# Patient Record
Sex: MALE | Race: WHITE | NOT HISPANIC OR LATINO | Employment: OTHER | URBAN - METROPOLITAN AREA
[De-identification: names, ages, dates, MRNs, and addresses within clinical notes are randomized per-mention and may not be internally consistent; named-entity substitution may affect disease eponyms.]

---

## 2018-04-05 LAB
ALBUMIN SERPL BCP-MCNC: 4.5 G/DL (ref 3.5–5.7)
ALP SERPL-CCNC: 52 IU/L (ref 55–165)
ALT SERPL W P-5'-P-CCNC: 19 IU/L (ref 10–35)
ANION GAP SERPL CALCULATED.3IONS-SCNC: 12.6 MM/L
AST SERPL W P-5'-P-CCNC: 13 U/L (ref 8–27)
BASOPHILS # BLD AUTO: 0.1 X3/UL (ref 0–0.3)
BASOPHILS # BLD AUTO: 1.2 % (ref 0–2)
BILIRUB SERPL-MCNC: 0.6 MG/DL (ref 0.3–1)
BILIRUB UR QL STRIP: NEGATIVE
BNP SERPL-MCNC: 9 PG/ML (ref 1–100)
BUN SERPL-MCNC: 17 MG/DL (ref 7–25)
CALCIUM SERPL-MCNC: 9.6 MG/DL (ref 8.6–10.5)
CHLORIDE SERPL-SCNC: 105 MM/L (ref 98–107)
CHOLEST SERPL-MCNC: 220 MG/DL (ref 0–200)
CLARITY UR: CLEAR
CO2 SERPL-SCNC: 26 MM/L (ref 21–31)
COLOR UR: YELLOW
CREAT SERPL-MCNC: 1.11 MG/DL (ref 0.7–1.3)
DEPRECATED RDW RBC AUTO: 13.6 %
EGFR (HISTORICAL): > 60 GFR
EGFR AFRICAN AMERICAN (HISTORICAL): > 60 GFR
EOSINOPHIL # BLD AUTO: 0.2 X3/UL (ref 0–0.5)
EOSINOPHIL NFR BLD AUTO: 4 % (ref 0–5)
ERYTHROCYTE SEDIMENTATION RATE (HISTORICAL): 2 MM/HR
EST. AVERAGE GLUCOSE BLD GHB EST-MCNC: 142 MG/DL
GLUCOSE (HISTORICAL): 130 MG/DL (ref 65–99)
GLUCOSE UR STRIP-MCNC: NEGATIVE MG/DL
HBA1C MFR BLD HPLC: 6.6 % (ref 4–6.2)
HCT VFR BLD AUTO: 48.7 % (ref 42–52)
HDLC SERPL-MCNC: 42 MG/DL (ref 40–60)
HGB BLD-MCNC: 16.8 G/DL (ref 14–18)
HGB UR QL STRIP.AUTO: NEGATIVE
KETONES UR STRIP-MCNC: NEGATIVE MG/DL
LDLC SERPL CALC-MCNC: 129.8 MG/DL (ref 75–193)
LEUKOCYTE ESTERASE UR QL STRIP: NEGATIVE
LYMPHOCYTES # BLD AUTO: 1.6 X3/UL (ref 1.2–4.2)
LYMPHOCYTES NFR BLD AUTO: 30.1 % (ref 20.5–51.1)
MCH RBC QN AUTO: 30.2 PG (ref 26–34)
MCHC RBC AUTO-ENTMCNC: 34.6 G/DL (ref 31–37)
MCV RBC AUTO: 87.4 FL (ref 81–99)
MONOCYTES # BLD AUTO: 0.5 X3/UL (ref 0–1)
MONOCYTES NFR BLD AUTO: 8.5 % (ref 1.7–12)
NEUTROPHILS # BLD AUTO: 3 X3/UL (ref 1.4–6.5)
NEUTS SEG NFR BLD AUTO: 56.2 % (ref 42.2–75.2)
NITRITE UR QL STRIP: NEGATIVE
OSMOLALITY, SERUM (HISTORICAL): 281 MOSM (ref 262–291)
PH UR STRIP.AUTO: 5.5 [PH] (ref 4.5–8)
PLATELET # BLD AUTO: 167 X3/UL (ref 130–400)
PMV BLD AUTO: 9.1 FL
POTASSIUM SERPL-SCNC: 4.6 MM/L (ref 3.5–5.5)
PROT UR STRIP-MCNC: NEGATIVE MG/DL
PSA (HISTORICAL): 1.96 NG/ML (ref 0–4)
RBC # BLD AUTO: 5.57 X6/UL (ref 4.3–5.9)
SODIUM SERPL-SCNC: 139 MM/L (ref 134–143)
SP GR UR STRIP.AUTO: 1.02 (ref 1–1.03)
TOTAL PROTEIN (HISTORICAL): 6.9 G/DL (ref 6.4–8.9)
TRIGL SERPL-MCNC: 240 MG/DL (ref 44–166)
TSH SERPL DL<=0.05 MIU/L-ACNC: 0.58 UIU/M (ref 0.45–5.33)
UROBILINOGEN UR QL STRIP.AUTO: 0.2 EU/DL (ref 0.2–8)
VLDL CHOLESTEROL (HISTORICAL): 48 MG/DL (ref 5–51)
WBC # BLD AUTO: 5.4 X3/UL (ref 4.8–10.8)

## 2018-07-19 DIAGNOSIS — E78.5 HYPERLIPIDEMIA, UNSPECIFIED HYPERLIPIDEMIA TYPE: Primary | ICD-10-CM

## 2018-07-20 RX ORDER — SIMVASTATIN 20 MG
20 TABLET ORAL
Qty: 90 TABLET | Refills: 3 | Status: SHIPPED | OUTPATIENT
Start: 2018-07-20 | End: 2019-12-13 | Stop reason: ALTCHOICE

## 2018-08-15 PROBLEM — R06.00 DYSPNEA: Status: ACTIVE | Noted: 2018-08-15

## 2018-08-15 PROBLEM — E78.00 HIGH CHOLESTEROL: Status: ACTIVE | Noted: 2018-08-15

## 2018-08-15 PROBLEM — I10 HIGH BLOOD PRESSURE: Status: ACTIVE | Noted: 2018-08-15

## 2018-08-30 DIAGNOSIS — E11.9 TYPE 2 DIABETES MELLITUS WITHOUT COMPLICATION, WITHOUT LONG-TERM CURRENT USE OF INSULIN (HCC): Primary | ICD-10-CM

## 2019-01-16 ENCOUNTER — TELEPHONE (OUTPATIENT)
Dept: FAMILY MEDICINE CLINIC | Facility: CLINIC | Age: 69
End: 2019-01-16

## 2019-01-16 DIAGNOSIS — R73.09 ELEVATED GLUCOSE LEVEL: Primary | ICD-10-CM

## 2019-01-16 DIAGNOSIS — E11.8 TYPE 2 DIABETES MELLITUS WITH COMPLICATION, WITH LONG-TERM CURRENT USE OF INSULIN (HCC): ICD-10-CM

## 2019-01-16 DIAGNOSIS — R73.03 PRE-DIABETES: Primary | ICD-10-CM

## 2019-01-16 DIAGNOSIS — Z79.4 TYPE 2 DIABETES MELLITUS WITH COMPLICATION, WITH LONG-TERM CURRENT USE OF INSULIN (HCC): ICD-10-CM

## 2019-01-17 ENCOUNTER — TRANSCRIBE ORDERS (OUTPATIENT)
Dept: ADMINISTRATIVE | Facility: HOSPITAL | Age: 69
End: 2019-01-17

## 2019-01-17 ENCOUNTER — APPOINTMENT (OUTPATIENT)
Dept: LAB | Facility: HOSPITAL | Age: 69
End: 2019-01-17
Attending: INTERNAL MEDICINE
Payer: COMMERCIAL

## 2019-01-17 DIAGNOSIS — R73.03 PRE-DIABETES: ICD-10-CM

## 2019-01-17 DIAGNOSIS — R73.03 PRE-DIABETES: Primary | ICD-10-CM

## 2019-01-17 LAB
ALBUMIN SERPL BCP-MCNC: 4.4 G/DL (ref 3.5–5.7)
ALP SERPL-CCNC: 55 U/L (ref 55–165)
ALT SERPL W P-5'-P-CCNC: 19 U/L (ref 7–52)
ANION GAP SERPL CALCULATED.3IONS-SCNC: 9 MMOL/L (ref 4–13)
AST SERPL W P-5'-P-CCNC: 19 U/L (ref 13–39)
BASOPHILS # BLD AUTO: 0.1 THOUSANDS/ΜL (ref 0–0.1)
BASOPHILS NFR BLD AUTO: 1 % (ref 0–1)
BILIRUB SERPL-MCNC: 0.6 MG/DL (ref 0.2–1)
BILIRUB UR QL STRIP: NEGATIVE
BUN SERPL-MCNC: 18 MG/DL (ref 7–25)
CALCIUM SERPL-MCNC: 9.2 MG/DL (ref 8.6–10.5)
CHLORIDE SERPL-SCNC: 102 MMOL/L (ref 98–107)
CLARITY UR: CLEAR
CO2 SERPL-SCNC: 25 MMOL/L (ref 21–31)
COLOR UR: YELLOW
CREAT SERPL-MCNC: 1.02 MG/DL (ref 0.7–1.3)
EOSINOPHIL # BLD AUTO: 0.2 THOUSAND/ΜL (ref 0–0.61)
EOSINOPHIL NFR BLD AUTO: 3 % (ref 0–6)
ERYTHROCYTE [DISTWIDTH] IN BLOOD BY AUTOMATED COUNT: 13.5 % (ref 11.6–15.1)
EST. AVERAGE GLUCOSE BLD GHB EST-MCNC: 148 MG/DL
GFR SERPL CREATININE-BSD FRML MDRD: 76 ML/MIN/1.73SQ M
GLUCOSE P FAST SERPL-MCNC: 142 MG/DL (ref 65–99)
GLUCOSE UR STRIP-MCNC: NEGATIVE MG/DL
HBA1C MFR BLD: 6.8 % (ref 4.2–6.3)
HCT VFR BLD AUTO: 50.8 % (ref 42–52)
HGB BLD-MCNC: 17.1 G/DL (ref 12–17)
HGB UR QL STRIP.AUTO: NEGATIVE
KETONES UR STRIP-MCNC: NEGATIVE MG/DL
LEUKOCYTE ESTERASE UR QL STRIP: NEGATIVE
LYMPHOCYTES # BLD AUTO: 1.9 THOUSANDS/ΜL (ref 0.6–4.47)
LYMPHOCYTES NFR BLD AUTO: 29 % (ref 14–44)
MCH RBC QN AUTO: 29.8 PG (ref 26.8–34.3)
MCHC RBC AUTO-ENTMCNC: 33.6 G/DL (ref 31.4–37.4)
MCV RBC AUTO: 89 FL (ref 82–98)
MONOCYTES # BLD AUTO: 0.6 THOUSAND/ΜL (ref 0.17–1.22)
MONOCYTES NFR BLD AUTO: 10 % (ref 4–12)
NEUTROPHILS # BLD AUTO: 3.8 THOUSANDS/ΜL (ref 1.85–7.62)
NEUTS SEG NFR BLD AUTO: 58 % (ref 43–75)
NITRITE UR QL STRIP: NEGATIVE
NRBC BLD AUTO-RTO: 0 /100 WBCS
PH UR STRIP.AUTO: 6 [PH] (ref 5–8)
PLATELET # BLD AUTO: 168 THOUSANDS/UL (ref 149–390)
PMV BLD AUTO: 9.2 FL (ref 8.9–12.7)
POTASSIUM SERPL-SCNC: 4.5 MMOL/L (ref 3.5–5.5)
PROT SERPL-MCNC: 6.8 G/DL (ref 6.4–8.9)
PROT UR STRIP-MCNC: NEGATIVE MG/DL
RBC # BLD AUTO: 5.73 MILLION/UL (ref 3.88–5.62)
SODIUM SERPL-SCNC: 136 MMOL/L (ref 134–143)
SP GR UR STRIP.AUTO: 1.02 (ref 1–1.03)
UROBILINOGEN UR QL STRIP.AUTO: 0.2 E.U./DL
WBC # BLD AUTO: 6.5 THOUSAND/UL (ref 4.31–10.16)

## 2019-01-17 PROCEDURE — 80053 COMPREHEN METABOLIC PANEL: CPT

## 2019-01-17 PROCEDURE — 83036 HEMOGLOBIN GLYCOSYLATED A1C: CPT

## 2019-01-17 PROCEDURE — 85025 COMPLETE CBC W/AUTO DIFF WBC: CPT

## 2019-01-17 PROCEDURE — 36415 COLL VENOUS BLD VENIPUNCTURE: CPT

## 2019-01-17 PROCEDURE — 81003 URINALYSIS AUTO W/O SCOPE: CPT

## 2019-01-23 ENCOUNTER — OFFICE VISIT (OUTPATIENT)
Dept: FAMILY MEDICINE CLINIC | Facility: CLINIC | Age: 69
End: 2019-01-23
Payer: COMMERCIAL

## 2019-01-23 VITALS
RESPIRATION RATE: 16 BRPM | OXYGEN SATURATION: 99 % | BODY MASS INDEX: 35.79 KG/M2 | WEIGHT: 214.8 LBS | SYSTOLIC BLOOD PRESSURE: 133 MMHG | TEMPERATURE: 97.1 F | DIASTOLIC BLOOD PRESSURE: 80 MMHG | HEART RATE: 86 BPM | HEIGHT: 65 IN

## 2019-01-23 DIAGNOSIS — E11.9 TYPE 2 DIABETES MELLITUS WITHOUT COMPLICATION, WITHOUT LONG-TERM CURRENT USE OF INSULIN (HCC): ICD-10-CM

## 2019-01-23 DIAGNOSIS — Z01.00 DIABETIC EYE EXAM (HCC): ICD-10-CM

## 2019-01-23 DIAGNOSIS — Z79.4 TYPE 2 DIABETES MELLITUS WITH COMPLICATION, WITH LONG-TERM CURRENT USE OF INSULIN (HCC): ICD-10-CM

## 2019-01-23 DIAGNOSIS — Z29.9 PREVENTIVE MEASURE: ICD-10-CM

## 2019-01-23 DIAGNOSIS — E11.8 TYPE 2 DIABETES MELLITUS WITH COMPLICATION, WITH LONG-TERM CURRENT USE OF INSULIN (HCC): ICD-10-CM

## 2019-01-23 DIAGNOSIS — E78.00 HIGH CHOLESTEROL: ICD-10-CM

## 2019-01-23 DIAGNOSIS — E11.9 ENCOUNTER FOR DIABETIC FOOT EXAM (HCC): ICD-10-CM

## 2019-01-23 DIAGNOSIS — E11.9 DIABETIC EYE EXAM (HCC): ICD-10-CM

## 2019-01-23 DIAGNOSIS — I10 ESSENTIAL HYPERTENSION: ICD-10-CM

## 2019-01-23 DIAGNOSIS — Z11.59 ENCOUNTER FOR HEPATITIS C SCREENING TEST FOR LOW RISK PATIENT: Primary | ICD-10-CM

## 2019-01-23 PROCEDURE — 1101F PT FALLS ASSESS-DOCD LE1/YR: CPT | Performed by: INTERNAL MEDICINE

## 2019-01-23 PROCEDURE — 99214 OFFICE O/P EST MOD 30 MIN: CPT | Performed by: INTERNAL MEDICINE

## 2019-01-23 PROCEDURE — 3008F BODY MASS INDEX DOCD: CPT | Performed by: INTERNAL MEDICINE

## 2019-01-23 PROCEDURE — 3079F DIAST BP 80-89 MM HG: CPT | Performed by: INTERNAL MEDICINE

## 2019-01-23 PROCEDURE — 3075F SYST BP GE 130 - 139MM HG: CPT | Performed by: INTERNAL MEDICINE

## 2019-01-23 PROCEDURE — 1036F TOBACCO NON-USER: CPT | Performed by: INTERNAL MEDICINE

## 2019-01-23 PROCEDURE — 1160F RVW MEDS BY RX/DR IN RCRD: CPT | Performed by: INTERNAL MEDICINE

## 2019-01-23 RX ORDER — ASPIRIN 81 MG/1
81 TABLET ORAL DAILY
Qty: 90 TABLET | Refills: 3 | Status: SHIPPED | OUTPATIENT
Start: 2019-01-23 | End: 2020-08-03

## 2019-01-23 NOTE — PROGRESS NOTES
Vitals:    01/23/19 1108   BP: 133/80   Pulse: 86   Resp: 16   Temp: (!) 97 1 °F (36 2 °C)   TempSrc: Tympanic   SpO2: 99%   Weight: 97 4 kg (214 lb 12 8 oz)   Height: 5' 5" (1 651 m)       Assessment/Plan:    Diagnoses and all orders for this visit:    Encounter for hepatitis C screening test for low risk patient  -     Hepatitis C antibody; Future    Encounter for diabetic foot exam ([facility] )  -     Diabetic foot exam; Future    Diabetic eye exam [facility])  -     Ambulatory Referral to Ophthalmology; Future    Type 2 diabetes mellitus with complication, with long-term current use of insulin (HCC)  -     Microalbumin / creatinine urine ratio  -     Lipid panel; Future    Essential hypertension  -     TSH, 3rd generation with Free T4 reflex; Future  -     CBC and differential; Future  -     Urinalysis with reflex to microscopic    High cholesterol    Type 2 diabetes mellitus without complication, without long-term current use of insulin (HCC)    Preventive measure  -     aspirin (ECOTRIN LOW STRENGTH) 81 mg EC tablet; Take 1 tablet (81 mg total) by mouth daily        Subjective:      Patient ID: Alvino Koroma is a 80 y o  male      HPI    The following portions of the patient's history were reviewed and updated as appropriate: allergies, current medications, past family history, past medical history, past social history, past surgical history and problem list       Current Outpatient Prescriptions:     metFORMIN (GLUCOPHAGE) 500 mg tablet, Take 1 tablet (500 mg total) by mouth daily with breakfast, Disp: 90 tablet, Rfl: 1    simvastatin (ZOCOR) 20 mg tablet, Take 1 tablet (20 mg total) by mouth daily at bedtime, Disp: 90 tablet, Rfl: 3    aspirin (ECOTRIN LOW STRENGTH) 81 mg EC tablet, Take 1 tablet (81 mg total) by mouth daily, Disp: 90 tablet, Rfl: 3    Past Medical History:   Diagnosis Date    Arthritis     Dyslipidemia     GERD (gastroesophageal reflux disease)     Nodule of left lung 2007    negative for CA  Type 2 diabetes mellitus without complication, without long-term current use of insulin ([facility] ) 1/23/2019       Past Surgical History:   Procedure Laterality Date    FOOT TENDON SURGERY      heel       Social History       Patient Active Problem List   Diagnosis    High cholesterol    High blood pressure    Dyspnea    Type 2 diabetes mellitus without complication, without long-term current use of insulin ([facility])           Review of Systems      Objective:      /80   Pulse 86   Temp (!) 97 1 °F (36 2 °C) (Tympanic)   Resp 16   Ht 5' 5" (1 651 m)   Wt 97 4 kg (214 lb 12 8 oz)   SpO2 99%   BMI 35 74 kg/m²          Physical Exam   Cardiovascular: Pulses are no weak pulses  Pulses:       Dorsalis pedis pulses are 2+ on the right side, and 2+ on the left side  Posterior tibial pulses are 2+ on the right side, and 2+ on the left side  Feet:   Right Foot:   Skin Integrity: Negative for ulcer, skin breakdown, erythema, warmth, callus or dry skin  Left Foot:   Skin Integrity: Negative for ulcer, skin breakdown, erythema, warmth, callus or dry skin             Appointment on 01/17/2019   Component Date Value Ref Range Status    WBC 01/17/2019 6 50  4 31 - 10 16 Thousand/uL Final    RBC 01/17/2019 5 73* 3 88 - 5 62 Million/uL Final    Hemoglobin 01/17/2019 17 1* 12 0 - 17 0 g/dL Final    Hematocrit 01/17/2019 50 8  42 0 - 52 0 % Final    MCV 01/17/2019 89  82 - 98 fL Final    MCH 01/17/2019 29 8  26 8 - 34 3 pg Final    MCHC 01/17/2019 33 6  31 4 - 37 4 g/dL Final    RDW 01/17/2019 13 5  11 6 - 15 1 % Final    MPV 01/17/2019 9 2  8 9 - 12 7 fL Final    Platelets 62/74/3213 168  149 - 390 Thousands/uL Final    nRBC 01/17/2019 0  /100 WBCs Final    Neutrophils Relative 01/17/2019 58  43 - 75 % Final    Lymphocytes Relative 01/17/2019 29  14 - 44 % Final    Monocytes Relative 01/17/2019 10  4 - 12 % Final    Eosinophils Relative 01/17/2019 3  0 - 6 % Final    Basophils Relative 01/17/2019 1  0 - 1 % Final    Neutrophils Absolute 01/17/2019 3 80  1 85 - 7 62 Thousands/µL Final    Lymphocytes Absolute 01/17/2019 1 90  0 60 - 4 47 Thousands/µL Final    Monocytes Absolute 01/17/2019 0 60  0 17 - 1 22 Thousand/µL Final    Eosinophils Absolute 01/17/2019 0 20  0 00 - 0 61 Thousand/µL Final    Basophils Absolute 01/17/2019 0 10  0 00 - 0 10 Thousands/µL Final    Sodium 01/17/2019 136  134 - 143 mmol/L Final    Potassium 01/17/2019 4 5  3 5 - 5 5 mmol/L Final    Chloride 01/17/2019 102  98 - 107 mmol/L Final    CO2 01/17/2019 25  21 - 31 mmol/L Final    ANION GAP 01/17/2019 9  4 - 13 mmol/L Final    BUN 01/17/2019 18  7 - 25 mg/dL Final    Creatinine 01/17/2019 1 02  0 70 - 1 30 mg/dL Final    Standardized to IDMS reference method    Glucose, Fasting 01/17/2019 142* 65 - 99 mg/dL Final      Specimen collection should occur prior to Sulfasalazine administration due to the potential for falsely depressed results  Specimen collection should occur prior to Sulfapyridine administration due to the potential for falsely elevated results   Calcium 01/17/2019 9 2  8 6 - 10 5 mg/dL Final    AST 01/17/2019 19  13 - 39 U/L Final      Specimen collection should occur prior to Sulfasalazine administration due to the potential for falsely depressed results   ALT 01/17/2019 19  7 - 52 U/L Final      Specimen collection should occur prior to Sulfasalazine administration due to the potential for falsely depressed results       Alkaline Phosphatase 01/17/2019 55  55 - 165 U/L Final    Total Protein 01/17/2019 6 8  6 4 - 8 9 g/dL Final    Albumin 01/17/2019 4 4  3 5 - 5 7 g/dL Final    Total Bilirubin 01/17/2019 0 60  0 20 - 1 00 mg/dL Final    eGFR 01/17/2019 76  ml/min/1 73sq m Final    Hemoglobin A1C 01/17/2019 6 8* 4 2 - 6 3 % Final    EAG 01/17/2019 148  mg/dl Final   Telephone on 01/16/2019   Component Date Value Ref Range Status    Color, UA 01/17/2019 Yellow  Yellow, Straw Final  Clarity, UA 01/17/2019 Clear  Hazy, Clear Final    Specific Gravity, UA 01/17/2019 1 020  1 005 - 1 030 Final    pH, UA 01/17/2019 6 0  5 0-<8 0 Final    Leukocytes, UA 01/17/2019 Negative  Negative Final    Nitrite, UA 01/17/2019 Negative  Negative Final    Protein, UA 01/17/2019 Negative  Negative, Interference- unable to analyze mg/dl Final    Glucose, UA 01/17/2019 Negative  Negative mg/dl Final    Ketones, UA 01/17/2019 Negative  Negative mg/dl Final    Urobilinogen, UA 01/17/2019 0 2  0 2, 1 0 E U /dl E U /dl Final    Bilirubin, UA 01/17/2019 Negative  Negative Final    Blood, UA 01/17/2019 Negative  Negative Final       No results found  Social History     Social History Narrative    No narrative on file       Family History   Problem Relation Age of Onset    COPD Father         80 yrs   Roselyn Silva Cerebral aneurysm Mother         80 yrs       Past Surgical History:   Procedure Laterality Date    FOOT TENDON SURGERY      heel       No Known Allergies  Patient's shoes and socks removed  Right Foot/Ankle   Right Foot Inspection  Skin Exam: skin normal and skin intact no dry skin, no warmth, no callus, no erythema, no maceration, no abnormal color, no pre-ulcer, no ulcer and no callus                          Toe Exam: ROM and strength within normal limits  Sensory   Vibration: intact  Proprioception: intact   Monofilament testing: intact  Vascular  Capillary refills: < 3 seconds  The right DP pulse is 2+  The right PT pulse is 2+     Right Toe  - Comprehensive Exam  Ecchymosis: none  Arch: normal  Hammertoes: absent  Claw Toes: absent  Swelling: none   Tenderness: none         Left Foot/Ankle  Left Foot Inspection  Skin Exam: skin normal and skin intactno dry skin, no warmth, no erythema, no maceration, normal color, no pre-ulcer, no ulcer and no callus                         Toe Exam: ROM and strength within normal limits                   Sensory   Vibration: intact  Proprioception: intact  Monofilament: intact  Vascular  Capillary refills: < 3 seconds  The left DP pulse is 2+  The left PT pulse is 2+  Left Toe  - Comprehensive Exam  Ecchymosis: none  Arch: normal  Hammertoes: absent  Claw toes: absent  Swelling: none   Tenderness: none       Assign Risk Category:  No deformity present; No loss of protective sensation;  No weak pulses       Risk: 0

## 2019-01-23 NOTE — PROGRESS NOTES
Vitals:    01/23/19 1108   BP: 133/80   Pulse: 86   Resp: 16   Temp: (!) 97 1 °F (36 2 °C)   TempSrc: Tympanic   SpO2: 99%   Weight: 97 4 kg (214 lb 12 8 oz)   Height: 5' 5" (1 651 m)       Assessment/Plan:    Diagnoses and all orders for this visit:    Encounter for hepatitis C screening test for low risk patient  -     Hepatitis C antibody; Future    Encounter for diabetic foot exam ([facility] )  -     Diabetic foot exam; Future    Diabetic eye exam [facility])  -     Ambulatory Referral to Ophthalmology; Future    Type 2 diabetes mellitus with complication, with long-term current use of insulin (HCC)  -     Microalbumin / creatinine urine ratio  -     Lipid panel; Future    Essential hypertension  -     TSH, 3rd generation with Free T4 reflex; Future  -     CBC and differential; Future  -     Urinalysis with reflex to microscopic    High cholesterol    Type 2 diabetes mellitus without complication, without long-term current use of insulin (HCC)    Preventive measure  -     aspirin (ECOTRIN LOW STRENGTH) 81 mg EC tablet; Take 1 tablet (81 mg total) by mouth daily        Subjective:      Patient ID: Antonio Joe is a 80 y o  male      HPI    The following portions of the patient's history were reviewed and updated as appropriate: allergies, current medications, past family history, past medical history, past social history, past surgical history and problem list       Current Outpatient Prescriptions:     metFORMIN (GLUCOPHAGE) 500 mg tablet, Take 1 tablet (500 mg total) by mouth daily with breakfast, Disp: 90 tablet, Rfl: 1    simvastatin (ZOCOR) 20 mg tablet, Take 1 tablet (20 mg total) by mouth daily at bedtime, Disp: 90 tablet, Rfl: 3    aspirin (ECOTRIN LOW STRENGTH) 81 mg EC tablet, Take 1 tablet (81 mg total) by mouth daily, Disp: 90 tablet, Rfl: 3    Past Medical History:   Diagnosis Date    Arthritis     Dyslipidemia     GERD (gastroesophageal reflux disease)     Nodule of left lung 2007    negative for CA  Type 2 diabetes mellitus without complication, without long-term current use of insulin ([facility] ) 1/23/2019       Past Surgical History:   Procedure Laterality Date    FOOT TENDON SURGERY      heel       Social History       Patient Active Problem List   Diagnosis    High cholesterol    High blood pressure    Dyspnea    Type 2 diabetes mellitus without complication, without long-term current use of insulin ([facility])           Review of Systems      Objective:      /80   Pulse 86   Temp (!) 97 1 °F (36 2 °C) (Tympanic)   Resp 16   Ht 5' 5" (1 651 m)   Wt 97 4 kg (214 lb 12 8 oz)   SpO2 99%   BMI 35 74 kg/m²          Physical Exam        Appointment on 01/17/2019   Component Date Value Ref Range Status    WBC 01/17/2019 6 50  4 31 - 10 16 Thousand/uL Final    RBC 01/17/2019 5 73* 3 88 - 5 62 Million/uL Final    Hemoglobin 01/17/2019 17 1* 12 0 - 17 0 g/dL Final    Hematocrit 01/17/2019 50 8  42 0 - 52 0 % Final    MCV 01/17/2019 89  82 - 98 fL Final    MCH 01/17/2019 29 8  26 8 - 34 3 pg Final    MCHC 01/17/2019 33 6  31 4 - 37 4 g/dL Final    RDW 01/17/2019 13 5  11 6 - 15 1 % Final    MPV 01/17/2019 9 2  8 9 - 12 7 fL Final    Platelets 42/93/4416 168  149 - 390 Thousands/uL Final    nRBC 01/17/2019 0  /100 WBCs Final    Neutrophils Relative 01/17/2019 58  43 - 75 % Final    Lymphocytes Relative 01/17/2019 29  14 - 44 % Final    Monocytes Relative 01/17/2019 10  4 - 12 % Final    Eosinophils Relative 01/17/2019 3  0 - 6 % Final    Basophils Relative 01/17/2019 1  0 - 1 % Final    Neutrophils Absolute 01/17/2019 3 80  1 85 - 7 62 Thousands/µL Final    Lymphocytes Absolute 01/17/2019 1 90  0 60 - 4 47 Thousands/µL Final    Monocytes Absolute 01/17/2019 0 60  0 17 - 1 22 Thousand/µL Final    Eosinophils Absolute 01/17/2019 0 20  0 00 - 0 61 Thousand/µL Final    Basophils Absolute 01/17/2019 0 10  0 00 - 0 10 Thousands/µL Final    Sodium 01/17/2019 136  134 - 143 mmol/L Final    Potassium 01/17/2019 4 5  3 5 - 5 5 mmol/L Final    Chloride 01/17/2019 102  98 - 107 mmol/L Final    CO2 01/17/2019 25  21 - 31 mmol/L Final    ANION GAP 01/17/2019 9  4 - 13 mmol/L Final    BUN 01/17/2019 18  7 - 25 mg/dL Final    Creatinine 01/17/2019 1 02  0 70 - 1 30 mg/dL Final    Standardized to IDMS reference method    Glucose, Fasting 01/17/2019 142* 65 - 99 mg/dL Final      Specimen collection should occur prior to Sulfasalazine administration due to the potential for falsely depressed results  Specimen collection should occur prior to Sulfapyridine administration due to the potential for falsely elevated results   Calcium 01/17/2019 9 2  8 6 - 10 5 mg/dL Final    AST 01/17/2019 19  13 - 39 U/L Final      Specimen collection should occur prior to Sulfasalazine administration due to the potential for falsely depressed results   ALT 01/17/2019 19  7 - 52 U/L Final      Specimen collection should occur prior to Sulfasalazine administration due to the potential for falsely depressed results       Alkaline Phosphatase 01/17/2019 55  55 - 165 U/L Final    Total Protein 01/17/2019 6 8  6 4 - 8 9 g/dL Final    Albumin 01/17/2019 4 4  3 5 - 5 7 g/dL Final    Total Bilirubin 01/17/2019 0 60  0 20 - 1 00 mg/dL Final    eGFR 01/17/2019 76  ml/min/1 73sq m Final    Hemoglobin A1C 01/17/2019 6 8* 4 2 - 6 3 % Final    EAG 01/17/2019 148  mg/dl Final   Telephone on 01/16/2019   Component Date Value Ref Range Status    Color, UA 01/17/2019 Yellow  Yellow, Straw Final    Clarity, UA 01/17/2019 Clear  Hazy, Clear Final    Specific Gravity, UA 01/17/2019 1 020  1 005 - 1 030 Final    pH, UA 01/17/2019 6 0  5 0-<8 0 Final    Leukocytes, UA 01/17/2019 Negative  Negative Final    Nitrite, UA 01/17/2019 Negative  Negative Final    Protein, UA 01/17/2019 Negative  Negative, Interference- unable to analyze mg/dl Final    Glucose, UA 01/17/2019 Negative  Negative mg/dl Final    Ketones, UA 01/17/2019 Negative  Negative mg/dl Final    Urobilinogen, UA 01/17/2019 0 2  0 2, 1 0 E U /dl E U /dl Final    Bilirubin, UA 01/17/2019 Negative  Negative Final    Blood, UA 01/17/2019 Negative  Negative Final       No results found      Social History     Social History Narrative    No narrative on file       Family History   Problem Relation Age of Onset    COPD Father         80 yrs    Cerebral aneurysm Mother         80 yrs       Past Surgical History:   Procedure Laterality Date    FOOT TENDON SURGERY      heel       No Known Allergies

## 2019-01-23 NOTE — PROGRESS NOTES
Assessment/Plan diabetes mellitus type 2  This is in good control is A1c is 6 8 is fasting sugar 142 mg%  Hypertension no evidence of this is found at this point he just avoids is a salt in the diet  Hypercholesterolemia patient is on Zocor 20 mg daily  It diabetic foot exam was given to him which was completely normal  The recent lab and blood tests were reviewed and discussed with the patient his CBCs borderline this will be repeated again with a lipid panel TSH and a PSA     Diagnoses and all orders for this visit:    Encounter for hepatitis C screening test for low risk patient  -     Hepatitis C antibody; Future    Encounter for diabetic foot exam ([facility] )  -     Diabetic foot exam; Future    Diabetic eye exam [facility])  -     Ambulatory Referral to Ophthalmology; Future    Type 2 diabetes mellitus with complication, with long-term current use of insulin (HCC)  -     Microalbumin / creatinine urine ratio  -     Lipid panel; Future    Essential hypertension  -     TSH, 3rd generation with Free T4 reflex; Future  -     CBC and differential; Future  -     Urinalysis with reflex to microscopic    High cholesterol    Type 2 diabetes mellitus without complication, without long-term current use of insulin (HCC)    Preventive measure  -     aspirin (ECOTRIN LOW STRENGTH) 81 mg EC tablet; Take 1 tablet (81 mg total) by mouth daily          Subjective:     Patient ID: Sukhi Feng is a 80 y o  male  HPI 67-year-old white male is coming in for a routine follow-up visit he is seeing me after relapse of 1 year  Offers no new complaints no chest pain no shortness of breathing no nausea vomiting patient is known to have history of hypertension hypercholesterolemia obesity and diabetes mellitus type 2 a diabetic foot exam was also given and in addition to this visit    Review of Systems   Constitutional: Negative  HENT: Negative  Eyes: Negative  Respiratory: Negative  Cardiovascular: Negative  Gastrointestinal: Negative  Endocrine: Negative  Genitourinary: Negative  Musculoskeletal: Negative  Skin: Negative  Allergic/Immunologic: Negative  Neurological: Negative  Hematological: Negative  Psychiatric/Behavioral: Negative  Objective:     Physical Exam   Constitutional: He is oriented to person, place, and time  He appears well-developed and well-nourished  HENT:   Head: Normocephalic  Mouth/Throat: Oropharynx is clear and moist    Eyes: Pupils are equal, round, and reactive to light  Conjunctivae are normal    Neck: Normal range of motion  Neck supple  Cardiovascular: Normal rate and normal heart sounds  Pulmonary/Chest: Effort normal and breath sounds normal    Abdominal: Soft  Bowel sounds are normal    Musculoskeletal: Normal range of motion  Neurological: He is alert and oriented to person, place, and time  Skin: Skin is warm and dry

## 2019-01-23 NOTE — PROGRESS NOTES
Vitals:    01/23/19 1108   BP: 144/82   Pulse: 86   Resp: 16   Temp: (!) 97 1 °F (36 2 °C)   TempSrc: Tympanic   SpO2: 99%   Weight: 97 4 kg (214 lb 12 8 oz)   Height: 5' 5" (1 651 m)       Assessment/Plan:    Diagnoses and all orders for this visit:    Encounter for hepatitis C screening test for low risk patient  -     Hepatitis C antibody; Future    Encounter for diabetic foot exam ([place] [address] )  -     Diabetic foot exam; Future    Diabetic eye exam [facility])  -     Ambulatory Referral to Ophthalmology; Future    Type 2 diabetes mellitus with complication, with long-term current use of insulin (HCC)  -     Microalbumin / creatinine urine ratio        Subjective:      Patient ID: Olivia Do is a 80 y o  male      HPI    The following portions of the patient's history were reviewed and updated as appropriate: allergies, current medications, past family history, past medical history, past social history, past surgical history and problem list       Current Outpatient Prescriptions:     metFORMIN (GLUCOPHAGE) 500 mg tablet, Take 1 tablet (500 mg total) by mouth daily with breakfast, Disp: 90 tablet, Rfl: 1    simvastatin (ZOCOR) 20 mg tablet, Take 1 tablet (20 mg total) by mouth daily at bedtime, Disp: 90 tablet, Rfl: 3    Past Medical History:   Diagnosis Date    Arthritis     Dyslipidemia     GERD (gastroesophageal reflux disease)     Nodule of left lung 2007    negative for CA       Past Surgical History:   Procedure Laterality Date    FOOT TENDON SURGERY      heel       Social History       Patient Active Problem List   Diagnosis    High cholesterol    High blood pressure    Dyspnea           Review of Systems      Objective:      /82   Pulse 86   Temp (!) 97 1 °F (36 2 °C) (Tympanic)   Resp 16   Ht 5' 5" (1 651 m)   Wt 97 4 kg (214 lb 12 8 oz)   SpO2 99%   BMI 35 74 kg/m²          Physical Exam        Appointment on 01/17/2019   Component Date Value Ref Range Status    WBC 01/17/2019 6 50 4 31 - 10 16 Thousand/uL Final    RBC 01/17/2019 5 73* 3 88 - 5 62 Million/uL Final    Hemoglobin 01/17/2019 17 1* 12 0 - 17 0 g/dL Final    Hematocrit 01/17/2019 50 8  42 0 - 52 0 % Final    MCV 01/17/2019 89  82 - 98 fL Final    MCH 01/17/2019 29 8  26 8 - 34 3 pg Final    MCHC 01/17/2019 33 6  31 4 - 37 4 g/dL Final    RDW 01/17/2019 13 5  11 6 - 15 1 % Final    MPV 01/17/2019 9 2  8 9 - 12 7 fL Final    Platelets 27/46/3742 168  149 - 390 Thousands/uL Final    nRBC 01/17/2019 0  /100 WBCs Final    Neutrophils Relative 01/17/2019 58  43 - 75 % Final    Lymphocytes Relative 01/17/2019 29  14 - 44 % Final    Monocytes Relative 01/17/2019 10  4 - 12 % Final    Eosinophils Relative 01/17/2019 3  0 - 6 % Final    Basophils Relative 01/17/2019 1  0 - 1 % Final    Neutrophils Absolute 01/17/2019 3 80  1 85 - 7 62 Thousands/µL Final    Lymphocytes Absolute 01/17/2019 1 90  0 60 - 4 47 Thousands/µL Final    Monocytes Absolute 01/17/2019 0 60  0 17 - 1 22 Thousand/µL Final    Eosinophils Absolute 01/17/2019 0 20  0 00 - 0 61 Thousand/µL Final    Basophils Absolute 01/17/2019 0 10  0 00 - 0 10 Thousands/µL Final    Sodium 01/17/2019 136  134 - 143 mmol/L Final    Potassium 01/17/2019 4 5  3 5 - 5 5 mmol/L Final    Chloride 01/17/2019 102  98 - 107 mmol/L Final    CO2 01/17/2019 25  21 - 31 mmol/L Final    ANION GAP 01/17/2019 9  4 - 13 mmol/L Final    BUN 01/17/2019 18  7 - 25 mg/dL Final    Creatinine 01/17/2019 1 02  0 70 - 1 30 mg/dL Final    Standardized to IDMS reference method    Glucose, Fasting 01/17/2019 142* 65 - 99 mg/dL Final      Specimen collection should occur prior to Sulfasalazine administration due to the potential for falsely depressed results  Specimen collection should occur prior to Sulfapyridine administration due to the potential for falsely elevated results      Calcium 01/17/2019 9 2  8 6 - 10 5 mg/dL Final    AST 01/17/2019 19  13 - 39 U/L Final      Specimen collection should occur prior to Sulfasalazine administration due to the potential for falsely depressed results   ALT 01/17/2019 19  7 - 52 U/L Final      Specimen collection should occur prior to Sulfasalazine administration due to the potential for falsely depressed results   Alkaline Phosphatase 01/17/2019 55  55 - 165 U/L Final    Total Protein 01/17/2019 6 8  6 4 - 8 9 g/dL Final    Albumin 01/17/2019 4 4  3 5 - 5 7 g/dL Final    Total Bilirubin 01/17/2019 0 60  0 20 - 1 00 mg/dL Final    eGFR 01/17/2019 76  ml/min/1 73sq m Final    Hemoglobin A1C 01/17/2019 6 8* 4 2 - 6 3 % Final    EAG 01/17/2019 148  mg/dl Final   Telephone on 01/16/2019   Component Date Value Ref Range Status    Color, UA 01/17/2019 Yellow  Yellow, Straw Final    Clarity, UA 01/17/2019 Clear  Hazy, Clear Final    Specific Gravity, UA 01/17/2019 1 020  1 005 - 1 030 Final    pH, UA 01/17/2019 6 0  5 0-<8 0 Final    Leukocytes, UA 01/17/2019 Negative  Negative Final    Nitrite, UA 01/17/2019 Negative  Negative Final    Protein, UA 01/17/2019 Negative  Negative, Interference- unable to analyze mg/dl Final    Glucose, UA 01/17/2019 Negative  Negative mg/dl Final    Ketones, UA 01/17/2019 Negative  Negative mg/dl Final    Urobilinogen, UA 01/17/2019 0 2  0 2, 1 0 E U /dl E U /dl Final    Bilirubin, UA 01/17/2019 Negative  Negative Final    Blood, UA 01/17/2019 Negative  Negative Final       No results found  Social History     Social History Narrative    No narrative on file       Family History   Problem Relation Age of Onset    COPD Father         80 yrs   Ardeth Needs Cerebral aneurysm Mother         80 yrs       Past Surgical History:   Procedure Laterality Date    FOOT TENDON SURGERY      heel       No Known Allergies    Patient's shoes and socks removed

## 2019-01-30 ENCOUNTER — APPOINTMENT (OUTPATIENT)
Dept: LAB | Facility: HOSPITAL | Age: 69
End: 2019-01-30
Attending: INTERNAL MEDICINE
Payer: COMMERCIAL

## 2019-01-30 DIAGNOSIS — E11.8 TYPE 2 DIABETES MELLITUS WITH COMPLICATION, WITH LONG-TERM CURRENT USE OF INSULIN (HCC): ICD-10-CM

## 2019-01-30 DIAGNOSIS — Z11.59 ENCOUNTER FOR HEPATITIS C SCREENING TEST FOR LOW RISK PATIENT: ICD-10-CM

## 2019-01-30 DIAGNOSIS — I10 ESSENTIAL HYPERTENSION: ICD-10-CM

## 2019-01-30 DIAGNOSIS — Z79.4 TYPE 2 DIABETES MELLITUS WITH COMPLICATION, WITH LONG-TERM CURRENT USE OF INSULIN (HCC): ICD-10-CM

## 2019-01-30 LAB
BASOPHILS # BLD AUTO: 0 THOUSANDS/ΜL (ref 0–0.1)
BASOPHILS NFR BLD AUTO: 1 % (ref 0–1)
BILIRUB UR QL STRIP: NEGATIVE
CHOLEST SERPL-MCNC: 151 MG/DL (ref 0–200)
CLARITY UR: CLEAR
COLOR UR: YELLOW
CREAT UR-MCNC: 174 MG/DL
EOSINOPHIL # BLD AUTO: 0.2 THOUSAND/ΜL (ref 0–0.61)
EOSINOPHIL NFR BLD AUTO: 3 % (ref 0–6)
ERYTHROCYTE [DISTWIDTH] IN BLOOD BY AUTOMATED COUNT: 13.9 % (ref 11.6–15.1)
GLUCOSE UR STRIP-MCNC: NEGATIVE MG/DL
HCT VFR BLD AUTO: 49.7 % (ref 42–52)
HCV AB SER QL: NORMAL
HDLC SERPL-MCNC: 41 MG/DL (ref 40–60)
HGB BLD-MCNC: 16.6 G/DL (ref 12–17)
HGB UR QL STRIP.AUTO: NEGATIVE
KETONES UR STRIP-MCNC: NEGATIVE MG/DL
LDLC SERPL CALC-MCNC: 79 MG/DL (ref 0–100)
LEUKOCYTE ESTERASE UR QL STRIP: NEGATIVE
LYMPHOCYTES # BLD AUTO: 1.7 THOUSANDS/ΜL (ref 0.6–4.47)
LYMPHOCYTES NFR BLD AUTO: 30 % (ref 14–44)
MCH RBC QN AUTO: 29.8 PG (ref 26.8–34.3)
MCHC RBC AUTO-ENTMCNC: 33.5 G/DL (ref 31.4–37.4)
MCV RBC AUTO: 89 FL (ref 82–98)
MICROALBUMIN UR-MCNC: 8.6 MG/L (ref 0–20)
MICROALBUMIN/CREAT 24H UR: 5 MG/G CREATININE (ref 0–30)
MONOCYTES # BLD AUTO: 0.5 THOUSAND/ΜL (ref 0.17–1.22)
MONOCYTES NFR BLD AUTO: 9 % (ref 4–12)
NEUTROPHILS # BLD AUTO: 3.2 THOUSANDS/ΜL (ref 1.85–7.62)
NEUTS SEG NFR BLD AUTO: 58 % (ref 43–75)
NITRITE UR QL STRIP: NEGATIVE
NONHDLC SERPL-MCNC: 110 MG/DL
NRBC BLD AUTO-RTO: 0 /100 WBCS
PH UR STRIP.AUTO: 6 [PH] (ref 5–8)
PLATELET # BLD AUTO: 171 THOUSANDS/UL (ref 149–390)
PMV BLD AUTO: 9.3 FL (ref 8.9–12.7)
PROT UR STRIP-MCNC: NEGATIVE MG/DL
RBC # BLD AUTO: 5.59 MILLION/UL (ref 3.88–5.62)
SP GR UR STRIP.AUTO: 1.02 (ref 1–1.03)
TRIGL SERPL-MCNC: 154 MG/DL (ref 44–166)
TSH SERPL DL<=0.05 MIU/L-ACNC: 0.48 UIU/ML (ref 0.45–5.33)
UROBILINOGEN UR QL STRIP.AUTO: 0.2 E.U./DL
WBC # BLD AUTO: 5.6 THOUSAND/UL (ref 4.31–10.16)

## 2019-01-30 PROCEDURE — 81003 URINALYSIS AUTO W/O SCOPE: CPT | Performed by: INTERNAL MEDICINE

## 2019-01-30 PROCEDURE — 84443 ASSAY THYROID STIM HORMONE: CPT

## 2019-01-30 PROCEDURE — 80061 LIPID PANEL: CPT

## 2019-01-30 PROCEDURE — 85025 COMPLETE CBC W/AUTO DIFF WBC: CPT

## 2019-01-30 PROCEDURE — 82570 ASSAY OF URINE CREATININE: CPT | Performed by: INTERNAL MEDICINE

## 2019-01-30 PROCEDURE — 86803 HEPATITIS C AB TEST: CPT

## 2019-01-30 PROCEDURE — 3061F NEG MICROALBUMINURIA REV: CPT | Performed by: INTERNAL MEDICINE

## 2019-01-30 PROCEDURE — 82043 UR ALBUMIN QUANTITATIVE: CPT | Performed by: INTERNAL MEDICINE

## 2019-01-30 PROCEDURE — 36415 COLL VENOUS BLD VENIPUNCTURE: CPT

## 2019-03-11 DIAGNOSIS — E11.9 TYPE 2 DIABETES MELLITUS WITHOUT COMPLICATION, WITHOUT LONG-TERM CURRENT USE OF INSULIN (HCC): ICD-10-CM

## 2019-07-25 ENCOUNTER — OFFICE VISIT (OUTPATIENT)
Dept: FAMILY MEDICINE CLINIC | Facility: CLINIC | Age: 69
End: 2019-07-25
Payer: COMMERCIAL

## 2019-07-25 VITALS
OXYGEN SATURATION: 97 % | RESPIRATION RATE: 18 BRPM | BODY MASS INDEX: 33.15 KG/M2 | DIASTOLIC BLOOD PRESSURE: 68 MMHG | SYSTOLIC BLOOD PRESSURE: 134 MMHG | HEART RATE: 84 BPM | WEIGHT: 199 LBS | HEIGHT: 65 IN | TEMPERATURE: 98.5 F

## 2019-07-25 DIAGNOSIS — Z00.00 ANNUAL PHYSICAL EXAM: Primary | ICD-10-CM

## 2019-07-25 DIAGNOSIS — I10 ESSENTIAL HYPERTENSION: ICD-10-CM

## 2019-07-25 DIAGNOSIS — E66.09 CLASS 1 OBESITY DUE TO EXCESS CALORIES WITHOUT SERIOUS COMORBIDITY WITH BODY MASS INDEX (BMI) OF 33.0 TO 33.9 IN ADULT: ICD-10-CM

## 2019-07-25 DIAGNOSIS — E11.9 TYPE 2 DIABETES MELLITUS WITHOUT COMPLICATION, WITHOUT LONG-TERM CURRENT USE OF INSULIN (HCC): ICD-10-CM

## 2019-07-25 DIAGNOSIS — E78.00 HIGH CHOLESTEROL: ICD-10-CM

## 2019-07-25 DIAGNOSIS — Z12.11 COLON CANCER SCREENING: ICD-10-CM

## 2019-07-25 DIAGNOSIS — Z12.5 SCREENING PSA (PROSTATE SPECIFIC ANTIGEN): ICD-10-CM

## 2019-07-25 PROBLEM — E66.811 CLASS 1 OBESITY DUE TO EXCESS CALORIES WITHOUT SERIOUS COMORBIDITY WITH BODY MASS INDEX (BMI) OF 33.0 TO 33.9 IN ADULT: Status: ACTIVE | Noted: 2019-07-25

## 2019-07-25 LAB — SL AMB POCT HEMOGLOBIN AIC: 6 (ref ?–6.5)

## 2019-07-25 PROCEDURE — 3075F SYST BP GE 130 - 139MM HG: CPT | Performed by: NURSE PRACTITIONER

## 2019-07-25 PROCEDURE — 99214 OFFICE O/P EST MOD 30 MIN: CPT | Performed by: NURSE PRACTITIONER

## 2019-07-25 PROCEDURE — 3044F HG A1C LEVEL LT 7.0%: CPT | Performed by: NURSE PRACTITIONER

## 2019-07-25 PROCEDURE — 3008F BODY MASS INDEX DOCD: CPT | Performed by: NURSE PRACTITIONER

## 2019-07-25 PROCEDURE — 1036F TOBACCO NON-USER: CPT | Performed by: NURSE PRACTITIONER

## 2019-07-25 PROCEDURE — 99397 PER PM REEVAL EST PAT 65+ YR: CPT | Performed by: NURSE PRACTITIONER

## 2019-07-25 PROCEDURE — 1160F RVW MEDS BY RX/DR IN RCRD: CPT | Performed by: NURSE PRACTITIONER

## 2019-07-25 PROCEDURE — 83036 HEMOGLOBIN GLYCOSYLATED A1C: CPT | Performed by: NURSE PRACTITIONER

## 2019-07-25 NOTE — PROGRESS NOTES
HPI:  Adarsh Bush is a 80 y o  male here for his yearly health maintenance exam    Patient Active Problem List   Diagnosis    High cholesterol    High blood pressure    Dyspnea    Type 2 diabetes mellitus without complication, without long-term current use of insulin ([facility])    Class 1 obesity due to excess calories without serious comorbidity with body mass index (BMI) of 33 0 to 33 9 in adult     Past Medical History:   Diagnosis Date    Arthritis     Dyslipidemia     GERD (gastroesophageal reflux disease)     Nodule of left lung 2007    negative for CA    Type 2 diabetes mellitus without complication, without long-term current use of insulin ([facility]) 1/23/2019         Current Outpatient Medications   Medication Sig Dispense Refill    aspirin (ECOTRIN LOW STRENGTH) 81 mg EC tablet Take 1 tablet (81 mg total) by mouth daily 90 tablet 3    metFORMIN (GLUCOPHAGE) 500 mg tablet Take 1 tablet (500 mg total) by mouth daily with breakfast 90 tablet 1    simvastatin (ZOCOR) 20 mg tablet Take 1 tablet (20 mg total) by mouth daily at bedtime (Patient not taking: Reported on 7/25/2019) 90 tablet 3     No current facility-administered medications for this visit  No Known Allergies  Immunization History   Administered Date(s) Administered    H1N1, All Formulations 01/09/2010    INFLUENZA 09/01/2012, 11/04/2015, 10/13/2017, 10/15/2018    Pneumococcal Conjugate 13-Valent 10/15/2018    Zoster 11/04/2015       Patient Care Team:  Deniz Cano as PCP - General (Family Medicine)    Review of Systems   Constitutional: Negative for activity change, diaphoresis, fatigue and fever  HENT: Negative for congestion, facial swelling, hearing loss, rhinorrhea, sinus pressure, sinus pain, sneezing, sore throat and voice change  Eyes: Negative for discharge and visual disturbance  Respiratory: Negative for cough, choking, chest tightness, shortness of breath, wheezing and stridor      Cardiovascular: Negative for chest pain, palpitations and leg swelling  Gastrointestinal: Negative for abdominal distention, abdominal pain, constipation, diarrhea, nausea and vomiting  Endocrine: Negative for polydipsia, polyphagia and polyuria  Genitourinary: Negative for difficulty urinating, dysuria, frequency and urgency  Musculoskeletal: Negative for arthralgias, back pain, gait problem, joint swelling, myalgias, neck pain and neck stiffness  Skin: Negative for color change, rash and wound  Neurological: Negative for dizziness, syncope, speech difficulty, weakness, light-headedness and headaches  Hematological: Negative for adenopathy  Does not bruise/bleed easily  Psychiatric/Behavioral: Negative for agitation, behavioral problems, confusion, hallucinations, sleep disturbance and suicidal ideas  The patient is not nervous/anxious  Physical Exam :  Physical Exam   Constitutional: He is oriented to person, place, and time  He appears well-developed and well-nourished  No distress  Neck: Normal range of motion  Neck supple  No tracheal deviation present  No thyromegaly present  Cardiovascular: Normal rate, regular rhythm and normal heart sounds  No murmur heard  Pulmonary/Chest: Effort normal and breath sounds normal  No respiratory distress  He has no wheezes  Musculoskeletal: Normal range of motion  He exhibits no edema, tenderness or deformity  Lymphadenopathy:     He has no cervical adenopathy  Neurological: He is alert and oriented to person, place, and time  Skin: Skin is warm and dry  He is not diaphoretic  Psychiatric: He has a normal mood and affect  His behavior is normal  Judgment and thought content normal    Nursing note and vitals reviewed          Reviewed Updated [facility] Prior Wellness Visits:   Last Health Maintenance visit information was reviewed, patient interviewed , no change since last HM visit yes  Last HM visit information was reviewed, patient interviewed and updates made to the record today yes    Assessment and Plan:  1  Annual physical exam     2  Colon cancer screening  Ambulatory referral to Gastroenterology   3  Type 2 diabetes mellitus without complication, without long-term current use of insulin (HCC)  POCT hemoglobin A1c    Comprehensive metabolic panel    Microalbumin / creatinine urine ratio    HEMOGLOBIN A1C W/ EAG ESTIMATION   4  High cholesterol  Lipid panel   5  Essential hypertension  Comprehensive metabolic panel   6  Screening PSA (prostate specific antigen)  PSA, total and free   7   Class 1 obesity due to excess calories without serious comorbidity with body mass index (BMI) of 33 0 to 33 9 in adult         Health Maintenance Due   Topic Date Due    CRC Screening: Colonoscopy  1950    DM Eye Exam  08/03/1961    BMI: Followup Plan  08/03/1969    HEPATITIS B VACCINES (1 of 3 - Risk 3-dose series) 08/03/1970    DTaP,Tdap,and Td Vaccines (1 - Tdap) 08/03/1972    CRC Screening: FOBTx3/FIT  08/03/2001    INFLUENZA VACCINE  07/01/2019    HEMOGLOBIN A1C  07/17/2019

## 2019-07-25 NOTE — PROGRESS NOTES
[address] [place] [address] Primary Care        NAME: Khris Beltran is a 80 y o  male  : 1950    MRN: [id number]  DATE: 2019  TIME: 11:28 AM    Assessment and Plan   Type 2 diabetes mellitus without complication, without long-term current use of insulin ([facility] ) [E11 9]  1  Type 2 diabetes mellitus without complication, without long-term current use of insulin ([facility])  POCT hemoglobin A1c    Comprehensive metabolic panel    Microalbumin / creatinine urine ratio    HEMOGLOBIN A1C W/ EAG ESTIMATION   2  Colon cancer screening  Ambulatory referral to Gastroenterology   3  High cholesterol  Lipid panel   4  Essential hypertension  Comprehensive metabolic panel   5  Screening PSA (prostate specific antigen)  PSA, total and free   6  Class 1 obesity due to excess calories without serious comorbidity with body mass index (BMI) of 33 0 to 33 9 in adult           Patient Instructions     Patient Instructions   Complete blood work in 6 months (2020)  Nothing to eat or drink 8-10 hours prior to testing  Can have water or black coffee  Follow up in 6 months for routine visit, and to review blood work  Follow up with GI to set up colonoscopy, Dr Ivette Hare  Referral provided  Chief Complaint     Chief Complaint   Patient presents with    Diabetes    Hypertension         History of Present Illness       Patient here for routine follow up  Cholesterol-was taking Simvastatin  Ran out about 60 days ago and has not been taking during this time  Recent labs were WNL  Will not restart this medication at this time  Will recheck labs in 2020, and evaluate at this time  Patient started diet in 2019, went to Dr Domingo Goyal in [place]  Is taking supplements, shakes, and has lost 20 lbs  DM- HbgA1C today 6 0  Currently taking Metformin 500 mg daily at breakfast  Doing well no concerns  Will continue  Had vision checked one week ago at [facility]  Did not have diabetic eye exam completed  Declines at this time  Will revisit at next follow up visit  Dermatology- saw [facility] in the past for brown spots on face  Patient states he had spots on the left side of head/face, lesion was biopsied and was benign  Inquiring about spots on right side of face currently  "it just bothers me that it's there " no pain, itching, bleeding, or discomfort  States they have not changed in color, size, shape, or texture  Encouraged to follow up with dermatologist approx  Every 5 years at least for evaluation of skin lesions  GI- had colonoscopy at age 55  States he has not had one since then  Review of Systems   Review of Systems   Constitutional: Negative for activity change, diaphoresis, fatigue and fever  HENT: Negative for congestion, facial swelling, hearing loss, rhinorrhea, sinus pressure, sinus pain, sneezing, sore throat and voice change  Eyes: Negative for discharge and visual disturbance  Respiratory: Negative for cough, choking, chest tightness, shortness of breath, wheezing and stridor  Cardiovascular: Negative for chest pain, palpitations and leg swelling  Gastrointestinal: Negative for abdominal distention, abdominal pain, constipation, diarrhea, nausea and vomiting  Endocrine: Negative for polydipsia, polyphagia and polyuria  Genitourinary: Negative for difficulty urinating, dysuria, frequency and urgency  Musculoskeletal: Negative for arthralgias, back pain, gait problem, joint swelling, myalgias, neck pain and neck stiffness  Skin: Negative for color change, rash and wound  Brown lesion on right side of face  Neurological: Negative for dizziness, syncope, speech difficulty, weakness, light-headedness and headaches  Hematological: Negative for adenopathy  Does not bruise/bleed easily  Psychiatric/Behavioral: Negative for agitation, behavioral problems, confusion, hallucinations, sleep disturbance and suicidal ideas   The patient is not nervous/anxious  Current Medications       Current Outpatient Medications:     aspirin (ECOTRIN LOW STRENGTH) 81 mg EC tablet, Take 1 tablet (81 mg total) by mouth daily, Disp: 90 tablet, Rfl: 3    metFORMIN (GLUCOPHAGE) 500 mg tablet, Take 1 tablet (500 mg total) by mouth daily with breakfast, Disp: 90 tablet, Rfl: 1    simvastatin (ZOCOR) 20 mg tablet, Take 1 tablet (20 mg total) by mouth daily at bedtime (Patient not taking: Reported on 7/25/2019), Disp: 90 tablet, Rfl: 3    Current Allergies     Allergies as of 07/25/2019    (No Known Allergies)            The following portions of the patient's history were reviewed and updated as appropriate: allergies, current medications, past family history, past medical history, past social history, past surgical history and problem list      Past Medical History:   Diagnosis Date    Arthritis     Dyslipidemia     GERD (gastroesophageal reflux disease)     Nodule of left lung 2007    negative for CA    Type 2 diabetes mellitus without complication, without long-term [facility] use [facility] 1/23/2019       Past Surgical History:   Procedure Laterality Date    FOOT TENDON SURGERY      heel       Family History   Problem Relation Age of Onset    COPD Father         80 yrs    Cerebral aneurysm Mother         80 yrs         Medications have been verified  Objective   /68   Pulse 84   Temp 98 5 °F (36 9 °C) (Tympanic)   Resp 18   Ht 5' 5" (1 651 m)   Wt 90 3 kg (199 lb)   SpO2 97%   BMI 33 12 kg/m²        Physical Exam     Physical Exam   Constitutional: He is oriented to person, place, and time  Vital signs are normal  He appears well-developed and well-nourished  He is cooperative  No distress  Neck: Trachea normal  Neck supple  No tracheal tenderness present  No thyromegaly present  Cardiovascular: Normal rate, regular rhythm and normal heart sounds  No murmur heard    Pulmonary/Chest: Effort normal and breath sounds normal  No respiratory distress  He has no wheezes  Musculoskeletal: Normal range of motion  Neurological: He is alert and oriented to person, place, and time  Skin: Skin is warm and dry  Lesion noted  No rash noted  He is not diaphoretic    2 brown preauricular skin lesions  Consistent with seborrheic keratosis  Psychiatric: He has a normal mood and affect  His speech is normal and behavior is normal  Judgment and thought content normal  Cognition and memory are normal    Nursing note and vitals reviewed  BMI Counseling: Body mass index is 33 12 kg/m²  Discussed the patient's BMI with him  The BMI is above average  BMI counseling and education was provided to the patient  Nutrition recommendations include 3-5 servings of fruits/vegetables daily, consuming healthier snacks, moderation in carbohydrate intake, increasing intake of lean protein, reducing intake of saturated fat and trans fat and reducing intake of cholesterol

## 2019-07-25 NOTE — PATIENT INSTRUCTIONS
Complete blood work in 6 months (Feb 2020)  Nothing to eat or drink 8-10 hours prior to testing  Can have water or black coffee  Follow up in 6 months for routine visit, and to review blood work  Follow up with GI to set up colonoscopy, Dr Tana Sanchez  Referral provided

## 2019-11-13 DIAGNOSIS — E11.9 DIABETIC EYE EXAM (HCC): Primary | ICD-10-CM

## 2019-11-13 DIAGNOSIS — Z01.00 DIABETIC EYE EXAM (HCC): Primary | ICD-10-CM

## 2019-11-16 DIAGNOSIS — E11.9 TYPE 2 DIABETES MELLITUS WITHOUT COMPLICATION, WITHOUT LONG-TERM CURRENT USE OF INSULIN (HCC): ICD-10-CM

## 2019-11-20 DIAGNOSIS — E11.9 TYPE 2 DIABETES MELLITUS WITHOUT COMPLICATION, WITHOUT LONG-TERM CURRENT USE OF INSULIN (HCC): ICD-10-CM

## 2019-11-27 ENCOUNTER — APPOINTMENT (OUTPATIENT)
Dept: LAB | Facility: HOSPITAL | Age: 69
End: 2019-11-27
Payer: COMMERCIAL

## 2019-11-27 DIAGNOSIS — I10 ESSENTIAL HYPERTENSION: ICD-10-CM

## 2019-11-27 DIAGNOSIS — E11.9 TYPE 2 DIABETES MELLITUS WITHOUT COMPLICATION, WITHOUT LONG-TERM CURRENT USE OF INSULIN (HCC): ICD-10-CM

## 2019-11-27 DIAGNOSIS — E78.00 HIGH CHOLESTEROL: ICD-10-CM

## 2019-11-27 DIAGNOSIS — Z12.5 SCREENING PSA (PROSTATE SPECIFIC ANTIGEN): ICD-10-CM

## 2019-11-27 LAB
ALBUMIN SERPL BCP-MCNC: 4.6 G/DL (ref 3.5–5)
ALP SERPL-CCNC: 58 U/L (ref 46–116)
ALT SERPL W P-5'-P-CCNC: 30 U/L (ref 12–78)
ANION GAP SERPL CALCULATED.3IONS-SCNC: 5 MMOL/L (ref 4–13)
AST SERPL W P-5'-P-CCNC: 16 U/L (ref 5–45)
BILIRUB SERPL-MCNC: 0.57 MG/DL (ref 0.2–1)
BUN SERPL-MCNC: 21 MG/DL (ref 5–25)
CALCIUM SERPL-MCNC: 9.2 MG/DL (ref 8.3–10.1)
CHLORIDE SERPL-SCNC: 109 MMOL/L (ref 100–108)
CHOLEST SERPL-MCNC: 219 MG/DL (ref 50–200)
CO2 SERPL-SCNC: 27 MMOL/L (ref 21–32)
CREAT SERPL-MCNC: 1.05 MG/DL (ref 0.6–1.3)
CREAT UR-MCNC: 124 MG/DL
EST. AVERAGE GLUCOSE BLD GHB EST-MCNC: 123 MG/DL
GFR SERPL CREATININE-BSD FRML MDRD: 73 ML/MIN/1.73SQ M
GLUCOSE P FAST SERPL-MCNC: 117 MG/DL (ref 65–99)
HBA1C MFR BLD: 5.9 % (ref 4.2–6.3)
HDLC SERPL-MCNC: 43 MG/DL
LDLC SERPL CALC-MCNC: 150 MG/DL (ref 0–100)
MICROALBUMIN UR-MCNC: 8.1 MG/L (ref 0–20)
MICROALBUMIN/CREAT 24H UR: 7 MG/G CREATININE (ref 0–30)
NONHDLC SERPL-MCNC: 176 MG/DL
POTASSIUM SERPL-SCNC: 4.6 MMOL/L (ref 3.5–5.3)
PROT SERPL-MCNC: 7.6 G/DL (ref 6.4–8.2)
SODIUM SERPL-SCNC: 141 MMOL/L (ref 136–145)
TRIGL SERPL-MCNC: 129 MG/DL

## 2019-11-27 PROCEDURE — 84154 ASSAY OF PSA FREE: CPT

## 2019-11-27 PROCEDURE — 82570 ASSAY OF URINE CREATININE: CPT

## 2019-11-27 PROCEDURE — 80061 LIPID PANEL: CPT

## 2019-11-27 PROCEDURE — 84153 ASSAY OF PSA TOTAL: CPT

## 2019-11-27 PROCEDURE — 80053 COMPREHEN METABOLIC PANEL: CPT

## 2019-11-27 PROCEDURE — 83036 HEMOGLOBIN GLYCOSYLATED A1C: CPT

## 2019-11-27 PROCEDURE — 36415 COLL VENOUS BLD VENIPUNCTURE: CPT

## 2019-11-27 PROCEDURE — 82043 UR ALBUMIN QUANTITATIVE: CPT

## 2019-11-29 LAB
PSA FREE MFR SERPL: 20.6 %
PSA FREE SERPL-MCNC: 0.64 NG/ML
PSA SERPL-MCNC: 3.1 NG/ML (ref 0–4)

## 2019-12-13 ENCOUNTER — OFFICE VISIT (OUTPATIENT)
Dept: FAMILY MEDICINE CLINIC | Facility: CLINIC | Age: 69
End: 2019-12-13
Payer: COMMERCIAL

## 2019-12-13 VITALS
HEART RATE: 76 BPM | OXYGEN SATURATION: 99 % | BODY MASS INDEX: 33.99 KG/M2 | HEIGHT: 65 IN | DIASTOLIC BLOOD PRESSURE: 78 MMHG | SYSTOLIC BLOOD PRESSURE: 132 MMHG | RESPIRATION RATE: 18 BRPM | TEMPERATURE: 98.2 F | WEIGHT: 204 LBS

## 2019-12-13 DIAGNOSIS — E11.9 TYPE 2 DIABETES MELLITUS WITHOUT COMPLICATION, WITHOUT LONG-TERM CURRENT USE OF INSULIN (HCC): Primary | ICD-10-CM

## 2019-12-13 DIAGNOSIS — Z12.11 COLON CANCER SCREENING: ICD-10-CM

## 2019-12-13 DIAGNOSIS — E66.09 CLASS 1 OBESITY DUE TO EXCESS CALORIES WITHOUT SERIOUS COMORBIDITY WITH BODY MASS INDEX (BMI) OF 33.0 TO 33.9 IN ADULT: ICD-10-CM

## 2019-12-13 DIAGNOSIS — E78.00 HYPERCHOLESTEREMIA: ICD-10-CM

## 2019-12-13 PROCEDURE — 1036F TOBACCO NON-USER: CPT | Performed by: NURSE PRACTITIONER

## 2019-12-13 PROCEDURE — 1160F RVW MEDS BY RX/DR IN RCRD: CPT | Performed by: NURSE PRACTITIONER

## 2019-12-13 PROCEDURE — 99214 OFFICE O/P EST MOD 30 MIN: CPT | Performed by: NURSE PRACTITIONER

## 2019-12-13 PROCEDURE — 3008F BODY MASS INDEX DOCD: CPT | Performed by: NURSE PRACTITIONER

## 2019-12-13 RX ORDER — SIMVASTATIN 20 MG
20 TABLET ORAL
Qty: 90 TABLET | Refills: 3 | Status: SHIPPED | OUTPATIENT
Start: 2019-12-13 | End: 2020-12-14 | Stop reason: SDUPTHER

## 2019-12-13 RX ORDER — SIMVASTATIN 10 MG
10 TABLET ORAL
Qty: 90 TABLET | Refills: 3 | Status: SHIPPED | OUTPATIENT
Start: 2019-12-13 | End: 2019-12-13

## 2019-12-13 NOTE — PATIENT INSTRUCTIONS
Continue same dose Metformin  Start Simvastatin 20mg daily  Return in July for annual exam/6 month diabetes medcheck  Get colonoscopy as agreed upon- referral to Dr Emily Aragon given  Call for any problems/concerns

## 2019-12-13 NOTE — PROGRESS NOTES
[address] [place] [address] Primary Care        NAME: Susanna Fuchs is a 77 y o  male  : 1950    MRN: [id number]  DATE: 2019  TIME: 12:18 PM    Assessment and Plan   Type 2 diabetes mellitus without complication, without long-term current use of insulin ([facility] [address] ) [E11 9]  1  Type 2 diabetes mellitus without complication, without long-term current use of insulin (HCC)  metFORMIN (GLUCOPHAGE) 500 mg tablet   2  Colon cancer screening  Ambulatory referral to Gastroenterology   3  Hypercholesteremia  simvastatin (ZOCOR) 20 mg tablet    DISCONTINUED: simvastatin (ZOCOR) 10 mg tablet   4  Class 1 obesity due to excess calories without serious comorbidity with body mass index (BMI) of 33 0 to 33 9 in adult           Patient Instructions     Patient Instructions   Continue same dose Metformin  Start Simvastatin 20mg daily  Return in July for annual exam/6 month diabetes medcheck  Get colonoscopy as agreed upon- referral to Dr Clive Jeffers given  Call for any problems/concerns          Chief Complaint     Chief Complaint   Patient presents with    Follow-up    Diabetes         History of Present Illness       Here for 6 month medcheck/DM/Lab review  Cholesterol has worsened- is willing to restart Simvastatin 20mg daily  Has been improving his diet- HgA1c improved from 6 0 to 5 9  Is willing to get colonoscopy in /July when he isn't working (is a )- his last one was with Dr Clive Jeffers- would like referral      Review of Systems   Review of Systems   Constitutional: Negative for activity change, diaphoresis, fatigue and fever  HENT: Negative for congestion, facial swelling, hearing loss, rhinorrhea, sinus pressure, sinus pain, sneezing, sore throat and voice change  Eyes: Negative for discharge and visual disturbance  Respiratory: Negative for cough, choking, chest tightness, shortness of breath, wheezing and stridor  Cardiovascular: Negative for chest pain, palpitations and leg swelling  Gastrointestinal: Negative for abdominal distention, abdominal pain, constipation, diarrhea, nausea and vomiting  Endocrine: Negative for polydipsia, polyphagia and polyuria  Genitourinary: Negative for difficulty urinating, dysuria, frequency and urgency  Musculoskeletal: Negative for arthralgias, back pain, gait problem, joint swelling, myalgias, neck pain and neck stiffness  Skin: Negative for color change, rash and wound  Neurological: Negative for dizziness, syncope, speech difficulty, weakness, light-headedness and headaches  Hematological: Negative for adenopathy  Does not bruise/bleed easily  Psychiatric/Behavioral: Negative for agitation, behavioral problems, confusion, hallucinations, sleep disturbance and suicidal ideas  The patient is not nervous/anxious            Current Medications       Current Outpatient Medications:     aspirin (ECOTRIN LOW STRENGTH) 81 mg EC tablet, Take 1 tablet (81 mg total) by mouth daily, Disp: 90 tablet, Rfl: 3    metFORMIN (GLUCOPHAGE) 500 mg tablet, Take 1 tablet (500 mg total) by mouth daily with breakfast, Disp: 90 tablet, Rfl: 1    simvastatin (ZOCOR) 20 mg tablet, Take 1 tablet (20 mg total) by mouth daily at bedtime, Disp: 90 tablet, Rfl: 3    Current Allergies     Allergies as of 12/13/2019    (No Known Allergies)            The following portions of the patient's history were reviewed and updated as appropriate: allergies, current medications, past family history, past medical history, past social history, past surgical history and problem list      Past Medical History:   Diagnosis Date    Arthritis     Dyslipidemia     GERD (gastroesophageal reflux disease)     Nodule of left lung 2007    negative for CA    Type 2 [facility] mellitus without complication, without long-term [facility] use [facility] 1/23/2019       Past Surgical History:   Procedure Laterality Date    FOOT TENDON SURGERY      heel       Family History   Problem Relation Age of Onset    COPD Father         80 yrs    Cerebral aneurysm Mother         80 yrs         Medications have been verified  Objective   /78   Pulse 76   Temp 98 2 °F (36 8 °C) (Tympanic)   Resp 18   Ht 5' 5" (1 651 m)   Wt 92 5 kg (204 lb)   SpO2 99%   BMI 33 95 kg/m²        Physical Exam     Physical Exam   Constitutional: He is oriented to person, place, and time  He appears well-developed and well-nourished  No distress  Cardiovascular: Normal rate, regular rhythm and normal heart sounds  No murmur heard  Pulmonary/Chest: Effort normal and breath sounds normal  No respiratory distress  He has no wheezes  Musculoskeletal: Normal range of motion  Neurological: He is alert and oriented to person, place, and time  Skin: Skin is warm and dry  He is not diaphoretic  Psychiatric: He has a normal mood and affect  His behavior is normal  Judgment and thought content normal    Nursing note and vitals reviewed  BMI Counseling: Body mass index is 33 95 kg/m²  The BMI is above normal  Nutrition recommendations include reducing fast food intake, consuming healthier snacks, decreasing soda and/or juice intake and moderation in carbohydrate intake       PHQ-9 Depression Screening    PHQ-9:    Frequency of the following problems over the past two weeks:       Little interest or pleasure in doing things:  0 - not at all  Feeling down, depressed, or hopeless:  0 - not at all  PHQ-2 Score:  0

## 2020-01-23 ENCOUNTER — TELEPHONE (OUTPATIENT)
Dept: FAMILY MEDICINE CLINIC | Facility: CLINIC | Age: 70
End: 2020-01-23

## 2020-01-23 NOTE — TELEPHONE ENCOUNTER
Patient stopped into office asking if Jessica Vasquez would be willing to review and order specific one test laboratory testing which checks for cancer causing agents  The patient provided a packet for Jessica Vasquez to review  He works as a  and is exposed to smoke and chemicals  This testing was recommended to him  Packet is on desk for review

## 2020-01-24 NOTE — TELEPHONE ENCOUNTER
I will order the testing but I can not verify coverage by his insurance company   If all he needs is a provider to order the testing I am fine with that

## 2020-01-24 NOTE — TELEPHONE ENCOUNTER
Spoke with patient per provider  Timm Dance is able to order the lab testing for him and sign the order for the patient  We can only place orders for our patients  We cannot guarantee insurance coverage for the testing  Per patient they will be charged $150 per  if they do testing as a group  The patient is going to think it over and let the office know  Orders scanned into chart

## 2020-02-06 LAB
LEFT EYE DIABETIC RETINOPATHY: NORMAL
RIGHT EYE DIABETIC RETINOPATHY: NORMAL

## 2020-02-17 ENCOUNTER — OFFICE VISIT (OUTPATIENT)
Dept: FAMILY MEDICINE CLINIC | Facility: CLINIC | Age: 70
End: 2020-02-17
Payer: COMMERCIAL

## 2020-02-17 VITALS
WEIGHT: 205 LBS | RESPIRATION RATE: 18 BRPM | TEMPERATURE: 97.6 F | BODY MASS INDEX: 34.16 KG/M2 | HEIGHT: 65 IN | OXYGEN SATURATION: 98 % | SYSTOLIC BLOOD PRESSURE: 118 MMHG | HEART RATE: 85 BPM | DIASTOLIC BLOOD PRESSURE: 68 MMHG

## 2020-02-17 DIAGNOSIS — E11.9 TYPE 2 DIABETES MELLITUS WITHOUT COMPLICATION, WITHOUT LONG-TERM CURRENT USE OF INSULIN (HCC): ICD-10-CM

## 2020-02-17 DIAGNOSIS — E11.9 ENCOUNTER FOR DIABETIC FOOT EXAM (HCC): ICD-10-CM

## 2020-02-17 DIAGNOSIS — M62.08 DIASTASIS RECTI: Primary | ICD-10-CM

## 2020-02-17 LAB — SL AMB POCT HEMOGLOBIN AIC: 6.3 (ref ?–6.5)

## 2020-02-17 PROCEDURE — 83036 HEMOGLOBIN GLYCOSYLATED A1C: CPT | Performed by: NURSE PRACTITIONER

## 2020-02-17 PROCEDURE — 3074F SYST BP LT 130 MM HG: CPT | Performed by: NURSE PRACTITIONER

## 2020-02-17 PROCEDURE — 3044F HG A1C LEVEL LT 7.0%: CPT | Performed by: NURSE PRACTITIONER

## 2020-02-17 PROCEDURE — 3078F DIAST BP <80 MM HG: CPT | Performed by: NURSE PRACTITIONER

## 2020-02-17 PROCEDURE — 3008F BODY MASS INDEX DOCD: CPT | Performed by: NURSE PRACTITIONER

## 2020-02-17 PROCEDURE — 2022F DILAT RTA XM EVC RTNOPTHY: CPT | Performed by: NURSE PRACTITIONER

## 2020-02-17 PROCEDURE — 4040F PNEUMOC VAC/ADMIN/RCVD: CPT | Performed by: NURSE PRACTITIONER

## 2020-02-17 PROCEDURE — 1160F RVW MEDS BY RX/DR IN RCRD: CPT | Performed by: NURSE PRACTITIONER

## 2020-02-17 PROCEDURE — 99213 OFFICE O/P EST LOW 20 MIN: CPT | Performed by: NURSE PRACTITIONER

## 2020-02-17 PROCEDURE — 1036F TOBACCO NON-USER: CPT | Performed by: NURSE PRACTITIONER

## 2020-02-17 NOTE — PROGRESS NOTES
[address] [place] [address] Primary Care        NAME: Brennan Lenz is a 77 y o  male  : 1950    MRN: [id number]  DATE: 2020  TIME: 11:33 AM    Assessment and Plan   Diastasis recti [M62 08]  1  Diastasis recti     2  Type 2 diabetes mellitus without complication, without long-term current use of insulin (HCC)  POCT hemoglobin A1c    HEMOGLOBIN A1C W/ EAG ESTIMATION   3  Encounter for diabetic foot exam [facility])           Patient Instructions     Patient Instructions   Consider physical therapy for Diastasis Recti- call for referral  Return in 6 months for scheduled visit  Continue same medications      Diastasis Recti   WHAT YOU NEED TO KNOW:   What is diastasis recti (DR)? DR is when the muscles in your abdomen move apart  It is a common condition during pregnancy and after delivery  What increases my risk for DR? · Being pregnant with more than 1 baby    · Being pregnant at age 28 or older    · Delivering a large baby    · Weak abdominal muscles    · Weight lifting    · Swelling or fluid in the abdomen    · Obesity  What are the signs and symptoms of DR?   · A lump in the middle of your abdomen that gets bigger when you cough, sneeze, or sit up    · Pain in the abdomen, pelvis, or back    · Problems controlling your urine or bowel movements  How is DR diagnosed and treated? Your healthcare provider will feel your abdomen and ask about your symptoms  You may need an ultrasound or CT scan  Tell the healthcare provider if you have ever had an allergic reaction to contrast liquid  DR may get better without treatment  Your healthcare provider may tell you to go to physical therapy  A physical therapist teaches you exercises to help improve movement and strength, and to decrease pain  Rarely, surgery is needed to repair the muscles  How can I manage my symptoms? · Do not lift anything heavier than 10 pounds  This can make your symptoms worse  · Do abdominal exercises as directed    Abdominal exercises can help strengthen your muscles and decrease symptoms  If you are a woman, do not do abdominal exercises for at least 2 weeks after you deliver  · Wear a binder as directed  A binder can help support your muscles and decrease pain  Ask your healthcare provider where to buy a binder  · Maintain a healthy weight  This may help prevent your DR from getting worse  Ask your healthcare provider how much you should weigh  Ask him to help you create a weight loss plan if you are overweight  When should I seek immediate care? · You have severe pain in your abdomen or pelvis  · You have a sudden increase in the size of the bulge that cannot be pushed back in     · You vomit blood  · You have black bowel movements or blood in your bowel movements  When should I contact my healthcare provider? · You have nausea or are vomiting  · You have trouble controlling your urine or bowel movements  · You have questions or concerns about your condition or care  CARE AGREEMENT:   You have the right to help plan your care  Learn about your health condition and how it may be treated  Discuss treatment options with your caregivers to decide what care you want to receive  You always have the right to refuse treatment  The above information is an  only  It is not intended as medical advice for individual conditions or treatments  Talk to your doctor, nurse or pharmacist before following any medical regimen to see if it is safe and effective for you  © 2017 [employer]  Information is for End User's use only and may not be sold, redistributed or otherwise used for commercial purposes  All illustrations and images included in CareNotes® are the copyrighted property of [employer]  or [employer]              Chief Complaint     Chief Complaint   Patient presents with    Hernia     possible embellical hernia     Follow-up     diabetes          History of Present Illness       78 y/o male presents to the office for evaluation of possible umbilical hernia  Patient was at [facility] physical last month and was told has it  Patient notes a "bulging out" of umbilical/mid-abdomen when lifting or straining  Patient notes intermittent discomfort  Denies fever, severe abdominal pain, n/v/d  Discussion reviewing worsening symptoms and treatment options had with patient  Review of Systems   Review of Systems   Constitutional: Negative for activity change, appetite change and fatigue  HENT: Negative  Respiratory: Negative  Cardiovascular: Negative  Gastrointestinal: Negative for abdominal distention, abdominal pain, nausea and vomiting  Genitourinary: Negative  Musculoskeletal: Negative for myalgias  Skin: Negative  Neurological: Negative  Psychiatric/Behavioral: Negative            Current Medications       Current Outpatient Medications:     aspirin (ECOTRIN LOW STRENGTH) 81 mg EC tablet, Take 1 tablet (81 mg total) by mouth daily, Disp: 90 tablet, Rfl: 3    metFORMIN (GLUCOPHAGE) 500 mg tablet, Take 1 tablet (500 mg total) by mouth daily with breakfast, Disp: 90 tablet, Rfl: 1    simvastatin (ZOCOR) 20 mg tablet, Take 1 tablet (20 mg total) by mouth daily at bedtime, Disp: 90 tablet, Rfl: 3    Current Allergies     Allergies as of 02/17/2020    (No Known Allergies)            The following portions of the patient's history were reviewed and updated as appropriate: allergies, current medications, past family history, past medical history, past social history, past surgical history and problem list      Past Medical History:   Diagnosis Date    Arthritis     Dyslipidemia     GERD (gastroesophageal reflux disease)     Nodule of left lung 2007    negative for CA    Type 2 diabetes mellitus without complication, without long-term current use of insulin ([facility] [address] ) 1/23/2019       Past Surgical History:   Procedure Laterality Date    FOOT TENDON SURGERY      heel       Family History   Problem Relation Age of Onset    COPD Father         80 yrs    Cerebral aneurysm Mother         80 yrs         Medications have been verified  Objective   /68   Pulse 85   Temp 97 6 °F (36 4 °C)   Resp 18   Ht 5' 5" (1 651 m)   Wt 93 kg (205 lb)   SpO2 98%   BMI 34 11 kg/m²        Physical Exam     Physical Exam   Constitutional: He is oriented to person, place, and time  He appears well-developed and well-nourished  No distress  Cardiovascular: Normal rate, regular rhythm and normal heart sounds  Pulses are no weak pulses  No murmur heard  Pulses:       Dorsalis pedis pulses are 2+ on the right side, and 2+ on the left side  Pulmonary/Chest: Effort normal and breath sounds normal  No respiratory distress  He has no wheezes  Abdominal: Soft  There is no tenderness  Musculoskeletal: Normal range of motion  Feet:   Right Foot:   Skin Integrity: Negative for ulcer, skin breakdown, erythema, warmth, callus or dry skin  Left Foot:   Skin Integrity: Negative for ulcer, skin breakdown, erythema, warmth, callus or dry skin  Neurological: He is alert and oriented to person, place, and time  Skin: Skin is warm and dry  He is not diaphoretic  Psychiatric: He has a normal mood and affect  His behavior is normal  Judgment and thought content normal    Nursing note and vitals reviewed  Patient's shoes and socks removed  Right Foot/Ankle   Right Foot Inspection  Skin Exam: skin normal and skin intact no dry skin, no warmth, no callus, no erythema, no maceration, no abnormal color, no pre-ulcer, no ulcer and no callus                          Toe Exam: ROM and strength within normal limits  Sensory       Monofilament testing: intact  Vascular  Capillary refills: < 3 seconds  The right DP pulse is 2+       Left Foot/Ankle  Left Foot Inspection  Skin Exam: skin normal and skin intactno dry skin, no warmth, no erythema, no maceration, normal color, no pre-ulcer, no ulcer and no callus                         Toe Exam: ROM and strength within normal limits                   Sensory       Monofilament: intact  Vascular  Capillary refills: < 3 seconds  The left DP pulse is 2+  Assign Risk Category:  No deformity present; No loss of protective sensation;  No weak pulses       Risk: 0

## 2020-02-17 NOTE — PATIENT INSTRUCTIONS
Consider physical therapy for Diastasis Recti- call for referral  Return in 6 months for scheduled visit  Continue same medications      Diastasis Recti   WHAT YOU NEED TO KNOW:   What is diastasis recti (DR)? DR is when the muscles in your abdomen move apart  It is a common condition during pregnancy and after delivery  What increases my risk for DR? · Being pregnant with more than 1 baby    · Being pregnant at age 28 or older    · Delivering a large baby    · Weak abdominal muscles    · Weight lifting    · Swelling or fluid in the abdomen    · Obesity  What are the signs and symptoms of DR?   · A lump in the middle of your abdomen that gets bigger when you cough, sneeze, or sit up    · Pain in the abdomen, pelvis, or back    · Problems controlling your urine or bowel movements  How is DR diagnosed and treated? Your healthcare provider will feel your abdomen and ask about your symptoms  You may need an ultrasound or CT scan  Tell the healthcare provider if you have ever had an allergic reaction to contrast liquid  DR may get better without treatment  Your healthcare provider may tell you to go to physical therapy  A physical therapist teaches you exercises to help improve movement and strength, and to decrease pain  Rarely, surgery is needed to repair the muscles  How can I manage my symptoms? · Do not lift anything heavier than 10 pounds  This can make your symptoms worse  · Do abdominal exercises as directed  Abdominal exercises can help strengthen your muscles and decrease symptoms  If you are a woman, do not do abdominal exercises for at least 2 weeks after you deliver  · Wear a binder as directed  A binder can help support your muscles and decrease pain  Ask your healthcare provider where to buy a binder  · Maintain a healthy weight  This may help prevent your DR from getting worse  Ask your healthcare provider how much you should weigh   Ask him to help you create a weight loss plan if you are overweight  When should I seek immediate care? · You have severe pain in your abdomen or pelvis  · You have a sudden increase in the size of the bulge that cannot be pushed back in     · You vomit blood  · You have black bowel movements or blood in your bowel movements  When should I contact my healthcare provider? · You have nausea or are vomiting  · You have trouble controlling your urine or bowel movements  · You have questions or concerns about your condition or care  CARE AGREEMENT:   You have the right to help plan your care  Learn about your health condition and how it may be treated  Discuss treatment options with your caregivers to decide what care you want to receive  You always have the right to refuse treatment  The above information is an  only  It is not intended as medical advice for individual conditions or treatments  Talk to your doctor, nurse or pharmacist before following any medical regimen to see if it is safe and effective for you  © 2017 [address] Information is for End User's use only and may not be sold, redistributed or otherwise used for commercial purposes  All illustrations and images included in CareNotes® are the copyrighted property of [employer]  or Carlos Ty

## 2020-07-01 ENCOUNTER — APPOINTMENT (OUTPATIENT)
Dept: LAB | Facility: CLINIC | Age: 70
End: 2020-07-01
Payer: COMMERCIAL

## 2020-07-01 DIAGNOSIS — E11.9 TYPE 2 DIABETES MELLITUS WITHOUT COMPLICATION, WITHOUT LONG-TERM CURRENT USE OF INSULIN (HCC): ICD-10-CM

## 2020-07-01 LAB
EST. AVERAGE GLUCOSE BLD GHB EST-MCNC: 128 MG/DL
HBA1C MFR BLD: 6.1 %

## 2020-07-01 PROCEDURE — 3044F HG A1C LEVEL LT 7.0%: CPT | Performed by: NURSE PRACTITIONER

## 2020-07-01 PROCEDURE — 83036 HEMOGLOBIN GLYCOSYLATED A1C: CPT

## 2020-07-01 PROCEDURE — 36415 COLL VENOUS BLD VENIPUNCTURE: CPT

## 2020-07-10 ENCOUNTER — TELEPHONE (OUTPATIENT)
Dept: FAMILY MEDICINE CLINIC | Facility: CLINIC | Age: 70
End: 2020-07-10

## 2020-07-10 NOTE — TELEPHONE ENCOUNTER
Called patient, Left message to let him know that the form is done, patient can pick it up or we call mail it  I also let him know the second page for if the eye doctor to fill out  Told patient to call back to let us know what he would like to us to do

## 2020-07-26 DIAGNOSIS — E11.9 TYPE 2 DIABETES MELLITUS WITHOUT COMPLICATION, WITHOUT LONG-TERM CURRENT USE OF INSULIN (HCC): ICD-10-CM

## 2020-08-03 ENCOUNTER — OFFICE VISIT (OUTPATIENT)
Dept: FAMILY MEDICINE CLINIC | Facility: CLINIC | Age: 70
End: 2020-08-03
Payer: COMMERCIAL

## 2020-08-03 VITALS
BODY MASS INDEX: 34.82 KG/M2 | OXYGEN SATURATION: 98 % | WEIGHT: 209 LBS | HEIGHT: 65 IN | RESPIRATION RATE: 18 BRPM | DIASTOLIC BLOOD PRESSURE: 72 MMHG | TEMPERATURE: 97.2 F | HEART RATE: 82 BPM | SYSTOLIC BLOOD PRESSURE: 118 MMHG

## 2020-08-03 DIAGNOSIS — Z00.00 ANNUAL PHYSICAL EXAM: Primary | ICD-10-CM

## 2020-08-03 DIAGNOSIS — Z12.5 PROSTATE CANCER SCREENING: ICD-10-CM

## 2020-08-03 DIAGNOSIS — I10 ESSENTIAL HYPERTENSION: ICD-10-CM

## 2020-08-03 DIAGNOSIS — E66.09 CLASS 1 OBESITY DUE TO EXCESS CALORIES WITH SERIOUS COMORBIDITY AND BODY MASS INDEX (BMI) OF 34.0 TO 34.9 IN ADULT: ICD-10-CM

## 2020-08-03 DIAGNOSIS — E11.9 TYPE 2 DIABETES MELLITUS WITHOUT COMPLICATION, WITHOUT LONG-TERM CURRENT USE OF INSULIN (HCC): ICD-10-CM

## 2020-08-03 DIAGNOSIS — Z13.220 SCREENING CHOLESTEROL LEVEL: ICD-10-CM

## 2020-08-03 PROCEDURE — 99213 OFFICE O/P EST LOW 20 MIN: CPT | Performed by: NURSE PRACTITIONER

## 2020-08-03 PROCEDURE — 1160F RVW MEDS BY RX/DR IN RCRD: CPT | Performed by: NURSE PRACTITIONER

## 2020-08-03 PROCEDURE — 1101F PT FALLS ASSESS-DOCD LE1/YR: CPT | Performed by: NURSE PRACTITIONER

## 2020-08-03 PROCEDURE — 1036F TOBACCO NON-USER: CPT | Performed by: NURSE PRACTITIONER

## 2020-08-03 PROCEDURE — 99397 PER PM REEVAL EST PAT 65+ YR: CPT | Performed by: NURSE PRACTITIONER

## 2020-08-03 PROCEDURE — 2022F DILAT RTA XM EVC RTNOPTHY: CPT | Performed by: NURSE PRACTITIONER

## 2020-08-03 PROCEDURE — 3044F HG A1C LEVEL LT 7.0%: CPT | Performed by: NURSE PRACTITIONER

## 2020-08-03 PROCEDURE — 3078F DIAST BP <80 MM HG: CPT | Performed by: NURSE PRACTITIONER

## 2020-08-03 PROCEDURE — 4040F PNEUMOC VAC/ADMIN/RCVD: CPT | Performed by: NURSE PRACTITIONER

## 2020-08-03 PROCEDURE — 3288F FALL RISK ASSESSMENT DOCD: CPT | Performed by: NURSE PRACTITIONER

## 2020-08-03 PROCEDURE — 3074F SYST BP LT 130 MM HG: CPT | Performed by: NURSE PRACTITIONER

## 2020-08-03 PROCEDURE — 3008F BODY MASS INDEX DOCD: CPT | Performed by: NURSE PRACTITIONER

## 2020-08-03 PROCEDURE — 3725F SCREEN DEPRESSION PERFORMED: CPT | Performed by: NURSE PRACTITIONER

## 2020-08-03 NOTE — PROGRESS NOTES
HPI:  Morgan Sales is a 72 y o  male here for his yearly health maintenance exam    Patient Active Problem List   Diagnosis    Hypercholesteremia    High blood pressure    Dyspnea    Type 2 diabetes mellitus without complication, without long-term current use of insulin ([facility]    Class 1 obesity due to excess calories with serious comorbidity and body mass index (BMI) of 34 0 to 34 9 in adult     Past Medical History:   Diagnosis Date    Arthritis     Dyslipidemia     GERD (gastroesophageal reflux disease)     Nodule of left lung 2007    negative for CA    Type 2 diabetes mellitus without complication, without long-term current use of insulin ([facility] ) 1/23/2019       BMI Counseling: Body mass index is 34 78 kg/m²  The BMI is above normal  Nutrition recommendations include decreasing portion sizes, encouraging healthy choices of fruits and vegetables, decreasing fast food intake, consuming healthier snacks, limiting drinks that contain sugar, moderation in carbohydrate intake and increasing intake of lean protein  PHQ-9 Depression Screening    PHQ-9:    Frequency of the following problems over the past two weeks:       Little interest or pleasure in doing things:  0 - not at all  Feeling down, depressed, or hopeless:  0 - not at all  PHQ-2 Score:  0           Current Outpatient Medications   Medication Sig Dispense Refill    metFORMIN (GLUCOPHAGE) 500 mg tablet Take 1 tablet (500 mg total) by mouth daily with breakfast 90 tablet 0    simvastatin (ZOCOR) 20 mg tablet Take 1 tablet (20 mg total) by mouth daily at bedtime 90 tablet 3     No current facility-administered medications for this visit        No Known Allergies  Immunization History   Administered Date(s) Administered    H1N1, All Formulations 01/09/2010    INFLUENZA 09/01/2012, 11/04/2015, 10/13/2017, 10/15/2018    Pneumococcal Conjugate 13-Valent 10/15/2018    Zoster 11/04/2015    Zoster Vaccine Recombinant 11/02/2019, 02/20/2020    influenza, trivalent, adjuvanted 10/07/2019       Patient Care Team:  Cheo Dasilva as PCP - General (Family Medicine)    Review of Systems   Constitutional: Negative for activity change, diaphoresis, fatigue and fever  HENT: Negative for congestion, facial swelling, hearing loss, rhinorrhea, sinus pressure, sinus pain, sneezing, sore throat and voice change  Eyes: Negative for discharge and visual disturbance  Respiratory: Negative for cough, choking, chest tightness, shortness of breath, wheezing and stridor  Cardiovascular: Negative for chest pain, palpitations and leg swelling  Gastrointestinal: Negative for abdominal distention, abdominal pain, constipation, diarrhea, nausea and vomiting  Endocrine: Negative for polydipsia, polyphagia and polyuria  Genitourinary: Negative for difficulty urinating, dysuria, frequency and urgency  Musculoskeletal: Negative for arthralgias, back pain, gait problem, joint swelling, myalgias, neck pain and neck stiffness  Skin: Negative for color change, rash and wound  Neurological: Negative for dizziness, syncope, speech difficulty, weakness, light-headedness and headaches  Hematological: Negative for adenopathy  Does not bruise/bleed easily  Psychiatric/Behavioral: Negative for agitation, behavioral problems, confusion, hallucinations, sleep disturbance and suicidal ideas  The patient is not nervous/anxious  Physical Exam :  Physical Exam   Constitutional: He is oriented to person, place, and time  He appears well-developed  No distress  Neck: Trachea normal and normal range of motion  Neck supple  No thyroid mass and no thyromegaly present  Cardiovascular: Normal rate, regular rhythm and normal heart sounds  No murmur heard  Pulmonary/Chest: Effort normal and breath sounds normal  No respiratory distress  He has no wheezes  Musculoskeletal: Normal range of motion           General: No swelling, tenderness, deformity or signs of injury  Right lower leg: No edema  Left lower leg: No edema  Neurological: He is alert and oriented to person, place, and time  Skin: Skin is warm and dry  He is not diaphoretic  Psychiatric: His speech is normal and behavior is normal  Judgment and thought content normal    Nursing note and vitals reviewed  Reviewed Updated [facility] Prior Wellness Visits:   Last Health Maintenance visit information was reviewed, patient interviewed , no change since last  visit no  Last HM visit information was reviewed, patient interviewed and updates made to the record today no    Assessment and Plan:  1  Annual physical exam     2  Type 2 diabetes mellitus without complication, without long-term current use of insulin (HCC)  HEMOGLOBIN A1C W/ EAG ESTIMATION    Microalbumin / creatinine urine ratio   3  Essential hypertension  Comprehensive metabolic panel   4  Prostate cancer screening  PSA, Total Screen   5  Screening cholesterol level  Lipid panel   6   Class 1 obesity due to excess calories with serious comorbidity and body mass index (BMI) of 34 0 to 34 9 in adult         Health Maintenance Due   Topic Date Due    DTaP,Tdap,and Td Vaccines (1 - Tdap) 08/03/1972    Colorectal Cancer Screening  08/03/2001    Pneumococcal Vaccine: 65+ Years (2 of 2 - PPSV23) 10/15/2019    Influenza Vaccine  07/01/2020    Annual Physical  07/25/2020    BMI: Followup Plan  12/13/2020

## 2020-08-03 NOTE — PROGRESS NOTES
[address] [place] [address] Primary Care        NAME: Nohemi Srinivasan is a 72 y o  male  : 1950    MRN: [id number]  DATE: August 3, 2020  TIME: 8:29 AM    Assessment and Plan   Type 2 diabetes mellitus without complication, without long-term current use of insulin ([facility] [address] ) [E11 9]  1  Type 2 diabetes mellitus without complication, without long-term current use of insulin (HCC)  HEMOGLOBIN A1C W/ EAG ESTIMATION    Microalbumin / creatinine urine ratio   2  Essential hypertension  Comprehensive metabolic panel   3  Prostate cancer screening  PSA, Total Screen   4  Screening cholesterol level  Lipid panel         Patient Instructions     Patient Instructions   Apply warm compress to eye several times a day  Follow up in 6 months  Follow up with dermatologist for blocked oil duct on right lower eyelid  Chief Complaint     Chief Complaint   Patient presents with    Follow-up     6 month fu  Pt offers no complaints  pt states he had colonoscopy completed 2 weeks ago Carole Mahajan         History of Present Illness       HgbA1C 6 1  Continuing Metformin  Has a "stye" on his right eye that has been present for a year  No pain, Recently seen by eye doctor- they did not address this for him  Had colonoscopy 2 weeks ago by Dr Mihai [facility]   Review of Systems   Constitutional: Negative  Negative for activity change, diaphoresis, fatigue and fever  HENT: Negative  Negative for congestion, facial swelling, hearing loss, rhinorrhea, sinus pressure, sinus pain, sneezing, sore throat and voice change  Eyes: Negative for discharge and visual disturbance  Right lower lid stye   Respiratory: Negative  Negative for cough, choking, chest tightness, shortness of breath, wheezing and stridor  Cardiovascular: Negative  Negative for chest pain, palpitations and leg swelling  Gastrointestinal: Negative    Negative for abdominal distention, abdominal pain, constipation, diarrhea, nausea and vomiting  Endocrine: Negative for polydipsia, polyphagia and polyuria  Genitourinary: Negative  Negative for difficulty urinating, dysuria, frequency and urgency  Musculoskeletal: Negative  Negative for arthralgias, back pain, gait problem, joint swelling, myalgias, neck pain and neck stiffness  Skin: Negative  Negative for color change, rash and wound  Neurological: Negative  Negative for dizziness, syncope, speech difficulty, weakness, light-headedness and headaches  Hematological: Negative  Negative for adenopathy  Does not bruise/bleed easily  Psychiatric/Behavioral: Negative  Negative for agitation, behavioral problems, confusion, hallucinations, sleep disturbance and suicidal ideas  The patient is not nervous/anxious  Current Medications       Current Outpatient Medications:     metFORMIN (GLUCOPHAGE) 500 mg tablet, Take 1 tablet (500 mg total) by mouth daily with breakfast, Disp: 90 tablet, Rfl: 0    simvastatin (ZOCOR) 20 mg tablet, Take 1 tablet (20 mg total) by mouth daily at bedtime, Disp: 90 tablet, Rfl: 3    Current Allergies     Allergies as of 08/03/2020    (No Known Allergies)            The following portions of the patient's history were reviewed and updated as appropriate: allergies, current medications, past family history, past medical history, past social history, past surgical history and problem list      Past Medical History:   Diagnosis Date    Arthritis     Dyslipidemia     GERD (gastroesophageal reflux disease)     Nodule of left lung 2007    negative for CA    Type 2 diabetes mellitus without complication, without long-term current use of insulin [facility] ) 1/23/2019       Past Surgical History:   Procedure Laterality Date    FOOT TENDON SURGERY      heel       Family History   Problem Relation Age of Onset    COPD Father         80 yrs    Cerebral aneurysm Mother         80 yrs         Medications have been verified          Objective   /72 Pulse 82   Temp (!) 97 2 °F (36 2 °C) (Temporal)   Resp 18   Ht 5' 5"   Wt 94 8 kg (209 lb)   SpO2 98%   BMI 34 78 kg/m²        Physical Exam     Physical Exam   Constitutional: He is oriented to person, place, and time  He appears well-developed  No distress  HENT:   Head: Normocephalic and atraumatic  Eyes: Conjunctivae are normal        Right eye stye   Neck: Trachea normal and normal range of motion  Neck supple  No thyroid mass and no thyromegaly present  Cardiovascular: Normal rate, regular rhythm, S1 normal, S2 normal and normal heart sounds  No murmur heard  Negative carotid bruits b/l   Pulmonary/Chest: Effort normal and breath sounds normal  No respiratory distress  He has no wheezes  Musculoskeletal: Normal range of motion  General: No swelling, tenderness, deformity or signs of injury  Neurological: He is alert and oriented to person, place, and time  Skin: Skin is warm and dry  He is not diaphoretic  Psychiatric: His speech is normal and behavior is normal  Mood, judgment and thought content normal    Nursing note and vitals reviewed

## 2020-08-03 NOTE — PATIENT INSTRUCTIONS
Apply warm compress to eye several times a day  Follow up in 6 months  Follow up with dermatologist for blocked oil duct on right lower eyelid

## 2020-11-18 DIAGNOSIS — E11.9 TYPE 2 DIABETES MELLITUS WITHOUT COMPLICATION, WITHOUT LONG-TERM CURRENT USE OF INSULIN (HCC): ICD-10-CM

## 2020-12-07 ENCOUNTER — TELEPHONE (OUTPATIENT)
Dept: FAMILY MEDICINE CLINIC | Facility: CLINIC | Age: 70
End: 2020-12-07

## 2020-12-07 DIAGNOSIS — E11.9 TYPE 2 DIABETES MELLITUS WITHOUT COMPLICATION, WITHOUT LONG-TERM CURRENT USE OF INSULIN (HCC): Primary | ICD-10-CM

## 2020-12-09 ENCOUNTER — LAB (OUTPATIENT)
Dept: LAB | Facility: CLINIC | Age: 70
End: 2020-12-09
Payer: COMMERCIAL

## 2020-12-09 DIAGNOSIS — Z12.5 PROSTATE CANCER SCREENING: ICD-10-CM

## 2020-12-09 DIAGNOSIS — I10 ESSENTIAL HYPERTENSION: ICD-10-CM

## 2020-12-09 DIAGNOSIS — Z13.220 SCREENING CHOLESTEROL LEVEL: ICD-10-CM

## 2020-12-09 DIAGNOSIS — E11.9 TYPE 2 DIABETES MELLITUS WITHOUT COMPLICATION, WITHOUT LONG-TERM CURRENT USE OF INSULIN (HCC): ICD-10-CM

## 2020-12-09 LAB
ALBUMIN SERPL BCP-MCNC: 4.1 G/DL (ref 3.5–5)
ALP SERPL-CCNC: 61 U/L (ref 46–116)
ALT SERPL W P-5'-P-CCNC: 28 U/L (ref 12–78)
ANION GAP SERPL CALCULATED.3IONS-SCNC: 4 MMOL/L (ref 4–13)
AST SERPL W P-5'-P-CCNC: 12 U/L (ref 5–45)
BILIRUB SERPL-MCNC: 0.68 MG/DL (ref 0.2–1)
BUN SERPL-MCNC: 17 MG/DL (ref 5–25)
CALCIUM SERPL-MCNC: 9.5 MG/DL (ref 8.3–10.1)
CHLORIDE SERPL-SCNC: 108 MMOL/L (ref 100–108)
CHOLEST SERPL-MCNC: 148 MG/DL (ref 50–200)
CO2 SERPL-SCNC: 27 MMOL/L (ref 21–32)
CREAT SERPL-MCNC: 0.98 MG/DL (ref 0.6–1.3)
CREAT UR-MCNC: 85.8 MG/DL
EST. AVERAGE GLUCOSE BLD GHB EST-MCNC: 140 MG/DL
GFR SERPL CREATININE-BSD FRML MDRD: 78 ML/MIN/1.73SQ M
GLUCOSE SERPL-MCNC: 100 MG/DL (ref 65–140)
HBA1C MFR BLD: 6.5 %
HDLC SERPL-MCNC: 40 MG/DL
LDLC SERPL CALC-MCNC: 73 MG/DL (ref 0–100)
MICROALBUMIN UR-MCNC: 7.4 MG/L (ref 0–20)
MICROALBUMIN/CREAT 24H UR: 9 MG/G CREATININE (ref 0–30)
NONHDLC SERPL-MCNC: 108 MG/DL
POTASSIUM SERPL-SCNC: 4.6 MMOL/L (ref 3.5–5.3)
PROT SERPL-MCNC: 7.4 G/DL (ref 6.4–8.2)
PSA SERPL-MCNC: 1.7 NG/ML (ref 0–4)
SODIUM SERPL-SCNC: 139 MMOL/L (ref 136–145)
TRIGL SERPL-MCNC: 175 MG/DL

## 2020-12-09 PROCEDURE — 36415 COLL VENOUS BLD VENIPUNCTURE: CPT

## 2020-12-09 PROCEDURE — 80061 LIPID PANEL: CPT

## 2020-12-09 PROCEDURE — 82570 ASSAY OF URINE CREATININE: CPT | Performed by: NURSE PRACTITIONER

## 2020-12-09 PROCEDURE — 80053 COMPREHEN METABOLIC PANEL: CPT

## 2020-12-09 PROCEDURE — 82043 UR ALBUMIN QUANTITATIVE: CPT | Performed by: NURSE PRACTITIONER

## 2020-12-09 PROCEDURE — 83036 HEMOGLOBIN GLYCOSYLATED A1C: CPT

## 2020-12-09 PROCEDURE — G0103 PSA SCREENING: HCPCS

## 2020-12-14 DIAGNOSIS — E78.00 HYPERCHOLESTEREMIA: ICD-10-CM

## 2020-12-14 RX ORDER — SIMVASTATIN 20 MG
20 TABLET ORAL
Qty: 90 TABLET | Refills: 3 | Status: SHIPPED | OUTPATIENT
Start: 2020-12-14 | End: 2021-03-28 | Stop reason: SDUPTHER

## 2021-01-12 ENCOUNTER — IMMUNIZATIONS (OUTPATIENT)
Dept: FAMILY MEDICINE CLINIC | Facility: HOSPITAL | Age: 71
End: 2021-01-12

## 2021-01-12 DIAGNOSIS — Z23 ENCOUNTER FOR IMMUNIZATION: ICD-10-CM

## 2021-01-12 PROCEDURE — 0011A SARS-COV-2 / COVID-19 MRNA VACCINE (MODERNA) 100 MCG: CPT

## 2021-01-12 PROCEDURE — 91301 SARS-COV-2 / COVID-19 MRNA VACCINE (MODERNA) 100 MCG: CPT

## 2021-02-04 ENCOUNTER — OFFICE VISIT (OUTPATIENT)
Dept: FAMILY MEDICINE CLINIC | Facility: CLINIC | Age: 71
End: 2021-02-04
Payer: COMMERCIAL

## 2021-02-04 VITALS
OXYGEN SATURATION: 98 % | RESPIRATION RATE: 20 BRPM | WEIGHT: 216 LBS | SYSTOLIC BLOOD PRESSURE: 128 MMHG | BODY MASS INDEX: 35.99 KG/M2 | HEART RATE: 85 BPM | HEIGHT: 65 IN | DIASTOLIC BLOOD PRESSURE: 80 MMHG | TEMPERATURE: 97.9 F

## 2021-02-04 DIAGNOSIS — E66.09 CLASS 1 OBESITY DUE TO EXCESS CALORIES WITH SERIOUS COMORBIDITY AND BODY MASS INDEX (BMI) OF 34.0 TO 34.9 IN ADULT: ICD-10-CM

## 2021-02-04 DIAGNOSIS — E11.9 ENCOUNTER FOR DIABETIC FOOT EXAM (HCC): ICD-10-CM

## 2021-02-04 DIAGNOSIS — E11.9 TYPE 2 DIABETES MELLITUS WITHOUT COMPLICATION, WITHOUT LONG-TERM CURRENT USE OF INSULIN (HCC): Primary | ICD-10-CM

## 2021-02-04 DIAGNOSIS — E78.00 HYPERCHOLESTEREMIA: ICD-10-CM

## 2021-02-04 PROCEDURE — 3288F FALL RISK ASSESSMENT DOCD: CPT | Performed by: NURSE PRACTITIONER

## 2021-02-04 PROCEDURE — 3725F SCREEN DEPRESSION PERFORMED: CPT | Performed by: NURSE PRACTITIONER

## 2021-02-04 PROCEDURE — 99214 OFFICE O/P EST MOD 30 MIN: CPT | Performed by: NURSE PRACTITIONER

## 2021-02-04 PROCEDURE — 1101F PT FALLS ASSESS-DOCD LE1/YR: CPT | Performed by: NURSE PRACTITIONER

## 2021-02-04 PROCEDURE — 1036F TOBACCO NON-USER: CPT | Performed by: NURSE PRACTITIONER

## 2021-02-04 PROCEDURE — 1160F RVW MEDS BY RX/DR IN RCRD: CPT | Performed by: NURSE PRACTITIONER

## 2021-02-04 NOTE — PROGRESS NOTES
[address] [facility]        NAME: Herminia Rosado is a 72 y o  male  : 1950    MRN: [id number]  DATE: 2021  TIME: 12:04 PM    Assessment and Plan   Type 2 diabetes mellitus without complication, without long-term current use of insulin ([facility] ) [E11 9]  1  Type 2 diabetes mellitus without complication, without long-term current use of insulin ([address] )     2  Encounter for diabetic foot exam ([facility] )     3  Class 1 obesity due to excess calories with serious comorbidity and body mass index (BMI) of 34 0 to 34 9 in adult     4  Hypercholesteremia           Patient Instructions     Patient Instructions   Dermatology card given   Offered diabetes education/nutrition guidance- he will call if he wants to do this  Continue same medications  Return in 6 months or call sooner for problems/concerns          Chief Complaint     Chief Complaint   Patient presents with    Follow-up         History of Present Illness       In office 6mo wellness visit  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 "[employer]  Is doing well, no complaints today  Reviewed recent bloodwork from December  Is willing to get his HgA1c checked in office in August then get full panel of bloodwork in December next year  Review of Systems   Review of Systems   Constitutional: Negative for activity change, diaphoresis, fatigue and fever  HENT: Negative for congestion, facial swelling, hearing loss, rhinorrhea, sinus pressure, sinus pain, sneezing, sore throat and voice change  Eyes: Negative for discharge and visual disturbance  Respiratory: Negative for cough, choking, chest tightness, shortness of breath, wheezing and stridor  Cardiovascular: Negative for chest pain, palpitations and leg swelling  Gastrointestinal: Negative for abdominal distention, abdominal pain, constipation, diarrhea, nausea and vomiting  Endocrine: Negative for polydipsia, polyphagia and polyuria  Genitourinary: Negative for difficulty urinating, dysuria, frequency and urgency  Musculoskeletal: Negative for arthralgias, back pain, gait problem, joint swelling, myalgias, neck pain and neck stiffness  Skin: Negative for color change, rash and wound  Neurological: Negative for dizziness, syncope, speech difficulty, weakness, light-headedness and headaches  Hematological: Negative for adenopathy  Does not bruise/bleed easily  Psychiatric/Behavioral: Negative for agitation, behavioral problems, confusion, hallucinations, sleep disturbance and suicidal ideas  The patient is not nervous/anxious  Current Medications       Current Outpatient Medications:     metFORMIN (GLUCOPHAGE) 500 mg tablet, Take 1 tablet (500 mg total) by mouth daily with breakfast, Disp: 90 tablet, Rfl: 3    simvastatin (ZOCOR) 20 mg tablet, Take 1 tablet (20 mg total) by mouth daily at bedtime, Disp: 90 tablet, Rfl: 3    Current Allergies     Allergies as of 02/04/2021    (No Known Allergies)            The following portions of the patient's history were reviewed and updated as appropriate: allergies, current medications, past family history, past medical history, past social history, past surgical history and problem list      Past Medical History:   Diagnosis Date    Arthritis     Dyslipidemia     GERD (gastroesophageal reflux disease)     Nodule of left lung 2007    negative for CA    Type 2 diabetes mellitus without complication, without long-term current use of insulin [facility] ) 1/23/2019       Past Surgical History:   Procedure Laterality Date    FOOT TENDON SURGERY      heel       Family History   Problem Relation Age of Onset    COPD Father         80 yrs    Cerebral aneurysm Mother         80 yrs         Medications have been verified          Objective   /80   Pulse 85   Temp 97 9 °F (36 6 °C)   Resp 20   Ht 5' 5" (1 651 m)   Wt 98 kg (216 lb)   SpO2 98%   BMI 35 94 kg/m²        Physical Exam Physical Exam  Vitals signs and nursing note reviewed  Constitutional:       General: He is not in acute distress  Appearance: He is well-developed  He is not diaphoretic  Neck:      Musculoskeletal: Normal range of motion and neck supple  Thyroid: No thyromegaly  Trachea: No tracheal deviation  Cardiovascular:      Rate and Rhythm: Normal rate and regular rhythm  Pulses: no weak pulses          Dorsalis pedis pulses are 2+ on the right side and 2+ on the left side  Heart sounds: Normal heart sounds  No murmur  Pulmonary:      Effort: Pulmonary effort is normal  No respiratory distress  Breath sounds: Normal breath sounds  No wheezing  Musculoskeletal: Normal range of motion  General: No tenderness or deformity  Right lower leg: No edema  Left lower leg: No edema  Feet:      Right foot:      Skin integrity: No ulcer, skin breakdown, erythema, warmth, callus or dry skin  Left foot:      Skin integrity: No ulcer, skin breakdown, erythema, warmth, callus or dry skin  Skin:     General: Skin is warm and dry  Neurological:      General: No focal deficit present  Mental Status: He is alert and oriented to person, place, and time  Psychiatric:         Mood and Affect: Mood normal          Speech: Speech normal          Behavior: Behavior normal          Thought Content: Thought content normal          Judgment: Judgment normal      Patient's shoes and socks removed  Right Foot/Ankle   Right Foot Inspection  Skin Exam: skin normal and skin intact no dry skin, no warmth, no callus, no erythema, no maceration, no abnormal color, no pre-ulcer, no ulcer and no callus                          Toe Exam: ROM and strength within normal limits  Sensory       Monofilament testing: intact  Vascular  Capillary refills: < 3 seconds  The right DP pulse is 2+       Left Foot/Ankle  Left Foot Inspection  Skin Exam: skin normal and skin intactno dry skin, no warmth, no erythema, no maceration, normal color, no pre-ulcer, no ulcer and no callus                         Toe Exam: ROM and strength within normal limits                   Sensory       Monofilament: intact  Vascular  Capillary refills: < 3 seconds  The left DP pulse is 2+  Assign Risk Category:  No deformity present; No loss of protective sensation; No weak pulses       Risk: 0      BMI Counseling: Body mass index is 35 94 kg/m²  The BMI is above normal  Nutrition recommendations include decreasing portion sizes, encouraging healthy choices of fruits and vegetables, decreasing fast food intake, consuming healthier snacks, limiting drinks that contain sugar, moderation in carbohydrate intake and increasing intake of lean protein

## 2021-02-04 NOTE — PATIENT INSTRUCTIONS
Dermatology card given   Offered diabetes education/nutrition guidance- he will call if he wants to do this  Continue same medications  Return in 6 months or call sooner for problems/concerns

## 2021-02-05 ENCOUNTER — TELEPHONE (OUTPATIENT)
Dept: FAMILY MEDICINE CLINIC | Facility: CLINIC | Age: 71
End: 2021-02-05

## 2021-02-05 DIAGNOSIS — E66.09 CLASS 1 OBESITY DUE TO EXCESS CALORIES WITH SERIOUS COMORBIDITY AND BODY MASS INDEX (BMI) OF 34.0 TO 34.9 IN ADULT: Primary | ICD-10-CM

## 2021-02-05 NOTE — TELEPHONE ENCOUNTER
Sounds good! Please give him appointment with the provider from weight management- not the nutritionist  Thanks!

## 2021-02-07 ENCOUNTER — IMMUNIZATIONS (OUTPATIENT)
Dept: FAMILY MEDICINE CLINIC | Facility: HOSPITAL | Age: 71
End: 2021-02-07

## 2021-02-07 DIAGNOSIS — Z23 ENCOUNTER FOR IMMUNIZATION: Primary | ICD-10-CM

## 2021-02-07 PROCEDURE — 0012A SARS-COV-2 / COVID-19 MRNA VACCINE (MODERNA) 100 MCG: CPT

## 2021-02-07 PROCEDURE — 91301 SARS-COV-2 / COVID-19 MRNA VACCINE (MODERNA) 100 MCG: CPT

## 2021-02-25 DIAGNOSIS — E11.9 TYPE 2 DIABETES MELLITUS WITHOUT COMPLICATION, WITHOUT LONG-TERM CURRENT USE OF INSULIN (HCC): ICD-10-CM

## 2021-02-27 DIAGNOSIS — E11.9 TYPE 2 DIABETES MELLITUS WITHOUT COMPLICATION, WITHOUT LONG-TERM CURRENT USE OF INSULIN (HCC): ICD-10-CM

## 2021-03-28 DIAGNOSIS — E78.00 HYPERCHOLESTEREMIA: ICD-10-CM

## 2021-03-29 RX ORDER — SIMVASTATIN 20 MG
20 TABLET ORAL
Qty: 90 TABLET | Refills: 3 | Status: SHIPPED | OUTPATIENT
Start: 2021-03-29 | End: 2022-04-25 | Stop reason: SDUPTHER

## 2021-03-29 NOTE — TELEPHONE ENCOUNTER
Patient requesting refill(s) of: simvastatin    Last filled:12/14/2020 #90 x 3  Last appt:2/4/2021  Next appt:8/5/2021  Pharmacy: [facility]

## 2021-04-01 ENCOUNTER — CONSULT (OUTPATIENT)
Dept: BARIATRICS | Facility: CLINIC | Age: 71
End: 2021-04-01
Payer: MEDICARE

## 2021-04-01 VITALS
RESPIRATION RATE: 18 BRPM | HEIGHT: 64 IN | BODY MASS INDEX: 36.88 KG/M2 | TEMPERATURE: 98.3 F | WEIGHT: 216 LBS | HEART RATE: 87 BPM | SYSTOLIC BLOOD PRESSURE: 132 MMHG | DIASTOLIC BLOOD PRESSURE: 72 MMHG

## 2021-04-01 DIAGNOSIS — E66.01 SEVERE OBESITY (BMI 35.0-39.9) WITH COMORBIDITY (HCC): Primary | ICD-10-CM

## 2021-04-01 DIAGNOSIS — E11.9 TYPE 2 DIABETES MELLITUS WITHOUT COMPLICATION, WITHOUT LONG-TERM CURRENT USE OF INSULIN (HCC): ICD-10-CM

## 2021-04-01 DIAGNOSIS — Z13.29 SCREENING FOR THYROID DISORDER: ICD-10-CM

## 2021-04-01 DIAGNOSIS — E78.00 HYPERCHOLESTEREMIA: ICD-10-CM

## 2021-04-01 PROCEDURE — 3008F BODY MASS INDEX DOCD: CPT | Performed by: NURSE PRACTITIONER

## 2021-04-01 PROCEDURE — 99204 OFFICE O/P NEW MOD 45 MIN: CPT | Performed by: PHYSICIAN ASSISTANT

## 2021-04-01 NOTE — ASSESSMENT & PLAN NOTE
-Discussed options of [facility]-Intensive Lifestyle Intervention Program, Very Low Calorie Diet-VLCD, Conservative Program, Jean Paul-En-Y Gastric Bypass and Vertical Sleeve Gastrectomy and the role of weight loss medications   -Initial weight loss goal of 5-10% weight loss for improved health  -Screening labs: reviewed; consider TSH  -Patient is interested in pursuing conservative program    +STOP BANG (4/8):  -reviewed risks of undiagnosed/untreated sleep apnea  -Recommended referral to sleep medicine provider for further evaluation   -Patient declined and would like to see if risks improve with weight loss   Repeat STOP BANG 3-4 months

## 2021-04-01 NOTE — ASSESSMENT & PLAN NOTE
Lab Results   Component Value Date    HGBA1C 6 5 (H) 12/09/2020   -On Metformin, can consider GLP-1  -dietary/lifestyle changes

## 2021-04-01 NOTE — PROGRESS NOTES
Assessment/Plan:    Severe obesity (BMI 35 0-39  9) with comorbidity ([facility] )  -Discussed options of [facility]-Intensive Lifestyle Intervention Program, Very Low Calorie Diet-VLCD, Conservative Program, Jean Paul-En-Y Gastric Bypass and Vertical Sleeve Gastrectomy and the role of weight loss medications   -Initial weight loss goal of 5-10% weight loss for improved health  -Screening labs: reviewed; consider TSH  -Patient is interested in pursuing conservative program    +STOP BANG (4/8):  -reviewed risks of undiagnosed/untreated sleep apnea  -Recommended referral to sleep medicine provider for further evaluation   -Patient declined and would like to see if risks improve with weight loss  Repeat STOP BANG 3-4 months    Type 2 diabetes mellitus without complication, without long-term current use of insulin ([place] [address] )    Lab Results   Component Value Date    HGBA1C 6 5 (H) 12/09/2020   -On Metformin, can consider GLP-1  -dietary/lifestyle changes    Hypercholesteremia  -on Statin therapy  -dietary/lifestyle changes  -can improve weight weight loss    Goals:    Food log (ie ) www myfitnesspal com,sparkpeople  com,loseit com,calorieking  com,etc    No sugary beverages  At least 64oz of water daily  Increase physical activity by 10 minutes daily  Gradually increase physical activity to a goal of 5 days per week for 30 minutes of MODERATE intensity PLUS 2 days per week of FULL BODY resistance training  1535-1463 calories per day  5-10 servings of fruits and vegetables per day  90 grams of protein per day    Follow up in approximately 4-6 weeks with Non-Surgical Physician/Advanced Practitioner  Diagnoses and all orders for this visit:    Severe obesity (BMI 35 0-39  9) with comorbidity ([facility] )  -     TSH, 3rd generation with Free T4 reflex; Future    Type 2 diabetes mellitus without complication, without long-term current use of insulin ([facility])  -     TSH, 3rd generation with Free T4 reflex;  Future    Hypercholesteremia    BMI 36 0-36 9,adult  -     Ambulatory referral to Weight Management    Screening for thyroid disorder  -     TSH, 3rd generation with Free T4 reflex; Future          Subjective:   Chief Complaint   Patient presents with    Consult     [facility] New Patient - Richie Van 4/8       Patient ID: Val Edmondson  is a 72 y o  male with excess weight/obesity here to pursue weight managment  Past Medical History:   Diagnosis Date    Arthritis     Dyslipidemia     GERD (gastroesophageal reflux disease)     Nodule of left lung 2007    negative for CA    Type 2 diabetes mellitus without complication, without long-term current use of insulin ([facility] ) 1/23/2019         HPI:  Obesity/Excess Weight:  Severity: Severe  Onset:  Past 20 years  Modifiers: physician's weight loss program, nutrisystem, self created diets, Following with chiropractor - meal replacements and supplements  Contributing factors: eating out of boredom, reports gaining 20 lbs in last year with COVID  Associated symptoms: decreased exercise capacity, increased shortness of breath and decreased mobility    Goals: 180 lbs    Hydration: 32-64oz of water; crystal light, coffee + equal and cream  Exercise: 2-2 5 miles walking daily 45 min  Occupation: drives school bus  Sleep: ~6 hours  ETOH: occasional; varies, 6-8 drinks per month  Tobacco: former  DM: past 4 years    Colonoscopy: 7/2020    The following portions of the patient's history were reviewed and updated as appropriate: allergies, current medications, past family history, past medical history, past social history, past surgical history and problem list     Review of Systems   Constitutional: Negative for chills and fever  HENT: Negative for sore throat  Respiratory: Positive for shortness of breath  Negative for cough  Cardiovascular: Negative for chest pain and palpitations  Gastrointestinal: Negative for abdominal pain, nausea and vomiting  Genitourinary: Negative for dysuria     Musculoskeletal: Positive for arthralgias  Skin: Negative for rash  Neurological: Negative for dizziness and headaches  Psychiatric/Behavioral: Negative for dysphoric mood  The patient is not nervous/anxious  Objective:    /72   Pulse 87   Temp 98 3 °F (36 8 °C)   Resp 18   Ht 5' 4 25" (1 632 m)   Wt 98 kg (216 lb)   BMI 36 79 kg/m²     Physical Exam  Vitals signs and nursing note reviewed  Constitutional   General appearance: Abnormal   well developed and obese  Eyes No conjunctival pallor  Ears, Nose, Mouth, and Throat Oral mucosa moist    Pulmonary   Respiratory effort: No increased work of breathing or signs of respiratory distress  Auscultation of lungs: Clear to auscultation, equal breath sounds bilaterally, no wheezes, no rales, no rhonci  Cardiovascular   Auscultation of heart: Normal rate and rhythm, normal S1 and S2, without murmurs  Examination of extremities for edema and/or varicosities: Normal   no edema  Abdomen   Abdomen: Abnormal   The abdomen was obese  Bowel sounds were normal  The abdomen was soft and nontender     Musculoskeletal   Gait and station: Normal     Psychiatric   Orientation to person, place and time: Normal     Affect: appropriate

## 2021-04-01 NOTE — PATIENT INSTRUCTIONS
Goals: Food log (ie ) www myfitnesspal com,sparkpeople  com,loseit com,calorieking  com,etc    No sugary beverages  At least 64oz of water daily  Increase physical activity by 10 minutes daily   Gradually increase physical activity to a goal of 5 days per week for 30 minutes of MODERATE intensity PLUS 2 days per week of FULL BODY resistance training  9473-4413 calories per day, 1500 calories per day with exercise  5-10 servings of fruits and vegetables per day  90 grams of protein per day

## 2021-05-03 ENCOUNTER — OFFICE VISIT (OUTPATIENT)
Dept: OBGYN CLINIC | Facility: CLINIC | Age: 71
End: 2021-05-03
Payer: MEDICARE

## 2021-05-03 VITALS
WEIGHT: 216 LBS | DIASTOLIC BLOOD PRESSURE: 80 MMHG | HEIGHT: 64 IN | BODY MASS INDEX: 36.88 KG/M2 | HEART RATE: 91 BPM | SYSTOLIC BLOOD PRESSURE: 141 MMHG

## 2021-05-03 DIAGNOSIS — G56.02 CARPAL TUNNEL SYNDROME ON LEFT: Primary | ICD-10-CM

## 2021-05-03 DIAGNOSIS — G56.01 CARPAL TUNNEL SYNDROME ON RIGHT: ICD-10-CM

## 2021-05-03 PROCEDURE — 99203 OFFICE O/P NEW LOW 30 MIN: CPT | Performed by: ORTHOPAEDIC SURGERY

## 2021-05-03 NOTE — PROGRESS NOTES
Assessment/Plan:  Assessment/Plan   Diagnoses and all orders for this visit:    Carpal tunnel syndrome on left  -     EMG 2 Limb Upper Extremity; Future    Carpal tunnel syndrome on right  -     EMG 2 Limb Upper Extremity; Future    Discussed with patient that today's physical exam is consistent with bilateral carpal tunnel syndrome, left more significant than right  Patient is being referred for EMG study of the bilateral upper extremity  He will be seen for follow-up after his study is complete for review and consideration for surgical management versus nonsurgical management  He can resume activities as tolerated  He is amenable to this plan  clinically, the patient has carpal tunnel syndrome, left greater than right  Physical examination shows a positive Tinel's  There is atrophy along the thumb region bilaterally  His 2 point discrimination is markedly elongated more in the median and ulnar nerve distribution  The EMGs are ordered  He returned back once this is complete      Subjective:   Patient ID: Floyd Hernadez is a 72 y o  RHD male  HPI   patient presents for evaluation of bilateral hand numbness and tingling, left more significant than right  Patient states he has been experiencing these symptoms for some time, but they have gotten significantly worse over the last 3-4 weeks  Patient states that he typically experiences rapid onset numbness and tingling with gripping motions such as holding a phone or cup, or driving  He states that he is not currently dropping items  The symptoms also bothersome at night, and sometimes wake him from sleep  He denies any associated bruising, swelling, or mechanical symptoms        The following portions of the patient's history were reviewed and updated as appropriate: allergies, current medications, past family history, past medical history, past social history, past surgical history and problem list     Past Medical History:   Diagnosis Date    Arthritis     Dyslipidemia     GERD (gastroesophageal reflux disease)     Nodule of left lung 2007    negative for CA    Type 2 diabetes mellitus without complication, without long-term current use of insulin [facility] ) 1/23/2019     Past Surgical History:   Procedure Laterality Date    ELBOW SURGERY Bilateral     FOOT TENDON SURGERY      heel     Family History   Problem Relation Age of Onset    COPD Father         80 yrs   Pamela Nine Heart disease Mother         hx MI   Pamela Nine Diabetes Mother     Cancer Brother      Social History     Socioeconomic History    Marital status: /Civil Union     Spouse name: None    Number of children: None    Years of education: None    Highest education level: None   Occupational History    None   Social Needs    Financial resource strain: None    Food insecurity     Worry: None     Inability: None    Transportation needs     Medical: None     Non-medical: None   Tobacco Use    Smoking status: Former Smoker    Smokeless tobacco: Never Used   Substance and Sexual Activity    Alcohol use: No    Drug use: No    Sexual activity: None   Lifestyle    Physical activity     Days per week: None     Minutes per session: None    Stress: None   Relationships    Social connections     Talks on phone: None     Gets together: None     Attends Congregational service: None     Active member of club or organization: None     Attends meetings of clubs or organizations: None     Relationship status: None    Intimate partner violence     Fear of current or ex partner: None     Emotionally abused: None     Physically abused: None     Forced sexual activity: None   Other Topics Concern    None   Social History Narrative    None       Current Outpatient Medications:     metFORMIN (GLUCOPHAGE) 500 mg tablet, Take 1 tablet (500 mg total) by mouth daily with breakfast, Disp: 90 tablet, Rfl: 3    simvastatin (ZOCOR) 20 mg tablet, Take 1 tablet (20 mg total) by mouth daily at bedtime, Disp: 90 tablet, Rfl: 3    No Known Allergies    Review of Systems   Constitutional: Negative for chills, fever and unexpected weight change  HENT: Negative for hearing loss, nosebleeds and sore throat  Eyes: Negative for pain, redness and visual disturbance  Respiratory: Negative for cough, shortness of breath and wheezing  Cardiovascular: Negative for chest pain, palpitations and leg swelling  Gastrointestinal: Negative for abdominal pain, nausea and vomiting  Endocrine: Negative for polydipsia and polyuria  Genitourinary: Negative for dysuria and hematuria  Musculoskeletal:        As noted in HPI   Skin: Negative for rash and wound  Neurological: Negative for dizziness, numbness and headaches  Psychiatric/Behavioral: Negative for decreased concentration and suicidal ideas  The patient is not nervous/anxious  Objective:  /80 (BP Location: Right arm, Patient Position: Sitting, Cuff Size: Large)   Pulse 91   Ht 5' 4 25" (1 632 m)   Wt 98 kg (216 lb)   BMI 36 79 kg/m²     Ortho Exam   Bilateral Hands -    no obvious anatomical deformity   Skin is warm and dry to touch with no signs of erythema, ecchymosis, or infection   no tenderness to palpation on exam   FDS, FDP, and extensor mechanisms are intact   5/5 MMT throughout  - Tinel's at carpal tunnel  + Phalan's L>R  + Carpal tunnel compression   2+ distal radial pulse with brisk capillary refill to the fingers   Radial, median, and ulnar motor and sensory distributions intact  Sensation light touch diminished in median distribution L>R    Physical Exam  Constitutional:       Appearance: He is well-developed  HENT:      Right Ear: External ear normal       Left Ear: External ear normal       Nose: Nose normal    Eyes:      Conjunctiva/sclera: Conjunctivae normal       Pupils: Pupils are equal, round, and reactive to light  Neck:      Musculoskeletal: Normal range of motion     Pulmonary:      Effort: Pulmonary effort is normal  Musculoskeletal:      Comments:  As noted in HPI   Skin:     General: Skin is warm and dry  Neurological:      Mental Status: He is alert and oriented to person, place, and time  Psychiatric:         Behavior: Behavior normal          Thought Content:  Thought content normal          Judgment: Judgment normal          Imaging:  No new imaging reviewed this visit     Scribe Attestation    I,:  Eleonora Benavidez am acting as a scribe while in the presence of the attending physician :       I,:  Annabella Terrazas,  personally performed the services described in this documentation    as scribed in my presence :

## 2021-06-03 ENCOUNTER — HOSPITAL ENCOUNTER (OUTPATIENT)
Dept: NEUROLOGY | Facility: CLINIC | Age: 71
Discharge: HOME | End: 2021-06-03
Payer: MEDICARE

## 2021-06-03 DIAGNOSIS — G56.02 CARPAL TUNNEL SYNDROME ON LEFT: ICD-10-CM

## 2021-06-03 DIAGNOSIS — G56.01 CARPAL TUNNEL SYNDROME ON RIGHT: ICD-10-CM

## 2021-06-03 PROCEDURE — 95886 MUSC TEST DONE W/N TEST COMP: CPT | Performed by: PSYCHIATRY & NEUROLOGY

## 2021-06-03 PROCEDURE — 95911 NRV CNDJ TEST 9-10 STUDIES: CPT | Performed by: PSYCHIATRY & NEUROLOGY

## 2021-06-06 DIAGNOSIS — E11.9 TYPE 2 DIABETES MELLITUS WITHOUT COMPLICATION, WITHOUT LONG-TERM CURRENT USE OF INSULIN (HCC): ICD-10-CM

## 2021-06-07 DIAGNOSIS — E11.9 TYPE 2 DIABETES MELLITUS WITHOUT COMPLICATION, WITHOUT LONG-TERM CURRENT USE OF INSULIN (HCC): Primary | ICD-10-CM

## 2021-06-07 NOTE — TELEPHONE ENCOUNTER
Patient requesting refill(s) of: Metformin     Last filled: 3/1/21 90 tablets 3 refills  Last appt: 2/4/21  Next appt: 8/5/21  Pharmacy:[facility], needs new script due to switching pharmacy's

## 2021-06-14 ENCOUNTER — OFFICE VISIT (OUTPATIENT)
Dept: OBGYN CLINIC | Facility: CLINIC | Age: 71
End: 2021-06-14
Payer: MEDICARE

## 2021-06-14 VITALS
BODY MASS INDEX: 36.88 KG/M2 | HEIGHT: 64 IN | SYSTOLIC BLOOD PRESSURE: 151 MMHG | HEART RATE: 71 BPM | DIASTOLIC BLOOD PRESSURE: 81 MMHG | WEIGHT: 216 LBS

## 2021-06-14 DIAGNOSIS — G56.02 CARPAL TUNNEL SYNDROME ON LEFT: Primary | ICD-10-CM

## 2021-06-14 DIAGNOSIS — G56.01 CARPAL TUNNEL SYNDROME ON RIGHT: ICD-10-CM

## 2021-06-14 DIAGNOSIS — Z01.812 PRE-OPERATIVE LABORATORY EXAMINATION: ICD-10-CM

## 2021-06-14 PROCEDURE — 99213 OFFICE O/P EST LOW 20 MIN: CPT | Performed by: ORTHOPAEDIC SURGERY

## 2021-06-14 RX ORDER — CHLORHEXIDINE GLUCONATE 4 G/100ML
SOLUTION TOPICAL DAILY PRN
Status: CANCELLED | OUTPATIENT
Start: 2021-06-30

## 2021-06-14 RX ORDER — CEFAZOLIN SODIUM 2 G/50ML
2000 SOLUTION INTRAVENOUS ONCE
Status: CANCELLED | OUTPATIENT
Start: 2021-06-30 | End: 2021-06-14

## 2021-06-14 RX ORDER — CHLORHEXIDINE GLUCONATE 0.12 MG/ML
15 RINSE ORAL ONCE
Status: CANCELLED | OUTPATIENT
Start: 2021-06-30 | End: 2021-06-14

## 2021-06-14 NOTE — H&P (VIEW-ONLY)
Assessment/Plan:    No problem-specific Assessment & Plan notes found for this encounter  Diagnoses and all orders for this visit:    Carpal tunnel syndrome on left    Carpal tunnel syndrome on right            The patient has opted for elective left carpal tunnel release  All risks, complications, benefits were discussed with the patient in great detail including bleeding, infection, blood clots, pain, stiffness, neurovascular damage, fractures, dislocations, wound healing problems, worsening nerve symptoms, the possibility of loss of life from surgery, heart attack, stroke, etcetera     his surgery is tentatively scheduled for June 30, 2021    Subjective:      Patient ID: Axel Mora is a 72 y o  male  HPI     the patient presents for his nerve conduction studies of his bilateral upper extremities  It showed moderate carpal tunnel syndrome, worse on the left than the right  The pain does affect his lifestyle  His hand does feel clumsy  He wants to proceed with elective left carpal tunnel release  He continues to wear splints at night    The following portions of the patient's history were reviewed and updated as appropriate: allergies, current medications, past family history, past medical history, past social history, past surgical history and problem list     Review of Systems   Constitutional: Negative for chills, fever and unexpected weight change  HENT: Negative for hearing loss, nosebleeds and sore throat  Eyes: Negative for pain, redness and visual disturbance  Respiratory: Negative for cough, shortness of breath and wheezing  Cardiovascular: Negative for chest pain, palpitations and leg swelling  Gastrointestinal: Negative for abdominal pain, nausea and vomiting  Endocrine: Negative for polydipsia and polyuria  Genitourinary: Negative for dysuria and hematuria  Musculoskeletal: Positive for arthralgias and myalgias   Negative for back pain, gait problem, joint swelling, neck pain and neck stiffness  As noted in HPI   Skin: Negative for rash and wound  Neurological: Negative for dizziness, numbness and headaches  Psychiatric/Behavioral: Negative for decreased concentration and suicidal ideas  The patient is not nervous/anxious  Objective:      /81   Pulse 71   Ht 5' 4 25" (1 632 m)   Wt 98 kg (216 lb)   BMI 36 79 kg/m²          Physical Exam       neck was soft and supple  There is a negative axial compression test in his neck  Bilateral upper extremities are neurovascular intact  Fingers are pink and mobile  Compartments are soft  There is a positive Tinel's and positive Phalen's test bilaterally  It is worse on the left than the right  Two-point discrimination was markedly elongated more in the median and ulnar nerve distributions  Mild thenar atrophy was present        Nerve conduction studies show moderate carpal tunnel syndrome, worse on the left than the right

## 2021-06-14 NOTE — PROGRESS NOTES
Assessment/Plan:    No problem-specific Assessment & Plan notes found for this encounter  Diagnoses and all orders for this visit:    Carpal tunnel syndrome on left    Carpal tunnel syndrome on right            The patient has opted for elective left carpal tunnel release  All risks, complications, benefits were discussed with the patient in great detail including bleeding, infection, blood clots, pain, stiffness, neurovascular damage, fractures, dislocations, wound healing problems, worsening nerve symptoms, the possibility of loss of life from surgery, heart attack, stroke, etcetera     his surgery is tentatively scheduled for June 30, 2021    Subjective:      Patient ID: Hafsa Mccauley is a 72 y o  male  HPI     the patient presents for his nerve conduction studies of his bilateral upper extremities  It showed moderate carpal tunnel syndrome, worse on the left than the right  The pain does affect his lifestyle  His hand does feel clumsy  He wants to proceed with elective left carpal tunnel release  He continues to wear splints at night    The following portions of the patient's history were reviewed and updated as appropriate: allergies, current medications, past family history, past medical history, past social history, past surgical history and problem list     Review of Systems   Constitutional: Negative for chills, fever and unexpected weight change  HENT: Negative for hearing loss, nosebleeds and sore throat  Eyes: Negative for pain, redness and visual disturbance  Respiratory: Negative for cough, shortness of breath and wheezing  Cardiovascular: Negative for chest pain, palpitations and leg swelling  Gastrointestinal: Negative for abdominal pain, nausea and vomiting  Endocrine: Negative for polydipsia and polyuria  Genitourinary: Negative for dysuria and hematuria  Musculoskeletal: Positive for arthralgias and myalgias   Negative for back pain, gait problem, joint swelling, neck pain and neck stiffness  As noted in HPI   Skin: Negative for rash and wound  Neurological: Negative for dizziness, numbness and headaches  Psychiatric/Behavioral: Negative for decreased concentration and suicidal ideas  The patient is not nervous/anxious  Objective:      /81   Pulse 71   Ht 5' 4 25" (1 632 m)   Wt 98 kg (216 lb)   BMI 36 79 kg/m²          Physical Exam       neck was soft and supple  There is a negative axial compression test in his neck  Bilateral upper extremities are neurovascular intact  Fingers are pink and mobile  Compartments are soft  There is a positive Tinel's and positive Phalen's test bilaterally  It is worse on the left than the right  Two-point discrimination was markedly elongated more in the median and ulnar nerve distributions  Mild thenar atrophy was present        Nerve conduction studies show moderate carpal tunnel syndrome, worse on the left than the right

## 2021-06-16 ENCOUNTER — TELEPHONE (OUTPATIENT)
Dept: FAMILY MEDICINE CLINIC | Facility: CLINIC | Age: 71
End: 2021-06-16

## 2021-06-16 ENCOUNTER — APPOINTMENT (OUTPATIENT)
Dept: LAB | Facility: CLINIC | Age: 71
End: 2021-06-16
Payer: MEDICARE

## 2021-06-16 DIAGNOSIS — Z01.812 PRE-OPERATIVE LABORATORY EXAMINATION: ICD-10-CM

## 2021-06-16 DIAGNOSIS — E11.9 TYPE 2 DIABETES MELLITUS WITHOUT COMPLICATION, WITHOUT LONG-TERM CURRENT USE OF INSULIN (HCC): ICD-10-CM

## 2021-06-16 LAB
ALBUMIN SERPL BCP-MCNC: 3.8 G/DL (ref 3.5–5)
ALP SERPL-CCNC: 52 U/L (ref 46–116)
ALT SERPL W P-5'-P-CCNC: 27 U/L (ref 12–78)
ANION GAP SERPL CALCULATED.3IONS-SCNC: 7 MMOL/L (ref 4–13)
AST SERPL W P-5'-P-CCNC: 13 U/L (ref 5–45)
BILIRUB SERPL-MCNC: 0.57 MG/DL (ref 0.2–1)
BUN SERPL-MCNC: 20 MG/DL (ref 5–25)
CALCIUM SERPL-MCNC: 9 MG/DL (ref 8.3–10.1)
CHLORIDE SERPL-SCNC: 107 MMOL/L (ref 100–108)
CO2 SERPL-SCNC: 24 MMOL/L (ref 21–32)
CREAT SERPL-MCNC: 0.93 MG/DL (ref 0.6–1.3)
ERYTHROCYTE [DISTWIDTH] IN BLOOD BY AUTOMATED COUNT: 13.7 % (ref 11.6–15.1)
EST. AVERAGE GLUCOSE BLD GHB EST-MCNC: 140 MG/DL
GFR SERPL CREATININE-BSD FRML MDRD: 83 ML/MIN/1.73SQ M
GLUCOSE P FAST SERPL-MCNC: 151 MG/DL (ref 65–99)
HBA1C MFR BLD: 6.5 %
HCT VFR BLD AUTO: 48.3 % (ref 36.5–49.3)
HGB BLD-MCNC: 15.8 G/DL (ref 12–17)
MCH RBC QN AUTO: 29.4 PG (ref 26.8–34.3)
MCHC RBC AUTO-ENTMCNC: 32.7 G/DL (ref 31.4–37.4)
MCV RBC AUTO: 90 FL (ref 82–98)
PLATELET # BLD AUTO: 160 THOUSANDS/UL (ref 149–390)
PMV BLD AUTO: 10.8 FL (ref 8.9–12.7)
POTASSIUM SERPL-SCNC: 4.3 MMOL/L (ref 3.5–5.3)
PROT SERPL-MCNC: 7 G/DL (ref 6.4–8.2)
RBC # BLD AUTO: 5.37 MILLION/UL (ref 3.88–5.62)
SODIUM SERPL-SCNC: 138 MMOL/L (ref 136–145)
WBC # BLD AUTO: 5.84 THOUSAND/UL (ref 4.31–10.16)

## 2021-06-16 PROCEDURE — 80053 COMPREHEN METABOLIC PANEL: CPT

## 2021-06-16 PROCEDURE — 83036 HEMOGLOBIN GLYCOSYLATED A1C: CPT

## 2021-06-16 PROCEDURE — 85027 COMPLETE CBC AUTOMATED: CPT

## 2021-06-16 PROCEDURE — 36415 COLL VENOUS BLD VENIPUNCTURE: CPT

## 2021-06-16 NOTE — PRE-PROCEDURE INSTRUCTIONS
Pre-Surgery Instructions:   Medication Instructions    metFORMIN (GLUCOPHAGE) 500 mg tablet not taking DOS    simvastatin (ZOCOR) 20 mg tablet takes at     Med Class    Pre-Surgery/Procedure Instructions for Adult Patients who Take Medicine for Diabetes or to Control their Blood Sugar     Day Before Surgery/Procedure  Use the directions based on the type of medicine you take for your diabetes  1  If you are having a procedure that does not require a bowel prep:  ? Pre-Mixed Insulin (Intermediate Acting: Humalog 75/25, Humulin 70/30  Novolog 70/30, Regular Insulin)  § Take ½ your regular dose the evening before your procedure  ? Rapid/Fast Acting Insulin/Long Acting Insulin (Humalog U200, NovoLog, Apidra, Lantus, Levemir, Wendelin Meuse, Cameron)  § Take your FULL regular dose the day before procedure  ? Oral Diabetic Medicines including Glipizide/Glimepiride/Glucotrol (sulfonylurea)  § Take your regular dose with dinner the evening before your procedure  2  If you are having a procedure (e g  Colonoscopy) that requires a bowel prep and you are allowed to have at least a clear liquid diet:  ? Pre-Mixed Insulin (Intermediate Acting: Humalog 75/25, Humulin 70/30, Novolog 70/30, Regular Insulin)  § Take ½ your regular dose the evening before your procedure  ? Rapid/Fast Acting Insulin (Humalog U200, NovoLog, Apidra, Fiasp)  § Take ½ your regular dose the evening before your procedure  ? Long Acting Insulin (Lantus, Levemir, Wendelin Meuse)  § Take your FULL regular dose the day before procedure  ? Oral Glipizide/Glimepiride/Glucotrol (sulfonylurea)  § Take ½ your regular dose the evening before your procedure  ? Oral Diabetic Medicines that are NOT Glipizide/Glimepiride/Glucotrol  § Take your regular dose with dinner in the evening before your procedure      Day of Surgery/Procedure  · Long Acting Insulin (Lantus, Levemir, Wendelin Meuse)  ?  If you usually take your Long-Acting Insulin in the morning, take the full dose as scheduled  · With the exception of the morning Long-Acting Insulin noted above, DO NOT take ANY diabetic medicine on the day of your procedure unless you were instructed by the doctor who manages your diabetic medicines  · Continue to check your blood sugars  · If you have an insulin pump then consult with your Endocrinologist for instructions  · If you cannot see your Endocrinologist, on the day of the procedure set your insulin pump to your basal rate only  Please bring your insulin pump supplies to the hospital      This Educational material has been approved by the Patient Education Advisory Committee  Date prepared: 1/17/2018          Expiration date: 1/17/2019        Approval Number:          Statin Med Class  Continue to take this medication on your normal schedule  If this is an oral medication and you take it in the morning, then you may take this medicine with a sip of water  Reviewed with Pt med insts, showering insts, advised of NPO at [place] for DOS including candy mints etc   Advised of Inocencio Shaver 27 call  6/29 from [id number]  Pt verbalized understanding to all of the above

## 2021-06-16 NOTE — TELEPHONE ENCOUNTER
Pt dropped off  diabetic waiver form to be filled out by Jayess samuel, I gave form to MA to start form    Told pt we will call him when ready

## 2021-06-29 ENCOUNTER — ANESTHESIA EVENT (OUTPATIENT)
Dept: PERIOP | Facility: HOSPITAL | Age: 71
End: 2021-06-29
Payer: MEDICARE

## 2021-06-30 ENCOUNTER — ANESTHESIA (OUTPATIENT)
Dept: PERIOP | Facility: HOSPITAL | Age: 71
End: 2021-06-30
Payer: MEDICARE

## 2021-06-30 ENCOUNTER — HOSPITAL ENCOUNTER (OUTPATIENT)
Facility: HOSPITAL | Age: 71
Setting detail: OUTPATIENT SURGERY
Discharge: HOME | End: 2021-06-30
Attending: ORTHOPAEDIC SURGERY | Admitting: ORTHOPAEDIC SURGERY
Payer: MEDICARE

## 2021-06-30 VITALS
RESPIRATION RATE: 20 BRPM | DIASTOLIC BLOOD PRESSURE: 61 MMHG | HEART RATE: 76 BPM | OXYGEN SATURATION: 93 % | SYSTOLIC BLOOD PRESSURE: 147 MMHG | TEMPERATURE: 98.1 F

## 2021-06-30 DIAGNOSIS — Z01.812 PRE-OPERATIVE LABORATORY EXAMINATION: ICD-10-CM

## 2021-06-30 DIAGNOSIS — G56.02 CARPAL TUNNEL SYNDROME ON LEFT: Primary | ICD-10-CM

## 2021-06-30 LAB — GLUCOSE SERPL-MCNC: 122 MG/DL (ref 65–140)

## 2021-06-30 PROCEDURE — 88304 TISSUE EXAM BY PATHOLOGIST: CPT | Performed by: PATHOLOGY

## 2021-06-30 PROCEDURE — 82948 REAGENT STRIP/BLOOD GLUCOSE: CPT

## 2021-06-30 PROCEDURE — 64721 CARPAL TUNNEL SURGERY: CPT | Performed by: ORTHOPAEDIC SURGERY

## 2021-06-30 PROCEDURE — 64721 CARPAL TUNNEL SURGERY: CPT | Performed by: PHYSICIAN ASSISTANT

## 2021-06-30 RX ORDER — PROPOFOL 10 MG/ML
INJECTION, EMULSION INTRAVENOUS CONTINUOUS PRN
Status: DISCONTINUED | OUTPATIENT
Start: 2021-06-30 | End: 2021-06-30

## 2021-06-30 RX ORDER — ONDANSETRON 2 MG/ML
4 INJECTION INTRAMUSCULAR; INTRAVENOUS EVERY 6 HOURS PRN
Status: DISCONTINUED | OUTPATIENT
Start: 2021-06-30 | End: 2021-06-30 | Stop reason: HOSPADM

## 2021-06-30 RX ORDER — CHLORHEXIDINE GLUCONATE 0.12 MG/ML
15 RINSE ORAL ONCE
Status: COMPLETED | OUTPATIENT
Start: 2021-06-30 | End: 2021-06-30

## 2021-06-30 RX ORDER — CEFAZOLIN SODIUM 2 G/50ML
2000 SOLUTION INTRAVENOUS ONCE
Status: COMPLETED | OUTPATIENT
Start: 2021-06-30 | End: 2021-06-30

## 2021-06-30 RX ORDER — LIDOCAINE HYDROCHLORIDE 10 MG/ML
INJECTION, SOLUTION EPIDURAL; INFILTRATION; INTRACAUDAL; PERINEURAL AS NEEDED
Status: DISCONTINUED | OUTPATIENT
Start: 2021-06-30 | End: 2021-06-30 | Stop reason: HOSPADM

## 2021-06-30 RX ORDER — POVIDONE-IODINE 10 MG/G
OINTMENT TOPICAL AS NEEDED
Status: DISCONTINUED | OUTPATIENT
Start: 2021-06-30 | End: 2021-06-30 | Stop reason: HOSPADM

## 2021-06-30 RX ORDER — PROPOFOL 10 MG/ML
INJECTION, EMULSION INTRAVENOUS AS NEEDED
Status: DISCONTINUED | OUTPATIENT
Start: 2021-06-30 | End: 2021-06-30

## 2021-06-30 RX ORDER — FENTANYL CITRATE/PF 50 MCG/ML
25 SYRINGE (ML) INJECTION
Status: DISCONTINUED | OUTPATIENT
Start: 2021-06-30 | End: 2021-06-30 | Stop reason: HOSPADM

## 2021-06-30 RX ORDER — SODIUM CHLORIDE, SODIUM LACTATE, POTASSIUM CHLORIDE, CALCIUM CHLORIDE 600; 310; 30; 20 MG/100ML; MG/100ML; MG/100ML; MG/100ML
125 INJECTION, SOLUTION INTRAVENOUS CONTINUOUS
Status: DISCONTINUED | OUTPATIENT
Start: 2021-06-30 | End: 2021-06-30 | Stop reason: HOSPADM

## 2021-06-30 RX ORDER — CEPHALEXIN 500 MG/1
500 CAPSULE ORAL EVERY 8 HOURS SCHEDULED
Qty: 15 CAPSULE | Refills: 0 | Status: SHIPPED | OUTPATIENT
Start: 2021-06-30 | End: 2021-07-05

## 2021-06-30 RX ORDER — METHYLPREDNISOLONE ACETATE 40 MG/ML
INJECTION, SUSPENSION INTRA-ARTICULAR; INTRALESIONAL; INTRAMUSCULAR; SOFT TISSUE AS NEEDED
Status: DISCONTINUED | OUTPATIENT
Start: 2021-06-30 | End: 2021-06-30 | Stop reason: HOSPADM

## 2021-06-30 RX ORDER — SODIUM CHLORIDE, SODIUM LACTATE, POTASSIUM CHLORIDE, CALCIUM CHLORIDE 600; 310; 30; 20 MG/100ML; MG/100ML; MG/100ML; MG/100ML
125 INJECTION, SOLUTION INTRAVENOUS CONTINUOUS
Status: DISCONTINUED | OUTPATIENT
Start: 2021-06-30 | End: 2021-06-30

## 2021-06-30 RX ORDER — TRAMADOL HYDROCHLORIDE 50 MG/1
50 TABLET ORAL EVERY 6 HOURS PRN
Status: DISCONTINUED | OUTPATIENT
Start: 2021-06-30 | End: 2021-06-30 | Stop reason: HOSPADM

## 2021-06-30 RX ORDER — CHLORHEXIDINE GLUCONATE 4 G/100ML
SOLUTION TOPICAL DAILY PRN
Status: DISCONTINUED | OUTPATIENT
Start: 2021-06-30 | End: 2021-06-30 | Stop reason: HOSPADM

## 2021-06-30 RX ORDER — MIDAZOLAM HYDROCHLORIDE 2 MG/2ML
INJECTION, SOLUTION INTRAMUSCULAR; INTRAVENOUS AS NEEDED
Status: DISCONTINUED | OUTPATIENT
Start: 2021-06-30 | End: 2021-06-30

## 2021-06-30 RX ORDER — ONDANSETRON 2 MG/ML
4 INJECTION INTRAMUSCULAR; INTRAVENOUS ONCE AS NEEDED
Status: DISCONTINUED | OUTPATIENT
Start: 2021-06-30 | End: 2021-06-30 | Stop reason: HOSPADM

## 2021-06-30 RX ORDER — TRAMADOL HYDROCHLORIDE 50 MG/1
50 TABLET ORAL EVERY 6 HOURS PRN
Qty: 30 TABLET | Refills: 0 | Status: SHIPPED | OUTPATIENT
Start: 2021-06-30 | End: 2021-07-10

## 2021-06-30 RX ADMIN — CHLORHEXIDINE GLUCONATE 0.12% ORAL RINSE 15 ML: 1.2 LIQUID ORAL at 12:35

## 2021-06-30 RX ADMIN — PROPOFOL 30 MG: 10 INJECTION, EMULSION INTRAVENOUS at 13:50

## 2021-06-30 RX ADMIN — TRAMADOL HYDROCHLORIDE 50 MG: 50 TABLET, FILM COATED ORAL at 14:49

## 2021-06-30 RX ADMIN — PROPOFOL 30 MG: 10 INJECTION, EMULSION INTRAVENOUS at 13:56

## 2021-06-30 RX ADMIN — SODIUM CHLORIDE, SODIUM LACTATE, POTASSIUM CHLORIDE, AND CALCIUM CHLORIDE 125 ML/HR: .6; .31; .03; .02 INJECTION, SOLUTION INTRAVENOUS at 12:51

## 2021-06-30 RX ADMIN — CEFAZOLIN SODIUM 2000 MG: 2 SOLUTION INTRAVENOUS at 13:36

## 2021-06-30 RX ADMIN — MIDAZOLAM HYDROCHLORIDE 2 MG: 1 INJECTION, SOLUTION INTRAMUSCULAR; INTRAVENOUS at 13:48

## 2021-06-30 RX ADMIN — PROPOFOL 100 MCG/KG/MIN: 10 INJECTION, EMULSION INTRAVENOUS at 13:50

## 2021-06-30 NOTE — ANESTHESIA PREPROCEDURE EVALUATION
Procedure:  RELEASE CARPAL TUNNEL (Left Wrist)    Relevant Problems   CARDIO   (+) High blood pressure   (+) Hypercholesteremia      ENDO   (+) Type 2 diabetes mellitus without complication, without long-term current use of insulin (HCC)      PULMONARY   (+) Dyspnea        Physical Exam    Airway    Mallampati score: II  TM Distance: >3 FB  Neck ROM: full     Dental       Cardiovascular  Rhythm: regular, Rate: normal, Cardiovascular exam normal    Pulmonary  Pulmonary exam normal Breath sounds clear to auscultation,     Other Findings        Anesthesia Plan  ASA Score- 2     Anesthesia Type- IV sedation with anesthesia with ASA Monitors  Additional Monitors:   Airway Plan:           Plan Factors-Exercise tolerance (METS): >4 METS  Chart reviewed  Existing labs reviewed  Patient summary reviewed  Patient is not a current smoker  Induction- intravenous  Postoperative Plan- Plan for postoperative opioid use  Informed Consent- Anesthetic plan and risks discussed with patient  I personally reviewed this patient with the CRNA  Discussed and agreed on the Anesthesia Plan with the CRNA  Maria Sánchez

## 2021-06-30 NOTE — OP NOTE
OPERATIVE REPORT  PATIENT NAME: Pam Verdin    :  1950  MRN: [id number]  Pt Location: [facility] OR ROOM 01    SURGERY DATE: 2021    Surgeon(s) and Role:     * Karrie Haas, DO - Primary     * Katarzyna Pagan PA-C - Assisting    Preop Diagnosis:  Pre-operative laboratory examination [Z01 812]    Carpal tunnel syndrome left wrist    Post-Op Diagnosis Codes:     * Pre-operative laboratory examination [Z01 812]    Carpal tunnel syndrome left wrist    Procedure(s) (LRB):  RELEASE CARPAL TUNNEL (Left)     Left carpal tunnel release  Tenosynovectomy  Subcutaneous fasciotomy  Application of short-arm splint    Specimen(s):  Tenosynovium    Estimated Blood Loss:   Minimal    Drains:  None    Anesthesia Type:   IV Sedation with local Anesthesia    Operative Indications:  Pre-operative laboratory examination [Z01 812]  Carpal tunnel syndrome left wrist    Operative Findings:  TT: 11    Complications:   None    Procedure and Technique:    The patient was properly identified and brought to the operative suite  He was given a dose of intravenous antibiotics  After successful induction of the IV sedation a tourniquet was placed on patient's left proximal arm  The left upper extremity was then prepped and draped in usual sterile fashion  It was medically necessary that the physician assistant be in the room for  Positioning  and applying  the appropriate amount of retraction for  the patient  More specifically, the physician assisted in positioning, retraction of the soft tissue, retraction of the nerve, assisting with the fasciotomy as well as closure of a complex wound        The surgical landmarks were outlined with a  skin marker  An Esmarch was used to [employer] the left upper extremity  The tourniquet was then inflated  1% lidocaine was used for local infiltration     Using a #15  Scalpel blade, a longitudinal   incision was made in the patient's left palm starting at the wrist crease extending distally for approximately 6-7 cm  Hemostasis was achieved with the use of electrocautery  This layer was brought down to the  subcutaneous fat layer  Through further blunt dissection the palmar fascia layer was then located  Using 2nd #15  Scalpel blade the palmar fascia was then divided line with its fibers  The hypertrophic transverse carpal ligament then located  It is volar fibers were incised a #15  Scalpel blade  As its terminal fibers were being  reached, a hemostat  was placed underneath this and  above the median nerve  The remaining fibers transverse carpal ligament ligament was then divided distally to the level of palmar arch and proximally to the level of the wrist crease  There was still some stenosis proximally and remaining fibers of the transverse carpal ligament were then  divided  A small subcutaneous fasciotomy did occur  Median nerve was inspected  The nerve was quite compressed and had a bluish tinge indicative of chronic compression as well  There was a tremendous amount of  tenosynovium present, and  a small portion was sent for biopsy purposes  The wound was then irrigated and closed with 4-0 nylon and dressed with ointment Xeroform 4 x 4 sterile a volar splint and an Ace bandage    There was no complications from the procedure  He was  Transferred to his  hospital bed and went to the PACU in stable condition       I was present for the entire procedure, A qualified resident physician was not available and A physician assistant was required during the procedure for retraction tissue handling,dissection and suturing    Patient Disposition:  PACU     SIGNATURE: Markie Barr DO  DATE: June 30, 2021  TIME: 1:41 PM

## 2021-06-30 NOTE — ANESTHESIA POSTPROCEDURE EVALUATION
Post-Op Assessment Note    CV Status:  Stable  Pain Score: 0    Pain management: adequate     Mental Status:  Arousable   Hydration Status:  Stable   PONV Controlled:  None      Post Op Vitals Reviewed: Yes      Staff: CRNA         No complications documented      BP   126/60   Temp  97 3   Pulse  86   Resp   16   SpO2   97%

## 2021-06-30 NOTE — INTERVAL H&P NOTE
H&P reviewed  After examining the patient I find no changes in the patients condition since the H&P had been written    Lungs CTA  Heart RRR  Vitals:    06/30/21 1228   BP: 147/79   Pulse: 85   Resp: 18   Temp: (!) 97 4 °F (36 3 °C)   SpO2: 96%

## 2021-06-30 NOTE — DISCHARGE INSTRUCTIONS
POST-OPERATIVE INFORMATION  HAND SURGERY    1  Keep your hand elevated above heart level as much as possible during the first 48 hours after surgery to minimize swelling  2  Exercise your fingers and elbow regularly by moving them in all directions  Try to make gentle fists or touch each finger to your thumb  3  Do not remove the splint until you are seen in our office for follow up  4  If the bandage begins to feel to tight, you may unwrap it and loosen it  You should do this if you notice significant hand swelling  5  You may change the dressing after 2 days or if it gets wet or dirty  6  You may shower after 2 days, but keep your hand and dressing dry  7  Call our office if you experience pain not controlled by your medicine, develop a fever, experience increased drainage or redness around the incision site  8  If a post-op follow up appointment is not arranged before you leave the hospital, call our office the day following surgery to schedule this appointment  9  You should start therapy in about 2 days after surgery, or as previously scheduled

## 2021-07-06 ENCOUNTER — EVALUATION (OUTPATIENT)
Dept: OCCUPATIONAL THERAPY | Facility: CLINIC | Age: 71
End: 2021-07-06
Payer: MEDICARE

## 2021-07-06 DIAGNOSIS — G56.02 CARPAL TUNNEL SYNDROME ON LEFT: ICD-10-CM

## 2021-07-06 DIAGNOSIS — Z98.890 S/P CARPAL TUNNEL RELEASE: Primary | ICD-10-CM

## 2021-07-06 PROCEDURE — 97535 SELF CARE MNGMENT TRAINING: CPT

## 2021-07-06 PROCEDURE — 97165 OT EVAL LOW COMPLEX 30 MIN: CPT

## 2021-07-06 NOTE — PROGRESS NOTES
OT Evaluation     Today's date: 2021  Patient name: Kristie Vazquez  : 1950  MRN: [id number]  Referring provider: Froylan Nevarez DO  Dx:   Encounter Diagnosis     ICD-10-CM    1  S/P carpal tunnel release  Z98 890    2  Carpal tunnel syndrome on left  G56 02                   Assessment  Assessment details: Patient presenting with OP OT services with a diagnosis of CTR  Patient had surgery completed on 2021 by Dr Stuart Borjas  Patient reports symptoms started several years  Patient reports symptoms in right hand  Patient reports left is worse than right  Patient's next follow-up appointment is on 2021  Patient is right hand dominant  Patient reports being a retired   Patient presenting this date with post operative dressings  Impairments: abnormal coordination, abnormal or restricted ROM and impaired physical strength    Symptom irritability: moderateUnderstanding of Dx/Px/POC: good   Prognosis: good    Goals  STGs    Pt will increase  strength by 5-10#  Pt will increase wrist strength by 1/2 grade  Pt will increase wrist ROM by 50%     Independent with HEP    LTGs     Pt will increase  strength by an additional 5-10#  Pt will increase wrist strength by 1-2 grade  Pt will increase wrist ROM to [facility]  Pt will increase pinch strength by 3-5#  Pt will report an increase in ADL/IADL participation        Plan  Plan details: Patient has presenting to OP OT services with a dx of L CTR  Patient demonstrating increased pain, decreased strength, decreased ROM and decreased activity  Pt would benefit from continued Occupational Therapy services two times per week for 4 weeks to return to prior level of function and achieve all established goals   Thank you for the referral!    Patient would benefit from: OT eval, skilled occupational therapy and custom splinting  Referral necessary: Yes  Planned modality interventions: ultrasound, unattended electrical stimulation, thermotherapy: hydrocollator packs, cryotherapy and thermotherapy: paraffin bath  Planned therapy interventions: massage, manual therapy, joint mobilization, patient education, strengthening, stretching, fine motor coordination training, home exercise program, neuromuscular re-education, self care, therapeutic exercise and therapeutic activities  Frequency: 1x week  Duration in visits: 4  Duration in weeks: 4  Treatment plan discussed with: patient        Subjective Evaluation    History of Present Illness  Date of surgery: 2021  Mechanism of injury: surgery  Mechanism of injury: S/p L CTR          Not a recurrent problem   Quality of life: good    Pain  Current pain ratin  At best pain ratin  At worst pain ratin  Location: L Wrist    Hand dominance: right      Diagnostic Tests  EMG: abnormal  Treatments  Current treatment: occupational therapy  Patient Goals  Patient goals for therapy: decreased edema, decreased pain, increased motion, return to work, increased strength and independence with ADLs/IADLs          Objective     Neurological Testing     Sensation     Wrist/Hand   Left   Intact: light touch    Right   Diminished: light touch    Additional Neurological Details  Patient reports a resolution of symptoms since surgery along left hand  Patient is still experiencing occasional right hand numbness       Active Range of Motion     Left Wrist   Wrist flexion: 40 degrees   Wrist extension: 65 degrees   Radial deviation: 15 degrees   Ulnar deviation: 45 degrees     Right Wrist   Normal active range of motion    Left Thumb   Opposition: WNL    Right Thumb   Opposition: WNL    Additional Active Range of Motion Details  Decreased left wrist ROM  Composite fist noted    Strength/Myotome Testing     Left Wrist/Hand   Wrist extension: 3-  Wrist flexion: 3-  Radial deviation: 3-  Ulnar deviation: 3-     (2nd hand position)     Trial 1: 0    Thumb Strength  Key/Lateral Pinch     Trial 1: 0    Right Wrist/Hand   Normal wrist strength     (2nd hand position)     Trial 1: 65    Thumb Strength   Key/Lateral Pinch     Trial 1: 16    Swelling     Left Wrist/Hand   Circumference wrist: 17 6 cm    Right Wrist/Hand   Circumference wrist: 17 8 cm    Additional Swelling Details  No significant increase in swelling  Neuro Exam:     Functional outcomes   Left 9 peg hole test: 19 39 (seconds)  Right 9 hole peg test: 17 48 (seconds)        Patient tolerated session well  Patient and therapist discussed exercises as per initial HEP  Patient verbalized understanding of education and demonstrated proper technique  Therapist will make increases as tolerated   Continue with POC          Precautions s/p L CTR       Manuals 07/06/2021       Scar Massage        Manual Massage        PROM Wrist All Planes        Edema Massage                Neuro Re-Ed  07/06/2021                                                               Ther Ex 07/06/2021       TGE HEP        Median Nerve Glide HEP       Prayer Stretch        Wrist AROM   Flex/Ext  RD/UD HEP        Digi-Flex        Wrist Maze        Hand Power Pro        Wrist Iso  Flexion  Extension  RD  UD                Ther Activity 07/06/2021       Tension Pin w/ Pom Pom                                        Modalities 07/06/2021       Ultrasound                CP Performed at end of session

## 2021-07-12 ENCOUNTER — OFFICE VISIT (OUTPATIENT)
Dept: OCCUPATIONAL THERAPY | Facility: CLINIC | Age: 71
End: 2021-07-12
Payer: MEDICARE

## 2021-07-12 DIAGNOSIS — Z98.890 S/P CARPAL TUNNEL RELEASE: Primary | ICD-10-CM

## 2021-07-12 DIAGNOSIS — G56.02 CARPAL TUNNEL SYNDROME ON LEFT: ICD-10-CM

## 2021-07-12 PROCEDURE — 97530 THERAPEUTIC ACTIVITIES: CPT

## 2021-07-12 PROCEDURE — 97110 THERAPEUTIC EXERCISES: CPT

## 2021-07-12 PROCEDURE — 97140 MANUAL THERAPY 1/> REGIONS: CPT

## 2021-07-12 NOTE — PROGRESS NOTES
Daily Note     Today's date: 2021  Patient name: Guillermina Meraz  : 1950  MRN: [id number]  Referring provider: Stevie Wong DO  Dx:   Encounter Diagnosis     ICD-10-CM    1  S/P carpal tunnel release  Z98 890    2  Carpal tunnel syndrome on left  G56 02                   Subjective: "I go on Monday "      Objective: See treatment diary below      Assessment: Tolerated treatment well  Patient exhibited good technique with therapeutic exercises and would benefit from continued OT  Patient has follow-up with Ortho next Monday  Patient is leaving for [facility] tomorrow and will be scheduled for additional appointments next week  Incision appears to be healing well with good margins and no drainage  Plan: Continue per plan of care  Progress treatment as tolerated            Precautions s/p L CTR       Manuals 2021      Scar Massage        Manual Massage  Performed      PROM Wrist All Planes  30 sec x 3       Edema Massage  Performed              Neuro Re-Ed  2021                                                              Ther Ex 2021      TGE HEP  X 10      Median Nerve Glide HEP 5 sec x 7       Prayer Stretch        Wrist AROM   Flex/Ext  RD/UD HEP    X 20  X 20       Digi-Flex        Wrist Maze        Hand Power Pro        Wrist Iso  Flexion  Extension  RD  UD    10 sec x 5  10 sec x 5  10 sec x 5  10 sec x 5              Ther Activity 2021      Tension Pin w/ Pom Pom  Red                                      Modalities 2021      Ultrasound                CP Performed at end of session Performed at end of session

## 2021-07-19 ENCOUNTER — OFFICE VISIT (OUTPATIENT)
Dept: OBGYN CLINIC | Facility: CLINIC | Age: 71
End: 2021-07-19

## 2021-07-19 VITALS
DIASTOLIC BLOOD PRESSURE: 90 MMHG | HEIGHT: 64 IN | SYSTOLIC BLOOD PRESSURE: 151 MMHG | WEIGHT: 216 LBS | BODY MASS INDEX: 36.88 KG/M2 | HEART RATE: 77 BPM

## 2021-07-19 DIAGNOSIS — Z98.890 STATUS POST CARPAL TUNNEL RELEASE: Primary | ICD-10-CM

## 2021-07-19 PROCEDURE — 99024 POSTOP FOLLOW-UP VISIT: CPT | Performed by: ORTHOPAEDIC SURGERY

## 2021-07-19 NOTE — PROGRESS NOTES
Assessment/Plan:    No problem-specific Assessment & Plan notes found for this encounter  Diagnoses and all orders for this visit:    Status post carpal tunnel release           the patient is doing quite well  His numbness is now gone  Continue home exercise program   Continue therapy return back in 1 month for re-evaluation  If his condition changes, he will not hesitate to let us know    Subjective:      Patient ID: Kenneth Arreola is a 72 y o  male  HPI     the patient is 2 weeks status post left carpal tunnel release  The the the numbness is a now gone his fingertips he is complaining of some  Mild pain and itching at the incisional site  He continues to participate in physical therapy    The following portions of the patient's history were reviewed and updated as appropriate: allergies, current medications, past family history, past surgical history and problem list     Review of Systems   Constitutional: Negative for chills, fever and unexpected weight change  HENT: Negative for hearing loss, nosebleeds and sore throat  Eyes: Negative for pain, redness and visual disturbance  Respiratory: Negative for cough, shortness of breath and wheezing  Cardiovascular: Negative for chest pain, palpitations and leg swelling  Gastrointestinal: Negative for abdominal pain, nausea and vomiting  Endocrine: Negative for polydipsia and polyuria  Genitourinary: Negative for dysuria and hematuria  Musculoskeletal: Positive for myalgias  Negative for arthralgias, back pain, gait problem, joint swelling, neck pain and neck stiffness  As noted in HPI   Skin: Negative for rash and wound  Neurological: Negative for dizziness, numbness and headaches  Psychiatric/Behavioral: Negative for decreased concentration and suicidal ideas  The patient is not nervous/anxious            Objective:      /90   Pulse 77   Ht 5' 4 25" (1 632 m)   Wt 98 kg (216 lb)   BMI 36 79 kg/m²          Physical Exam       left upper extremity is neurovascular intact  Fingers are pink and mobile  Compartments are soft  Incision is clean, dry, intact  No drainage  No warmth  Sensation intact along the fingertips  Good thumb opposition  Pinch strength improved    Neurologically intact distally

## 2021-07-21 ENCOUNTER — OFFICE VISIT (OUTPATIENT)
Dept: OCCUPATIONAL THERAPY | Facility: CLINIC | Age: 71
End: 2021-07-21
Payer: MEDICARE

## 2021-07-21 DIAGNOSIS — G56.02 CARPAL TUNNEL SYNDROME ON LEFT: ICD-10-CM

## 2021-07-21 DIAGNOSIS — Z98.890 S/P CARPAL TUNNEL RELEASE: Primary | ICD-10-CM

## 2021-07-21 PROCEDURE — 97110 THERAPEUTIC EXERCISES: CPT

## 2021-07-21 PROCEDURE — 97140 MANUAL THERAPY 1/> REGIONS: CPT

## 2021-07-21 NOTE — PROGRESS NOTES
Daily Note     Today's date: 2021  Patient name: Emily Thomas  : 1950  MRN: [id number]  Referring provider: Gigi Ellis DO  Dx:   Encounter Diagnosis     ICD-10-CM    1  S/P carpal tunnel release  Z98 890    2  Carpal tunnel syndrome on left  G56 02                   Subjective: "I had the stitches removed last week "      Objective: See treatment diary below      Assessment: Tolerated treatment well  Patient exhibited good technique with therapeutic exercises and would benefit from continued OT  Patient presenting with sutures removed this date  Therapist completed [address] at start of session and followed with wound management and dead skin with tweezers and scissors  Therapist educated patient on wound care and verbalized understanding of education  Patient presenting with wound margins of 1 cm with fresh skin visible  Plan: Continue per plan of care  Progress treatment as tolerated            Precautions s/p L CTR       Manuals 2021     Scar Massage        Manual Massage  Performed Performed     PROM Wrist All Planes  30 sec x 3  30 sec x 3     Edema Massage  Performed Performed             Neuro Re-Ed  2021                                                             Ther Ex 2021     TGE HEP  X 10 X 10     Median Nerve Glide HEP 5 sec x 7  5 sec x 7     Prayer Stretch   30 sec x 3     Wrist AROM   Flex/Ext  RD/UD HEP    X 20  X 20    2# X 20  2# X 20     Digi-Flex   Red x 20  Green x 20     Wrist Maze   X 5     Hand Power Pro   Green x 20     Wrist Iso  Flexion  Extension  RD  UD    10 sec x 5  10 sec x 5  10 sec x 5  10 sec x 5   10 sec x 5  10 sec x 5  10 sec x 5  10 sec x 5             Ther Activity 2021     Tension Pin w/ Pom Pom  Red Green                                     Modalities 2021     Ultrasound                CP Performed at end of session Performed at end of session Performed at start and end of session

## 2021-07-28 ENCOUNTER — OFFICE VISIT (OUTPATIENT)
Dept: OCCUPATIONAL THERAPY | Facility: CLINIC | Age: 71
End: 2021-07-28
Payer: MEDICARE

## 2021-07-28 DIAGNOSIS — G56.02 CARPAL TUNNEL SYNDROME ON LEFT: ICD-10-CM

## 2021-07-28 DIAGNOSIS — Z98.890 S/P CARPAL TUNNEL RELEASE: Primary | ICD-10-CM

## 2021-07-28 PROCEDURE — 97140 MANUAL THERAPY 1/> REGIONS: CPT

## 2021-07-28 PROCEDURE — 97110 THERAPEUTIC EXERCISES: CPT

## 2021-07-28 NOTE — PROGRESS NOTES
Daily Note     Today's date: 2021  Patient name: Parminder Gilmore  : 1950  MRN: [id number]  Referring provider: Ean Cabrear DO  Dx:   Encounter Diagnosis     ICD-10-CM    1  S/P carpal tunnel release  Z98 890    2  Carpal tunnel syndrome on left  G56 02                   Subjective: I dont know, its really sore right here, can you see that  Objective: See treatment diary below      Assessment: Tolerated treatment well  Patient exhibited good technique with therapeutic exercises and would benefit from continued OT Pt presents this date with minimal discomfort at wrist line, with minimal amount of dried blood noted  Therapist removing small amount of dried blood for increased comfort and decreased pain  Pt reports decreased discomfort post dried blood removal and manual/scar massage  Pt participated in skilled OT this date focusing on pain/incision management, HEP review, strength, and ROM of L wrist  Therapist providing with silicone on incision this date for increased healing, with therapist providing education on wear and schedule  Plan: Continue per plan of care  Progress treatment as tolerated  Progress treament per protocol           Precautions s/p L CTR       Manuals 2021    Scar Massage        Manual Massage  Performed Performed Performed    PROM Wrist All Planes  30 sec x 3  30 sec x 3 Performed    Edema Massage  Performed Performed Performed            Neuro Re-Ed  2021                                                            Ther Ex 2021    TGE HEP  X 10 X 10 X 5    Median Nerve Glide HEP 5 sec x 7  5 sec x 7 10 sec x 3    Prayer Stretch   30 sec x 3 15 sec x 2    Wrist AROM   Flex/Ext  RD/UD  Pro/Sup HEP    X 20  X 20    2# X 20  2# X 20 2#  X 20  X 20  X 20    Digi-Flex   Red x 20  Green x 20 Green x 25    Wrist Maze   X 5 X 3    Hand Power Pro   Green x 20 Green x 25    Wrist Iso  Flexion  Extension  RD  UD    10 sec x 5  10 sec x 5  10 sec x 5  10 sec x 5   10 sec x 5  10 sec x 5  10 sec x 5  10 sec x 5             Ther Activity 07/06/2021 07/12/2021 07/21/2021 7/28/2021    Tension Pin w/ Pom Pom  Red Green Blue pin all into container                                    Modalities 07/06/2021 07/12/2021 07/21/2021 7/28/2021    Ultrasound                CP Performed at end of session Performed at end of session Performed at start and end of session Deferr

## 2021-08-04 ENCOUNTER — EVALUATION (OUTPATIENT)
Dept: OCCUPATIONAL THERAPY | Facility: CLINIC | Age: 71
End: 2021-08-04
Payer: MEDICARE

## 2021-08-04 DIAGNOSIS — G56.02 CARPAL TUNNEL SYNDROME ON LEFT: ICD-10-CM

## 2021-08-04 DIAGNOSIS — Z98.890 S/P CARPAL TUNNEL RELEASE: Primary | ICD-10-CM

## 2021-08-04 PROCEDURE — 97140 MANUAL THERAPY 1/> REGIONS: CPT

## 2021-08-04 PROCEDURE — 97150 GROUP THERAPEUTIC PROCEDURES: CPT

## 2021-08-04 PROCEDURE — 97110 THERAPEUTIC EXERCISES: CPT

## 2021-08-04 PROCEDURE — 97168 OT RE-EVAL EST PLAN CARE: CPT

## 2021-08-04 NOTE — PROGRESS NOTES
OT Re-Evaluation  and OT Discharge     Today's date: 2021  Patient name: Esteban Hernandez  : 1950  MRN: [id number]  Referring provider: Johnathon Olson DO  Dx:   Encounter Diagnosis     ICD-10-CM    1  S/P carpal tunnel release  Z98 890    2  Carpal tunnel syndrome on left  G56 02                   Assessment  Assessment details: Patient presenting with OP OT services with a diagnosis of CTR  Patient had surgery completed on 2021 by Dr Elisabeth Lawton  Patient reports symptoms started several years  Patient reports symptoms in right hand  Patient reports left is worse than right  Patient's next follow-up appointment is on 2021  Patient is right hand dominant  Patient reports being a retired   Patient presenting this date with post operative dressings  Re-assessment completed on 2021  Patient has consistently attended OP OT services since start of care  Patient has made steady progress towards established goals  Patient demonstrating with increases in wrist ROM, strength, functional use, [address] and decreases in swelling and pain  Patient has met all established goals and will be discharged to home exercises  Patient has follow-up with Ortho on 2021  Patient demonstrating with improved wound healing as well  No drainage or open margins observable  Patient and therapist discussed discontinuation of OT services  D/C OT services  Impairments: abnormal coordination, abnormal or restricted ROM and impaired physical strength    Symptom irritability: moderateUnderstanding of Dx/Px/POC: good   Prognosis: good    Goals  STGs    Pt will increase  strength by 5-10#  - Met    Pt will increase wrist strength by 1/2 grade  - Met    Pt will increase wrist ROM by 50% - Met    Independent with HEP - Met    LTGs     Pt will increase  strength by an additional 5-10#  - Met    Pt will increase wrist strength by 1-2 grade  - Met    Pt will increase wrist ROM to [facility]   - Met    Pt will increase pinch strength by 3-5#  - Met    Pt will report an increase in ADL/IADL participation - Met        Plan  Plan details:     Patient would benefit from: OT eval, skilled occupational therapy and custom splinting  Referral necessary: Yes  Planned therapy interventions: home exercise program  Treatment plan discussed with: patient        Subjective Evaluation    History of Present Illness  Date of surgery: 2021  Mechanism of injury: surgery  Mechanism of injury: S/p L CTR          Not a recurrent problem   Quality of life: good    Pain  Current pain ratin  At best pain ratin  At worst pain ratin  Location: L Wrist    Hand dominance: right      Diagnostic Tests  EMG: abnormal  Treatments  Current treatment: occupational therapy  Patient Goals  Patient goals for therapy: decreased edema, decreased pain, increased motion, return to work, increased strength and independence with ADLs/IADLs          Objective     Neurological Testing     Sensation     Wrist/Hand   Left   Intact: light touch    Right   Diminished: light touch    Additional Neurological Details  Patient reports a resolution of symptoms since surgery along left hand  Patient is still experiencing occasional right hand numbness       Active Range of Motion     Left Wrist   Wrist flexion: 45 degrees   Wrist extension: 65 degrees   Radial deviation: 15 degrees   Ulnar deviation: 45 degrees     Right Wrist   Normal active range of motion    Left Thumb   Opposition: WNL    Right Thumb   Opposition: WNL    Additional Active Range of Motion Details  Composite fist noted    Strength/Myotome Testing     Left Wrist/Hand   Wrist extension: 4  Wrist flexion: 4  Radial deviation: 4  Ulnar deviation: 4     (2nd hand position)     Trial 1: 35    Thumb Strength  Key/Lateral Pinch     Trial 1: 15    Right Wrist/Hand   Normal wrist strength     (2nd hand position)     Trial 1: 45    Thumb Strength   Key/Lateral Pinch     Trial 1: 16    Swelling Left Wrist/Hand   Circumference wrist: 17 6 cm    Right Wrist/Hand   Circumference wrist: 17 8 cm    Additional Swelling Details  No significant increase in swelling  Neuro Exam:     Functional outcomes   Left 9 peg hole test: 18 (seconds)  Right 9 hole peg test: 17 48 (seconds)

## 2021-08-04 NOTE — PROGRESS NOTES
Daily Note     Today's date: 2021  Patient name: Laurie Lockett  : 1950  MRN: [id number]  Referring provider: Gwen Chapman DO  Dx:   Encounter Diagnosis     ICD-10-CM    1  S/P carpal tunnel release  Z98 890    2  Carpal tunnel syndrome on left  G56 02                   TIME:  8:00-8:15 = Unsupervised  8:15-8:30 = Group  8:30-9:00 =     Subjective: I just have the soreness right here, no pain though  Objective: See treatment diary below    Assessment: Tolerated treatment well  Patient exhibited good technique with therapeutic exercises and would benefit from continued OT Pt presents this date with no increased pain noted, however, min soreness still present in gonzales aspect  Incision seems to be healing well, with no discharge or redness noted  Pt participated in skilled OT this date focusing on pain/incision management, HEP review, strength, and ROM of L wrist, for increased safety and engagement in ADL/IADL activities  Therapist educating pt on benefit of ice/heat for discomfort, in addition to stretches especially when using the hand a lot  Therapist educating pt on also doing self massage at home  Plan: As per OT re-evaluation, pt d/c this date          Precautions s/p L CTR       Manuals 2021   Scar Massage     Performed   Manual Massage  Performed Performed Performed Performed   PROM Wrist All Planes  30 sec x 3  30 sec x 3 Performed    Edema Massage  Performed Performed Performed Performed           Neuro Re-Ed  2021                                                           Ther Ex 2021   TGE HEP  X 10 X 10 X 5 X 5   Median Nerve Glide HEP 5 sec x 7  5 sec x 7 10 sec x 3    Prayer Stretch   30 sec x 3 15 sec x 2 Doesn't feel   Wrist AROM   Flex/Ext  RD/UD  Pro/Sup  Sub Flex/Ext  Sub RD/UD HEP    X 20  X 20    2# X 20  2# X 20 2#  X 20  X 20  X 20 2#  X 30  X 30  X 30  X 30  X 30   Digi-Flex   Red x 20  Green x 20 Green x 25 Blue x 30   Wrist Maze   X 5 X 3 X 4   Hand Power Pro   Green x 20 Green x 25 Green x 30   Wrist Iso  Flexion  Extension  RD  UD    10 sec x 5  10 sec x 5  10 sec x 5  10 sec x 5   10 sec x 5  10 sec x 5  10 sec x 5  10 sec x 5             Ther Activity 07/06/2021 07/12/2021 07/21/2021 7/28/2021 8/4/2021   Tension Pin w/ Pom Pom  Red Green Blue pin all into container Blue pin all into container                                   Modalities 07/06/2021 07/12/2021 07/21/2021 7/28/2021 8/4/2021   Ultrasound     3 3 MHz, 100% rate, ad L 1 0   MH        CP Performed at end of session Performed at end of session Performed at start and end of session Deferr

## 2021-08-05 ENCOUNTER — OFFICE VISIT (OUTPATIENT)
Dept: FAMILY MEDICINE CLINIC | Facility: CLINIC | Age: 71
End: 2021-08-05
Payer: MEDICARE

## 2021-08-05 VITALS
TEMPERATURE: 98 F | SYSTOLIC BLOOD PRESSURE: 138 MMHG | RESPIRATION RATE: 20 BRPM | BODY MASS INDEX: 36.67 KG/M2 | OXYGEN SATURATION: 98 % | HEART RATE: 81 BPM | DIASTOLIC BLOOD PRESSURE: 76 MMHG | WEIGHT: 214.8 LBS | HEIGHT: 64 IN

## 2021-08-05 DIAGNOSIS — E78.00 HYPERCHOLESTEREMIA: ICD-10-CM

## 2021-08-05 DIAGNOSIS — Z23 ENCOUNTER FOR IMMUNIZATION: ICD-10-CM

## 2021-08-05 DIAGNOSIS — E11.9 TYPE 2 DIABETES MELLITUS WITHOUT COMPLICATION, WITHOUT LONG-TERM CURRENT USE OF INSULIN (HCC): Primary | ICD-10-CM

## 2021-08-05 DIAGNOSIS — Z12.5 PROSTATE CANCER SCREENING: ICD-10-CM

## 2021-08-05 DIAGNOSIS — Z00.00 MEDICARE ANNUAL WELLNESS VISIT, SUBSEQUENT: ICD-10-CM

## 2021-08-05 PROBLEM — E11.8 TYPE 2 DIABETES MELLITUS WITH COMPLICATION, WITH LONG-TERM CURRENT USE OF INSULIN (HCC): Status: ACTIVE | Noted: 2019-01-23

## 2021-08-05 PROBLEM — Z79.4 TYPE 2 DIABETES MELLITUS WITH COMPLICATION, WITH LONG-TERM CURRENT USE OF INSULIN (HCC): Status: ACTIVE | Noted: 2019-01-23

## 2021-08-05 PROCEDURE — 99213 OFFICE O/P EST LOW 20 MIN: CPT | Performed by: NURSE PRACTITIONER

## 2021-08-05 PROCEDURE — 90732 PPSV23 VACC 2 YRS+ SUBQ/IM: CPT

## 2021-08-05 PROCEDURE — G0009 ADMIN PNEUMOCOCCAL VACCINE: HCPCS

## 2021-08-05 PROCEDURE — G0402 INITIAL PREVENTIVE EXAM: HCPCS | Performed by: NURSE PRACTITIONER

## 2021-08-05 NOTE — PROGRESS NOTES
[address] [place] [address] Primary Care        NAME: Pam Verdin is a 80 y o  male  : 1950    MRN: [id number]  DATE: 2021  TIME: 9:32 AM    Assessment and Plan   Type 2 diabetes mellitus without complication, without long-term current use of insulin ([facility] ) [E11 9]  1  Type 2 diabetes mellitus without complication, without long-term current use of insulin (HCC)  HEMOGLOBIN A1C W/ EAG ESTIMATION    Comprehensive metabolic panel    Microalbumin / creatinine urine ratio   2  Medicare annual wellness visit, subsequent     3  Hypercholesteremia  Lipid panel   4  Prostate cancer screening  PSA, Total Screen   5  Encounter for immunization  PNEUMOCOCCAL POLYSACCHARIDE VACCINE 23-VALENT =>3YO SQ IM         Patient Instructions     Patient Instructions       Medicare Preventive Visit Patient Instructions  Thank you for completing your Welcome to Medicare Visit or Medicare Annual Wellness Visit today  Your next wellness visit will be due in one year (2022)  The screening/preventive services that you may require over the next 5-10 years are detailed below  Some tests may not apply to you based off risk factors and/or age  Screening tests ordered at today's visit but not completed yet may show as past due  Also, please note that scanned in results may not display below  Preventive Screenings:  Service Recommendations Previous Testing/Comments   Colorectal Cancer Screening  · Colonoscopy    · Fecal Occult Blood Test (FOBT)/Fecal Immunochemical Test (FIT)  · Fecal DNA/Cologuard Test  · Flexible Sigmoidoscopy Age: 54-65 years old   Colonoscopy: every 10 years (May be performed more frequently if at higher risk)  OR  FOBT/FIT: every 1 year  OR  Cologuard: every 3 years  OR  Sigmoidoscopy: every 5 years  Screening may be recommended earlier than age 48 if at higher risk for colorectal cancer   Also, an individualized decision between you and your healthcare provider will decide whether screening between the ages of 76-85 would be appropriate  Colonoscopy: 08/03/2020  FOBT/FIT: Not on file  Cologuard: Not on file  Sigmoidoscopy: Not on file    Screening Current     Prostate Cancer Screening Individualized decision between patient and health care provider in men between ages of 53-78   Medicare will cover every 12 months beginning on the day after your 50th birthday PSA: 1 7 ng/mL     Screening Current     Hepatitis C Screening Once for adults born between 1945 and 1965  More frequently in patients at high risk for Hepatitis C Hep C Antibody: 01/30/2019    Screening Current   Diabetes Screening 1-2 times per year if you're at risk for diabetes or have pre-diabetes Fasting glucose: 151 mg/dL   A1C: 6 5 %    Screening Not Indicated  History Diabetes   Cholesterol Screening Once every 5 years if you don't have a lipid disorder  May order more often based on risk factors  Lipid panel: 12/09/2020    Screening Not Indicated  History Lipid Disorder      Other Preventive Screenings Covered by Medicare:  1  Abdominal Aortic Aneurysm (AAA) Screening: covered once if your at risk  You're considered to be at risk if you have a family history of AAA or a male between the age of 73-68 who smoking at least 100 cigarettes in your lifetime  2  Lung Cancer Screening: covers low dose CT scan once per year if you meet all of the following conditions: (1) Age 50-69; (2) No signs or symptoms of lung cancer; (3) Current smoker or have quit smoking within the last 15 years; (4) You have a tobacco smoking history of at least 30 pack years (packs per day x number of years you smoked); (5) You get a written order from a healthcare provider  3  Glaucoma Screening: covered annually if you're considered high risk: (1) You have diabetes OR (2) Family history of glaucoma OR (3)  aged 48 and older OR (3)  American aged 72 and older  3   Osteoporosis Screening: covered every 2 years if you meet one of the following conditions: (1) Have a vertebral abnormality; (2) On glucocorticoid therapy for more than 3 months; (3) Have primary hyperparathyroidism; (4) On osteoporosis medications and need to assess response to drug therapy  5  HIV Screening: covered annually if you're between the age of 12-76  Also covered annually if you are younger than 13 and older than 72 with risk factors for HIV infection  For pregnant patients, it is covered up to 3 times per pregnancy  Immunizations:  Immunization Recommendations   Influenza Vaccine Annual influenza vaccination during flu season is recommended for all persons aged >= 6 months who do not have contraindications   Pneumococcal Vaccine (Prevnar and Pneumovax)  * Prevnar = PCV13  * Pneumovax = PPSV23 Adults 25-60 years old: 1-3 doses may be recommended based on certain risk factors  Adults 72 years old: Prevnar (PCV13) vaccine recommended followed by Pneumovax (PPSV23) vaccine  If already received PPSV23 since turning 65, then PCV13 recommended at least one year after PPSV23 dose  Hepatitis B Vaccine 3 dose series if at intermediate or high risk (ex: diabetes, end stage renal disease, liver disease)   Tetanus (Td) Vaccine - COST NOT COVERED BY MEDICARE PART B Following completion of primary series, a booster dose should be given every 10 years to maintain immunity against tetanus  Td may also be given as tetanus wound prophylaxis  Tdap Vaccine - COST NOT COVERED BY MEDICARE PART B Recommended at least once for all adults  For pregnant patients, recommended with each pregnancy     Shingles Vaccine (Shingrix) - COST NOT COVERED BY MEDICARE PART B  2 shot series recommended in those aged 48 and above     Health Maintenance Due:      Topic Date Due    Colorectal Cancer Screening  08/03/2023    Hepatitis C Screening  Completed     Immunizations Due:      Topic Date Due    DTaP,Tdap,and Td Vaccines (1 - Tdap) Never done    Pneumococcal Vaccine: 65+ Years (1 of 2 - PPSV23) 12/10/2018    Influenza Vaccine (1) 09/01/2021     Advance Directives   What are advance directives? Advance directives are legal documents that state your wishes and plans for medical care  These plans are made ahead of time in case you lose your ability to make decisions for yourself  Advance directives can apply to any medical decision, such as the treatments you want, and if you want to donate organs  What are the types of advance directives? There are many types of advance directives, and each state has rules about how to use them  You may choose a combination of any of the following:  · Living will: This is a written record of the treatment you want  You can also choose which treatments you do not want, which to limit, and which to stop at a certain time  This includes surgery, medicine, IV fluid, and tube feedings  · Durable power of  for healthcare [facility]): This is a written record that states who you want to make healthcare choices for you when you are unable to make them for yourself  This person, called a proxy, is usually a family member or a friend  You may choose more than 1 proxy  · Do not resuscitate (DNR) order:  A DNR order is used in case your heart stops beating or you stop breathing  It is a request not to have certain forms of treatment, such as CPR  A DNR order may be included in other types of advance directives  · Medical directive: This covers the care that you want if you are in a coma, near death, or unable to make decisions for yourself  You can list the treatments you want for each condition  Treatment may include pain medicine, surgery, blood transfusions, dialysis, IV or tube feedings, and a ventilator (breathing machine)  · Values history: This document has questions about your views, beliefs, and how you feel and think about life  This information can help others choose the care that you would choose  Why are advance directives important?   An advance directive helps you control your care  Although spoken wishes may be used, it is better to have your wishes written down  Spoken wishes can be misunderstood, or not followed  Treatments may be given even if you do not want them  An advance directive may make it easier for your family to make difficult choices about your care  Weight Management   Why it is important to manage your weight:  Being overweight increases your risk of health conditions such as heart disease, high blood pressure, type 2 diabetes, and certain types of cancer  It can also increase your risk for osteoarthritis, sleep apnea, and other respiratory problems  Aim for a slow, steady weight loss  Even a small amount of weight loss can lower your risk of health problems  How to lose weight safely:  A safe and healthy way to lose weight is to eat fewer calories and get regular exercise  You can lose up about 1 pound a week by decreasing the number of calories you eat by 500 calories each day  Healthy meal plan for weight management:  A healthy meal plan includes a variety of foods, contains fewer calories, and helps you stay healthy  A healthy meal plan includes the following:  · Eat whole-grain foods more often  A healthy meal plan should contain fiber  Fiber is the part of grains, fruits, and vegetables that is not broken down by your body  Whole-grain foods are healthy and provide extra fiber in your diet  Some examples of whole-grain foods are whole-wheat breads and pastas, oatmeal, brown rice, and bulgur  · Eat a variety of vegetables every day  Include dark, leafy greens such as spinach, kale, michael greens, and mustard greens  Eat yellow and orange vegetables such as carrots, sweet potatoes, and winter squash  · Eat a variety of fruits every day  Choose fresh or canned fruit (canned in its own juice or light syrup) instead of juice  Fruit juice has very little or no fiber  · Eat low-fat dairy foods  Drink fat-free (skim) milk or 1% milk   Eat fat-free yogurt and low-fat cottage cheese  Try low-fat cheeses such as mozzarella and other reduced-fat cheeses  · Choose meat and other protein foods that are low in fat  Choose beans or other legumes such as split peas or lentils  Choose fish, skinless poultry (chicken or turkey), or lean cuts of red meat (beef or pork)  Before you cook meat or poultry, cut off any visible fat  · Use less fat and oil  Try baking foods instead of frying them  Add less fat, such as margarine, sour cream, regular salad dressing and mayonnaise to foods  Eat fewer high-fat foods  Some examples of high-fat foods include french fries, doughnuts, ice cream, and cakes  · Eat fewer sweets  Limit foods and drinks that are high in sugar  This includes candy, cookies, regular soda, and sweetened drinks  Exercise:  Exercise at least 30 minutes per day on most days of the week  Some examples of exercise include walking, biking, dancing, and swimming  You can also fit in more physical activity by taking the stairs instead of the elevator or parking farther away from stores  Ask your healthcare provider about the best exercise plan for you  © Copyright [employer] 2018 Information is for End User's use only and may not be sold, redistributed or otherwise used for commercial purposes  All illustrations and images included in CareNotes® are the copyrighted property of A D A M , Inc  or [address] [place] [address]          Chief Complaint     Chief Complaint   Patient presents with   [facility] Wellness Visit         History of Present Illness       Here for 6 month medcheck and lab review  Denies any concerns or symptoms      Review of Systems   Review of Systems   Constitutional: Negative for activity change, diaphoresis, fatigue and fever  HENT: Negative for congestion, facial swelling, hearing loss, rhinorrhea, sinus pressure, sinus pain, sneezing, sore throat and voice change  Eyes: Negative for discharge and visual disturbance     Respiratory: Negative for cough, choking, chest tightness, shortness of breath, wheezing and stridor  Cardiovascular: Negative for chest pain, palpitations and leg swelling  Gastrointestinal: Negative for abdominal distention, abdominal pain, constipation, diarrhea, nausea and vomiting  Endocrine: Negative for polydipsia, polyphagia and polyuria  Genitourinary: Negative for difficulty urinating, dysuria, frequency and urgency  Musculoskeletal: Negative for arthralgias, back pain, gait problem, joint swelling, myalgias, neck pain and neck stiffness  Skin: Negative for color change, rash and wound  Neurological: Negative for dizziness, syncope, speech difficulty, weakness, light-headedness and headaches  Hematological: Negative for adenopathy  Does not bruise/bleed easily  Psychiatric/Behavioral: Negative for agitation, behavioral problems, confusion, hallucinations, sleep disturbance and suicidal ideas  The patient is not nervous/anxious            Current Medications       Current Outpatient Medications:     metFORMIN (GLUCOPHAGE) 500 mg tablet, Take 1 tablet (500 mg total) by mouth daily with breakfast, Disp: 90 tablet, Rfl: 1    simvastatin (ZOCOR) 20 mg tablet, Take 1 tablet (20 mg total) by mouth daily at bedtime, Disp: 90 tablet, Rfl: 3    Current Allergies     Allergies as of 08/05/2021    (No Known Allergies)            The following portions of the patient's history were reviewed and updated as appropriate: allergies, current medications, past family history, past medical history, past social history, past surgical history and problem list      Past Medical History:   Diagnosis Date    Arthritis     Diabetes mellitus ([facility] )     Dyslipidemia     GERD (gastroesophageal reflux disease)     Hyperlipidemia     Nodule of left lung 2007    negative for CA    Type 2 diabetes mellitus without complication, without long-term current use of insulin ([facility] ) 1/23/2019       Past Surgical History:   Procedure Laterality Date    COLONOSCOPY      ELBOW SURGERY Bilateral     FOOT TENDON SURGERY      heel    AR REVISE MEDIAN N/CARPAL TUNNEL SURG Left 6/30/2021    Procedure: RELEASE CARPAL TUNNEL;  Surgeon: Nickie Esteban DO;  Location: [address] [place], [place] [address] MAIN OR;  Service: Orthopedics       Family History   Problem Relation Age of Onset    COPD Father         80 yrs   Chikis Hewitt Heart disease Mother         hx MI   Chikis Hewitt Diabetes Mother     Cancer Brother          Medications have been verified  Objective   /76   Pulse 81   Temp 98 °F (36 7 °C) (Tympanic)   Resp 20   Ht 5' 4 25" (1 632 m)   Wt 97 4 kg (214 lb 12 8 oz)   SpO2 98%   BMI 36 58 kg/m²        Physical Exam     Physical Exam  Vitals and nursing note reviewed  Constitutional:       General: He is not in acute distress  Appearance: He is well-developed  He is not diaphoretic  Neck:      Thyroid: No thyromegaly  Trachea: No tracheal deviation  Cardiovascular:      Rate and Rhythm: Normal rate and regular rhythm  Heart sounds: Normal heart sounds  No murmur heard  Comments: Negative carotid bruits bilateral  Pulmonary:      Effort: Pulmonary effort is normal  No respiratory distress  Breath sounds: Normal breath sounds  No wheezing  Musculoskeletal:         General: No tenderness or deformity  Normal range of motion  Cervical back: Normal range of motion and neck supple  Skin:     General: Skin is warm and dry  Neurological:      Mental Status: He is alert and oriented to person, place, and time  Psychiatric:         Speech: Speech normal          Behavior: Behavior normal          Thought Content:  Thought content normal          Judgment: Judgment normal

## 2021-08-05 NOTE — PROGRESS NOTES
Assessment and Plan:     Problem List Items Addressed This Visit        Other    Hypercholesteremia    Relevant Orders    Lipid panel      Other Visit Diagnoses     Medicare annual wellness visit, subsequent    -  Primary    Type 2 diabetes mellitus without complication, without long-term current use of insulin ([address] )        Relevant Orders    HEMOGLOBIN A1C W/ EAG ESTIMATION    Comprehensive metabolic panel    Microalbumin / creatinine urine ratio    Prostate cancer screening        Relevant Orders    PSA, Total Screen    Encounter for immunization        Relevant Orders    PNEUMOCOCCAL POLYSACCHARIDE VACCINE 23-VALENT =>3YO SQ IM           Preventive health issues were discussed with patient, and age appropriate screening tests were ordered as noted in patient's After Visit Summary  Personalized health advice and appropriate referrals for health education or preventive services given if needed, as noted in patient's After Visit Summary  History of Present Illness:     Patient presents for Medicare Annual Wellness visit    Patient Care Team:  Jarad Moya as PCP - General (Family Medicine)  Jarad Moya as PCP - PCP-[facility]     Problem List:     Patient Active Problem List   Diagnosis    Hypercholesteremia    High blood pressure    Dyspnea    Severe obesity (BMI 35 0-39  9) with comorbidity ([address] )    Carpal tunnel syndrome on right    Carpal tunnel syndrome on left      Past Medical and Surgical History:     Past Medical History:   Diagnosis Date    Arthritis     Diabetes mellitus ([address] )     Dyslipidemia     GERD (gastroesophageal reflux disease)     Hyperlipidemia     Nodule of left lung 2007    negative for CA    Type 2 diabetes mellitus without complication, without long-term current use of insulin ([address] ) 1/23/2019     Past Surgical History:   Procedure Laterality Date    COLONOSCOPY      ELBOW SURGERY Bilateral     FOOT TENDON SURGERY      heel    ME REVISE MEDIAN N/CARPAL TUNNEL SURG Left 6/30/2021    Procedure: RELEASE CARPAL TUNNEL;  Surgeon: Roz Baig DO;  Location: [facility] MAIN OR;  Service: Orthopedics      Family History:     Family History   Problem Relation Age of Onset    COPD Father         80 yrs   Vena Sharon Heart disease Mother         hx MI   Vena Sharon Diabetes Mother     Cancer Brother       Social History:     Social History     Socioeconomic History    Marital status: /Civil Union     Spouse name: None    Number of children: None    Years of education: None    Highest education level: None   Occupational History    None   Tobacco Use    Smoking status: Former Smoker    Smokeless tobacco: Never Used   Vaping Use    Vaping Use: Never used   Substance and Sexual Activity    Alcohol use: No    Drug use: No    Sexual activity: Yes     Partners: Female     Birth control/protection: None   Other Topics Concern    None   Social History Narrative    None     Social Determinants of Health     Financial Resource Strain:     Difficulty of Paying Living Expenses:    Food Insecurity:     Worried About Running Out of Food in the Last Year:     Ran Out of Food in the Last Year:    Transportation Needs:     Lack of Transportation (Medical):      Lack of Transportation (Non-Medical):    Physical Activity:     Days of Exercise per Week:     Minutes of Exercise per Session:    Stress:     Feeling of Stress :    Social Connections:     Frequency of Communication with Friends and Family:     Frequency of Social Gatherings with Friends and Family:     Attends Hinduism Services:     Active Member of Clubs or Organizations:     Attends Club or Organization Meetings:     Marital Status:    Intimate Partner Violence:     Fear of Current or Ex-Partner:     Emotionally Abused:     Physically Abused:     Sexually Abused:       Medications and Allergies:     Current Outpatient Medications   Medication Sig Dispense Refill    metFORMIN (GLUCOPHAGE) 500 mg tablet Take 1 tablet (500 mg total) by mouth daily with breakfast 90 tablet 1    simvastatin (ZOCOR) 20 mg tablet Take 1 tablet (20 mg total) by mouth daily at bedtime 90 tablet 3     No current facility-administered medications for this visit  No Known Allergies   Immunizations:     Immunization History   Administered Date(s) Administered    H1N1, All Formulations 01/09/2010    INFLUENZA 09/01/2012, 11/04/2015, 10/13/2017, 10/15/2018, 11/22/2020    Influenza, high dose seasonal 0 7 mL 10/30/2020    Pneumococcal Conjugate 13-Valent 10/15/2018    SARS-CoV-2 / COVID-19 mRNA IM (Moderna) 01/12/2021, 02/07/2021    Zoster 11/04/2015    Zoster Vaccine Recombinant 11/02/2019, 02/20/2020, 02/20/2020    influenza, trivalent, adjuvanted 10/07/2019      Health Maintenance:         Topic Date Due    Colorectal Cancer Screening  08/03/2023    Hepatitis C Screening  Completed         Topic Date Due    DTaP,Tdap,and Td Vaccines (1 - Tdap) Never done    Pneumococcal Vaccine: 65+ Years (1 of 2 - PPSV23) 12/10/2018    Influenza Vaccine (1) 09/01/2021      Medicare Health Risk Assessment:     /76   Pulse 81   Temp 98 °F (36 7 °C) (Tympanic)   Resp 20   Ht 5' 4 25" (1 632 m)   Wt 97 4 kg (214 lb 12 8 oz)   SpO2 98%   BMI 36 58 kg/m²          Health Risk Assessment:   Patient rates overall health as good  Patient feels that their physical health rating is same  Patient is satisfied with their life  Eyesight was rated as same  Hearing was rated as slightly worse  Patient feels that their emotional and mental health rating is same  Patients states they are never, rarely angry  Patient states they are never, rarely unusually tired/fatigued  Pain experienced in the last 7 days has been none  Patient states that he has experienced no weight loss or gain in last 6 months  Depression Screening:   PHQ-2 Score: 0      Fall Risk Screening:    In the past year, patient has experienced: no history of falling in past year      Home Safety:  Patient does not have trouble with stairs inside or outside of their home  Patient has working smoke alarms and has working carbon monoxide detector  Home safety hazards include: none  Nutrition:   Current diet is Regular  Medications:   Patient is not currently taking any over-the-counter supplements  Patient is able to manage medications  Activities of Daily Living (ADLs)/Instrumental Activities of Daily Living (IADLs):   Walk and transfer into and out of bed and chair?: Yes  Dress and groom yourself?: Yes    Bathe or shower yourself?: Yes    Feed yourself?  Yes  Do your laundry/housekeeping?: Yes  Manage your money, pay your bills and track your expenses?: Yes  Make your own meals?: Yes    Do your own shopping?: Yes    Previous Hospitalizations:   Any hospitalizations or ED visits within the last 12 months?: No      PREVENTIVE SCREENINGS      Cardiovascular Screening:    General: Screening Not Indicated and History Lipid Disorder    Due for: Lipid Panel      Diabetes Screening:     General: Screening Not Indicated and History Diabetes    Due for: Blood Glucose      Colorectal Cancer Screening:     General: Screening Current      Prostate Cancer Screening:    General: Screening Current      Abdominal Aortic Aneurysm (AAA) Screening:    Risk factors include: age between 73-69 yo and tobacco use        Lung Cancer Screening:     General: Screening Not Indicated      Hepatitis C Screening:    General: Screening Current    Screening, Brief Intervention, and Referral to Treatment (SBIRT)    Screening  Typical number of drinks in a day: 0    Single Item Drug Screening:  How often have you used an illegal drug (including marijuana) or a prescription medication for non-medical reasons in the past year? never    Single Item Drug Screen Score: 0  Interpretation: Negative screen for possible drug use disorder      DREA Brown

## 2021-08-05 NOTE — PATIENT INSTRUCTIONS

## 2021-08-30 ENCOUNTER — OFFICE VISIT (OUTPATIENT)
Dept: OBGYN CLINIC | Facility: CLINIC | Age: 71
End: 2021-08-30

## 2021-08-30 VITALS
BODY MASS INDEX: 36.7 KG/M2 | HEART RATE: 83 BPM | SYSTOLIC BLOOD PRESSURE: 136 MMHG | WEIGHT: 215 LBS | DIASTOLIC BLOOD PRESSURE: 77 MMHG | HEIGHT: 64 IN

## 2021-08-30 DIAGNOSIS — Z98.890 STATUS POST CARPAL TUNNEL RELEASE: Primary | ICD-10-CM

## 2021-08-30 PROCEDURE — 1124F ACP DISCUSS-NO DSCNMKR DOCD: CPT | Performed by: ORTHOPAEDIC SURGERY

## 2021-08-30 PROCEDURE — 99024 POSTOP FOLLOW-UP VISIT: CPT | Performed by: ORTHOPAEDIC SURGERY

## 2021-08-30 NOTE — PROGRESS NOTES
Assessment:  1  Status post carpal tunnel release          the patient is doing much better from his left carpal tunnel release  Continue home exercise program   Continue stretching  Follow up on as-needed basis  At some point, he will return to discuss surgical intervention along his right wrist   He is currently working will have to wait till next spring irregardless  Physical examination shows the incision to be clean, dry, intact  Negative Tinel's  Good pinch and  strength testing  Plan:  8 weeks s/p left CTR, 6/30/2021  He is doing well  He is encouraged to return to baseline activity  He should follow up in about 8 months to discuss right hand CTR  To do next visit:  Return if symptoms worsen or fail to improve  The above stated was discussed in layman's terms and the patient expressed understanding  All questions were answered to the patient's satisfaction  Scribe Attestation    I,:  Jose Engel am acting as a scribe while in the presence of the attending physician :       I,:  Remy Rivera, DO personally performed the services described in this documentation    as scribed in my presence :             Subjective:   Serge Cevallos is a 80 y o  male who presents 8 weeks s/p left CTR, 6/30/2021  He is doing well  Today he complains of tenderness over left wrist incision  He denies tingling or numbness into left hand  He is active without repercussion  He is working driving a school bus          Review of systems negative unless otherwise specified in HPI    Past Medical History:   Diagnosis Date    Arthritis     Diabetes mellitus ([address] )     Dyslipidemia     GERD (gastroesophageal reflux disease)     Hyperlipidemia     Nodule of left lung 2007    negative for CA    Type 2 diabetes mellitus without complication, without long-term current use of insulin ([address] ) 1/23/2019       Past Surgical History:   Procedure Laterality Date    COLONOSCOPY      ELBOW SURGERY Bilateral     FOOT TENDON SURGERY      heel    [place] REVISE MEDIAN N/CARPAL TUNNEL SURG Left 6/30/2021    Procedure: RELEASE CARPAL TUNNEL;  Surgeon: Colleen Delacruz DO;  Location: [facility] MAIN OR;  Service: Orthopedics       Family History   Problem Relation Age of Onset    COPD Father         80 yrs   [employer] Heart disease Mother         hx MI   [employer] Diabetes Mother     Cancer Brother        Social History     Occupational History    Not on file   Tobacco Use    Smoking status: Former Smoker    Smokeless tobacco: Never Used   Vaping Use    Vaping Use: Never used   Substance and Sexual Activity    Alcohol use: No    Drug use: No    Sexual activity: Yes     Partners: Female     Birth control/protection: None         Current Outpatient Medications:     metFORMIN (GLUCOPHAGE) 500 mg tablet, Take 1 tablet (500 mg total) by mouth daily with breakfast, Disp: 90 tablet, Rfl: 1    simvastatin (ZOCOR) 20 mg tablet, Take 1 tablet (20 mg total) by mouth daily at bedtime, Disp: 90 tablet, Rfl: 3    No Known Allergies         Vitals:    08/30/21 1105   BP: 136/77   Pulse: 83       Objective:  Physical exam  · General: Awake, Alert, Oriented  · Eyes: Pupils equal, round and reactive to light  · Heart: regular rate and rhythm  · Lungs: No audible wheezing  · Abdomen: soft                    Ortho Exam  Left wrist:  Well healed palmar incision  Normal thumb abduction and thumb-finger apposition  Sensation intact    Diagnostics, reviewed and taken today if performed as documented:    None performed    Procedures, if performed today:    Procedures    None performed      Portions of the record may have been created with voice recognition software  Occasional wrong word or "sound a like" substitutions may have occurred due to the inherent limitations of voice recognition software  Read the chart carefully and recognize, using context, where substitutions have occurred

## 2021-09-07 ENCOUNTER — TELEPHONE (OUTPATIENT)
Dept: FAMILY MEDICINE CLINIC | Facility: CLINIC | Age: 71
End: 2021-09-07

## 2021-09-07 DIAGNOSIS — R05.9 COUGH: ICD-10-CM

## 2021-09-07 DIAGNOSIS — J02.9 SORE THROAT: ICD-10-CM

## 2021-09-07 DIAGNOSIS — R51.9 NONINTRACTABLE HEADACHE, UNSPECIFIED CHRONICITY PATTERN, UNSPECIFIED HEADACHE TYPE: ICD-10-CM

## 2021-09-07 DIAGNOSIS — R50.9 FEVER, UNSPECIFIED FEVER CAUSE: Primary | ICD-10-CM

## 2021-09-07 PROCEDURE — U0003 INFECTIOUS AGENT DETECTION BY NUCLEIC ACID (DNA OR RNA); SEVERE ACUTE RESPIRATORY SYNDROME CORONAVIRUS 2 (SARS-COV-2) (CORONAVIRUS DISEASE [COVID-19]), AMPLIFIED PROBE TECHNIQUE, MAKING USE OF HIGH THROUGHPUT TECHNOLOGIES AS DESCRIBED BY CMS-2020-01-R: HCPCS | Performed by: NURSE PRACTITIONER

## 2021-09-07 PROCEDURE — U0005 INFEC AGEN DETEC AMPLI PROBE: HCPCS | Performed by: NURSE PRACTITIONER

## 2021-09-07 NOTE — TELEPHONE ENCOUNTER
Patient called today to set up appointment but needed to put in phone note symptoms cough, sore throat , headache , chills and fever started 09/05/2021  Going to have a MA call to see what to do next   Call him at [phone]

## 2021-09-07 NOTE — TELEPHONE ENCOUNTER
Patient returned call  Has been experiencing symptoms since Saturday  Fever, cough, headache  Will come to parking lot for covid swab

## 2021-09-08 LAB — SARS-COV-2 RNA RESP QL NAA+PROBE: POSITIVE

## 2021-09-09 ENCOUNTER — TELEMEDICINE (OUTPATIENT)
Dept: FAMILY MEDICINE CLINIC | Facility: CLINIC | Age: 71
End: 2021-09-09
Payer: MEDICARE

## 2021-09-09 VITALS
OXYGEN SATURATION: 99 % | HEART RATE: 96 BPM | WEIGHT: 215 LBS | TEMPERATURE: 97.2 F | DIASTOLIC BLOOD PRESSURE: 72 MMHG | RESPIRATION RATE: 18 BRPM | BODY MASS INDEX: 36.7 KG/M2 | HEIGHT: 64 IN | SYSTOLIC BLOOD PRESSURE: 138 MMHG

## 2021-09-09 DIAGNOSIS — U07.1 BRONCHITIS DUE TO COVID-19 VIRUS: Primary | ICD-10-CM

## 2021-09-09 DIAGNOSIS — J40 BRONCHITIS DUE TO COVID-19 VIRUS: Primary | ICD-10-CM

## 2021-09-09 PROCEDURE — 99213 OFFICE O/P EST LOW 20 MIN: CPT | Performed by: NURSE PRACTITIONER

## 2021-09-09 RX ORDER — AZITHROMYCIN 250 MG/1
TABLET, FILM COATED ORAL
Qty: 6 TABLET | Refills: 0 | Status: SHIPPED | OUTPATIENT
Start: 2021-09-09 | End: 2021-09-14

## 2021-09-09 NOTE — PATIENT INSTRUCTIONS
Discussed OTC cough/cold medications  zithromax only if symptoms worsen as discussed  Call for any concerns/questions  Quarantine for 10 days- patient verbalizes understanding

## 2021-10-27 ENCOUNTER — APPOINTMENT (OUTPATIENT)
Dept: RADIOLOGY | Facility: CLINIC | Age: 71
End: 2021-10-27
Payer: MEDICARE

## 2021-10-27 ENCOUNTER — OFFICE VISIT (OUTPATIENT)
Dept: URGENT CARE | Facility: CLINIC | Age: 71
End: 2021-10-27
Payer: MEDICARE

## 2021-10-27 VITALS
TEMPERATURE: 97.8 F | RESPIRATION RATE: 16 BRPM | HEART RATE: 93 BPM | OXYGEN SATURATION: 97 % | DIASTOLIC BLOOD PRESSURE: 72 MMHG | WEIGHT: 215 LBS | SYSTOLIC BLOOD PRESSURE: 130 MMHG | BODY MASS INDEX: 36.62 KG/M2

## 2021-10-27 DIAGNOSIS — S69.91XA INJURY OF RIGHT WRIST, INITIAL ENCOUNTER: ICD-10-CM

## 2021-10-27 DIAGNOSIS — S63.501A SPRAIN OF RIGHT WRIST, INITIAL ENCOUNTER: Primary | ICD-10-CM

## 2021-10-27 DIAGNOSIS — S63.91XA SPRAIN OF RIGHT HAND, INITIAL ENCOUNTER: ICD-10-CM

## 2021-10-27 PROCEDURE — 73130 X-RAY EXAM OF HAND: CPT

## 2021-10-27 PROCEDURE — 99214 OFFICE O/P EST MOD 30 MIN: CPT | Performed by: PHYSICIAN ASSISTANT

## 2021-10-27 PROCEDURE — 73110 X-RAY EXAM OF WRIST: CPT

## 2021-10-27 PROCEDURE — G0463 HOSPITAL OUTPT CLINIC VISIT: HCPCS | Performed by: PHYSICIAN ASSISTANT

## 2021-10-27 PROCEDURE — 29125 APPL SHORT ARM SPLINT STATIC: CPT | Performed by: PHYSICIAN ASSISTANT

## 2021-11-15 ENCOUNTER — OFFICE VISIT (OUTPATIENT)
Dept: OBGYN CLINIC | Facility: CLINIC | Age: 71
End: 2021-11-15
Payer: MEDICARE

## 2021-11-15 VITALS
HEIGHT: 64 IN | WEIGHT: 217 LBS | BODY MASS INDEX: 37.05 KG/M2 | HEART RATE: 94 BPM | SYSTOLIC BLOOD PRESSURE: 151 MMHG | DIASTOLIC BLOOD PRESSURE: 83 MMHG

## 2021-11-15 DIAGNOSIS — M25.561 RIGHT KNEE PAIN, UNSPECIFIED CHRONICITY: Primary | ICD-10-CM

## 2021-11-15 DIAGNOSIS — S76.301A HAMSTRING INJURY, RIGHT, INITIAL ENCOUNTER: ICD-10-CM

## 2021-11-15 PROCEDURE — 99213 OFFICE O/P EST LOW 20 MIN: CPT | Performed by: ORTHOPAEDIC SURGERY

## 2021-11-30 PROBLEM — E11.9 TYPE 2 DIABETES MELLITUS WITHOUT COMPLICATION, WITHOUT LONG-TERM CURRENT USE OF INSULIN (HCC): Status: ACTIVE | Noted: 2021-11-30

## 2021-12-11 ENCOUNTER — APPOINTMENT (OUTPATIENT)
Dept: LAB | Facility: CLINIC | Age: 71
End: 2021-12-11
Payer: MEDICARE

## 2021-12-11 DIAGNOSIS — Z12.5 PROSTATE CANCER SCREENING: ICD-10-CM

## 2021-12-11 DIAGNOSIS — E11.9 TYPE 2 DIABETES MELLITUS WITHOUT COMPLICATION, WITHOUT LONG-TERM CURRENT USE OF INSULIN (HCC): ICD-10-CM

## 2021-12-11 DIAGNOSIS — E78.00 HYPERCHOLESTEREMIA: ICD-10-CM

## 2021-12-11 LAB
ALBUMIN SERPL BCP-MCNC: 4.2 G/DL (ref 3.5–5)
ALP SERPL-CCNC: 58 U/L (ref 46–116)
ALT SERPL W P-5'-P-CCNC: 33 U/L (ref 12–78)
ANION GAP SERPL CALCULATED.3IONS-SCNC: 5 MMOL/L (ref 4–13)
AST SERPL W P-5'-P-CCNC: 16 U/L (ref 5–45)
BILIRUB SERPL-MCNC: 0.45 MG/DL (ref 0.2–1)
BUN SERPL-MCNC: 21 MG/DL (ref 5–25)
CALCIUM SERPL-MCNC: 9.2 MG/DL (ref 8.3–10.1)
CHLORIDE SERPL-SCNC: 107 MMOL/L (ref 100–108)
CHOLEST SERPL-MCNC: 149 MG/DL
CO2 SERPL-SCNC: 25 MMOL/L (ref 21–32)
CREAT SERPL-MCNC: 1.1 MG/DL (ref 0.6–1.3)
CREAT UR-MCNC: 152 MG/DL
EST. AVERAGE GLUCOSE BLD GHB EST-MCNC: 160 MG/DL
GFR SERPL CREATININE-BSD FRML MDRD: 68 ML/MIN/1.73SQ M
GLUCOSE P FAST SERPL-MCNC: 159 MG/DL (ref 65–99)
HBA1C MFR BLD: 7.2 %
HDLC SERPL-MCNC: 44 MG/DL
LDLC SERPL CALC-MCNC: 83 MG/DL (ref 0–100)
MICROALBUMIN UR-MCNC: 11.6 MG/L (ref 0–20)
MICROALBUMIN/CREAT 24H UR: 8 MG/G CREATININE (ref 0–30)
NONHDLC SERPL-MCNC: 105 MG/DL
POTASSIUM SERPL-SCNC: 4.7 MMOL/L (ref 3.5–5.3)
PROT SERPL-MCNC: 7.7 G/DL (ref 6.4–8.2)
PSA SERPL-MCNC: 1.7 NG/ML (ref 0–4)
SODIUM SERPL-SCNC: 137 MMOL/L (ref 136–145)
TRIGL SERPL-MCNC: 108 MG/DL

## 2021-12-11 PROCEDURE — 82043 UR ALBUMIN QUANTITATIVE: CPT

## 2021-12-11 PROCEDURE — 80053 COMPREHEN METABOLIC PANEL: CPT

## 2021-12-11 PROCEDURE — 82570 ASSAY OF URINE CREATININE: CPT

## 2021-12-11 PROCEDURE — 83036 HEMOGLOBIN GLYCOSYLATED A1C: CPT

## 2021-12-11 PROCEDURE — 80061 LIPID PANEL: CPT

## 2021-12-11 PROCEDURE — G0103 PSA SCREENING: HCPCS

## 2021-12-11 PROCEDURE — 36415 COLL VENOUS BLD VENIPUNCTURE: CPT

## 2021-12-27 ENCOUNTER — OFFICE VISIT (OUTPATIENT)
Dept: FAMILY MEDICINE CLINIC | Facility: CLINIC | Age: 71
End: 2021-12-27
Payer: MEDICARE

## 2021-12-27 VITALS
RESPIRATION RATE: 20 BRPM | TEMPERATURE: 98.9 F | BODY MASS INDEX: 37.28 KG/M2 | DIASTOLIC BLOOD PRESSURE: 70 MMHG | OXYGEN SATURATION: 98 % | SYSTOLIC BLOOD PRESSURE: 138 MMHG | HEART RATE: 84 BPM | HEIGHT: 64 IN | WEIGHT: 218.4 LBS

## 2021-12-27 DIAGNOSIS — E66.01 CLASS 2 SEVERE OBESITY DUE TO EXCESS CALORIES WITH SERIOUS COMORBIDITY AND BODY MASS INDEX (BMI) OF 37.0 TO 37.9 IN ADULT (HCC): Primary | ICD-10-CM

## 2021-12-27 DIAGNOSIS — E11.9 TYPE 2 DIABETES MELLITUS WITHOUT COMPLICATION, WITHOUT LONG-TERM CURRENT USE OF INSULIN (HCC): ICD-10-CM

## 2021-12-27 PROCEDURE — 99213 OFFICE O/P EST LOW 20 MIN: CPT | Performed by: NURSE PRACTITIONER

## 2021-12-27 RX ORDER — PHENTERMINE HYDROCHLORIDE 37.5 MG/1
37.5 TABLET ORAL DAILY
Qty: 30 TABLET | Refills: 2 | Status: SHIPPED | OUTPATIENT
Start: 2021-12-27 | End: 2022-04-11 | Stop reason: SDUPTHER

## 2022-02-18 ENCOUNTER — OFFICE VISIT (OUTPATIENT)
Dept: URGENT CARE | Facility: CLINIC | Age: 72
End: 2022-02-18
Payer: MEDICARE

## 2022-02-18 ENCOUNTER — APPOINTMENT (OUTPATIENT)
Dept: RADIOLOGY | Facility: CLINIC | Age: 72
End: 2022-02-18
Payer: MEDICARE

## 2022-02-18 VITALS
SYSTOLIC BLOOD PRESSURE: 137 MMHG | TEMPERATURE: 97.7 F | RESPIRATION RATE: 16 BRPM | HEART RATE: 93 BPM | DIASTOLIC BLOOD PRESSURE: 78 MMHG | OXYGEN SATURATION: 97 %

## 2022-02-18 DIAGNOSIS — M25.561 ACUTE PAIN OF RIGHT KNEE: ICD-10-CM

## 2022-02-18 DIAGNOSIS — M25.561 ACUTE PAIN OF RIGHT KNEE: Primary | ICD-10-CM

## 2022-02-18 PROCEDURE — 99213 OFFICE O/P EST LOW 20 MIN: CPT

## 2022-02-18 PROCEDURE — 73564 X-RAY EXAM KNEE 4 OR MORE: CPT

## 2022-02-18 PROCEDURE — G0463 HOSPITAL OUTPT CLINIC VISIT: HCPCS

## 2022-02-19 NOTE — PROGRESS NOTES
[address] [employer] [facility]        NAME: Valente Gloria is a 80 y o  male  : 1950    MRN: [id number]  DATE: 2022  TIME: 7:51 PM    Assessment and Plan   Acute pain of right knee [M25 561]  1  Acute pain of right knee  XR knee 4+ vw right injury    Ambulatory Referral to Orthopedic Surgery       X-ray of the knee interpreted by myself  No acute osseous abnormality  Patient place in knee immobilizer and referred to ortho  Patient Instructions     Keep the immobilizer in place  Continue to take tylenol or ibuprofen as needed for pain  Rest the area and apply ice  Follow up with PCP in 3-5 days  Proceed to  ER if symptoms worsen  Chief Complaint     Chief Complaint   Patient presents with    Knee Pain     Right knee and leg pain after falling today  History of Present Illness       Patient states he was walking down the steps and heard a pop in the right knee  Patient took aleve PTA and is ambulating with a cane  Patient denies any numbness or tingling, fever or chills  Has seen Dr Porfirio Norris in past for a pulled hamstring in the right leg  Knee Pain   Pertinent negatives include no numbness  Review of Systems   Review of Systems   Constitutional: Negative for chills and fever  Respiratory: Negative for shortness of breath  Cardiovascular: Negative for chest pain, palpitations and leg swelling  Gastrointestinal: Negative for nausea and vomiting  Musculoskeletal: Positive for arthralgias and myalgias  Skin: Negative for wound  Neurological: Negative for weakness and numbness  All other systems reviewed and are negative          Current Medications       Current Outpatient Medications:     metFORMIN (GLUCOPHAGE) 500 mg tablet, Take 1 tablet (500 mg total) by mouth 2 (two) times a day with meals, Disp: 180 tablet, Rfl: 1    phentermine (ADIPEX-P) 37 5 MG tablet, Take 1 tablet (37 5 mg total) by mouth daily, Disp: 30 tablet, Rfl: 2    simvastatin (ZOCOR) 20 mg tablet, Take 1 tablet (20 mg total) by mouth daily at bedtime, Disp: 90 tablet, Rfl: 3    Current Allergies     Allergies as of 02/18/2022    (No Known Allergies)            The following portions of the patient's history were reviewed and updated as appropriate: allergies, current medications, past family history, past medical history, past social history, past surgical history and problem list      Past Medical History:   Diagnosis Date    Arthritis     [facility] )     Dyslipidemia     GERD (gastroesophageal reflux disease)     Hyperlipidemia     Nodule of left lung 2007    negative for CA    Type 2 diabetes mellitus without complication, without long-term current use of insulin ([facility] ) 1/23/2019       Past Surgical History:   Procedure Laterality Date    COLONOSCOPY      ELBOW SURGERY Bilateral     FOOT TENDON SURGERY      heel    [place] REVISE MEDIAN N/CARPAL TUNNEL SURG Left 6/30/2021    Procedure: RELEASE CARPAL TUNNEL;  Surgeon: Sally Bagley DO;  Location: [address] [place] [address] MAIN OR;  Service: Orthopedics       Family History   Problem Relation Age of Onset    COPD Father         80 yrs    Heart disease Mother         hx MI   Nusrat Sheridanar Diabetes Mother     Cancer Brother          Medications have been verified  Objective   /78   Pulse 93   Temp 97 7 °F (36 5 °C)   Resp 16   SpO2 97%        Physical Exam     Physical Exam  Vitals and nursing note reviewed  Constitutional:       General: He is not in acute distress  Appearance: He is not ill-appearing or toxic-appearing  Cardiovascular:      Rate and Rhythm: Normal rate  Pulses: Normal pulses  Pulmonary:      Effort: Pulmonary effort is normal    Musculoskeletal:      Right knee: No swelling or deformity  Decreased range of motion  Tenderness present over the lateral joint line, MCL and PCL  Normal pulse  Left knee: Normal    Skin:     General: Skin is warm  Capillary Refill: Capillary refill takes less than 2 seconds  Neurological:      Mental Status: He is alert and oriented to person, place, and time

## 2022-02-19 NOTE — PATIENT INSTRUCTIONS
Keep the immobilizer in place  Continue to take tylenol or ibuprofen as needed for pain  Rest the area and apply ice  Knee Pain   WHAT YOU NEED TO KNOW:   Knee pain may start suddenly, or it may be a long-term problem  You may have pain on the side, front, or back of your knee  You may have knee stiffness and swelling  You may hear popping sounds or feel like your knee is giving way or locking up as you walk  You may feel pain when you sit, stand, walk, or climb up and down stairs  Knee pain can be caused by conditions such as obesity, inflammation, or strains or tears in ligaments or tendons  DISCHARGE INSTRUCTIONS:   Return to the emergency department if:   · Your pain is worse, even after treatment  · You cannot bend or straighten your leg completely  · The swelling around your knee does not go down even with treatment  · Your knee is painful and hot to the touch  Contact your healthcare provider if:   · You have questions or concerns about your condition or care  Medicines: You may need any of the following:  · NSAIDs  help decrease swelling and pain or fever  This medicine is available with or without a doctor's order  NSAIDs can cause stomach bleeding or kidney problems in certain people  If you take blood thinner medicine, always ask your healthcare provider if NSAIDs are safe for you  Always read the medicine label and follow directions  · Acetaminophen  decreases pain and fever  It is available without a doctor's order  Ask how much to take and how often to take it  Follow directions  Read the labels of all other medicines you are using to see if they also contain acetaminophen, or ask your doctor or pharmacist  Acetaminophen can cause liver damage if not taken correctly  Do not use more than 4 grams (4,000 milligrams) total of acetaminophen in one day  · Prescription pain medicine  may be given  Ask your healthcare provider how to take this medicine safely   Some prescription pain medicines contain acetaminophen  Do not take other medicines that contain acetaminophen without talking to your healthcare provider  Too much acetaminophen may cause liver damage  Prescription pain medicine may cause constipation  Ask your healthcare provider how to prevent or treat constipation  · Take your medicine as directed  Contact your healthcare provider if you think your medicine is not helping or if you have side effects  Tell him or her if you are allergic to any medicine  Keep a list of the medicines, vitamins, and herbs you take  Include the amounts, and when and why you take them  Bring the list or the pill bottles to follow-up visits  Carry your medicine list with you in case of an emergency  What you can do to manage your symptoms:   · Rest your knee so it can heal   Limit activities that increase your pain  Do low-impact exercises, such as walking or swimming  · Apply ice to help reduce swelling and pain  Use an ice pack, or put crushed ice in a plastic bag  Cover it with a towel before you apply it to your knee  Apply ice for 15 to 20 minutes every hour, or as directed  · Apply compression to help reduce swelling  Use a brace or bandage only as directed  · Elevate your knee to help decrease pain and swelling  Elevate your knee while you are sitting or lying down  Prop your leg on pillows to keep your knee above the level of your heart  · Prevent your knee from moving as directed  Your healthcare provider may put on a cast or splint  You may need to wear a leg brace to stabilize your knee  A leg brace can be adjusted to increase your range of motion as your knee heals  What you can do to prevent knee pain:   · Maintain a healthy weight  Extra weight increases your risk for knee pain  Ask your healthcare provider how much you should weigh  He or she can help you create a safe weight loss plan if you need to lose weight      · Exercise or train properly  Use the correct equipment for sports  Wear shoes that provide good support  Check your posture often as you exercise, play sports, or train for an event  This can help prevent stress and strain on your knees  Rest between sessions so you do not overwork your knees  Follow up with your healthcare provider within 24 hours or as directed: You may need follow-up treatments, such as steroid injections to decrease pain  Write down your questions so you remember to ask them during your visits  © Copyright [employer] 2021 Information is for End User's use only and may not be sold, redistributed or otherwise used for commercial purposes  All illustrations and images included in CareNotes® are the copyrighted property of A D A M , Inc  or [facility]   The above information is an  only  It is not intended as medical advice for individual conditions or treatments  Talk to your doctor, nurse or pharmacist before following any medical regimen to see if it is safe and effective for you

## 2022-02-21 ENCOUNTER — OFFICE VISIT (OUTPATIENT)
Dept: OBGYN CLINIC | Facility: CLINIC | Age: 72
End: 2022-02-21
Payer: MEDICARE

## 2022-02-21 VITALS
DIASTOLIC BLOOD PRESSURE: 99 MMHG | HEART RATE: 107 BPM | BODY MASS INDEX: 37.22 KG/M2 | WEIGHT: 218 LBS | SYSTOLIC BLOOD PRESSURE: 149 MMHG | HEIGHT: 64 IN

## 2022-02-21 DIAGNOSIS — M25.561 RIGHT KNEE PAIN, UNSPECIFIED CHRONICITY: ICD-10-CM

## 2022-02-21 DIAGNOSIS — S76.311A RUPTURE OF RIGHT HAMSTRING TENDON, INITIAL ENCOUNTER: ICD-10-CM

## 2022-02-21 DIAGNOSIS — S76.301A HAMSTRING INJURY, RIGHT, INITIAL ENCOUNTER: Primary | ICD-10-CM

## 2022-02-21 DIAGNOSIS — M25.561 ACUTE PAIN OF RIGHT KNEE: ICD-10-CM

## 2022-02-21 PROCEDURE — 99213 OFFICE O/P EST LOW 20 MIN: CPT | Performed by: ORTHOPAEDIC SURGERY

## 2022-02-21 NOTE — PROGRESS NOTES
Assessment:  1  Hamstring injury, right, initial encounter  MRI knee right  wo contrast   2  Right knee pain, unspecified chronicity  MRI knee right  wo contrast   3  Rupture of right hamstring tendon, initial encounter  MRI knee right  wo contrast       Plan:  Suspect right hamstring rupture  The patient has injury, exam and imaging consistent with hamstring rupture  A right knee MRI is ordered to rule out hamstring tear  He should follow up for right knee MRI review  To do next visit:  Return for right knee MRI review   The above stated was discussed in layman's terms and the patient expressed understanding  All questions were answered to the patient's satisfaction  The patient re-injured his right hamstring  There is point tenderness along the lateral hamstring region directly behind the knee  An MRI is ordered to rule out an internal derangement versus hamstring tendon tear  Return back once this is complete  There is any problems sooner, he will not hesitate to let us know  Scribe Attestation    I,:  Bj Kurtz am acting as a scribe while in the presence of the attending physician :       I,:  Shaniqua Connell, DO personally performed the services described in this documentation    as scribed in my presence :             Subjective:   Nisha Patel is a 80 y o  male who presents for follow up of right hamstring strain  He did re-aggravate the symptoms 2/18/2022 while going down stairs  He felt a tearing sensation  He was seen at an Urgent Care and place in immobilizer brace with use of crutches  Today he complains of occasional posterior right knee pain  He rates his pain at 0/10 and 8/10 at its worse  Weight bearing aggravates while rest alleviates  He had been oing hamstring stretches with benefit prior to recent flare of symptoms  He has [employer] compliant with immobilizer brace and crutches  He has used Tylenol, Advil a pain pill from prior surgery          Review of systems negative unless otherwise specified in HPI    Past Medical History:   Diagnosis Date    Arthritis     [facility] mellitus ([facility] )     Dyslipidemia     GERD (gastroesophageal reflux disease)     Hyperlipidemia     Nodule of left lung 2007    negative for CA    Type 2 diabetes mellitus without complication, without long-term current use of insulin [facility] ) 1/23/2019       Past Surgical History:   Procedure Laterality Date    COLONOSCOPY      ELBOW SURGERY Bilateral     FOOT TENDON SURGERY      heel    [place] REVISE MEDIAN N/CARPAL TUNNEL SURG Left 6/30/2021    Procedure: RELEASE CARPAL TUNNEL;  Surgeon: Damir Tucker DO;  Location: [address] [place] [address] MAIN OR;  Service: Orthopedics       Family History   Problem Relation Age of Onset    COPD Father         80 yrs   [de-identified] Heart disease Mother         hx MI   [de-identified] Diabetes Mother     Cancer Brother        Social History     Occupational History    Not on file   Tobacco Use    Smoking status: Former Smoker    Smokeless tobacco: Never Used   Vaping Use    Vaping Use: Never used   Substance and Sexual Activity    Alcohol use: Yes     Comment: occ    Drug use: No    Sexual activity: Yes     Partners: Female     Birth control/protection: None         Current Outpatient Medications:     metFORMIN (GLUCOPHAGE) 500 mg tablet, Take 1 tablet (500 mg total) by mouth 2 (two) times a day with meals, Disp: 180 tablet, Rfl: 1    phentermine (ADIPEX-P) 37 5 MG tablet, Take 1 tablet (37 5 mg total) by mouth daily, Disp: 30 tablet, Rfl: 2    simvastatin (ZOCOR) 20 mg tablet, Take 1 tablet (20 mg total) by mouth daily at bedtime, Disp: 90 tablet, Rfl: 3    No Known Allergies         Vitals:    02/21/22 0904   BP: 149/99   Pulse: (!) 107       Objective:  Physical exam  · General: Awake, Alert, Oriented  · Eyes: Pupils equal, round and reactive to light  · Heart: regular rate and rhythm  · Lungs: No audible wheezing  · Abdomen: soft                    Ortho Exam  Right knee:  TTP over distal hamstring  No gapping of hamstring tendin  No erythema or ecchymosis  Limited strength with flexion limited by pain  Calf compartments soft and supple  Sensation intact  Toes are warm sensate and mobile      Diagnostics, reviewed and taken today if performed as documented:    None performed    Procedures, if performed today:    Procedures    None performed      Portions of the record may have been created with voice recognition software  Occasional wrong word or "sound a like" substitutions may have occurred due to the inherent limitations of voice recognition software  Read the chart carefully and recognize, using context, where substitutions have occurred

## 2022-02-25 ENCOUNTER — HOSPITAL ENCOUNTER (OUTPATIENT)
Dept: MRI IMAGING | Facility: HOSPITAL | Age: 72
Discharge: HOME | End: 2022-02-25
Attending: ORTHOPAEDIC SURGERY
Payer: MEDICARE

## 2022-02-25 DIAGNOSIS — S76.311A RUPTURE OF RIGHT HAMSTRING TENDON, INITIAL ENCOUNTER: ICD-10-CM

## 2022-02-25 DIAGNOSIS — M25.561 RIGHT KNEE PAIN, UNSPECIFIED CHRONICITY: ICD-10-CM

## 2022-02-25 DIAGNOSIS — S76.301A HAMSTRING INJURY, RIGHT, INITIAL ENCOUNTER: ICD-10-CM

## 2022-02-25 PROCEDURE — G1004 CDSM NDSC: HCPCS

## 2022-02-25 PROCEDURE — 73721 MRI JNT OF LWR EXTRE W/O DYE: CPT

## 2022-03-04 ENCOUNTER — OFFICE VISIT (OUTPATIENT)
Dept: OBGYN CLINIC | Facility: CLINIC | Age: 72
End: 2022-03-04
Payer: MEDICARE

## 2022-03-04 VITALS — BODY MASS INDEX: 36.7 KG/M2 | WEIGHT: 215 LBS | HEIGHT: 64 IN

## 2022-03-04 DIAGNOSIS — S83.241D OTHER TEAR OF MEDIAL MENISCUS OF RIGHT KNEE AS CURRENT INJURY, SUBSEQUENT ENCOUNTER: Primary | ICD-10-CM

## 2022-03-04 PROCEDURE — 99213 OFFICE O/P EST LOW 20 MIN: CPT | Performed by: ORTHOPAEDIC SURGERY

## 2022-03-04 NOTE — PROGRESS NOTES
Assessment/Plan:   Diagnoses and all orders for this visit:    Other tear of medial meniscus of right knee as current injury, subsequent encounter  -     Ambulatory referral to Physical Therapy; Future         Discussed with patient that today's physical exam and MRI findings are consistent with degenerative tear of right medial meniscus  Discussed both conservative management via physical therapy, stretching, and strengthening versus surgical intervention via right knee arthroscopy with potential medial meniscectomy  Patient does not wish to move forward with surgical intervention at this time  Referral to formal physical therapy was provided  He can continue activity modification, NSAIDs/Tylenol, and ice and or heat as needed for pain and soreness  If he does not see sufficient progress with conservative management, he will consider surgical intervention  He has also been provided documentation for full return to work without limitations or restrictions  He will be seen for follow-up in 6 weeks for re-evaluation  Patient expresses understanding and is in agreement with this treatment plan  Treatment options were discussed with the patient in great detail  He wants to try conservative modalities first with therapy first   He understands if that fails, may need arthroscopic evaluation  Physical exam shows medial jont line tenderness with a posiitve  McMurrays  No effusion  No instability    Subjective:   Patient ID: [place]  1950     HPI  Patient is a 80 y o  male who presents for follow-up evaluation MRI review of right knee  Patient states he continues to have pain in the posteromedial aspect of the knee with weight-bearing pivoting motions  He denies any recent bruising or swelling, he also denies any numbness, tingling, feelings of instability, or mechanical symptoms  His pain is most bothersome with weight-bearing pivoting motions of the right lower extremity      The following portions of the patient's history were reviewed and updated as appropriate:  Past medical history, past surgical history, Family history, social history, current medications and allergies    Past Medical History:   Diagnosis Date    Arthritis     Diabetes mellitus ([facility] )     Dyslipidemia     GERD (gastroesophageal reflux disease)     Hyperlipidemia     Nodule of left lung 2007    negative for CA    Type 2 diabetes mellitus without complication, without long-term current use of insulin ([facility] ) 1/23/2019       Past Surgical History:   Procedure Laterality Date    COLONOSCOPY      ELBOW SURGERY Bilateral     FOOT TENDON SURGERY      heel    NM REVISE MEDIAN N/CARPAL TUNNEL SURG Left 6/30/2021    Procedure: RELEASE CARPAL TUNNEL;  Surgeon: Shannan Phillips DO;  Location: [address] [place] [address] OR;  Service: Orthopedics       Family History   Problem Relation Age of Onset    COPD Father         80 yrs   Zee Brady Heart disease Mother         hx MI   Zee Brady Diabetes Mother     Cancer Brother        Social History     Socioeconomic History    Marital status: /Civil Union     Spouse name: None    Number of children: None    Years of education: None    Highest education level: None   Occupational History    None   Tobacco Use    Smoking status: Former Smoker    Smokeless tobacco: Never Used   Vaping Use    Vaping Use: Never used   Substance and Sexual Activity    Alcohol use: Yes     Comment: occ    Drug use: No    Sexual activity: Yes     Partners: Female     Birth control/protection: None   Other Topics Concern    None   Social History Narrative    None     Social Determinants of Health     Financial Resource Strain: Not on file   Food Insecurity: Not on file   Transportation Needs: Not on file   Physical Activity: Not on file   Stress: Not on file   Social Connections: Not on file   Intimate Partner Violence: Not on file   Housing Stability: Not on file         Current Outpatient Medications:     metFORMIN (GLUCOPHAGE) 500 mg tablet, Take 1 tablet (500 mg total) by mouth 2 (two) times a day with meals, Disp: 180 tablet, Rfl: 1    phentermine (ADIPEX-P) 37 5 MG tablet, Take 1 tablet (37 5 mg total) by mouth daily, Disp: 30 tablet, Rfl: 2    simvastatin (ZOCOR) 20 mg tablet, Take 1 tablet (20 mg total) by mouth daily at bedtime, Disp: 90 tablet, Rfl: 3    No Known Allergies    Review of Systems   Constitutional: Negative for chills, fever and unexpected weight change  HENT: Negative for hearing loss, nosebleeds and sore throat  Eyes: Negative for pain, redness and visual disturbance  Respiratory: Negative for cough, shortness of breath and wheezing  Cardiovascular: Negative for chest pain, palpitations and leg swelling  Gastrointestinal: Negative for abdominal pain, nausea and vomiting  Endocrine: Negative for polydipsia and polyuria  Genitourinary: Negative for dysuria and hematuria  Musculoskeletal:        As noted in HPI   Skin: Negative for rash and wound  Neurological: Negative for dizziness, numbness and headaches  Psychiatric/Behavioral: Negative for decreased concentration and suicidal ideas  The patient is not nervous/anxious           Objective:  Ht 5' 4 25" (1 632 m)   Wt 97 5 kg (215 lb)   BMI 36 62 kg/m²     Ortho Exam  Right Knee -   Patient ambulates with antalgic gait pattern favoring left lower extremity  Uses no assistive device  No anatomical deformity  Skin is warm and dry to touch with no signs of erythema, ecchymosis, infection  No soft tissue swelling, trace effusion noted  ROM 5 degrees-115 degree  TTP over medial joint line, mildly TTP over lateral joint line  Flexor and extensor mechanisms intact  Knee is stable to varus and valgus stress  - Lachman's  - Anterior Drawer, - Posterior Drawer  Painful medial Juany's, - lateral Juany's  - Pivot Shift  Patella tracks centrally with palpable crepitus  Calf compartments are soft and supple  2+ TP and DP pulses with brisk capillary refill to the toes  Sural, saphenous, tibial, superficial and deep peroneal motor and sensory distributions intact  Sensation light touch intact distally      Physical Exam  Vitals reviewed  Constitutional:       Appearance: He is well-developed  HENT:      Head: Normocephalic and atraumatic  Nose: Nose normal    Eyes:      Conjunctiva/sclera: Conjunctivae normal    Cardiovascular:      Rate and Rhythm: Normal rate  Pulmonary:      Effort: Pulmonary effort is normal    Musculoskeletal:      Cervical back: Neck supple  Skin:     General: Skin is warm and dry  Capillary Refill: Capillary refill takes less than 2 seconds  Neurological:      Mental Status: He is alert and oriented to person, place, and time     Psychiatric:         Mood and Affect: Mood normal          Behavior: Behavior normal         Diagnostic Test Review:  Attending Physician has personally reviewed pertinent imaging in PACS, impression is as follows:    Review of MRI series taken 3/4/2022 of the right knee is significant for medial meniscal tear, mild anterior compartment osteoarthritis, and evidence of Baker cyst    Procedures   No procedures performed this visit    Scribe Attestation    I,:  Elena Murphy am acting as a scribe while in the presence of the attending physician :       I,:  Aramis Zepeda DO personally performed the services described in this documentation    as scribed in my presence :

## 2022-03-04 NOTE — LETTER
March 4, 2022     Patient: Wilber Snow   YOB: 1950   Date of Visit: 3/4/2022       To Whom it May Concern:    Wilber Snow is under my professional care  He was seen in my office on 3/4/2022  He is cleared to return to work without limitations or restrictions beginning 3/7/2022  If you have any questions or concerns, please don't hesitate to call           Sincerely,          Jose Nagel DO        CC: No Recipients

## 2022-03-13 NOTE — PROGRESS NOTES
PT Evaluation     Today's date: 3/14/2022  Patient name: Luz Rdz  : 1950  MRN: [id number]  Referring provider: Radha Castillo DO  Dx:   Encounter Diagnosis     ICD-10-CM    1  Tear of medial meniscus of right knee, current, unspecified tear type, subsequent encounter  S83 241D    2  Hamstring tightness of right lower extremity  M62 89    3  Right knee pain, unspecified chronicity  M25 561                   Assessment  Assessment details: Luz Rdz is a 80 y o  male with a history of GERD, hyperlipidemia, DM, arthritis, L lung nodule, HTN, and BMI>30 that presents for a moderate complexity physical therapy initial evaluation  The patient demonstrates signs and symptoms consistent with right knee medial meniscus tear; R hamstring tightness, and right knee pain  During the examination the patient demonstrated decreased R LE strength, decreased R knee ROM, gait dysfunction, and R knee pain  The patient's impairments are causing the following functional limitations: difficulty with prolonged standing, prolonged walking, walking on unlevel surfaces, difficulty squatting/kneeling, difficulty stair-climbing, and difficulty transferring from low surfaces     The patient's clinical presentation is evolving due to a number of participation restrictions, significant medial history, and functional limitation (FOTO 52% function)  The patient will benefit from skilled PT services to address impairments, work towards goals, and restore PLOF  Impairments: abnormal gait, abnormal or restricted ROM, activity intolerance, impaired physical strength, lacks appropriate home exercise program, pain with function and poor posture   Functional limitations: difficulty with prolonged standing, prolonged walking, walking on unlevel surfaces, difficulty squatting/kneeling, difficulty stair-climbing, and difficulty transferring from low surfaces    Symptom irritability: lowUnderstanding of Dx/Px/POC: good Prognosis: good    Goals  STG: Achieve in 4-6 weeks  1  Patient's R knee pain at worst less than 2/10 to allow for proper gait  2   Patient's R knee ROM improve by 10-15 degrees to improve squatting  3   R LE MMT improve to > 4+/5 all motions tested to improve ADL/recreational activities  4  30 second sit to stand score improve by 6 stands ( greater than 14 stands)to indicate improved transfers  LTG:  Achieve in 6-12 weeks  1  Patient's knee FOTO score improve to > 69% to indicate a return to normal functioning  2   Patient achieve personal goal of returning to walking 3 miles without R knee pain  3  Patient to achieve independence with home exercise plan  Plan  Plan details: RE-ASSESS 1X/MONTH  Patient would benefit from: skilled physical therapy  Planned modality interventions: TENS, cryotherapy, thermotherapy: hydrocollator packs and ultrasound  Other planned modality interventions: IASTM  Planned therapy interventions: joint mobilization, manual therapy, neuromuscular re-education, patient education, postural training, strengthening, stretching, therapeutic activities, therapeutic exercise, home exercise program, abdominal trunk stabilization, balance, activity modification and gait training  Frequency: 1-3X/WK  Duration in weeks: 12  Plan of Care beginning date: 3/14/2022  Plan of Care expiration date: 6/13/2022  Treatment plan discussed with: PTA and patient        Subjective Evaluation    History of Present Illness  Date of onset: 2/18/2022  Mechanism of injury: Юлия Back is a 80 y o  male that presents to outpatient physical therapy with complaints of right posterior knee pain  The patient reports initial onset of pain felt like a pulled hamstring at the R posterior knee  The patient noted onset with descending the stairs  The patient saw orthopedics who ordered an MRI which revelealed a torn medial meniscus at the right knee  The patient wanted to try conservative therapy   The patient notes difficulty with prolonged walking, walking on unlevel surfaces, ambulating up/down the steps, and difficulty squatting kneeling  The patient's main goal for physical therapy is to get rid of the pain with walking for exercise/leisure  Pain  Current pain ratin  At best pain ratin  At worst pain ratin  Location: R medial/anterior knee joint line  Quality: dull ache and sharp  Relieving factors: rest  Aggravating factors: stair climbing, standing, walking and lifting  Progression: improved    Social Support  Steps to enter house: yes  Stairs in house: yes   Lives in: multiple-level home  Lives with: spouse    Employment status: working (drive school bus)  Hand dominance: right      Diagnostic Tests  MRI studies: abnormal  Treatments  Current treatment: physical therapy  Patient Goals  Patient goals for therapy: decreased pain, improved balance, increased motion, increased strength, independence with ADLs/IADLs and return to sport/leisure activities  Patient goal: walk for leisure/exercise        Objective     Palpation     Additional Palpation Details  NO TTP    Tenderness     Right Knee   Tenderness in the inferior patella  Neurological Testing     Sensation     Knee   Left Knee   Intact: light touch    Right Knee   Intact: light touch     Active Range of Motion   Left Knee   Flexion: 135 degrees   Extension: 6 degrees     Right Knee   Flexion: 120 degrees   Extension: 1 degrees with pain    Passive Range of Motion   Left Knee   Normal passive range of motion    Right Knee   Flexion: 120 degrees with pain  Extension: 1 degrees with pain    Mobility   Patellar Mobility:   Left Knee   WFL: medial, lateral, superior and inferior       Right Knee   WFL: medial, lateral, superior and inferior    Patellar Static Positioning   Left Knee: WFL  Right Knee: [facility]    Strength/Myotome Testing     Left Hip   Planes of Motion   Flexion: 4+  Extension: 4  Abduction: 4+  Adduction: 4+    Right Hip Planes of Motion   Flexion: 3  Extension: 3  Abduction: 4-  Adduction: 4-    Left Knee   Flexion: 5  Extension: 5  Quadriceps contraction: good    Right Knee   Flexion: 3+  Extension: 4-  Quadriceps contraction: fair    Tests     Additional Tests Details  Gait impairments: Patient ambulates with no AD WBAT B/L LE  The patient demonstrates slow gait speed, R lateral trunk lean, (+) antalgic gait pattern w/ R LE limp  FOTO: 52% ( predicted 69%)    30SSTS: 8 stands    Swelling     Left Knee Girth Measurement (cm)   Joint line: 38 cm    Right Knee Girth Measurement (cm)   Joint line: 41 cm               Re-Evaluation:4/11  PRECAUTIONS:CARDIAC  Knee Specialty Daily Treatment Diary   [email or web address]       Manual Therapy 3/14       MFR/STM/ IASTM        Hamstring stretch        Prone Quad stretch        Patellar mobs        Knee stretch on edge of mat            Ther Ex 3/14       Nu Step S=  *      Biodex Bike S=        Heel slides flex/abd  *      PROM knee                Prone knee flex                SLR all planes  *      SAQ        LAQ        Hamstring stretch 3x:30       Calf stretch 3x:30       Standing hamstring curls                Mini Squat                TKE  *      Total gym squats (deep)        Neuro Re-ed        Gluteal set x15 :05       QS x15 :05       Sciatic N glides  *?       Bridges  *      [employer] board        Eccentric Leg press        Clam Shells  *              [place]        GAIT Training        sidestepping  *      Heel-toe amb        Mirror walking        Ther Act        Amb up/down steps        Chair squats        Crate carry        Step ups  *          Modalities        CP  KNEE

## 2022-03-14 ENCOUNTER — EVALUATION (OUTPATIENT)
Dept: PHYSICAL THERAPY | Facility: CLINIC | Age: 72
End: 2022-03-14
Payer: MEDICARE

## 2022-03-14 DIAGNOSIS — M62.89 HAMSTRING TIGHTNESS OF RIGHT LOWER EXTREMITY: ICD-10-CM

## 2022-03-14 DIAGNOSIS — N40.0 ENLARGED PROSTATE: Primary | ICD-10-CM

## 2022-03-14 DIAGNOSIS — M25.561 RIGHT KNEE PAIN, UNSPECIFIED CHRONICITY: ICD-10-CM

## 2022-03-14 DIAGNOSIS — S83.241D TEAR OF MEDIAL MENISCUS OF RIGHT KNEE, CURRENT, UNSPECIFIED TEAR TYPE, SUBSEQUENT ENCOUNTER: Primary | ICD-10-CM

## 2022-03-14 PROCEDURE — 97112 NEUROMUSCULAR REEDUCATION: CPT | Performed by: PHYSICAL THERAPIST

## 2022-03-14 PROCEDURE — 97162 PT EVAL MOD COMPLEX 30 MIN: CPT | Performed by: PHYSICAL THERAPIST

## 2022-03-14 RX ORDER — TAMSULOSIN HYDROCHLORIDE 0.4 MG/1
0.4 CAPSULE ORAL
Qty: 30 CAPSULE | Refills: 1 | Status: SHIPPED | OUTPATIENT
Start: 2022-03-14 | End: 2022-05-15 | Stop reason: SDUPTHER

## 2022-03-14 NOTE — TELEPHONE ENCOUNTER
----- Message from [facility] sent at 3/14/2022  1:42 PM EDT -----  Regarding: FW: Question regarding PSA, Total Screen  Flomax 0 4mg at bedtime- you can send to me for approval    ----- Message -----  From: Francis Artis MA  Sent: 3/14/2022   1:33 PM EDT  To: DREA Diaz  Subject: FW: Question regarding PSA, Total Screen           ----- Message -----  From: Olivia Do  Sent: 3/14/2022   1:13 PM EDT  To: Rambo Angelo [facility]  Subject: Question regarding PSA, Total Screen             Let try the Medication abs we will see what happens  Agree? Thank you

## 2022-03-16 ENCOUNTER — OFFICE VISIT (OUTPATIENT)
Dept: PHYSICAL THERAPY | Facility: CLINIC | Age: 72
End: 2022-03-16
Payer: MEDICARE

## 2022-03-16 DIAGNOSIS — M25.561 RIGHT KNEE PAIN, UNSPECIFIED CHRONICITY: ICD-10-CM

## 2022-03-16 DIAGNOSIS — M62.89 HAMSTRING TIGHTNESS OF RIGHT LOWER EXTREMITY: ICD-10-CM

## 2022-03-16 DIAGNOSIS — S83.241D TEAR OF MEDIAL MENISCUS OF RIGHT KNEE, CURRENT, UNSPECIFIED TEAR TYPE, SUBSEQUENT ENCOUNTER: Primary | ICD-10-CM

## 2022-03-16 PROCEDURE — 97110 THERAPEUTIC EXERCISES: CPT

## 2022-03-16 PROCEDURE — 97112 NEUROMUSCULAR REEDUCATION: CPT

## 2022-03-16 PROCEDURE — 97530 THERAPEUTIC ACTIVITIES: CPT

## 2022-03-16 NOTE — PROGRESS NOTES
Daily Note     Today's date: 3/16/2022  Patient name: Mukesh Russell  : 1950  MRN: [id number]  Referring provider: Vicky Campbell DO  Dx:   Encounter Diagnosis     ICD-10-CM    1  Tear of medial meniscus of right knee, current, unspecified tear type, subsequent encounter  S83 594D    2  Hamstring tightness of right lower extremity  M62 89    3  Right knee pain, unspecified chronicity  M25 561        Start Time: 945          Subjective: Patient felt his knee is its usual 2/10 pain that is medial right knee and across joint line today  He feels the hamstrings are what irritates him the most and is the cause of his knee problem  He reports the home exercises are going well, but he feels some discomfort in the right hamstrings with some of them  Objective: See treatment diary below    Patient's home exercise program updated to include additional exercises  Handout issued and explained with demonstration  Patient accepts new exercises  Assessment: Tolerated treatment fair+  Patient would benefit from continued PT for stretching and strengthening  Patient was able to add exercises to his program with little difficulty and discomfort in the hamstring  Patient noticed the quad sets irritate his hamstring the most  Patient seemed to understand all education given during session  Patient was a little more sore by the end of the session, but he felt it was "fine"  Plan: Continue per plan of care  Progress treatment as tolerated         Re-Evaluation:  PRECAUTIONS:CARDIAC  Knee Specialty Daily Treatment Diary   [email or web address]       Manual Therapy 3/14 3/16      MFR/STM/ IASTM        Hamstring stretch        Prone Quad stretch        Patellar mobs        Knee stretch on edge of mat            Ther Ex 3/14 3/16      Nu Step S=8  *L1 x5 min      Biodex Bike S=        Heel slides flex/abd  *:05x10 ea      PROM knee                Prone knee flex                SLR all planes  *2x5      SAQ        LAQ Hamstring stretch 3x:30 :30x3      Calf stretch 3x:30 :30x3      Standing hamstring curls                Mini Squat                TKE  *red :05x10      Total gym squats (deep)        Neuro Re-ed        Gluteal set x15 :05 :05x15      QS x15 :05 :05x15      Sciatic N glides   *?      Bridges  *:[id number]      Fitter board        Eccentric Leg press        Clam Shells  *B/L :03x10              [place]        GAIT Training        sidestepping  *10ft x4      Heel-toe amb        Mirror walking        Ther Act        Amb up/down steps        Chair squats        Crate carry        Step ups fwd/lat  *4" x10           Modalities        CP  KNEE

## 2022-03-21 ENCOUNTER — OFFICE VISIT (OUTPATIENT)
Dept: PHYSICAL THERAPY | Facility: CLINIC | Age: 72
End: 2022-03-21
Payer: MEDICARE

## 2022-03-21 DIAGNOSIS — M62.89 HAMSTRING TIGHTNESS OF RIGHT LOWER EXTREMITY: ICD-10-CM

## 2022-03-21 DIAGNOSIS — M25.561 RIGHT KNEE PAIN, UNSPECIFIED CHRONICITY: ICD-10-CM

## 2022-03-21 DIAGNOSIS — S83.241D TEAR OF MEDIAL MENISCUS OF RIGHT KNEE, CURRENT, UNSPECIFIED TEAR TYPE, SUBSEQUENT ENCOUNTER: Primary | ICD-10-CM

## 2022-03-21 PROCEDURE — 97112 NEUROMUSCULAR REEDUCATION: CPT | Performed by: PHYSICAL THERAPIST

## 2022-03-21 PROCEDURE — 97110 THERAPEUTIC EXERCISES: CPT | Performed by: PHYSICAL THERAPIST

## 2022-03-21 NOTE — PROGRESS NOTES
Daily Note     Today's date: 3/21/2022  Patient name: Bj Gray  : 1950  MRN: [id number]  Referring provider: Hadley Lott DO  Dx:   Encounter Diagnosis     ICD-10-CM    1  Tear of medial meniscus of right knee, current, unspecified tear type, subsequent encounter  S83 835D    2  Hamstring tightness of right lower extremity  M62 89    3  Right knee pain, unspecified chronicity  M25 561                   Subjective: The patient reports inconsistency with his R knee pain  He was awakened last night with posterior R knee pain at the popliteal fossa described as " rubbing" and "friction"  The patient notes feeling superior patella pain and joint line pain at the right knee /10 at the start of therapy  Objective: See treatment diary below      Assessment: The patient tolerated all activities well today  The patient's lumbar spine was screened and the patient was found to have a flexion preference which decreases his R knee symptoms  The patient was advised to focus on performing these exercises repeatedly throughout the day  The patient reports a significant reduction in his R knee pain at the end of the treatment  The patient will benefit from continued skilled physical therapy to progress towards achieving patient centered goals  Plan: Continue per plan of care  Progress treatment as tolerated  Focus on reducing the lumbar derangement and then recovery of function       Re-Evaluation:  PRECAUTIONS:CARDIAC  Knee Specialty Daily Treatment Diary   [email or web address]       Manual Therapy 3/14 3/16 3/21     MFR/STM/ IASTM        Hamstring stretch        Prone Quad stretch        Patellar mobs        Knee stretch on edge of mat            Ther Ex 3/14 3/16 3/21     Nu Step S=8  *L1 x5 min L2 x 10 mins     Biodex Bike S=        Heel slides flex/abd  *:05x10 ea      PROM knee                Prone knee flex        D K->C   3x10 w/ towel behind knee  1x10 no towel       Prone press ups   x10 p!     Prone on elbows   x10p! SLR all planes  *2x5      SAQ        LAQ        Hamstring stretch 3x:30 :30x3 :30 x3 B/L     Calf stretch 3x:30 :30x3      Standing hamstring curls                Mini Squat                TKE  *red :05x10      Total gym squats (deep)        Neuro Re-ed        Gluteal set x15 :05 :05x15 15x:05     QS x15 :05 :05x15 15x:05     Sciatic N glides   *? np     [employer]  *:[id number]      Fitter board        Eccentric Leg press        Clam Shells  *B/L :03x10              [place]        GAIT Training        sidestepping  *10ft x4      Heel-toe amb        Mirror walking        Ther Act        Amb up/down steps        Chair squats        Crate carry        Step ups fwd/lat  *4" x10           Modalities        CP  KNEE

## 2022-03-23 ENCOUNTER — OFFICE VISIT (OUTPATIENT)
Dept: PHYSICAL THERAPY | Facility: CLINIC | Age: 72
End: 2022-03-23
Payer: MEDICARE

## 2022-03-23 DIAGNOSIS — M25.561 RIGHT KNEE PAIN, UNSPECIFIED CHRONICITY: ICD-10-CM

## 2022-03-23 DIAGNOSIS — S83.241D TEAR OF MEDIAL MENISCUS OF RIGHT KNEE, CURRENT, UNSPECIFIED TEAR TYPE, SUBSEQUENT ENCOUNTER: Primary | ICD-10-CM

## 2022-03-23 DIAGNOSIS — M62.89 HAMSTRING TIGHTNESS OF RIGHT LOWER EXTREMITY: ICD-10-CM

## 2022-03-23 PROCEDURE — 97112 NEUROMUSCULAR REEDUCATION: CPT | Performed by: PHYSICAL THERAPIST

## 2022-03-23 PROCEDURE — 97110 THERAPEUTIC EXERCISES: CPT | Performed by: PHYSICAL THERAPIST

## 2022-03-23 NOTE — PROGRESS NOTES
Daily Note     Today's date: 3/23/2022  Patient name: Bonnie Byers  : 1950  MRN: [id number]  Referring provider: Austin Segovia DO  Dx:   Encounter Diagnosis     ICD-10-CM    1  Tear of medial meniscus of right knee, current, unspecified tear type, subsequent encounter  S83 462D    2  Hamstring tightness of right lower extremity  M62 89    3  Right knee pain, unspecified chronicity  M25 561                   Subjective: The patient reports feeling a significant decrease in posterior R knee pain since starting the knees to chest exercise  The patient reports feeling a 2/10 pain at the R anterior knee joint presently  Objective: See treatment diary below      Assessment: The patient tolerated th treatment well with minimal complaints of fatigue and R knee pain  The patient did have a pain increase related to performing the standing hip abd+ext exercise so these were discharged  We ended with lumbar flexion which did help to minimize the patient's R knee discomfort back to 2/10 and not worse from the other exercises  The patient will benefit from continued PT focused on improving his hip strength and core stability with maximizing R knee function  Plan: Continue per plan of care  Progress treatment as tolerated  Re-Evaluation:  PRECAUTIONS:CARDIAC  Knee Specialty Daily Treatment Diary   [email or web address]   Updated AD 3/23      Manual Therapy 3/14 3/16 3/21 3/23    MFR/STM/ IASTM        Hamstring stretch        Prone Quad stretch    4x:30 ( pillow under hips)    Patellar mobs    x10 all directions w/ home instruction                Ther Ex 3/14 3/16 3/21 3/23    Nu Step S=8  *L1 x5 min L2 x 10 mins L3 x 10 mins    Biodex Bike S=        Heel slides flex/abd  *:05x10 ea  ABD x15    Ball squeeze    x15 :05            Prone knee flex        D K->C   3x10 w/ towel behind knee  1x10 no towel   1x10  2x10 ( end)    Prone press ups   x10 p! Prone on elbows   x10p!      SLR all planes  *2x5 x10 ea flex/ext(pillow under hip)    SAQ        LAQ    x10    Hamstring stretch 3x:30 :30x3 :30 x3 B/L 3x:30 B/L    Calf stretch 3x:30 :30x3      Standing hamstring curls                Mini Squat                TKE  *red :05x10      Total gym squats (deep)        Neuro Re-ed        Gluteal set x15 :05 :05x15 15x:05 reviewed    QS x15 :05 :05x15 15x:05 reviewed    Sciatic N glides        Bridges  *:03x15      Fitter board        Eccentric Leg press        Clam Shells  *B/L :03x10  x15    HIP EXT+ABD combo    x10 p!p!  DC    [facility] [employer]        GAIT Training        sidestepping  *10ft x4      Heel-toe amb    10ft x 6    Mirror walking        Ther Act        Amb up/down steps        Chair squats        Crate carry        Step ups fwd/lat  *4" x10   [place]        Modalities        CP  KNEE

## 2022-03-28 ENCOUNTER — OFFICE VISIT (OUTPATIENT)
Dept: PHYSICAL THERAPY | Facility: CLINIC | Age: 72
End: 2022-03-28
Payer: MEDICARE

## 2022-03-28 DIAGNOSIS — M25.561 RIGHT KNEE PAIN, UNSPECIFIED CHRONICITY: ICD-10-CM

## 2022-03-28 DIAGNOSIS — S83.241D TEAR OF MEDIAL MENISCUS OF RIGHT KNEE, CURRENT, UNSPECIFIED TEAR TYPE, SUBSEQUENT ENCOUNTER: Primary | ICD-10-CM

## 2022-03-28 DIAGNOSIS — M62.89 HAMSTRING TIGHTNESS OF RIGHT LOWER EXTREMITY: ICD-10-CM

## 2022-03-28 PROCEDURE — 97112 NEUROMUSCULAR REEDUCATION: CPT | Performed by: PHYSICAL THERAPIST

## 2022-03-28 PROCEDURE — 97110 THERAPEUTIC EXERCISES: CPT | Performed by: PHYSICAL THERAPIST

## 2022-03-28 NOTE — PROGRESS NOTES
Daily Note     Today's date: 3/28/2022  Patient name: Sukhi Feng  : 1950  MRN: [id number]  Referring provider: Tone Nassar DO  Dx:   Encounter Diagnosis     ICD-10-CM    1  Tear of medial meniscus of right knee, current, unspecified tear type, subsequent encounter  S83 241D    2  Hamstring tightness of right lower extremity  M62 89    3  Right knee pain, unspecified chronicity  M25 561                   Subjective: The patient reports feeling 3-4/10 pain at his R posterior knee and also at his anterior/medial knee  The patient notes increased pain on this past Thursday, Friday  The patient notes on Friday with walking he felt a pop at the R knee and felt his posterior knee pain release  Objective: See treatment diary below      Assessment: The patient performed repeated flexion in lying and standing after warming up and demonstrated decreased R posterior knee pain but no change at his anterior/medial knee joint  The patient was instructed to perform only his supine lumbar flexion and HS stretch, calf stretch, and QS  I think the patient suffers from an anterior lumbar derangement which is being aggravated every time the patient lies completely supine or performs any hip extension exercises  All activities causing slight lumbar extension were ceased  The patient ended the treatment with minimal R knee pain after simplifying his program   The patient will benefit from continued PT to achieve his goals of therapy  Plan: Continue per plan of care  Progress treatment as tolerated         Re-Evaluation:  PRECAUTIONS:CARDIAC  Knee Specialty Daily Treatment Diary   [email or web address]   Updated AD 3/23      Manual Therapy  3/16 3/21 3/23 3/28   MFR/STM/ IASTM        Hamstring stretch        Prone Quad stretch    4x:30 ( pillow under hips) HOLD   Patellar mobs    x10 all directions w/ home instruction                Ther Ex  3/16 3/21 3/23 3/28   Nu Step S=8  *L1 x5 min L2 x 10 mins L3 x 10 mins L2 x10 min   Biodex Bike S=        Heel slides flex/abd  *:05x10 ea  ABD x15    Ball squeeze    x15 :05    Standing lumbar flexion     x10 pain at R ant/med knee   Prone knee flex        D K->C   3x10 w/ towel behind knee  1x10 no towel   1x10  2x10 ( end) 3x10   Prone press ups   x10 p! Prone on elbows   x10p! SLR all planes  *2x5  x10 ea flex/ext(pillow under hip) DC EXT   SAQ        LAQ    x10 *   Hamstring stretch  :30x3 :30 x3 B/L 3x:30 B/L 5x:30   Calf stretch  :30x3   4x:30   Standing hamstring curls                Mini Squat                TKE  *red :05x10      Total gym squats (deep)        Neuro Re-ed        Gluteal set  :05x15 15x:05 reviewed    QS  :05x15 15x:05 reviewed x15 :05   POSTURE EDUCATION / HEP program instruction     DONE AD x 8 mins   Sciatic N glides        Bridges  *:03x15      Fitter board        Eccentric Leg press        Clam Shells  *B/L :03x10  x15    HIP EXT+ABD combo    x10 p!p!  DC DC   [facility] [employer]        GAIT Training        sidestepping  *10ft x4      Heel-toe amb    10ft x 6    Mirror walking        Ther Act        Amb up/down steps        Chair squats        Crate carry        Step ups fwd/lat  *4" x10   NV        Modalities        CP  KNEE

## 2022-03-30 ENCOUNTER — OFFICE VISIT (OUTPATIENT)
Dept: PHYSICAL THERAPY | Facility: CLINIC | Age: 72
End: 2022-03-30
Payer: MEDICARE

## 2022-03-30 DIAGNOSIS — M25.561 RIGHT KNEE PAIN, UNSPECIFIED CHRONICITY: ICD-10-CM

## 2022-03-30 DIAGNOSIS — M62.89 HAMSTRING TIGHTNESS OF RIGHT LOWER EXTREMITY: ICD-10-CM

## 2022-03-30 DIAGNOSIS — S83.241D TEAR OF MEDIAL MENISCUS OF RIGHT KNEE, CURRENT, UNSPECIFIED TEAR TYPE, SUBSEQUENT ENCOUNTER: Primary | ICD-10-CM

## 2022-03-30 PROCEDURE — 97110 THERAPEUTIC EXERCISES: CPT

## 2022-03-30 NOTE — PROGRESS NOTES
Daily Note     Today's date: 3/30/2022  Patient name: Agnes Johnson  : 1950  MRN: [id number]  Referring provider: Migdalia Lee DO  Dx:   Encounter Diagnosis     ICD-10-CM    1  Tear of medial meniscus of right knee, current, unspecified tear type, subsequent encounter  S83 168D    2  Hamstring tightness of right lower extremity  M62 89    3  Right knee pain, unspecified chronicity  M25 561        Start Time: 0859          Subjective: Patient has a little discomfort in the right medial knee (about 2/10)  Feels it is in the joint  Objective: See treatment diary below    Updated patient's home exercise program  Patient declined handout, but will use the web site for exercises  Patient accepts new exercises  Assessment: Tolerated treatment well  Patient would benefit from continued PT for stretches and strengthening  Patient noticed his hamstrings and calves are very tight today  Patient was able to add exercises to his program without increase of pain  Patient seemed to understand all education on new exercises  Patient had only a little discomfort in right knee joint line by the end of the session  Plan: Continue per plan of care  Progress treatment as tolerated  Re-Evaluation:  PRECAUTIONS:CARDIAC  Knee Specialty Daily Treatment Diary   [email or web address]   Updated AD 3/23      Manual Therapy 3/30  3/21 3/23 3/28   MFR/STM/ IASTM        Hamstring stretch        Prone Quad stretch    4x:30 ( pillow under hips) HOLD   Patellar mobs    x10 all directions w/ home instruction                Ther Ex 3/30  3/21 3/23 3/28   Nu Step S=8 L2 x10 min  L2 x 10 mins L3 x 10 mins L2 x10 min   Biodex Bike S=        Heel slides flex/abd    ABD x15    Ball squeeze Sit :05 x10   x15 :05    Standing lumbar flexion     x10 pain at R ant/med knee   Prone knee flex        D K->C x10  3x10 w/ towel behind knee  1x10 no towel   1x10  2x10 ( end) 3x10   Prone press ups   x10 p! Prone on elbows   x10p! SLR all planes    x10 ea flex/ext(pillow under hip) DC EXT   SAQ        LAQ :05x10   x10 *   Hamstring stretch :30 x5 B/L  :30 x3 B/L 3x:30 B/L 5x:30   Calf stretch :30x3    4x:30   soleus stretch :30x3       Standing hamstring curls Lean on counter       Hip abduction  *red :05x20       Mini Squat                TKE        Total gym squats (deep)        Neuro Re-ed        Gluteal set   15x:05 reviewed    QS   15x:05 reviewed x15 :05   POSTURE EDUCATION / HEP program instruction     DONE AD x 8 mins   Sciatic N glides        Bridges        Fitter board        Eccentric Leg press        Clam Shells    x15    HIP EXT+ABD combo    x10 p!p! DC DC   [place]        GAIT Training        sidestepping        Heel-toe amb    10ft x 6    Mirror walking        Ther Act        Amb up/down steps        Chair squats        Crate carry        Step ups fwd/lat    NV        Modalities        CP  KNEE                          Access Code: [email or web address]  URL: [email or web address]  Date: 03/30/2022  Prepared by: Bud Florentino    Exercises  · Supine Quadricep Sets - 2 x daily - 6 x weekly - 2 sets - 10 reps - 5 hold  · Seated Hamstring Stretch - 2 x daily - 6 x weekly - 1 sets - 4 reps - 30 hold  · Supine Double Knee to Chest - 8 x daily - 7 x weekly - 1 sets - 10 reps  · Long Sitting 4 Way Patellar [place] - 1 x daily - 7 x weekly - 1 sets - 10 reps - 3 hold  · Seated Hip Adduction Isometrics with Ball - 2 x daily - 7 x weekly - 1 sets - 10 reps - 5 sec hold  · Seated Hip Abduction - 2 x daily - 7 x weekly - 1 sets - 10 reps - 5 sec hold  · Seated Calf Stretch with Strap - 2 x daily - 7 x weekly - 1 sets - 3 reps - 30 sec hold  · Seated Soleus Stretch with Strap - 2 x daily - 7 x weekly - 1 sets - 3 reps - 30 sec hold

## 2022-04-04 ENCOUNTER — OFFICE VISIT (OUTPATIENT)
Dept: PHYSICAL THERAPY | Facility: CLINIC | Age: 72
End: 2022-04-04
Payer: MEDICARE

## 2022-04-04 DIAGNOSIS — S83.241D TEAR OF MEDIAL MENISCUS OF RIGHT KNEE, CURRENT, UNSPECIFIED TEAR TYPE, SUBSEQUENT ENCOUNTER: Primary | ICD-10-CM

## 2022-04-04 DIAGNOSIS — M25.561 RIGHT KNEE PAIN, UNSPECIFIED CHRONICITY: ICD-10-CM

## 2022-04-04 DIAGNOSIS — M62.89 HAMSTRING TIGHTNESS OF RIGHT LOWER EXTREMITY: ICD-10-CM

## 2022-04-04 PROCEDURE — 97035 APP MDLTY 1+ULTRASOUND EA 15: CPT

## 2022-04-04 PROCEDURE — 97110 THERAPEUTIC EXERCISES: CPT

## 2022-04-04 NOTE — PROGRESS NOTES
Daily Note     Today's date: 2022  Patient name: Renae Newman  : 1950  MRN: [id number]  Referring provider: Kaden Wilde DO  Dx:   Encounter Diagnosis     ICD-10-CM    1  Tear of medial meniscus of right knee, current, unspecified tear type, subsequent encounter  S84 986D    2  Hamstring tightness of right lower extremity  M62 89    3  Right knee pain, unspecified chronicity  M25 561        Start Time: 902          Subjective: Patient states since Thursday he has had increased pain  He is having difficulty moving his leg from gas to break without pain  He also his having difficulty bending his knee when perform the stairs  He feels it is not the exercises that irritated the knee  Today he feels his pain is a 2/10, but when the pain flairs up his pain is a 8/10  He asked about the use of a TENS unit he has at home  Objective: See treatment diary below    Patient educated about the use of TENS on the knee and it is allowable  Assessment: Tolerated treatment well  Patient would benefit from continued PT for stretching, strengthening, and pain relief  Patient was able to perform his exercises in a pain free range with little adjustment with hip abduction  Patient received US during session which felt "good" in medial right knee area  Patient felt "ok" when he left department  Plan: Continue per plan of care  Progress treatment as tolerated         Re-Evaluation:  PRECAUTIONS:CARDIAC  Knee Specialty Daily Treatment Diary   [email or web address]   Updated AD 3/23      Manual Therapy 3/30 4/4  3/23 3/28   MFR/STM/ IASTM        Hamstring stretch        Prone Quad stretch    4x:30 ( pillow under hips) HOLD   Patellar mobs    x10 all directions w/ home instruction                Ther Ex 3/30 4/4  3/23 3/28   Nu Step S=8 L2 x10 min L2 x16 min  L3 x 10 mins L2 x10 min   Biodex Bike S=        Heel slides flex/abd    ABD x15    Ball squeeze Sit :05 x10 Supine 05x10  x15 :05    Standing lumbar flexion     x10 pain at R ant/med knee   Prone knee flex        D K->C x10 x10  1x10  2x10 ( end) 3x10   Prone press ups        Prone on elbows        SLR all planes    x10 ea flex/ext(pillow under hip) DC EXT   SAQ        LAQ :05x10 :05x10  x10 *   Hamstring stretch :30 x5 B/L :30x3 B/L  3x:30 B/L 5x:30   Calf stretch :30x3 :30x3   4x:30   soleus stretch :30x3 :30x3      Standing hamstring curls Lean on counter       Hip abduction  *red :05x20 Red :05x10      Mini Squat                TKE        Total gym squats (deep)        Neuro Re-ed        Gluteal set    reviewed    QS    reviewed x15 :05   POSTURE EDUCATION / HEP program instruction     DONE AD x 8 mins   Sciatic N glides        Bridges        Fitter board        Eccentric Leg press        Clam Shells    x15    HIP EXT+ABD combo    x10 p!p! DC DC   [place]        GAIT Training        sidestepping        Heel-toe amb    10ft x 6    Mirror walking        Ther Act        Amb up/down steps        Chair squats        Crate carry        Step ups fwd/lat    NV        Modalities  4/4      CP  KNEE        US 1 4w/cm2  *x8 min                Access Code: [email or web address]  URL: [email or web address]  Date: 03/30/2022  Prepared by: Jose Alberto Florentino    Exercises  · Supine Quadricep Sets - 2 x daily - 6 x weekly - 2 sets - 10 reps - 5 hold  · Seated Hamstring Stretch - 2 x daily - 6 x weekly - 1 sets - 4 reps - 30 hold  · Supine Double Knee to Chest - 8 x daily - 7 x weekly - 1 sets - 10 reps  · Long Sitting 4 Way Patellar [place] - 1 x daily - 7 x weekly - 1 sets - 10 reps - 3 hold  · Seated Hip Adduction Isometrics with Ball - 2 x daily - 7 x weekly - 1 sets - 10 reps - 5 sec hold  · Seated Hip Abduction - 2 x daily - 7 x weekly - 1 sets - 10 reps - 5 sec hold  · Seated Calf Stretch with Strap - 2 x daily - 7 x weekly - 1 sets - 3 reps - 30 sec hold  · Seated Soleus Stretch with Strap - 2 x daily - 7 x weekly - 1 sets - 3 reps - 30 sec hold

## 2022-04-05 ENCOUNTER — RA CDI HCC (OUTPATIENT)
Dept: OTHER | Facility: HOSPITAL | Age: 72
End: 2022-04-05

## 2022-04-05 NOTE — PROGRESS NOTES
[address]  coding opportunities       Chart reviewed, no opportunity found: CHART REVIEWED, NO OPPORTUNITY FOUND        Patients Insurance     Medicare Insurance: Medicare

## 2022-04-06 ENCOUNTER — OFFICE VISIT (OUTPATIENT)
Dept: PHYSICAL THERAPY | Facility: CLINIC | Age: 72
End: 2022-04-06
Payer: MEDICARE

## 2022-04-06 DIAGNOSIS — M62.89 HAMSTRING TIGHTNESS OF RIGHT LOWER EXTREMITY: ICD-10-CM

## 2022-04-06 DIAGNOSIS — M25.561 RIGHT KNEE PAIN, UNSPECIFIED CHRONICITY: ICD-10-CM

## 2022-04-06 DIAGNOSIS — S83.241D TEAR OF MEDIAL MENISCUS OF RIGHT KNEE, CURRENT, UNSPECIFIED TEAR TYPE, SUBSEQUENT ENCOUNTER: Primary | ICD-10-CM

## 2022-04-06 PROCEDURE — 97110 THERAPEUTIC EXERCISES: CPT | Performed by: PHYSICAL THERAPIST

## 2022-04-06 PROCEDURE — 97035 APP MDLTY 1+ULTRASOUND EA 15: CPT | Performed by: PHYSICAL THERAPIST

## 2022-04-06 NOTE — PROGRESS NOTES
Daily Note     Today's date: 2022  Patient name: Dillan Sprague  : 1950  MRN: [id number]  Referring provider: Talia Olmstead DO  Dx:   Encounter Diagnosis     ICD-10-CM    1  Tear of medial meniscus of right knee, current, unspecified tear type, subsequent encounter  S83 123D    2  Hamstring tightness of right lower extremity  M62 89    3  Right knee pain, unspecified chronicity  M25 561                   Subjective: The patient feels better today - moving right foot gas to brake is better  He still has difficulty descending the steps - goes sideways  The patient notes feeling ache and jabs of pain  The patient notes feeling a 2/10 pain at the medial knee and patella area  Objective: See treatment diary below      Assessment: The patient performed all exercises sitting in the chair today to avoid any extension at his low back  The patient tolerated the treatment well with minimal complaints of pain increase at the R knee joint  The patient will be re-assessed next session to determine the progress over the past 4 weeks  Plan: Continue per plan of care  Progress note during next visit  Progress treatment as tolerated         Re-Evaluation:  PRECAUTIONS:CARDIAC  Knee Specialty Daily Treatment Diary   [email or web address]   Updated AD 3/23      Manual Therapy 3/30 4/4 4/6  3/28   MFR/STM/ IASTM        Hamstring stretch        Prone Quad stretch     HOLD   Patellar mobs   Done x10 all                 Ther Ex 3/30 4/4 4/6  3/28   Nu Step S=8 L2 x10 min L2 x16 min L3 x 8 mins  L2 x10 min   Biodex Bike S=        Heel slides flex/abd        Ball squeeze Sit :05 x10 Supine 05x10 Sit ball x20 :05     Standing lumbar flexion     x10 pain at R ant/med knee   Prone knee flex        D K->C x10 x10   3x10   Sit repeated flexion   x10             Sit hip flexion     DC EXT           LAQ :05x10 :05x10 2# 2x10  *   Hamstring stretch :30 x5 B/L :30x3 B/L 4x:30  5x:30   Calf stretch :30x3 :30x3   4x:30 soleus stretch :30x3 :30x3      Band Ham curls   Blue 2x10     Hip abduction  *red :05x20 Red :05x10 Blue 2x10     Mini Squat        Chair squat w/ table in front for support   x12     TKE        Total gym squats (deep)        Neuro Re-ed        Gluteal set        QS     x15 :05   POSTURE EDUCATION / HEP program instruction     DONE AD x 8 mins   Sciatic N glides        Bridges        Fitter board        Eccentric Leg press        Clam Shells        HIP EXT+ABD combo     DC   [place]        GAIT Training        sidestepping        Heel-toe amb        Mirror walking        Ther Act        Amb up/down steps        Chair squats        Crate carry        Step ups fwd/lat            Modalities  4/4 4/6     CP  KNEE        US 1 4w/cm2  *x8 min x8 mins               Access Code: [email or web address]  URL: [email or web address]  Date: 03/30/2022  Prepared by: Lowery Dance Frohnheiser    Exercises  · Supine Quadricep Sets - 2 x daily - 6 x weekly - 2 sets - 10 reps - 5 hold  · Seated Hamstring Stretch - 2 x daily - 6 x weekly - 1 sets - 4 reps - 30 hold  · Supine Double Knee to Chest - 8 x daily - 7 x weekly - 1 sets - 10 reps  · Long Sitting 4 Way Patellar [place] - 1 x daily - 7 x weekly - 1 sets - 10 reps - 3 hold  · Seated Hip Adduction Isometrics with Ball - 2 x daily - 7 x weekly - 1 sets - 10 reps - 5 sec hold  · Seated Hip Abduction - 2 x daily - 7 x weekly - 1 sets - 10 reps - 5 sec hold  · Seated Calf Stretch with Strap - 2 x daily - 7 x weekly - 1 sets - 3 reps - 30 sec hold  · Seated Soleus Stretch with Strap - 2 x daily - 7 x weekly - 1 sets - 3 reps - 30 sec hold

## 2022-04-09 NOTE — PROGRESS NOTES
PT Re-Evaluation     Today's date: 2022  Patient name: Bayron Hayes  : 1950  MRN: [id number]  Referring provider: Carol Ann Travis DO  Dx:   Encounter Diagnosis     ICD-10-CM    1  Tear of medial meniscus of right knee, current, unspecified tear type, subsequent encounter  S83 241D    2  Hamstring tightness of right lower extremity  M62 89    3  Right knee pain, unspecified chronicity  M25 561                   Assessment  Assessment details: Bayron Hayes is a 80 y o  male with a history of GERD, hyperlipidemia, DM, arthritis, L lung nodule, HTN, and BMI>30 that presents for a physical therapy RE evaluation  The patient has attended 9 sessions of skilled physical therapy  The patient demonstrates signs and symptoms consistent with right knee medial meniscus tear; R hamstring tightness, and right knee pain  During the examination the patient demonstrated improved but decreased R LE strength, improved but decreased R knee ROM, improved gait, and decreased R knee pain  The patient has made functional gains since starting therapy  The patient is now able to tolerate standing/walking/stair climbing without difficulty or R knee pain  The patient's symptoms are inconsistent and intermittent  The patient continues to have difficulty with stair climbing ( at times), squatting, kneeling, and walking on unlevel surfaces  The patient will benefit from continued skilled PT to address impairments, work towards goals, and restore patient PLOF  Impairments: abnormal gait, abnormal or restricted ROM, activity intolerance, impaired physical strength, lacks appropriate home exercise program, pain with function and poor posture   Functional limitations: difficulty with prolonged standing, prolonged walking, walking on unlevel surfaces, difficulty squatting/kneeling, difficulty stair-climbing, and difficulty transferring from low surfaces    Symptom irritability: moderateUnderstanding of Dx/Px/POC: good   Prognosis: good    Goals  STG: Achieve in 4-6 weeks  1  Patient's R knee pain at worst less than 2/10 to allow for proper gait  PROGRESSING 4/11  2  Patient's R knee ROM improve by 10-15 degrees to improve squatting  MET 4/11  3  R LE MMT improve to > 4+/5 all motions tested to improve ADL/recreational activities  PROGRESSING 4/11  4  30 second sit to stand score improve by 6 stands ( greater than 14 stands)to indicate improved transfers  PROGRESSING 4/11    LTG:  Achieve in 6-12 weeks  1  Patient's knee FOTO score improve to > 69% to indicate a return to normal functioning  PROGRESSING 4/11  2  Patient achieve personal goal of returning to walking 3 miles without R knee pain  PROGRESSING 4/11  3  Patient to achieve independence with home exercise plan  PROGRESSING 4/11      Plan  Plan details: RE-ASSESS 1X/MONTH  Patient would benefit from: skilled physical therapy  Planned modality interventions: TENS, cryotherapy, thermotherapy: hydrocollator packs and ultrasound  Other planned modality interventions: IAS  Planned therapy interventions: joint mobilization, manual therapy, neuromuscular re-education, patient education, postural training, strengthening, stretching, therapeutic activities, therapeutic exercise, home exercise program, abdominal trunk stabilization, balance, activity modification and gait training  Frequency: 1-3X/WK  Duration in weeks: 12  Plan of Care beginning date: 3/14/2022  Plan of Care expiration date: 6/13/2022  Treatment plan discussed with: PTA and patient        Subjective Evaluation    History of Present Illness  Date of onset: 2/18/2022  Mechanism of injury: SUBJECTIVE: 4/11/2022: The patient reports an improvement in  activity tolerance, R LE strength, and R knee ROM since starting therapy  The patient notes his symptoms are not consistent    There are times when he is able to ambulate up/down the steps without difficulty and then there are other times when this activity is very painful/difficult  The patient continues to have difficulty with squatting/kneeling, and stair climbing  The patient will benefit from continued PT focused on achieving patient specific goals  INJURY HISTORY: Margareth Kilgore is a 80 y o  male that presents to outpatient physical therapy with complaints of right posterior knee pain  The patient reports initial onset of pain felt like a pulled hamstring at the R posterior knee  The patient noted onset with descending the stairs  The patient saw orthopedics who ordered an MRI which revelealed a torn medial meniscus at the right knee  The patient wanted to try conservative therapy  The patient notes difficulty with prolonged walking, walking on unlevel surfaces, ambulating up/down the steps, and difficulty squatting kneeling  The patient's main goal for physical therapy is to get rid of the pain with walking for exercise/leisure  Pain  Current pain rating: 3  At best pain ratin  At worst pain ratin  Location: R medial/anterior knee joint line  Quality: dull ache and sharp  Relieving factors: rest  Aggravating factors: stair climbing, standing, walking and lifting  Progression: improved    Social Support  Steps to enter house: yes  Stairs in house: yes   Lives in: multiple-level home  Lives with: spouse    Employment status: working (drive school bus)  Hand dominance: right      Diagnostic Tests  MRI studies: abnormal  Treatments  Current treatment: physical therapy  Patient Goals  Patient goals for therapy: decreased pain, improved balance, increased motion, increased strength, independence with ADLs/IADLs and return to sport/leisure activities  Patient goal: walk for leisure/exercise ( progressing)        Objective     Palpation     Additional Palpation Details  NO TTP    Tenderness     Right Knee   No tenderness in the inferior patella       Neurological Testing     Sensation     Knee   Left Knee   Intact: light touch    Right Knee   Intact: light touch     Active Range of Motion   Left Knee   Flexion: 135 degrees   Extension: 6 degrees     Right Knee   Flexion: 130 degrees   Extension: 2 degrees with pain    Additional Active Range of Motion Details  IMPROVEMENT by 10 deg of flexion - no change extension    Passive Range of Motion   Left Knee   Normal passive range of motion    Right Knee   Flexion: 131 degrees   Extension: 2 degrees with pain    Additional Passive Range of Motion Details  Improved flexion  -no change EXT    Mobility   Patellar Mobility:   Left Knee   WFL: medial, lateral, superior and inferior  Right Knee   WFL: medial, lateral, superior and inferior    Patellar Static Positioning   Left Knee: WFL  Right Knee: [facility]    Strength/Myotome Testing     Left Hip   Planes of Motion   Flexion: 4+  Extension: 4  Abduction: 4+  Adduction: 4+    Right Hip   Planes of Motion   Flexion: 4-  Extension: 4-  Abduction: 4-  Adduction: 4-    Left Knee   Flexion: 5  Extension: 5  Quadriceps contraction: good    Right Knee   Flexion: 4-  Extension: 4  Quadriceps contraction: fair    Additional Strength Details  IMPROVED  Improved    Tests     Additional Tests Details  Gait impairments: Patient ambulates with no AD WBAT B/L LE  The patient demonstrates improved but slow gait speed, R lateral trunk lean, less antalgic gait pattern w/ less pronounced R LE limp      FOTO: 57% ( predicted 69%) (improved 5%)    30SSTS: 13 stands    Swelling     Left Knee Girth Measurement (cm)   Joint line: 38 cm    Right Knee Girth Measurement (cm)   Joint line: 40 5 cm               Re-Evaluation:5/10  PRECAUTIONS:CARDIAC  Knee Specialty Daily Treatment Diary   [email or web address]       Manual Therapy 3/30 4/4 4/6 4/11 3/28   MFR/STM/ IASTM        Hamstring stretch        Prone Quad stretch     HOLD   Patellar mobs   Done x10 all                 Ther Ex 3/30 4/4 4/6 4/11 3/28   Nu Step S=8 L2 x10 min L2 x16 min L3 x 8 mins L3 x 10 mins L2 x10 min Biodex Bike S=        Heel slides flex/abd        Ball squeeze Sit :05 x10 Supine 05x10 Sit ball x20 :05 Sit ball x20 :05    Standing lumbar flexion    x10 -p!p! R post knee x10 pain at R ant/med knee   Prone knee flex        D K->C x10 x10  2x10 3x10   Sit repeated flexion   x10 2X10( better)            Sit hip flexion     DC EXT           LAQ :05x10 :05x10 2# 2x10 2# 2x10 *   Hamstring stretch :30 x5 B/L :30x3 B/L 4x:30 4x:30 5x:30   Calf stretch :30x3 :30x3   4x:30   soleus stretch :30x3 :30x3      Band Ham curls   Blue 2x10 Blue 2x10    Hip abduction  *red :05x20 Red :05x10 Blue 2x10 Blue x20    Mini Squat        Chair squat w/ table in front for support   x12 x13 p!     TKE        Total gym squats (deep)        Neuro Re-ed        Gluteal set        QS     x15 :05   POSTURE EDUCATION / HEP program instruction     DONE AD x 8 mins   Sciatic N glides        [employer] board        Eccentric Leg press        Clam Shells        HIP EXT+ABD combo     DC   [place]        GAIT Training        sidestepping        Heel-toe amb        Mirror walking        Ther Act        Amb up/down steps        Chair squats        Crate carry        Step ups fwd/lat            Modalities  4/4 4/6 4/11    CP  KNEE        US 1 4w/cm2  *x8 min x8 mins np              Access Code: [email or web address]

## 2022-04-11 ENCOUNTER — EVALUATION (OUTPATIENT)
Dept: PHYSICAL THERAPY | Facility: CLINIC | Age: 72
End: 2022-04-11
Payer: MEDICARE

## 2022-04-11 ENCOUNTER — OFFICE VISIT (OUTPATIENT)
Dept: FAMILY MEDICINE CLINIC | Facility: CLINIC | Age: 72
End: 2022-04-11
Payer: MEDICARE

## 2022-04-11 VITALS
TEMPERATURE: 98 F | HEART RATE: 102 BPM | RESPIRATION RATE: 20 BRPM | HEIGHT: 64 IN | OXYGEN SATURATION: 98 % | WEIGHT: 213.8 LBS | DIASTOLIC BLOOD PRESSURE: 80 MMHG | SYSTOLIC BLOOD PRESSURE: 132 MMHG | BODY MASS INDEX: 36.5 KG/M2

## 2022-04-11 DIAGNOSIS — E11.9 ENCOUNTER FOR DIABETIC FOOT EXAM (HCC): ICD-10-CM

## 2022-04-11 DIAGNOSIS — E66.01 CLASS 2 SEVERE OBESITY DUE TO EXCESS CALORIES WITH SERIOUS COMORBIDITY AND BODY MASS INDEX (BMI) OF 36.0 TO 36.9 IN ADULT (HCC): ICD-10-CM

## 2022-04-11 DIAGNOSIS — M25.561 RIGHT KNEE PAIN, UNSPECIFIED CHRONICITY: ICD-10-CM

## 2022-04-11 DIAGNOSIS — E78.00 HYPERCHOLESTEREMIA: ICD-10-CM

## 2022-04-11 DIAGNOSIS — M62.89 HAMSTRING TIGHTNESS OF RIGHT LOWER EXTREMITY: ICD-10-CM

## 2022-04-11 DIAGNOSIS — E11.9 TYPE 2 DIABETES MELLITUS WITHOUT COMPLICATION, WITHOUT LONG-TERM CURRENT USE OF INSULIN (HCC): Primary | ICD-10-CM

## 2022-04-11 DIAGNOSIS — R14.0 ABDOMINAL DISTENSION: ICD-10-CM

## 2022-04-11 DIAGNOSIS — S83.241D TEAR OF MEDIAL MENISCUS OF RIGHT KNEE, CURRENT, UNSPECIFIED TEAR TYPE, SUBSEQUENT ENCOUNTER: Primary | ICD-10-CM

## 2022-04-11 LAB — SL AMB POCT HEMOGLOBIN AIC: 7.6 (ref ?–6.5)

## 2022-04-11 PROCEDURE — 99214 OFFICE O/P EST MOD 30 MIN: CPT | Performed by: NURSE PRACTITIONER

## 2022-04-11 PROCEDURE — 83036 HEMOGLOBIN GLYCOSYLATED A1C: CPT | Performed by: NURSE PRACTITIONER

## 2022-04-11 PROCEDURE — 97110 THERAPEUTIC EXERCISES: CPT | Performed by: PHYSICAL THERAPIST

## 2022-04-11 RX ORDER — PHENTERMINE HYDROCHLORIDE 37.5 MG/1
37.5 TABLET ORAL DAILY
Qty: 30 TABLET | Refills: 2 | Status: SHIPPED | OUTPATIENT
Start: 2022-04-11

## 2022-04-11 NOTE — PATIENT INSTRUCTIONS
Ultrasound ordered  Continue Phentermine as directed  medcheck and AWV in August  Get labs done before visit  Call sooner for problems/concerns

## 2022-04-11 NOTE — PROGRESS NOTES
[address] [place] [address] Primary Care        NAME: Arpita Alvarez is a 80 y o  male  : 1950    MRN: [id number]  DATE: 2022  TIME: 10:37 AM    Assessment and Plan   Type 2 diabetes mellitus without complication, without long-term current use of insulin ([facility] ) [E11 9]  1  Type 2 diabetes mellitus without complication, without long-term current use of insulin ([facility])  POCT hemoglobin A1c    HEMOGLOBIN A1C W/ EAG ESTIMATION    Comprehensive metabolic panel    CBC and differential    Microalbumin / creatinine urine ratio   2  Hypercholesteremia  Lipid panel   3  Class 2 severe obesity due to excess calories with serious comorbidity and body mass index (BMI) of 36 0 to 36 9 in adult ([facility])  phentermine (ADIPEX-P) 37 5 MG tablet   4  Abdominal distension  US abdomen complete   5  Encounter for diabetic foot exam [facility])           Patient Instructions     Patient Instructions   Ultrasound ordered  Continue Phentermine as directed  mark anthony and EDE in August  Get labs done before visit  Call sooner for problems/concerns          Chief Complaint     Chief Complaint   Patient presents with    Follow-up         History of Present Illness       Here for medcheck- Phentermine  He reports he has lost about 10 pounds since starting Phentermine- has been off for about 1 week, would like to continue this  C/o abdominal distension and difficulty doing things because his stomach is in the way of his mobility  When he sits up he has Diastasis Recti- we discussed this  He also has an umbilical hernia that he reports is nontender    Hga1c increased from 7 2 to 7 6 today  He reports that he had thought he was supposed to be on 1 metformin but he should be on 2 so he started that 1 month ago      Review of Systems   Review of Systems   Constitutional: Negative for activity change, diaphoresis, fatigue and fever     HENT: Negative for congestion, facial swelling, hearing loss, rhinorrhea, sinus pressure, sinus pain, sneezing, sore throat and voice change  Eyes: Negative for discharge and visual disturbance  Respiratory: Negative for cough, choking, chest tightness, shortness of breath, wheezing and stridor  Cardiovascular: Negative for chest pain, palpitations and leg swelling  Gastrointestinal: Positive for abdominal distention  Negative for abdominal pain, constipation, diarrhea, nausea and vomiting  Endocrine: Negative for polydipsia, polyphagia and polyuria  Genitourinary: Negative for difficulty urinating, dysuria, frequency and urgency  Musculoskeletal: Negative for arthralgias, back pain, gait problem, joint swelling, myalgias, neck pain and neck stiffness  Skin: Negative for color change, rash and wound  Neurological: Negative for dizziness, syncope, speech difficulty, weakness, light-headedness and headaches  Hematological: Negative for adenopathy  Does not bruise/bleed easily  Psychiatric/Behavioral: Negative for agitation, behavioral problems, confusion, hallucinations, sleep disturbance and suicidal ideas  The patient is not nervous/anxious            Current Medications       Current Outpatient Medications:     metFORMIN (GLUCOPHAGE) 500 mg tablet, Take 1 tablet (500 mg total) by mouth 2 (two) times a day with meals, Disp: 180 tablet, Rfl: 1    phentermine (ADIPEX-P) 37 5 MG tablet, Take 1 tablet (37 5 mg total) by mouth daily, Disp: 30 tablet, Rfl: 2    simvastatin (ZOCOR) 20 mg tablet, Take 1 tablet (20 mg total) by mouth daily at bedtime, Disp: 90 tablet, Rfl: 3    tamsulosin (FLOMAX) 0 4 mg, Take 1 capsule (0 4 mg total) by mouth daily with dinner, Disp: 30 capsule, Rfl: 1    Current Allergies     Allergies as of 04/11/2022    (No Known Allergies)            The following portions of the patient's history were reviewed and updated as appropriate: allergies, current medications, past family history, past medical history, past social history, past surgical history and problem list      Past Medical History:   Diagnosis Date    Arthritis     [facility] )     Dyslipidemia     GERD (gastroesophageal reflux disease)     Hyperlipidemia     Nodule of left lung 2007    negative for CA    Type 2 [facility] mellitus without complication[facility] long[facility]term [facility] 1[facility]       Past Surgical History:   Procedure Laterality Date    COLONOSCOPY      ELBOW SURGERY Bilateral     FOOT TENDON SURGERY      heel    MD REVISE MEDIAN N/CARPAL TUNNEL SURG Left 6/30/2021    Procedure: RELEASE CARPAL TUNNEL;  Surgeon: Lida Cedeno DO;  Location: [address] [place] [address] MAIN OR;  Service: Orthopedics       Family History   Problem Relation Age of Onset    COPD Father         80 yrs   Adam Bones Heart disease Mother         hx MI   Adam Bones Diabetes Mother     Cancer Brother          Medications have been verified  Objective   /80   Pulse 102   Temp 98 °F (36 7 °C) (Tympanic)   Resp 20   Ht 5' 4 25" (1 632 m)   Wt 97 kg (213 lb 12 8 oz)   SpO2 98%   BMI 36 41 kg/m²        Physical Exam     Physical Exam  Vitals and nursing note reviewed  Constitutional:       General: He is not in acute distress  Appearance: He is well-developed  He is not diaphoretic  Neck:      Thyroid: No thyromegaly  Trachea: No tracheal deviation  Cardiovascular:      Rate and Rhythm: Normal rate and regular rhythm  Pulses: no weak pulses          Dorsalis pedis pulses are 2+ on the right side and 2+ on the left side  Heart sounds: Normal heart sounds  No murmur heard  Pulmonary:      Effort: Pulmonary effort is normal  No respiratory distress  Breath sounds: Normal breath sounds  No wheezing  Abdominal:      General: There is distension  Palpations: There is no mass  Tenderness: There is no abdominal tenderness  There is no guarding  Hernia: A hernia (umbilical) is present  Comments: Diastasis recti noted   Musculoskeletal:         General: No tenderness or deformity  Normal range of motion  Cervical back: Normal range of motion and neck supple  Right lower leg: No edema  Left lower leg: No edema  Feet:      Right foot:      Skin integrity: No ulcer, skin breakdown, erythema, warmth, callus or dry skin  Left foot:      Skin integrity: No ulcer, skin breakdown, erythema, warmth, callus or dry skin  Skin:     General: Skin is warm and dry  Neurological:      Mental Status: He is alert and oriented to person, place, and time  Psychiatric:         Mood and Affect: Mood normal          Speech: Speech normal          Behavior: Behavior normal          Thought Content: Thought content normal          Judgment: Judgment normal        Patient's shoes and socks removed  Right Foot/Ankle   Right Foot Inspection  Skin Exam: skin normal and skin intact  No dry skin, no warmth, no callus, no erythema, no maceration, no abnormal color, no pre-ulcer, no ulcer and no callus  Toe Exam: ROM and strength within normal limits  Sensory   Monofilament testing: intact    Vascular  Capillary refills: < 3 seconds  The right DP pulse is 2+  Left Foot/Ankle  Left Foot Inspection  Skin Exam: skin normal and skin intact  No dry skin, no warmth, no erythema, no maceration, normal color, no pre-ulcer, no ulcer and no callus  Toe Exam: ROM and strength within normal limits  Sensory   Monofilament testing: intact    Vascular  Capillary refills: < 3 seconds  The left DP pulse is 2+       Assign Risk Category  No deformity present  No loss of protective sensation  No weak pulses  Risk: 0

## 2022-04-12 ENCOUNTER — HOSPITAL ENCOUNTER (OUTPATIENT)
Dept: ULTRASOUND IMAGING | Facility: HOSPITAL | Age: 72
Discharge: HOME | End: 2022-04-12
Payer: MEDICARE

## 2022-04-12 DIAGNOSIS — R14.0 ABDOMINAL DISTENSION: ICD-10-CM

## 2022-04-12 PROCEDURE — 76700 US EXAM ABDOM COMPLETE: CPT

## 2022-04-13 ENCOUNTER — TELEPHONE (OUTPATIENT)
Dept: FAMILY MEDICINE CLINIC | Facility: CLINIC | Age: 72
End: 2022-04-13

## 2022-04-13 ENCOUNTER — OFFICE VISIT (OUTPATIENT)
Dept: PHYSICAL THERAPY | Facility: CLINIC | Age: 72
End: 2022-04-13
Payer: MEDICARE

## 2022-04-13 DIAGNOSIS — M25.561 RIGHT KNEE PAIN, UNSPECIFIED CHRONICITY: ICD-10-CM

## 2022-04-13 DIAGNOSIS — S83.241D TEAR OF MEDIAL MENISCUS OF RIGHT KNEE, CURRENT, UNSPECIFIED TEAR TYPE, SUBSEQUENT ENCOUNTER: Primary | ICD-10-CM

## 2022-04-13 DIAGNOSIS — M62.89 HAMSTRING TIGHTNESS OF RIGHT LOWER EXTREMITY: ICD-10-CM

## 2022-04-13 PROCEDURE — 97110 THERAPEUTIC EXERCISES: CPT

## 2022-04-13 PROCEDURE — 97112 NEUROMUSCULAR REEDUCATION: CPT

## 2022-04-13 NOTE — TELEPHONE ENCOUNTER
Patients Phentermine was denied on covermymeds due to the fact that it is a weight loss medication, which is not covered under Medicare Part D

## 2022-04-13 NOTE — TELEPHONE ENCOUNTER
Patient stopped into the office to Drop off Form to be filled out, Scanned and gave to MA in the back to put in PCP's folder   Call patient when finished [phone]

## 2022-04-13 NOTE — PROGRESS NOTES
Daily Note     Today's date: 2022  Patient name: Destin Marin  : 1950  MRN: [id number]  Referring provider: Oj Salamanca DO  Dx:   Encounter Diagnosis     ICD-10-CM    1  Tear of medial meniscus of right knee, current, unspecified tear type, subsequent encounter  S83 119D    2  Hamstring tightness of right lower extremity  M62 89    3  Right knee pain, unspecified chronicity  M25 561        Start Time: 0900          Subjective: Patient states he has coming and going tightness in medial hamstring head at posterior knee  He feels it comes and goes and he has it today  He feels overall the knee is better, but he is not yet where he wants to be  He felt his knee was very tight yesterday  He reports always having discomfort along joint line  Objective: See treatment diary below    Today patient's knee is visible seen to be swollen  Assessment: Tolerated treatment well  Patient would benefit from continued PT for stretching and strengthening  Patient was able to increase and add exercises with little difficulty and no increase of pain  He felt the hamstring tightness disappeared with exercises, but he still had joint line discomfort (mainly in medial right knee)  Plan: Continue per plan of care  Progress treatment as tolerated  Re-Evaluation:5/10  PRECAUTIONS:CARDIAC  Knee Specialty Daily Treatment Diary   [email or web address]       Manual Therapy 3/30 4/4 4/6 4/11 4/13   MFR/STM/ IASTM        Hamstring stretch        Prone Quad stretch        Patellar mobs   Done x10 all                 Ther Ex 3/30 4/4 4/6 4/11 4/13   Nu Step S=8 L2 x10 min L2 x16 min L3 x 8 mins L3 x 10 mins L2 x15 min   Biodex Bike S=        Heel slides flex/abd        Ball squeeze Sit :05 x10 Supine 05x10 Sit ball x20 :05 Sit ball x20 :05 Sit :05x20   Standing lumbar flexion    x10 -p!p!  R post knee    Prone knee flex        D K->C x10 x10  2x10 2x10   Sit repeated flexion   x10 2X10( better) x10  x10 w/ OP Sit hip flexion                LAQ :05x10 :05x10 2# 2x10 2# 2x10 2# x25    Hamstring stretch :30 x5 B/L :30x3 B/L 4x:30 4x:30 4x ;30   Calf stretch :30x3 :30x3      soleus stretch :30x3 :30x3      Band Ham curls   Blue 2x10 Blue 2x10 Blue x20   Hip abduction  *red :05x20 Red :05x10 Blue 2x10 Blue x20 Blue x20   Mini Squat        Chair squat w/ table in front for support   x12 x13 p!     TKE        Total gym squats (deep)        Neuro Re-ed        Gluteal set        QS        POSTURE EDUCATION / HEP program instruction        Sciatic N glides        Bridges        Fitter board        Eccentric Leg press     * s=6 35# x20   Clam Shells        HIP EXT+ABD combo        Phenix        GAIT Training        sidestepping        Heel-toe amb        Mirror walking        Ther Act        Amb up/down steps        Chair squats        Crate carry        Step ups fwd/lat     *6" x10 R       Modalities  4/4 4/6 4/11 4/13   CP  KNEE        US 1 4w/cm2  *x8 min x8 mins np              Access Code: [email or web address]

## 2022-04-14 NOTE — TELEPHONE ENCOUNTER
He filled Phentermine before, this was a refill  Did he pay cash, [facility], or something else? I'm assuming he will be paying for it however he did it previously

## 2022-04-15 ENCOUNTER — TELEPHONE (OUTPATIENT)
Dept: FAMILY MEDICINE CLINIC | Facility: CLINIC | Age: 72
End: 2022-04-15

## 2022-04-15 NOTE — TELEPHONE ENCOUNTER
Patient stopped by the office to  his paperwork and wanted to ask a question, patients script sent to [facility] for Phentermine ( Adipex-P) 37 5mg takes 1 tab daily by mouth, needed Prior Auth and what is the status of that?

## 2022-04-18 ENCOUNTER — OFFICE VISIT (OUTPATIENT)
Dept: OBGYN CLINIC | Facility: CLINIC | Age: 72
End: 2022-04-18
Payer: MEDICARE

## 2022-04-18 ENCOUNTER — OFFICE VISIT (OUTPATIENT)
Dept: PHYSICAL THERAPY | Facility: CLINIC | Age: 72
End: 2022-04-18
Payer: MEDICARE

## 2022-04-18 VITALS
BODY MASS INDEX: 36.41 KG/M2 | DIASTOLIC BLOOD PRESSURE: 79 MMHG | HEART RATE: 105 BPM | SYSTOLIC BLOOD PRESSURE: 139 MMHG | HEIGHT: 64 IN

## 2022-04-18 DIAGNOSIS — M25.561 RIGHT KNEE PAIN, UNSPECIFIED CHRONICITY: ICD-10-CM

## 2022-04-18 DIAGNOSIS — S83.241D TEAR OF MEDIAL MENISCUS OF RIGHT KNEE, CURRENT, UNSPECIFIED TEAR TYPE, SUBSEQUENT ENCOUNTER: Primary | ICD-10-CM

## 2022-04-18 DIAGNOSIS — M62.89 HAMSTRING TIGHTNESS OF RIGHT LOWER EXTREMITY: ICD-10-CM

## 2022-04-18 DIAGNOSIS — S83.241D OTHER TEAR OF MEDIAL MENISCUS OF RIGHT KNEE AS CURRENT INJURY, SUBSEQUENT ENCOUNTER: Primary | ICD-10-CM

## 2022-04-18 DIAGNOSIS — R14.0 ABDOMINAL DISTENTION: Primary | ICD-10-CM

## 2022-04-18 PROCEDURE — 97110 THERAPEUTIC EXERCISES: CPT

## 2022-04-18 PROCEDURE — 97112 NEUROMUSCULAR REEDUCATION: CPT

## 2022-04-18 PROCEDURE — 99213 OFFICE O/P EST LOW 20 MIN: CPT | Performed by: PHYSICIAN ASSISTANT

## 2022-04-18 PROCEDURE — 20610 DRAIN/INJ JOINT/BURSA W/O US: CPT | Performed by: PHYSICIAN ASSISTANT

## 2022-04-18 RX ORDER — BUPIVACAINE HYDROCHLORIDE 2.5 MG/ML
4 INJECTION, SOLUTION INFILTRATION; PERINEURAL
Status: COMPLETED | OUTPATIENT
Start: 2022-04-18 | End: 2022-04-18

## 2022-04-18 RX ORDER — TRIAMCINOLONE ACETONIDE 40 MG/ML
80 INJECTION, SUSPENSION INTRA-ARTICULAR; INTRAMUSCULAR
Status: COMPLETED | OUTPATIENT
Start: 2022-04-18 | End: 2022-04-18

## 2022-04-18 RX ADMIN — BUPIVACAINE HYDROCHLORIDE 4 ML: 2.5 INJECTION, SOLUTION INFILTRATION; PERINEURAL at 08:37

## 2022-04-18 RX ADMIN — TRIAMCINOLONE ACETONIDE 80 MG: 40 INJECTION, SUSPENSION INTRA-ARTICULAR; INTRAMUSCULAR at 08:37

## 2022-04-18 NOTE — PROGRESS NOTES
ASSESSMENT/PLAN:    Diagnoses and all orders for this visit:    Other tear of medial meniscus of right knee as current injury, subsequent encounter    Other orders  -     Large joint arthrocentesis        The patient is still symptomatic from his meniscal tear  Possible treatment options were again discussed with the patient  He would like to continue physical therapy  His right knee was aspirated for 23 cc of clear synovial fluid and then injected with Kenalog and Marcaine  He tolerated the procedure quite well  Follow up with our office in 6 weeks  The patient is acceptable to this plan  Return in about 6 weeks (around 5/30/2022)  Under aseptic technique, the right knee was aspirated of 23 cc of clear synovial fluid  It was injected with Kenalog and Marcaine  The patient is not interested in any surgery  Wants to try physical therapy for a longer period of time  There is still persistent medial joint tenderness with a positive Juany's  Return back in 6 weeks re-evaluation  If his condition changes, he will not hesitate to let us know    _____________________________________________________  CHIEF COMPLAINT:  Chief Complaint   Patient presents with    Right Knee - Follow-up         SUBJECTIVE:  Geraldo Baldwin is a 80 y o  male who presents to our office for a follow-up visit  The patient has a known meniscal tear of his right knee  Last visit, treatment options were discussed with the patient and he elected for physical therapy  He states that his knee pain has somewhat improved, but he is still having pain  He also complains of knee swelling as well  He denies any numbness or tingling  He denies any fever or chills        The following portions of the patient's history were reviewed and updated as appropriate: allergies, current medications, past family history, past medical history, past social history, past surgical history and problem list     PAST MEDICAL HISTORY:  Past Medical History:   Diagnosis Date    Arthritis     [facility]     Dyslipidemia     GERD (gastroesophageal reflux disease)     Hyperlipidemia     Nodule of left lung 2007    negative for CA    Type 2 diabetes mellitus without complication, without long-term current use of insulin ([facility] ) 1/23/2019       PAST SURGICAL HISTORY:  Past Surgical History:   Procedure Laterality Date    COLONOSCOPY      ELBOW SURGERY Bilateral     FOOT TENDON SURGERY      heel    OR REVISE MEDIAN N/CARPAL TUNNEL SURG Left 6/30/2021    Procedure: RELEASE CARPAL TUNNEL;  Surgeon: Ar Andre DO;  Location: [facility] MAIN OR;  Service: Orthopedics       FAMILY HISTORY:  Family History   Problem Relation Age of Onset    COPD Father         80 yrs   Roxane Splinter Heart disease Mother         hx MI   Roxane Splinter Diabetes Mother     Cancer Brother        SOCIAL HISTORY:  Social History     Tobacco Use    Smoking status: Former Smoker    Smokeless tobacco: Never Used   Vaping Use    Vaping Use: Never used   Substance Use Topics    Alcohol use: Yes     Comment: occ    Drug use: No       MEDICATIONS:    Current Outpatient Medications:     metFORMIN (GLUCOPHAGE) 500 mg tablet, Take 1 tablet (500 mg total) by mouth 2 (two) times a day with meals, Disp: 180 tablet, Rfl: 1    phentermine (ADIPEX-P) 37 5 MG tablet, Take 1 tablet (37 5 mg total) by mouth daily, Disp: 30 tablet, Rfl: 2    simvastatin (ZOCOR) 20 mg tablet, Take 1 tablet (20 mg total) by mouth daily at bedtime, Disp: 90 tablet, Rfl: 3    tamsulosin (FLOMAX) 0 4 mg, Take 1 capsule (0 4 mg total) by mouth daily with dinner, Disp: 30 capsule, Rfl: 1    ALLERGIES:  No Known Allergies    ROS:  Review of Systems     Constitutional: Negative for fatigue, fever or loss of appetite  HENT: Negative  Respiratory: Negative for shortness of breath, dyspnea  Cardiovascular: Negative for chest pain/tightness  Gastrointestinal: Negative for abdominal pain, N/V     Endocrine: Negative for cold/heat intolerance, unexplained weight loss/gain  Genitourinary: Negative for flank pain, dysuria, hematuria  Musculoskeletal: Positive for arthralgia   Skin: Negative for rash  Neurological: Negative for numbness or tingling  Psychiatric/Behavioral: Negative for agitation  _____________________________________________________  PHYSICAL EXAMINATION:    Blood pressure 139/79, pulse 105, height 5' 4 25" (1 632 m)  Constitutional: Oriented to person, place, and time  Appears well-developed and well-nourished  No distress  HENT:   Head: Normocephalic  Eyes: Conjunctivae are normal  Right eye exhibits no discharge  Left eye exhibits no discharge  No scleral icterus  Cardiovascular: Normal rate  Pulmonary/Chest: Effort normal    Neurological: Alert and oriented to person, place, and time  Skin: Skin is warm and dry  No rash noted  Not diaphoretic  No erythema  No pallor  Psychiatric: Normal mood and affect  Behavior is normal  Judgment and thought content normal       MUSCULOSKELETAL EXAMINATION:   Physical Exam  Ortho Exam    Right lower extremity is neurovascularly intact  Toes are pink and mobile  Compartments are soft  There is an effusion present  Range of motion of the knee is from 5-115 degrees  Brisk cap refill  Sensation intact  Positive Juany's  Medial joint line tenderness and slight lateral joint line tenderness  Objective:  BP Readings from Last 1 Encounters:   04/18/22 139/79      Wt Readings from Last 1 Encounters:   04/11/22 97 kg (213 lb 12 8 oz)        BMI:   Estimated body mass index is 36 41 kg/m² as calculated from the following:    Height as of this encounter: 5' 4 25" (1 632 m)  Weight as of 4/11/22: 97 kg (213 lb 12 8 oz)        PROCEDURES PERFORMED:  Large joint arthrocentesis: R knee  Universal Protocol:  Risks and benefits: risks, benefits and alternatives were discussed  Consent given by: patient  Patient understanding: patient states understanding of the procedure being performed  Site marked: the operative site was marked  Patient identity confirmed: verbally with patient    Supporting Documentation  Indications: pain   Procedure Details  Location: knee - R knee  Preparation: Patient was prepped and draped in the usual sterile fashion  Needle size: 18 G  Ultrasound guidance: no  Approach: lateral  Medications administered: 4 mL bupivacaine 0 25 %; 80 mg triamcinolone acetonide 40 mg/mL    Aspirate amount: 23 mL  Aspirate: clear and yellow    Patient tolerance: patient tolerated the procedure well with no immediate complications  Dressing:  Sterile dressing applied            Scribe Attestation    I,:  Anthony Bach PA-C am acting as a scribe while in the presence of the attending physician :       I,:  Ileana Jo DO personally performed the services described in this documentation    as scribed in my presence :

## 2022-04-18 NOTE — PROGRESS NOTES
Daily Note     Today's date: 2022  Patient name: Phill Redmond  : 1950  MRN: [id number]  Referring provider: Kalyn Rodriguez DO  Dx:   Encounter Diagnosis     ICD-10-CM    1  Tear of medial meniscus of right knee, current, unspecified tear type, subsequent encounter  S83 241D    2  Hamstring tightness of right lower extremity  M62 89    3  Right knee pain, unspecified chronicity  M25 561        Start Time:           Subjective: Patient just saw MD and he had 20 cc of fluid taken from his right knee  His knee feels better and is a 1/10 "achey"  He asked about the pain in medial right knee, he was told that was the side of the tear and that is why he is having the pain  He now feels better about the knee since the MD visit  Saturday he had a lot of pain and would have had the surgery  Today was better and he opted for just the fluid removal only at the MD         Objective: See treatment diary below      Assessment: Tolerated treatment well  Patient would benefit from continued PT for stretching and strengthening  Patient performed his exercises with little difficulty nd discomfort  He continues to become hamstring irritated if hamstring stretch is performed in standing  Patient still felt good when he left department  Encouraged patient to take it easy on the knee today since he just had it tapped  Plan: Continue per plan of care  Progress treatment as tolerated  Re-Evaluation:5/10  PRECAUTIONS:CARDIAC  Knee Specialty Daily Treatment Diary   [email or web address]       Manual Therapy    MFR/STM/ IASTM        Hamstring stretch        Prone Quad stretch        Patellar mobs   Done x10 all                 Ther Ex    Nu Step S=8 L2 x11 min  L3 x 8 mins L3 x 10 mins L2 x15 min   Biodex Bike S=        Heel slides flex/abd        Ball squeeze Sit :05x20  Sit ball x20 :05 Sit ball x20 :05 Sit :05x20   Standing lumbar flexion    x10 -p!p!  R post knee    Prone knee flex        D K->C 2x10   2x10 2x10   Sit repeated flexion x10  x10 w/ OP  x10 2X10( better) x10  x10 w/ OP           Sit hip flexion                LAQ 2# x25  2# 2x10 2# 2x10 2# x25    Hamstring stretch Stand :30x1 p! Sit :30x 3  4x:30 4x:30 4x :30   Calf stretch        soleus stretch        Band Ham curls Blue x20  Blue 2x10 Blue 2x10 Blue x20   Hip abduction Blue x20  Blue 2x10 Blue x20 Blue x20   Mini Squat        Chair squat w/ table in front for support   x12 x13 p!     TKE        Total gym squats (deep)        Neuro Re-ed        Gluteal set        QS        POSTURE EDUCATION / HEP program instruction        Sciatic N glides        Bridges        Fitter board        Eccentric Leg press s=6 35# x20    * s=6 35# x20   Clam Shells        HIP EXT+ABD combo        [place]        GAIT Training        sidestepping        Heel-toe amb        Mirror walking        Ther Act        Amb up/down steps        Chair squats        Crate carry        Step ups fwd/lat 6" x10 R    *6" x10 R       Modalities   4/6 4/11 4/13   CP  KNEE        US 1 4w/cm2   x8 mins np              Access Code: [email or web address]

## 2022-04-18 NOTE — TELEPHONE ENCOUNTER
Called and informed patient that the medication was not approved via prior auth, the patient will call the pharmacy and have the medication filled and use good rx to make the medication affordable,

## 2022-04-19 NOTE — TELEPHONE ENCOUNTER
Called and spoke with patient  Patient said he paid for it with [employer] and the cost was $11   Patient asked if there is anyway he could find out about a prescription drug plan that would cover the Phentermine since his insurance does not  Please advise

## 2022-04-20 ENCOUNTER — OFFICE VISIT (OUTPATIENT)
Dept: PHYSICAL THERAPY | Facility: CLINIC | Age: 72
End: 2022-04-20
Payer: MEDICARE

## 2022-04-20 DIAGNOSIS — S83.241D TEAR OF MEDIAL MENISCUS OF RIGHT KNEE, CURRENT, UNSPECIFIED TEAR TYPE, SUBSEQUENT ENCOUNTER: Primary | ICD-10-CM

## 2022-04-20 DIAGNOSIS — M62.89 HAMSTRING TIGHTNESS OF RIGHT LOWER EXTREMITY: ICD-10-CM

## 2022-04-20 DIAGNOSIS — M25.561 RIGHT KNEE PAIN, UNSPECIFIED CHRONICITY: ICD-10-CM

## 2022-04-20 PROCEDURE — 97110 THERAPEUTIC EXERCISES: CPT | Performed by: PHYSICAL THERAPIST

## 2022-04-20 PROCEDURE — 97112 NEUROMUSCULAR REEDUCATION: CPT | Performed by: PHYSICAL THERAPIST

## 2022-04-20 NOTE — PROGRESS NOTES
Daily Note     Today's date: 2022  Patient name: Phill Redmond  : 1950  MRN: [id number]  Referring provider: Kalyn Rodriguez DO  Dx:   Encounter Diagnosis     ICD-10-CM    1  Tear of medial meniscus of right knee, current, unspecified tear type, subsequent encounter  S84 372D    2  Hamstring tightness of right lower extremity  M62 89    3  Right knee pain, unspecified chronicity  M25 561                   Subjective: The patient notes no pain at the R knee since the doctor removed the fluid on Monday  The patient does not feel pressure at the joint or hamstring pain  Objective: See treatment diary below      Assessment: The patient tolerated all activities very well today  The patient was educated to avoid any pain provocation so that the swelling remains low  The patient did not have any complaints of increased pain or problems during/after the session today  The patient will benefit from continued skilled physical therapy to progress towards achieving patient centered goals  Plan: Continue per plan of care  Progress treatment as tolerated  Re-Evaluation:5/10  PRECAUTIONS:CARDIAC  Knee Specialty Daily Treatment Diary   [email or web address]       Manual Therapy    MFR/STM/ IASTM        Hamstring stretch        Prone Quad stretch        Patellar mobs                    Ther Ex    Nu Step S=8 L2 x11 min L2 x 10 mins  L3 x 10 mins L2 x15 min   Biodex Bike S=        Heel slides flex/abd        Ball squeeze Sit :05x20 x25  Sit ball x20 :05 Sit :05x20   Standing lumbar flexion    x10 -p!p! R post knee    Prone knee flex        D K->C 2x10 x15  2x10 2x10   Sit repeated flexion x10  x10 w/ OP   2X10( better) x10  x10 w/ OP           Sit hip flexion                LAQ 2# x25 3# x20  2# 2x10 2# x25    Hamstring stretch Stand :30x1 p!   Sit :30x 3 Sit 3x:30  4x:30 4x :30   Calf stretch  3x:30      soleus stretch        Band Ham curls Blue x20 Blue x20 Blue 2x10 Blue x20   Hip abduction Blue x20 Blue x25  Blue x20 [employer] x20   Mini Squat        Chair squat w/ table in front for support    x13 p!     TKE        Total gym squats (deep)        Neuro Re-ed        Gluteal set        QS        POSTURE EDUCATION / HEP program instruction        Sciatic N glides        Bridges        Fitter board        Eccentric Leg press s=6 35# x20 S=6 #35 x25   * s=6 35# x20   Clam Shells        HIP EXT+ABD combo        [place]        GAIT Training        sidestepping        Heel-toe amb        Mirror walking        Ther Act        Amb up/down steps        Chair squats        Crate carry        Step ups fwd/lat 6" x10 R 10x fwd/side R   *6" x10 R       Modalities    4/11 4/13   CP  KNEE        US 1 4w/cm2    np              Access Code: [email or web address]

## 2022-04-25 ENCOUNTER — OFFICE VISIT (OUTPATIENT)
Dept: PHYSICAL THERAPY | Facility: CLINIC | Age: 72
End: 2022-04-25
Payer: MEDICARE

## 2022-04-25 DIAGNOSIS — S83.241D TEAR OF MEDIAL MENISCUS OF RIGHT KNEE, CURRENT, UNSPECIFIED TEAR TYPE, SUBSEQUENT ENCOUNTER: Primary | ICD-10-CM

## 2022-04-25 DIAGNOSIS — M62.89 HAMSTRING TIGHTNESS OF RIGHT LOWER EXTREMITY: ICD-10-CM

## 2022-04-25 DIAGNOSIS — M25.561 RIGHT KNEE PAIN, UNSPECIFIED CHRONICITY: ICD-10-CM

## 2022-04-25 PROCEDURE — 97112 NEUROMUSCULAR REEDUCATION: CPT | Performed by: PHYSICAL THERAPIST

## 2022-04-25 PROCEDURE — 97110 THERAPEUTIC EXERCISES: CPT | Performed by: PHYSICAL THERAPIST

## 2022-04-25 NOTE — PROGRESS NOTES
Daily Note     Today's date: 2022  Patient name: Brandon Saldana  : 1950  MRN: [id number]  Referring provider: Rachel Cook DO  Dx:   Encounter Diagnosis     ICD-10-CM    1  Tear of medial meniscus of right knee, current, unspecified tear type, subsequent encounter  S83 825D    2  Hamstring tightness of right lower extremity  M62 89    3  Right knee pain, unspecified chronicity  M25 561                   Subjective: The patient states that he has been doing good since the fluid was drained off his knee  No complaints of pain upon entering the clinic today  Objective: See treatment diary below      Assessment: Tolerated treatment well with no increase in pain noted during session today  Patient exhibited good technique with therapeutic exercises and would benefit from continued PT  Plan: Continue per plan of care  Patient has questioned if he can be done with PT, to come in for one more visit to speak with primary therapist with possible D/C if he continues to do well without pain  Re-Evaluation:5/10  PRECAUTIONS:CARDIAC  Knee Specialty Daily Treatment Diary   [email or web address]       Manual Therapy    MFR/STM/ IASTM        Hamstring stretch        Prone Quad stretch        Patellar mobs                    Ther Ex    Nu Step S=8 L2 x11 min L2 x 10 mins L3 10 mins L3 x 10 mins L2 x15 min   Biodex Bike S=        Heel slides flex/abd        Ball squeeze Sit :05x20 x25 Sit x 25 Sit ball x20 :05 Sit :05x20   Standing lumbar flexion    x10 -p!p! R post knee    Prone knee flex        D K->C 2x10 x15 x15 2x10 2x10   Sit repeated flexion x10  x10 w/ OP   2X10( better) x10  x10 w/ OP           Sit hip flexion                LAQ 2# x25 3# x20 3# x25 2# 2x10 2# x25    Hamstring stretch Stand :30x1 p!   Sit :30x 3 Sit 3x:30 Sit 3 x 30" 4x:30 4x :30   Calf stretch  3x:30 3 x 30"     soleus stretch        Band Ham curls Blue x20 Blue x20 Blue x 20 Blue 2x10 Blue x20   Hip abduction Blue x20 Blue x25 Blue x 25 Blue x20 [employer] x20   Mini Squat        Chair squat w/ table in front for support    x13 p!     TKE        Total gym squats (deep)        Neuro Re-ed        Gluteal set        QS        POSTURE EDUCATION / HEP program instruction        Sciatic N glides        Bridges        Fitter board        Eccentric Leg press s=6 35# x20 S=6 #35 x25 S=6 35# x 25  * s=6 35# x20   Clam Shells        HIP EXT+ABD combo        [place]        GAIT Training        sidestepping        Heel-toe amb        Mirror walking        Ther Act        Amb up/down steps        Chair squats        Crate carry        Step ups fwd/lat 6" x10 R 10x fwd/side R 10x For/Side R  *6" x10 R       Modalities    4/11 4/13   CP  KNEE        US 1 4w/cm2    np              Access Code: [email or web address]

## 2022-04-27 ENCOUNTER — OFFICE VISIT (OUTPATIENT)
Dept: PHYSICAL THERAPY | Facility: CLINIC | Age: 72
End: 2022-04-27
Payer: MEDICARE

## 2022-04-27 DIAGNOSIS — S83.241D TEAR OF MEDIAL MENISCUS OF RIGHT KNEE, CURRENT, UNSPECIFIED TEAR TYPE, SUBSEQUENT ENCOUNTER: Primary | ICD-10-CM

## 2022-04-27 DIAGNOSIS — M62.89 HAMSTRING TIGHTNESS OF RIGHT LOWER EXTREMITY: ICD-10-CM

## 2022-04-27 DIAGNOSIS — M25.561 RIGHT KNEE PAIN, UNSPECIFIED CHRONICITY: ICD-10-CM

## 2022-04-27 PROCEDURE — 97112 NEUROMUSCULAR REEDUCATION: CPT | Performed by: PHYSICAL THERAPIST

## 2022-04-27 PROCEDURE — 97530 THERAPEUTIC ACTIVITIES: CPT | Performed by: PHYSICAL THERAPIST

## 2022-04-27 PROCEDURE — 97110 THERAPEUTIC EXERCISES: CPT | Performed by: PHYSICAL THERAPIST

## 2022-04-27 NOTE — PROGRESS NOTES
PT Re-Evaluation  and PT Discharge    Today's date: 2022  Patient name: Sabina Dupont  : 1950  MRN: [id number]  Referring provider: Yanci Lamar DO  Dx:   Encounter Diagnosis     ICD-10-CM    1  Tear of medial meniscus of right knee, current, unspecified tear type, subsequent encounter  S83 241D    2  Hamstring tightness of right lower extremity  M62 89    3  Right knee pain, unspecified chronicity  M25 561                   Assessment  Assessment details: Sabina Dupont is a 80 y o  male with a history of GERD, hyperlipidemia, DM, arthritis, L lung nodule, HTN, and BMI>30 that presents for a physical therapy RE evaluation/DISCHARGE  The patient has attended 14 sessions of skilled physical therapy  The patient demonstrates signs and symptoms consistent with right knee medial meniscus tear; R hamstring tightness, and right knee pain  During the examination the patient demonstrated improved R LE strength, improved but decreased R knee ROM, improved gait, and decreased R knee pain  The patient has made functional gains since starting therapy  The patient is now able to tolerate standing/walking/stair climbing without difficulty or R knee pain  The patient now only has difficulty with deep squatting, kneeling, and twisting motions  The patient feels he can continue at home with his home program   The patient will be discharged from formal PT per request           Symptom irritability: lowUnderstanding of Dx/Px/POC: good   Prognosis: good    Goals  STG: Achieve in 4-6 weeks  1  Patient's R knee pain at worst less than 2/10 to allow for proper gait  NOT MET   2  Patient's R knee ROM improve by 10-15 degrees to improve squatting  MET   3  R LE MMT improve to > 4+/5 all motions tested to improve ADL/recreational activities  PARTIALLY MET   4  30 second sit to stand score improve by 6 stands ( greater than 14 stands)to indicate improved transfers    MET     LTG:  Achieve in 6-12 weeks  1  Patient's knee FOTO score improve to > 69% to indicate a return to normal functioning  MET 4/27  2  Patient achieve personal goal of returning to walking 3 miles without R knee pain  NOT MET  3  Patient to achieve independence with home exercise plan  MET 4/27      Plan  Plan details: D/C PT TO AN INDEPENDENT [facility]  Treatment plan discussed with: PTA and patient        Subjective Evaluation    History of Present Illness  Date of onset: 2/18/2022  Mechanism of injury: SUBJECTIVE: 4/27/2022: The patient reports a significant reduction his R knee pain over the past 2-3 weeks  The patient feels he is ready for discharge to an independent HEP  The patient is able to stand, walk, and climb steps with less difficulty/pain at the R knee  The patient's main complaint is R medial knee joint line pain which he attributes to the meniscus tear  SUBJECTIVE: 4/11/2022: The patient reports an improvement in  activity tolerance, R LE strength, and R knee ROM since starting therapy  The patient notes his symptoms are not consistent  There are times when he is able to ambulate up/down the steps without difficulty and then there are other times when this activity is very painful/difficult  The patient continues to have difficulty with squatting/kneeling, and stair climbing  The patient will benefit from continued PT focused on achieving patient specific goals  INJURY HISTORY: Ita Ramirez is a 80 y o  male that presents to outpatient physical therapy with complaints of right posterior knee pain  The patient reports initial onset of pain felt like a pulled hamstring at the R posterior knee  The patient noted onset with descending the stairs  The patient saw orthopedics who ordered an MRI which revelealed a torn medial meniscus at the right knee  The patient wanted to try conservative therapy   The patient notes difficulty with prolonged walking, walking on unlevel surfaces, ambulating up/down the steps, and difficulty squatting kneeling  The patient's main goal for physical therapy is to get rid of the pain with walking for exercise/leisure  Pain  Current pain rating: 3  At best pain ratin  At worst pain ratin  Location: R medial/anterior knee joint line  Quality: dull ache and sharp  Relieving factors: rest  Aggravating factors: lifting  Progression: improved    Social Support  Steps to enter house: yes  Stairs in house: yes   Lives in: multiple-level home  Lives with: spouse    Employment status: working (drive school bus)  Hand dominance: right      Diagnostic Tests  MRI studies: abnormal  Treatments  Previous treatment: physical therapy  Patient Goals  Patient goal: walk for leisure/exercise ( NOT MET)        Objective     Palpation     Additional Palpation Details  NO TTP    Tenderness     Right Knee   No tenderness in the inferior patella  Neurological Testing     Sensation     Knee   Left Knee   Intact: light touch    Right Knee   Intact: light touch     Active Range of Motion   Left Knee   Flexion: 135 degrees   Extension: 6 degrees     Right Knee   Flexion: 130 degrees   Extension: 4 degrees with pain    Passive Range of Motion   Left Knee   Normal passive range of motion    Right Knee   Flexion: 131 degrees   Extension: 4 degrees     Mobility   Patellar Mobility:   Left Knee   WFL: medial, lateral, superior and inferior       Right Knee   WFL: medial, lateral, superior and inferior    Patellar Static Positioning   Left Knee: WFL  Right Knee: [facility]    Strength/Myotome Testing     Left Hip   Planes of Motion   Flexion: 4+  Extension: 4+  Abduction: 4+  Adduction: 4+    Right Hip   Planes of Motion   Flexion: 4+  Extension: 4+  Abduction: 4+  Adduction: 4+    Left Knee   Flexion: 5  Extension: 5  Quadriceps contraction: good    Right Knee   Flexion: 4+  Extension: 4+  Quadriceps contraction: good    Additional Strength Details  IMPROVED  Improved    Tests Additional Tests Details  Gait impairments: Patient ambulates with no AD WBAT B/L LE  The patient demonstrates improved gait speed, o lateral trunk lean, less antalgic gait pattern w/ less pronounced R LE limp  FOTO: 75% ( predicted 69%) (improved 18%)    30SSTS:  17 stands( improved 4 stands)    Swelling     Left Knee Girth Measurement (cm)   Joint line: 38 cm    Right Knee Girth Measurement (cm)   Joint line: 40 cm               Re-Evaluation:5/10  PRECAUTIONS:CARDIAC  Knee Specialty Daily Treatment Diary   [email or web address]     Manual Therapy 4/18 4/20 4/25 4/27 4/13   MFR/STM/ IASTM        Hamstring stretch        Prone Quad stretch        Patellar mobs                    Ther Ex 4/18 4/20 4/25 4/27    Nu Step S=8 L2 x11 min L2 x 10 mins L3 10 mins L3 x 15 mins    Biodex Bike S=        Heel slides flex/abd        Ball squeeze Sit :05x20 x25 Sit x 25     Standing lumbar flexion        Prone knee flex        D K->C 2x10 x15 x15 x15    Sit repeated flexion x10  x10 w/ OP               Sit hip flexion                LAQ 2# x25 3# x20 3# x25     Hamstring stretch Stand :30x1 p!   Sit :30x 3 Sit 3x:30 Sit 3 x 30"     Calf stretch  3x:30 3 x 30"     soleus stretch        Band Ham curls Blue x20 Blue x20 Blue x 20     Hip abduction Blue x20 Blue x25 Blue x 25     Mini Squat        Chair squat w/ table in front for support        TKE        Total gym squats (deep)        Neuro Re-ed        Gluteal set        QS        POSTURE EDUCATION / HEP program instruction    Reviewed: AD including movement precautions to avoid stressing the meniscus    Sciatic N glides        Bridges        Fitter board        Eccentric Leg press s=6 35# x20 S=6 #35 x25 S=6 35# x 25 S=6 42 5 #  x30    Clam Shells        HIP EXT+ABD combo        [place]        GAIT Training        sidestepping        Heel-toe amb        Mirror walking        Ther Act        Amb up/down steps    x3 4 steps SOS no HR    Chair squats    x17    Crate carry        Step ups fwd/lat 6" x10 R 10x fwd/side R 10x For/Side R         Modalities     4/13   CP  KNEE        US 1 4w/cm2                    Access Code: [email or web address]

## 2022-04-28 ENCOUNTER — OFFICE VISIT (OUTPATIENT)
Dept: GASTROENTEROLOGY | Facility: CLINIC | Age: 72
End: 2022-04-28
Payer: MEDICARE

## 2022-04-28 VITALS
OXYGEN SATURATION: 97 % | TEMPERATURE: 98.4 F | HEIGHT: 64 IN | HEART RATE: 111 BPM | DIASTOLIC BLOOD PRESSURE: 70 MMHG | SYSTOLIC BLOOD PRESSURE: 138 MMHG | BODY MASS INDEX: 36.33 KG/M2 | WEIGHT: 212.8 LBS | RESPIRATION RATE: 20 BRPM

## 2022-04-28 DIAGNOSIS — Z12.11 SCREENING FOR COLORECTAL CANCER: ICD-10-CM

## 2022-04-28 DIAGNOSIS — K21.9 GASTROESOPHAGEAL REFLUX DISEASE, UNSPECIFIED WHETHER ESOPHAGITIS PRESENT: ICD-10-CM

## 2022-04-28 DIAGNOSIS — M95.8 ACQUIRED ABDOMINAL WALL DEFECT: Primary | ICD-10-CM

## 2022-04-28 DIAGNOSIS — Z01.812 PRE-PROCEDURE LAB EXAM: ICD-10-CM

## 2022-04-28 DIAGNOSIS — Z12.12 SCREENING FOR COLORECTAL CANCER: ICD-10-CM

## 2022-04-28 DIAGNOSIS — K76.0 HEPATIC STEATOSIS: ICD-10-CM

## 2022-04-28 DIAGNOSIS — R14.0 ABDOMINAL DISTENTION: ICD-10-CM

## 2022-04-28 PROCEDURE — 99204 OFFICE O/P NEW MOD 45 MIN: CPT | Performed by: FAMILY MEDICINE

## 2022-04-28 NOTE — PROGRESS NOTES
[facility] - Outpatient Consultation  Dillan Sprague 80 y o  male MRN: [id number]  Encounter: [id number]          ASSESSMENT AND PLAN:      1  Abdominal distention  2  Acquired abdominal wall defect  Patient referred for abdominal distention, but with clear abdominal wall defect without definitive ventral herniation on exam today  Most suspicious for diastasis recti vs ventral hernia  Patient without any alarm or obstructive signs/symptoms  Ordered a CT A/P w/ and w/o contrast (IV and PO) to further differentiate diastasis recti vs true ventral hernia  Discussed that uncomplicated diastasis recti does not require surgical intervention, but placed a referral to plastic surgery to discuss possible elective repair at patient's request - If imaging were to reveal a true ventral hernia would then refer to general surgery  Discussed conservative treatment including weight loss through diet and exercise, physical therapy to strengthen to abdominal wall which patient has already been referred to by his PCP, and the use of an abdominal binder during physical activity  - Ambulatory Referral to Gastroenterology  - CT abdomen pelvis w wo contrast; Future  - Ambulatory Referral to Plastic Surgery; Future    3  Gastroesophageal reflux disease, unspecified whether esophagitis present  Patient with reflux, currently well controlled with the use of OTC antacids  Advised to continue with OTC antacids as needed, but if using the more often than 3x weekly should discuss a daily acid suppression medicine (PPI)  Discussed weight loss through diet and exercise, adhering to a GERD diet, and avoiding eating/drinking within 3 hours of lying down/bedtime  4  Hepatic steatosis  Incidentally noted on complete abdominal ultrasound on 04/12/2022 with mild liver enlargement with suggestion of patchy hepatic steatosis; no evidence of ascites   Hepatic function historically normal  Discussed treatment for fatty liver, most importantly being weight loss through low fat diet and exercise  Patient is already working with his PCP to lose weight, currently taking Adipex and participating in physical therapy  Will discuss need for US elastography vs BETANCOURT Fibrosure at follow-up  5  Screening for colorectal cancer  CRC screening colonoscopy up to date; Colonoscopy with [facility] on 08/03/2020 notable for 7 total polyps throughout the colon removed via hot biopsy forceps, with at least 1 of those polyps being a tubular adenoma  Recommended repeat x3 years, and recalls placed for patient with Roland Styles for June 2023  6  Pre-procedure lab exam  BMP to assess renal function required prior to CT w/ contrast    - Basic metabolic panel; Future    Will determine timing of follow-up after reviewing results of CT A/P      ______________________________________________________________________    HPI: Patient is a 80 y o  male with PMH significant for obesity, DM2, elevated BP, HLD, who presents today for a consultation regarding abdominal distention  Patient was kindly referred by his PCP  Patient states he first noticed abdominal distention 2 years ago  Patient sees a CDL provider for an annual physical who believed it to be a ventral hernia  Complete abdominal ultrasound on 04/12/2022 notable for mild liver enlargement with suggestion of patchy hepatic steatosis; no evidence of ascites  Denies abdominal pain, but describes abdominal discomfort especially with physical activity  Describes discomfort as pressure and occasional shortness of breath due to pressure under the diaphragm  Describes normal bowel movements, at least one formed brown stool daily without straining  Admits to rare constipation  Denies difficulty swallowing, early satiety, constipation, diarrhea, noticing blood in his stools/black tarry stools, unintentional weight loss       Patient also admits to heartburn and belching, take occasional TUMS with relief  Has previously been advised to try and lose weight and adhere to a GERD diet  States he is actively working with his PCP trying to loose weight - Taking Adipex and participating in physical therapy for recent meniscal tear  REVIEW OF SYSTEMS:    CONSTITUTIONAL: Denies any fever, chills, rigors, and weight loss  HEENT: No earache or tinnitus  Denies hearing loss or visual disturbances  CARDIOVASCULAR: No chest pain or palpitations  RESPIRATORY: Denies any cough, hemoptysis, shortness of breath or dyspnea on exertion  GASTROINTESTINAL: As noted in the History of Present Illness  GENITOURINARY: No problems with urination  Denies any hematuria or dysuria  NEUROLOGIC: No dizziness or vertigo, denies headaches  MUSCULOSKELETAL: Denies any muscle or joint pain  SKIN: Denies skin rashes or itching  ENDOCRINE: Denies excessive thirst  Denies intolerance to heat or cold  PSYCHOSOCIAL: Denies depression or anxiety  Denies any recent memory loss         Historical Information   Past Medical History:   Diagnosis Date    Arthritis     [facility]     Dyslipidemia     GERD (gastroesophageal reflux disease)     Hyperlipidemia     Nodule of left lung 2007    negative for CA    Type 2 diabetes mellitus without complication, without long-term current use of insulin ([facility] ) 1/23/2019     Past Surgical History:   Procedure Laterality Date    COLONOSCOPY      ELBOW SURGERY Bilateral     FOOT TENDON SURGERY      heel    ID REVISE MEDIAN N/CARPAL TUNNEL SURG Left 6/30/2021    Procedure: RELEASE CARPAL TUNNEL;  Surgeon: Lida Cedeno DO;  Location: [address] [place] [address] OR;  Service: Orthopedics     Social History   Social History     Substance and Sexual Activity   Alcohol Use Yes    Comment: occ     Social History     Substance and Sexual Activity   Drug Use No     Social History     Tobacco Use   Smoking Status Former Smoker   Smokeless Tobacco Never Used     Family History Problem Relation Age of Onset    COPD Father         80 yrs   Penny Sandoval Heart disease Mother         hx MI   Penny Mulmuriel Diabetes Mother     Cancer Brother        Meds/Allergies       Current Outpatient Medications:     metFORMIN (GLUCOPHAGE) 500 mg tablet    phentermine (ADIPEX-P) 37 5 MG tablet    simvastatin (ZOCOR) 20 mg tablet    tamsulosin (FLOMAX) 0 4 mg    No Known Allergies        Objective     Blood pressure 138/70, pulse (!) 111, temperature 98 4 °F (36 9 °C), temperature source Tympanic, resp  rate 20, height 5' 4 25" (1 632 m), weight 96 5 kg (212 lb 12 8 oz), SpO2 97 %  Body mass index is 36 24 kg/m²  PHYSICAL EXAM:      General Appearance:   Alert, cooperative, no distress   HEENT:   Normocephalic, atraumatic, anicteric  Neck:  Supple, symmetrical, trachea midline   Lungs:   Clear to auscultation bilaterally; no rales, rhonchi or wheezing; respirations unlabored    Heart[de-identified]   Regular rate and rhythm; no murmur, rub, or gallop  Abdomen:   +Observable and palpable midline separation of the abdominal wall, worsened with valsalva, without obvious palpable ventral herniation; Soft, non-tender, non-distended; normal bowel sounds; no masses, no organomegaly    Genitalia:   Deferred    Rectal:   Deferred    Extremities:  No cyanosis, clubbing or edema    Pulses:  2+ and symmetric    Skin:  No jaundice, rashes, or lesions    Lymph nodes:  No palpable cervical lymphadenopathy        Lab Results:   No visits with results within 1 Day(s) from this visit  Latest known visit with results is:   Office Visit on 04/11/2022   Component Date Value    Hemoglobin A1C 04/11/2022 7 6*         Radiology Results:   US abdomen complete    Result Date: 4/17/2022  Narrative: ABDOMEN ULTRASOUND, COMPLETE INDICATION:   R14 0: Abdominal distension (gaseous)  COMPARISON:  None TECHNIQUE:   Real-time ultrasound of the abdomen was performed with a curvilinear transducer with both volumetric sweeps and still imaging techniques  FINDINGS: PANCREAS:  Visualized portions of the pancreas are within normal limits  AORTA AND IVC:  Visualized portions are normal for patient age  LIVER: Size:  Enlarged  The liver measures 18 9 cm in the midclavicular line  Contour:  Surface contour is smooth  Parenchyma:  Mild heterogeneous parenchyma with regions of increased echogenicity  No liver mass identified  Limited imaging of the main portal vein shows it to be patent and hepatopetal  BILIARY: No gallbladder findings  No intrahepatic biliary dilatation  CBD measures 3 0 mm  No choledocholithiasis  KIDNEY: Right kidney measures 10 7 x 5 5 x 5 3  cm  Volume 164 1 mL Simple cyst measuring 18 1 mm  Left kidney measures 11 8 x 7 0 x 5 3 cm  Volume 229 3 mL Simple cyst measuring 24 2 mm  SPLEEN: Measures 10 3 x 11 1 x 3 6 cm  Volume 212 9 mL Within normal limits  ASCITES:  None  Impression: Mild liver enlargement with suggestion of patchy hepatic steatosis   Workstation performed: [id number]

## 2022-04-29 ENCOUNTER — APPOINTMENT (OUTPATIENT)
Dept: LAB | Facility: CLINIC | Age: 72
End: 2022-04-29
Payer: MEDICARE

## 2022-04-29 DIAGNOSIS — Z01.812 PRE-PROCEDURE LAB EXAM: ICD-10-CM

## 2022-04-29 LAB
ANION GAP SERPL CALCULATED.3IONS-SCNC: 3 MMOL/L (ref 4–13)
BUN SERPL-MCNC: 23 MG/DL (ref 5–25)
CALCIUM SERPL-MCNC: 9.2 MG/DL (ref 8.3–10.1)
CHLORIDE SERPL-SCNC: 107 MMOL/L (ref 100–108)
CO2 SERPL-SCNC: 27 MMOL/L (ref 21–32)
CREAT SERPL-MCNC: 1.04 MG/DL (ref 0.6–1.3)
GFR SERPL CREATININE-BSD FRML MDRD: 72 ML/MIN/1.73SQ M
GLUCOSE P FAST SERPL-MCNC: 150 MG/DL (ref 65–99)
POTASSIUM SERPL-SCNC: 4.5 MMOL/L (ref 3.5–5.3)
SODIUM SERPL-SCNC: 137 MMOL/L (ref 136–145)

## 2022-04-29 PROCEDURE — 80048 BASIC METABOLIC PNL TOTAL CA: CPT

## 2022-04-29 PROCEDURE — 36415 COLL VENOUS BLD VENIPUNCTURE: CPT

## 2022-05-02 ENCOUNTER — APPOINTMENT (OUTPATIENT)
Dept: PHYSICAL THERAPY | Facility: CLINIC | Age: 72
End: 2022-05-02
Payer: MEDICARE

## 2022-05-04 ENCOUNTER — APPOINTMENT (OUTPATIENT)
Dept: PHYSICAL THERAPY | Facility: CLINIC | Age: 72
End: 2022-05-04
Payer: MEDICARE

## 2022-05-06 ENCOUNTER — HOSPITAL ENCOUNTER (OUTPATIENT)
Dept: CT IMAGING | Facility: HOSPITAL | Age: 72
Discharge: HOME | End: 2022-05-06
Payer: MEDICARE

## 2022-05-06 DIAGNOSIS — R14.0 ABDOMINAL DISTENTION: ICD-10-CM

## 2022-05-06 DIAGNOSIS — M95.8 ACQUIRED ABDOMINAL WALL DEFECT: ICD-10-CM

## 2022-05-06 PROCEDURE — G1004 CDSM NDSC: HCPCS

## 2022-05-06 PROCEDURE — 74177 CT ABD & PELVIS W/CONTRAST: CPT

## 2022-05-06 RX ADMIN — IOHEXOL 100 ML: 350 INJECTION, SOLUTION INTRAVENOUS at 07:56

## 2022-05-09 ENCOUNTER — APPOINTMENT (OUTPATIENT)
Dept: PHYSICAL THERAPY | Facility: CLINIC | Age: 72
End: 2022-05-09
Payer: MEDICARE

## 2022-05-09 ENCOUNTER — PREP FOR PROCEDURE (OUTPATIENT)
Dept: OBGYN CLINIC | Facility: CLINIC | Age: 72
End: 2022-05-09

## 2022-05-09 ENCOUNTER — OFFICE VISIT (OUTPATIENT)
Dept: OBGYN CLINIC | Facility: CLINIC | Age: 72
End: 2022-05-09
Payer: MEDICARE

## 2022-05-09 VITALS
SYSTOLIC BLOOD PRESSURE: 148 MMHG | WEIGHT: 210 LBS | BODY MASS INDEX: 35.85 KG/M2 | HEART RATE: 102 BPM | DIASTOLIC BLOOD PRESSURE: 85 MMHG | HEIGHT: 64 IN

## 2022-05-09 DIAGNOSIS — Z01.812 PRE-OPERATIVE LABORATORY EXAMINATION: ICD-10-CM

## 2022-05-09 DIAGNOSIS — S83.241D OTHER TEAR OF MEDIAL MENISCUS OF RIGHT KNEE AS CURRENT INJURY, SUBSEQUENT ENCOUNTER: Primary | ICD-10-CM

## 2022-05-09 PROCEDURE — 99213 OFFICE O/P EST LOW 20 MIN: CPT | Performed by: ORTHOPAEDIC SURGERY

## 2022-05-09 RX ORDER — CHLORHEXIDINE GLUCONATE 0.12 MG/ML
15 RINSE ORAL ONCE
Status: CANCELLED | OUTPATIENT
Start: 2022-05-23 | End: 2022-05-09

## 2022-05-09 RX ORDER — CEFAZOLIN SODIUM 2 G/50ML
2000 SOLUTION INTRAVENOUS ONCE
Status: CANCELLED | OUTPATIENT
Start: 2022-05-23 | End: 2022-05-09

## 2022-05-09 RX ORDER — CHLORHEXIDINE GLUCONATE 4 G/100ML
SOLUTION TOPICAL DAILY PRN
Status: CANCELLED | OUTPATIENT
Start: 2022-05-23

## 2022-05-09 NOTE — H&P (VIEW-ONLY)
Assessment:  1  Other tear of medial meniscus of right knee as current injury, subsequent encounter         Plan:  Right knee meniscus tear  In the presence of lack of relief with non operative modalities and in conjunction with MRI findings and symptoms, quality of life decision to pursue a right knee arthroscopy with partial medial lateral meniscectomies  Patient wishes to pursue surgical intervention as he finds that her symptoms are impacting his daily activities as well as impeding on his quality of life  Risks and benefits of a knee arthroscopy were discussed  Consents obtained  Reasonable expectations of surgical outcomes advised given the amount of arthritis the patient does have  Scheduled surgery date 5/23/2022  The patient has a tear of his medial meniscus of his right knee  Treatment options were discussed  He has opted for elective arthroscopy of his right knee  All risks, complications, benefits were discussed with the patient in great detail including bleeding, infection, blood clots, pain, stiffness, neurovascular damage, fractures, dislocations, the possibility of loss of life limb for surgery, heart attack, stroke, etcetera  His surgery is tentatively scheduled for May 23, 2022  Physical examination shows diffuse medial joint line pain with a positive Juany's and a painful arc motion along that region  Negative Homans  The hamstring pain is now gone  To do next visit:  Return for post-op visit  The above stated was discussed in layman's terms and the patient expressed understanding  All questions were answered to the patient's satisfaction         Scribe Attestation    I,:  Jonathan Serna am acting as a scribe while in the presence of the attending physician :       I,:  Ileana Jo DO personally performed the services described in this documentation    as scribed in my presence :             Subjective:   Kassy Samayoa is a 80 y o  male who presents for follow up of right knee  He is s/p right knee steroid injection with liroberto, 4/18/2022  Today he complains of severe medial right knee pain  The right knee can feel unstable at times  Most activity aggravates while rest alleviates  He has tried steroid injections, activity modification and oral medications with limited benefit          Review of systems negative unless otherwise specified in HPI    Past Medical History:   Diagnosis Date    Arthritis     Diabetes mellitus ([facility] [address] )     Dyslipidemia     GERD (gastroesophageal reflux disease)     Hyperlipidemia     Nodule of left lung 2007    negative for CA    Type 2 diabetes mellitus without complication, without long-term current use of insulin ([facility] [address] ) 1/23/2019       Past Surgical History:   Procedure Laterality Date    COLONOSCOPY      ELBOW SURGERY Bilateral     FOOT TENDON SURGERY      heel    [place] REVISE MEDIAN N/CARPAL TUNNEL SURG Left 6/30/2021    Procedure: RELEASE CARPAL TUNNEL;  Surgeon: Nicole Stevens DO;  Location: [facility] MAIN OR;  Service: Orthopedics       Family History   Problem Relation Age of Onset    COPD Father         80 yrs   Tamanna Seller Heart disease Mother         hx MI   Tamanna Seller Diabetes Mother     Cancer Brother        Social History     Occupational History    Not on file   Tobacco Use    Smoking status: Former Smoker    Smokeless tobacco: Never Used   Vaping Use    Vaping Use: Never used   Substance and Sexual Activity    Alcohol use: Yes     Comment: occ    Drug use: No    Sexual activity: Yes     Partners: Female     Birth control/protection: None         Current Outpatient Medications:     metFORMIN (GLUCOPHAGE) 500 mg tablet, Take 1 tablet (500 mg total) by mouth 2 (two) times a day with meals, Disp: 180 tablet, Rfl: 1    phentermine (ADIPEX-P) 37 5 MG tablet, Take 1 tablet (37 5 mg total) by mouth daily, Disp: 30 tablet, Rfl: 2    simvastatin (ZOCOR) 20 mg tablet, Take 1 tablet (20 mg total) by mouth daily at bedtime, Disp: 90 tablet, Rfl: 3    tamsulosin (FLOMAX) 0 4 mg, Take 1 capsule (0 4 mg total) by mouth daily with dinner, Disp: 30 capsule, Rfl: 1    No Known Allergies         Vitals:    05/09/22 0933   BP: 148/85   Pulse: 102       Objective:  Physical exam  · General: Awake, Alert, Oriented  · Eyes: Pupils equal, round and reactive to light  · Heart: regular rate and rhythm  · Lungs: No audible wheezing  · Abdomen: soft                    Ortho Exam  Right knee:  TTP over medial joint line  No erythema or ecchymosis  No effusion or swelling  Normal strength  Good ROM   Positive Medial McMurrays  Positive Apleys  Calf compartments soft and supple  Sensation intact  Toes are warm sensate and mobile        Diagnostics, reviewed and taken today if performed as documented: The attending physician has personally reviewed the pertinent films in PACS and interpretation is as follows:  Right knee MRI of 2/2022:  Medial meniscus tear  Procedures, if performed today:    Procedures    None performed      Portions of the record may have been created with voice recognition software  Occasional wrong word or "sound a like" substitutions may have occurred due to the inherent limitations of voice recognition software  Read the chart carefully and recognize, using context, where substitutions have occurred

## 2022-05-11 ENCOUNTER — APPOINTMENT (OUTPATIENT)
Dept: PHYSICAL THERAPY | Facility: CLINIC | Age: 72
End: 2022-05-11
Payer: MEDICARE

## 2022-05-13 ENCOUNTER — CLINICAL SUPPORT (OUTPATIENT)
Dept: URGENT CARE | Facility: CLINIC | Age: 72
End: 2022-05-13
Payer: MEDICARE

## 2022-05-13 ENCOUNTER — ANESTHESIA EVENT (OUTPATIENT)
Dept: PERIOP | Facility: HOSPITAL | Age: 72
End: 2022-05-13
Payer: MEDICARE

## 2022-05-13 DIAGNOSIS — Z01.812 PRE-OPERATIVE LABORATORY EXAMINATION: ICD-10-CM

## 2022-05-13 RX ORDER — ACETAMINOPHEN 500 MG
500 TABLET ORAL EVERY 6 HOURS PRN
COMMUNITY

## 2022-05-13 NOTE — PROGRESS NOTES
Assessment/Plan:      Diagnoses and all orders for this visit:    Pre-operative laboratory examination  -     EKG 12 lead      Withdrawal of Life-Sustaining Treatment  Progress note    Discussion held with Sammie Voss care teams and their family including ***, who is identified as the patient's POA/decision maker  The decision was made to pursue comfort measures at this time and plan for withdrawal of life-sustaining treatments  Code status changed to Level 4, comfort measures  Non-comfort medications have been discontinued  Comfort orders have been placed  Withdrawal of life sustaining treatments occurred on 05/13/22  at 8:58 AM with *** present from the  *** team      Yina Moss RN      Subjective:     Patient ID: Ita Ramirez is a 80 y o  male      HPI    Review of Systems      Objective:     Physical Exam

## 2022-05-13 NOTE — PRE-PROCEDURE INSTRUCTIONS
Pre-Surgery Instructions:   Medication Instructions    acetaminophen (TYLENOL) 500 mg tablet Uses PRN- OK to take day of surgery    metFORMIN (GLUCOPHAGE) 500 mg tablet Hold day of surgery   phentermine (ADIPEX-P) 37 5 MG tablet Hold day of surgery   simvastatin (ZOCOR) 20 mg tablet Take night before surgery    tamsulosin (FLOMAX) 0 4 mg Take night before surgery   Covid screening negative as per patient  Reviewed pre op medicine and showering instructions with patient via phone call, verbalizes understanding  Advised patient to not take vitamins, herbal meds and ASA 7 days pre op  Advised to not take NSAID's 3 days pre op but may take Tylenol products if needed  Advised to take DOS medicine with a small sip water  Advised NPO after MN prior to surgery and surgical services will call (5/20) with scheduled time of hospital arrival      Patient not interested in referral to sleep medicine  Pt verbalized understanding of all instructions

## 2022-05-19 ENCOUNTER — OFFICE VISIT (OUTPATIENT)
Dept: FAMILY MEDICINE CLINIC | Facility: CLINIC | Age: 72
End: 2022-05-19
Payer: MEDICARE

## 2022-05-19 VITALS
SYSTOLIC BLOOD PRESSURE: 112 MMHG | BODY MASS INDEX: 35.68 KG/M2 | HEIGHT: 64 IN | TEMPERATURE: 97.7 F | DIASTOLIC BLOOD PRESSURE: 70 MMHG | RESPIRATION RATE: 20 BRPM | WEIGHT: 209 LBS | OXYGEN SATURATION: 98 % | HEART RATE: 96 BPM

## 2022-05-19 DIAGNOSIS — M62.08 DIASTASIS RECTI: Primary | ICD-10-CM

## 2022-05-19 PROCEDURE — 99213 OFFICE O/P EST LOW 20 MIN: CPT | Performed by: NURSE PRACTITIONER

## 2022-05-19 NOTE — PROGRESS NOTES
[address] [place] [address] Primary Care        NAME: Ethan Meléndez is a 80 y o  male  : 1950    MRN: [id number]  DATE: May 19, 2022  TIME: 11:27 AM    Assessment and Plan   Diastasis recti [M62 08]  1  Diastasis recti           Patient Instructions     Patient Instructions   Exercises as discussed- also discussed exercises to avoid or that would make this worse  Ok to keep appointment with plastic surgery if you want to discuss treatment options  Continue Phentermine- discussed weight loss at length  Get labs done before visit in August or call sooner for problems/concerns          Chief Complaint     Chief Complaint   Patient presents with    Follow-up         History of Present Illness       Here to discuss results of his CT scan  He was recommended by GI to follow up with plastic surgery for his diastasis recti  We discussed exercises he could work on 3x/day to treat this  There would also be a specific physical therapy available for this if he wanted  He is taking the Phentermine as directed- took 2-3 weeks off  Is down 15 pounds since starting this  Review of Systems   Review of Systems   Constitutional: Negative for activity change, diaphoresis, fatigue and fever  HENT: Negative for congestion, facial swelling, hearing loss, rhinorrhea, sinus pressure, sinus pain, sneezing, sore throat and voice change  Eyes: Negative for discharge and visual disturbance  Respiratory: Negative for cough, choking, chest tightness, shortness of breath, wheezing and stridor  Cardiovascular: Negative for chest pain, palpitations and leg swelling  Gastrointestinal: Negative for abdominal distention, abdominal pain, constipation, diarrhea, nausea and vomiting  Endocrine: Negative for polydipsia, polyphagia and polyuria  Genitourinary: Negative for difficulty urinating, dysuria, frequency and urgency     Musculoskeletal: Negative for arthralgias, back pain, gait problem, joint swelling, myalgias, neck pain and neck stiffness  Skin: Negative for color change, rash and wound  Neurological: Negative for dizziness, syncope, speech difficulty, weakness, light-headedness and headaches  Hematological: Negative for adenopathy  Does not bruise/bleed easily  Psychiatric/Behavioral: Negative for agitation, behavioral problems, confusion, hallucinations, sleep disturbance and suicidal ideas  The patient is not nervous/anxious  Current Medications       Current Outpatient Medications:     acetaminophen (TYLENOL) 500 mg tablet, Take 500 mg by mouth every 6 (six) hours as needed for mild pain, Disp: , Rfl:     metFORMIN (GLUCOPHAGE) 500 mg tablet, Take 1 tablet (500 mg total) by mouth in the morning and 1 tablet (500 mg total) in the evening  Take with meals  , Disp: 180 tablet, Rfl: 3    phentermine (ADIPEX-P) 37 5 MG tablet, Take 1 tablet (37 5 mg total) by mouth daily, Disp: 30 tablet, Rfl: 2    simvastatin (ZOCOR) 20 mg tablet, Take 1 tablet (20 mg total) by mouth daily at bedtime, Disp: 90 tablet, Rfl: 3    tamsulosin (FLOMAX) 0 4 mg, Take 1 capsule (0 4 mg total) by mouth daily with dinner, Disp: 90 capsule, Rfl: 3    Current Allergies     Allergies as of 05/19/2022    (No Known Allergies)            The following portions of the patient's history were reviewed and updated as appropriate: allergies, current medications, past family history, past medical history, past social history, past surgical history and problem list      Past Medical History:   Diagnosis Date    Arthritis     Colon polyp     Diabetes mellitus ([facility] [address] )     Dyslipidemia     GERD (gastroesophageal reflux disease)     Hyperlipidemia     Nodule of left lung 2007    negative for CA    Type 2 diabetes mellitus without complication, without long-term current use of insulin ([facility] [address] ) 01/23/2019       Past Surgical History:   Procedure Laterality Date    COLONOSCOPY      ELBOW SURGERY Bilateral     FOOT TENDON SURGERY Bilateral heel    [place] REVISE MEDIAN N/CARPAL TUNNEL SURG Left 06/30/2021    Procedure: RELEASE CARPAL TUNNEL;  Surgeon: Shekhar Oswald DO;  Location: [facility] MAIN OR;  Service: Orthopedics       Family History   Problem Relation Age of Onset    COPD Father         80 yrs   Vitcoria Negro Heart disease Mother         hx MI   Victoria Negro Diabetes Mother     Cancer Brother          Medications have been verified  Objective   /70   Pulse 96   Temp 97 7 °F (36 5 °C) (Tympanic)   Resp 20   Ht 5' 4 25" (1 632 m)   Wt 94 8 kg (209 lb)   SpO2 98%   BMI 35 60 kg/m²        Physical Exam     Physical Exam  Vitals and nursing note reviewed  Constitutional:       General: He is not in acute distress  Appearance: He is well-developed  He is not ill-appearing, toxic-appearing or diaphoretic  Pulmonary:      Effort: Pulmonary effort is normal  No respiratory distress  Abdominal:      General: There is distension (round, distended abdomen)  Skin:     Coloration: Skin is not pale  Neurological:      Mental Status: He is alert and oriented to person, place, and time  Psychiatric:         Mood and Affect: Mood normal          Speech: Speech normal          Behavior: Behavior normal  Behavior is cooperative  Thought Content:  Thought content normal          Judgment: Judgment normal

## 2022-05-19 NOTE — PATIENT INSTRUCTIONS
Exercises as discussed- also discussed exercises to avoid or that would make this worse  Ok to keep appointment with plastic surgery if you want to discuss treatment options  Continue Phentermine- discussed weight loss at length  Get labs done before visit in August or call sooner for problems/concerns

## 2022-05-23 ENCOUNTER — HOSPITAL ENCOUNTER (OUTPATIENT)
Facility: HOSPITAL | Age: 72
Setting detail: OUTPATIENT SURGERY
Discharge: HOME | End: 2022-05-23
Attending: ORTHOPAEDIC SURGERY | Admitting: ORTHOPAEDIC SURGERY
Payer: MEDICARE

## 2022-05-23 ENCOUNTER — ANESTHESIA (OUTPATIENT)
Dept: PERIOP | Facility: HOSPITAL | Age: 72
End: 2022-05-23
Payer: MEDICARE

## 2022-05-23 VITALS
HEART RATE: 80 BPM | HEIGHT: 64 IN | WEIGHT: 207 LBS | BODY MASS INDEX: 35.34 KG/M2 | SYSTOLIC BLOOD PRESSURE: 124 MMHG | DIASTOLIC BLOOD PRESSURE: 78 MMHG | RESPIRATION RATE: 18 BRPM | OXYGEN SATURATION: 97 % | TEMPERATURE: 97.4 F

## 2022-05-23 DIAGNOSIS — S83.241D OTHER TEAR OF MEDIAL MENISCUS OF RIGHT KNEE AS CURRENT INJURY, SUBSEQUENT ENCOUNTER: ICD-10-CM

## 2022-05-23 PROBLEM — S83.241A TEAR OF MEDIAL MENISCUS OF RIGHT KNEE, CURRENT: Status: ACTIVE | Noted: 2022-05-23

## 2022-05-23 LAB — GLUCOSE SERPL-MCNC: 138 MG/DL (ref 65–140)

## 2022-05-23 PROCEDURE — 88304 TISSUE EXAM BY PATHOLOGIST: CPT | Performed by: PATHOLOGY

## 2022-05-23 PROCEDURE — 29880 ARTHRS KNE SRG MNISECTMY M&L: CPT | Performed by: ORTHOPAEDIC SURGERY

## 2022-05-23 PROCEDURE — 82948 REAGENT STRIP/BLOOD GLUCOSE: CPT

## 2022-05-23 PROCEDURE — 88311 DECALCIFY TISSUE: CPT | Performed by: PATHOLOGY

## 2022-05-23 PROCEDURE — 29880 ARTHRS KNE SRG MNISECTMY M&L: CPT | Performed by: PHYSICIAN ASSISTANT

## 2022-05-23 RX ORDER — BUPIVACAINE HYDROCHLORIDE AND EPINEPHRINE 5; 5 MG/ML; UG/ML
INJECTION, SOLUTION PERINEURAL AS NEEDED
Status: DISCONTINUED | OUTPATIENT
Start: 2022-05-23 | End: 2022-05-23 | Stop reason: HOSPADM

## 2022-05-23 RX ORDER — SODIUM CHLORIDE, SODIUM LACTATE, POTASSIUM CHLORIDE, CALCIUM CHLORIDE 600; 310; 30; 20 MG/100ML; MG/100ML; MG/100ML; MG/100ML
75 INJECTION, SOLUTION INTRAVENOUS CONTINUOUS
Status: DISCONTINUED | OUTPATIENT
Start: 2022-05-23 | End: 2022-05-23

## 2022-05-23 RX ORDER — HYDROCODONE BITARTRATE AND ACETAMINOPHEN 5; 325 MG/1; MG/1
1 TABLET ORAL EVERY 6 HOURS PRN
Status: DISCONTINUED | OUTPATIENT
Start: 2022-05-23 | End: 2022-05-23 | Stop reason: HOSPADM

## 2022-05-23 RX ORDER — METHYLPREDNISOLONE ACETATE 40 MG/ML
INJECTION, SUSPENSION INTRA-ARTICULAR; INTRALESIONAL; INTRAMUSCULAR; SOFT TISSUE AS NEEDED
Status: DISCONTINUED | OUTPATIENT
Start: 2022-05-23 | End: 2022-05-23 | Stop reason: HOSPADM

## 2022-05-23 RX ORDER — CEFAZOLIN SODIUM 2 G/50ML
2000 SOLUTION INTRAVENOUS ONCE
Status: COMPLETED | OUTPATIENT
Start: 2022-05-23 | End: 2022-05-23

## 2022-05-23 RX ORDER — PROPOFOL 10 MG/ML
INJECTION, EMULSION INTRAVENOUS AS NEEDED
Status: DISCONTINUED | OUTPATIENT
Start: 2022-05-23 | End: 2022-05-23

## 2022-05-23 RX ORDER — FENTANYL CITRATE 50 UG/ML
INJECTION, SOLUTION INTRAMUSCULAR; INTRAVENOUS AS NEEDED
Status: DISCONTINUED | OUTPATIENT
Start: 2022-05-23 | End: 2022-05-23

## 2022-05-23 RX ORDER — MAGNESIUM HYDROXIDE 1200 MG/15ML
LIQUID ORAL AS NEEDED
Status: DISCONTINUED | OUTPATIENT
Start: 2022-05-23 | End: 2022-05-23 | Stop reason: HOSPADM

## 2022-05-23 RX ORDER — CEPHALEXIN 500 MG/1
500 CAPSULE ORAL EVERY 8 HOURS SCHEDULED
Qty: 15 CAPSULE | Refills: 0 | Status: SHIPPED | OUTPATIENT
Start: 2022-05-23 | End: 2022-05-28

## 2022-05-23 RX ORDER — ONDANSETRON 2 MG/ML
4 INJECTION INTRAMUSCULAR; INTRAVENOUS EVERY 6 HOURS PRN
Status: DISCONTINUED | OUTPATIENT
Start: 2022-05-23 | End: 2022-05-23 | Stop reason: HOSPADM

## 2022-05-23 RX ORDER — KETOROLAC TROMETHAMINE 30 MG/ML
INJECTION, SOLUTION INTRAMUSCULAR; INTRAVENOUS AS NEEDED
Status: DISCONTINUED | OUTPATIENT
Start: 2022-05-23 | End: 2022-05-23

## 2022-05-23 RX ORDER — LIDOCAINE HYDROCHLORIDE 20 MG/ML
INJECTION, SOLUTION EPIDURAL; INFILTRATION; INTRACAUDAL; PERINEURAL AS NEEDED
Status: DISCONTINUED | OUTPATIENT
Start: 2022-05-23 | End: 2022-05-23

## 2022-05-23 RX ORDER — ONDANSETRON 2 MG/ML
4 INJECTION INTRAMUSCULAR; INTRAVENOUS ONCE AS NEEDED
Status: DISCONTINUED | OUTPATIENT
Start: 2022-05-23 | End: 2022-05-23 | Stop reason: HOSPADM

## 2022-05-23 RX ORDER — FENTANYL CITRATE/PF 50 MCG/ML
25 SYRINGE (ML) INJECTION
Status: DISCONTINUED | OUTPATIENT
Start: 2022-05-23 | End: 2022-05-23 | Stop reason: HOSPADM

## 2022-05-23 RX ORDER — MIDAZOLAM HYDROCHLORIDE 2 MG/2ML
INJECTION, SOLUTION INTRAMUSCULAR; INTRAVENOUS AS NEEDED
Status: DISCONTINUED | OUTPATIENT
Start: 2022-05-23 | End: 2022-05-23

## 2022-05-23 RX ORDER — CHLORHEXIDINE GLUCONATE 0.12 MG/ML
15 RINSE ORAL ONCE
Status: COMPLETED | OUTPATIENT
Start: 2022-05-23 | End: 2022-05-23

## 2022-05-23 RX ORDER — POVIDONE-IODINE 10 MG/G
OINTMENT TOPICAL AS NEEDED
Status: DISCONTINUED | OUTPATIENT
Start: 2022-05-23 | End: 2022-05-23 | Stop reason: HOSPADM

## 2022-05-23 RX ORDER — CHLORHEXIDINE GLUCONATE 4 G/100ML
SOLUTION TOPICAL DAILY PRN
Status: DISCONTINUED | OUTPATIENT
Start: 2022-05-23 | End: 2022-05-23 | Stop reason: HOSPADM

## 2022-05-23 RX ORDER — MELOXICAM 15 MG/1
15 TABLET ORAL DAILY
Qty: 30 TABLET | Refills: 0 | Status: SHIPPED | OUTPATIENT
Start: 2022-05-23

## 2022-05-23 RX ORDER — ONDANSETRON 2 MG/ML
INJECTION INTRAMUSCULAR; INTRAVENOUS AS NEEDED
Status: DISCONTINUED | OUTPATIENT
Start: 2022-05-23 | End: 2022-05-23

## 2022-05-23 RX ORDER — SODIUM CHLORIDE, SODIUM LACTATE, POTASSIUM CHLORIDE, CALCIUM CHLORIDE 600; 310; 30; 20 MG/100ML; MG/100ML; MG/100ML; MG/100ML
75 INJECTION, SOLUTION INTRAVENOUS CONTINUOUS
Status: DISCONTINUED | OUTPATIENT
Start: 2022-05-23 | End: 2022-05-23 | Stop reason: HOSPADM

## 2022-05-23 RX ORDER — HYDROCODONE BITARTRATE AND ACETAMINOPHEN 5; 325 MG/1; MG/1
1 TABLET ORAL EVERY 6 HOURS PRN
Qty: 28 TABLET | Refills: 0 | Status: SHIPPED | OUTPATIENT
Start: 2022-05-23 | End: 2022-06-02

## 2022-05-23 RX ADMIN — ONDANSETRON 4 MG: 2 INJECTION INTRAMUSCULAR; INTRAVENOUS at 08:03

## 2022-05-23 RX ADMIN — FENTANYL CITRATE 25 MCG: 50 INJECTION INTRAMUSCULAR; INTRAVENOUS at 07:58

## 2022-05-23 RX ADMIN — MIDAZOLAM 2 MG: 1 INJECTION INTRAMUSCULAR; INTRAVENOUS at 07:23

## 2022-05-23 RX ADMIN — LIDOCAINE HYDROCHLORIDE 100 MG: 20 INJECTION, SOLUTION EPIDURAL; INFILTRATION; INTRACAUDAL; PERINEURAL at 07:28

## 2022-05-23 RX ADMIN — PROPOFOL 150 MG: 10 INJECTION, EMULSION INTRAVENOUS at 07:28

## 2022-05-23 RX ADMIN — CHLORHEXIDINE GLUCONATE 0.12% ORAL RINSE 15 ML: 1.2 LIQUID ORAL at 06:26

## 2022-05-23 RX ADMIN — FENTANYL CITRATE 25 MCG: 50 INJECTION INTRAMUSCULAR; INTRAVENOUS at 07:45

## 2022-05-23 RX ADMIN — FENTANYL CITRATE 25 MCG: 50 INJECTION INTRAMUSCULAR; INTRAVENOUS at 07:28

## 2022-05-23 RX ADMIN — FENTANYL CITRATE 25 MCG: 50 INJECTION INTRAMUSCULAR; INTRAVENOUS at 07:50

## 2022-05-23 RX ADMIN — SODIUM CHLORIDE, SODIUM LACTATE, POTASSIUM CHLORIDE, AND CALCIUM CHLORIDE 75 ML/HR: .6; .31; .03; .02 INJECTION, SOLUTION INTRAVENOUS at 06:36

## 2022-05-23 RX ADMIN — CEFAZOLIN SODIUM 2000 MG: 2 SOLUTION INTRAVENOUS at 07:20

## 2022-05-23 RX ADMIN — KETOROLAC TROMETHAMINE 15 MG: 30 INJECTION, SOLUTION INTRAMUSCULAR at 08:03

## 2022-05-23 NOTE — ANESTHESIA POSTPROCEDURE EVALUATION
Post-Op Assessment Note    CV Status:  Stable  Pain Score: 0    Pain management: adequate     Mental Status:  Arousable   Hydration Status:  Stable   PONV Controlled:  None   Airway Patency:  Patent      Post Op Vitals Reviewed: Yes      Staff: CRNA         No complications documented      BP   131/72   Temp   97 8   Pulse  92   Resp   14   SpO2   96%

## 2022-05-23 NOTE — INTERVAL H&P NOTE
H&P reviewed  After examining the patient I find no changes in the patients condition since the H&P had been written    Lungs CTA  Heart RRR  Vitals:    05/23/22 0623   BP: 132/78   Pulse: 96   Resp: 16   Temp: 97 7 °F (36 5 °C)   SpO2: 96%

## 2022-05-23 NOTE — ANESTHESIA PREPROCEDURE EVALUATION
Procedure:  KNEE ARTHROSCOPY WITH POSSIBLE MENISCECTOMY (Right Knee)    Relevant Problems   CARDIO   (+) High blood pressure   (+) Hypercholesteremia      ENDO   (+) Type 2 diabetes mellitus without complication, without long-term current use of insulin (HCC)      PULMONARY   (+) Dyspnea      Other   (+) Severe obesity (BMI 35 0-39  9) with comorbidity (HCC)        Physical Exam    Airway    Mallampati score: II  TM Distance: >3 FB  Neck ROM: full     Dental   No notable dental hx     Cardiovascular  Cardiovascular exam normal    Pulmonary  Pulmonary exam normal     Other Findings        Anesthesia Plan  ASA Score- 2     Anesthesia Type- general with ASA Monitors  Additional Monitors:   Airway Plan: LMA  Plan Factors-    Chart reviewed  Patient summary reviewed  Induction- intravenous  Postoperative Plan- Plan for postoperative opioid use  Planned trial extubation    Informed Consent- Anesthetic plan and risks discussed with patient  I personally reviewed this patient with the CRNA  Discussed and agreed on the Anesthesia Plan with the CRNA  Michael Oscar

## 2022-05-23 NOTE — OP NOTE
OPERATIVE REPORT  PATIENT NAME: Dillan Sprague    :  1950  MRN: [id number]  Pt Location: [facility] OR ROOM 02    SURGERY DATE: 2022    Surgeon(s) and Role:     * Ann-Marie Barber DO - Primary    Assistant:  Denise Alvarado PA-C    Preop Diagnosis:  Other tear of medial meniscus of right knee as current injury, subsequent encounter [S83 241D]    Post-Op Diagnosis Codes:     * Other tear of medial meniscus of right knee as current injury, subsequent encounter [S83 241D]    Tear of medial meniscus right knee  Tear of lateral meniscus  Degenerative joint disease  Synovitis    Procedure(s) (LRB):  KNEE ARTHROSCOPY WITH POSSIBLE MENISCECTOMY (Right)    Right knee arthroscopy  Medial meniscectomy  Lateral meniscectomy  Chondroplasty  Synovectomy  Post arthroscopic injection of intra-articular joint space and peripheral portals    Specimen(s):  Shavings    Estimated Blood Loss:   Minimal    Drains:  None    Anesthesia Type:   LMA    Operative Indications: Other tear of medial meniscus of right knee as current injury, subsequent encounter [S83 241D]      Operative Findings:  TT: 14    Complications:   None    Procedure and Technique:    The patient was properly identified and brought into the operative suite  After  Successful induction of the general anesthetic, a tourniquet was placed on patient's right proximal thigh  The right lower extremity was then prepped and draped in the usual fashion      It was medically necessary that the physician's assistant be in the room to aid in positioning and placing  the appropriate amount of retraction on  the patient  Denise Alvarado PA-C-assisting necessary for the procedure for assistance with minimally invasive arthroscopic techniques for medial and lateral meniscectomies as well as assistance with utilization of the camera and shaver and the biter  The patient was also given a dose of intravenous antibiotics       An Esmarch was used to exsanguinate the right lower extremity  The tourniquet was then inflated  Using a #11  Scalpel blade an anterior lateral portal was made approximately 1 cm above the joint line 1 cm lateral to patellar tendon and an anterior medial portal was made approximately 1 cm above the joint line 1 cm medial to patellar tendon  The arthroscope was 1st introduced into the  into the lateral portal   First the  medial joint space was inspected  There was a tremendous amount of synovial tissue  A synovectomy did occur with the assistance of a shaver  There was a complex tear along the central and posterior 1/3 horn of the medial meniscus  There was grade 3 arthritic changes along the medial femoral condyle  Using an an arthroscopic biter, a medial meniscectomy  occurred  It was then smoothed out with shaver  A Chondroplasty did occur where there was rough articular cartilage  It was then confirmed with a probe that there was no further loosening of the meniscus and articular cartilage  The arthroscope was then introduced into the intercondylar notch  The ACL was probed and found be quite stable      The arthroscope was then introduced in the lateral side of his knee  After a synovectomy was performed, there was a degenerative rim tear along the lateral meniscus  Using arthroscopic shaver a lateral meniscectomy did occur  It was then confirmed with a probe that there was no further loosening of the meniscal tissue  There was minimal arthrosis on the lateral side of his knee where no chondroplasty was warranted  The arthroscope was then induced the patellofemoral joint  There was some grade 2 and grade 3 early arthritic changes along the undersurface of the patella and trochlea  Using arthroscopic shaver a chondroplasty to occur smoothing the articular cartilage down to a nice base  The medial and lateral gutters were inspected next and there was no loose bodies        The knee was then copiously irrigated sterile arthroscopic solution  The portals were closed with 3 O nylon  The portals were then injected with 0 5% Marcaine with epinephrine, and the knee was injected with 0 5% Marcaine with epinephrine and Depo-Medrol  The wounds were then dressed with ointment Xeroform 4x4s sterile Webril and an Ace bandage  The tourniquet was then deflated  There was no complications during the procedure  He was successfully woken transferred to his hospital bed and went to recovery room in stable condition          I was present for the entire procedure, A qualified resident physician was not available and A physician assistant was required during the procedure for retraction tissue handling,dissection and suturing    Patient Disposition:  PACU       SIGNATURE: Maria Victoria Garner, DO  DATE: May 23, 2022  TIME: 7:17 AM

## 2022-05-24 NOTE — PROGRESS NOTES
PT Evaluation     Today's date: 2022  Patient name: Mitchel Gillespie  : 1950  MRN: [id number]  Referring provider: Jane Banks  Dx:   Encounter Diagnosis     ICD-10-CM    1  Other tear of medial meniscus of right knee as current injury, subsequent encounter  S83 325D Ambulatory Referral to Physical Therapy   2  Right knee pain, unspecified chronicity  M25 561                   Assessment  Assessment details: Mitchel Gillespie is a 80 y o  male with a history of GERD, hyperlipidemia, DM, arthritis, L lung nodule, HTN, and BMI>30 that presents for a moderate complexity physical therapy initial evaluation  The patient demonstrates signs and symptoms consistent with other tear of medial meniscus of the R knee s/p R knee scope meniscectomy; R LE pain  During the examination the patient demonstrated decreased R LE strength, decreased R knee ROM, gait dysfunction, and R knee pain  The patient's impairments are causing the following functional limitations: difficulty with prolonged standing, prolonged walking, walking on unlevel surfaces, difficulty squatting/kneeling, difficulty stair-climbing, and difficulty transferring from low surfaces  The patient's clinical presentation is evolving due to a number of participation restrictions, significant medial history, and functional limitation (FOTO 66% function)  The patient will benefit from skilled PT services to address impairments, work towards goals, and restore PLOF  Impairments: abnormal gait, abnormal or restricted ROM, activity intolerance, impaired physical strength, lacks appropriate home exercise program, pain with function and poor posture   Functional limitations: difficulty with prolonged standing, prolonged walking, walking on unlevel surfaces, difficulty squatting/kneeling, difficulty stair-climbing, and difficulty transferring from low surfaces    Symptom irritability: lowUnderstanding of Dx/Px/POC: good   Prognosis: good    Goals  STG: Achieve in 4-6 weeks  1  Patient's R knee pain at worst less than 2/10 to allow for proper gait  2   Patient's R knee ROM improve by 10-45 degrees to improve squatting  3   R LE MMT improve to > 4+/5 all motions tested to improve ADL/recreational activities  4  30 second sit to stand score improve by 4 stands ( greater than 14 stands)to indicate improved transfers  LTG:  Achieve in 6-12 weeks  1  Patient's knee FOTO score improve to > 74% to indicate a return to normal functioning  2   Patient achieve personal goal of returning to walking 3 miles without R knee pain  3  Patient to achieve independence with home exercise plan  Plan  Plan details: RE-ASSESS 1X/MONTH  Patient would benefit from: skilled physical therapy  Planned modality interventions: TENS, cryotherapy and thermotherapy: hydrocollator packs  Other planned modality interventions: IASTM  Planned therapy interventions: joint mobilization, manual therapy, neuromuscular re-education, patient education, postural training, strengthening, stretching, therapeutic activities, therapeutic exercise, home exercise program, abdominal trunk stabilization, balance, activity modification, gait training and balance/weight bearing training  Frequency: 1-3X/WK  Duration in weeks: 12  Plan of Care beginning date: 5/26/2022  Plan of Care expiration date: 8/24/2022  Treatment plan discussed with: PTA and patient        Subjective Evaluation    History of Present Illness  Date of onset: 2/18/2022  Date of surgery: 5/23/2022  Mechanism of injury: surgery  Mechanism of injury: Kendell Reddy is a 80 y o  male that presents to outpatient physical therapy with complaints of R thigh/groin pain s/p R knee scope meniscectomy, synovectomy done by Dr Nela Melo at [facility]  The patient denies any post operative complications  The patient attempted conservative care prior to surgery ( PT)    The patient did have S/S of an anterior lumbar derangement which improved with repeated flexion in lying  The patient notes present difficulty with prolonged walking, walking on unlevel surfaces, ambulating up/down the steps, and difficulty squatting/kneeling  The patient's main goal for physical therapy is to be pain free with walking and exercise  Recurrent probem    Pain  Current pain ratin  At best pain ratin  At worst pain ratin  Location: R medial thigh/groin  Quality: dull ache and sharp  Relieving factors: rest  Aggravating factors: stair climbing, standing, walking and lifting  Progression: improved    Social Support  Steps to enter house: yes  Stairs in house: yes   Lives in: multiple-level home  Lives with: spouse    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)  Hand dominance: right    Treatments  Previous treatment: physical therapy  Current treatment: physical therapy  Discharged from (in last 30 days): inpatient hospitalization  Patient Goals  Patient goals for therapy: decreased pain, improved balance, increased motion, increased strength, independence with ADLs/IADLs, return to sport/leisure activities and return to work  Patient goal: walk for leisure/exercise        Objective     Observations     Right Knee   Positive for abrasion  Additional Observation Details  Patient's postoperative dressings removed and incisions inspected  The patient was educated on caring for his incisions and watching for signs of infection  All patient questions related to the incisions were answered at this time  The patient's incisions were cleaned with alcohol swabs and redressed with guaze and band aids  Patient has a large black and blue area at his R medial mid thigh / groin area which is most likely due to the tourniquet as the patient denies any other trauma to this area    Palpation     Right   Tenderness of the vastus medialis       Additional Palpation Details  (+) TTP R groin muscles    Tenderness     Right Knee   No tenderness in the inferior patella  Neurological Testing     Sensation     Knee   Left Knee   Intact: light touch    Right Knee   Intact: light touch     Active Range of Motion   Left Knee   Flexion: 135 degrees   Extension: 6 degrees     Right Knee   Flexion: 80 degrees   Extension: -5 degrees     Passive Range of Motion   Left Knee   Normal passive range of motion    Right Knee   Flexion: 85 degrees   Extension: 0 degrees     Mobility   Patellar Mobility:   Left Knee   WFL: medial, lateral, superior and inferior  Right Knee   Hypomobile: medial, lateral, superior and inferior     Patellar Static Positioning   Left Knee: WFL  Right Knee: [facility]    Strength/Myotome Testing     Left Hip   Planes of Motion   Flexion: 4+  Extension: 4  Abduction: 4+  Adduction: 4+    Right Hip   Planes of Motion   Flexion: 3  Extension: 3  Abduction: 4-  Adduction: 4-    Left Knee   Flexion: 5  Extension: 5  Quadriceps contraction: good    Right Knee   Flexion: 3+  Extension: 3+  Quadriceps contraction: fair    Tests     Additional Tests Details  Gait impairments: Patient ambulates with no AD WBAT B/L LE  The patient demonstrates slow gait speed, mild R lateral trunk lean, (+) antalgic gait pattern w/ R LE limp   Mild decreased Heel toe rollover R LE    FOTO: 66% ( predicted 74%)    30SSTS: 10 stands with hands    Swelling     Left Knee Girth Measurement (cm)   Joint line: 40 cm  10 cm above joint line: 46 cm    Right Knee Girth Measurement (cm)   Joint line: 40 cm  10 cm above joint line: 50 cm               Re-Evaluation:6/23  PRECAUTIONS:CARDIAC / perform most exercises in sitting   Knee Specialty Daily Treatment Diary   Access Code [email or web address]          Manual Therapy 5/26       MFR/STM/ IASTM        Hamstring stretch  *              Patellar mobs  *      Knee stretch on edge of mat            Ther Ex 5/26       Nu Step S=  *      Biodex Bike S=        Heel slide abd, flex x10 ea       PROM knee        D K->C  *      Prone knee flex        T-band Abd        SLR all planes  *      Ball squeeze        LAQ        Hamstring stretch 3x:30       Calf stretch 3x:30       Standing hamstring curls                Mini Squat                TKE  *      Total gym squats (deep)        Neuro Re-ed                QS x15 :05       Gluteal set x10               Fitter board        Eccentric Leg press        Clam Shells  *              [place]        GAIT Training        sidestepping  *      Heel-toe amb        Mirror walking        Ther Act        Amb up/down steps        Chair squats        Crate carry        Step ups  *          Modalities        CP  KNEE                                The patient was given a new home exercise plan with written handout, pictures, and verbal instruction  The patient accepts and understands the new home activities

## 2022-05-26 ENCOUNTER — EVALUATION (OUTPATIENT)
Dept: PHYSICAL THERAPY | Facility: CLINIC | Age: 72
End: 2022-05-26
Payer: MEDICARE

## 2022-05-26 DIAGNOSIS — S83.241D OTHER TEAR OF MEDIAL MENISCUS OF RIGHT KNEE AS CURRENT INJURY, SUBSEQUENT ENCOUNTER: Primary | ICD-10-CM

## 2022-05-26 DIAGNOSIS — M25.561 RIGHT KNEE PAIN, UNSPECIFIED CHRONICITY: ICD-10-CM

## 2022-05-26 PROCEDURE — 97112 NEUROMUSCULAR REEDUCATION: CPT | Performed by: PHYSICAL THERAPIST

## 2022-05-26 PROCEDURE — 97162 PT EVAL MOD COMPLEX 30 MIN: CPT | Performed by: PHYSICAL THERAPIST

## 2022-05-26 PROCEDURE — 97110 THERAPEUTIC EXERCISES: CPT | Performed by: PHYSICAL THERAPIST

## 2022-06-01 ENCOUNTER — CONSULT (OUTPATIENT)
Dept: PLASTIC SURGERY | Facility: CLINIC | Age: 72
End: 2022-06-01
Payer: MEDICARE

## 2022-06-01 ENCOUNTER — OFFICE VISIT (OUTPATIENT)
Dept: PHYSICAL THERAPY | Facility: CLINIC | Age: 72
End: 2022-06-01
Payer: MEDICARE

## 2022-06-01 VITALS
DIASTOLIC BLOOD PRESSURE: 80 MMHG | RESPIRATION RATE: 16 BRPM | WEIGHT: 206 LBS | TEMPERATURE: 97.7 F | HEART RATE: 107 BPM | BODY MASS INDEX: 35.17 KG/M2 | HEIGHT: 64 IN | SYSTOLIC BLOOD PRESSURE: 134 MMHG | OXYGEN SATURATION: 98 %

## 2022-06-01 DIAGNOSIS — R19.00 ABDOMINAL WALL BULGE: Primary | ICD-10-CM

## 2022-06-01 DIAGNOSIS — M25.561 RIGHT KNEE PAIN, UNSPECIFIED CHRONICITY: ICD-10-CM

## 2022-06-01 DIAGNOSIS — S83.241D OTHER TEAR OF MEDIAL MENISCUS OF RIGHT KNEE AS CURRENT INJURY, SUBSEQUENT ENCOUNTER: Primary | ICD-10-CM

## 2022-06-01 PROCEDURE — 97112 NEUROMUSCULAR REEDUCATION: CPT

## 2022-06-01 PROCEDURE — 99204 OFFICE O/P NEW MOD 45 MIN: CPT | Performed by: PLASTIC SURGERY

## 2022-06-01 PROCEDURE — 97110 THERAPEUTIC EXERCISES: CPT

## 2022-06-01 NOTE — PROGRESS NOTES
Daily Note     Today's date: 2022  Patient name: Lopez Andrew  : 1950  MRN: [id number]  Referring provider: Socorro Bhardwaj  Dx:   Encounter Diagnosis     ICD-10-CM    1  Other tear of medial meniscus of right knee as current injury, subsequent encounter  S84 301D    2  Right knee pain, unspecified chronicity  M25 561        Start Time: 1116          Subjective: Patient has pain 3-4/10 with activity  No pain at rest  He did a little too much over the weekend which made him more sore  He feels the knee is not as tight, but the bruseing is better  Objective: See treatment diary below    Janna's home exercise program updated to include additional exercises  Handout declined, but he accepts new exercises  Sutures clean and intact  Assessment: Tolerated treatment well  Patient would benefit from continued PT for stretching and strengthening  Patient was able to add exercises to her program without increase of pain  He felt a little "tender" when he left department  Plan: Continue per plan of care  Progress treatment as tolerated         Re-Evaluation:  PRECAUTIONS:CARDIAC / perform most exercises in sitting   Knee Specialty Daily Treatment Diary   Access Code [email or web address]          Manual Therapy       MFR/STM/ IASTM        Hamstring stretch  *:30x3              Patellar mobs  *x10      Knee stretch on edge of mat            Ther Ex       Nu Step S=10   *L1 x10 min      Biodex Bike S=        Heel slide abd, flex x10 ea x10 ea      PROM knee        D K->C  *x10      Prone knee flex        T-band Abd        SLR all planes  *flex x10      Ball squeeze        LAQ  * :05x10      Hamstring stretch 3x:30 :30x3      Calf stretch 3x:30 Wedge :30x      Standing hamstring curls                Mini Squat                TKE  *wall/ball :05x10      Total gym squats (deep)        Neuro Re-ed                QS x15 :05 :05x15      Gluteal set x10 x10              Fitter board Eccentric Leg press        Clam Shells  *:03x10              [place]        GAIT Training        sidestepping  *10ft x4      Heel-toe amb        Mirror walking        Ther Act        Amb up/down steps        Chair squats        Crate carry        Step ups fwd/lat  *4"x10 fwd  Side x3          Modalities        CP  KNEE                            Access Code: [email or web address]  URL: [email or web address]  Date: 06/01/2022  Prepared by: Dena Gallegos Froteeteeiser    Exercises  · Supine Quadricep Sets - 2 x daily - 6 x weekly - 2 sets - 10 reps - 5 hold  · Supine Heel Slide - 2 x daily - 6 x weekly - 2 sets - 10 reps - 5 hold  · Supine Hip Abduction - 2 x daily - 6 x weekly - 2 sets - 10 reps - 5 hold  · Seated Calf Stretch with Strap - 2 x daily - 6 x weekly - 1 sets - 5 reps - 30 hold  · Seated Hamstring Stretch - 2 x daily - 6 x weekly - 1 sets - 5 reps - 30 hold  · Seated Ankle Pumps - 3 x daily - 7 x weekly - 3 sets - 10 reps  · Supine Gluteal Sets - 2 x daily - 7 x weekly - 2 sets - 10 reps - 5 hold  · 4 Way Patellar [place] - 2 x daily - 7 x weekly - 1 sets - 10 reps  · Supine Straight Leg Raises - 2 x daily - 7 x weekly - 1 sets - 10 reps  · Supine Double Knee to Chest - 2 x daily - 7 x weekly - 1 sets - 10 reps  · Seated Long Arc Quad - 2 x daily - 7 x weekly - 1 sets - 10 reps - 5 sec hold  · Standing Terminal Knee Extension at Wall with Ball - 2 x daily - 7 x weekly - 1 sets - 10 reps - 5 sec hold

## 2022-06-01 NOTE — PROGRESS NOTES
Assessment/Plan   81-year-old male with rectus diastasis, umbilical hernia  1 )  Patient is asymptomatic from both rectus diastasis and the umbilical hernia  2 ) patient can follow with General surgery as needed for repair of his umbilical hernia  3 ) once patient achieve his target weight he can follow-up with our office if needed for skin excision as needed     Discussion--I had lengthy discussion regarding the patient's current issues which are somewhat related but also somewhat separate  Discussed with the patient regarding his rectus diastasis, this is mainly a cosmetic issue, it poses no threat to his health, and as the patient is having no functional difficulty or pain or tenderness in this area I would recommend deferring any type of surgical management  Regarding his umbilical hernia, this is a reducible hernia, discussed with the patient that this is something that is typically repaired electively, I did discuss with the patient that these are typically rheumatica ended for repair in the event that they become a medical issue down the road  Regarding his abdominal pannus, discussed with the patient that he would be a better candidate for surgery once he achieves his target weight, at that point he can assess if he would benefit from skin excision surgery  I discussed with the patient we can make a referral for him for general surgery if he desires, however he states that he we will consider surgery and would like to defer treatment at this time  I discussed with the patient can follow up with us prn  Subjective   Patient ID: Asif Brown is a 80 y o  male  Vitals:    06/01/22 1002   BP: 134/80   Pulse: (!) 107   Resp: 16   Temp: 97 7 °F (36 5 °C)   SpO2: 98%     HPI- Patient is a 68yo male who is here today for evaluation of a rectus diastasis and possible body contouring surgery  The patient states the for many years he has had an abdominal bulge when the patient sits    It is not associated with any pain or tenderness, the patient has no obstructive symptoms, the patient has also lost approximately 20 lb continues to lose weight, he is concerned with his abdominal bulge and the overhanging skin on the waistline  The patient has a history of type 2 diabetes, hypercholesterolemia, he does not take steroids or blood thinners, he is a nonsmoker, he has no history of major abdominal surgery  The following portions of the patient's history were reviewed and updated as appropriate: allergies, current medications, past family history, past medical history, past social history, past surgical history and problem list     Review of Systems   Gastrointestinal: Negative for diarrhea, rectal pain and vomiting  Skin:        Per HPI       Objective   Physical Exam   Constitutional  He appears well-developed and well-nourished  Eyes      General -             Right: Right eye extraocular movements are normal              Left: Left eye extraocular movements are normal        Skin    Patient with protuberant abdomen with abdominal adiposity     Psychiatric  He has a normal mood and affect   His behavior is normal  Judgment and thought content normal      Abdomen--soft, nontender, nondistended, patient has a reducible umbilical hernia  Past Medical History:   Diagnosis Date    Arthritis     Colon polyp     [facility] [address] )     Dyslipidemia     GERD (gastroesophageal reflux disease)     Hyperlipidemia     Nodule of left lung 2007    negative for CA    Type 2 diabetes mellitus without complication, without long-term current use of insulin ([facility] [address] ) 01/23/2019     Past Surgical History:   Procedure Laterality Date    COLONOSCOPY      ELBOW SURGERY Bilateral     FOOT TENDON SURGERY Bilateral     heel    NV KNEE SCOPE,MED/LAT MENISECTOMY Right 5/23/2022    Procedure: KNEE ARTHROSCOPY WITH medial MENISCECTOMY chondroplasty synovectomy injection;  Surgeon: Gris Carty DO;  Location: [facility] MAIN OR;  Service: Orthopedics    UT REVISE MEDIAN N/CARPAL TUNNEL SURG Left 06/30/2021    Procedure: RELEASE CARPAL TUNNEL;  Surgeon: Ruddy Madison DO;  Location: [address] [place] [address] MAIN OR;  Service: Orthopedics     Current Outpatient Medications   Medication Instructions    acetaminophen (TYLENOL) 500 mg, Every 6 hours PRN    HYDROcodone-acetaminophen (NORCO) 5-325 mg per tablet 1 tablet, Oral, Every 6 hours PRN    meloxicam (MOBIC) 15 mg, Oral, Daily    metFORMIN (GLUCOPHAGE) 500 mg, Oral, 2 times daily with meals    phentermine (ADIPEX-P) 37 5 mg, Oral, Daily    simvastatin (ZOCOR) 20 mg, Oral, Daily at bedtime    tamsulosin (FLOMAX) 0 4 mg, Oral, Daily with dinner       Social History     Social History Narrative    Not on file     Social History     Tobacco Use   Smoking Status Former Smoker    Quit date: 2007    Years since quitting: 15 4   Smokeless Tobacco Never Used

## 2022-06-02 ENCOUNTER — OFFICE VISIT (OUTPATIENT)
Dept: PHYSICAL THERAPY | Facility: CLINIC | Age: 72
End: 2022-06-02
Payer: MEDICARE

## 2022-06-02 DIAGNOSIS — S83.241D OTHER TEAR OF MEDIAL MENISCUS OF RIGHT KNEE AS CURRENT INJURY, SUBSEQUENT ENCOUNTER: Primary | ICD-10-CM

## 2022-06-02 DIAGNOSIS — M25.561 RIGHT KNEE PAIN, UNSPECIFIED CHRONICITY: ICD-10-CM

## 2022-06-02 PROCEDURE — 97112 NEUROMUSCULAR REEDUCATION: CPT

## 2022-06-02 PROCEDURE — 97530 THERAPEUTIC ACTIVITIES: CPT

## 2022-06-02 PROCEDURE — 97110 THERAPEUTIC EXERCISES: CPT

## 2022-06-02 NOTE — PROGRESS NOTES
Daily Note     Today's date: 2022  Patient name: Balta Ortega  : 1950  MRN: [id number]  Referring provider: Nessa Alba  Dx:   Encounter Diagnosis     ICD-10-CM    1  Other tear of medial meniscus of right knee as current injury, subsequent encounter  S83 241D    2  Right knee pain, unspecified chronicity  M25 561        Start Time: 1030          Subjective: Patient feels he is sore today  At rest he has no pain, but walking is a 2/10 in the medial knee  Same place he always has his pain  Objective: See treatment diary below    Patient's home exercise program updated to include additional exercises  Handout declined, but new exercises accepted  Assessment: Tolerated treatment well  Patient would benefit from continued PT for stretching and strengthening  Patient was able to add an exercise to his program  He felt good when he left department  Plan: Continue per plan of care  Progress treatment as tolerated         Re-Evaluation:  PRECAUTIONS:CARDIAC / perform most exercises in sitting   Knee Specialty Daily Treatment Diary   Access Code [email or web address]          Manual Therapy      MFR/STM/ IASTM        Hamstring stretch  *:30x3 :30x3             Patellar mobs  *x10 x10     Knee stretch on edge of mat            Ther Ex  6     Nu Step S=10   *L1 x10 min L1 x10 min     Biodex Bike S=        Heel slide abd, flex x10 ea x10 ea x10ea     PROM knee        D K->C  *x10 x10     Prone knee flex        T-band Abd        SLR all planes  *flex x10 Flex 2x5     Ball squeeze        LAQ  * :05x10 :05 x10     Hamstring stretch 3x:30 :30x3 :30x3     Calf stretch 3x:30 Wedge :30x Wedge :30x3     Standing hamstring curls                Mini Squat                TKE  *wall/ball :05x10 wall/ball :05x10     Total gym squats (deep)        Neuro Re-ed                QS x15 :05 :05x15 :05x15     Gluteal set x10 x10 x15             Fitter board        Eccentric Leg press Clam Shells  *:03x10 :03x10             [place]        GAIT Training        sidestepping  *10ft x4 10ft x4     Heel-toe amb   *10 ft x4     Mirror walking        Ther Act        Amb up/down steps        Chair squats        Crate carry        Step ups fwd/lat  *4"x10 fwd  Side x3 4" x10 fwd         Modalities        CP  KNEE                            Access Code: [email or web address]  URL: [email or web address]  Date: 06/02/2022  Prepared by: Charmayne Ling Frohnheiser    Exercises  · Supine Quadricep Sets - 2 x daily - 6 x weekly - 2 sets - 10 reps - 5 hold  · Supine Heel Slide - 2 x daily - 6 x weekly - 2 sets - 10 reps - 5 hold  · Supine Hip Abduction - 2 x daily - 6 x weekly - 2 sets - 10 reps - 5 hold  · Seated Calf Stretch with Strap - 2 x daily - 6 x weekly - 1 sets - 5 reps - 30 hold  · Seated Hamstring Stretch - 2 x daily - 6 x weekly - 1 sets - 5 reps - 30 hold  · Seated Ankle Pumps - 3 x daily - 7 x weekly - 3 sets - 10 reps  · Supine Gluteal Sets - 2 x daily - 7 x weekly - 2 sets - 10 reps - 5 hold  · 4 Way Patellar [place] - 2 x daily - 7 x weekly - 1 sets - 10 reps  · Supine Straight Leg Raises - 2 x daily - 7 x weekly - 1 sets - 10 reps  · Supine Double Knee to Chest - 2 x daily - 7 x weekly - 1 sets - 10 reps  · Seated Long Arc Quad - 2 x daily - 7 x weekly - 1 sets - 10 reps - 5 sec hold  · Standing Terminal Knee Extension at Wall with Ball - 2 x daily - 7 x weekly - 1 sets - 10 reps - 5 sec hold  · Tandem Walking - 2 x daily - 7 x weekly - 1 sets - 3-4 reps      ·

## 2022-06-06 ENCOUNTER — OFFICE VISIT (OUTPATIENT)
Dept: PHYSICAL THERAPY | Facility: CLINIC | Age: 72
End: 2022-06-06
Payer: MEDICARE

## 2022-06-06 DIAGNOSIS — S83.241D OTHER TEAR OF MEDIAL MENISCUS OF RIGHT KNEE AS CURRENT INJURY, SUBSEQUENT ENCOUNTER: Primary | ICD-10-CM

## 2022-06-06 DIAGNOSIS — M25.561 RIGHT KNEE PAIN, UNSPECIFIED CHRONICITY: ICD-10-CM

## 2022-06-06 PROCEDURE — 97140 MANUAL THERAPY 1/> REGIONS: CPT | Performed by: PHYSICAL THERAPIST

## 2022-06-06 PROCEDURE — 97110 THERAPEUTIC EXERCISES: CPT | Performed by: PHYSICAL THERAPIST

## 2022-06-06 PROCEDURE — 97112 NEUROMUSCULAR REEDUCATION: CPT | Performed by: PHYSICAL THERAPIST

## 2022-06-06 NOTE — PROGRESS NOTES
Daily Note     Today's date: 2022  Patient name: Chio Almeida  : 1950  MRN: [id number]  Referring provider: Dinah Segal  Dx:   Encounter Diagnosis     ICD-10-CM    1  Other tear of medial meniscus of right knee as current injury, subsequent encounter  S84 258D    2  Right knee pain, unspecified chronicity  M25 561                   Subjective: The patient reports feeling 2/10 pain at his R anterior knee over the weekend and 1/10 pain now  Objective: See treatment diary below      Assessment: The patient's R medial thigh bruising has diminished some compared to last week which is an encouraging sign  The patient's pain levels are decreasing and his activity tolerance is improved  The patient had difficulty with static/dynamic balance activities today so they were highlighted as being important tasks to practice with HEP  The patient will benefit from continued PT to achieve his goals of therapy  Plan: Continue per plan of care  Progress treatment as tolerated         Re-Evaluation:  PRECAUTIONS:CARDIAC / perform most exercises in sitting   Knee Specialty Daily Treatment Diary   Access Code [email or web address]          Manual Therapy     Knee flex/EXT stretch    3x:30    Hamstring stretch  *:30x3 :30x3 3x:30    Calf stretch    3x:30    Patellar mobs  *x10 x10 x10    Knee stretch on edge of mat            Ther Ex     Nu Step S=10   *L1 x10 min L1 x10 min L2 x 10 mins    Biodex Bike S=        Heel slide abd, flex x10 ea x10 ea x10ea x10    PROM knee        D K->C  *x10 x10     Prone knee flex        T-band Abd        SLR all planes  *flex x10 Flex 2x5 Flex 3x5    Ball squeeze        LAQ  * :05x10 :05 x10     Hamstring stretch 3x:30 :30x3 :30x3     Calf stretch 3x:30 Wedge :30x Wedge :30x3     Standing hamstring curls                Mini Squat    Reviewed for HEP            TKE  *wall/ball :05x10 wall/ball :05x10     Total gym squats (deep)    partial squat x15    Neuro Re-ed                QS x15 :05 :05x15 :05x15 x15    Gluteal set x10 x10 x15 x15    Tandem stand    B/L 2x:30    Fitter board        Eccentric Leg press        Clam Shells  *:03x10 :03x10 x15            [place]        GAIT Training        sidestepping  *10ft x4 10ft x4 10ft x 6(NRE)    Heel-toe amb   *10 ft x4 10ft x 6(NRE)    Mirror walking        Ther Act        Amb up/down steps        Chair squats        Crate carry        Step ups fwd/lat  *4"x10 fwd  Side x3 4" x10 fwd 6" x10 fwd/side        Modalities        CP  KNEE                            Access Code: [email or web address]  URL: [email or web address]  Date: 06/02/2022  Prepared by: Klarissa Florentino    Exercises  · Supine Quadricep Sets - 2 x daily - 6 x weekly - 2 sets - 10 reps - 5 hold  · Supine Heel Slide - 2 x daily - 6 x weekly - 2 sets - 10 reps - 5 hold  · Supine Hip Abduction - 2 x daily - 6 x weekly - 2 sets - 10 reps - 5 hold  · Seated Calf Stretch with Strap - 2 x daily - 6 x weekly - 1 sets - 5 reps - 30 hold  · Seated Hamstring Stretch - 2 x daily - 6 x weekly - 1 sets - 5 reps - 30 hold  · Seated Ankle Pumps - 3 x daily - 7 x weekly - 3 sets - 10 reps  · Supine Gluteal Sets - 2 x daily - 7 x weekly - 2 sets - 10 reps - 5 hold  · 4 Way Patellar [place] - 2 x daily - 7 x weekly - 1 sets - 10 reps  · Supine Straight Leg Raises - 2 x daily - 7 x weekly - 1 sets - 10 reps  · Supine Double Knee to Chest - 2 x daily - 7 x weekly - 1 sets - 10 reps  · Seated Long Arc Quad - 2 x daily - 7 x weekly - 1 sets - 10 reps - 5 sec hold  · Standing Terminal Knee Extension at Wall with Ball - 2 x daily - 7 x weekly - 1 sets - 10 reps - 5 sec hold  · Tandem Walking - 2 x daily - 7 x weekly - 1 sets - 3-4 reps      ·

## 2022-06-08 ENCOUNTER — OFFICE VISIT (OUTPATIENT)
Dept: PHYSICAL THERAPY | Facility: CLINIC | Age: 72
End: 2022-06-08
Payer: MEDICARE

## 2022-06-08 DIAGNOSIS — S83.241D OTHER TEAR OF MEDIAL MENISCUS OF RIGHT KNEE AS CURRENT INJURY, SUBSEQUENT ENCOUNTER: Primary | ICD-10-CM

## 2022-06-08 DIAGNOSIS — M25.561 RIGHT KNEE PAIN, UNSPECIFIED CHRONICITY: ICD-10-CM

## 2022-06-08 PROCEDURE — 97110 THERAPEUTIC EXERCISES: CPT

## 2022-06-08 PROCEDURE — 97140 MANUAL THERAPY 1/> REGIONS: CPT

## 2022-06-08 PROCEDURE — 97112 NEUROMUSCULAR REEDUCATION: CPT

## 2022-06-08 NOTE — PROGRESS NOTES
Daily Note     Today's date: 2022  Patient name: Dillan Sprague  : 1950  MRN: [id number]  Referring provider: Bri Araya  Dx:   Encounter Diagnosis     ICD-10-CM    1  Other tear of medial meniscus of right knee as current injury, subsequent encounter  S81 337D    2  Right knee pain, unspecified chronicity  M25 561                   Subjective: Patient stated that he's having minimal pain this AM  Ecchymosis and edema continue to decrease  Objective: See treatment diary below    EDU on using CP at home for edema control  Assessment: Tolerated treatment well  Patient demonstrated good static and dynamic balance, completing without UE support  Good power and control noted with TG  Good tolerance to increased reps  Good quad activation note with sets and SLR, completing without lag  Continued PT would be beneficial to improve function           Plan: Continue per plan of care         Re-Evaluation:  PRECAUTIONS:CARDIAC / perform most exercises in sitting   Knee Specialty Daily Treatment Diary   Access Code [email or web address]          Manual Therapy    Knee flex/EXT stretch    3x:30 3x:30   Hamstring stretch  *:30x3 :30x3 3x:30 3x:30   Calf stretch    3x:30 3x:30   Patellar mobs  *x10 x10 x10 ROM    Knee stretch on edge of mat            Ther Ex    Nu Step S=10   *L1 x10 min L1 x10 min L2 x 10 mins L2 x 10 mins   Biodex Bike S=        Heel slide abd, flex x10 ea x10 ea x10ea x10 12x ea   PROM knee        D K->C  *x10 x10     Prone knee flex        T-band Abd        SLR all planes  *flex x10 Flex 2x5 Flex 3x5 Flex  2x10   Ball squeeze        LAQ  * :05x10 :05 x10     Hamstring stretch 3x:30 :30x3 :30x3     Calf stretch 3x:30 Wedge :30x Wedge :30x3     Standing hamstring curls                Mini Squat    Reviewed for HEP            TKE  *wall/ball :05x10 wall/ball :05x10     Total gym squats (deep)    partial squat x15 partial squat 2x10   Neuro Re-ed                QS x15 :05 :05x15 :05x15 x15 15x5"   Gluteal set x10 x10 x15 x15 15x5"   Tandem stand    B/L 2x:30 B/L 2x:30   Fitter board        Eccentric Leg press        Clam Shells  *:03x10 :03x10 x15 2x10           [place]        GAIT Training        sidestepping  *10ft x4 10ft x4 10ft x 6(NRE) 10ft x 6(NRE)   Heel-toe amb   *10 ft x4 10ft x 6(NRE) 10ft x 6(NRE)   Mirror walking        Ther Act        Amb up/down steps        Chair squats        Crate carry        Step ups fwd/lat  *4"x10 fwd  Side x3 4" x10 fwd 6" x10 fwd/side 6" x10 fwd/side       Modalities        CP  KNEE                            Access Code: [email or web address]  URL: [email or web address]  Date: 06/02/2022  Prepared by: Gilmar Florentino    Exercises  · Supine Quadricep Sets - 2 x daily - 6 x weekly - 2 sets - 10 reps - 5 hold  · Supine Heel Slide - 2 x daily - 6 x weekly - 2 sets - 10 reps - 5 hold  · Supine Hip Abduction - 2 x daily - 6 x weekly - 2 sets - 10 reps - 5 hold  · Seated Calf Stretch with Strap - 2 x daily - 6 x weekly - 1 sets - 5 reps - 30 hold  · Seated Hamstring Stretch - 2 x daily - 6 x weekly - 1 sets - 5 reps - 30 hold  · Seated Ankle Pumps - 3 x daily - 7 x weekly - 3 sets - 10 reps  · Supine Gluteal Sets - 2 x daily - 7 x weekly - 2 sets - 10 reps - 5 hold  · 4 Way Patellar [place] - 2 x daily - 7 x weekly - 1 sets - 10 reps  · Supine Straight Leg Raises - 2 x daily - 7 x weekly - 1 sets - 10 reps  · Supine Double Knee to Chest - 2 x daily - 7 x weekly - 1 sets - 10 reps  · Seated Long Arc Quad - 2 x daily - 7 x weekly - 1 sets - 10 reps - 5 sec hold  · Standing Terminal Knee Extension at Wall with Ball - 2 x daily - 7 x weekly - 1 sets - 10 reps - 5 sec hold  · Tandem Walking - 2 x daily - 7 x weekly - 1 sets - 3-4 reps      ·

## 2022-06-10 ENCOUNTER — OFFICE VISIT (OUTPATIENT)
Dept: OBGYN CLINIC | Facility: CLINIC | Age: 72
End: 2022-06-10

## 2022-06-10 VITALS
SYSTOLIC BLOOD PRESSURE: 158 MMHG | HEART RATE: 102 BPM | DIASTOLIC BLOOD PRESSURE: 84 MMHG | HEIGHT: 64 IN | WEIGHT: 206 LBS | BODY MASS INDEX: 35.17 KG/M2

## 2022-06-10 DIAGNOSIS — Z98.890 S/P RIGHT KNEE ARTHROSCOPY: Primary | ICD-10-CM

## 2022-06-10 PROCEDURE — 99024 POSTOP FOLLOW-UP VISIT: CPT | Performed by: ORTHOPAEDIC SURGERY

## 2022-06-10 NOTE — PROGRESS NOTES
ASSESSMENT/PLAN:    Diagnoses and all orders for this visit:    S/P right knee arthroscopy        The patient is doing very well since surgery  His incisions are clean, dry and intact  He should continue physical therapy and occupational therapy weight-bearing as tolerated for strengthening, stretching range of motion  He will follow up with our office in 1 month for strength and motion check  The patient is acceptable to this plan  Return in about 1 month (around 7/10/2022)  The patient is doing quite well from his right knee arthroscopy  Strength and motion are intact  Resolving ecchymosis  No effusion  The majority joint tenderness is now gone  Continue home exercise program   Continue stretching  Return back in 1 month for strength and motion check  If his condition changes, he will not hesitate to let us know      _____________________________________________________  CHIEF COMPLAINT:  Chief Complaint   Patient presents with    Right Knee - Post-op         SUBJECTIVE:  Hai Middleton is a 80 y o  male who presents to our office for postop visit  The patient is status post right knee arthroscopy with medial meniscectomy from 05/23/2022  He states he has been doing very well since surgery  He denies any significant pain  He denies any numbness or tingling  He denies any fever or chills  He has been participating in physical therapy  He is very pleased with the results        The following portions of the patient's history were reviewed and updated as appropriate: allergies, current medications, past family history, past medical history, past social history, past surgical history and problem list     PAST MEDICAL HISTORY:  Past Medical History:   Diagnosis Date    Arthritis     Colon polyp     Diabetes mellitus ([address] )     Dyslipidemia     GERD (gastroesophageal reflux disease)     Hyperlipidemia     Nodule of left lung 2007    negative for [place]    Type 2 diabetes mellitus without complication, without long-term current use of insulin ([facility] [address] ) 01/23/2019       PAST SURGICAL HISTORY:  Past Surgical History:   Procedure Laterality Date    COLONOSCOPY      ELBOW SURGERY Bilateral     FOOT TENDON SURGERY Bilateral     heel    LA KNEE SCOPE,MED/LAT MENISECTOMY Right 5/23/2022    Procedure: KNEE ARTHROSCOPY WITH medial MENISCECTOMY chondroplasty synovectomy injection;  Surgeon: Max Mejia DO;  Location: [facility];  Service: Orthopedics    LA REVISE MEDIAN N/CARPAL TUNNEL SURG Left 06/30/2021    Procedure: RELEASE CARPAL TUNNEL;  Surgeon: Guillermo Lay DO;  Location: [facility] MAIN OR;  Service: Orthopedics       FAMILY HISTORY:  Family History   Problem Relation Age of Onset    COPD Father         80 yrs   [facility] Heart disease Mother         hx MI   [facility] Diabetes Mother     Cancer Brother        SOCIAL HISTORY:  Social History     Tobacco Use    Smoking status: Former Smoker     Quit date: 2007     Years since quitting: 15 4    Smokeless tobacco: Never Used   Vaping Use    Vaping Use: Never used   Substance Use Topics    Alcohol use: Yes     Comment: occ    Drug use: No       MEDICATIONS:    Current Outpatient Medications:     acetaminophen (TYLENOL) 500 mg tablet, Take 500 mg by mouth every 6 (six) hours as needed for mild pain (Patient not taking: No sig reported), Disp: , Rfl:     meloxicam (MOBIC) 15 mg tablet, Take 1 tablet (15 mg total) by mouth in the morning  (Patient not taking: No sig reported), Disp: 30 tablet, Rfl: 0    metFORMIN (GLUCOPHAGE) 500 mg tablet, Take 1 tablet (500 mg total) by mouth in the morning and 1 tablet (500 mg total) in the evening  Take with meals  , Disp: 180 tablet, Rfl: 3    phentermine (ADIPEX-P) 37 5 MG tablet, Take 1 tablet (37 5 mg total) by mouth daily, Disp: 30 tablet, Rfl: 2    simvastatin (ZOCOR) 20 mg tablet, Take 1 tablet (20 mg total) by mouth daily at bedtime, Disp: 90 tablet, Rfl: 3    tamsulosin (FLOMAX) 0 4 mg, Take 1 capsule (0 4 mg total) by mouth daily with dinner, Disp: 90 capsule, Rfl: 3    ALLERGIES:  No Known Allergies    ROS:  Review of Systems     Constitutional: Negative for fatigue, fever or loss of appetite  HENT: Negative  Respiratory: Negative for shortness of breath, dyspnea  Cardiovascular: Negative for chest pain/tightness  Gastrointestinal: Negative for abdominal pain, N/V  Endocrine: Negative for cold/heat intolerance, unexplained weight loss/gain  Genitourinary: Negative for flank pain, dysuria, hematuria  Musculoskeletal:  Negative for arthralgia   Skin: Negative for rash  Neurological: Negative for numbness or tingling  Psychiatric/Behavioral: Negative for agitation  _____________________________________________________  PHYSICAL EXAMINATION:    Blood pressure 158/84, pulse 102, height 5' 4 25" (1 632 m), weight 93 4 kg (206 lb)  Constitutional: Oriented to person, place, and time  Appears well-developed and well-nourished  No distress  HENT:   Head: Normocephalic  Eyes: Conjunctivae are normal  Right eye exhibits no discharge  Left eye exhibits no discharge  No scleral icterus  Cardiovascular: Normal rate  Pulmonary/Chest: Effort normal    Neurological: Alert and oriented to person, place, and time  Skin: Skin is warm and dry  No rash noted  Not diaphoretic  No erythema  No pallor  Psychiatric: Normal mood and affect   Behavior is normal  Judgment and thought content normal       MUSCULOSKELETAL EXAMINATION:   Physical Exam  Ortho Exam    Right lower extremity is neurovascularly intact  Toes are pink and mobile  Compartments are soft  Range of motion of the knee is from 0-120 degrees  Incisions are clean, dry and intact  No ligament laxity  Brisk cap refill  Sensation intact  Objective:  BP Readings from Last 1 Encounters:   06/10/22 158/84      Wt Readings from Last 1 Encounters:   06/10/22 93 4 kg (206 lb)        BMI:   Estimated body mass index is 35 09 kg/m² as calculated from the following:    Height as of this encounter: 5' 4 25" (1 632 m)  Weight as of this encounter: 93 4 kg (206 lb)          Scribe Attestation    I,:  Pio Smith PA-C am acting as a scribe while in the presence of the attending physician :       I,:  Dillan Perez, DO personally performed the services described in this documentation    as scribed in my presence :

## 2022-06-13 ENCOUNTER — OFFICE VISIT (OUTPATIENT)
Dept: PHYSICAL THERAPY | Facility: CLINIC | Age: 72
End: 2022-06-13
Payer: MEDICARE

## 2022-06-13 DIAGNOSIS — S83.241D OTHER TEAR OF MEDIAL MENISCUS OF RIGHT KNEE AS CURRENT INJURY, SUBSEQUENT ENCOUNTER: Primary | ICD-10-CM

## 2022-06-13 DIAGNOSIS — M25.561 RIGHT KNEE PAIN, UNSPECIFIED CHRONICITY: ICD-10-CM

## 2022-06-13 PROCEDURE — 97110 THERAPEUTIC EXERCISES: CPT | Performed by: PHYSICAL THERAPIST

## 2022-06-13 PROCEDURE — 97140 MANUAL THERAPY 1/> REGIONS: CPT | Performed by: PHYSICAL THERAPIST

## 2022-06-13 PROCEDURE — 97112 NEUROMUSCULAR REEDUCATION: CPT | Performed by: PHYSICAL THERAPIST
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
## 2022-06-15 ENCOUNTER — OFFICE VISIT (OUTPATIENT)
Dept: PHYSICAL THERAPY | Facility: CLINIC | Age: 72
End: 2022-06-15
Payer: MEDICARE

## 2022-06-15 DIAGNOSIS — M25.561 RIGHT KNEE PAIN, UNSPECIFIED CHRONICITY: ICD-10-CM

## 2022-06-15 DIAGNOSIS — S83.241D OTHER TEAR OF MEDIAL MENISCUS OF RIGHT KNEE AS CURRENT INJURY, SUBSEQUENT ENCOUNTER: Primary | ICD-10-CM

## 2022-06-15 PROCEDURE — 97110 THERAPEUTIC EXERCISES: CPT

## 2022-06-15 PROCEDURE — 97112 NEUROMUSCULAR REEDUCATION: CPT

## 2022-06-15 NOTE — PROGRESS NOTES
Daily Note     Today's date: 6/15/2022  Patient name: Hai Middleton  : 1950  MRN: [id number]  Referring provider: Yancy Bhakta  Dx:   Encounter Diagnosis     ICD-10-CM    1  Other tear of medial meniscus of right knee as current injury, subsequent encounter  S87 327D    2  Right knee pain, unspecified chronicity  M25 561                   Subjective: Patient denies knee discomfort this morning, however some residual swelling continues  Objective: See treatment diary below       Assessment: Tolerated treatment well  Patient exhibited good technique with therapeutic exercises and would benefit from continued therapy  No complaints reported during today's session  Plan: Continue per plan of care        Re-Evaluation:  PRECAUTIONS:CARDIAC / perform most exercises in sitting   Knee Specialty Daily Treatment Diary   Access Code [email or web address]          Manual Therapy 6/13 6/15 6/2 6/6 6/8   Knee flex/EXT stretch :30 x 3 ea :30 x 3 ea  3x:30 3x:30   Hamstring stretch   :30x3 3x:30 3x:30   Calf stretch    3x:30 3x:30   Patellar mobs x10 ea all gr 4 X 10  x10 x10 ROM    Knee stretch on edge of mat            Ther Ex 6/13 6/15 6/2 6/6 6/8   Nu Step S=10  L4 x 10 mins L4 x 10 mins L1 x10 min L2 x 10 mins L2 x 10 mins   Biodex Bike S=        Heel slide abd, flex x10 ea X 10 ea x10ea x10 12x ea   PROM knee        D K->C   x10     Prone knee flex        T-band Abd        SLR all planes 2x10 flex 2x10 flex Flex 2x5 Flex 3x5 Flex  2x10   Ball squeeze        LAQ   :05 x10     Hamstring stretch 3x:30 3x:30 :30x3     Calf stretch 3x:30 strap 3x:30 strap Wedge :30x3     Standing hamstring curls                Mini Squat    Reviewed for HEP            TKE   wall/ball :05x10     Total gym squats (deep) Partial x25 Partial x25  partial squat x15 partial squat 2x10   Neuro Re-ed                QS HEP  :05x15 x15 15x5"   Gluteal set HEP  x15 x15 15x5"   Tandem stand BLUE EO 2x:30   B/L 2x:30 B/L 2x:30   Fitter board        Eccentric Leg press        Clam Shells x20 RED B/L x20 RED B/L :03x10 x15 2x10   Bridge x10 RED       [place]        GAIT Training        sidestepping 15ft x 4 15ft x 4 10ft x4 10ft x 6(NRE) 10ft x 6(NRE)   Heel-toe amb 15ft x 4 15ft x 4 *10 ft x4 10ft x 6(NRE) 10ft x 6(NRE)   Mirror walking        Ther Act        Amb up/down steps        Chair squats        Crate carry        Step ups fwd/lat 6" x10 fwd/side 6"x10 fwd/side 4" x10 fwd 6" x10 fwd/side 6" x10 fwd/side       Modalities        CP  KNEE                            Access Code: [email or web address]  URL: [email or web address]  Date: 06/02/2022  Prepared by: Abram Zheng Frohnheiser    Exercises  · Supine Quadricep Sets - 2 x daily - 6 x weekly - 2 sets - 10 reps - 5 hold  · Supine Heel Slide - 2 x daily - 6 x weekly - 2 sets - 10 reps - 5 hold  · Supine Hip Abduction - 2 x daily - 6 x weekly - 2 sets - 10 reps - 5 hold  · Seated Calf Stretch with Strap - 2 x daily - 6 x weekly - 1 sets - 5 reps - 30 hold  · Seated Hamstring Stretch - 2 x daily - 6 x weekly - 1 sets - 5 reps - 30 hold  · Seated Ankle Pumps - 3 x daily - 7 x weekly - 3 sets - 10 reps  · Supine Gluteal Sets - 2 x daily - 7 x weekly - 2 sets - 10 reps - 5 hold  · 4 Way Patellar Bath - 2 x daily - 7 x weekly - 1 sets - 10 reps  · Supine Straight Leg Raises - 2 x daily - 7 x weekly - 1 sets - 10 reps  · Supine Double Knee to Chest - 2 x daily - 7 x weekly - 1 sets - 10 reps  · Seated Long Arc Quad - 2 x daily - 7 x weekly - 1 sets - 10 reps - 5 sec hold  · Standing Terminal Knee Extension at Wall with Ball - 2 x daily - 7 x weekly - 1 sets - 10 reps - 5 sec hold  · Tandem Walking - 2 x daily - 7 x weekly - 1 sets - 3-4 reps      ·

## 2022-06-20 ENCOUNTER — EVALUATION (OUTPATIENT)
Dept: PHYSICAL THERAPY | Facility: CLINIC | Age: 72
End: 2022-06-20
Payer: MEDICARE

## 2022-06-20 DIAGNOSIS — S83.241D OTHER TEAR OF MEDIAL MENISCUS OF RIGHT KNEE AS CURRENT INJURY, SUBSEQUENT ENCOUNTER: Primary | ICD-10-CM

## 2022-06-20 DIAGNOSIS — M25.561 RIGHT KNEE PAIN, UNSPECIFIED CHRONICITY: ICD-10-CM

## 2022-06-20 PROCEDURE — 97110 THERAPEUTIC EXERCISES: CPT | Performed by: PHYSICAL THERAPIST

## 2022-06-20 PROCEDURE — 97140 MANUAL THERAPY 1/> REGIONS: CPT | Performed by: PHYSICAL THERAPIST

## 2022-06-20 NOTE — PROGRESS NOTES
PT Re-Evaluation  and PT Discharge    Today's date: 2022  Patient name: Hai Middleton  : 1950  MRN: [id number]  Referring provider: Yancy Bhakta  Dx:   Encounter Diagnosis     ICD-10-CM    1  Other tear of medial meniscus of right knee as current injury, subsequent encounter  S83 241D    2  Right knee pain, unspecified chronicity  M25 561                   Assessment  Assessment details: Hai Middleton is a 80 y o  male with a history of GERD, hyperlipidemia, DM, arthritis, L lung nodule, HTN, and BMI>30 that presents for a physical therapy RE evaluation  The patient has attended 8 sessions of skilled physical therapy  The patient demonstrates signs and symptoms consistent with other tear of medial meniscus of the R knee s/p R knee scope meniscectomy; R LE pain  During the examination the patient demonstrated improved R LE strength, improved R knee ROM, improved gait, and decreased R knee pain  The patient has made functional gains since starting therapy  The patient is now able to ambulate up/down the steps, walk prolonged distances, perform ADL's, and perform work tasks without difficulty  The patient feels he has returned to normal function at this time  The patient will be discharged from formal PT to an independent [facility]  Symptom irritability: lowUnderstanding of Dx/Px/POC: good   Prognosis: good    Goals  STG: Achieve in 4-6 weeks  1  Patient's R knee pain at worst less than 2/10 to allow for proper gait  MET  2  Patient's R knee ROM improve by 10-45 degrees to improve squatting  MET  3   R LE MMT improve to > 4+/5 all motions tested to improve ADL/recreational activities  MET  4  30 second sit to stand score improve by 4 stands ( greater than 14 stands)to indicate improved transfers  MET    LTG:  Achieve in 6-12 weeks  1  Patient's knee FOTO score improve to > 74% to indicate a return to normal functioning  MET  2    Patient achieve personal goal of returning to walking 3 miles without R knee pain  PARTIALLY MET  3  Patient to achieve independence with home exercise plan  MET      Plan  Plan details: D/C PT TO AN INDEPENDENT HEP - PATIENT ACHIEVED HIS GOALS  Treatment plan discussed with: PTA and patient        Subjective Evaluation    History of Present Illness  Date of onset: 2022  Date of surgery: 2022  Mechanism of injury: surgery  Mechanism of injury: SUBJECTIVE: 2022:  The patient reports improvement at his R knee since the initial evaluation  The patient is able to walk/stand for prolonged periods of time and perform all ADL's/work tasks without difficulty  The patient feels he is ready for discharge  The patient will be discharged from formal PT to an independent [facility]  INJURY HISTORY: Hai Middleton is a 80 y o  male that presents to outpatient physical therapy with complaints of R thigh/groin pain s/p R knee scope meniscectomy, synovectomy done by Dr Kailash Phipps at [facility]  The patient denies any post operative complications  The patient attempted conservative care prior to surgery ( PT)  The patient did have S/S of an anterior lumbar derangement which improved with repeated flexion in lying  The patient notes present difficulty with prolonged walking, walking on unlevel surfaces, ambulating up/down the steps, and difficulty squatting/kneeling  The patient's main goal for physical therapy is to be pain free with walking and exercise            Recurrent probem    Pain  Current pain ratin  At best pain ratin  At worst pain ratin  Location: R knee  Quality: sharp ("twinge")  Relieving factors: rest  Aggravating factors: lifting  Progression: improved    Social Support  Steps to enter house: yes  Stairs in house: yes   Lives in: multiple-level home  Lives with: spouse    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)  Hand dominance: right    Treatments  Previous treatment: physical therapy  Patient Goals  Patient goal: walk for leisure/exercise ( partially met)        Objective     Observations     Right Knee   Positive for edema  Negative for abrasion  Additional Observation Details  Patient's R leg bruising has significantly diminished  The patient's incisions are healed, mild edema noted    Palpation     Additional Palpation Details  NO TTP    Tenderness     Right Knee   No tenderness in the inferior patella  Neurological Testing     Sensation     Knee   Left Knee   Intact: light touch    Right Knee   Intact: light touch     Active Range of Motion   Left Knee   Flexion: 135 degrees   Extension: 6 degrees     Right Knee   Flexion: 130 degrees   Extension: 4 degrees     Passive Range of Motion   Left Knee   Normal passive range of motion    Right Knee   Flexion: 135 degrees   Extension: 5 degrees     Mobility   Patellar Mobility:   Left Knee   WFL: medial, lateral, superior and inferior  Right Knee   WFL: medial, lateral, superior and inferior    Patellar Static Positioning   Left Knee: WFL  Right Knee: [facility]    Strength/Myotome Testing     Left Hip   Planes of Motion   Flexion: 4+  Extension: 4+  Abduction: 4+  Adduction: 4+    Right Hip   Planes of Motion   Flexion: 4+  Extension: 4+  Abduction: 4+  Adduction: 4+    Left Knee   Flexion: 5  Extension: 5  Quadriceps contraction: good    Right Knee   Flexion: 4+  Extension: 4+  Quadriceps contraction: good    Additional Strength Details  Improved    Tests     Additional Tests Details  Gait impairments: Patient ambulates with no AD WBAT B/L LE    The patient demonstrates improved gait speed, no abnormalities noted    FOTO: 91% ( predicted 74%)    30SSTS: 20 stands NO hands ( improved 10 stands)    Swelling     Left Knee Girth Measurement (cm)   Joint line: 40 cm  10 cm above joint line: 46 cm    Right Knee Girth Measurement (cm)   Joint line: 40 cm  10 cm above joint line: 50 cm               Re-Evaluation:6/23  PRECAUTIONS:CARDIAC / perform most exercises in sitting   Knee Specialty Daily Treatment Diary   Access Code [email or web address]      Manual Therapy 6/13 6/15 6/20 6/6 6/8   Knee flex/EXT stretch :30 x 3 ea :30 x 3 ea :30x3 ea 3x:30 3x:30   Hamstring stretch    3x:30 3x:30   Calf stretch    3x:30 3x:30   Patellar mobs x10 ea all gr 4 X 10  x10 x10 ROM    Knee stretch on edge of mat            Ther Ex 6/13 6/15 6/20 6/6 6/8   Nu Step S=10  L4 x 10 mins L4 x 10 mins L5 x 10 mins L2 x 10 mins L2 x 10 mins   Biode[employer] Bike S=        Heel slide abd, flex x10 ea X 10 ea x5 x10 12x ea   PROM knee        D K->C        Prone knee flex        T-band Abd        SLR all planes 2x10 flex 2x10 flex  Flex 3x5 Flex  2x10   Ball squeeze        LAQ        Hamstring stretch 3x:30 3x:30      Calf stretch 3x:30 strap 3x:30 strap      Standing hamstring curls        Chair squat   x20     Mini Squat    Reviewed for HEP            TKE        Total gym squats (deep) Partial x25 Partial x25 Partial x30 partial squat x15 partial squat 2x10   Neuro Re-ed                QS HEP   x15 15x5"   Gluteal set HEP   x15 15x5"   Tandem stand BLUE EO 2x:30   B/L 2x:30 B/L 2x:30   Fitter board        Eccentric Leg press        Clam Shells x20 RED B/L x20 RED B/L reviewed x15 2x10   Bridge x10 RED       [place]        GAIT Training        sidestepping 15ft x 4 15ft x 4  10ft x 6(NRE) 10ft x 6(NRE)   Heel-toe amb 15ft x 4 15ft x 4  10ft x 6(NRE) 10ft x 6(NRE)   Mirror walking        Ther Act        Amb up/down steps        Chair squats        Crate carry        Step ups fwd/lat 6" x10 fwd/side 6"x10 fwd/side  6" x10 fwd/side 6" x10 fwd/side       Modalities        CP  KNEE                            Access Code: [email or web address]

## 2022-06-22 ENCOUNTER — APPOINTMENT (OUTPATIENT)
Dept: PHYSICAL THERAPY | Facility: CLINIC | Age: 72
End: 2022-06-22
Payer: MEDICARE

## 2022-07-07 ENCOUNTER — APPOINTMENT (OUTPATIENT)
Dept: RADIOLOGY | Facility: CLINIC | Age: 72
End: 2022-07-07
Payer: MEDICARE

## 2022-07-07 ENCOUNTER — OFFICE VISIT (OUTPATIENT)
Dept: URGENT CARE | Facility: CLINIC | Age: 72
End: 2022-07-07
Payer: MEDICARE

## 2022-07-07 VITALS
TEMPERATURE: 97.8 F | WEIGHT: 200 LBS | RESPIRATION RATE: 18 BRPM | HEART RATE: 104 BPM | HEIGHT: 64 IN | BODY MASS INDEX: 34.15 KG/M2 | DIASTOLIC BLOOD PRESSURE: 73 MMHG | SYSTOLIC BLOOD PRESSURE: 116 MMHG | OXYGEN SATURATION: 96 %

## 2022-07-07 DIAGNOSIS — S59.901A INJURY OF RIGHT ELBOW, INITIAL ENCOUNTER: ICD-10-CM

## 2022-07-07 DIAGNOSIS — S59.901A INJURY OF RIGHT ELBOW, INITIAL ENCOUNTER: Primary | ICD-10-CM

## 2022-07-07 PROCEDURE — 99213 OFFICE O/P EST LOW 20 MIN: CPT | Performed by: NURSE PRACTITIONER

## 2022-07-07 PROCEDURE — 73080 X-RAY EXAM OF ELBOW: CPT

## 2022-07-07 PROCEDURE — G0463 HOSPITAL OUTPT CLINIC VISIT: HCPCS | Performed by: NURSE PRACTITIONER

## 2022-07-07 RX ORDER — METHYLPREDNISOLONE 4 MG/1
TABLET ORAL
Qty: 21 TABLET | Refills: 0 | Status: SHIPPED | OUTPATIENT
Start: 2022-07-07 | End: 2022-10-10

## 2022-07-07 NOTE — PROGRESS NOTES
[address] [facility]        NAME: Ngoc oJ is a 80 y o  male  : 1950    MRN: [id number]  DATE: 2022  TIME: 12:55 PM    Assessment and Plan   Injury of right elbow, initial encounter [Q52 901A]  1  Injury of right elbow, initial encounter  XR elbow 3+ vw right    methylPREDNISolone 4 MG tablet therapy pack    Diclofenac Sodium (VOLTAREN) 1 %     Xray shows no acute osseous abnormality per my read  Ace wrap applied    Patient Instructions     Patient Instructions   Rest, ice, elevate  Wear ace wrap as directed  Tylenol as needed for pain  If you develop any increased pain, swelling, numbness, tingling, or any new or concerning symptoms please return or proceed to ER  Follow up with pcp in 3-5 days  Follow up with PCP in 3-5 days  Proceed to  ER if symptoms worsen  Chief Complaint     Chief Complaint   Patient presents with   [address] [place] [address] on back off  truck, arms hit ground, right elbow now has a lump  Aches, tender to touch, tightness when bent         History of Present Illness       Arm Pain   The incident occurred 5 to 7 days ago  The incident occurred at home  The injury mechanism was a fall (states that he was standing on a bucket to get into the bed of his truck when he fell backwards, hit right elbow, denies any head injury or LOC  denies any head, neck or back pain)  The quality of the pain is described as aching  The pain does not radiate  The pain is mild  The pain has been intermittent since the incident  Pertinent negatives include no chest pain, muscle weakness, numbness or tingling  Nothing aggravates the symptoms  Review of Systems   Review of Systems   Constitutional: Negative for chills, diaphoresis, fatigue and fever  Respiratory: Negative  Cardiovascular: Negative for chest pain  Gastrointestinal: Negative  Musculoskeletal: Positive for arthralgias and joint swelling   Negative for back pain, gait problem, myalgias, neck pain and neck stiffness  Skin: Negative for rash  Neurological: Negative for dizziness, tingling, syncope, weakness, light-headedness, numbness and headaches  Current Medications       Current Outpatient Medications:     Diclofenac Sodium (VOLTAREN) 1 %, Apply 2 g topically 4 (four) times a day, Disp: 100 g, Rfl: 0    metFORMIN (GLUCOPHAGE) 500 mg tablet, Take 1 tablet (500 mg total) by mouth in the morning and 1 tablet (500 mg total) in the evening  Take with meals  , Disp: 180 tablet, Rfl: 3    methylPREDNISolone 4 MG tablet therapy pack, Use as directed on package, Disp: 21 tablet, Rfl: 0    phentermine (ADIPEX-P) 37 5 MG tablet, Take 1 tablet (37 5 mg total) by mouth daily, Disp: 30 tablet, Rfl: 2    simvastatin (ZOCOR) 20 mg tablet, Take 1 tablet (20 mg total) by mouth daily at bedtime, Disp: 90 tablet, Rfl: 3    tamsulosin (FLOMAX) 0 4 mg, Take 1 capsule (0 4 mg total) by mouth daily with dinner, Disp: 90 capsule, Rfl: 3    acetaminophen (TYLENOL) 500 mg tablet, Take 500 mg by mouth every 6 (six) hours as needed for mild pain (Patient not taking: No sig reported), Disp: , Rfl:     meloxicam (MOBIC) 15 mg tablet, Take 1 tablet (15 mg total) by mouth in the morning   (Patient not taking: No sig reported), Disp: 30 tablet, Rfl: 0    Current Allergies     Allergies as of 07/07/2022    (No Known Allergies)            The following portions of the patient's history were reviewed and updated as appropriate: allergies, current medications, past family history, past medical history, past social history, past surgical history and problem list      Past Medical History:   Diagnosis Date    Arthritis     Colon polyp     Diabetes mellitus ([facility] )     Dyslipidemia     GERD (gastroesophageal reflux disease)     Hyperlipidemia     Nodule of left lung 2007    negative for CA    Type 2 diabetes mellitus without complication, without long-term current use of insulin ([facility] [address] ) 01/23/2019       Past Surgical History: Procedure Laterality Date    COLONOSCOPY      ELBOW SURGERY Bilateral     FOOT TENDON SURGERY Bilateral     heel    [place] KNEE SCOPE,MED/LAT MENISECTOMY Right 5/23/2022    Procedure: KNEE ARTHROSCOPY WITH medial MENISCECTOMY chondroplasty synovectomy injection;  Surgeon: Ana Samayoa DO;  Location: [facility];  Service: Orthopedics    [place] REVISE MEDIAN N/CARPAL TUNNEL SURG Left 06/30/2021    Procedure: RELEASE CARPAL TUNNEL;  Surgeon: Humera Coto DO;  Location: [address] [place] [address] MAIN OR;  Service: Orthopedics       Family History   Problem Relation Age of Onset    COPD Father         80 yrs   [facility] Heart disease Mother         hx MI   [facility] Diabetes Mother     Cancer Brother          Medications have been verified  Objective   /73   Pulse 104   Temp 97 8 °F (36 6 °C)   Resp 18   Ht 5' 4" (1 626 m)   Wt 90 7 kg (200 lb)   SpO2 96%   BMI 34 33 kg/m²   No LMP for male patient  Physical Exam     Physical Exam  Constitutional:       General: He is not in acute distress  Appearance: Normal appearance  He is not diaphoretic  HENT:      Head: Normocephalic and atraumatic  Cardiovascular:      Rate and Rhythm: Normal rate and regular rhythm  Pulses: Normal pulses  Radial pulses are 2+ on the right side and 2+ on the left side  Heart sounds: Normal heart sounds, S1 normal and S2 normal    Pulmonary:      Effort: Pulmonary effort is normal       Breath sounds: Normal breath sounds and air entry  Musculoskeletal:      Right elbow: Swelling present  No deformity, effusion or lacerations  Normal range of motion  Tenderness present in olecranon process  Left elbow: Normal       Cervical back: Normal       Thoracic back: Normal       Lumbar back: Normal    Neurological:      Mental Status: He is alert and oriented to person, place, and time

## 2022-07-07 NOTE — PATIENT INSTRUCTIONS
Rest, ice, elevate  Wear ace wrap as directed  Tylenol as needed for pain  If you develop any increased pain, swelling, numbness, tingling, or any new or concerning symptoms please return or proceed to ER  Follow up with pcp in 3-5 days

## 2022-08-24 DIAGNOSIS — E66.01 CLASS 2 SEVERE OBESITY DUE TO EXCESS CALORIES WITH SERIOUS COMORBIDITY AND BODY MASS INDEX (BMI) OF 36.0 TO 36.9 IN ADULT (HCC): ICD-10-CM

## 2022-08-24 NOTE — TELEPHONE ENCOUNTER
Patient requesting refill(s) of:Phentermine     Last filled: 4/11/2022 30 tabs 2 refills   Last appt: 5/19/2022  Next appt: 10/174/2022  Pharmacy: [facility]

## 2022-08-25 RX ORDER — PHENTERMINE HYDROCHLORIDE 37.5 MG/1
37.5 TABLET ORAL DAILY
Qty: 30 TABLET | Refills: 2 | Status: SHIPPED | OUTPATIENT
Start: 2022-08-25

## 2022-09-17 ENCOUNTER — APPOINTMENT (OUTPATIENT)
Dept: LAB | Facility: CLINIC | Age: 72
End: 2022-09-17
Payer: MEDICARE

## 2022-09-17 DIAGNOSIS — E11.9 TYPE 2 DIABETES MELLITUS WITHOUT COMPLICATION, WITHOUT LONG-TERM CURRENT USE OF INSULIN (HCC): ICD-10-CM

## 2022-09-17 DIAGNOSIS — E78.00 HYPERCHOLESTEREMIA: ICD-10-CM

## 2022-09-17 LAB
ALBUMIN SERPL BCP-MCNC: 4 G/DL (ref 3.5–5)
ALP SERPL-CCNC: 52 U/L (ref 46–116)
ALT SERPL W P-5'-P-CCNC: 28 U/L (ref 12–78)
ANION GAP SERPL CALCULATED.3IONS-SCNC: 5 MMOL/L (ref 4–13)
AST SERPL W P-5'-P-CCNC: 12 U/L (ref 5–45)
BASOPHILS # BLD AUTO: 0.05 THOUSANDS/ΜL (ref 0–0.1)
BASOPHILS NFR BLD AUTO: 1 % (ref 0–1)
BILIRUB SERPL-MCNC: 0.41 MG/DL (ref 0.2–1)
BUN SERPL-MCNC: 17 MG/DL (ref 5–25)
CALCIUM SERPL-MCNC: 9.1 MG/DL (ref 8.3–10.1)
CHLORIDE SERPL-SCNC: 108 MMOL/L (ref 96–108)
CHOLEST SERPL-MCNC: 115 MG/DL
CO2 SERPL-SCNC: 22 MMOL/L (ref 21–32)
CREAT SERPL-MCNC: 1.02 MG/DL (ref 0.6–1.3)
CREAT UR-MCNC: 83.4 MG/DL
EOSINOPHIL # BLD AUTO: 0.14 THOUSAND/ΜL (ref 0–0.61)
EOSINOPHIL NFR BLD AUTO: 3 % (ref 0–6)
ERYTHROCYTE [DISTWIDTH] IN BLOOD BY AUTOMATED COUNT: 13.5 % (ref 11.6–15.1)
EST. AVERAGE GLUCOSE BLD GHB EST-MCNC: 146 MG/DL
GFR SERPL CREATININE-BSD FRML MDRD: 73 ML/MIN/1.73SQ M
GLUCOSE P FAST SERPL-MCNC: 137 MG/DL (ref 65–99)
HBA1C MFR BLD: 6.7 %
HCT VFR BLD AUTO: 50 % (ref 36.5–49.3)
HDLC SERPL-MCNC: 43 MG/DL
HGB BLD-MCNC: 16.2 G/DL (ref 12–17)
IMM GRANULOCYTES # BLD AUTO: 0.05 THOUSAND/UL (ref 0–0.2)
IMM GRANULOCYTES NFR BLD AUTO: 1 % (ref 0–2)
LDLC SERPL CALC-MCNC: 53 MG/DL (ref 0–100)
LYMPHOCYTES # BLD AUTO: 1.6 THOUSANDS/ΜL (ref 0.6–4.47)
LYMPHOCYTES NFR BLD AUTO: 31 % (ref 14–44)
MCH RBC QN AUTO: 29.7 PG (ref 26.8–34.3)
MCHC RBC AUTO-ENTMCNC: 32.4 G/DL (ref 31.4–37.4)
MCV RBC AUTO: 92 FL (ref 82–98)
MICROALBUMIN UR-MCNC: 8.6 MG/L (ref 0–20)
MICROALBUMIN/CREAT 24H UR: 10 MG/G CREATININE (ref 0–30)
MONOCYTES # BLD AUTO: 0.5 THOUSAND/ΜL (ref 0.17–1.22)
MONOCYTES NFR BLD AUTO: 10 % (ref 4–12)
NEUTROPHILS # BLD AUTO: 2.84 THOUSANDS/ΜL (ref 1.85–7.62)
NEUTS SEG NFR BLD AUTO: 54 % (ref 43–75)
NONHDLC SERPL-MCNC: 72 MG/DL
NRBC BLD AUTO-RTO: 0 /100 WBCS
PLATELET # BLD AUTO: 176 THOUSANDS/UL (ref 149–390)
PMV BLD AUTO: 11.4 FL (ref 8.9–12.7)
POTASSIUM SERPL-SCNC: 4.1 MMOL/L (ref 3.5–5.3)
PROT SERPL-MCNC: 7.2 G/DL (ref 6.4–8.4)
RBC # BLD AUTO: 5.46 MILLION/UL (ref 3.88–5.62)
SODIUM SERPL-SCNC: 135 MMOL/L (ref 135–147)
TRIGL SERPL-MCNC: 93 MG/DL
WBC # BLD AUTO: 5.18 THOUSAND/UL (ref 4.31–10.16)

## 2022-09-17 PROCEDURE — 85025 COMPLETE CBC W/AUTO DIFF WBC: CPT

## 2022-09-17 PROCEDURE — 80053 COMPREHEN METABOLIC PANEL: CPT

## 2022-09-17 PROCEDURE — 36415 COLL VENOUS BLD VENIPUNCTURE: CPT

## 2022-09-17 PROCEDURE — 83036 HEMOGLOBIN GLYCOSYLATED A1C: CPT

## 2022-09-17 PROCEDURE — 80061 LIPID PANEL: CPT

## 2022-09-26 ENCOUNTER — OFFICE VISIT (OUTPATIENT)
Dept: OBGYN CLINIC | Facility: CLINIC | Age: 72
End: 2022-09-26
Payer: MEDICARE

## 2022-09-26 VITALS — HEIGHT: 64 IN | WEIGHT: 204 LBS | BODY MASS INDEX: 34.83 KG/M2

## 2022-09-26 DIAGNOSIS — M70.21 OLECRANON BURSITIS OF RIGHT ELBOW: ICD-10-CM

## 2022-09-26 DIAGNOSIS — M17.11 PRIMARY OSTEOARTHRITIS OF RIGHT KNEE: Primary | ICD-10-CM

## 2022-09-26 PROCEDURE — 20610 DRAIN/INJ JOINT/BURSA W/O US: CPT | Performed by: ORTHOPAEDIC SURGERY

## 2022-09-26 PROCEDURE — 20605 DRAIN/INJ JOINT/BURSA W/O US: CPT | Performed by: ORTHOPAEDIC SURGERY

## 2022-09-26 PROCEDURE — 99213 OFFICE O/P EST LOW 20 MIN: CPT | Performed by: ORTHOPAEDIC SURGERY

## 2022-09-26 RX ORDER — BUPIVACAINE HYDROCHLORIDE 2.5 MG/ML
4 INJECTION, SOLUTION INFILTRATION; PERINEURAL
Status: COMPLETED | OUTPATIENT
Start: 2022-09-26 | End: 2022-09-26

## 2022-09-26 RX ORDER — METHYLPREDNISOLONE ACETATE 40 MG/ML
2 INJECTION, SUSPENSION INTRA-ARTICULAR; INTRALESIONAL; INTRAMUSCULAR; SOFT TISSUE
Status: COMPLETED | OUTPATIENT
Start: 2022-09-26 | End: 2022-09-26

## 2022-09-26 RX ADMIN — METHYLPREDNISOLONE ACETATE 2 ML: 40 INJECTION, SUSPENSION INTRA-ARTICULAR; INTRALESIONAL; INTRAMUSCULAR; SOFT TISSUE at 10:44

## 2022-09-26 RX ADMIN — BUPIVACAINE HYDROCHLORIDE 4 ML: 2.5 INJECTION, SOLUTION INFILTRATION; PERINEURAL at 10:44

## 2022-09-26 NOTE — PROGRESS NOTES
Assessment:   Diagnosis ICD-10-CM Associated Orders   1  Primary osteoarthritis of right knee  M17 11    2  Olecranon bursitis of right elbow  M70 21        Plan:  He was offered, accepted, performed an aspiration of his right knee with injection of cortisone for symptomatic relief  An aspiration of 20 cc of clear fluid was taken out of his right knee  He was offered, accepted, performed an aspiration of 6 cc of blood taken out of the right elbow  He tolerated procedure well  Ice and post injection protocol advised  Weightbearing activities as tolerated  He will be seen for follow-up in 6 weeks for re-evaluation and consideration for repeat injections as necessary  Patient expresses understanding and is in agreement with this treatment plan  The patient was given the opportunity to ask questions or present concerns  To do next visit:  Return in about 6 weeks (around 11/7/2022) for Recheck  The above stated was discussed in layman's terms and the patient expressed understanding  All questions were answered to the patient's satisfaction  The patient has synovitis with degenerative joint disease of the right knee  This was aspirated of 20 cc of clear snow fluid and injected with Kenalog and Marcaine  He also has traumatic bursitis-olecranon his right elbow after a fall  6 cc of blood were aspirated  He was instructed to avoid any excessive bleeding on his elbow  Return back in 6 weeks re-evaluation  If his condition changes, he will not hesitate to let us know      Scribe Attestation    I,:  Aubrey Russell am acting as a scribe while in the presence of the attending physician :       I,:  Kayla Contreras, DO personally performed the services described in this documentation    as scribed in my presence :             Subjective:   Bj Gray is a 71 y o  male who presents today for a follow-up evaluation of his right elbow and knee due to chronic pain   Patient state that he does not recall any EMEKA but states that he thinks he twisted it  He is experiencing pain along the medial joint line  Secondary, he states that he has fluid build up in his right elbow  Patient expresses that he would like an aspiration of the fluid  He denies any recent bruising, numbness, tingling, or feelings of instability  He also denies any fevers, chills or shortness of breath  Review of systems negative unless otherwise specified in HPI  Review of Systems   Constitutional: Negative for chills and fever  HENT: Negative for ear pain and sore throat  Eyes: Negative for pain and visual disturbance  Respiratory: Negative for cough and shortness of breath  Cardiovascular: Negative for chest pain and palpitations  Gastrointestinal: Negative for abdominal pain and vomiting  Genitourinary: Negative for dysuria and hematuria  Musculoskeletal: Negative for arthralgias and back pain  Skin: Negative for color change and rash  Neurological: Negative for seizures and syncope  All other systems reviewed and are negative        Past Medical History:   Diagnosis Date    Arthritis     Colon polyp     Diabetes mellitus ([facility] [address] )     Dyslipidemia     GERD (gastroesophageal reflux disease)     Hyperlipidemia     Nodule of left lung 2007    negative for CA    Type 2 diabetes mellitus without complication, without long-term current use of insulin [facility] ) 01/23/2019       Past Surgical History:   Procedure Laterality Date    COLONOSCOPY      ELBOW SURGERY Bilateral     FOOT TENDON SURGERY Bilateral     heel    [place] KNEE SCOPE,MED/LAT MENISECTOMY Right 5/23/2022    Procedure: KNEE ARTHROSCOPY WITH medial MENISCECTOMY chondroplasty synovectomy injection;  Surgeon: Glo Baxter DO;  Location: [facility];  Service: Orthopedics    [place] REVISE MEDIAN N/CARPAL TUNNEL SURG Left 06/30/2021    Procedure: RELEASE CARPAL TUNNEL;  Surgeon: Dawit Salomon DO;  Location: [facility] MAIN OR;  Service: Orthopedics       Family History   Problem Relation Age of Onset    COPD Father         80 yrs   [facility] Heart disease Mother         hx MI   [facility] Diabetes Mother     Cancer Brother        Social History     Occupational History    Not on file   Tobacco Use    Smoking status: Former Smoker     Quit date: 2007     Years since quitting: 15 7    Smokeless tobacco: Never Used   Vaping Use    Vaping Use: Never used   Substance and Sexual Activity    Alcohol use: Yes     Comment: occ    Drug use: No    Sexual activity: Yes     Partners: Female     Birth control/protection: None         Current Outpatient Medications:     Diclofenac Sodium (VOLTAREN) 1 %, Apply 2 g topically 4 (four) times a day, Disp: 100 g, Rfl: 0    metFORMIN (GLUCOPHAGE) 500 mg tablet, Take 1 tablet (500 mg total) by mouth in the morning and 1 tablet (500 mg total) in the evening  Take with meals  , Disp: 180 tablet, Rfl: 3    methylPREDNISolone 4 MG tablet therapy pack, Use as directed on package, Disp: 21 tablet, Rfl: 0    phentermine (ADIPEX-P) 37 5 MG tablet, Take 1 tablet (37 5 mg total) by mouth daily, Disp: 30 tablet, Rfl: 2    simvastatin (ZOCOR) 20 mg tablet, Take 1 tablet (20 mg total) by mouth daily at bedtime, Disp: 90 tablet, Rfl: 3    tamsulosin (FLOMAX) 0 4 mg, Take 1 capsule (0 4 mg total) by mouth daily with dinner, Disp: 90 capsule, Rfl: 3    acetaminophen (TYLENOL) 500 mg tablet, Take 500 mg by mouth every 6 (six) hours as needed for mild pain (Patient not taking: No sig reported), Disp: , Rfl:     meloxicam (MOBIC) 15 mg tablet, Take 1 tablet (15 mg total) by mouth in the morning   (Patient not taking: No sig reported), Disp: 30 tablet, Rfl: 0    No Known Allergies       Vitals:       Objective:                    Ortho Exam  right knee(s) -   Patient ambulates with antalgic gait pattern  Uses No assistive device  No anatomical deformity  Skin is warm and dry to touch with no signs of erythema, ecchymosis, or infection  Mild generalized soft tissue swelling or effusion noted  ROM (5° - 115°)  MMT: 4/5 throughout  TTP over medial joint line, no popliteal fullness appreciated on exam   Flexor and extensor mechanisms are intact   Knee is stable to varus and valgus stress  - Lachman's  - Anterior Drawer, - Posterior Drawer  - Medial Juany's, - Lateral Juany's  - Pivot Shift  Patella tracks centrally with palpable crepitus  Calf compartments are soft and supple  - Rachel's sign  2+ DP and PT pulses with brisk capillary refill to the toes  Sural, saphenous, tibial, superficial, and deep peroneal motor and sensory distributions intact  Sensation light touch intact distally    right Elbow -   No anatomical deformity  Skin is warm and dry to touch with no signs of erythema, ecchymosis, or infection  With soft tissue swelling or effusion noted  With palpable crepitus with passive motion  TTP over olecranon bursa  ROM: deferred  MMT: 4/5 throughout  - varus laxity, - valgus laxity  Demonstrates normal shoulder, wrist, and finger motion  - radial head instability  - ulnar nerve subluxation  2+ distal radial pulse with brisk capillary refill to the fingers  Radial, median, and ulnar motor and sensory distrubution intact  Sensation to light touch intact distally     Diagnostics, reviewed and taken today if performed as documented:    None performed    Procedures, if performed today:    Large joint arthrocentesis: R knee  Universal Protocol:  Consent: Verbal consent obtained  Risks and benefits: risks, benefits and alternatives were discussed  Consent given by: patient  Time out: Immediately prior to procedure a "time out" was called to verify the correct patient, procedure, equipment, support staff and site/side marked as required    Timeout called at: 9/26/2022 10:35 AM   Patient understanding: patient states understanding of the procedure being performed  Site marked: the operative site was marked  Patient identity confirmed: verbally with patient    Supporting Documentation  Indications: pain and diagnostic evaluation   Procedure Details  Location: knee - R knee  Needle size: 22 G  Ultrasound guidance: no  Approach: anterolateral  Medications administered: 4 mL bupivacaine 0 25 %; 2 mL methylPREDNISolone acetate 40 mg/mL    Aspirate amount: 20 mL  Aspirate: clear    Patient tolerance: patient tolerated the procedure well with no immediate complications  Dressing:  Sterile dressing applied    Medium joint arthrocentesis: R olecranon bursa  Universal Protocol:  Consent: Verbal consent obtained  Risks and benefits: risks, benefits and alternatives were discussed  Consent given by: patient  Time out: Immediately prior to procedure a "time out" was called to verify the correct patient, procedure, equipment, support staff and site/side marked as required  Timeout called at: 9/26/2022 10:30 AM   Patient understanding: patient states understanding of the procedure being performed  Site marked: the operative site was marked  Patient identity confirmed: verbally with patient    Supporting Documentation  Indications: pain and diagnostic evaluation   Procedure Details  Location: elbow - R olecranon bursa  Preparation: Patient was prepped and draped in the usual sterile fashion  Needle size: 22 G  Ultrasound guidance: no  Approach: anteromedial    Aspirate amount: 20 mL  Aspirate: bloody    Patient tolerance: patient tolerated the procedure well with no immediate complications  Dressing:  Sterile dressing applied            Portions of the record may have been created with voice recognition software  Occasional wrong word or "sound a like" substitutions may have occurred due to the inherent limitations of voice recognition software  Read the chart carefully and recognize, using context, where substitutions have occurred

## 2022-10-03 ENCOUNTER — TELEPHONE (OUTPATIENT)
Dept: FAMILY MEDICINE CLINIC | Facility: CLINIC | Age: 72
End: 2022-10-03

## 2022-10-03 NOTE — TELEPHONE ENCOUNTER
Pt dropped off form for  diabetic waiver form to be filled out     Scanned to chart and put in provider folder

## 2022-10-05 LAB
LEFT EYE DIABETIC RETINOPATHY: NORMAL
RIGHT EYE DIABETIC RETINOPATHY: NORMAL

## 2022-10-10 ENCOUNTER — OFFICE VISIT (OUTPATIENT)
Dept: OBGYN CLINIC | Facility: CLINIC | Age: 72
End: 2022-10-10
Payer: MEDICARE

## 2022-10-10 VITALS
WEIGHT: 202 LBS | SYSTOLIC BLOOD PRESSURE: 133 MMHG | HEIGHT: 64 IN | HEART RATE: 92 BPM | BODY MASS INDEX: 34.49 KG/M2 | DIASTOLIC BLOOD PRESSURE: 77 MMHG

## 2022-10-10 DIAGNOSIS — M70.21 OLECRANON BURSITIS OF RIGHT ELBOW: Primary | ICD-10-CM

## 2022-10-10 PROCEDURE — 99213 OFFICE O/P EST LOW 20 MIN: CPT | Performed by: ORTHOPAEDIC SURGERY

## 2022-10-10 PROCEDURE — 20605 DRAIN/INJ JOINT/BURSA W/O US: CPT | Performed by: ORTHOPAEDIC SURGERY

## 2022-10-10 NOTE — PROGRESS NOTES
Assessment/Plan:    No problem-specific Assessment & Plan notes found for this encounter  Diagnoses and all orders for this visit:    Olecranon bursitis of right elbow          Under aseptic technique, the right elbow was aspirated of 3 cc of blood  This is 50% less than the last aspiration  Maintain next appointment in a month as previously scheduled  If his condition changes, he will not hesitate to let us know    Subjective:      Patient ID: Dex Varma is a 71 y o  male  HPI     The patient has a history of olecranon bursitis of his right elbow  He is concerned that the fluid has returned  He denies any numbness or tingling  He denies any fever or chills  The following portions of the patient's history were reviewed and updated as appropriate: allergies, current medications, past medical history, past social history, past surgical history and problem list     Review of Systems   Constitutional: Negative for chills, fever and unexpected weight change  HENT: Negative for hearing loss, nosebleeds and sore throat  Eyes: Negative for pain, redness and visual disturbance  Respiratory: Negative for cough, shortness of breath and wheezing  Cardiovascular: Negative for chest pain, palpitations and leg swelling  Gastrointestinal: Negative for abdominal pain, nausea and vomiting  Endocrine: Negative for polydipsia and polyuria  Genitourinary: Negative for dysuria and hematuria  Musculoskeletal: Positive for arthralgias, joint swelling and myalgias  Negative for back pain, gait problem, neck pain and neck stiffness  As noted in HPI   Skin: Negative for rash and wound  Neurological: Negative for dizziness, numbness and headaches  Psychiatric/Behavioral: Negative for decreased concentration and suicidal ideas  The patient is not nervous/anxious            Objective:      /77   Pulse 92   Ht 5' 4" (1 626 m)   Wt 91 6 kg (202 lb)   BMI 34 67 kg/m²          Physical Exam        Right upper extremity is neurovascular intact  Fingers are pink and mobile  Compartments are soft  There is a thickened olecranon bursal sac with fluid present  No warmth erythema  Extensor mechanism intact  Medium joint arthrocentesis: R olecranon bursa  Universal Protocol:  Consent: Verbal consent obtained  Written consent not obtained  Risks and benefits: risks, benefits and alternatives were discussed  Consent given by: patient  Time out: Immediately prior to procedure a "time out" was called to verify the correct patient, procedure, equipment, support staff and site/side marked as required  Patient understanding: patient states understanding of the procedure being performed  Test results: test results available and properly labeled  Site marked: the operative site was marked  Radiology Images displayed and confirmed   If images not available, report reviewed: imaging studies available  Patient identity confirmed: verbally with patient    Supporting Documentation  Indications: pain   Procedure Details  Location: elbow - R olecranon bursa  Preparation: Patient was prepped and draped in the usual sterile fashion  Needle size: 18 G  Ultrasound guidance: no  Approach: posterior    Aspirate amount: 3 mL  Aspirate: bloody    Patient tolerance: patient tolerated the procedure well with no immediate complications  Dressing:  Sterile dressing applied

## 2022-10-17 ENCOUNTER — OFFICE VISIT (OUTPATIENT)
Dept: FAMILY MEDICINE CLINIC | Facility: CLINIC | Age: 72
End: 2022-10-17
Payer: MEDICARE

## 2022-10-17 VITALS
BODY MASS INDEX: 34.83 KG/M2 | HEART RATE: 88 BPM | OXYGEN SATURATION: 98 % | RESPIRATION RATE: 20 BRPM | DIASTOLIC BLOOD PRESSURE: 72 MMHG | SYSTOLIC BLOOD PRESSURE: 128 MMHG | WEIGHT: 204 LBS | HEIGHT: 64 IN | TEMPERATURE: 97.4 F

## 2022-10-17 DIAGNOSIS — E11.9 TYPE 2 DIABETES MELLITUS WITHOUT COMPLICATION, WITHOUT LONG-TERM CURRENT USE OF INSULIN (HCC): ICD-10-CM

## 2022-10-17 DIAGNOSIS — E66.01 CLASS 2 SEVERE OBESITY DUE TO EXCESS CALORIES WITH SERIOUS COMORBIDITY AND BODY MASS INDEX (BMI) OF 35.0 TO 35.9 IN ADULT (HCC): ICD-10-CM

## 2022-10-17 DIAGNOSIS — Z00.00 MEDICARE ANNUAL WELLNESS VISIT, SUBSEQUENT: Primary | ICD-10-CM

## 2022-10-17 DIAGNOSIS — Z23 ENCOUNTER FOR IMMUNIZATION: ICD-10-CM

## 2022-10-17 DIAGNOSIS — E78.00 HYPERCHOLESTEREMIA: ICD-10-CM

## 2022-10-17 DIAGNOSIS — Z12.5 PROSTATE CANCER SCREENING: ICD-10-CM

## 2022-10-17 DIAGNOSIS — N40.0 ENLARGED PROSTATE: ICD-10-CM

## 2022-10-17 PROBLEM — E66.812 CLASS 2 SEVERE OBESITY DUE TO EXCESS CALORIES WITH SERIOUS COMORBIDITY AND BODY MASS INDEX (BMI) OF 35.0 TO 35.9 IN ADULT (HCC): Status: ACTIVE | Noted: 2019-07-25

## 2022-10-17 PROCEDURE — G0439 PPPS, SUBSEQ VISIT: HCPCS | Performed by: NURSE PRACTITIONER

## 2022-10-17 PROCEDURE — G0008 ADMIN INFLUENZA VIRUS VAC: HCPCS

## 2022-10-17 PROCEDURE — 90662 IIV NO PRSV INCREASED AG IM: CPT

## 2022-10-17 NOTE — PATIENT INSTRUCTIONS
Continue medication as ordered  Please get blood work before next visit  Work on diet modification for weight loss and diabetes- low carbohydrate and sugar  Follow up in 6 months  Call with any questions/concerns  Call when Phentermine refill needed- please take 2-4 weeks off in between prescriptions  Medicare Preventive Visit Patient Instructions  Thank you for completing your Welcome to Medicare Visit or Medicare Annual Wellness Visit today  Your next wellness visit will be due in one year (10/18/2023)  The screening/preventive services that you may require over the next 5-10 years are detailed below  Some tests may not apply to you based off risk factors and/or age  Screening tests ordered at today's visit but not completed yet may show as past due  Also, please note that scanned in results may not display below  Preventive Screenings:  Service Recommendations Previous Testing/Comments   Colorectal Cancer Screening  Colonoscopy    Fecal Occult Blood Test (FOBT)/Fecal Immunochemical Test (FIT)  Fecal DNA/Cologuard Test  Flexible Sigmoidoscopy Age: 39-70 years old   Colonoscopy: every 10 years (May be performed more frequently if at higher risk)  OR  FOBT/FIT: every 1 year  OR  Cologuard: every 3 years  OR  Sigmoidoscopy: every 5 years  Screening may be recommended earlier than age 39 if at higher risk for colorectal cancer  Also, an individualized decision between you and your healthcare provider will decide whether screening between the ages of 74-80 would be appropriate   Colonoscopy: 08/03/2020  FOBT/FIT: Not on file  Cologuard: Not on file  Sigmoidoscopy: Not on file    Screening Current     Prostate Cancer Screening Individualized decision between patient and health care provider in men between ages of 53-78   Medicare will cover every 12 months beginning on the day after your 50th birthday PSA: 1 7 ng/mL     Screening Current     Hepatitis C Screening Once for adults born between 1945 and 1965  More frequently in patients at high risk for Hepatitis C Hep C Antibody: 01/30/2019    Screening Current   Diabetes Screening 1-2 times per year if you're at risk for diabetes or have pre-diabetes Fasting glucose: 137 mg/dL (9/17/2022)  A1C: 6 7 % (9/17/2022)  Screening Not Indicated  History Diabetes   Cholesterol Screening Once every 5 years if you don't have a lipid disorder  May order more often based on risk factors  Lipid panel: 09/17/2022  Screening Not Indicated  History Lipid Disorder      Other Preventive Screenings Covered by Medicare:  Abdominal Aortic Aneurysm (AAA) Screening: covered once if your at risk  You're considered to be at risk if you have a family history of AAA or a male between the age of 73-68 who smoking at least 100 cigarettes in your lifetime  Lung Cancer Screening: covers low dose CT scan once per year if you meet all of the following conditions: (1) Age 50-69; (2) No signs or symptoms of lung cancer; (3) Current smoker or have quit smoking within the last 15 years; (4) You have a tobacco smoking history of at least 20 pack years (packs per day x number of years you smoked); (5) You get a written order from a healthcare provider  Glaucoma Screening: covered annually if you're considered high risk: (1) You have diabetes OR (2) Family history of glaucoma OR (3)  aged 48 and older OR (3)  American aged 72 and older  Osteoporosis Screening: covered every 2 years if you meet one of the following conditions: (1) Have a vertebral abnormality; (2) On glucocorticoid therapy for more than 3 months; (3) Have primary hyperparathyroidism; (4) On osteoporosis medications and need to assess response to drug therapy  HIV Screening: covered annually if you're between the age of 12-76  Also covered annually if you are younger than 13 and older than 72 with risk factors for HIV infection   For pregnant patients, it is covered up to 3 times per pregnancy  Immunizations:  Immunization Recommendations   Influenza Vaccine Annual influenza vaccination during flu season is recommended for all persons aged >= 6 months who do not have contraindications   Pneumococcal Vaccine   * Pneumococcal conjugate vaccine = PCV13 (Prevnar 13), PCV15 (Vaxneuvance), PCV20 (Prevnar 20)  * Pneumococcal polysaccharide vaccine = PPSV23 (Pneumovax) Adults 25-60 years old: 1-3 doses may be recommended based on certain risk factors  Adults 72 years old: 1-2 doses may be recommended based off what pneumonia vaccine you previously received   Hepatitis B Vaccine 3 dose series if at intermediate or high risk (ex: diabetes, end stage renal disease, liver disease)   Tetanus (Td) Vaccine - COST NOT COVERED BY MEDICARE PART B Following completion of primary series, a booster dose should be given every 10 years to maintain immunity against tetanus  Td may also be given as tetanus wound prophylaxis  Tdap Vaccine - COST NOT COVERED BY MEDICARE PART B Recommended at least once for all adults  For pregnant patients, recommended with each pregnancy  Shingles Vaccine (Shingrix) - COST NOT COVERED BY MEDICARE PART B  2 shot series recommended in those aged 48 and above     Health Maintenance Due:      Topic Date Due    Colorectal Cancer Screening  08/03/2023    Hepatitis C Screening  Completed     Immunizations Due:      Topic Date Due    COVID-19 Vaccine (3 - Booster for Moderna series) 07/07/2021    Influenza Vaccine (1) 09/01/2022     Advance Directives   What are advance directives? Advance directives are legal documents that state your wishes and plans for medical care  These plans are made ahead of time in case you lose your ability to make decisions for yourself  Advance directives can apply to any medical decision, such as the treatments you want, and if you want to donate organs  What are the types of advance directives?   There are many types of advance directives, and each state has rules about how to use them  You may choose a combination of any of the following:  Living will: This is a written record of the treatment you want  You can also choose which treatments you do not want, which to limit, and which to stop at a certain time  This includes surgery, medicine, IV fluid, and tube feedings  Durable power of  for healthcare [facility]): This is a written record that states who you want to make healthcare choices for you when you are unable to make them for yourself  This person, called a proxy, is usually a family member or a friend  You may choose more than 1 proxy  Do not resuscitate (DNR) order:  A DNR order is used in case your heart stops beating or you stop breathing  It is a request not to have certain forms of treatment, such as CPR  A DNR order may be included in other types of advance directives  Medical directive: This covers the care that you want if you are in a coma, near death, or unable to make decisions for yourself  You can list the treatments you want for each condition  Treatment may include pain medicine, surgery, blood transfusions, dialysis, IV or tube feedings, and a ventilator (breathing machine)  Values history: This document has questions about your views, beliefs, and how you feel and think about life  This information can help others choose the care that you would choose  Why are advance directives important? An advance directive helps you control your care  Although spoken wishes may be used, it is better to have your wishes written down  Spoken wishes can be misunderstood, or not followed  Treatments may be given even if you do not want them  An advance directive may make it easier for your family to make difficult choices about your care     Weight Management   Why it is important to manage your weight:  Being overweight increases your risk of health conditions such as heart disease, high blood pressure, type 2 diabetes, and certain types of cancer  It can also increase your risk for osteoarthritis, sleep apnea, and other respiratory problems  Aim for a slow, steady weight loss  Even a small amount of weight loss can lower your risk of health problems  How to lose weight safely:  A safe and healthy way to lose weight is to eat fewer calories and get regular exercise  You can lose up about 1 pound a week by decreasing the number of calories you eat by 500 calories each day  Healthy meal plan for weight management:  A healthy meal plan includes a variety of foods, contains fewer calories, and helps you stay healthy  A healthy meal plan includes the following:  Eat whole-grain foods more often  A healthy meal plan should contain fiber  Fiber is the part of grains, fruits, and vegetables that is not broken down by your body  Whole-grain foods are healthy and provide extra fiber in your diet  Some examples of whole-grain foods are whole-wheat breads and pastas, oatmeal, brown rice, and bulgur  Eat a variety of vegetables every day  Include dark, leafy greens such as spinach, kale, michael greens, and mustard greens  Eat yellow and orange vegetables such as carrots, sweet potatoes, and winter squash  Eat a variety of fruits every day  Choose fresh or canned fruit (canned in its own juice or light syrup) instead of juice  Fruit juice has very little or no fiber  Eat low-fat dairy foods  Drink fat-free (skim) milk or 1% milk  Eat fat-free yogurt and low-fat cottage cheese  Try low-fat cheeses such as mozzarella and other reduced-fat cheeses  Choose meat and other protein foods that are low in fat  Choose beans or other legumes such as split peas or lentils  Choose fish, skinless poultry (chicken or turkey), or lean cuts of red meat (beef or pork)  Before you cook meat or poultry, cut off any visible fat  Use less fat and oil  Try baking foods instead of frying them   Add less fat, such as margarine, sour cream, regular salad dressing and mayonnaise to foods  Eat fewer high-fat foods  Some examples of high-fat foods include french fries, doughnuts, ice cream, and cakes  Eat fewer sweets  Limit foods and drinks that are high in sugar  This includes candy, cookies, regular soda, and sweetened drinks  Exercise:  Exercise at least 30 minutes per day on most days of the week  Some examples of exercise include walking, biking, dancing, and swimming  You can also fit in more physical activity by taking the stairs instead of the elevator or parking farther away from stores  Ask your healthcare provider about the best exercise plan for you  © Copyright [employer] 2018 Information is for End User's use only and may not be sold, redistributed or otherwise used for commercial purposes   All illustrations and images included in CareNotes® are the copyrighted property of [employer]  or [address] [place] [address]

## 2022-10-17 NOTE — PROGRESS NOTES
Assessment and Plan:     Problem List Items Addressed This Visit        Endocrine    Type 2 diabetes mellitus without complication, without long-term current use of insulin ([address] )    Relevant Orders    Comprehensive metabolic panel    CBC and differential    HEMOGLOBIN A1C W/ EAG ESTIMATION    Microalbumin / creatinine urine ratio       Other    Hypercholesteremia    Relevant Orders    Lipid panel    Class 2 severe obesity due to excess calories with serious comorbidity and body mass index (BMI) of 35 0 to 35 9 in adult [facility])      Other Visit Diagnoses     Medicare annual wellness visit, subsequent    -  Primary    Relevant Orders    Comprehensive metabolic panel    CBC and differential    Enlarged prostate        Relevant Orders    PSA, Total Screen    Prostate cancer screening        Relevant Orders    PSA, Total Screen    Encounter for immunization        Relevant Orders    influenza vaccine, high-dose, PF 0 7 mL (FLUZONE HIGH-DOSE)           Preventive health issues were discussed with patient, and age appropriate screening tests were ordered as noted in patient's After Visit Summary  Personalized health advice and appropriate referrals for health education or preventive services given if needed, as noted in patient's After Visit Summary  History of Present Illness:     Patient presents for a Medicare Wellness Visit    Pt is here for [facility]- reviewed blood work- all questions answered  DM- A1C down to 6 7 from 7 6  Fat last visit with increase in Metformin to BID  Not watching diet  Discussed diet modifications- begin watching carbohydrates and sugars  HLD- continue Zocor, BW stable  Pt feeling well- reports he is down to 198 lbs at home with no clothes, still taking Phentermine, saw plastic surgery for diastasis recti- they will continue to monitor  Pt reports having a cold since Friday- did not test for COVID  Managing ok with home remedies      C/O left knee pain, chronic, slight decrease ROM due to pain, follows with Ortho- recently had fluid removed from right elbow and right knee  Would like flu shot today  All other screenings are UTD  Patient Care Team:  Minna Labs as PCP - General (Family Medicine)  Minna Labs as PCP - PCP-[facility] Attributed-Roster     Review of Systems:     Review of Systems   Constitutional: Negative for activity change, chills, diaphoresis, fatigue and fever  HENT: Positive for congestion, hearing loss (wears hearing aids bilateral), postnasal drip and rhinorrhea  Negative for ear pain, facial swelling, sinus pressure, sinus pain, sneezing, sore throat and voice change  Eyes: Negative for pain, discharge, redness and visual disturbance  Respiratory: Positive for cough  Negative for choking, chest tightness, shortness of breath, wheezing and stridor  Cardiovascular: Negative for chest pain, palpitations and leg swelling  Gastrointestinal: Negative for abdominal distention, abdominal pain, constipation, diarrhea, nausea and vomiting  Endocrine: Negative for polydipsia, polyphagia and polyuria  Genitourinary: Negative for difficulty urinating, dysuria, frequency and urgency  Musculoskeletal: Positive for arthralgias (left knee pain) and myalgias (left knee pain )  Negative for back pain, gait problem, joint swelling, neck pain and neck stiffness  Skin: Negative for color change, rash and wound  Neurological: Negative for dizziness, syncope, speech difficulty, weakness, light-headedness and headaches  Hematological: Negative for adenopathy  Does not bruise/bleed easily  Psychiatric/Behavioral: Negative for agitation, behavioral problems, confusion, hallucinations, sleep disturbance and suicidal ideas  The patient is not nervous/anxious           Problem List:     Patient Active Problem List   Diagnosis   • Hypercholesteremia   • High blood pressure   • Dyspnea   • Class 2 severe obesity due to excess calories with serious comorbidity and body mass index (BMI) of 35 0 to 35 9 in adult [facility])   • Carpal tunnel syndrome on right   • Carpal tunnel syndrome on left   • Type 2 diabetes mellitus without complication, without long-term current use of insulin (HCC)   • Tear of medial meniscus of right knee, current      Past Medical and Surgical History:     Past Medical History:   Diagnosis Date   • Arthritis    • Colon polyp    • Diabetes mellitus ([facility] )    • Dyslipidemia    • GERD (gastroesophageal reflux disease)    • Hyperlipidemia    • Nodule of left lung 2007    negative for CA   • Type 2 diabetes mellitus without complication, without long-term current use of insulin ([facility] ) 01/23/2019     Past Surgical History:   Procedure Laterality Date   • COLONOSCOPY     • ELBOW SURGERY Bilateral    • FOOT TENDON SURGERY Bilateral     heel   • HI KNEE SCOPE,MED/LAT MENISECTOMY Right 5/23/2022    Procedure: KNEE ARTHROSCOPY WITH medial MENISCECTOMY chondroplasty synovectomy injection;  Surgeon: Alis Rodriguez DO;  [facility]: [facility];  Service: Orthopedics   • HI REVISE MEDIAN N/CARPAL TUNNEL SURG Left 06/30/2021    Procedure: RELEASE CARPAL TUNNEL;  Surgeon: Rosio Limon DO;  Location: [address] [place] [address] MAIN OR;  Service: Orthopedics      Family History:     Family History   Problem Relation Age of Onset   • COPD Father         80 yrs   • Coronary artery disease Father    • Heart disease Mother         hx MI   • Diabetes Mother    • Stroke Mother    • Cancer Brother       Social History:     Social History     Socioeconomic History   • Marital status: /Civil Union     Spouse name: None   • Number of children: None   • Years of education: None   • Highest education level: None   Occupational History   • None   Tobacco Use   • Smoking status: Former Smoker     Packs/day: 1 00     Years: 30 00     Pack years: 30 00     Types: Cigarettes     Quit date: 2007     Years since quitting: 15 8   • Smokeless tobacco: Never Used   Vaping Use   • Vaping Use: Never used   Substance and Sexual Activity   • Alcohol use: Yes     Comment: occ   • Drug use: No   • Sexual activity: Yes     Partners: Female     Birth control/protection: Male Sterilization   Other Topics Concern   • None   Social History Narrative   • None     Social Determinants of Health     Financial Resource Strain: Low Risk    • Difficulty of Paying Living Expenses: Not hard at all   Food Insecurity: Not on file   Transportation Needs: No Transportation Needs   • Lack of Transportation (Medical): No   • Lack of Transportation (Non-Medical): No   Physical Activity: Not on file   Stress: Not on file   Social Connections: Not on file   Intimate Partner Violence: Not on file   Housing Stability: Not on file      Medications and Allergies:     Current Outpatient Medications   Medication Sig Dispense Refill   • metFORMIN (GLUCOPHAGE) 500 mg tablet Take 1 tablet (500 mg total) by mouth in the morning and 1 tablet (500 mg total) in the evening  Take with meals  180 tablet 3   • phentermine (ADIPEX-P) 37 5 MG tablet Take 1 tablet (37 5 mg total) by mouth daily 30 tablet 2   • simvastatin (ZOCOR) 20 mg tablet Take 1 tablet (20 mg total) by mouth daily at bedtime 90 tablet 3   • tamsulosin (FLOMAX) 0 4 mg Take 1 capsule (0 4 mg total) by mouth daily with dinner 90 capsule 3   • acetaminophen (TYLENOL) 500 mg tablet Take 500 mg by mouth every 6 (six) hours as needed for mild pain (Patient not taking: No sig reported)       No current facility-administered medications for this visit       No Known Allergies   Immunizations:     Immunization History   Administered Date(s) Administered   • COVID-19 MODERNA VACC 0 5 ML IM 01/12/2021, 02/07/2021   • H1N1, All Formulations 01/09/2010   • INFLUENZA 09/01/2012, 11/04/2015, 10/13/2017, 10/15/2018, 11/22/2020   • Influenza, high dose seasonal 0 7 mL 10/30/2020   • Pneumococcal Conjugate 13-Valent 10/15/2018   • Pneumococcal Polysaccharide PPV23 08/05/2021   • Zoster 11/04/2015 • Zoster Vaccine Recombinant 11/02/2019, 02/20/2020, 02/20/2020   • influenza, trivalent, adjuvanted 10/07/2019      Health Maintenance:         Topic Date Due   • Colorectal Cancer Screening  08/03/2023   • Hepatitis C Screening  Completed         Topic Date Due   • COVID-19 Vaccine (3 - Booster for Moderna series) 07/07/2021   • Influenza Vaccine (1) 09/01/2022      Medicare Screening Tests and Risk Assessments:     Jayce Ramos is here for his Subsequent Wellness visit  Health Risk Assessment:   Patient rates overall health as very good  Patient feels that their physical health rating is same  Patient is very satisfied with their life  Eyesight was rated as same  Hearing was rated as same  Patient feels that their emotional and mental health rating is same  Patients states they are never, rarely angry  Patient states they are never, rarely unusually tired/fatigued  Pain experienced in the last 7 days has been some  Patient's pain rating has been 3/10  Patient states that he has experienced no weight loss or gain in last 6 months  Left knee    Fall Risk Screening: In the past year, patient has experienced: no history of falling in past year      Home Safety:  Patient does not have trouble with stairs inside or outside of their home  Patient has working smoke alarms and has working carbon monoxide detector  Home safety hazards include: none  Nutrition:   Current diet is Diabetic  Medications:   Patient is not currently taking any over-the-counter supplements  Patient is able to manage medications  Activities of Daily Living (ADLs)/Instrumental Activities of Daily Living (IADLs):   Walk and transfer into and out of bed and chair?: Yes  Dress and groom yourself?: Yes    Bathe or shower yourself?: Yes    Feed yourself?  Yes  Do your laundry/housekeeping?: Yes  Manage your money, pay your bills and track your expenses?: Yes  Make your own meals?: No    Do your own shopping?: Yes    Previous Hospitalizations:   Any hospitalizations or ED visits within the last 12 months?: No      Advance Care Planning:   Living will: Yes    Durable POA for healthcare: Yes    Advanced directive: Yes      PREVENTIVE SCREENINGS      Cardiovascular Screening:    General: Screening Not Indicated and History Lipid Disorder      Diabetes Screening:     General: Screening Not Indicated and History Diabetes      Colorectal Cancer Screening:     General: Screening Current      Prostate Cancer Screening:    General: Screening Current      Abdominal Aortic Aneurysm (AAA) Screening:    Risk factors include: age between 73-69 yo and tobacco use        Lung Cancer Screening:     General: Screening Not Indicated      Hepatitis C Screening:    General: Screening Current    Screening, Brief Intervention, and Referral to Treatment (SBIRT)    Screening  Typical number of drinks in a day: 1  Typical number of drinks in a week: 3  Interpretation: Low risk drinking behavior  AUDIT-C Screenin) How often did you have a drink containing alcohol in the past year? monthly or less  2) How many drinks did you have on a typical day when you were drinking in the past year? 0  3) How often did you have 6 or more drinks on one occasion in the past year? never    AUDIT-C Score: 1  Interpretation: Score 0-3 (male): Negative screen for alcohol misuse    Single Item Drug Screening:  How often have you used an illegal drug (including marijuana) or a prescription medication for non-medical reasons in the past year? never    Single Item Drug Screen Score: 0  Interpretation: Negative screen for possible drug use disorder    No exam data present     Physical Exam:     /72   Pulse 88   Temp (!) 97 4 °F (36 3 °C) (Tympanic)   Resp 20   Ht 5' 4" (1 626 m)   Wt 92 5 kg (204 lb)   SpO2 98%   BMI 35 02 kg/m²     Physical Exam  Constitutional:       General: He is not in acute distress  Appearance: He is well-developed  He is not diaphoretic  HENT:      Head: Normocephalic and atraumatic  Right Ear: Tympanic membrane, ear canal and external ear normal       Left Ear: Tympanic membrane, ear canal and external ear normal       Nose: Congestion and rhinorrhea present  Mouth/Throat:      Mouth: Mucous membranes are moist       Pharynx: No oropharyngeal exudate  Eyes:      General:         Right eye: No discharge  Left eye: No discharge  Conjunctiva/sclera: Conjunctivae normal       Pupils: Pupils are equal, round, and reactive to light  Neck:      Thyroid: No thyromegaly  Trachea: No tracheal deviation  Cardiovascular:      Rate and Rhythm: Normal rate and regular rhythm  Heart sounds: Normal heart sounds  No murmur heard  Pulmonary:      Effort: Pulmonary effort is normal  No respiratory distress  Breath sounds: Normal breath sounds  No wheezing  Musculoskeletal:         General: No deformity  Cervical back: Normal range of motion and neck supple  Left knee: No swelling, deformity, effusion, erythema, ecchymosis or lacerations  Decreased range of motion (due to pain)  Tenderness (chronic left knee pain) present  Right lower leg: No edema  Left lower leg: No edema  Skin:     General: Skin is warm and dry  Coloration: Skin is not pale  Findings: No erythema or rash  Neurological:      Mental Status: He is alert and oriented to person, place, and time  Coordination: Coordination normal    Psychiatric:         Mood and Affect: Mood normal          Behavior: Behavior normal          Thought Content:  Thought content normal          Judgment: Judgment normal           DREA Chung

## 2022-10-18 DIAGNOSIS — R05.1 ACUTE COUGH: Primary | ICD-10-CM

## 2022-10-18 RX ORDER — DEXTROMETHORPHAN HYDROBROMIDE AND PROMETHAZINE HYDROCHLORIDE 15; 6.25 MG/5ML; MG/5ML
5 SOLUTION ORAL 4 TIMES DAILY PRN
Qty: 240 ML | Refills: 1 | Status: SHIPPED | OUTPATIENT
Start: 2022-10-18

## 2022-10-31 ENCOUNTER — OFFICE VISIT (OUTPATIENT)
Dept: OBGYN CLINIC | Facility: CLINIC | Age: 72
End: 2022-10-31

## 2022-10-31 VITALS
SYSTOLIC BLOOD PRESSURE: 160 MMHG | BODY MASS INDEX: 35.02 KG/M2 | HEART RATE: 103 BPM | DIASTOLIC BLOOD PRESSURE: 73 MMHG | HEIGHT: 64 IN

## 2022-10-31 DIAGNOSIS — M25.562 ACUTE PAIN OF LEFT KNEE: Primary | ICD-10-CM

## 2022-10-31 DIAGNOSIS — S83.204A OTHER TEAR OF MENISCUS OF LEFT KNEE, UNSPECIFIED MENISCUS, UNSPECIFIED WHETHER OLD OR CURRENT TEAR, INITIAL ENCOUNTER: ICD-10-CM

## 2022-10-31 RX ORDER — TRIAMCINOLONE ACETONIDE 40 MG/ML
80 INJECTION, SUSPENSION INTRA-ARTICULAR; INTRAMUSCULAR
Status: COMPLETED | OUTPATIENT
Start: 2022-10-31 | End: 2022-10-31

## 2022-10-31 RX ORDER — BUPIVACAINE HYDROCHLORIDE 2.5 MG/ML
4 INJECTION, SOLUTION INFILTRATION; PERINEURAL
Status: COMPLETED | OUTPATIENT
Start: 2022-10-31 | End: 2022-10-31

## 2022-10-31 RX ADMIN — TRIAMCINOLONE ACETONIDE 80 MG: 40 INJECTION, SUSPENSION INTRA-ARTICULAR; INTRAMUSCULAR at 15:13

## 2022-10-31 RX ADMIN — BUPIVACAINE HYDROCHLORIDE 4 ML: 2.5 INJECTION, SOLUTION INFILTRATION; PERINEURAL at 15:13

## 2022-10-31 NOTE — PROGRESS NOTES
ASSESSMENT/PLAN:    Diagnoses and all orders for this visit:    Acute pain of left knee  -     XR knee 3 vw left non injury; Future    Other tear of meniscus of left knee, unspecified meniscus, unspecified whether old or current tear, initial encounter  -     MRI knee left  wo contrast; Future    Other orders  -     Large joint arthrocentesis        X-rays of the patient's left knee are consistent with moderate arthritic changes  There are no fractures or dislocations  I am concerned, however, that the patient has a tear of his meniscus  We will order an MRI to rule this out  A corticosteroid injection was offered to the patient to which she accepted  The patient's left knee was injected with Kenalog and Marcaine  He tolerated the procedure quite well  He will follow up with our office once the MRI is complete  He is acceptable to this plan  Return for MRI results  The patient has diffuse tenderness along his left knee  There is medial joint tenderness with a positive Juany's  There is no terminal extension present  Is my medical opinion that he sustained a tear of his medial meniscus of his left knee  An MRI is the gold standard to look for this  Return back once the MRI is complete  X-rays were negative  In the meantime, the left knee was injected with Kenalog and Marcaine    _____________________________________________________  CHIEF COMPLAINT:  Chief Complaint   Patient presents with   • Left Knee - Follow-up         SUBJECTIVE:  Itzel Larkin is a 71 y o  male who presents to our office complaining of left knee pain for approximately 2 and half months  The patient states that his pain is worse with ambulation  Sometimes his knee wants to lock or give out on him  Most of his pain is along the medial and posterior aspects of his knee  He denies any numbness or tingling  He denies any fever or chills        The following portions of the patient's history were reviewed and updated as appropriate: allergies, current medications, past family history, past medical history, past social history, past surgical history and problem list     PAST MEDICAL HISTORY:  Past Medical History:   Diagnosis Date   • Arthritis    • Colon polyp    • Diabetes mellitus ([facility] )    • Dyslipidemia    • GERD (gastroesophageal reflux disease)    • Hyperlipidemia    • Nodule of left lung 2007    negative for CA   • Type 2 diabetes mellitus without complication, without long-term current use of insulin ([facility] ) 01/23/2019       PAST SURGICAL HISTORY:  Past Surgical History:   Procedure Laterality Date   • COLONOSCOPY     • ELBOW SURGERY Bilateral    • FOOT TENDON SURGERY Bilateral     heel   • CA KNEE SCOPE,MED/LAT MENISECTOMY Right 5/23/2022    Procedure: KNEE ARTHROSCOPY WITH medial MENISCECTOMY chondroplasty synovectomy injection;  Surgeon: Paula Singh DO;  [facility]: [facility];  Service: Orthopedics   • CA REVISE MEDIAN N/CARPAL TUNNEL SURG Left 06/30/2021    Procedure: RELEASE CARPAL TUNNEL;  Surgeon: Jill Upton DO;  Location: [facility] MAIN OR;  Service: Orthopedics       FAMILY HISTORY:  Family History   Problem Relation Age of Onset   • COPD Father         80 yrs   • Coronary artery disease Father    • Heart disease Mother         hx MI   • Diabetes Mother    • Stroke Mother    • Cancer Brother        SOCIAL HISTORY:  Social History     Tobacco Use   • Smoking status: Former Smoker     Packs/day: 1 00     Years: 30 00     Pack years: 30 00     Types: Cigarettes     Quit date: 2007     Years since quitting: 15 8   • Smokeless tobacco: Never Used   Vaping Use   • Vaping Use: Never used   Substance Use Topics   • Alcohol use: Yes     Comment: occ   • Drug use: No       MEDICATIONS:    Current Outpatient Medications:   •  acetaminophen (TYLENOL) 500 mg tablet, Take 500 mg by mouth every 6 (six) hours as needed for mild pain, Disp: , Rfl:   •  metFORMIN (GLUCOPHAGE) 500 mg tablet, Take 1 tablet (500 mg total) by mouth in the morning and 1 tablet (500 mg total) in the evening  Take with meals  , Disp: 180 tablet, Rfl: 3  •  phentermine (ADIPEX-P) 37 5 MG tablet, Take 1 tablet (37 5 mg total) by mouth daily, Disp: 30 tablet, Rfl: 2  •  Promethazine-DM (PHENERGAN-DM) 6 25-15 mg/5 mL oral syrup, Take 5 mL by mouth 4 (four) times a day as needed for cough, Disp: 240 mL, Rfl: 1  •  simvastatin (ZOCOR) 20 mg tablet, Take 1 tablet (20 mg total) by mouth daily at bedtime, Disp: 90 tablet, Rfl: 3  •  tamsulosin (FLOMAX) 0 4 mg, Take 1 capsule (0 4 mg total) by mouth daily with dinner, Disp: 90 capsule, Rfl: 3    ALLERGIES:  No Known Allergies    ROS:  Review of Systems     Constitutional: Negative for fatigue, fever or loss of appetite  HENT: Negative  Respiratory: Negative for shortness of breath, dyspnea  Cardiovascular: Negative for chest pain/tightness  Gastrointestinal: Negative for abdominal pain, N/V  Endocrine: Negative for cold/heat intolerance, unexplained weight loss/gain  Genitourinary: Negative for flank pain, dysuria, hematuria  Musculoskeletal: Positive for arthralgia   Skin: Negative for rash  Neurological: Negative for numbness or tingling  Psychiatric/Behavioral: Negative for agitation  _____________________________________________________  PHYSICAL EXAMINATION:    Blood pressure 160/73, pulse 103, height 5' 4" (1 626 m)  Constitutional: Oriented to person, place, and time  Appears well-developed and well-nourished  No distress  HENT:   Head: Normocephalic  Eyes: Conjunctivae are normal  Right eye exhibits no discharge  Left eye exhibits no discharge  No scleral icterus  Cardiovascular: Normal rate  Pulmonary/Chest: Effort normal    Neurological: Alert and oriented to person, place, and time  Skin: Skin is warm and dry  No rash noted  Not diaphoretic  No erythema  No pallor  Psychiatric: Normal mood and affect   Behavior is normal  Judgment and thought content normal  MUSCULOSKELETAL EXAMINATION:   Physical Exam  Ortho Exam    Left lower extremity is neurovascularly intact  Toes are pink and mobile   Compartments are soft  No warmth, erythema or ecchymosis  ROM of knee is from 5-115 degrees  Negative Lachman, drawer or pivot shift  Positive Juany's  No medial instability  Medial joint line tenderness, slight lateral joint line tenderness  Patellofemoral crepitation  Objective:  BP Readings from Last 1 Encounters:   10/31/22 160/73      Wt Readings from Last 1 Encounters:   10/17/22 92 5 kg (204 lb)        BMI:   Estimated body mass index is 35 02 kg/m² as calculated from the following:    Height as of this encounter: 5' 4" (1 626 m)  Weight as of 10/17/22: 92 5 kg (204 lb)        PROCEDURES PERFORMED:  Large joint arthrocentesis: L knee  Universal Protocol:  Risks and benefits: risks, benefits and alternatives were discussed  Consent given by: patient  Patient understanding: patient states understanding of the procedure being performed  Site marked: the operative site was marked  Patient identity confirmed: verbally with patient    Supporting Documentation  Indications: pain   Procedure Details  Location: knee - L knee  Preparation: Patient was prepped and draped in the usual sterile fashion  Needle size: 22 G  Ultrasound guidance: no  Approach: lateral  Medications administered: 4 mL bupivacaine 0 25 %; 80 mg triamcinolone acetonide 40 mg/mL    Patient tolerance: patient tolerated the procedure well with no immediate complications  Dressing:  Sterile dressing applied            Scribe Attestation    I,:  Laurie Rosen PA-C am acting as a scribe while in the presence of the attending physician :       I,:  Romelia Cat DO personally performed the services described in this documentation    as scribed in my presence :

## 2022-11-04 ENCOUNTER — HOSPITAL ENCOUNTER (OUTPATIENT)
Dept: MRI IMAGING | Facility: HOSPITAL | Age: 72
End: 2022-11-04
Attending: PHYSICIAN ASSISTANT

## 2022-11-04 DIAGNOSIS — S83.204A OTHER TEAR OF MENISCUS OF LEFT KNEE, UNSPECIFIED MENISCUS, UNSPECIFIED WHETHER OLD OR CURRENT TEAR, INITIAL ENCOUNTER: ICD-10-CM

## 2022-11-07 ENCOUNTER — PATIENT MESSAGE (OUTPATIENT)
Dept: OBGYN CLINIC | Facility: CLINIC | Age: 72
End: 2022-11-07

## 2022-11-11 ENCOUNTER — OFFICE VISIT (OUTPATIENT)
Dept: OBGYN CLINIC | Facility: CLINIC | Age: 72
End: 2022-11-11

## 2022-11-11 ENCOUNTER — PREP FOR PROCEDURE (OUTPATIENT)
Dept: OBGYN CLINIC | Facility: CLINIC | Age: 72
End: 2022-11-11

## 2022-11-11 VITALS
HEIGHT: 64 IN | DIASTOLIC BLOOD PRESSURE: 79 MMHG | BODY MASS INDEX: 34.83 KG/M2 | HEART RATE: 97 BPM | SYSTOLIC BLOOD PRESSURE: 116 MMHG | WEIGHT: 204 LBS

## 2022-11-11 DIAGNOSIS — S83.204A OTHER TEAR OF MENISCUS OF LEFT KNEE, UNSPECIFIED MENISCUS, UNSPECIFIED WHETHER OLD OR CURRENT TEAR, INITIAL ENCOUNTER: Primary | ICD-10-CM

## 2022-11-11 DIAGNOSIS — Z01.812 PRE-OPERATIVE LABORATORY EXAMINATION: ICD-10-CM

## 2022-11-11 RX ORDER — CHLORHEXIDINE GLUCONATE 0.12 MG/ML
15 RINSE ORAL ONCE
OUTPATIENT
Start: 2022-11-11 | End: 2022-11-11

## 2022-11-11 RX ORDER — CHLORHEXIDINE GLUCONATE 4 G/100ML
SOLUTION TOPICAL DAILY PRN
OUTPATIENT
Start: 2022-11-11

## 2022-11-11 NOTE — PROGRESS NOTES
ASSESSMENT/PLAN:    Diagnoses and all orders for this visit:    Other tear of meniscus of left knee, unspecified meniscus, unspecified whether old or current tear, initial encounter  -     Case request operating room: ARTHROSCOPY KNEE WITH POSSIBLE MENISCECTOMY; Standing  -     Ambulatory Referral to Physical Therapy; Future  -     Ambulatory Referral to Occupational Therapy; Future  -     Case request operating room: ARTHROSCOPY KNEE WITH POSSIBLE MENISCECTOMY    Pre-operative laboratory examination  -     EKG 12 lead; Future    Other orders  -     Nursing Communication Warmimg Interventions Implemented; Standing  -     Nursing Communication CHG bath, have staff wash entire body (neck down) per pre-op bathing protocol  Routine, evening prior to, and day of surgery ; Standing  -     Nursing Communication Swab both nares with Povidone-Iodine solution, EXCLUDE if patient has shellfish/Iodine allergy  Routine, day of surgery, on call to OR; Standing  -     chlorhexidine (PERIDEX) 0 12 % oral rinse 15 mL  -     Void on call to OR; Standing  -     Insert peripheral IV; Standing  -     chlorhexidine (HIBICLENS) 4 % topical liquid  -     ceFAZolin (ANCEF) 2,000 mg in dextrose 5 % 100 mL IVPB        An MRI of the patient's left knee was consistent with a medial meniscal tear  The patient stated that his symptoms were affecting his quality of life  After exhausting conservative treatment, the risks and benefits of surgery discussed with the patient  He decided on an elective left knee arthroscopy with possible meniscectomy  The patient's surgery is tentatively scheduled for November 30, 2022  The patient's MRI results were discussed  He has a tear of his medial meniscus  He wants proceed with elective arthroscopy of his left knee    All risks, complications, benefits were discussed with the patient in great detail including bleeding, infection, blood clots, pain, stiffness, neurovascular damage, fractures, dislocations, possibility of loss of limb from surgery, heart attack, stroke, etcetera  His surgery scheduled for November 30, 2022        _____________________________________________________  CHIEF COMPLAINT:  Chief Complaint   Patient presents with   • Left Knee - Follow-up         SUBJECTIVE:  Geraldo Baldwin is a 71 y o  male who presents to our office to review MRI results of his left knee  He still complaining of left knee pain with mechanical instability  He denies any numbness or tingling  He denies any fever or chills        The following portions of the patient's history were reviewed and updated as appropriate: allergies, current medications, past family history, past medical history, past social history, past surgical history and problem list     PAST MEDICAL HISTORY:  Past Medical History:   Diagnosis Date   • Arthritis    • Colon polyp    • Diabetes mellitus ([facility] [address] )    • Dyslipidemia    • GERD (gastroesophageal reflux disease)    • Hyperlipidemia    • Nodule of left lung 2007    negative for CA   • Type 2 diabetes mellitus without complication, without long-term current use of insulin ([facility] [address] ) 01/23/2019       PAST SURGICAL HISTORY:  Past Surgical History:   Procedure Laterality Date   • COLONOSCOPY     • ELBOW SURGERY Bilateral    • FOOT TENDON SURGERY Bilateral     heel   • AL KNEE SCOPE,MED/LAT MENISECTOMY Right 5/23/2022    Procedure: KNEE ARTHROSCOPY WITH medial MENISCECTOMY chondroplasty synovectomy injection;  Surgeon: Real Nava DO;  Location: [facility] MAIN OR;  Service: Orthopedics   • AL REVISE MEDIAN N/CARPAL TUNNEL SURG Left 06/30/2021    Procedure: RELEASE CARPAL TUNNEL;  Surgeon: Jesse Fischer DO;  Location: [facility] MAIN OR;  Service: Orthopedics       FAMILY HISTORY:  Family History   Problem Relation Age of Onset   • COPD Father         80 yrs   • Coronary artery disease Father    • Heart disease Mother         hx MI   • Diabetes Mother    • Stroke Mother    • Cancer Brother        SOCIAL HISTORY:  Social History     Tobacco Use   • Smoking status: Former Smoker     Packs/day: 1 00     Years: 30 00     Pack years: 30 00     Types: Cigarettes     Quit date: 2007     Years since quitting: 15 8   • Smokeless tobacco: Never Used   Vaping Use   • Vaping Use: Never used   Substance Use Topics   • Alcohol use: Yes     Comment: occ   • Drug use: No       MEDICATIONS:    Current Outpatient Medications:   •  acetaminophen (TYLENOL) 500 mg tablet, Take 500 mg by mouth every 6 (six) hours as needed for mild pain, Disp: , Rfl:   •  metFORMIN (GLUCOPHAGE) 500 mg tablet, Take 1 tablet (500 mg total) by mouth in the morning and 1 tablet (500 mg total) in the evening  Take with meals  , Disp: 180 tablet, Rfl: 3  •  phentermine (ADIPEX-P) 37 5 MG tablet, Take 1 tablet (37 5 mg total) by mouth daily, Disp: 30 tablet, Rfl: 2  •  Promethazine-DM (PHENERGAN-DM) 6 25-15 mg/5 mL oral syrup, Take 5 mL by mouth 4 (four) times a day as needed for cough, Disp: 240 mL, Rfl: 1  •  simvastatin (ZOCOR) 20 mg tablet, Take 1 tablet (20 mg total) by mouth daily at bedtime, Disp: 90 tablet, Rfl: 3  •  tamsulosin (FLOMAX) 0 4 mg, Take 1 capsule (0 4 mg total) by mouth daily with dinner, Disp: 90 capsule, Rfl: 3    ALLERGIES:  No Known Allergies    ROS:  Review of Systems     Constitutional: Negative for fatigue, fever or loss of appetite  HENT: Negative  Respiratory: Negative for shortness of breath, dyspnea  Cardiovascular: Negative for chest pain/tightness  Gastrointestinal: Negative for abdominal pain, N/V  Endocrine: Negative for cold/heat intolerance, unexplained weight loss/gain  Genitourinary: Negative for flank pain, dysuria, hematuria  Musculoskeletal: Positive for arthralgia   Skin: Negative for rash  Neurological: Negative for numbness or tingling  Psychiatric/Behavioral: Negative for agitation    _____________________________________________________  PHYSICAL EXAMINATION:    Blood pressure 116/79, pulse 97, height 5' 4" (1 626 m), weight 92 5 kg (204 lb)  Constitutional: Oriented to person, place, and time  Appears well-developed and well-nourished  No distress  HENT:   Head: Normocephalic  Eyes: Conjunctivae are normal  Right eye exhibits no discharge  Left eye exhibits no discharge  No scleral icterus  Cardiovascular: Normal rate  Pulmonary/Chest: Effort normal    Neurological: Alert and oriented to person, place, and time  Skin: Skin is warm and dry  No rash noted  Not diaphoretic  No erythema  No pallor  Psychiatric: Normal mood and affect  Behavior is normal  Judgment and thought content normal       MUSCULOSKELETAL EXAMINATION:   Physical Exam  Ortho Exam    Left lower extremity is neurovascularly intact  Toes are pink and mobile   Compartments are soft  No warmth, erythema or ecchymosis  ROM of knee is from 5-115 degrees  Negative Lachman, drawer or pivot shift  No medial instability  Medial joint line tenderness, slight lateral joint line tenderness  Patellofemoral crepitation  Positive Juany's  Objective:  BP Readings from Last 1 Encounters:   11/11/22 116/79      Wt Readings from Last 1 Encounters:   11/11/22 92 5 kg (204 lb)        BMI:   Estimated body mass index is 35 02 kg/m² as calculated from the following:    Height as of this encounter: 5' 4" (1 626 m)  Weight as of this encounter: 92 5 kg (204 lb)          Scribe Attestation    I,:  Juana Jeffers PA-C am acting as a scribe while in the presence of the attending physician :       I,:  Sebastien Reveles DO personally performed the services described in this documentation    as scribed in my presence :

## 2022-11-14 DIAGNOSIS — E78.00 HYPERCHOLESTEREMIA: ICD-10-CM

## 2022-11-14 DIAGNOSIS — N40.0 ENLARGED PROSTATE: ICD-10-CM

## 2022-11-14 DIAGNOSIS — E11.9 TYPE 2 DIABETES MELLITUS WITHOUT COMPLICATION, WITHOUT LONG-TERM CURRENT USE OF INSULIN (HCC): ICD-10-CM

## 2022-11-14 DIAGNOSIS — E66.01 CLASS 2 SEVERE OBESITY DUE TO EXCESS CALORIES WITH SERIOUS COMORBIDITY AND BODY MASS INDEX (BMI) OF 36.0 TO 36.9 IN ADULT (HCC): ICD-10-CM

## 2022-11-14 RX ORDER — PHENTERMINE HYDROCHLORIDE 37.5 MG/1
37.5 TABLET ORAL DAILY
Qty: 30 TABLET | Refills: 2 | Status: SHIPPED | OUTPATIENT
Start: 2022-11-14 | End: 2022-11-25 | Stop reason: SDUPTHER

## 2022-11-14 RX ORDER — TAMSULOSIN HYDROCHLORIDE 0.4 MG/1
0.4 CAPSULE ORAL
Qty: 90 CAPSULE | Refills: 3 | Status: SHIPPED | OUTPATIENT
Start: 2022-11-14 | End: 2022-11-23 | Stop reason: SDUPTHER

## 2022-11-14 RX ORDER — SIMVASTATIN 20 MG
20 TABLET ORAL
Qty: 90 TABLET | Refills: 3 | Status: SHIPPED | OUTPATIENT
Start: 2022-11-14 | End: 2022-11-23 | Stop reason: SDUPTHER

## 2022-11-14 NOTE — TELEPHONE ENCOUNTER
Patient requesting refill(s) of: simvastatin 20 mg daily    Last filled: 4/26/2022 #90 x 3      Patient requesting refill(s) of: metformin 500 mg BID    Last filled: 5/13/2022 #180 x 3      Patient requesting refill(s) of: Flomax 0 4 mg daily    Last filled: 5/16/2022 #90 x 3      Patient requesting refill(s) of: phentermine 37 5 mg daily    Last filled: 8/25/2022 #30 x 2  Last appt: 10/17/2022  Next appt: 4/7/2023  Pharmacy: [facility]

## 2022-11-23 ENCOUNTER — CLINICAL SUPPORT (OUTPATIENT)
Dept: URGENT CARE | Facility: CLINIC | Age: 72
End: 2022-11-23

## 2022-11-23 DIAGNOSIS — R94.31 EKG, ABNORMAL: Primary | ICD-10-CM

## 2022-11-23 DIAGNOSIS — Z01.812 PRE-OPERATIVE LABORATORY EXAMINATION: ICD-10-CM

## 2022-11-23 DIAGNOSIS — N40.0 ENLARGED PROSTATE: ICD-10-CM

## 2022-11-23 DIAGNOSIS — E78.00 HYPERCHOLESTEREMIA: ICD-10-CM

## 2022-11-23 LAB
ATRIAL RATE: 100 BPM
P AXIS: 62 DEGREES
PR INTERVAL: 154 MS
QRS AXIS: -36 DEGREES
QRSD INTERVAL: 90 MS
QT INTERVAL: 342 MS
QTC INTERVAL: 527 MS
T WAVE AXIS: 27 DEGREES
VENTRICULAR RATE: 143 BPM

## 2022-11-23 RX ORDER — SIMVASTATIN 20 MG
20 TABLET ORAL
Qty: 90 TABLET | Refills: 3 | Status: SHIPPED | OUTPATIENT
Start: 2022-11-23

## 2022-11-23 RX ORDER — TAMSULOSIN HYDROCHLORIDE 0.4 MG/1
0.4 CAPSULE ORAL
Qty: 90 CAPSULE | Refills: 3 | Status: SHIPPED | OUTPATIENT
Start: 2022-11-23

## 2022-11-25 DIAGNOSIS — E11.9 TYPE 2 DIABETES MELLITUS WITHOUT COMPLICATION, WITHOUT LONG-TERM CURRENT USE OF INSULIN (HCC): ICD-10-CM

## 2022-11-25 DIAGNOSIS — E66.01 CLASS 2 SEVERE OBESITY DUE TO EXCESS CALORIES WITH SERIOUS COMORBIDITY AND BODY MASS INDEX (BMI) OF 36.0 TO 36.9 IN ADULT (HCC): ICD-10-CM

## 2022-11-25 RX ORDER — PHENTERMINE HYDROCHLORIDE 37.5 MG/1
37.5 TABLET ORAL DAILY
Qty: 30 TABLET | Refills: 2 | Status: SHIPPED | OUTPATIENT
Start: 2022-11-25

## 2022-11-25 NOTE — TELEPHONE ENCOUNTER
Pt called and stated that the Metformin 500 mg and the phentermine 37 5 mg was sent to the wrong pharmacy    He would like these sent over the the [facility] mail order    Please advise    Phone [phone]

## 2022-11-30 ENCOUNTER — TELEPHONE (OUTPATIENT)
Dept: OBGYN CLINIC | Facility: HOSPITAL | Age: 72
End: 2022-11-30

## 2022-12-02 ENCOUNTER — PREP FOR PROCEDURE (OUTPATIENT)
Dept: PODIATRY | Facility: CLINIC | Age: 72
End: 2022-12-02

## 2022-12-02 NOTE — TELEPHONE ENCOUNTER
Caller: patient    Doctor: Tejal Baker    Reason for call: Noticed p/o appt but does not know when his surgery is for      Call back#: [phone]    Thank you

## 2022-12-08 ENCOUNTER — PREP FOR PROCEDURE (OUTPATIENT)
Dept: OBGYN CLINIC | Facility: CLINIC | Age: 72
End: 2022-12-08

## 2022-12-12 ENCOUNTER — OFFICE VISIT (OUTPATIENT)
Dept: OBGYN CLINIC | Facility: CLINIC | Age: 72
End: 2022-12-12

## 2022-12-12 VITALS
WEIGHT: 203 LBS | SYSTOLIC BLOOD PRESSURE: 148 MMHG | BODY MASS INDEX: 34.66 KG/M2 | HEIGHT: 64 IN | DIASTOLIC BLOOD PRESSURE: 85 MMHG

## 2022-12-12 DIAGNOSIS — S83.204A OTHER TEAR OF MENISCUS OF LEFT KNEE, UNSPECIFIED MENISCUS, UNSPECIFIED WHETHER OLD OR CURRENT TEAR, INITIAL ENCOUNTER: Primary | ICD-10-CM

## 2022-12-12 RX ORDER — TRIAMCINOLONE ACETONIDE 40 MG/ML
40 INJECTION, SUSPENSION INTRA-ARTICULAR; INTRAMUSCULAR
Status: COMPLETED | OUTPATIENT
Start: 2022-12-12 | End: 2022-12-12

## 2022-12-12 RX ORDER — BUPIVACAINE HYDROCHLORIDE 2.5 MG/ML
4 INJECTION, SOLUTION INFILTRATION; PERINEURAL
Status: COMPLETED | OUTPATIENT
Start: 2022-12-12 | End: 2022-12-12

## 2022-12-12 RX ADMIN — BUPIVACAINE HYDROCHLORIDE 4 ML: 2.5 INJECTION, SOLUTION INFILTRATION; PERINEURAL at 09:27

## 2022-12-12 RX ADMIN — TRIAMCINOLONE ACETONIDE 40 MG: 40 INJECTION, SUSPENSION INTRA-ARTICULAR; INTRAMUSCULAR at 09:27

## 2022-12-12 NOTE — PROGRESS NOTES
Assessment:   Diagnosis ICD-10-CM Associated Orders   1  Other tear of meniscus of left knee, unspecified meniscus, unspecified whether old or current tear, initial encounter  S83 204A           Plan:  He was offered, accepted, performed an injection(s) of cortisone to his Left knee for symptomatic relief of pain and inflammation  Patient tolerated the treatment(s) well  Ice and post injection protocol advised  Weightbearing activities as tolerated  Activities as tolerated with modifications to avoid pain  His original surgery date was cancelled due to COVID protocols  He is now tentatively scheduled for a L arthroscopy with possible meniscectomy for 02/06/2023  Patient expresses understanding and is in agreement with this treatment plan  The patient was given the opportunity to ask questions or present concerns  The patient has a tear of his medial meniscus of his left knee  Under aseptic technique, the left knee was injected with Kenalog and Marcaine  He tolerated seizure quite well  Return back for surgery on February 6  The patient was recently diagnosed with COVID which delayed his surgery till that point    To do next visit:  No follow-ups on file  The above stated was discussed in layman's terms and the patient expressed understanding  All questions were answered to the patient's satisfaction  Scribe Attestation    I,:  Jonah Kruse am acting as a scribe while in the presence of the attending physician :       I,:  Roscoe Ornelas, DO personally performed the services described in this documentation    as scribed in my presence :             Subjective:   Devin Barrera is a 71 y o  male who presents today for a repeat evaluation of his left knee due to chronic pain, known primary osteoarthritis  Patient is doing well but reports pain with prolonged sedentary positions and weight-bearing activities especially ambulating stairs   The patient states that He is point tender along the medial and lateral joint line  Patient would like an injection of cortisone to his left knee  He denies any recent bruising, numbness, tingling, or feelings of instability  He also denies any fevers, chills or shortness of breath  Review of systems negative unless otherwise specified in HPI  Review of Systems   Constitutional: Negative for chills and fever  HENT: Negative for ear pain and sore throat  Eyes: Negative for pain and visual disturbance  Respiratory: Negative for cough and shortness of breath  Cardiovascular: Negative for chest pain and palpitations  Gastrointestinal: Negative for abdominal pain and vomiting  Genitourinary: Negative for dysuria and hematuria  Musculoskeletal: Negative for arthralgias and back pain  Skin: Negative for color change and rash  Neurological: Negative for seizures and syncope  All other systems reviewed and are negative        Past Medical History:   Diagnosis Date   • Arthritis    • Colon polyp    • Diabetes mellitus ([facility] )    • Dyslipidemia    • GERD (gastroesophageal reflux disease)    • Hyperlipidemia    • Nodule of left lung 2007    negative for CA   • Type 2 diabetes mellitus without complication, without long-term current use of insulin ([facility] ) 01/23/2019       Past Surgical History:   Procedure Laterality Date   • COLONOSCOPY     • ELBOW SURGERY Bilateral    • FOOT TENDON SURGERY Bilateral     heel   • HAND SURGERY     • VA ARTHRS KNE SURG W/MENISCECTOMY MED/LAT W/SHVG Right 05/23/2022    Procedure: KNEE ARTHROSCOPY WITH medial MENISCECTOMY chondroplasty synovectomy injection;  Surgeon: Mik Solo DO;  Location: [facility] MA[facility];  Service: Orthopedics   • VA NEUROPLASTY &/TRANSPOS MEDIAN NRV CARPAL Alicia Can Left 06/30/2021    Procedure: RELEASE CARPAL TUNNEL;  Surgeon: Sofia Rosenberg DO;  Location: [address] [place] [address] MAIN OR;  Service: Orthopedics       Family History   Problem Relation Age of Onset   • Heart disease Mother         hx MI   • Diabetes Mother    • Stroke Mother    • COPD Father         80 yrs   • Coronary artery disease Father    • Cancer Brother        Social History     Occupational History   • Not on file   Tobacco Use   • Smoking status: Former     Packs/day: 1 00     Years: 30 00     Pack years: 30 00     Types: Cigarettes     Quit date: 2007     Years since quitting: 15 9   • Smokeless tobacco: Never   Vaping Use   • Vaping Use: Never used   Substance and Sexual Activity   • Alcohol use: Yes     Comment: occ   • Drug use: No   • Sexual activity: Yes     Partners: Female     Birth control/protection: Male Sterilization         Current Outpatient Medications:   •  acetaminophen (TYLENOL) 500 mg tablet, Take 500 mg by mouth every 6 (six) hours as needed for mild pain, Disp: , Rfl:   •  metFORMIN (GLUCOPHAGE) 500 mg tablet, Take 1 tablet (500 mg total) by mouth 2 (two) times a day with meals, Disp: 180 tablet, Rfl: 3  •  phentermine (ADIPEX-P) 37 5 MG tablet, Take 1 tablet (37 5 mg total) by mouth daily, Disp: 30 tablet, Rfl: 2  •  Promethazine-DM (PHENERGAN-DM) 6 25-15 mg/5 mL oral syrup, Take 5 mL by mouth 4 (four) times a day as needed for cough, Disp: 240 mL, Rfl: 1  •  simvastatin (ZOCOR) 20 mg tablet, Take 1 tablet (20 mg total) by mouth daily at bedtime, Disp: 90 tablet, Rfl: 3  •  tamsulosin (FLOMAX) 0 4 mg, Take 1 capsule (0 4 mg total) by mouth daily with dinner, Disp: 90 capsule, Rfl: 3    No Known Allergies       Vitals:    12/12/22 0909   BP: 148/85       Objective:                    Ortho Exam  left knee(s) -   Patient ambulates with antalgic gait pattern  Uses No assistive device  Genu Varus anatomical deformity  Skin is warm and dry to touch with no signs of erythema, ecchymosis, or infection  No soft tissue swelling or effusion noted  ROM (5° - 115°)  MMT: 4/5 throughout  TTP over medial joint line, TTP over lateral joint line, TTP over pes anserine bursa, no popliteal fullness appreciated on exam   Flexor and extensor mechanisms are intact   Knee is stable to varus and valgus stress  - Lachman's  - Anterior Drawer, - Posterior Drawer  - Medial Juany's, - Lateral Juany's  - Pivot Shift  Patella tracks centrally with palpable crepitus  Calf compartments are soft and supple  - Rachel's sign  2+ DP and PT pulses with brisk capillary refill to the toes  Sural, saphenous, tibial, superficial, and deep peroneal motor and sensory distributions intact  Sensation light touch intact distally    Diagnostics, reviewed and taken today if performed as documented:    None performed    Procedures, if performed today:    Large joint arthrocentesis: L knee  Universal Protocol:  Consent: Verbal consent obtained  Risks and benefits: risks, benefits and alternatives were discussed  Consent given by: patient  Time out: Immediately prior to procedure a "time out" was called to verify the correct patient, procedure, equipment, support staff and site/side marked as required  Timeout called at: 12/12/2022 9:23 AM   Patient understanding: patient states understanding of the procedure being performed  Site marked: the operative site was marked  Patient identity confirmed: verbally with patient    Supporting Documentation  Indications: pain and diagnostic evaluation   Procedure Details  Location: knee - L knee  Needle size: 22 G  Ultrasound guidance: no  Approach: anterolateral  Medications administered: 4 mL bupivacaine 0 25 %; 40 mg triamcinolone acetonide 40 mg/mL    Patient tolerance: patient tolerated the procedure well with no immediate complications  Dressing:  Sterile dressing applied      Portions of the record may have been created with voice recognition software  Occasional wrong word or "sound a like" substitutions may have occurred due to the inherent limitations of voice recognition software  Read the chart carefully and recognize, using context, where substitutions have occurred

## 2022-12-16 DIAGNOSIS — S83.204A OTHER TEAR OF MENISCUS OF LEFT KNEE, UNSPECIFIED MENISCUS, UNSPECIFIED WHETHER OLD OR CURRENT TEAR, INITIAL ENCOUNTER: Primary | ICD-10-CM

## 2022-12-16 RX ORDER — MELOXICAM 15 MG/1
15 TABLET ORAL DAILY
Qty: 30 TABLET | Refills: 2 | Status: SHIPPED | OUTPATIENT
Start: 2022-12-16 | End: 2023-04-07 | Stop reason: SDUPTHER

## 2023-01-22 DIAGNOSIS — E66.01 CLASS 2 SEVERE OBESITY DUE TO EXCESS CALORIES WITH SERIOUS COMORBIDITY AND BODY MASS INDEX (BMI) OF 36.0 TO 36.9 IN ADULT (HCC): ICD-10-CM

## 2023-01-23 RX ORDER — PHENTERMINE HYDROCHLORIDE 37.5 MG/1
37.5 TABLET ORAL DAILY
Qty: 30 TABLET | Refills: 0 | Status: SHIPPED | OUTPATIENT
Start: 2023-01-23 | End: 2023-01-31 | Stop reason: SDUPTHER

## 2023-01-23 NOTE — TELEPHONE ENCOUNTER
Patient requesting refill(s) of: phentramine    Last filled: 11/25/2022  Last appt: 11/17/2022  Next appt: 4/07/2023    Pharmacy:  [address] [place] [address]

## 2023-01-31 ENCOUNTER — ANESTHESIA EVENT (OUTPATIENT)
Dept: PERIOP | Facility: HOSPITAL | Age: 73
End: 2023-01-31

## 2023-01-31 DIAGNOSIS — E66.01 CLASS 2 SEVERE OBESITY DUE TO EXCESS CALORIES WITH SERIOUS COMORBIDITY AND BODY MASS INDEX (BMI) OF 36.0 TO 36.9 IN ADULT (HCC): ICD-10-CM

## 2023-01-31 RX ORDER — PHENTERMINE HYDROCHLORIDE 37.5 MG/1
37.5 TABLET ORAL DAILY
Qty: 30 TABLET | Refills: 2 | Status: SHIPPED | OUTPATIENT
Start: 2023-01-31

## 2023-02-05 NOTE — ANESTHESIA PREPROCEDURE EVALUATION
Procedure:  ARTHROSCOPY KNEE WITH POSSIBLE MENISCECTOMY (Left: Knee)    ECG: SR with nonspecific ST and T wave abnormality   Prior GA LMA 4    A1c 6 7 POC glucose 167    Relevant Problems   CARDIO   (+) High blood pressure   (+) Hypercholesteremia      ENDO   (+) Type 2 diabetes mellitus without complication, without long-term current use of insulin (HCC)      PULMONARY   (+) Dyspnea             Anesthesia Plan  ASA Score- 2     Anesthesia Type- general with ASA Monitors  Additional Monitors:   Airway Plan: LMA  Plan Factors-Exercise tolerance (METS): >4 METS  Chart reviewed  EKG reviewed  Existing labs reviewed  Patient summary reviewed  Patient is not a current smoker  Obstructive sleep apnea risk education given perioperatively  Induction- intravenous  Postoperative Plan-     Informed Consent- Anesthetic plan and risks discussed with patient  I personally reviewed this patient with the CRNA  Discussed and agreed on the Anesthesia Plan with the CRNA  Chasity Perez

## 2023-02-06 ENCOUNTER — ANESTHESIA (OUTPATIENT)
Dept: PERIOP | Facility: HOSPITAL | Age: 73
End: 2023-02-06

## 2023-02-06 ENCOUNTER — HOSPITAL ENCOUNTER (OUTPATIENT)
Facility: HOSPITAL | Age: 73
Setting detail: OUTPATIENT SURGERY
Discharge: HOME | End: 2023-02-06
Attending: ORTHOPAEDIC SURGERY | Admitting: ORTHOPAEDIC SURGERY

## 2023-02-06 VITALS
HEART RATE: 101 BPM | DIASTOLIC BLOOD PRESSURE: 67 MMHG | SYSTOLIC BLOOD PRESSURE: 137 MMHG | HEIGHT: 64 IN | TEMPERATURE: 97 F | BODY MASS INDEX: 34.66 KG/M2 | RESPIRATION RATE: 20 BRPM | OXYGEN SATURATION: 96 % | WEIGHT: 203 LBS

## 2023-02-06 DIAGNOSIS — S83.204A OTHER TEAR OF MENISCUS OF LEFT KNEE, UNSPECIFIED MENISCUS, UNSPECIFIED WHETHER OLD OR CURRENT TEAR, INITIAL ENCOUNTER: ICD-10-CM

## 2023-02-06 DIAGNOSIS — S83.241D OTHER TEAR OF MEDIAL MENISCUS OF RIGHT KNEE AS CURRENT INJURY, SUBSEQUENT ENCOUNTER: Primary | ICD-10-CM

## 2023-02-06 LAB — GLUCOSE SERPL-MCNC: 163 MG/DL (ref 65–140)

## 2023-02-06 RX ORDER — ONDANSETRON 2 MG/ML
4 INJECTION INTRAMUSCULAR; INTRAVENOUS EVERY 6 HOURS PRN
Status: DISCONTINUED | OUTPATIENT
Start: 2023-02-06 | End: 2023-02-06 | Stop reason: HOSPADM

## 2023-02-06 RX ORDER — HYDROCODONE BITARTRATE AND ACETAMINOPHEN 5; 325 MG/1; MG/1
1 TABLET ORAL EVERY 6 HOURS PRN
Status: DISCONTINUED | OUTPATIENT
Start: 2023-02-06 | End: 2023-02-06 | Stop reason: HOSPADM

## 2023-02-06 RX ORDER — KETOROLAC TROMETHAMINE 30 MG/ML
INJECTION, SOLUTION INTRAMUSCULAR; INTRAVENOUS AS NEEDED
Status: DISCONTINUED | OUTPATIENT
Start: 2023-02-06 | End: 2023-02-06

## 2023-02-06 RX ORDER — EPHEDRINE SULFATE 50 MG/ML
INJECTION INTRAVENOUS AS NEEDED
Status: DISCONTINUED | OUTPATIENT
Start: 2023-02-06 | End: 2023-02-06

## 2023-02-06 RX ORDER — FENTANYL CITRATE/PF 50 MCG/ML
25 SYRINGE (ML) INJECTION
Status: DISCONTINUED | OUTPATIENT
Start: 2023-02-06 | End: 2023-02-06 | Stop reason: HOSPADM

## 2023-02-06 RX ORDER — ALBUTEROL SULFATE 2.5 MG/3ML
2.5 SOLUTION RESPIRATORY (INHALATION) ONCE AS NEEDED
Status: DISCONTINUED | OUTPATIENT
Start: 2023-02-06 | End: 2023-02-06 | Stop reason: HOSPADM

## 2023-02-06 RX ORDER — CHLORHEXIDINE GLUCONATE 4 G/100ML
SOLUTION TOPICAL DAILY PRN
Status: DISCONTINUED | OUTPATIENT
Start: 2023-02-06 | End: 2023-02-06 | Stop reason: HOSPADM

## 2023-02-06 RX ORDER — LIDOCAINE HYDROCHLORIDE 10 MG/ML
INJECTION, SOLUTION EPIDURAL; INFILTRATION; INTRACAUDAL; PERINEURAL AS NEEDED
Status: DISCONTINUED | OUTPATIENT
Start: 2023-02-06 | End: 2023-02-06

## 2023-02-06 RX ORDER — ONDANSETRON 2 MG/ML
INJECTION INTRAMUSCULAR; INTRAVENOUS AS NEEDED
Status: DISCONTINUED | OUTPATIENT
Start: 2023-02-06 | End: 2023-02-06

## 2023-02-06 RX ORDER — FENTANYL CITRATE 50 UG/ML
INJECTION, SOLUTION INTRAMUSCULAR; INTRAVENOUS AS NEEDED
Status: DISCONTINUED | OUTPATIENT
Start: 2023-02-06 | End: 2023-02-06

## 2023-02-06 RX ORDER — METHYLPREDNISOLONE ACETATE 40 MG/ML
INJECTION, SUSPENSION INTRA-ARTICULAR; INTRALESIONAL; INTRAMUSCULAR; SOFT TISSUE AS NEEDED
Status: DISCONTINUED | OUTPATIENT
Start: 2023-02-06 | End: 2023-02-06 | Stop reason: HOSPADM

## 2023-02-06 RX ORDER — MIDAZOLAM HYDROCHLORIDE 2 MG/2ML
INJECTION, SOLUTION INTRAMUSCULAR; INTRAVENOUS AS NEEDED
Status: DISCONTINUED | OUTPATIENT
Start: 2023-02-06 | End: 2023-02-06

## 2023-02-06 RX ORDER — DEXAMETHASONE SODIUM PHOSPHATE 10 MG/ML
INJECTION, SOLUTION INTRAMUSCULAR; INTRAVENOUS AS NEEDED
Status: DISCONTINUED | OUTPATIENT
Start: 2023-02-06 | End: 2023-02-06

## 2023-02-06 RX ORDER — CHLORHEXIDINE GLUCONATE 0.12 MG/ML
15 RINSE ORAL ONCE
Status: DISCONTINUED | OUTPATIENT
Start: 2023-02-06 | End: 2023-02-06 | Stop reason: HOSPADM

## 2023-02-06 RX ORDER — ONDANSETRON 2 MG/ML
4 INJECTION INTRAMUSCULAR; INTRAVENOUS ONCE AS NEEDED
Status: DISCONTINUED | OUTPATIENT
Start: 2023-02-06 | End: 2023-02-06 | Stop reason: HOSPADM

## 2023-02-06 RX ORDER — HYDROMORPHONE HCL/PF 1 MG/ML
0.5 SYRINGE (ML) INJECTION
Status: DISCONTINUED | OUTPATIENT
Start: 2023-02-06 | End: 2023-02-06 | Stop reason: HOSPADM

## 2023-02-06 RX ORDER — CEFAZOLIN SODIUM 2 G/50ML
2000 SOLUTION INTRAVENOUS ONCE
Status: COMPLETED | OUTPATIENT
Start: 2023-02-06 | End: 2023-02-06

## 2023-02-06 RX ORDER — CEPHALEXIN 500 MG/1
500 CAPSULE ORAL EVERY 8 HOURS SCHEDULED
Qty: 9 CAPSULE | Refills: 0 | Status: SHIPPED | OUTPATIENT
Start: 2023-02-06 | End: 2023-02-09

## 2023-02-06 RX ORDER — SODIUM CHLORIDE, SODIUM LACTATE, POTASSIUM CHLORIDE, CALCIUM CHLORIDE 600; 310; 30; 20 MG/100ML; MG/100ML; MG/100ML; MG/100ML
100 INJECTION, SOLUTION INTRAVENOUS CONTINUOUS
Status: DISCONTINUED | OUTPATIENT
Start: 2023-02-06 | End: 2023-02-06 | Stop reason: HOSPADM

## 2023-02-06 RX ORDER — HYDROCODONE BITARTRATE AND ACETAMINOPHEN 5; 325 MG/1; MG/1
1 TABLET ORAL EVERY 6 HOURS PRN
Qty: 21 TABLET | Refills: 0 | Status: SHIPPED | OUTPATIENT
Start: 2023-02-06 | End: 2023-02-16

## 2023-02-06 RX ORDER — BUPIVACAINE HYDROCHLORIDE AND EPINEPHRINE 5; 5 MG/ML; UG/ML
INJECTION, SOLUTION PERINEURAL AS NEEDED
Status: DISCONTINUED | OUTPATIENT
Start: 2023-02-06 | End: 2023-02-06 | Stop reason: HOSPADM

## 2023-02-06 RX ORDER — PROPOFOL 10 MG/ML
INJECTION, EMULSION INTRAVENOUS AS NEEDED
Status: DISCONTINUED | OUTPATIENT
Start: 2023-02-06 | End: 2023-02-06

## 2023-02-06 RX ADMIN — KETOROLAC TROMETHAMINE 30 MG: 30 INJECTION, SOLUTION INTRAMUSCULAR at 08:43

## 2023-02-06 RX ADMIN — DEXAMETHASONE SODIUM PHOSPHATE 10 MG: 10 INJECTION, SOLUTION INTRAMUSCULAR; INTRAVENOUS at 08:19

## 2023-02-06 RX ADMIN — SODIUM CHLORIDE, SODIUM LACTATE, POTASSIUM CHLORIDE, AND CALCIUM CHLORIDE 100 ML/HR: .6; .31; .03; .02 INJECTION, SOLUTION INTRAVENOUS at 07:39

## 2023-02-06 RX ADMIN — EPHEDRINE SULFATE 5 MG: 50 INJECTION INTRAVENOUS at 08:33

## 2023-02-06 RX ADMIN — ONDANSETRON 4 MG: 2 INJECTION INTRAMUSCULAR; INTRAVENOUS at 08:19

## 2023-02-06 RX ADMIN — FENTANYL CITRATE 50 MCG: 50 INJECTION INTRAMUSCULAR; INTRAVENOUS at 08:30

## 2023-02-06 RX ADMIN — MIDAZOLAM 2 MG: 1 INJECTION INTRAMUSCULAR; INTRAVENOUS at 08:02

## 2023-02-06 RX ADMIN — FENTANYL CITRATE 50 MCG: 50 INJECTION INTRAMUSCULAR; INTRAVENOUS at 08:06

## 2023-02-06 RX ADMIN — HYDROCODONE BITARTRATE AND ACETAMINOPHEN 1 TABLET: 5; 325 TABLET ORAL at 09:40

## 2023-02-06 RX ADMIN — PROPOFOL 200 MG: 10 INJECTION, EMULSION INTRAVENOUS at 08:11

## 2023-02-06 RX ADMIN — EPHEDRINE SULFATE 10 MG: 50 INJECTION INTRAVENOUS at 08:16

## 2023-02-06 RX ADMIN — LIDOCAINE HYDROCHLORIDE 20 MG: 10 INJECTION, SOLUTION EPIDURAL; INFILTRATION; INTRACAUDAL; PERINEURAL at 08:11

## 2023-02-06 RX ADMIN — CEFAZOLIN SODIUM 2000 MG: 2 SOLUTION INTRAVENOUS at 08:02

## 2023-02-06 NOTE — OP NOTE
OPERATIVE REPORT  PATIENT NAME: Raymond Zaman    :  1950  MRN: [id number]  Pt Location: [facility] OR ROOM 02    SURGERY DATE: 2023    Surgeon(s) and Role:     * Vin Blue DO - Primary     * Sherrie Brown PA-C - Assisting    Preop Diagnosis:  Other tear of meniscus of left knee, unspecified meniscus, unspecified whether old or current tear, initial encounter S83 204A    Post-Op Diagnosis Codes:     * Other tear of meniscus of left knee, unspecified meniscus, unspecified whether old or current tear, initial encounter [S83 204A]     Tear of medial meniscus left knee  Tear of lateral meniscus  Degenerative joint disease  Synovitis    Procedure(s):  Left - ARTHROSCOPY KNEE WITH POSSIBLE MENISCECTOMY    Left knee arthroscopy  Medial meniscectomy  Lateral meniscectomy  Chondroplasty  Synovectomy  Post arthroscopic injection of intra-articular joint space and peripheral portal      Specimen(s):  Shavings    Estimated Blood Loss:   Minimal    Drains:  none    Anesthesia Type:   General with local placed at conclusion of procedure    Operative Indications: Other tear of meniscus of left knee, unspecified meniscus, unspecified whether old or current tear, initial encounter S83 204A      Operative Findings:  TT: 15    Complications:   None    Procedure and Technique:    The patient was properly identified and brought into the  operative suite  After  Successful induction of the general anesthetic, a tourniquet was placed on patient's left proximal thigh  The left lower extremity was then prepped and draped in the usual fashion  It was medically necessary that the physician's assistant be in the room to aid in positioning and placing  the appropriate amount of retraction on  the patient   Yza Mckeon PA-C-assisting necessary for the procedure for assistance with minimally invasive arthroscopic techniques for medial and lateral meniscectomies as well as assistance with utilization of the camera and shaver and the biter  Clyde Kennedy PA-C-assisting necessary for the procedure for assistance with minimally invasive arthroscopic techniques for medial and lateral meniscectomies as well as assistance with utilization of the camera and shaver and the biter  The patient was also given a dose of intravenous antibiotics  An Esmarch was used to exsanguinate the left lower extremity  The tourniquet was then inflated  Using a #11  Scalpel blade an anterior lateral portal was made approximately 1 cm above the joint line 1 cm lateral to patellar tendon and an anterior medial portal was made approximately 1 cm above the joint line 1 cm medial to patellar tendon  The arthroscope was 1st introduced into the  into the lateral portal   First the  medial joint space was inspected  There was a tremendous amount of synovial tissue  A synovectomy did occur with the assistance of a shaver  There was a complex tear along the central and posterior 1/3 horn of the medial meniscus  There was grade 3 arthritic changes along the medial femoral condyle  Using an an arthroscopic biter, a medial meniscectomy  occurred  It was then smoothed out with shaver  A Chondroplasty did occur where there was rough articular cartilage  It was then confirmed with a probe that there was no further loosening of the meniscus and articular cartilage  The arthroscope was then introduced into the intercondylar notch  The ACL was probed and found to be quite stable    The arthroscope was then introduced in the lateral side of his knee  After a synovectomy was performed, there was a degenerative rim tear along the lateral meniscus  Using arthroscopic shaver a lateral meniscectomy did occur  It was then confirmed with a probe that there was no further loosening of the meniscal tissue  There is minimal arthrosis on the lateral side of his  knee where no chondroplasty was warranted        The arthroscope was then induced the patellofemoral joint  There was some grade 2 and grade 3 early arthritic changes along the undersurface of the patella and trochlea  Using arthroscopic shaver a chondroplasty to occur smoothing the articular cartilage down to a nice base  The medial and lateral gutters were inspected next and there was no loose bodies  The knee was then copiously irrigated sterile arthroscopic solution  The portals were closed with 3 O nylon  The portals were then injected with 0 5% Marcaine with epinephrine, and the knee was injected with 0 5% Marcaine with epinephrine and Depo-Medrol  The wounds were then dressed with ointment Xeroform 4x4s sterile Webril and Ace bandage  The tourniquet was then deflated  There was no complications during the procedure  He was successfully awoken, transferred to his hospital bed, and went to recovery room in stable condition        I was present for the entire procedure, A qualified resident physician was not available, and A physician assistant was required during the procedure for retraction tissue handling,dissection and suturing    Patient Disposition:  PACU         SIGNATURE: Jaxon Marshall DO  DATE: February 6, 2023  TIME: 8:13 AM

## 2023-02-06 NOTE — H&P
H&P Exam - Orthopedics   Zena Parra 71 y o  male MRN: [id number]  Unit/Bed#: [facility] Encounter: [id number]    Assessment/Plan     Assessment:  Medial meniscal tear of left knee  Plan:  Elective left knee arthroscopy with possible meniscectomy    History of Present Illness   HPI:  Zena Parra is a 71 y o  male who presented to our office complaining of left knee pain with worsening mechanical instability  The patient stated that his pain was worsening especially with climbing stairs  An MRI was performed which consistent with a medial meniscal tear of his left knee  After exhausting conservative treatment, the risks and benefits of surgery were discussed with the patient  He decided on an elective left knee arthroscopy with possible meniscectomy  Review of Systems   Constitutional: Negative for chills, fever and unexpected weight change  HENT: Negative for nosebleeds and sore throat  Eyes: Negative for pain, redness and visual disturbance  Respiratory: Negative for cough, shortness of breath and wheezing  Cardiovascular: Negative for chest pain, palpitations and leg swelling  Gastrointestinal: Negative for abdominal pain, nausea and vomiting  Endocrine: Negative for polydipsia and polyuria  Genitourinary: Negative for dysuria and hematuria  Musculoskeletal: Positive for arthralgias, gait problem and myalgias  As noted in HPI   Skin: Negative for rash and wound  Neurological: Negative for dizziness, numbness and headaches  Psychiatric/Behavioral: Negative for decreased concentration and suicidal ideas  The patient is not nervous/anxious          Historical Information   Past Medical History:   Diagnosis Date   • Arthritis    • Colon polyp    • COVID 09/07/2021    and 12/2022   • Diabetes mellitus ([facility] [address] )    • Dyslipidemia    • GERD (gastroesophageal reflux disease)    • Hyperlipidemia    • Nodule of left lung 2007    negative for CA   • Type 2 diabetes mellitus without complication, without long-term current use of insulin ([facility] [address] ) 2019   • Use of cane as ambulatory aid      Past Surgical History:   Procedure Laterality Date   • COLONOSCOPY     • ELBOW SURGERY Bilateral    • FOOT TENDON SURGERY Bilateral     heel   • HAND SURGERY     • NH ARTHRS KNE SURG W/MENISCECTOMY MED/LAT W/SHVG Right 2022    Procedure: KNEE ARTHROSCOPY WITH medial MENISCECTOMY chondroplasty synovectomy injection;  Surgeon: Jai Dodd DO;  Location: [facility] MAIN OR;  Service: Orthopedics   • NH NEUROPLASTY &/TRANSPOS MEDIAN NRV CARPAL Sudie Deniais Left 2021    Procedure: RELEASE CARPAL TUNNEL;  Surgeon: Julia Curtis DO;  Location: [address] [place] [address] MAIN OR;  Service: Orthopedics     Social History   Social History     Substance and Sexual Activity   Alcohol Use Yes    Comment: occ     Social History     Substance and Sexual Activity   Drug Use No     Social History     Tobacco Use   Smoking Status Former   • Packs/day: 1 00   • Years: 30 00   • Pack years: 30 00   • Types: Cigarettes   • Quit date:    • Years since quittin 1   Smokeless Tobacco Never     Family History:   Family History   Problem Relation Age of Onset   • Heart disease Mother         hx MI   • Diabetes Mother    • Stroke Mother    • COPD Father         80 yrs   • Coronary artery disease Father    • Cancer Brother        Meds/Allergies   PTA meds:   Prior to Admission Medications   Prescriptions Last Dose Informant Patient Reported? Taking?    Promethazine-DM (PHENERGAN-DM) 6 25-15 mg/5 mL oral syrup   No Yes   Sig: Take 5 mL by mouth 4 (four) times a day as needed for cough   acetaminophen (TYLENOL) 500 mg tablet   Yes Yes   Sig: Take 500 mg by mouth every 6 (six) hours as needed for mild pain   meloxicam (Mobic) 15 mg tablet   No Yes   Sig: Take 1 tablet (15 mg total) by mouth daily   metFORMIN (GLUCOPHAGE) 500 mg tablet   No Yes   Sig: Take 1 tablet (500 mg total) by mouth 2 (two) times a day with meals   phentermine (ADIPEX-P) 37 5 MG tablet Not Taking  No No   Sig: Take 1 tablet (37 5 mg total) by mouth daily   Patient not taking: Reported on 1/31/2023   simvastatin (ZOCOR) 20 mg tablet   No Yes   Sig: Take 1 tablet (20 mg total) by mouth daily at bedtime   tamsulosin (FLOMAX) 0 4 mg   No Yes   Sig: Take 1 capsule (0 4 mg total) by mouth daily with dinner      Facility-Administered Medications: None     No Known Allergies    Objective   Vitals: There were no vitals taken for this visit  ,There is no height or weight on file to calculate BMI  No intake or output data in the 24 hours ending 02/06/23 0719    No intake/output data recorded  Invasive Devices     None                 Physical Exam  Ortho Exam  Left lower extremity is neurovascularly intact  Toes are pink and mobile  Compartments are soft  Medial joint line tenderness  Range of motion of the knee is from 5 to 115 degrees  Positive Juany's  Negative Lachman, drawer or pivot shift  Brisk cap refill  Sensation intact  Heart RRR  Lungs CTA  Abdomen Soft, nontender, nondistended  Lab Results: I have personally reviewed pertinent lab results  Imaging: I have personally reviewed pertinent films in PACS  EKG, Pathology, and Other Studies: I have personally reviewed pertinent films in PACS    Code Status: No Order  Advance Directive and Living Will:      Power of :    POLST:      Counseling / Coordination of Care  Total floor / unit time spent today 30 minutes  Greater than 50% of total time was spent with the patient and / or family counseling and / or coordination of care

## 2023-02-08 ENCOUNTER — EVALUATION (OUTPATIENT)
Dept: PHYSICAL THERAPY | Facility: CLINIC | Age: 73
End: 2023-02-08

## 2023-02-08 DIAGNOSIS — M25.562 LEFT KNEE PAIN, UNSPECIFIED CHRONICITY: Primary | ICD-10-CM

## 2023-02-08 DIAGNOSIS — S83.242D OTHER TEAR OF MEDIAL MENISCUS OF LEFT KNEE AS CURRENT INJURY, SUBSEQUENT ENCOUNTER: ICD-10-CM

## 2023-02-08 NOTE — PROGRESS NOTES
PT Evaluation     Today's date: 2023  Patient name: Jerry Denny  : 1950  MRN: [id number]  Referring provider: Aldo Eaton DO  Dx:   Encounter Diagnosis     ICD-10-CM    1  Left knee pain, unspecified chronicity  M25 562       2  Other tear of medial meniscus of left knee as current injury, subsequent encounter  S83 242D Ambulatory Referral to Physical Therapy          Start Time: 0815  Stop Time: 0900  Total time in clinic (min): 45 minutes    Assessment  Assessment details: Jerry Denny was seen for an initial PT evaluation today  Patient is a 71 y o  male with diagnosis of L knee s/p meniscectomy and past medical history significant for OA, dyslipidemia, GERD, DM, hyperlipidemia, bilateral elbow surgery, L carpal tunnel  Moderate complexity evaluation  due to number of participation restrictions, functional outcome measure of 51% limitation, and evolving clinical presentation  Findings today show left knee weakness, decreased range of motion, limited stability and pain consistent with s/p diagnosis impacting overall functional mobility including ability to walk, stand, squat  Skilled PT indicated to treat at this time to address above stated deficits and return patient to PLOF  Impairments: abnormal gait, abnormal muscle firing, abnormal muscle tone, abnormal or restricted ROM, abnormal movement, activity intolerance, impaired balance, impaired physical strength, lacks appropriate home exercise program, pain with function and safety issue  Functional limitations: walking, stairs, squatting, lunging, lifting, kneeling  Goals  STG (6 weeks)  1  Patient will have reported 0/10 pain in left knee at rest    2  Improve patient's left knee flexion to 120 degrees for increased ability to take proper strides during ambulation  3  Increase patient's left single leg balance to 15 seconds for increased stability on stairs  LTG (12 weeks)  1   Patient's LE strength will be equal bilaterally for ability to ambulate and return to functional activities at [employer]  2  Patient will be able to walk dogs for 2 miles with 0/10 pain in left knee  3  Patient will be independent with home exercise program for continued maintenance post PT discharge  Plan  Plan details: Progress note in 4 weeks  Patient would benefit from: skilled physical therapy  Planned modality interventions: unattended electrical stimulation, thermotherapy: hydrocollator packs and cryotherapy  Planned therapy interventions: manual therapy, neuromuscular re-education, self care, home exercise program, gait training, therapeutic exercise and therapeutic activities  Frequency: 2x week  Duration in weeks: 12  Plan of Care beginning date: 2023  Plan of Care expiration date: 2023  Treatment plan discussed with: patient and PTA        Subjective Evaluation    History of Present Illness  Date of surgery: 2023  Mechanism of injury: Gaby Sharp is a 71 y o  male who presents to outpatient Physical Therapy today with complaints of left knee pain  Pain in left knee began in October MRI (+) for meniscal tear  Patient is now s/p L knee scope  F/u with surgeon 3/21  Pain  Current pain ratin  At best pain ratin  At worst pain ratin  Location: medial L knee  Quality: dull ache and sharp    Social Support  Steps to enter house: yes  Stairs in house: no   Lives in: [facility]  Lives with: spouse    Employment status: working ()  Patient Goals  Patient goals for therapy: decreased pain  Patient goal: increase walking, likes to walk 2 miles a day with dogs        Objective     Observations   Left Knee   Positive for edema and incision  Additional Observation Details  2 scope incisions closed with sutures    Neurological Testing     Sensation     Knee   Left Knee   Intact: light touch    Right Knee   Diminished: light touch     Comments   Right light touch: lateral knee/thigh  Active Range of Motion   Left Knee   Flexion: 70 degrees with pain  Extension: 5 degrees with pain    Right Knee   Flexion: 132 degrees   Extension: 8 degrees     Mobility   Patellar Mobility:   Left Knee   Hypomobile: left medial, left lateral, left superior and left inferior    Right Knee   WFL: medial, lateral, superior and inferior    Strength/Myotome Testing     Left Knee   Flexion: 3-  Extension: 2+  Quadriceps contraction: fair    Right Knee   Flexion: 5  Extension: 5  Quadriceps contraction: good    Swelling     Left Knee Girth Measurement (cm)   Joint line: 40 cm    Right Knee Girth Measurement (cm)   Joint line: 40 cm    General Comments:      Knee Comments  Gait: No AD, antalgic, decreased stance on LLE    FOTO: 49% function, 69% predicted function                Precautions: none noted  Access code: [email or web address]  Progress note: 3/8  POC: 5/8    Manuals 2/8       PROM  *       stretching *       Patellar mobs *       Wound care 8 min with education and dressing change       Neuro Re-Ed        QS :05x10       SLB        Tandem stance        Fitter board        steamboat                        Ther Ex        Bike *       SAQ 10x       LAQ *       bridging        Leg raises 10x       Heel slides With strap 20x:05       clamshell                HOIST  Knee ext  Knee flex          Leg press S=  Squat  HR        Standing HR/TR        TKE        Standing hamstring curls                                        Calf stretch        Hip flexor stretch        Hamstring stretch *       Ther Activity        Step ups        Sit to stand        Mini squats                        Gait Training                        Modalities                        Yony Bernal was given a handout and verbal instruction of customized home exercise program  Patient accepted program and was able to demonstrate exercises

## 2023-02-10 ENCOUNTER — TELEPHONE (OUTPATIENT)
Dept: OBGYN CLINIC | Facility: HOSPITAL | Age: 73
End: 2023-02-10

## 2023-02-13 ENCOUNTER — OFFICE VISIT (OUTPATIENT)
Dept: PHYSICAL THERAPY | Facility: CLINIC | Age: 73
End: 2023-02-13

## 2023-02-13 DIAGNOSIS — M25.562 LEFT KNEE PAIN, UNSPECIFIED CHRONICITY: ICD-10-CM

## 2023-02-13 DIAGNOSIS — S83.242D OTHER TEAR OF MEDIAL MENISCUS OF LEFT KNEE AS CURRENT INJURY, SUBSEQUENT ENCOUNTER: Primary | ICD-10-CM

## 2023-02-13 NOTE — PROGRESS NOTES
Daily Note     Today's date: 2023  Patient name: Olga Cuenca  : 1950  MRN: [id number]  Referring provider: Marc Garcia DO  Dx:   Encounter Diagnosis     ICD-10-CM    1  Other tear of medial meniscus of left knee as current injury, subsequent encounter  S83 242D       2  Left knee pain, unspecified chronicity  M25 562           Start Time: 0946          Subjective: Patient states presently his pain is a 2/10 achy in the posterior knee  Yesterday he had 8/10 pain in the knee and he did not know why  He did put heat on the knee at night after the [employer] supper bowel party, when he got home  He felt he could not do his exercises yesterday because of the pain  Objective: See treatment diary below    Reviewed proper use of HP vs CP and where to use it on operated leg  Patient also educated on edema control with elevation and ankle pumps  Home exercise program updated to include additional exercises  Handout issued and explained with demonstration  Patient accepts new exercises  Patient continues to were Band-Aid over incision area  His left knee is swollen with hematomas in posterior knee  He has no signs or symptoms of infection  Assessment: Tolerated treatment fair+  Patient would benefit from continued PT for stretching and strengthening  Patient was able to add exercises to his program with little difficulty and discomfort  He seemed to understand all education given to patient  He was a little more sore, but no increase of pain by the end of the session  Plan: Continue per plan of care  Progress treatment as tolerated         Precautions: none noted  Access code: [email or web address]  Progress note: 3/8  POC:     Manuals       PROM  * :30x3      stretching *       Patellar mobs * x10ea      Wound care 8 min with education and dressing change       Neuro Re-Ed        QS :05x10 :05x10      SLB        Tandem stance        Fitter board        steamboat Ther Ex        Bike * No power x3 min      SAQ 10x :05x15      LAQ * *x10      bridging  *x10      Leg raises 10x x10      Heel slides With strap 20x:05 With strap 20x:05      clamshell                HOIST  Knee ext  Knee flex          Leg press S=  Squat  HR        Standing HR/TR  *x10      TKE        Standing hamstring curls                                        Calf stretch  *:30x3      Hip flexor stretch        Hamstring stretch * * :30x3      Ther Activity        Step ups        Sit to stand        Mini squats                        Gait Training                        Modalities                          Access Code: [email or web address]  URL: [email or web address]  Date: 02/13/2023  Prepared by: Darrian Florentino    Exercises  • Supine Quad Set - 1 x daily - 7 x weekly - 3 sets - 10 reps  • Active Straight Leg Raise with Quad Set - 1 x daily - 7 x weekly - 3 sets - 10 reps  • Supine Knee Extension Strengthening - 1 x daily - 7 x weekly - 3 sets - 10 reps  • Supine Heel Slide with Strap - 1 x daily - 7 x weekly - 3 sets - 10 reps  • Seated Calf Stretch with Strap - 2-3 x daily - 7 x weekly - 3-1 sets - 3 reps - 30 sec hold  • Seated Hamstring Stretch - 2-3 x daily - 7 x weekly - 1 sets - 3 reps - 30 sec hold  • Standing Heel Raises - 2 x daily - 7 x weekly - 1 sets - 10 reps - 3-5 sec hold  • Standing Toe Raises at Chair - 2 x daily - 7 x weekly - 1 sets - 10 reps - 3-5 sec hold  • Supine Bridge - 2 x daily - 7 x weekly - 1 sets - 10 reps - 3 sec hold  • Seated Long Arc Quad - 2 x daily - 7 x weekly - 1 sets - 10 reps - 5 sec hold

## 2023-02-16 ENCOUNTER — OFFICE VISIT (OUTPATIENT)
Dept: PHYSICAL THERAPY | Facility: CLINIC | Age: 73
End: 2023-02-16

## 2023-02-16 DIAGNOSIS — M25.562 LEFT KNEE PAIN, UNSPECIFIED CHRONICITY: ICD-10-CM

## 2023-02-16 DIAGNOSIS — S83.242D OTHER TEAR OF MEDIAL MENISCUS OF LEFT KNEE AS CURRENT INJURY, SUBSEQUENT ENCOUNTER: Primary | ICD-10-CM

## 2023-02-16 NOTE — PROGRESS NOTES
Daily Note     Today's date: 2023  Patient name: Min Ayala  : 1950  MRN: [id number]  Referring provider: Andrés Hoffmann DO  Dx:   Encounter Diagnosis     ICD-10-CM    1  Other tear of medial meniscus of left knee as current injury, subsequent encounter  S83 242D       2  Left knee pain, unspecified chronicity  M25 562           Start Time: 0930  Stop Time: 1020  Total time in clinic (min): 50 minutes    Subjective: States he returned to work this week has had increased pain with difficulty walking  Uses SPC throughout the day due to the pain  Objective: See treatment diary below      Assessment: Tolerated treatment fair  +  5 degree extensor lag noted today with SLR due to end range quad weakness  Discussed increased pain levels and likelihood of increasing activity too quickly vs re-injury  Discussed relative rest over the weekend with completion of pain free HEP, keeping consistent with heel slides, icing, and quad sets  Patient would benefit from continued PT      Plan: Continue per plan of care  Progress treatment as tolerated         Precautions: none noted  Access code: [email or web address]  Progress note: 3/8  POC:     Manuals      PROM  * :30x3 Flex/ext - KB     stretching *       Patellar mobs * x10ea Grade III all directions - KB     Wound care 8 min with education and dressing change       Neuro Re-Ed        QS :05x10 :05x10 :05x10     SLB        Tandem stance        Fitter board        steamboat                        Ther Ex        Bike * No power x3 min Nustep L1 10 min     SAQ 10x :05x15 20x     LAQ * *x10 20x     bridging  *x10 nv     Leg raises 10x x10 10x     Heel slides With strap 20x:05 With strap 20x:05 With strap 20x:05     clamshell                HOIST  Knee ext  Knee flex          Leg press S=  Squat  HR        Standing HR/TR  *x10 nv     TKE        Standing hamstring curls   nv                                     Calf stretch  *:30x3 Seated :30x3     Hip flexor stretch        Hamstring stretch * * :30x3 Seated :30x3     Ther Activity        Step ups        Sit to stand        Mini squats                        Gait Training                        Modalities        CP   Post ex with LE elevation 10 min               Access Code: [email or web address]  URL: [email or web address]  Date: 02/13/2023  Prepared by: Johnna Floerntino    Exercises  • Supine Quad Set - 1 x daily - 7 x weekly - 3 sets - 10 reps  • Active Straight Leg Raise with Quad Set - 1 x daily - 7 x weekly - 3 sets - 10 reps  • Supine Knee Extension Strengthening - 1 x daily - 7 x weekly - 3 sets - 10 reps  • Supine Heel Slide with Strap - 1 x daily - 7 x weekly - 3 sets - 10 reps  • Seated Calf Stretch with Strap - 2-3 x daily - 7 x weekly - 3-1 sets - 3 reps - 30 sec hold  • Seated Hamstring Stretch - 2-3 x daily - 7 x weekly - 1 sets - 3 reps - 30 sec hold  • Standing Heel Raises - 2 x daily - 7 x weekly - 1 sets - 10 reps - 3-5 sec hold  • Standing Toe Raises at Chair - 2 x daily - 7 x weekly - 1 sets - 10 reps - 3-5 sec hold  • Supine Bridge - 2 x daily - 7 x weekly - 1 sets - 10 reps - 3 sec hold  • Seated Long Arc Quad - 2 x daily - 7 x weekly - 1 sets - 10 reps - 5 sec hold

## 2023-02-20 ENCOUNTER — OFFICE VISIT (OUTPATIENT)
Dept: PHYSICAL THERAPY | Facility: CLINIC | Age: 73
End: 2023-02-20

## 2023-02-20 DIAGNOSIS — M25.562 LEFT KNEE PAIN, UNSPECIFIED CHRONICITY: ICD-10-CM

## 2023-02-20 DIAGNOSIS — S83.242D OTHER TEAR OF MEDIAL MENISCUS OF LEFT KNEE AS CURRENT INJURY, SUBSEQUENT ENCOUNTER: Primary | ICD-10-CM

## 2023-02-20 NOTE — PROGRESS NOTES
Daily Note     Today's date: 2023  Patient name: Antonio Joe  : 1950  MRN: [id number]  Referring provider: Perry Schaefer DO  Dx:   Encounter Diagnosis     ICD-10-CM    1  Other tear of medial meniscus of left knee as current injury, subsequent encounter  S83 242D       2  Left knee pain, unspecified chronicity  M25 562                      Subjective: The patient notes feeling 2-3/10 pain at his L medial knee just proximal to joint line  The patient notes getting "zings" of pain up to an 8/10 when turning abruptly / twisting / or stepping funny  Objective: See treatment diary below      Assessment: The patient tolerated all activities well today except the tandem stance with L LE in front  The patient was advised to stagger feet versus placing them in tandem  The patient notes difficulty bearing weight first thing in the morning - patient was advised to utilize pillows between the knees( he is a side sleeper)  The patient needed verbal and manual cues for proper posture and technique to perform the exercises properly  There were no complaints of increased pain or problems after the session today  The patient ended with cold pack - no skin issues afterwards  The patient will benefit from continued skilled physical therapy to progress towards achieving patient centered goals  Plan: Continue per plan of care  Progress treatment as tolerated         Precautions: none noted  Access code: [email or web address]  Progress note: 3/8  POC:     Manuals     PROM  * :30x3 Flex/ext - KB Flex/ext AD    stretching *       Patellar mobs * x10ea Grade III all directions - KB Grade 3 all AD    Wound care 8 min with education and dressing change       Neuro Re-Ed        QS :05x10 :05x10 :05x10 x10:05    SLB        Tandem stance    2x:30 B/L feet stagger    Fitter board        steamboat                        Ther Ex        Bike * No power x3 min Nustep L1 10 min Nu step L2 x10 mins SAQ 10x :05x15 20x 1# x20    LAQ * *x10 20x 1# x20    bridging  *x10 nv x10    Leg raises 10x x10 10x x15    Heel slides With strap 20x:05 With strap 20x:05 With strap 20x:05 Flex W/ strap x20  x10 ABD    clamshell                HOIST  Knee ext  Knee flex          Leg press S=  Squat  HR        Standing HR/TR  *x10 nv x15    TKE        Standing hamstring curls   nv x15                                    Calf stretch  *:30x3 Seated :30x3 Seated :30x3    Hip flexor stretch        Hamstring stretch * * :30x3 Seated :30x3 Seated :30x3    Ther Activity        Step ups        Sit to stand        Mini squats                        Gait Training                        Modalities        CP   Post ex with LE elevation 10 min Post ex x10 mins elevated              Access Code: [email or web address]  URL: [email or web address]  Date: 02/13/2023  Prepared by: Timoteo Florentino    Exercises  • Supine Quad Set - 1 x daily - 7 x weekly - 3 sets - 10 reps  • Active Straight Leg Raise with Quad Set - 1 x daily - 7 x weekly - 3 sets - 10 reps  • Supine Knee Extension Strengthening - 1 x daily - 7 x weekly - 3 sets - 10 reps  • Supine Heel Slide with Strap - 1 x daily - 7 x weekly - 3 sets - 10 reps  • Seated Calf Stretch with Strap - 2-3 x daily - 7 x weekly - 3-1 sets - 3 reps - 30 sec hold  • Seated Hamstring Stretch - 2-3 x daily - 7 x weekly - 1 sets - 3 reps - 30 sec hold  • Standing Heel Raises - 2 x daily - 7 x weekly - 1 sets - 10 reps - 3-5 sec hold  • Standing Toe Raises at Chair - 2 x daily - 7 x weekly - 1 sets - 10 reps - 3-5 sec hold  • Supine Bridge - 2 x daily - 7 x weekly - 1 sets - 10 reps - 3 sec hold  • Seated Long Arc Quad - 2 x daily - 7 x weekly - 1 sets - 10 reps - 5 sec hold

## 2023-02-21 ENCOUNTER — OFFICE VISIT (OUTPATIENT)
Dept: OBGYN CLINIC | Facility: CLINIC | Age: 73
End: 2023-02-21

## 2023-02-21 VITALS
BODY MASS INDEX: 35.17 KG/M2 | SYSTOLIC BLOOD PRESSURE: 163 MMHG | HEIGHT: 64 IN | WEIGHT: 206 LBS | DIASTOLIC BLOOD PRESSURE: 81 MMHG | HEART RATE: 109 BPM

## 2023-02-21 DIAGNOSIS — E11.9 TYPE 2 DIABETES MELLITUS WITHOUT COMPLICATION, WITHOUT LONG-TERM CURRENT USE OF INSULIN (HCC): ICD-10-CM

## 2023-02-21 DIAGNOSIS — M17.12 PRIMARY OSTEOARTHRITIS OF LEFT KNEE: ICD-10-CM

## 2023-02-21 DIAGNOSIS — Z98.890 S/P LEFT KNEE ARTHROSCOPY: Primary | ICD-10-CM

## 2023-02-21 DIAGNOSIS — E66.01 CLASS 2 SEVERE OBESITY DUE TO EXCESS CALORIES WITH SERIOUS COMORBIDITY AND BODY MASS INDEX (BMI) OF 35.0 TO 35.9 IN ADULT (HCC): ICD-10-CM

## 2023-02-21 NOTE — PROGRESS NOTES
Assessment/Plan:    No problem-specific Assessment & Plan notes found for this encounter  Diagnoses and all orders for this visit:    S/P left knee arthroscopy    Primary osteoarthritis of left knee  -     Injection Procedure Prior Authorization; Future    Class 2 severe obesity due to excess calories with serious comorbidity and body mass index (BMI) of 35 0 to 35 9 in adult [facility])    Type 2 diabetes mellitus without complication, without long-term current use of insulin (HCC)          Functionally, the patient is doing much better from his knee arthroscopy  There is residual right is present  Would recommend initiation of viscosupplementation  Follow-up in 1 month evaluation  Hopefully at that point, this can occur  The patient has had pain in his left knee for the past 6 months  He has tried conservative modalities prior to surgical invention for at least 4 months  He has persistent pain in his knee  His pain is still 8 out of scale of 10 even with surgical invention  Unfortunately, there is only residual arthritis  The next logical step would be viscosupplementation  Regular steroid injections have not helped him in the past clearly the most recent one from 2 weeks ago  The pain does back to his activities of daily living, there is still stiffness present and nocturnal issues    Subjective:      Patient ID: Kristina Angel is a 71 y o  male  HPI     The patient is 2 weeks status post left knee arthroscopy  He still complaining of medial sided knee pain, mostly along the osseous region  He denies any numbness or tingling  He denies any fever or chills    He is walking in the office without any assistive devices    The following portions of the patient's history were reviewed and updated as appropriate: allergies, current medications, past family history, past medical history, past social history, past surgical history and problem list     Review of Systems   Constitutional: Negative for chills, fever and unexpected weight change  HENT: Negative for hearing loss, nosebleeds and sore throat  Eyes: Negative for pain, redness and visual disturbance  Respiratory: Negative for cough, shortness of breath and wheezing  Cardiovascular: Negative for chest pain, palpitations and leg swelling  Gastrointestinal: Negative for abdominal pain, nausea and vomiting  Endocrine: Negative for polydipsia and polyuria  Genitourinary: Negative for dysuria and hematuria  Musculoskeletal: Positive for arthralgias, gait problem, joint swelling and myalgias  Negative for back pain, neck pain and neck stiffness  As noted in HPI   Skin: Negative for rash and wound  Neurological: Negative for dizziness, numbness and headaches  Psychiatric/Behavioral: Negative for decreased concentration and suicidal ideas  The patient is not nervous/anxious  Objective:      /81   Pulse (!) 109   Ht 5' 4" (1 626 m)   Wt 93 4 kg (206 lb)   BMI 35 36 kg/m²          Physical Exam      Left lower extremity is neurovascular intact  Toes are pink and mobile  Compartments are soft  Incisions are clean, dry, intact  There is resolving ecchymosis present  No effusion  There is tenderness along the medial femoral condyle  Negative Juany's    Negative Homans

## 2023-02-23 ENCOUNTER — OFFICE VISIT (OUTPATIENT)
Dept: PHYSICAL THERAPY | Facility: CLINIC | Age: 73
End: 2023-02-23

## 2023-02-23 DIAGNOSIS — S83.242D OTHER TEAR OF MEDIAL MENISCUS OF LEFT KNEE AS CURRENT INJURY, SUBSEQUENT ENCOUNTER: Primary | ICD-10-CM

## 2023-02-23 DIAGNOSIS — M25.562 LEFT KNEE PAIN, UNSPECIFIED CHRONICITY: ICD-10-CM

## 2023-02-23 NOTE — PROGRESS NOTES
Daily Note     Today's date: 2023  Patient name: Davida Farnsworth  : 1950  MRN: [id number]  Referring provider: Girish Sanchez DO  Dx:   Encounter Diagnosis     ICD-10-CM    1  Other tear of medial meniscus of left knee as current injury, subsequent encounter  S83 242D       2  Left knee pain, unspecified chronicity  M25 562           Start Time: 0945  Stop Time: 1030  Total time in clinic (min): 45 minutes    Subjective: States continued medial left knee pain that is familiar pain prior to surgery  Spoke with surgeon about pain who educated him on OA  Objective: See treatment diary below      Assessment: Tolerated treatment well  Patient displayed lumbar flexion bias today with decreased pain on ascending/descending stairs post supine DKTC  Instructed patient to continue supine DKTC or seated lumbar flexion as appropriate 6-8x/day with 10 reps each  Will re-assess at next session  Patient would benefit from continued PT      Plan: Continue per plan of care  Progress treatment as tolerated         Precautions: none noted  Access code: [email or web address]  Progress note: 3/8  POC:     Manuals    PROM  * :30x3 Flex/ext - KB Flex/ext AD prn   stretching *    L hams, calf, quad    Patellar mobs * x10ea Grade III all directions - KB Grade 3 all AD Grade III all directions - KB   Wound care 8 min with education and dressing change       Neuro Re-Ed        QS :05x10 :05x10 :05x10 x10:05 :05x10   SLB        Tandem stance    2x:30 B/L feet stagger 2x:30 bilat   Fitter board        [place]                        Ther Ex        Bike * No power x3 min Nustep L1 10 min Nu step L2 x10 mins Nu step L2 x10 mins   SAQ 10x :05x15 20x 1# x20    LAQ * *x10 20x 1# x20 1# 30x   bridging  *x10 nv x10 10x   Leg raises 10x x10 10x x15 15x with QS   Heel slides With strap 20x:05 With strap 20x:05 With strap 20x:05 Flex W/ strap x20  x10 ABD Flex W/ strap x10   clamshell        DKTC     10x HOIST  Knee ext  Knee flex          Leg press S=  Squat  HR        Standing HR/TR  *x10 nv x15 20x   TKE        Standing hamstring curls   nv x15 20x                                   Calf stretch  *:30x3 Seated :30x3 Seated :30x3    Hip flexor stretch        Hamstring stretch * * :30x3 Seated :30x3 Seated :30x3    Ther Activity        Step ups        Sit to stand        Mini squats     CSMi 50/50 1 min   CSMi weight shifting     L6 2 min           Gait Training                        Modalities        CP   Post ex with LE elevation 10 min Post ex x10 mins elevated Post ex x10 mins elevated             Access Code: [email or web address]  URL: [email or web address]  Date: 02/13/2023  Prepared by: Lisa Florentino    Exercises  • Supine Quad Set - 1 x daily - 7 x weekly - 3 sets - 10 reps  • Active Straight Leg Raise with Quad Set - 1 x daily - 7 x weekly - 3 sets - 10 reps  • Supine Knee Extension Strengthening - 1 x daily - 7 x weekly - 3 sets - 10 reps  • Supine Heel Slide with Strap - 1 x daily - 7 x weekly - 3 sets - 10 reps  • Seated Calf Stretch with Strap - 2-3 x daily - 7 x weekly - 3-1 sets - 3 reps - 30 sec hold  • Seated Hamstring Stretch - 2-3 x daily - 7 x weekly - 1 sets - 3 reps - 30 sec hold  • Standing Heel Raises - 2 x daily - 7 x weekly - 1 sets - 10 reps - 3-5 sec hold  • Standing Toe Raises at Chair - 2 x daily - 7 x weekly - 1 sets - 10 reps - 3-5 sec hold  • Supine Bridge - 2 x daily - 7 x weekly - 1 sets - 10 reps - 3 sec hold  • Seated Long Arc Quad - 2 x daily - 7 x weekly - 1 sets - 10 reps - 5 sec hold

## 2023-02-27 ENCOUNTER — OFFICE VISIT (OUTPATIENT)
Dept: PHYSICAL THERAPY | Facility: CLINIC | Age: 73
End: 2023-02-27

## 2023-02-27 DIAGNOSIS — S83.242D OTHER TEAR OF MEDIAL MENISCUS OF LEFT KNEE AS CURRENT INJURY, SUBSEQUENT ENCOUNTER: Primary | ICD-10-CM

## 2023-02-27 NOTE — PROGRESS NOTES
Daily Note     Today's date: 2023  Patient name: Bladimir Bertrand  : 1950  MRN: [id number]  Referring provider: Clint Byers DO  Dx:   Encounter Diagnosis     ICD-10-CM    1  Other tear of medial meniscus of left knee as current injury, subsequent encounter  S83 242D           Start Time: 945  Stop Time: 1030  Total time in clinic (min): 45 minutes    Subjective: Patient states he continues to have increased medial knee pain with standing and twisting activities or if he makes quick movements  Will be getting round of 3 injections beginning at the end of March  Objective: See treatment diary below      Assessment: Tolerated treatment fair+  Antalgic gait with decreased stance on involved limb  Did note improvement of quad strength and activation with decreased extensor lag when verbally cued  Excessive translation of knees forward over toes, instructed to keep weight posterior  Patient would benefit from continued PT      Plan: Continue per plan of care  Progress treatment as tolerated         Precautions: none noted  Access code: [email or web address]  Progress note: 3/8  POC:     Manuals    PROM   :30x3 Flex/ext - KB Flex/ext AD prn   stretching L hams, calf, quad    L hams, calf, quad    Patellar mobs Grade III all directions - KB x10ea Grade III all directions - KB Grade 3 all AD Grade III all directions - KB   Wound care        Neuro Re-Ed        QS :05x10 :05x10 :05x10 x10:05 :05x10   SLB        Tandem stance 2x:30 bilat   2x:30 B/L feet stagger 2x:30 bilat   Fitter board        [place]                        Ther Ex        Bike Nustep L1 10 min No power x3 min Nustep L1 10 min Nu step L2 x10 mins Nu step L2 x10 mins   SAQ 1# 3x10 :05x15 20x 1# x20    LAQ 1# 3x10 *x10 20x 1# x20 1# 30x   bridging  *x10 nv x10 10x   Leg raises 10x3 with QS x10 10x x15 15x with QS   Heel slides With ball 10x With strap 20x:05 With strap 20x:05 Flex W/ strap x20  x10 ABD Flex W/ strap x10   clamshell        DKTC 10x    10x   HOIST  Knee ext  Knee flex          Leg press S=  Squat  HR        Standing HR/TR 20x *x10 nv x15 20x   TKE        Standing hamstring curls 20x  nv x15 20x                                   Calf stretch  *:30x3 Seated :30x3 Seated :30x3    Hip flexor stretch        Hamstring stretch  * :30x3 Seated :30x3 Seated :30x3    Ther Activity        Step ups        Sit to stand        Mini squats CSMi 50/50 1 min    CSMi 50/50 1 min   CSMi weight shifting     L6 2 min           Gait Training                        Modalities        CP deferred  Post ex with LE elevation 10 min Post ex x10 mins elevated Post ex x10 mins elevated             Access Code: [email or web address]  URL: [email or web address]  Date: 02/13/2023  Prepared by: Nikki Florentino    Exercises  • Supine Quad Set - 1 x daily - 7 x weekly - 3 sets - 10 reps  • Active Straight Leg Raise with Quad Set - 1 x daily - 7 x weekly - 3 sets - 10 reps  • Supine Knee Extension Strengthening - 1 x daily - 7 x weekly - 3 sets - 10 reps  • Supine Heel Slide with Strap - 1 x daily - 7 x weekly - 3 sets - 10 reps  • Seated Calf Stretch with Strap - 2-3 x daily - 7 x weekly - 3-1 sets - 3 reps - 30 sec hold  • Seated Hamstring Stretch - 2-3 x daily - 7 x weekly - 1 sets - 3 reps - 30 sec hold  • Standing Heel Raises - 2 x daily - 7 x weekly - 1 sets - 10 reps - 3-5 sec hold  • Standing Toe Raises at Chair - 2 x daily - 7 x weekly - 1 sets - 10 reps - 3-5 sec hold  • Supine Bridge - 2 x daily - 7 x weekly - 1 sets - 10 reps - 3 sec hold  • Seated Long Arc Quad - 2 x daily - 7 x weekly - 1 sets - 10 reps - 5 sec hold

## 2023-03-02 ENCOUNTER — OFFICE VISIT (OUTPATIENT)
Dept: PHYSICAL THERAPY | Facility: CLINIC | Age: 73
End: 2023-03-02

## 2023-03-02 DIAGNOSIS — S83.242D OTHER TEAR OF MEDIAL MENISCUS OF LEFT KNEE AS CURRENT INJURY, SUBSEQUENT ENCOUNTER: Primary | ICD-10-CM

## 2023-03-02 DIAGNOSIS — M25.562 LEFT KNEE PAIN, UNSPECIFIED CHRONICITY: ICD-10-CM

## 2023-03-02 NOTE — PROGRESS NOTES
Daily Note     Today's date: 3/2/2023  Patient name: Zena Parra  : 1950  MRN: [id number]  Referring provider: Nagi Foster DO  Dx:   Encounter Diagnosis     ICD-10-CM    1  Other tear of medial meniscus of left knee as current injury, subsequent encounter  S83 242D       2  Left knee pain, unspecified chronicity  M25 562           Start Time: 0944          Subjective: Patient states his knee felt good yesterday, but today is not a good day  His pain sitting is minimal, but a sever medial pain with weight bearing  Objective: See treatment diary below    Patient's home exercise program updated to add new exercises  Assessment: Tolerated treatment well  Patient would benefit from continued PT for stretching and strengthening  Patient was able to perform his exercises with little difficulty and discomfort  He was able to increase a few exercises  Patient felt his hamstrings were tight today  He liked the new exercise to stretch hamstring  Patient felt a little better by the end of the session  Plan: Continue per plan of care  Progress note during next visit  Progress treatment as tolerated         Precautions: none noted  Access code: [email or web address]  Progress note: 3/8  POC:     Manuals 2/27 3/2 2/16 2/20 2/23   PROM    Flex/ext - KB Flex/ext AD prn   stretching L hams, calf, quad L hams, calf,quad   L hams, calf, quad    Patellar mobs Grade III all directions - KB x10ea Grade III all directions - KB Grade 3 all AD Grade III all directions - KB   Sitting knee ext stretch  * :30x2      Neuro Re-Ed        QS :05x10 :05x15 :05x10 x10:05 :05x10   SLB        Tandem stance 2x:30 bilat 2x:30 bilat  2x:30 B/L feet stagger 2x:30 bilat   Fitter board        [place]                        Ther Ex        Bike Nustep L1 10 min L1 x10 min s=6 Nustep L1 10 min Nu step L2 x10 mins Nu step L2 x10 mins   SAQ 1# 3x10 1# 3x10 20x 1# x20    LAQ 1# 3x10 1# 3x10 20x 1# x20 1# 30x   bridging   nv x10 10x Leg raises 10x3 with QS 10x2 with QS 10x x15 15x with QS   Heel slides With ball 10x Orange SB x10 With strap 20x:05 Flex W/ strap x20  x10 ABD Flex W/ strap x10   clamshell        DKTC 10x Orange SB x10   10x   HOIST  Knee ext  Knee flex          Leg press S=  Squat  HR        Standing HR/TR 20x x20 nv x15 20x   TKE        Standing hamstring curls 20x x40 nv x15 20x   Hamstring stretch 90/90  :15x4 w/towel                              Calf stretch   Seated :30x3 Seated :30x3    Hip flexor stretch        Hamstring stretch   Seated :30x3 Seated :30x3    Ther Activity        Step ups        Sit to stand        Mini squats CSMi 50/50 1 min CSMi 50/50 x2 min   CSMi 50/50 1 min   CSMi weight shifting     L6 2 min           Gait Training                        Modalities        CP deferred  Post ex with LE elevation 10 min Post ex x10 mins elevated Post ex x10 mins elevated             Access Code: [email or web address]  URL: [email or web address]  Date: 03/02/2023  Prepared by: Neo Florentino    Exercises  • Supine Quad Set - 1 x daily - 7 x weekly - 3 sets - 10 reps  • Active Straight Leg Raise with Quad Set - 1 x daily - 7 x weekly - 3 sets - 10 reps  • Supine Knee Extension Strengthening - 1 x daily - 7 x weekly - 3 sets - 10 reps  • Supine Heel Slide with Strap - 1 x daily - 7 x weekly - 3 sets - 10 reps  • Seated Calf Stretch with Strap - 2-3 x daily - 7 x weekly - 3-1 sets - 3 reps - 30 sec hold  • Seated Hamstring Stretch - 2-3 x daily - 7 x weekly - 1 sets - 3 reps - 30 sec hold  • Standing Heel Raises - 2 x daily - 7 x weekly - 1 sets - 10 reps - 3-5 sec hold  • Standing Toe Raises at Chair - 2 x daily - 7 x weekly - 1 sets - 10 reps - 3-5 sec hold  • Supine Bridge - 2 x daily - 7 x weekly - 1 sets - 10 reps - 3 sec hold  • Seated Long Arc Quad - 2 x daily - 7 x weekly - 1 sets - 10 reps - 5 sec hold  • Left Standing Lateral Shift Correction at Wall - Repetitions - 4 x daily - 7 x weekly - 1 sets - 10 reps  • Standing Knee Flexion AROM with Chair Support - 2 x daily - 7 x weekly - 1 sets - 10 reps - 3 hold  • Supine Hip Abduction - 2 x daily - 7 x weekly - 1 sets - 10 reps - 5 hold  • Tandem Stance with Chair Support - 2 x daily - 7 x weekly - 1 sets - 3 reps - 30 hold  • Hooklying Active Hamstring Stretch - 2 x daily - 7 x weekly - 1 sets - 3 reps - 30 sec hold      •

## 2023-03-06 ENCOUNTER — EVALUATION (OUTPATIENT)
Dept: PHYSICAL THERAPY | Facility: CLINIC | Age: 73
End: 2023-03-06

## 2023-03-06 DIAGNOSIS — M25.562 LEFT KNEE PAIN, UNSPECIFIED CHRONICITY: ICD-10-CM

## 2023-03-06 DIAGNOSIS — S83.242D OTHER TEAR OF MEDIAL MENISCUS OF LEFT KNEE AS CURRENT INJURY, SUBSEQUENT ENCOUNTER: Primary | ICD-10-CM

## 2023-03-06 NOTE — PROGRESS NOTES
PT Re-Evaluation     Today's date: 3/6/2023  Patient name: Shreyas Hurtado  : 1950  MRN: [id number]  Referring provider: Joaquina Muniz,   Dx:   Encounter Diagnosis     ICD-10-CM    1  Other tear of medial meniscus of left knee as current injury, subsequent encounter  S83 242D       2  Left knee pain, unspecified chronicity  M25 562                      Assessment  Assessment details: The patient has been seen in PT for a total of 8 visits since [facility] with good PT attendance noted  He has made improvements since [facility] and is making progress towards his goals  His knee ROM is now WNL  He has complaints of intermittent L knee pain with most pain being along his medial knee  He demonstrates deficits with slight decreased strength, decreased flexibility and pain with stair negotiation  Most pain is noted with going down the steps  The patient would benefit from continued PT to address deficits and improve function  Tx to include ROM, stretching, strengthening, modalities, HEP, pt education, postural ed, lifting/body mechanics, neuro re-ed, balance/proprioception Te, MT and equipment  The patient wishes to be on hold until his next doctor appointment later this month  In the meantime he is to continue with his HEP  He has demonstrated understanding of HEP for ROM, stretching, strengthening, balance and flexibility  Instructed patient to phone clinic with any questions or concerns, he verbalized understanding  He may continue to progress at home with continued HEP  Instructed patient to phone clinic after his doctor appointment re: status  Hold PT        Impairments: abnormal gait, abnormal muscle firing, abnormal muscle tone, abnormal or restricted ROM, abnormal movement, activity intolerance, impaired balance, impaired physical strength, lacks appropriate home exercise program, pain with function and safety issue  Functional limitations: walking, stairs, squatting, lunging, lifting, kneeling Prognosis: good    Goals  STG (6 weeks)  1  Patient will have reported 0/10 pain in left knee at rest  - progressing  2  Improve patient's left knee flexion to 120 degrees for increased ability to take proper strides during ambulation  - met  3  Increase patient's left single leg balance to 15 seconds for increased stability on stairs - progressing  LTG (12 weeks)  1  Patient's LE strength will be equal bilaterally for ability to ambulate and return to functional activities at PLOF  - progressing  2  Patient will be able to walk dogs for 2 miles with 0/10 pain in left knee  - progressing  3  Patient will be independent with home exercise program for continued maintenance post PT discharge  - progressing      Plan  Plan details: Progress note in 4 weeks  Patient would benefit from: skilled physical therapy  Planned modality interventions: unattended electrical stimulation, thermotherapy: hydrocollator packs and cryotherapy  Planned therapy interventions: manual therapy, neuromuscular re-education, self care, home exercise program, gait training, therapeutic exercise, therapeutic activities, balance, balance/weight bearing training, postural training, patient education, strengthening, flexibility and stretching  Frequency: 2x week  Duration in weeks: 8  Plan of Care beginning date: 3/6/2023  Plan of Care expiration date: 5/1/2023  Treatment plan discussed with: patient        Subjective Evaluation    History of Present Illness  Date of surgery: 2/6/2023  Mechanism of injury: Shreyas Hurtado is a 71 y o  male who presents to outpatient Physical Therapy today with complaints of left knee pain  Pain in left knee began in October MRI (+) for meniscal tear  Patient is now s/p L knee scope  F/u with surgeon 3/21  UPDATE 3/6/23: The patient states that he continues to have pain in his knee though it has been feeling better  He notes pain with coming down the steps or with walking up/down inclines    Pain is along his medial knee  He will be going back to see the doctor on 3/28 and will be getting an injection in his knee  Pain  Current pain ratin  At best pain ratin  At worst pain ratin  Location: medial L knee  Quality: dull ache and sharp    Social Support  Steps to enter house: yes  Stairs in house: no   Lives in: [place] house  Lives with: spouse    Employment status: working ()  Patient Goals  Patient goals for therapy: decreased pain  Patient goal: increase walking, likes to walk 2 miles a day with dogs        Objective     Observations   Left Knee   Positive for incision  Additional Observation Details  2 portal sites, well healed  Neurological Testing     Sensation     Knee   Left Knee   Intact: light touch    Right Knee   Diminished: light touch     Comments   Right light touch: lateral knee/thigh  Active Range of Motion   Left Knee   Flexion: 130 degrees   Extension: 0 degrees     Right Knee   Flexion: 132 degrees   Extension: 8 degrees     Mobility   Patellar Mobility:   Left Knee   WFL: medial, lateral, superior and inferior       Right Knee   WFL: medial, lateral, superior and inferior    Strength/Myotome Testing     Left Knee   Flexion: 4+  Extension: 4  Quadriceps contraction: good    Right Knee   Flexion: 5  Extension: 5  Quadriceps contraction: good    Swelling     Left Knee Girth Measurement (cm)   Joint line: 40 cm    Right Knee Girth Measurement (cm)   Joint line: 40 cm    General Comments:      Knee Comments  Gait: No AD, antalgic, decreased stance on LLE    FOTO: 61% function, 69% predicted function                Precautions: none noted  Access code: [email or web address]  Progress note:   POC:     Manuals 2/27 3/2 3/6 2/20 2/23   PROM     Flex/ext AD prn   stretching L hams, calf, quad L hams, calf,quad L hams, calf, quad 10x ea  L hams, calf, quad    Patellar mobs Grade III all directions - KB x10ea 10x ea Grade 3 all AD Grade III all directions - KB   Sitting knee ext stretch  * :30x2 30"x2     Neuro Re-Ed        QS :05x10 :05x15 :05x10 x10:05 :05x10   SLB        Tandem stance 2x:30 bilat 2x:30 bilat 2x30" Sharad 2x:30 B/L feet stagger 2x:30 bilat   Fitter board        [place]                        Ther Ex        Bike Nustep L1 10 min L1 x10 min s=6 Nustep L1 10 min Nu step L2 x10 mins Nu step L2 x10 mins   SAQ 1# 3x10 1# 3x10 1# 3x10 1# x20    LAQ 1# 3x10 1# 3x10 1# 3x10 1# x20 1# 30x   bridging    x10 10x   Leg raises 10x3 with QS 10x2 with QS 10x2 with QS x15 15x with QS   Heel slides With ball 10x Orange SB x10 Orange SB 10x Flex W/ strap x20  x10 ABD Flex W/ strap x10   clamshell        DKTC 10x [place] SB x10 Orange SB 10x  10x   HOIST  Knee ext  Knee flex          Leg press S=  Squat  HR        Standing HR/TR 20x x20 20x x15 20x   TKE        Standing hamstring curls 20x x40 20x x15 20x   Hamstring stretch 90/90  :15x4 w/towel 15"x4 w/towel                             Calf stretch    Seated :30x3    Hip flexor stretch        Hamstring stretch    Seated :30x3    Ther Activity        Step ups        Sit to stand        Mini squats CSMi 50/50 1 min CSMi 50/50 x2 min CSMi   50/50 2 min  CSMi 50/50 1 min   CSMi weight shifting     L6 2 min           Gait Training                        Modalities        CP deferred   Post ex x10 mins elevated Post ex x10 mins elevated                 Pearl Nugent was given a handout and verbal instruction of customized home exercise program  Patient accepted program and was able to demonstrate exercises

## 2023-03-16 ENCOUNTER — TELEPHONE (OUTPATIENT)
Dept: FAMILY MEDICINE CLINIC | Facility: CLINIC | Age: 73
End: 2023-03-16

## 2023-03-16 NOTE — TELEPHONE ENCOUNTER
Patient dropped off papers to be filled for [employer] diabetic Waiver by PCP   Scanned into his chart and put in PCP's folder up front

## 2023-03-21 ENCOUNTER — OFFICE VISIT (OUTPATIENT)
Dept: OBGYN CLINIC | Facility: CLINIC | Age: 73
End: 2023-03-21

## 2023-03-21 VITALS
WEIGHT: 210 LBS | DIASTOLIC BLOOD PRESSURE: 83 MMHG | HEIGHT: 64 IN | SYSTOLIC BLOOD PRESSURE: 129 MMHG | HEART RATE: 111 BPM | BODY MASS INDEX: 35.85 KG/M2

## 2023-03-21 DIAGNOSIS — M17.12 PRIMARY OSTEOARTHRITIS OF LEFT KNEE: Primary | ICD-10-CM

## 2023-03-21 RX ORDER — HYALURONATE SODIUM 10 MG/ML
20 SYRINGE (ML) INTRAARTICULAR
Status: COMPLETED | OUTPATIENT
Start: 2023-03-21 | End: 2023-03-21

## 2023-03-21 RX ADMIN — Medication 20 MG: at 10:18

## 2023-03-21 NOTE — PROGRESS NOTES
Assessment/Plan:  Diagnoses and all orders for this visit:    Primary osteoarthritis of left knee  -     Large joint arthrocentesis: L knee  Patient was provided with 1st of 3 shot Euflexxa viscosupplementation injection series for treatment of primary osteoarthritis of the left knee, following successful aspiration of 36 cc of clear synovial fluid  Patient tolerated treatment(s) well  His range of motion was improved immediately following aspiration  He will be seen for follow-up in 1 week for reevaluation and continuation of Visco injection series  Patient expresses understanding and is in agreement with this treatment plan  Under aseptic technique, the left knee was injected with his first set of Euflexxa  He tolerated procedure quite well  Return back next week for second set  If his condition changes, he would not hesitate to let us know    Subjective:    Patient info: Devin Barrera 71 y o  male    HPI    Patient presents today for re-evaluation and initiation of Euflexxa viscosupplementation injection series for treatment of primary osteoarthritis of the left knee, 6 weeks s/p left knee arthroscopy performed 2/6/2023  Patient was last seen in regards to this issue on 2/21/2023, at which time he was 2 weeks postop and was progressing well with physical therapy  On today's presentation he reports continued medial knee pain at the joint line as well as over the pes anserine bursa  patient denies any adverse reaction to previous injections including fever, chills, headache, nausea, dizziness, or malaise  Patient's medical history has been reviewed in detail and updated the computerized patient record      Past Medical History:   Diagnosis Date   • Arthritis    • Colon polyp    • COVID 09/07/2021    and 12/2022   • Diabetes mellitus ([facility] [address] )    • Dyslipidemia    • GERD (gastroesophageal reflux disease)    • Hyperlipidemia    • Nodule of left lung 2007    negative for CA   • Type 2 diabetes mellitus without complication, without long-term current use of insulin ([facility] [address] ) 2019   • Use of cane as ambulatory aid        Past Surgical History:   Procedure Laterality Date   • COLONOSCOPY     • ELBOW SURGERY Bilateral    • FOOT TENDON SURGERY Bilateral     heel   • HAND SURGERY     • DE ARTHRS KNE SURG W/MENISCECTOMY MED/LAT W/SHVG Right 2022    Procedure: KNEE ARTHROSCOPY WITH medial MENISCECTOMY chondroplasty synovectomy injection;  Surgeon: Kayla Contreras DO;  Location: [facility];  Service: Orthopedics   • DE ARTHRS KNE SURG W/MENISCECTOMY MED/LAT W/SHVG Left 2023    Procedure: Left knee arthroscopy: Medial meniscectomy; Lateral meniscectomy; Chondroplasty; Synovectomy;  Post arthroscopic injection of intra-articular joint space and peripheral portal;  Surgeon: Kayla Contreras DO;  Location: [facility];  Service: Orthopedics   • DE NEUROPLASTY &/TRANSPOS MEDIAN NRV CARPAL Deward Bertha Left 2021    Procedure: RELEASE CARPAL TUNNEL;  Surgeon: Brayden Hyman DO;  Location: [facility] MAIN OR;  Service: Orthopedics       Family History   Problem Relation Age of Onset   • Heart disease Mother         hx MI   • Diabetes Mother    • Stroke Mother    • COPD Father         80 yrs   • Coronary artery disease Father    • Cancer Brother        Social History     Socioeconomic History   • Marital status: /Civil Union     Spouse name: None   • Number of children: None   • Years of education: None   • Highest education level: None   Occupational History   • None   Tobacco Use   • Smoking status: Former     Packs/day: 1 00     Years: 30 00     Pack years: 30 00     Types: Cigarettes     Quit date:      Years since quittin 2   • Smokeless tobacco: Never   Vaping Use   • Vaping Use: Never used   Substance and Sexual Activity   • Alcohol use: Yes     Comment: occ   • Drug use: No   • Sexual activity: Yes     Partners: Female     Birth control/protection: Male Sterilization   Other Topics Concern   • None Social History Narrative   • None     Social Determinants of Health     Financial Resource Strain: Low Risk    • Difficulty of Paying Living Expenses: Not hard at all   Food Insecurity: Not on file   Transportation Needs: No Transportation Needs   • Lack of Transportation (Medical): No   • Lack of Transportation (Non-Medical): No   Physical Activity: Not on file   Stress: Not on file   Social Connections: Not on file   Intimate Partner Violence: Not on file   Housing Stability: Not on file         Current Outpatient Medications:   •  acetaminophen (TYLENOL) 500 mg tablet, Take 500 mg by mouth every 6 (six) hours as needed for mild pain, Disp: , Rfl:   •  meloxicam (Mobic) 15 mg tablet, Take 1 tablet (15 mg total) by mouth daily, Disp: 30 tablet, Rfl: 2  •  metFORMIN (GLUCOPHAGE) 500 mg tablet, Take 1 tablet (500 mg total) by mouth 2 (two) times a day with meals, Disp: 180 tablet, Rfl: 3  •  simvastatin (ZOCOR) 20 mg tablet, Take 1 tablet (20 mg total) by mouth daily at bedtime, Disp: 90 tablet, Rfl: 3  •  tamsulosin (FLOMAX) 0 4 mg, Take 1 capsule (0 4 mg total) by mouth daily with dinner, Disp: 90 capsule, Rfl: 3  •  phentermine (ADIPEX-P) 37 5 MG tablet, Take 1 tablet (37 5 mg total) by mouth daily (Patient not taking: Reported on 1/31/2023), Disp: 30 tablet, Rfl: 2  •  Promethazine-DM (PHENERGAN-DM) 6 25-15 mg/5 mL oral syrup, Take 5 mL by mouth 4 (four) times a day as needed for cough (Patient not taking: Reported on 2/21/2023), Disp: 240 mL, Rfl: 1    No Known Allergies    Review of Systems   Constitutional: Negative for chills, fever and unexpected weight change  HENT: Negative for hearing loss, nosebleeds and sore throat  Eyes: Negative for pain, redness and visual disturbance  Respiratory: Negative for cough, shortness of breath and wheezing  Cardiovascular: Negative for chest pain, palpitations and leg swelling  Gastrointestinal: Negative for abdominal pain, nausea and vomiting     Endocrine: Negative for polydipsia and polyuria  Genitourinary: Negative for dysuria and hematuria  Musculoskeletal:        As noted in HPI   Skin: Negative for rash and wound  Neurological: Negative for dizziness, numbness and headaches  Psychiatric/Behavioral: Negative for decreased concentration and suicidal ideas  The patient is not nervous/anxious  Objective :  /83 (BP Location: Left arm, Patient Position: Sitting, Cuff Size: Large)   Pulse (!) 111   Ht 5' 4" (1 626 m)   Wt 95 3 kg (210 lb)   BMI 36 05 kg/m²     Ortho Exam  Left knee(s) -   Patient ambulates with normal gait pattern  Uses no assistive device  No anatomical deformity  Skin is warm and dry to touch with no signs of erythema, ecchymosis, infection  No soft tissue swelling, mild to moderate effusion noted  ROM 5° - 100°, improved to 5° - 115° following aspiration  TTP over needle joint line, mildly TTP over pes anserine bursa  Flexor and extensor mechanisms intact  Knee is stable to varus and valgus stress  Knee is stable to anterior and posterior stress  Patella tracks centrally with palpable crepitus  Calf compartments are soft and supple  2+ TP and DP pulses with brisk capillary refill to the toes  Sural, saphenous, tibial, superficial and deep peroneal motor and sensory distributions intact  Sensation light touch intact distally    Physical Exam  Vitals reviewed  Constitutional:       Appearance: He is well-developed  HENT:      Head: Normocephalic and atraumatic  Nose: Nose normal    Eyes:      Conjunctiva/sclera: Conjunctivae normal    Cardiovascular:      Rate and Rhythm: Normal rate  Pulmonary:      Effort: Pulmonary effort is normal    Musculoskeletal:      Cervical back: Neck supple  Skin:     General: Skin is warm and dry  Capillary Refill: Capillary refill takes less than 2 seconds  Neurological:      Mental Status: He is alert and oriented to person, place, and time     Psychiatric:         Mood and Affect: Mood normal          Behavior: Behavior normal           Diagnostic Test Review:  No new imaging reviewed this visit    Large joint arthrocentesis: L knee  Universal Protocol:  Consent: Verbal consent obtained  Risks and benefits: risks, benefits and alternatives were discussed  Consent given by: patient  Time out: Immediately prior to procedure a "time out" was called to verify the correct patient, procedure, equipment, support staff and site/side marked as required  Timeout called at: 3/21/2023 10:01 AM   Patient understanding: patient states understanding of the procedure being performed  Site marked: the operative site was marked  Radiology Images displayed and confirmed  If images not available, report reviewed: imaging studies available  Patient identity confirmed: verbally with patient    Supporting Documentation  Indications: pain and joint swelling   Procedure Details  Location: knee - L knee  Preparation: Patient was prepped and draped in the usual sterile fashion  Needle gauge: 21G    Ultrasound guidance: no  Approach: anterolateral  Medications administered: 20 mg Sodium Hyaluronate 20 MG/2ML    Aspirate amount: 35 mL  Aspirate: clear    Patient tolerance: patient tolerated the procedure well with no immediate complications  Dressing:  Sterile dressing applied          Scribe Attestation    I,:  Isela Fernandez am acting as a scribe while in the presence of the attending physician :       I,:  Jenniffer Singh DO personally performed the services described in this documentation    as scribed in my presence :

## 2023-03-28 ENCOUNTER — PROCEDURE VISIT (OUTPATIENT)
Dept: OBGYN CLINIC | Facility: CLINIC | Age: 73
End: 2023-03-28

## 2023-03-28 VITALS
HEIGHT: 64 IN | DIASTOLIC BLOOD PRESSURE: 84 MMHG | WEIGHT: 210 LBS | BODY MASS INDEX: 35.85 KG/M2 | SYSTOLIC BLOOD PRESSURE: 150 MMHG | HEART RATE: 114 BPM

## 2023-03-28 DIAGNOSIS — M17.12 PRIMARY OSTEOARTHRITIS OF LEFT KNEE: Primary | ICD-10-CM

## 2023-03-28 RX ORDER — HYALURONATE SODIUM 10 MG/ML
20 SYRINGE (ML) INTRAARTICULAR
Status: COMPLETED | OUTPATIENT
Start: 2023-03-28 | End: 2023-03-28

## 2023-03-28 RX ADMIN — Medication 20 MG: at 11:15

## 2023-03-28 NOTE — PROGRESS NOTES
1  Primary osteoarthritis of left knee          Patient is here for his  injection of euflexxa #2 into the left knee  He states 5/10 pain today  He continues to note pain medial aspect of knee with mild swelling  Aseptic technique, the left knee was injected with the second set of Euflexxa  He tolerated procedure quite well  Return back next week for his final set of injections  If his condition changes, he would not hesitate to let us know        All organ systems normal  Physical exam of the knee shows mild effusion no ecchymosis  Large joint arthrocentesis: L knee  Universal Protocol:  Consent: Verbal consent obtained  Risks and benefits: risks, benefits and alternatives were discussed  Consent given by: patient  Patient understanding: patient states understanding of the procedure being performed  Site marked: the operative site was marked  Patient identity confirmed: verbally with patient    Supporting Documentation  Indications: pain   Procedure Details  Location: knee - L knee  Preparation: Patient was prepped and draped in the usual sterile fashion  Needle size: 22 G  Ultrasound guidance: no  Approach: lateral  Medications administered: 20 mg Sodium Hyaluronate 20 MG/2ML    Patient tolerance: patient tolerated the procedure well with no immediate complications  Dressing:  Sterile dressing applied          Patient tolerated procedure follow up in one week for eufllex #3 left knee       Scribe Attestation    I,:  Jj Reese am acting as a scribe while in the presence of the attending physician :       I,:  Zaida Sandhu DO personally performed the services described in this documentation    as scribed in my presence :

## 2023-04-04 ENCOUNTER — PROCEDURE VISIT (OUTPATIENT)
Dept: OBGYN CLINIC | Facility: CLINIC | Age: 73
End: 2023-04-04

## 2023-04-04 VITALS
BODY MASS INDEX: 35.85 KG/M2 | SYSTOLIC BLOOD PRESSURE: 160 MMHG | HEIGHT: 64 IN | WEIGHT: 210 LBS | HEART RATE: 104 BPM | DIASTOLIC BLOOD PRESSURE: 91 MMHG

## 2023-04-04 DIAGNOSIS — M17.12 PRIMARY OSTEOARTHRITIS OF LEFT KNEE: Primary | ICD-10-CM

## 2023-04-04 RX ORDER — TRIAMCINOLONE ACETONIDE 40 MG/ML
80 INJECTION, SUSPENSION INTRA-ARTICULAR; INTRAMUSCULAR
Status: COMPLETED | OUTPATIENT
Start: 2023-04-04 | End: 2023-04-04

## 2023-04-04 RX ORDER — BUPIVACAINE HYDROCHLORIDE 2.5 MG/ML
4 INJECTION, SOLUTION INFILTRATION; PERINEURAL
Status: COMPLETED | OUTPATIENT
Start: 2023-04-04 | End: 2023-04-04

## 2023-04-04 RX ADMIN — BUPIVACAINE HYDROCHLORIDE 4 ML: 2.5 INJECTION, SOLUTION INFILTRATION; PERINEURAL at 09:18

## 2023-04-04 RX ADMIN — TRIAMCINOLONE ACETONIDE 80 MG: 40 INJECTION, SUSPENSION INTRA-ARTICULAR; INTRAMUSCULAR at 09:18

## 2023-04-04 NOTE — PROGRESS NOTES
Assessment/Plan:   Diagnoses and all orders for this visit:    Primary osteoarthritis of left knee  -     Large joint arthrocentesis         Under aseptic technique, the left knee was injected with his final set of Euflexxa  He tolerated procedure quite well  Return back in 6 weeks to find out the maximum extent of the injections  If his condition changes, he would not hesitate to let us know    Reviewed physical exam with patient on today's visit  His symptoms are consistent with primary osteoarthritis of his left knee  He was offered and accepted Euflexxa - 3 Shot Series  An injection of Euflexxa - 3 Shot Series was performed to his Left knee(s) for symptomatic relief  He tolerated the procedure well  Ice and post injection protocol was advised  He is cleared for weightbearing activities as tolerated  He is also cleared for activities as tolerated, with modifications, to avoid pain  He will return as needed, if symptoms worsen or fail to improve  Patient expresses understanding and is in agreement with this treatment plan  The patient was given the opportunity to ask questions or present concerns  Subjective:   Patient ID: Nubia Mccracken  1950     HPI  Patient is a 71 y o  male who presents for follow-up evaluation of his left knee  He was seen 3/28/23, for his second visco supplementation  On today's visit, he is here to complete his 3-shot Euflexxa series  The patient stated that his pain today is 3/10  He is experiencing pain with prolonged weight bearing and deep knee flexion  He denies any numbness and tingling       The following portions of the patient's history were reviewed and updated as appropriate:  Past medical history, past surgical history, Family history, social history, current medications and allergies    Past Medical History:   Diagnosis Date   • Arthritis    • Colon polyp    • COVID 09/07/2021    and 12/2022   • Diabetes mellitus ([facility] [address] )    • Dyslipidemia    • GERD (gastroesophageal reflux disease)    • Hyperlipidemia    • Nodule of left lung     negative for CA   • Type 2 diabetes mellitus without complication, without long-term current use of insulin ([facility] [address] ) 2019   • Use of cane as ambulatory aid        Past Surgical History:   Procedure Laterality Date   • COLONOSCOPY     • ELBOW SURGERY Bilateral    • FOOT TENDON SURGERY Bilateral     heel   • HAND SURGERY     • [place] ARTHRS KNE SURG W/MENISCECTOMY MED/LAT W/SHVG Right 2022    Procedure: KNEE ARTHROSCOPY WITH medial MENISCECTOMY chondroplasty synovectomy injection;  Surgeon: Jeff Tiwari DO;  [facility]: [facility];  Service: Orthopedics   • NH ARTHRS KNE SURG W/MENISCECTOMY MED/LAT W/SHVG Left 2023    Procedure: Left knee arthroscopy: Medial meniscectomy; Lateral meniscectomy; Chondroplasty; Synovectomy; Post arthroscopic injection of intra-articular joint space and peripheral portal;  Surgeon: Jeff Tiwari DO;  Location: [facility];  Service: Orthopedics   • [place] NEUROPLASTY &/TRANSPOS MEDIAN NRV CARPAL Renny Sarbjit Left 2021    Procedure: RELEASE CARPAL TUNNEL;  Surgeon: Dennis Noel DO;  Location: [address] [place] [address] MAIN OR;  Service: Orthopedics       Family History   Problem Relation Age of Onset   • Heart disease Mother         hx MI   • Diabetes Mother    • Stroke Mother    • COPD Father         80 yrs   • Coronary artery disease Father    • Cancer Brother        Social History     Socioeconomic History   • Marital status: /Civil Union     Spouse name: None   • Number of children: None   • Years of education: None   • Highest education level: None   Occupational History   • None   Tobacco Use   • Smoking status: Former     Packs/day: 1 00     Years: 30 00     Pack years: 30 00     Types: Cigarettes     Quit date:      Years since quittin 2   • Smokeless tobacco: Never   Vaping Use   • Vaping Use: Never used   Substance and Sexual Activity   • Alcohol use: Yes     Comment: occ   • Drug use:  No • Sexual activity: Yes     Partners: Female     Birth control/protection: Male Sterilization   Other Topics Concern   • None   Social History Narrative   • None     Social Determinants of Health     Financial Resource Strain: Low Risk    • Difficulty of Paying Living Expenses: Not hard at all   Food Insecurity: Not on file   Transportation Needs: No Transportation Needs   • Lack of Transportation (Medical): No   • Lack of Transportation (Non-Medical): No   Physical Activity: Not on file   Stress: Not on file   Social Connections: Not on file   Intimate Partner Violence: Not on file   Housing Stability: Not on file         Current Outpatient Medications:   •  acetaminophen (TYLENOL) 500 mg tablet, Take 500 mg by mouth every 6 (six) hours as needed for mild pain, Disp: , Rfl:   •  meloxicam (Mobic) 15 mg tablet, Take 1 tablet (15 mg total) by mouth daily, Disp: 30 tablet, Rfl: 2  •  metFORMIN (GLUCOPHAGE) 500 mg tablet, Take 1 tablet (500 mg total) by mouth 2 (two) times a day with meals, Disp: 180 tablet, Rfl: 3  •  simvastatin (ZOCOR) 20 mg tablet, Take 1 tablet (20 mg total) by mouth daily at bedtime, Disp: 90 tablet, Rfl: 3  •  tamsulosin (FLOMAX) 0 4 mg, Take 1 capsule (0 4 mg total) by mouth daily with dinner, Disp: 90 capsule, Rfl: 3  •  phentermine (ADIPEX-P) 37 5 MG tablet, Take 1 tablet (37 5 mg total) by mouth daily (Patient not taking: Reported on 1/31/2023), Disp: 30 tablet, Rfl: 2  •  Promethazine-DM (PHENERGAN-DM) 6 25-15 mg/5 mL oral syrup, Take 5 mL by mouth 4 (four) times a day as needed for cough (Patient not taking: Reported on 2/21/2023), Disp: 240 mL, Rfl: 1    No Known Allergies    Review of Systems   Constitutional: Negative for chills, fever and unexpected weight change  HENT: Negative for hearing loss, nosebleeds and sore throat  Eyes: Negative for pain, redness and visual disturbance  Respiratory: Negative for cough, shortness of breath and wheezing      Cardiovascular: Negative for "chest pain, palpitations and leg swelling  Gastrointestinal: Negative for abdominal pain, nausea and vomiting  Endocrine: Negative for polydipsia and polyuria  Musculoskeletal:        As noted in HPI   Skin: Negative for rash and wound  Neurological: Negative for dizziness, numbness and headaches  Psychiatric/Behavioral: Negative for decreased concentration and suicidal ideas  The patient is not nervous/anxious  All other systems reviewed and are negative  Objective:  /91   Pulse 104   Ht 5' 4\" (1 626 m)   Wt 95 3 kg (210 lb)   BMI 36 05 kg/m²     Ortho Exam  right knee(s) -   Patient ambulates with steady gait pattern  Uses No assistive device  Genu Varus anatomical deformity  Skin is warm and dry to touch with no signs of erythema, ecchymosis, or infection   Mild generalized soft tissue swelling or effusion noted  ROM (5° - 115°)   TTP over medial joint line and TTP over lateral joint line  Flexor and extensor mechanisms are intact   Knee is stable to varus and valgus stress  Knee is stable to anterior and posterior stress  Calf compartments are soft and supple  - Rachel's sign  2+ DP and PT pulses with brisk capillary refill to the toes  Sural, saphenous, tibial, superficial, and deep peroneal motor and sensory distributions intact  Sensation light touch intact distally      Physical Exam  HENT:      Head: Normocephalic and atraumatic  Nose: Nose normal    Eyes:      Conjunctiva/sclera: Conjunctivae normal    Cardiovascular:      Rate and Rhythm: Normal rate  Pulmonary:      Effort: Pulmonary effort is normal    Musculoskeletal:      Cervical back: Neck supple  Skin:     General: Skin is warm and dry  Capillary Refill: Capillary refill takes less than 2 seconds  Neurological:      Mental Status: He is alert and oriented to person, place, and time     Psychiatric:         Mood and Affect: Mood normal          Behavior: Behavior normal           Diagnostic Test " Review:  None performed  Large joint arthrocentesis: L knee  Universal Protocol:  Consent: Verbal consent obtained  Risks and benefits: risks, benefits and alternatives were discussed  Consent given by: patient  Timeout called at: 4/4/2023 9:13 AM   Patient understanding: patient states understanding of the procedure being performed  Site marked: the operative site was marked  Patient identity confirmed: verbally with patient    Supporting Documentation  Indications: pain and joint swelling   Procedure Details  Location: knee - L knee  Needle gauge: 21 G    Ultrasound guidance: no  Approach: anterolateral  Medications administered: 4 mL bupivacaine 0 25 %; 80 mg triamcinolone acetonide 40 mg/mL    Patient tolerance: patient tolerated the procedure well with no immediate complications  Dressing:  Sterile dressing applied           Scribe Attestation    I,:   am acting as a scribe while in the presence of the attending physician :       I,:   personally performed the services described in this documentation    as scribed in my presence :

## 2023-04-05 LAB
CREAT UR-MCNC: 41.2 MG/DL
MICROALBUMIN UR-MCNC: 7.4 MG/L (ref 0–20)
MICROALBUMIN/CREAT 24H UR: 18 MG/G CREATININE (ref 0–30)

## 2023-04-07 ENCOUNTER — OFFICE VISIT (OUTPATIENT)
Dept: FAMILY MEDICINE CLINIC | Facility: CLINIC | Age: 73
End: 2023-04-07

## 2023-04-07 VITALS
TEMPERATURE: 98 F | WEIGHT: 209 LBS | DIASTOLIC BLOOD PRESSURE: 68 MMHG | HEART RATE: 88 BPM | RESPIRATION RATE: 20 BRPM | OXYGEN SATURATION: 99 % | BODY MASS INDEX: 35.68 KG/M2 | HEIGHT: 64 IN | SYSTOLIC BLOOD PRESSURE: 132 MMHG

## 2023-04-07 DIAGNOSIS — S83.204A OTHER TEAR OF MENISCUS OF LEFT KNEE, UNSPECIFIED MENISCUS, UNSPECIFIED WHETHER OLD OR CURRENT TEAR, INITIAL ENCOUNTER: ICD-10-CM

## 2023-04-07 DIAGNOSIS — M17.12 PRIMARY OSTEOARTHRITIS OF LEFT KNEE: Primary | ICD-10-CM

## 2023-04-07 DIAGNOSIS — E11.9 ENCOUNTER FOR DIABETIC FOOT EXAM (HCC): ICD-10-CM

## 2023-04-07 DIAGNOSIS — N40.0 ENLARGED PROSTATE: ICD-10-CM

## 2023-04-07 DIAGNOSIS — E11.9 TYPE 2 DIABETES MELLITUS WITHOUT COMPLICATION, WITHOUT LONG-TERM CURRENT USE OF INSULIN (HCC): ICD-10-CM

## 2023-04-07 DIAGNOSIS — E78.00 HYPERCHOLESTEREMIA: ICD-10-CM

## 2023-04-07 RX ORDER — TAMSULOSIN HYDROCHLORIDE 0.4 MG/1
0.8 CAPSULE ORAL
Qty: 180 CAPSULE | Refills: 3 | Status: SHIPPED | OUTPATIENT
Start: 2023-04-07

## 2023-04-07 RX ORDER — MELOXICAM 15 MG/1
15 TABLET ORAL DAILY
Qty: 90 TABLET | Refills: 2 | Status: SHIPPED | OUTPATIENT
Start: 2023-04-07

## 2023-04-07 NOTE — PROGRESS NOTES
[address] [facility]        NAME: Luna Anderson is a 71 y o  male  : 1950    MRN: [id number]  DATE: 2023  TIME: 9:08 AM    Assessment and Plan   Primary osteoarthritis of left knee [M17 12]  1  Primary osteoarthritis of left knee  Ambulatory Referral to Orthopedic Surgery      2  Enlarged prostate  tamsulosin (FLOMAX) 0 4 mg      3  Other tear of meniscus of left knee, unspecified meniscus, unspecified whether old or current tear, initial encounter  meloxicam (Mobic) 15 mg tablet      4  Encounter for diabetic foot exam [facility])  Ambulatory Referral to Podiatry      5  Type 2 diabetes mellitus without complication, without long-term current use of insulin ([facility])  Hemoglobin A1C    Comprehensive metabolic panel    CBC and differential    Microalbumin / creatinine urine ratio      6  Hypercholesteremia  Lipid Panel with Direct LDL reflex            Patient Instructions     Patient Instructions   Increase Flomax to 0 8mg at bedtime, if no improvement, follow up with urology  Continue Mobic for left knee swelling and pain, referral to ortho surgery given  Referral to podiatry for nail care  6 month medcheck- get bloodwork done before this visit  Discussed Ozempic for weightloss/diabetes- he will consider this  Continue same medications  Call sooner for problems/concerns          Chief Complaint     Chief Complaint   Patient presents with   • Follow-up         History of Present Illness       Here for 6 month medcheck and lab review-  We discussed Ozempic as Phentermine has not helped with weight loss- he has been off of this for a while and wishes to wait to restart as he would like his left knee evaluated for possible replacement     Requesting referral to podiatry for diabetic foot nail care  HgA1c increased from 6 7 to 6 8 but has improved from 1 year ago at 7 6, is taking Metformin 500mg twice daily      Review of Systems   Review of Systems   Constitutional: Negative for activity change, diaphoresis, fatigue and fever  HENT: Negative for congestion, facial swelling, hearing loss, rhinorrhea, sinus pressure, sinus pain, sneezing, sore throat and voice change  Eyes: Negative for discharge and visual disturbance  Respiratory: Negative for cough, choking, chest tightness, shortness of breath, wheezing and stridor  Cardiovascular: Negative for chest pain, palpitations and leg swelling  Gastrointestinal: Negative for abdominal distention, abdominal pain, constipation, diarrhea, nausea and vomiting  Endocrine: Negative for polydipsia, polyphagia and polyuria  Genitourinary: Positive for difficulty urinating (during the night- flomax 0 4mg at night with improvement but not resolution)  Negative for dysuria, frequency and urgency  Musculoskeletal: Positive for arthralgias and joint swelling  Negative for neck pain and neck stiffness  Skin: Negative for color change, rash and wound  Neurological: Negative for dizziness, syncope, speech difficulty, weakness, light-headedness and headaches  Hematological: Negative for adenopathy  Does not bruise/bleed easily  Psychiatric/Behavioral: Negative for agitation, behavioral problems, confusion, hallucinations, sleep disturbance and suicidal ideas  The patient is not nervous/anxious            Current Medications       Current Outpatient Medications:   •  meloxicam (Mobic) 15 mg tablet, Take 1 tablet (15 mg total) by mouth daily, Disp: 90 tablet, Rfl: 2  •  tamsulosin (FLOMAX) 0 4 mg, Take 2 capsules (0 8 mg total) by mouth daily with dinner, Disp: 180 capsule, Rfl: 3  •  acetaminophen (TYLENOL) 500 mg tablet, Take 500 mg by mouth every 6 (six) hours as needed for mild pain, Disp: , Rfl:   •  metFORMIN (GLUCOPHAGE) 500 mg tablet, Take 1 tablet (500 mg total) by mouth 2 (two) times a day with meals, Disp: 180 tablet, Rfl: 3  •  phentermine (ADIPEX-P) 37 5 MG tablet, Take 1 tablet (37 5 mg total) by mouth daily (Patient not taking: Reported on 1/31/2023), Disp: 30 tablet, Rfl: 2  •  Promethazine-DM (PHENERGAN-DM) 6 25-15 mg/5 mL oral syrup, Take 5 mL by mouth 4 (four) times a day as needed for cough (Patient not taking: Reported on 2/21/2023), Disp: 240 mL, Rfl: 1  •  simvastatin (ZOCOR) 20 mg tablet, Take 1 tablet (20 mg total) by mouth daily at bedtime, Disp: 90 tablet, Rfl: 3    Current Allergies     Allergies as of 04/07/2023   • (No Known Allergies)            The following portions of the patient's history were reviewed and updated as appropriate: allergies, current medications, past family history, past medical history, past social history, past surgical history and problem list      Past Medical History:   Diagnosis Date   • Arthritis    • Colon polyp    • COVID 09/07/2021    and 12/2022   • Diabetes mellitus ([facility] [address] )    • Dyslipidemia    • GERD (gastroesophageal reflux disease)    • Hyperlipidemia    • Nodule of left lung 2007    negative for CA   • Type 2 diabetes mellitus without complication, without long-term current use of insulin ([facility] ) 01/23/2019   • Use of cane as ambulatory aid        Past Surgical History:   Procedure Laterality Date   • COLONOSCOPY     • ELBOW SURGERY Bilateral    • FOOT TENDON SURGERY Bilateral     heel   • HAND SURGERY     • NC ARTHRS KNE SURG W/MENISCECTOMY MED/LAT W/SHVG Right 05/23/2022    Procedure: KNEE ARTHROSCOPY WITH medial MENISCECTOMY chondroplasty synovectomy injection;  Surgeon: Niya Rocha DO;  Location: [facility];  Service: Orthopedics   • NC ARTHRS KNE SURG W/MENISCECTOMY MED/LAT W/SHVG Left 2/6/2023    Procedure: Left knee arthroscopy: Medial meniscectomy; Lateral meniscectomy; Chondroplasty; Synovectomy;  Post arthroscopic injection of intra-articular joint space and peripheral portal;  Surgeon: Niya Rocha DO;  Location: [facility];  Service: Orthopedics   • NC NEUROPLASTY &/TRANSPOS MEDIAN NRV CARPAL Marlene Alvarado Left 06/30/2021    Procedure: RELEASE CARPAL TUNNEL;  Surgeon: Yael Lundberg "Christen Gonzalez DO;  Location: [facility] MAIN OR;  Service: Orthopedics       Family History   Problem Relation Age of Onset   • Heart disease Mother         hx MI   • Diabetes Mother    • Stroke Mother    • COPD Father         80 yrs   • Coronary artery disease Father    • Cancer Brother          Medications have been verified  Objective   /68   Pulse 88   Temp 98 °F (36 7 °C) (Tympanic)   Resp 20   Ht 5' 4\" (1 626 m)   Wt 94 8 kg (209 lb)   SpO2 99%   BMI 35 87 kg/m²        Physical Exam     Physical Exam  Vitals and nursing note reviewed  Constitutional:       General: He is not in acute distress  Appearance: He is well-developed  He is not diaphoretic  Cardiovascular:      Rate and Rhythm: Normal rate and regular rhythm  Pulses: no weak pulses          Dorsalis pedis pulses are 2+ on the right side and 2+ on the left side  Heart sounds: Normal heart sounds  No murmur heard  Pulmonary:      Effort: Pulmonary effort is normal  No respiratory distress  Breath sounds: Normal breath sounds  No wheezing  Musculoskeletal:         General: Swelling (left knee- mild) and tenderness (left knee- medial) present  Right lower leg: No edema  Left lower leg: No edema  Feet:      Right foot:      Skin integrity: No ulcer, skin breakdown, erythema, warmth, callus or dry skin  Left foot:      Skin integrity: No ulcer, skin breakdown, erythema, warmth, callus or dry skin  Skin:     General: Skin is warm and dry  Neurological:      Mental Status: He is alert and oriented to person, place, and time  Psychiatric:         Mood and Affect: Mood normal          Speech: Speech normal          Behavior: Behavior normal          Thought Content: Thought content normal          Judgment: Judgment normal            Patient's shoes and socks removed  Right Foot/Ankle   Right Foot Inspection  Skin Exam: skin normal and skin intact   No dry skin, no warmth, no callus, no erythema, no " maceration, no abnormal color, no pre-ulcer, no ulcer and no callus  Toe Exam: ROM and strength within normal limits  Sensory   Monofilament testing: intact    Vascular  Capillary refills: < 3 seconds  The right DP pulse is 2+  Left Foot/Ankle  Left Foot Inspection  Skin Exam: skin normal and skin intact  No dry skin, no warmth, no erythema, no maceration, normal color, no pre-ulcer, no ulcer and no callus  Toe Exam: ROM and strength within normal limits  Sensory   Monofilament testing: intact    Vascular  Capillary refills: < 3 seconds  The left DP pulse is 2+       Assign Risk Category  No deformity present  No loss of protective sensation  No weak pulses  Risk: 0

## 2023-04-07 NOTE — PATIENT INSTRUCTIONS
Increase Flomax to 0 8mg at bedtime, if no improvement, follow up with urology  Continue Mobic for left knee swelling and pain, referral to ortho surgery given  Referral to podiatry for nail care  6 month medcheck- get bloodwork done before this visit  Discussed Ozempic for weightloss/diabetes- he will consider this  Continue same medications  Call sooner for problems/concerns

## 2023-05-22 ENCOUNTER — OFFICE VISIT (OUTPATIENT)
Dept: OBGYN CLINIC | Facility: CLINIC | Age: 73
End: 2023-05-22

## 2023-05-22 VITALS
WEIGHT: 209 LBS | BODY MASS INDEX: 35.68 KG/M2 | HEIGHT: 64 IN | DIASTOLIC BLOOD PRESSURE: 84 MMHG | SYSTOLIC BLOOD PRESSURE: 157 MMHG | HEART RATE: 102 BPM

## 2023-05-22 DIAGNOSIS — M17.12 PRIMARY OSTEOARTHRITIS OF LEFT KNEE: Primary | ICD-10-CM

## 2023-05-22 RX ORDER — BUPIVACAINE HYDROCHLORIDE 2.5 MG/ML
4 INJECTION, SOLUTION INFILTRATION; PERINEURAL
Status: COMPLETED | OUTPATIENT
Start: 2023-05-22 | End: 2023-05-22

## 2023-05-22 RX ORDER — BETAMETHASONE SODIUM PHOSPHATE AND BETAMETHASONE ACETATE 3; 3 MG/ML; MG/ML
6 INJECTION, SUSPENSION INTRA-ARTICULAR; INTRALESIONAL; INTRAMUSCULAR; SOFT TISSUE
Status: COMPLETED | OUTPATIENT
Start: 2023-05-22 | End: 2023-05-22

## 2023-05-22 RX ADMIN — BUPIVACAINE HYDROCHLORIDE 4 ML: 2.5 INJECTION, SOLUTION INFILTRATION; PERINEURAL at 09:55

## 2023-05-22 RX ADMIN — BETAMETHASONE SODIUM PHOSPHATE AND BETAMETHASONE ACETATE 6 MG: 3; 3 INJECTION, SUSPENSION INTRA-ARTICULAR; INTRALESIONAL; INTRAMUSCULAR; SOFT TISSUE at 09:55

## 2023-05-22 NOTE — PROGRESS NOTES
Assessment/Plan:   Diagnoses and all orders for this visit:    Primary osteoarthritis of left knee     Physical exam performed  Effusion noted in Left knee    Under aseptic technique, the left knee was aspirated and injected with Marcaine and Celestone   He tolerated procedure quite well  Return back in 6 weeks to find out the maximum extent of the injections  If his condition changes, he would not hesitate to let us know    He will return as needed, if symptoms worsen or fail to improve  Patient expresses understanding and is in agreement with this treatment plan  The patient was given the opportunity to ask questions or present concerns  The patient has advanced arthritic changes along his left knee  Under aseptic technique, the left knee was aspirated of 40 cc of fluid  It was injected with Celestone and Marcaine  He tolerated procedure quite well  Return back 6 weeks reevaluation  If he is interested in knee replacement surgery, then x-rays will be provided before the next visit-3 views weightbearing    Subjective:   Patient ID: Lannis Lanes  1950     HPI  Patient is a 71 y o  male who presents for follow-up evaluation of his left knee  He was seen 4/4/2023, for his 3rd and final visco supplementation  Pt states that last CSI gave him good relief until about two weeks ago   Pt states he has pain with prolonged weight bearing      The following portions of the patient's history were reviewed and updated as appropriate:  Past medical history, past surgical history, Family history, social history, current medications and allergies    Past Medical History:   Diagnosis Date   • Arthritis    • Colon polyp    • COVID 09/07/2021    and 12/2022   • Diabetes mellitus ([facility] [address] )    • Dyslipidemia    • GERD (gastroesophageal reflux disease)    • Hyperlipidemia    • Nodule of left lung 2007    negative for CA   • Type 2 diabetes mellitus without complication, without long-term current use of insulin ([address] ) 2019   • Use of cane as ambulatory aid        Past Surgical History:   Procedure Laterality Date   • COLONOSCOPY     • ELBOW SURGERY Bilateral    • FOOT TENDON SURGERY Bilateral     heel   • HAND SURGERY     • [place] ARTHRS KNE SURG W/MENISCECTOMY MED/LAT W/SHVG Right 2022    Procedure: KNEE ARTHROSCOPY WITH medial MENISCECTOMY chondroplasty synovectomy injection;  Surgeon: Jay Jay Yousif DO;  Location: [facility];  Service: Orthopedics   • [place] ARTHRS KNE SURG W/MENISCECTOMY MED/LAT W/SHVG Left 2023    Procedure: Left knee arthroscopy: Medial meniscectomy; Lateral meniscectomy; Chondroplasty; Synovectomy;  Post arthroscopic injection of intra-articular joint space and peripheral portal;  Surgeon: Jay Jay Yousif DO;  Location: [facility];  Service: Orthopedics   • [place] NEUROPLASTY &/TRANSPOS MEDIAN NRV CARPAL Adine Rail Left 2021    Procedure: RELEASE CARPAL TUNNEL;  Surgeon: Rowena Childs DO;  Location: [address] [place] [address] MAIN OR;  Service: Orthopedics       Family History   Problem Relation Age of Onset   • Heart disease Mother         hx MI   • Diabetes Mother    • Stroke Mother    • COPD Father         80 yrs   • Coronary artery disease Father    • Cancer Brother        Social History     Socioeconomic History   • Marital status: /Civil Union     Spouse name: None   • Number of children: None   • Years of education: None   • Highest education level: None   Occupational History   • None   Tobacco Use   • Smoking status: Former     Packs/day: 1 00     Years: 30 00     Pack years: 30 00     Types: Cigarettes     Quit date:      Years since quittin 3   • Smokeless tobacco: Never   Vaping Use   • Vaping Use: Never used   Substance and Sexual Activity   • Alcohol use: Yes     Comment: occ   • Drug use: No   • Sexual activity: Yes     Partners: Female     Birth control/protection: Male Sterilization   Other Topics Concern   • None   Social History Narrative   • None     Social Determinants of Health Financial Resource Strain: Low Risk    • Difficulty of Paying Living Expenses: Not hard at all   Food Insecurity: Not on file   Transportation Needs: No Transportation Needs   • Lack of Transportation (Medical): No   • Lack of Transportation (Non-Medical): No   Physical Activity: Not on file   Stress: Not on file   Social Connections: Not on file   Intimate Partner Violence: Not on file   Housing Stability: Not on file         Current Outpatient Medications:   •  meloxicam (Mobic) 15 mg tablet, Take 1 tablet (15 mg total) by mouth daily, Disp: 90 tablet, Rfl: 2  •  metFORMIN (GLUCOPHAGE) 500 mg tablet, Take 1 tablet (500 mg total) by mouth 2 (two) times a day with meals, Disp: 180 tablet, Rfl: 3  •  simvastatin (ZOCOR) 20 mg tablet, Take 1 tablet (20 mg total) by mouth daily at bedtime, Disp: 90 tablet, Rfl: 3  •  tamsulosin (FLOMAX) 0 4 mg, Take 2 capsules (0 8 mg total) by mouth daily with dinner, Disp: 180 capsule, Rfl: 3  •  acetaminophen (TYLENOL) 500 mg tablet, Take 500 mg by mouth every 6 (six) hours as needed for mild pain, Disp: , Rfl:   •  phentermine (ADIPEX-P) 37 5 MG tablet, Take 1 tablet (37 5 mg total) by mouth daily (Patient not taking: Reported on 1/31/2023), Disp: 30 tablet, Rfl: 2  •  Promethazine-DM (PHENERGAN-DM) 6 25-15 mg/5 mL oral syrup, Take 5 mL by mouth 4 (four) times a day as needed for cough (Patient not taking: Reported on 2/21/2023), Disp: 240 mL, Rfl: 1    No Known Allergies    Review of Systems   Constitutional: Negative for chills, fever and unexpected weight change  HENT: Negative for hearing loss, nosebleeds and sore throat  Eyes: Negative for pain, redness and visual disturbance  Respiratory: Negative for cough, shortness of breath and wheezing  Cardiovascular: Negative for chest pain, palpitations and leg swelling  Gastrointestinal: Negative for abdominal pain, nausea and vomiting  Endocrine: Negative for polydipsia and polyuria     Musculoskeletal:        As "noted in HPI   Skin: Negative for rash and wound  Neurological: Negative for dizziness, numbness and headaches  Psychiatric/Behavioral: Negative for decreased concentration and suicidal ideas  The patient is not nervous/anxious  All other systems reviewed and are negative  Objective:  /84   Pulse 102   Ht 5' 4\" (1 626 m)   Wt 94 8 kg (209 lb)   BMI 35 87 kg/m²     Ortho Exam  Left knee(s) -   Patient ambulates with steady gait pattern utilizing a single point cane  Uses No assistive device  Genu Varus anatomical deformity  Skin is warm and dry to touch with no signs of erythema, ecchymosis, or infection   Mild generalized soft tissue swelling or effusion noted  ROM (5° - 115°)   TTP over medial joint line and TTP over lateral joint line  Flexor and extensor mechanisms are intact   Knee is stable to varus and valgus stress  Knee is stable to anterior and posterior stress  Calf compartments are soft and supple  - Rachel's sign  2+ DP and PT pulses with brisk capillary refill to the toes  Sural, saphenous, tibial, superficial, and deep peroneal motor and sensory distributions intact  Sensation light touch intact distally      Physical Exam  HENT:      Head: Normocephalic and atraumatic  Nose: Nose normal    Eyes:      Conjunctiva/sclera: Conjunctivae normal    Cardiovascular:      Rate and Rhythm: Normal rate  Pulmonary:      Effort: Pulmonary effort is normal    Musculoskeletal:      Cervical back: Neck supple  Skin:     General: Skin is warm and dry  Capillary Refill: Capillary refill takes less than 2 seconds  Neurological:      Mental Status: He is alert and oriented to person, place, and time  Psychiatric:         Mood and Affect: Mood normal          Behavior: Behavior normal           Diagnostic Test Review:  None performed  Large joint arthrocentesis: L knee  Universal Protocol:  Consent: Verbal consent obtained    Risks and benefits: risks, benefits and alternatives " were discussed  Consent given by: patient  Timeout called at: 5/22/2023 9:52 AM   Patient understanding: patient states understanding of the procedure being performed  Site marked: the operative site was marked  Patient identity confirmed: verbally with patient    Supporting Documentation  Indications: pain   Procedure Details  Location: knee - L knee  Needle size: 20 G  Ultrasound guidance: no  Approach: lateral  Medications administered: 4 mL bupivacaine 0 25 %; 6 mg betamethasone acetate-betamethasone sodium phosphate 6 (3-3) mg/mL    Aspirate amount: 40 mL  Aspirate: blood-tinged    Patient tolerance: patient tolerated the procedure well with no immediate complications  Dressing:  Sterile dressing applied           Scribe Attestation    I,:  Nicolas Henriquez am acting as a scribe while in the presence of the attending physician :       I,:  Ernie Montiel DO personally performed the services described in this documentation    as scribed in my presence :

## 2023-05-30 ENCOUNTER — HOSPITAL ENCOUNTER (OUTPATIENT)
Dept: RADIOLOGY | Facility: HOSPITAL | Age: 73
Discharge: HOME | End: 2023-05-30
Attending: ORTHOPAEDIC SURGERY

## 2023-05-30 ENCOUNTER — OFFICE VISIT (OUTPATIENT)
Dept: OBGYN CLINIC | Facility: HOSPITAL | Age: 73
End: 2023-05-30

## 2023-05-30 VITALS
WEIGHT: 209 LBS | SYSTOLIC BLOOD PRESSURE: 170 MMHG | HEIGHT: 64 IN | HEART RATE: 93 BPM | BODY MASS INDEX: 35.68 KG/M2 | DIASTOLIC BLOOD PRESSURE: 97 MMHG

## 2023-05-30 DIAGNOSIS — M17.12 PRIMARY OSTEOARTHRITIS OF LEFT KNEE: Primary | ICD-10-CM

## 2023-05-30 DIAGNOSIS — M25.562 CHRONIC PAIN OF LEFT KNEE: ICD-10-CM

## 2023-05-30 DIAGNOSIS — M25.462 EFFUSION OF LEFT KNEE: ICD-10-CM

## 2023-05-30 DIAGNOSIS — G89.29 CHRONIC PAIN OF LEFT KNEE: ICD-10-CM

## 2023-05-30 DIAGNOSIS — Z98.890 HISTORY OF ARTHROSCOPY OF LEFT KNEE: ICD-10-CM

## 2023-05-30 DIAGNOSIS — Z01.810 PRE-OPERATIVE CARDIOVASCULAR EXAMINATION: ICD-10-CM

## 2023-05-30 DIAGNOSIS — Z47.1 AFTERCARE FOLLOWING LEFT KNEE JOINT REPLACEMENT SURGERY: ICD-10-CM

## 2023-05-30 DIAGNOSIS — Z01.812 PRE-OPERATIVE LABORATORY EXAMINATION: ICD-10-CM

## 2023-05-30 DIAGNOSIS — Z96.652 AFTERCARE FOLLOWING LEFT KNEE JOINT REPLACEMENT SURGERY: ICD-10-CM

## 2023-05-30 DIAGNOSIS — Z00.00 HEALTHCARE MAINTENANCE: ICD-10-CM

## 2023-05-30 RX ORDER — SODIUM CHLORIDE, SODIUM LACTATE, POTASSIUM CHLORIDE, CALCIUM CHLORIDE 600; 310; 30; 20 MG/100ML; MG/100ML; MG/100ML; MG/100ML
125 INJECTION, SOLUTION INTRAVENOUS CONTINUOUS
OUTPATIENT
Start: 2023-05-30

## 2023-05-30 RX ORDER — ASCORBIC ACID 500 MG
500 TABLET ORAL 2 TIMES DAILY
Qty: 60 TABLET | Refills: 0 | Status: SHIPPED | OUTPATIENT
Start: 2023-05-30

## 2023-05-30 RX ORDER — GABAPENTIN 300 MG/1
300 CAPSULE ORAL ONCE
OUTPATIENT
Start: 2023-05-30 | End: 2023-05-30

## 2023-05-30 RX ORDER — ENOXAPARIN SODIUM 100 MG/ML
40 INJECTION SUBCUTANEOUS DAILY
Qty: 11.2 ML | Refills: 0 | Status: SHIPPED | OUTPATIENT
Start: 2023-05-30 | End: 2023-06-27

## 2023-05-30 RX ORDER — FERROUS SULFATE TAB EC 324 MG (65 MG FE EQUIVALENT) 324 (65 FE) MG
TABLET DELAYED RESPONSE ORAL
Qty: 30 TABLET | Refills: 0 | Status: SHIPPED | OUTPATIENT
Start: 2023-05-30

## 2023-05-30 RX ORDER — ACETAMINOPHEN 325 MG/1
975 TABLET ORAL ONCE
OUTPATIENT
Start: 2023-05-30 | End: 2023-05-30

## 2023-05-30 RX ORDER — FOLIC ACID 1 MG/1
1 TABLET ORAL DAILY
Qty: 30 TABLET | Refills: 0 | Status: SHIPPED | OUTPATIENT
Start: 2023-05-30

## 2023-05-30 RX ORDER — CHLORHEXIDINE GLUCONATE 0.12 MG/ML
15 RINSE ORAL ONCE
OUTPATIENT
Start: 2023-05-30 | End: 2023-05-30

## 2023-05-30 RX ORDER — CEFAZOLIN SODIUM 2 G/50ML
2000 SOLUTION INTRAVENOUS ONCE
OUTPATIENT
Start: 2023-05-30 | End: 2023-05-30

## 2023-05-30 RX ORDER — TRANEXAMIC ACID 10 MG/ML
1000 INJECTION, SOLUTION INTRAVENOUS ONCE
OUTPATIENT
Start: 2023-05-30 | End: 2023-05-30

## 2023-05-30 NOTE — PATIENT INSTRUCTIONS
1  Primary osteoarthritis of left knee  Ambulatory Referral to Orthopedic Surgery      2  Chronic pain of left knee  XR knee 1 or 2 vw left      3  Effusion of left knee        4  History of arthroscopy of left knee        5  Pre-operative laboratory examination        6  Pre-operative cardiovascular examination        7  Aftercare following left knee joint replacement surgery        8  Healthcare maintenance          Return for post-op with x-rays left knee  Knee Replacement   AMBULATORY CARE:   What you need to know about knee replacement:  Knee replacement is surgery to replace all or part of your knee joint  It is also called knee arthroplasty  How to prepare for knee replacement:   Weeks before your surgery: Your healthcare provider will check your overall health  He or she will ask about your current knee pain or stiffness  Tell your provider how pain or stiffness affects your daily activities or ability to play sports  He or she may also ask about fatigue, anxiety, or depression you may have  Some medicines will need to be stopped weeks before surgery  These medicines include blood thinning medicine, such as aspirin and ibuprofen  It also includes some antirheumatic medicines  Make sure your healthcare provider knows all medicines you are taking  Also ask how long before surgery to stop taking them  Your healthcare provider may have you do exercises to strengthen your leg muscles before surgery  You may need x-rays to help your healthcare provider plan your surgery  Ask about any other tests you may need  The night before and the day of surgery: Your healthcare provider may tell you not to eat or drink anything after midnight on the day of your surgery  You will be told what medicines you can or cannot take the morning of surgery  What will happen during knee replacement:   You may be given general anesthesia to keep you asleep and free from pain during surgery   You may instead be given regional anesthesia, such as spinal or epidural anesthesia, or a peripheral nerve block  Regional anesthesia keeps you numb from the waist down, but you will be awake during surgery  Your surgeon will make an incision over your knee joint  He or she will remove the damaged parts of your knee joint and replace them with a knee implant  The knee implant may be made of metal and plastic  Your surgeon may secure it with medical cement  Your surgeon will move the muscles and other tissues around your joint back into place  He or she will close your incision with stitches or staples  He or she may use strips of medical tape and a bandage to cover your wound  What to expect after knee replacement: It is normal to have increased stiffness and pain after surgery  Your pain and stiffness should get better with exercise  Do not get out of bed until your healthcare provider says it is okay  The physical therapist will help you walk after your surgery  When you walk the same day after surgery, it helps decrease pain and improves the function of your knee  You may use crutches or a walker  You may be in the hospital 1 to 4 days, or you may go home shortly after surgery  Your healthcare provider may talk to you about rehabilitation you can do at home  A physical therapist can teach you exercises to help strengthen your knee and prevent stiffness  You may also need occupational therapy to teach you the best ways to bathe and dress  You may  be given a joint replacement ID card  The card will tell which joint was replaced and when it was replaced  Tell all healthcare providers about your joint replacement surgery  Risks of knee replacement:  You may bleed more than expected or get an infection  Nerves or blood vessels may be damaged during surgery  After surgery, your knee may be stiff or numb  You may continue to have knee pain  You may get a blood clot in your leg   This may become life-threatening  Your implant may get loose or move out of place  The implant may get worn out over time and need to be replaced  Call your local emergency number (911 in the [address]) for any of the following: You feel lightheaded, short of breath, and have chest pain  You cough up blood  Seek care immediately if:   Your leg feels warm, tender, and painful  It may look swollen and red  You cannot walk or move your knee  Blood soaks through your bandage  Your incision wound comes apart  Your incision area is red, swollen, or draining pus  Call your doctor or surgeon if:   You have a fever or chills  You have trouble moving or bending your knee  You have new knee pain or pain that does not get better with medicine  You have questions or concerns about your condition or care  Medicines: You may  need any of the following:  Prescription pain medicine  may be given  Ask your healthcare provider how to take this medicine safely  Some prescription pain medicines contain acetaminophen  Do not take other medicines that contain acetaminophen without talking to your healthcare provider  Too much acetaminophen may cause liver damage  Prescription pain medicine may cause constipation  Ask your healthcare provider how to prevent or treat constipation  NSAIDs , such as ibuprofen, help decrease swelling, pain, and fever  This medicine is available with or without a doctor's order  NSAIDs can cause stomach bleeding or kidney problems in certain people  If you take blood thinner medicine, always ask your healthcare provider if NSAIDs are safe for you  Always read the medicine label and follow directions  Blood thinners  help prevent blood clots  Clots can cause strokes, heart attacks, and death  The following are general safety guidelines to follow while you are taking a blood thinner:    Watch for bleeding and bruising while you take blood thinners  Watch for bleeding from your gums or nose  Watch for blood in your urine and bowel movements  Use a soft washcloth on your skin, and a soft toothbrush to brush your teeth  This can keep your skin and gums from bleeding  If you shave, use an electric shaver  Do not play contact sports  Tell your dentist and other healthcare providers that you take a blood thinner  Wear a bracelet or necklace that says you take this medicine  Do not start or stop any other medicines unless your healthcare provider tells you to  Many medicines cannot be used with blood thinners  Take your blood thinner exactly as prescribed by your healthcare provider  Do not skip does or take less than prescribed  Tell your provider right away if you forget to take your blood thinner, or if you take too much  Warfarin  is a blood thinner that you may need to take  The following are things you should be aware of if you take warfarin:     Foods and medicines can affect the amount of warfarin in your blood  Do not make major changes to your diet while you take warfarin  Warfarin works best when you eat about the same amount of vitamin K every day  Vitamin K is found in green leafy vegetables and certain other foods  Ask for more information about what to eat when you are taking warfarin  You will need to see your healthcare provider for follow-up visits when you are on warfarin  You will need regular blood tests  These tests are used to decide how much medicine you need  Take your medicine as directed  Contact your healthcare provider if you think your medicine is not helping or if you have side effects  Tell your provider if you are allergic to any medicine  Keep a list of the medicines, vitamins, and herbs you take  Include the amounts, and when and why you take them  Bring the list or the pill bottles to follow-up visits  Carry your medicine list with you in case of an emergency  Self-care:   Apply ice  on your knee for 15 to 20 minutes every hour or as directed   Use an ice pack, or put crushed ice in a plastic bag  Cover it with a towel  Ice helps prevent tissue damage and decreases swelling and pain  Do not get the area wet  until your healthcare provider says it is okay  When your provider says it is okay, carefully wash the area with soap and water  Dry the area and put on new, clean bandages as directed  Care for your incision area  as directed  Do not put powders or lotions over the area  If you have strips of medical tape over the incision area, let them fall off on their own  If they do not fall off within 14 days, gently remove them  Check the area for signs of infection, such as swelling, redness, or pus  Go to physical or occupational therapy , if directed  A physical therapist will teach you exercises to build muscle strength, decrease pain and swelling, and prevent blood clots  An occupational therapist will show you how to do daily activities safely  He or she may recommend assistive devices to help you dress, bathe, and  objects  The assistive devices will help you prevent knee damage from twisting or bending  Prevent blood clots:   Wear pressure stockings as directed  Pressure stockings help keep blood from pooling in your leg veins  Your healthcare provider can prescribe stockings that are right for you  Do not buy over-the-counter pressure stockings unless your healthcare provider says it is okay  They may not fit correctly or may have elastic that cuts off your circulation  Ask your healthcare provider when to start wearing pressure stockings and how long to wear them each day  Do not cross your legs for 6 weeks or as directed  Your risk for blood clots is greater when you cross your legs  You might also move your implant out of place  Activity guidelines:   Know your limits  Start activities slowly and give yourself rest periods  Pain and swelling can increase when you do too much   Do not do an activity until your healthcare provider says you are ready  Use assistive devices as directed  Your thigh muscles will be weak after surgery  Assistive devices will help you not fall  Go up the stairs  by placing your non-operated leg on the step first  Then bring your operated leg to the same step  Repeat  Go down stairs  by placing your operated leg down on the step first  Then bring your non-operated leg to the same step  Repeat  Do light housekeeping only  Ask your healthcare provider which housekeeping activities are okay for you  Do not vacuum, change sheets on a bed, or anything that makes you reach up, bend, or twist     Do not drive for at least 6 weeks  or until your healthcare provider says it is okay  Do not play contact sports, tennis, golf, or ski  until your healthcare provider says it is okay  These activities can loosen your knee implant  Prevent falls:       Remove all loose carpets and cords  These can cause you to trip and fall  Use a shower bench or chair  when you take a shower to limit the time you are standing  Use a toilet seat riser with arms  if your toilet seat is low  A toilet seat riser will help prevent bending or twisting your knee  Metal detectors and MRIs after joint replacement surgery: The metal in your joint may set off metal detectors, such as at an airport  Tell the officials about your joint replacement surgery  You may also want to show your joint replacement ID card, if you were given one  MRIs are considered safe for people with joint replacements  The type of metal used is not magnetic  Tell all healthcare providers about your joint replacement surgery  Follow up with your healthcare provider as directed: Your provider may contact you weeks after your surgery  He or she may ask if surgery helped relieve your pain or stiffness  Tell your provider how well you are able to do your daily activities after surgery   Also tell him or her if you are having any problems with mobility or range of motion  Write down your questions so you remember to ask them during your visits  © Copyright [employer] 2022 Information is for End User's use only and may not be sold, redistributed or otherwise used for commercial purposes  The above information is an  only  It is not intended as medical advice for individual conditions or treatments  Talk to your doctor, nurse or pharmacist before following any medical regimen to see if it is safe and effective for you

## 2023-05-30 NOTE — PROGRESS NOTES
Assessment:   Diagnosis ICD-10-CM Associated Orders   1  Primary osteoarthritis of left knee  M17 12 Ambulatory Referral to Orthopedic Surgery     Comprehensive metabolic panel     Hemoglobin A1C W/EAG Estimation     Type and screen     Ambulatory referral to surgical optimization     Ambulatory referral to Physical Therapy     Case request operating room: ARTHROPLASTY KNEE TOTAL W ROBOT     Case request operating room: ARTHROPLASTY KNEE TOTAL W ROBOT      2  Chronic pain of left knee  M25 562 XR knee 1 or 2 vw left    G89 29 Comprehensive metabolic panel     Hemoglobin A1C W/EAG Estimation     Type and screen     Ambulatory referral to surgical optimization     Ambulatory referral to Physical Therapy     Case request operating room: ARTHROPLASTY KNEE TOTAL W ROBOT     Case request operating room: ARTHROPLASTY KNEE TOTAL W ROBOT      3  Effusion of left knee  M25 462       4  History of arthroscopy of left knee  Z98 890       5  Pre-operative laboratory examination  Z01 812       6  Pre-operative cardiovascular examination  Z01 810       7  Aftercare following left knee joint replacement surgery  Z47 1 enoxaparin (LOVENOX) 40 mg/0 4 mL    Z96 652       8  Healthcare maintenance  Z00 00 ascorbic acid (VITAMIN C) 500 MG tablet     enoxaparin (LOVENOX) 40 mg/0 4 mL     ferrous sulfate 324 (65 Fe) mg     folic acid (FOLVITE) 1 mg tablet          Plan:  • Diagnostics reviewed and physical exam performed  Diagnosis, treatment options and associated risks were discussed with the patient including no treatment, nonsurgical treatment and potential for surgical intervention  The patient was given the opportunity to ask questions regarding each  • He has had a multitude of nonoperative modalities without appreciable long-term relief of his left knee symptoms  Despite having a varus deformity of his left lower extremity cannot be passively corrected to neutral thus an  brace is not indicated    • Quality of life decision to pursue elective left total knee arthroplasty  Risks and benefits of a left total knee arthroplasty were discussed  Consents obtained  • Preoperative vitamins and postoperative Lovenox sent to his pharmacy  To do next visit:  Return for post-op with x-rays left knee  The above stated was discussed in layman's terms and the patient expressed understanding  All questions were answered to the patient's satisfaction  Scribe Attestation    I,:  Tika Rivera am acting as a scribe while in the presence of the attending physician :       I,:  Kimani Doty MD personally performed the services described in this documentation    as scribed in my presence :             Subjective:   Jerod Doe is a 71 y o  male who presents today for 2nd opinion regarding his left knee due to persistent pain and stiffness  He injured his knee in October 2022 and underwent an arthroscopy in February 2023 by Dr Hilario Millan  He cannot appreciate any relief from the arthroscopy as well as aspirations with a 3-shot euflexxa series and cortisone which he underwent post arthroscopy  He does find increased pain with weightbearing activities  He is stiffness getting up after prolonged sedentary positions  He also has difficulty ambulating stairs in an alternating fashion  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   He does find that his left knee symptoms do limit his day-to-day activities as well as impedes on his quality of life  His symptoms can be severe and unrelenting at times  He does present using a SPC for ambulatory assistance  He is a diabetic with a recent hemoglobin A1c of 6 8        Review of systems negative unless otherwise specified in HPI  Review of Systems    Past Medical History:   Diagnosis Date   • Arthritis    • Colon polyp    • COVID 09/07/2021    and 12/2022   • Diabetes mellitus ([facility] [address] )    • Dyslipidemia    • GERD (gastroesophageal reflux disease)    • Hyperlipidemia    • Nodule of left lung     negative for CA   • Type 2 diabetes mellitus without complication, without long-term current use of insulin ([facility] [address] ) 2019   • Use of cane as ambulatory aid        Past Surgical History:   Procedure Laterality Date   • COLONOSCOPY     • ELBOW SURGERY Bilateral    • FOOT TENDON SURGERY Bilateral     heel   • HAND SURGERY     • FL ARTHRS KNE SURG W/MENISCECTOMY MED/LAT W/SHVG Right 2022    Procedure: KNEE ARTHROSCOPY WITH medial MENISCECTOMY chondroplasty synovectomy injection;  Surgeon: Dominique Piña DO;  Location: [facility];  Service: Orthopedics   • FL ARTHRS KNE SURG W/MENISCECTOMY MED/LAT W/SHVG Left 2023    Procedure: Left knee arthroscopy: Medial meniscectomy; Lateral meniscectomy; Chondroplasty; Synovectomy;  Post arthroscopic injection of intra-articular joint space and peripheral portal;  Surgeon: Dominique Piña DO;  Location: [facility];  Service: Orthopedics   • FL NEUROPLASTY &/TRANSPOS MEDIAN NRV CARPAL Sudie Isabel Left 2021    Procedure: RELEASE CARPAL TUNNEL;  Surgeon: Tamela Giang DO;  Location: [facility] [place] MAIN OR;  Service: Orthopedics       Family History   Problem Relation Age of Onset   • Heart disease Mother         hx MI   • Diabetes Mother    • Stroke Mother    • COPD Father         80 yrs   • Coronary artery disease Father    • Cancer Brother        Social History     Occupational History   • Not on file   Tobacco Use   • Smoking status: Former     Packs/day: 1 00     Years: 30 00     Total pack years: 30 00     Types: Cigarettes     Quit date:      Years since quittin 4   • Smokeless tobacco: Never   Vaping Use   • Vaping Use: Never used   Substance and Sexual Activity   • Alcohol use: Yes     Comment: occ   • Drug use: No   • Sexual activity: Yes     Partners: Female     Birth control/protection: Male Sterilization         Current Outpatient Medications:   •  ascorbic acid (VITAMIN C) 500 MG tablet, Take 1 tablet (500 mg total) by mouth 2 (two) times a day, Disp: 60 tablet, Rfl: 0  •  enoxaparin (LOVENOX) 40 mg/0 4 mL, Inject 0 4 mL (40 mg total) under the skin daily for 28 days To start postoperatively, Disp: 11 2 mL, Rfl: 0  •  ferrous sulfate 324 (65 Fe) mg, Take one tablet daily  , Disp: 30 tablet, Rfl: 0  •  folic acid (FOLVITE) 1 mg tablet, Take 1 tablet (1 mg total) by mouth daily, Disp: 30 tablet, Rfl: 0  •  acetaminophen (TYLENOL) 500 mg tablet, Take 500 mg by mouth every 6 (six) hours as needed for mild pain, Disp: , Rfl:   •  meloxicam (Mobic) 15 mg tablet, Take 1 tablet (15 mg total) by mouth daily, Disp: 90 tablet, Rfl: 2  •  metFORMIN (GLUCOPHAGE) 500 mg tablet, Take 1 tablet (500 mg total) by mouth 2 (two) times a day with meals, Disp: 180 tablet, Rfl: 3  •  phentermine (ADIPEX-P) 37 5 MG tablet, Take 1 tablet (37 5 mg total) by mouth daily (Patient not taking: Reported on 1/31/2023), Disp: 30 tablet, Rfl: 2  •  Promethazine-DM (PHENERGAN-DM) 6 25-15 mg/5 mL oral syrup, Take 5 mL by mouth 4 (four) times a day as needed for cough (Patient not taking: Reported on 2/21/2023), Disp: 240 mL, Rfl: 1  •  simvastatin (ZOCOR) 20 mg tablet, Take 1 tablet (20 mg total) by mouth daily at bedtime, Disp: 90 tablet, Rfl: 3  •  tamsulosin (FLOMAX) 0 4 mg, Take 2 capsules (0 8 mg total) by mouth daily with dinner, Disp: 180 capsule, Rfl: 3    No Known Allergies         Vitals:    05/30/23 0802   BP: 170/97   Pulse: 93       Objective:                    Left Knee Exam     Muscle Strength   The patient has normal left knee strength  Tenderness   The patient is experiencing tenderness in the medial joint line      Range of Motion   Extension: 5   Flexion: 110 (with crepitation and stiffness)     Other   Erythema: absent  Sensation: normal  Swelling: mild  Effusion: effusion present    Comments:    Varus alignment, it cannot be passively corrected to neutral  Intact extensor mechanism  Healed arthroscopy portals  Mildly antalgic gait "            Diagnostics, reviewed and taken today if performed as documented: The attending physician has personally reviewed the pertinent films in PACS and interpretation is as follows:    Left knee x-rays taken and reviewed in the office today show: advanced degenerative changes with large OCD [address] (which was not present on his October 2022 films)  Mild lateral joint space narrowing with modest patellofemoral compartment narrowing  Subchondral sclerosis and osteophyte formation present  Procedures, if performed today:    Procedures    None performed      Portions of the record may have been created with voice recognition software  Occasional wrong word or \"sound a like\" substitutions may have occurred due to the inherent limitations of voice recognition software  Read the chart carefully and recognize, using context, where substitutions have occurred    "

## 2023-06-14 ENCOUNTER — ANESTHESIA EVENT (OUTPATIENT)
Dept: PERIOP | Facility: HOSPITAL | Age: 73
DRG: 470 | End: 2023-06-14
Payer: MEDICARE

## 2023-07-05 NOTE — PROGRESS NOTES
PT Evaluation     Today's date: 2023  Patient name: Argentina Tejada  : 1950  MRN: [id number]  Referring provider: Linda Cespedes  Dx:   Encounter Diagnosis     ICD-10-CM    1. Primary osteoarthritis of left knee  M17.12       2. Chronic pain of left knee  M25.562     G89.29                      Assessment  Assessment details: Argentina Tejada is a 71 y.o. male with a history of OA, dyslipidemia, GERD, DM, hyperlipidemia, bilateral elbow surgery, L carpal tunnel that presents for a moderate complexity physical therapy initial evaluation. The patient demonstrates signs and symptoms consistent with L knee OA s/p L TKA. During the examination the patient demonstrated decreased L knee strength, decreased L knee ROM, gait dysfunction, and L knee pain. The patient's impairments are causing the following functional limitations: difficulty with prolonged standing, prolonged walking, walking on unlevel surfaces, difficulty squatting/kneeling, difficulty stair-climbing, and difficulty transferring from low surfaces. The patient's clinical presentation is evolving due to a number of participation restrictions, significant medial history, and functional limitation (KOOS: 44.905). The patient will benefit from skilled PT services to address impairments, work towards goals, and restore PLOF. Impairments: abnormal gait, abnormal or restricted ROM, activity intolerance, impaired balance, impaired physical strength, lacks appropriate home exercise program, pain with function and weight-bearing intolerance  Functional limitations: difficulty with prolonged standing, prolonged walking, walking on unlevel surfaces, difficulty squatting/kneeling, difficulty stair-climbing, and difficulty transferring from low surfaces. Symptom irritability: moderateUnderstanding of Dx/Px/POC: good   Prognosis: good    Goals  GOALS TO BE ACHIEVED BY THE END OF THE PRE-OP VISIT  1.   Patient have all questions answered / receive pre operative education regarding precautions/wound care/ADL's. MET   2. Patient to be independent with his phase 1 post operative exercises to perform pre surgery. MET     GOALS TO BE ACHIEVED POST OPERATIVELY    STG: Achieve in 4-6 weeks  1. Patient's L knee pain at worst less than 3/10 to allow for proper gait. 2.  Patient's L knee ROM improve by 10-15 degrees to improve squatting. 3.  L LE MMT improve to > 4/5 all motions tested to improve ADL/recreational activities. 4.  Timed up and go score improve to less than 14 seconds to indicate improved balance/decreased falls risk    LTG:  Achieve in 6-12 weeks  1. Patient's knee FOTO score improve to > 70% to indicate a return to normal functioning. 2.  Patient achieve personal goal of walking. 3. Patient to achieve independence with home exercise plan. 4. 30 second sit to stand score improve TO > 15 stands to indicate improved transfers. Plan  Patient would benefit from: skilled physical therapy  Planned modality interventions: TENS, cryotherapy and thermotherapy: hydrocollator packs  Other planned modality interventions: IASTM / NMES  Planned therapy interventions: joint mobilization, manual therapy, massage, neuromuscular re-education, patient education, postural training, self care, strengthening, stretching, therapeutic activities, therapeutic exercise, home exercise program, abdominal trunk stabilization, balance, balance/weight bearing training and gait training  Frequency: 1-3X/WK. Duration in weeks: 12  Plan of Care beginning date: 7/6/2023  Plan of Care expiration date: 10/5/2023  Treatment plan discussed with: PTA and patient        Subjective Evaluation    History of Present Illness  Date of onset: 10/6/2022  Mechanism of injury: Willie Montoya is a 71 y.o. male that presents to outpatient physical therapy pre-operatively( upcoming L TKA) with complaints of L knee pain and difficulty functioning/walking.   The patient reports difficulty using the vacuum, unable to lift/carry items, squat, and kneel. The patient's main goal for physical therapy is to walk pain free at the L knee. The patient presents pre-operatively to get objective measures, learn phase 1 exercises, and receive education on the surgery/self care. Recurrent probem    Pain  Current pain ratin  At best pain ratin (sit/rest)  At worst pain ratin  Location: L knee medial knee, anterior knee  Quality: dull ache, grinding and sharp  Relieving factors: rest  Aggravating factors: standing, walking and stair climbing  Progression: worsening    Social Support  Steps to enter house: yes  Stairs in house: yes   Lives in: multiple-level home  Lives with: spouse    Employment status: working (drives school bus )  Hand dominance: right    Treatments  Previous treatment: physical therapy  Current treatment: physical therapy  Patient Goals  Patient goals for therapy: decreased pain, improved balance, increased motion, increased strength, independence with ADLs/IADLs and return to sport/leisure activities  Patient goal: walk pain free / function pain free        Objective     Observations     Additional Observation Details  The patient was educated on caring for L knee incisions and watching for signs of infection to prepare for upcoming L TKA surgery. All patient questions related to the incision care and infection signs were answered at this time. We also reviewed ambulation/transfers with an assistive device, post operative movement contraindications/precautions, and ADL's. The patient accepted and understood all information - patient was advised to call if any other questions arise pre-surgery.       Palpation     Additional Palpation Details  NO TTP    Tenderness     Additional Tenderness Details  NO TTP    Neurological Testing     Sensation     Knee   Left Knee   Intact: light touch    Right Knee   Intact: light touch     Active Range of Motion   Left Knee   Flexion: 125 degrees with pain  Extension: -9 degrees with pain    Right Knee   Flexion: 130 degrees with pain  Extension: 2 degrees with pain    Passive Range of Motion   Left Knee   Flexion: 125 degrees with pain  Extension: -9 degrees with pain    Right Knee   Flexion: 130 degrees   Extension: 2 degrees     Mobility   Patellar Mobility:   Left Knee   Hypomobile: left medial, left lateral, left superior and left inferior    Right Knee   WFL: medial, lateral, superior and inferior    Tests     Additional Tests Details  Gait impairments: Patient ambulates with SPC WBAT B/L LE. The patient demonstrates slow gait speed, unequal step lengths, and decreased heel-toe rollover.     TU seconds w/ SPC; (+) difficulty upon standing up    30 SSTS: 7 stands no H - slow    KOOS: 17= 44.905                 Re-Evaluation:  PRECAUTIONS: HX ANT LUMBAR  Knee Specialty Daily Treatment Diary     Manual Therapy        MFR/STM/ IASTM        Hamstring stretch        Prone Quad stretch        Patellar mobs        Knee stretch on edge of mat            Ther Ex        Nu Step for LE strength S=  *      Biodex Bike for knee ROM/ endurance S=        Heel slides flex/abd x15 ea               EXT BOARD        Prone knee flex         ANKLE PUMP X15 EA       SLR all planes  *              LAQ x15       Hamstring stretch  *      Calf stretch  *      Standing hamstring curls  *      HR/TR  *      Mini Squat  *                      Total gym squats (deep)        Neuro Re-ed        TKE  *      Gluteal set x15       QS x15       Wall slides with feet stagger        Bridges  *      Fitter board        Eccentric Leg press        Clam Shells        QS+ SLR        CSMi balance, "+" squat        GAIT Training        sidestepping  *      Heel-toe amb  *      Mirror walking  *      Ther Act        Amb up/down steps        Chair squats        Crate carry        Step ups  *          Modalities        CP  KNEE The patient was given a new home exercise plan with instruction, pictures, and verbal feedback. The patient accepts and understands the new home activities.

## 2023-07-06 ENCOUNTER — EVALUATION (OUTPATIENT)
Dept: PHYSICAL THERAPY | Facility: CLINIC | Age: 73
End: 2023-07-06
Payer: MEDICARE

## 2023-07-06 DIAGNOSIS — M25.562 CHRONIC PAIN OF LEFT KNEE: ICD-10-CM

## 2023-07-06 DIAGNOSIS — G89.29 CHRONIC PAIN OF LEFT KNEE: ICD-10-CM

## 2023-07-06 DIAGNOSIS — M17.12 PRIMARY OSTEOARTHRITIS OF LEFT KNEE: Primary | ICD-10-CM

## 2023-07-06 PROCEDURE — 97112 NEUROMUSCULAR REEDUCATION: CPT | Performed by: PHYSICAL THERAPIST

## 2023-07-06 PROCEDURE — 97110 THERAPEUTIC EXERCISES: CPT | Performed by: PHYSICAL THERAPIST

## 2023-07-06 PROCEDURE — 97162 PT EVAL MOD COMPLEX 30 MIN: CPT | Performed by: PHYSICAL THERAPIST

## 2023-07-13 ENCOUNTER — LAB REQUISITION (OUTPATIENT)
Dept: LAB | Facility: HOSPITAL | Age: 73
End: 2023-07-13
Payer: MEDICARE

## 2023-07-13 ENCOUNTER — APPOINTMENT (OUTPATIENT)
Dept: LAB | Facility: CLINIC | Age: 73
End: 2023-07-13
Payer: MEDICARE

## 2023-07-13 DIAGNOSIS — G89.29 CHRONIC PAIN OF LEFT KNEE: ICD-10-CM

## 2023-07-13 DIAGNOSIS — M17.12 UNILATERAL PRIMARY OSTEOARTHRITIS, LEFT KNEE: ICD-10-CM

## 2023-07-13 DIAGNOSIS — E11.9 TYPE 2 DIABETES MELLITUS WITHOUT COMPLICATION, WITHOUT LONG-TERM CURRENT USE OF INSULIN (HCC): ICD-10-CM

## 2023-07-13 DIAGNOSIS — E78.00 HYPERCHOLESTEREMIA: ICD-10-CM

## 2023-07-13 DIAGNOSIS — M17.12 PRIMARY OSTEOARTHRITIS OF LEFT KNEE: ICD-10-CM

## 2023-07-13 DIAGNOSIS — M25.562 CHRONIC PAIN OF LEFT KNEE: ICD-10-CM

## 2023-07-13 LAB
ABO GROUP BLD: NORMAL
ALBUMIN SERPL BCP-MCNC: 4 G/DL (ref 3.5–5)
ALP SERPL-CCNC: 54 U/L (ref 46–116)
ALT SERPL W P-5'-P-CCNC: 25 U/L (ref 12–78)
ANION GAP SERPL CALCULATED.3IONS-SCNC: 4 MMOL/L
AST SERPL W P-5'-P-CCNC: 11 U/L (ref 5–45)
BASOPHILS # BLD AUTO: 0.04 THOUSANDS/ÂΜL (ref 0–0.1)
BASOPHILS NFR BLD AUTO: 1 % (ref 0–1)
BILIRUB SERPL-MCNC: 0.42 MG/DL (ref 0.2–1)
BLD GP AB SCN SERPL QL: NEGATIVE
BUN SERPL-MCNC: 22 MG/DL (ref 5–25)
CALCIUM SERPL-MCNC: 9.5 MG/DL (ref 8.3–10.1)
CHLORIDE SERPL-SCNC: 110 MMOL/L (ref 96–108)
CHOLEST SERPL-MCNC: 126 MG/DL
CO2 SERPL-SCNC: 24 MMOL/L (ref 21–32)
CREAT SERPL-MCNC: 0.9 MG/DL (ref 0.6–1.3)
EOSINOPHIL # BLD AUTO: 0.17 THOUSAND/ÂΜL (ref 0–0.61)
EOSINOPHIL NFR BLD AUTO: 3 % (ref 0–6)
ERYTHROCYTE [DISTWIDTH] IN BLOOD BY AUTOMATED COUNT: 13.5 % (ref 11.6–15.1)
EST. AVERAGE GLUCOSE BLD GHB EST-MCNC: 157 MG/DL
GFR SERPL CREATININE-BSD FRML MDRD: 85 ML/MIN/1.73SQ M
GLUCOSE P FAST SERPL-MCNC: 150 MG/DL (ref 65–99)
HBA1C MFR BLD: 7.1 %
HCT VFR BLD AUTO: 49.2 % (ref 36.5–49.3)
HDLC SERPL-MCNC: 40 MG/DL
HGB BLD-MCNC: 16 G/DL (ref 12–17)
IMM GRANULOCYTES # BLD AUTO: 0.01 THOUSAND/UL (ref 0–0.2)
IMM GRANULOCYTES NFR BLD AUTO: 0 % (ref 0–2)
LDLC SERPL CALC-MCNC: 60 MG/DL (ref 0–100)
LYMPHOCYTES # BLD AUTO: 1.66 THOUSANDS/ÂΜL (ref 0.6–4.47)
LYMPHOCYTES NFR BLD AUTO: 29 % (ref 14–44)
MCH RBC QN AUTO: 28.7 PG (ref 26.8–34.3)
MCHC RBC AUTO-ENTMCNC: 32.5 G/DL (ref 31.4–37.4)
MCV RBC AUTO: 88 FL (ref 82–98)
MONOCYTES # BLD AUTO: 0.45 THOUSAND/ÂΜL (ref 0.17–1.22)
MONOCYTES NFR BLD AUTO: 8 % (ref 4–12)
NEUTROPHILS # BLD AUTO: 3.39 THOUSANDS/ÂΜL (ref 1.85–7.62)
NEUTS SEG NFR BLD AUTO: 59 % (ref 43–75)
NRBC BLD AUTO-RTO: 0 /100 WBCS
PLATELET # BLD AUTO: 180 THOUSANDS/UL (ref 149–390)
PMV BLD AUTO: 11.1 FL (ref 8.9–12.7)
POTASSIUM SERPL-SCNC: 4.2 MMOL/L (ref 3.5–5.3)
PROT SERPL-MCNC: 7 G/DL (ref 6.4–8.4)
RBC # BLD AUTO: 5.57 MILLION/UL (ref 3.88–5.62)
RH BLD: POSITIVE
SODIUM SERPL-SCNC: 138 MMOL/L (ref 135–147)
SPECIMEN EXPIRATION DATE: NORMAL
TRIGL SERPL-MCNC: 130 MG/DL
WBC # BLD AUTO: 5.72 THOUSAND/UL (ref 4.31–10.16)

## 2023-07-13 PROCEDURE — 36415 COLL VENOUS BLD VENIPUNCTURE: CPT

## 2023-07-13 PROCEDURE — 85025 COMPLETE CBC W/AUTO DIFF WBC: CPT

## 2023-07-13 PROCEDURE — 86850 RBC ANTIBODY SCREEN: CPT | Performed by: ORTHOPAEDIC SURGERY

## 2023-07-13 PROCEDURE — 83036 HEMOGLOBIN GLYCOSYLATED A1C: CPT

## 2023-07-13 PROCEDURE — 86901 BLOOD TYPING SEROLOGIC RH(D): CPT | Performed by: ORTHOPAEDIC SURGERY

## 2023-07-13 PROCEDURE — 80053 COMPREHEN METABOLIC PANEL: CPT

## 2023-07-13 PROCEDURE — 80061 LIPID PANEL: CPT

## 2023-07-13 PROCEDURE — 86900 BLOOD TYPING SEROLOGIC ABO: CPT | Performed by: ORTHOPAEDIC SURGERY

## 2023-07-14 ENCOUNTER — OFFICE VISIT (OUTPATIENT)
Dept: PODIATRY | Facility: CLINIC | Age: 73
End: 2023-07-14
Payer: MEDICARE

## 2023-07-14 VITALS
SYSTOLIC BLOOD PRESSURE: 152 MMHG | HEART RATE: 84 BPM | BODY MASS INDEX: 35.68 KG/M2 | HEIGHT: 64 IN | DIASTOLIC BLOOD PRESSURE: 77 MMHG | WEIGHT: 209 LBS

## 2023-07-14 DIAGNOSIS — B35.1 ONYCHOMYCOSIS: Primary | ICD-10-CM

## 2023-07-14 DIAGNOSIS — Z01.818 PREOPERATIVE CLEARANCE: Primary | ICD-10-CM

## 2023-07-14 DIAGNOSIS — E11.9 TYPE 2 DIABETES MELLITUS WITHOUT COMPLICATION, WITHOUT LONG-TERM CURRENT USE OF INSULIN (HCC): ICD-10-CM

## 2023-07-14 PROCEDURE — 11721 DEBRIDE NAIL 6 OR MORE: CPT | Performed by: STUDENT IN AN ORGANIZED HEALTH CARE EDUCATION/TRAINING PROGRAM

## 2023-07-14 NOTE — PROGRESS NOTES
Assessment/Plan:    No problem-specific Assessment & Plan notes found for this encounter. Diagnoses and all orders for this visit:    Onychomycosis    Type 2 diabetes mellitus without complication, without long-term current use of insulin ([address])       Plan:     1. A thorough neurovascular exam was performed. Patient presents for at-risk foot care. Patient has no acute concerns today. Patient has significant lower extremity risk due to neuropathy, parasthesia, edema, and trophic skin changes to the lower extremity. Patient has Q9  findings and is recommended for at risk foot care every 3 months.     2. All 10 toenails were debridement as follow: using nail nipper, cory, and curette, nails were sharply debrided, reduced in thickness and length. Devitalized nail tissue and fungal debris excised and removed. Patient tolerated well.       3. Patient was educated on importance of glycemic control, daily foot assessment and proper shoe gear. Educational materials were provided. Patient will follow up annually for diabetic foot risk assessment.      4. Return in 10-12 weeks.      Lab Results   Component Value Date    HGBA1C 7.1 (H) 07/13/2023         Subjective:      Patient ID: Marlen Collins is a 71 y.o. male. HPI:  Marlen Collins is a 71 y.o. male who presents with painful, elongated toenails. They have difficulty applying their socks and shoes due to the elongation of the nails. The pressure within their shoe gear is painful and they have been unable to cut their nails adequately. Patient states pain is 1/10 in shoe gear. Pain with pressure. Requires at risk foot care.          The following portions of the patient's history were reviewed and updated as appropriate:   He  has a past medical history of Arthritis, Colon polyp, COVID (09/07/2021), Diabetes mellitus ([address]), Dyslipidemia, Fallen arches, GERD (gastroesophageal reflux disease), Hyperlipidemia, Nodule of left lung (2007), Type 2 diabetes mellitus without complication, without long-term current use of insulin ([address]) (01/23/2019), and Use of cane as ambulatory aid. He   Patient Active Problem List    Diagnosis Date Noted   • Tear of medial meniscus of right knee, current 05/23/2022   • Type 2 diabetes mellitus without complication, without long-term current use of insulin ([address]) 11/30/2021   • Carpal tunnel syndrome on right    • Carpal tunnel syndrome on left    • Class 2 severe obesity due to excess calories with serious comorbidity and body mass index (BMI) of 35.0 to 35.9 in adult [facility]) 07/25/2019   • Hypercholesteremia 08/15/2018   • High blood pressure 08/15/2018   • Dyspnea 08/15/2018     He  has a past surgical history that includes Foot Tendon Surgery (Bilateral); Elbow surgery (Bilateral); Colonoscopy; pr neuroplasty &/transpos median nrv carpal tunne (Left, 06/30/2021); pr arthrs kne surg w/meniscectomy med/lat w/shvg (Right, 05/23/2022); Hand surgery; and pr arthrs kne surg w/meniscectomy med/lat w/shvg (Left, 2/6/2023). .    Review of Systems   All other systems reviewed and are negative. Objective:      /77 (BP Location: Left arm, Patient Position: Sitting, Cuff Size: Standard)   Pulse 84   Ht 5' 4" (1.626 m)   Wt 94.8 kg (209 lb)   BMI 35.87 kg/m²          Physical Exam  Vitals reviewed. Feet:      Comments: On exam patient has thickened, hypertrophic, discolored, brittle toenails with subungual debris and tenderness x10.   Patient has lower extremity edema  PAtients skin is atrophic, thickened nails, and decreased pedal hair  Patient has decreased pinprick and vibratory sensation to his feet and parasthesia

## 2023-07-17 PROBLEM — M17.12 PRIMARY OSTEOARTHRITIS OF LEFT KNEE: Status: ACTIVE | Noted: 2023-07-17

## 2023-07-17 NOTE — PROGRESS NOTES
Internal Medicine Pre-Operative Evaluation:     Reason for Visit: Pre-operative Evaluation for Risk Stratification and Optimization    Patient ID: Lisa Ruano is a 71 y.o. male. Surgery: Arthroplasty of left knee  Referring Provider: Dr Monie Li      Recommendations to Proceed withSurgery    Patient is considered to be Low risk for Medium risk procedure. After evaluation and discussion with patient with emphasis that all surgery has some degree of inherent risk it is determined this procedure is of acceptable risk  medically. Patient may proceed with planned procedure      Assessment      Primary osteoarthritis left knee  • Failed conservative measures  • Electing to undergo total joint arthroplasty    Pre-operative Medical Evaluation for planned surgery  • Patient meets preoperative quality goals as noted below  • Recommendations as listed in PLAN section below  • Contact surgical nurse  navigator with any questions regarding preoperative plan or schedule. DM  • Hgb A1c 7.1  • Hold metformin the morning of surgery  • Monitor post operative BS   • Continue ADA diet    Obesity  • Recommend ongoing attempts at weight loss  • Current BMI 35    Hyperlipidemia  • Low cholesterol diet  • Continue statin therapy    BPH  · Monitor for post operative retention  · Cont flomax            Patient Active Problem List   Diagnosis   • Hypercholesteremia   • High blood pressure   • Dyspnea   • Class 2 severe obesity due to excess calories with serious comorbidity and body mass index (BMI) of 35.0 to 35.9 in adult [facility])   • Carpal tunnel syndrome on right   • Carpal tunnel syndrome on left   • Type 2 diabetes mellitus without complication, without long-term current use of insulin ([facility])   • Tear of medial meniscus of right knee, current   • Primary osteoarthritis of left knee        Plan:     1. Further preoperative workup as follows:   - none no further testing required may proceed with surgery    2. Medication Management/Recommendations:   - continue all current medicines through morning of surgery with sip of water except the following:  - Hold the following oral diabetes medication morning of surgery: metformin  - hold aspirin 7 days prior to surgery  - avoid use of NSAID such as motrin, advil, aleve for 7 days prior to surgery  - hold all OTC herbal or nutritional supplements 7 days before surgery    3. Patient requires further consultation with:   No Consults Required    4. Discharge Planning / Barriers to Discharge  none identified - patients has post discharge therapy plan in place, transportation arranged for discharge day, adequate family support at home to assist with discharge to home. Subjective:           History of Present Illness:     Jhoana Yo is a 71 y.o. male who presents to the office today for a preoperative consultation at the request of surgeon. The patient understands this is an elective procedure and not emergent. They are electing to undergo planned procedure with an understanding that all surgery has inherent risk. They have worked with their surgeon and failed conservative treatment measures. Today they present for preoperative risk assessment and recommendations for optimization in preparation for surgery. Pt seen in surgical optimization center for upcoming proposed surgery. They have failed previous conservative measures and have elected surgical intervention. Pt meets presurgical lab and BMI optimization goals. Upon interview questioning patient is able to perform greater than 4 METs workload in daily life without any reported cardio-pulmonary symptoms. ROS: No TIA's or unusual headaches, no dysphagia. No prolonged cough. No dyspnea or chest pain on exertion. No abdominal pain, change in bowel habits, black or bloody stools. No urinary tract or BPH symptoms. Positive reported pain in arthritic joint. Positive difficulty with gait.  No skin rashes or issues. Objective:      /64   Ht 5' 4" (1.626 m)   Wt 93.9 kg (207 lb)   BMI 35.53 kg/m²       General Appearance: no distress, conversive  HEENT: PERRLA, conjuctiva normal; oropharynx clear; mucous membranes moist;   Neck:  Supple, no lymphadenopathy or thyromegaly  Lungs: breath sounds normal, normal respiratory effort, no retractions, expiratory effort normal  CV: normal heart sounds S1/S2, PMI normal   ABD: soft non tender, no masses , no hepatic or splenomegaly  EXT: DP pulses intact, no lymphadenopathy, no edema  Skin: normal turgor, normal texture, no rash  Psych: affect normal, mood normal  Neuro: AAOx3        The following portions of the patient's history were reviewed and updated as appropriate: allergies, current medications, past family history, past medical history, past social history, past surgical history and problem list.     Past History:       Past Medical History:   Diagnosis Date   • Arthritis    • Colon polyp    • COVID 09/07/2021    and 12/2022   • Diabetes mellitus ([address])    • Dyslipidemia    • Fallen arches    • GERD (gastroesophageal reflux disease)    • Hyperlipidemia    • Nodule of left lung 2007    negative for CA   • Type 2 diabetes mellitus without complication, without long-term current use of insulin ([address]) 01/23/2019   • Use of cane as ambulatory aid     Past Surgical History:   Procedure Laterality Date   • COLONOSCOPY     • ELBOW SURGERY Bilateral    • FOOT TENDON SURGERY Bilateral     heel   • HAND SURGERY     • AL ARTHRS KNE SURG W/MENISCECTOMY MED/LAT W/SHVG Right 05/23/2022    Procedure: KNEE ARTHROSCOPY WITH medial MENISCECTOMY chondroplasty synovectomy injection;  Surgeon: Lucie Munguia DO;  Location: [facility] MA[facility];  Service: Orthopedics   • AL ARTHRS KNE SURG W/MENISCECTOMY MED/LAT W/SHVG Left 2/6/2023    Procedure: Left knee arthroscopy: Medial meniscectomy; Lateral meniscectomy; Chondroplasty; Synovectomy;  Post arthroscopic injection of intra-articular joint space and peripheral portal;  Surgeon: Mylene Bell DO;  Location: [facility];  Service: Orthopedics   • FL NEUROPLASTY &/TRANSPOS MEDIAN NRV CARPAL June Cabrera Left 2021    Procedure: RELEASE CARPAL TUNNEL;  Surgeon: Alfredo Koehler DO;  Location: [address] [place] [address] MAIN OR;  Service: Orthopedics          Social History     Tobacco Use   • Smoking status: Former     Packs/day: 1.00     Years: 35.00     Total pack years: 35.00     Types: Cigarettes     Quit date:      Years since quittin.5   • Smokeless tobacco: Never   Vaping Use   • Vaping Use: Never used   Substance Use Topics   • Alcohol use: Yes     Comment: occ   • Drug use: No     Family History   Problem Relation Age of Onset   • Heart disease Mother         hx MI   • Diabetes Mother    • Stroke Mother    • Arthritis Mother    • COPD Father         80 yrs   • Coronary artery disease Father    • Cancer Brother           Allergies:     No Known Allergies     Current Medications:     Current Outpatient Medications   Medication Instructions   • ascorbic acid (VITAMIN C) 500 mg, Oral, 2 times daily   • enoxaparin (LOVENOX) 40 mg, Subcutaneous, Daily, To start postoperatively   • ferrous sulfate 324 (65 Fe) mg Take one tablet daily. • folic acid (FOLVITE) 1 mg, Oral, Daily   • meloxicam (MOBIC) 15 mg, Oral, Daily   • metFORMIN (GLUCOPHAGE) 500 mg, Oral, 2 times daily with meals   • simvastatin (ZOCOR) 20 mg, Oral, Daily at bedtime   • tamsulosin (FLOMAX) 0.8 mg, Oral, Daily with dinner             PRE-OP WORKSHEET DATA    Assessment of Pre-Operative Risks     MLJ Quality Hard Stops:  BMI (<40) : Estimated body mass index is 35.53 kg/m² as calculated from the following:    Height as of this encounter: 5' 4" (1.626 m). Weight as of this encounter: 93.9 kg (207 lb). Hgb ( >11):    Lab Results   Component Value Date    HGB 16.0 2023     HbA1c (<7.0) :   Lab Results   Component Value Date    HGBA1C 7.1 (H) 2023     GFR (>60) (Less then 39 = Nephrology consult):  CrCl cannot be calculated (Patient's most recent lab result is older than the maximum 7 days allowed. ). Active Decompensated Chronic Conditions which would delay surgery  No acutely decompensated medical issues such as recent CVA, MI, new onset arrhythmia, severe aortic stenosis, CHF, uncontrolled COPD       Exercise Capacity: (if less the 4 mets consider functional assessment via cardiac stress testing or consultation)    · Able to walk 2 blocks without symptoms?: Yes  · Able to walk 1 flights without symptoms?: Yes    Assessment of intra and post operative respiratory, hemodynamic and thrombotic risks     Prior Anesthesia Reactions: No     Personal history of venous thromboembolic disease? No    History of steroid use > 5 mg for >2 weeks within last year? No    Cardiac Risk Estimation: per the Revised Cardiac Risk Index (Circ. 100:1043, 1999),     The patient's risk factors for cardiac complications include :  none    Valeria Grey has a in hospital cardiac risk of RCI RISK CLASS I (0 risk factors, risk of major cardiac compl. appr. 0.5%) based on RCRI calculator          Pre-Op Data Reviewed:       Laboratory Results: I have personally reviewed the pertinent laboratory results/reports     EKG:I have personally reviewed pertinent reports. . I personally reviewed and interpreted available tracings in the electronic medical record    Normal sinus rhythm  Left axis deviation  Abnormal ECG  When compared with ECG of 23-NOV-2022 08:41,  No significant change was found  Confirmed by Graciela Clemons ([phone]) on 7/21/2023     OLD RECORDS: reviewed old records in the chart review section if EHR on day of visit.     Previous cardiopulmonary studies within the past year:  · Echocardiogram: no  · Cardiac Catheterization: no  · Stress Test: no      Time of visit including pre-visit chart review, visit and post-visit coordination of plan and care , review of pre-surgical lab work, preparation and time spent documenting note in electronic medical record, time spent face-to-face in physical examination answering patient questions by care team 55 minutes             [address] [facility]

## 2023-07-17 NOTE — PATIENT INSTRUCTIONS
Contact surgical nurse  navigator with any questions regarding preoperative plan or schedule.   Stop all over the counter supplements, herbal, naturopathic  medications for 1 week prior to surgery UNLESS prescribed by your surgeon  Hold NSAIDS (i.e. advil, alleve, motrin, ibuprofen, celebrex) minimum 7 days prior to surgery  Hold Asprin minimum 7 days prior to surgery  Recommend using Tylenol ( acetaminophen ) 500mg every eight hours during the first week post discharge in conjunction with any additional pain medicine prescribed by your surgeon  Use bowel medicines prescribed by your surgeon ( colace) daily post op during the first 1-2 weeks to avoid post operative constipation issues  Call [phone] with any post discharge concerns or medical issues  The morning of surgery take only the following medication with small sip of water: none  Hold metformin the morning of surgery

## 2023-07-20 ENCOUNTER — OFFICE VISIT (OUTPATIENT)
Dept: LAB | Facility: HOSPITAL | Age: 73
End: 2023-07-20
Payer: MEDICARE

## 2023-07-20 DIAGNOSIS — Z01.818 PREOPERATIVE CLEARANCE: ICD-10-CM

## 2023-07-20 PROCEDURE — 93005 ELECTROCARDIOGRAM TRACING: CPT

## 2023-07-21 LAB
ATRIAL RATE: 97 BPM
P AXIS: 56 DEGREES
PR INTERVAL: 154 MS
QRS AXIS: -36 DEGREES
QRSD INTERVAL: 90 MS
QT INTERVAL: 352 MS
QTC INTERVAL: 447 MS
T WAVE AXIS: 27 DEGREES
VENTRICULAR RATE: 97 BPM

## 2023-07-21 PROCEDURE — 93010 ELECTROCARDIOGRAM REPORT: CPT | Performed by: INTERNAL MEDICINE

## 2023-07-25 ENCOUNTER — TELEPHONE (OUTPATIENT)
Dept: OBGYN CLINIC | Facility: HOSPITAL | Age: 73
End: 2023-07-25

## 2023-07-25 NOTE — TELEPHONE ENCOUNTER
Preoperative Elective Admission Assessment s/w pt x12m    Living Situation:  Lives with wife. [place]. To enter home, from front, #2, landing, #2 then #4 additional steps to get into home. Hand rails with the entry. Laundry downstairs in basement. States there are no throw rugs. Patient's Current Level of Function and DME: ambulates using the cane. Denies any community supports, independent with ADLs. Has a RW and cane. Has step in tub. Ordered BSC. pt aware [employer] will reach out about to the pt directly with financial responsibility and delivery information for pt's ordered DME. Post-op Caregiver and transportation: wife        Outpatient Physical Therapy Site:  Site: [facility]  pre and post-op appts scheduled? Y     Medication Management: self   Preferred Pharm[facility] for Post-op Medications: [address] [place] [place] [address]  Blood Management Vitamin Regimen: taking as RXd. Post-op anticoagulant: lovenox at home, education provided for post-op use only. DC Plan pt plans for DC to home and plans to attend OP PT    Barriers to DC identified preoperatively: none    BMI: 35.87    Pt IQ: No outstanding Pt IQ tasks as of 7/24    Patient Education:  Pt educated on post-op pain, early mobilization (POD0), Average inpt LOS, OP PT goal.  Patient educated that our goal is to appropriately discharge patient based off their post-op function while striving to maintain maximal independence. The goal is to discharge patient to home and for them to attend outpatient physical therapy. Assigned to care team?y    CHW/SW: NA    Pt encouraged to call with any questions, concerns or issues.

## 2023-07-26 ENCOUNTER — OFFICE VISIT (OUTPATIENT)
Age: 73
End: 2023-07-26
Payer: MEDICARE

## 2023-07-26 VITALS
WEIGHT: 207 LBS | DIASTOLIC BLOOD PRESSURE: 64 MMHG | SYSTOLIC BLOOD PRESSURE: 124 MMHG | HEIGHT: 64 IN | BODY MASS INDEX: 35.34 KG/M2

## 2023-07-26 DIAGNOSIS — E11.9 TYPE 2 DIABETES MELLITUS WITHOUT COMPLICATION, WITHOUT LONG-TERM CURRENT USE OF INSULIN (HCC): ICD-10-CM

## 2023-07-26 DIAGNOSIS — I10 HYPERTENSION, UNSPECIFIED TYPE: ICD-10-CM

## 2023-07-26 DIAGNOSIS — E78.00 HYPERCHOLESTEREMIA: Primary | ICD-10-CM

## 2023-07-26 DIAGNOSIS — E66.01 CLASS 2 SEVERE OBESITY DUE TO EXCESS CALORIES WITH SERIOUS COMORBIDITY AND BODY MASS INDEX (BMI) OF 35.0 TO 35.9 IN ADULT (HCC): ICD-10-CM

## 2023-07-26 LAB
DME PARACHUTE DELIVERY DATE ACTUAL: NORMAL
DME PARACHUTE DELIVERY DATE REQUESTED: NORMAL
DME PARACHUTE ITEM DESCRIPTION: NORMAL
DME PARACHUTE ORDER STATUS: NORMAL
DME PARACHUTE SUPPLIER NAME: NORMAL
DME PARACHUTE SUPPLIER PHONE: NORMAL

## 2023-07-26 PROCEDURE — 99204 OFFICE O/P NEW MOD 45 MIN: CPT | Performed by: INTERNAL MEDICINE

## 2023-08-01 ENCOUNTER — TELEPHONE (OUTPATIENT)
Dept: FAMILY MEDICINE CLINIC | Facility: CLINIC | Age: 73
End: 2023-08-01

## 2023-08-01 NOTE — PRE-PROCEDURE INSTRUCTIONS
Pre-Surgery Instructions:   Medication Instructions   • ascorbic acid (VITAMIN C) 500 MG tablet Hold day of surgery. • enoxaparin (LOVENOX) 40 mg/0.4 mL Pt understands this is to start post op after discharge. • ferrous sulfate 324 (65 Fe) mg Hold day of surgery. • folic acid (FOLVITE) 1 mg tablet Hold day of surgery. • meloxicam (Mobic) 15 mg tablet Stop taking 3 days prior to surgery. • metFORMIN (GLUCOPHAGE) 500 mg tablet Hold day of surgery. • simvastatin (ZOCOR) 20 mg tablet Take day of surgery. • tamsulosin (FLOMAX) 0.4 mg Take night before surgery    Medication instructions for day surgery reviewed. Please use only a sip of water to take your instructed medications. Avoid all over the counter vitamins, supplements and NSAIDS for one week prior to surgery per anesthesia guidelines. Tylenol is ok to take as needed. You will receive a call one business day prior to surgery with an arrival time and hospital directions. If your surgery is scheduled on a Monday, the hospital will be calling you on the Friday prior to your surgery. If you have not heard from anyone by 8pm, please call the hospital supervisor through the hospital  at [phone]. Supriya Bennett [phone]). Do not eat or drink anything after midnight the night before your surgery, including candy, mints, lifesavers, or chewing gum. Do not drink alcohol 24hrs before your surgery. Try not to smoke at least 24hrs before your surgery. Pt received ortho bag from office with IS, CHG soap/cloths, and all instructions. He read through them and has no questions. Follow the pre surgery showering instructions as listed in the [facility] Surgical Experience Booklet” or otherwise provided by your surgeon's office. Do not shave the surgical area 24 hours before surgery. Do not apply any lotions, creams, including makeup, cologne, deodorant, or perfumes after showering on the day of your surgery.      No contact lenses, eye make-up, or artificial eyelashes. Remove nail polish, including gel polish, and any artificial, gel, or acrylic nails if possible. Remove all jewelry including rings and body piercing jewelry. Wear causal clothing that is easy to take on and off. Consider your type of surgery. Keep any valuables, jewelry, piercings at home. Please bring any specially ordered equipment (sling, braces) if indicated. Arrange for a responsible person to drive you to and from the hospital on the day of your surgery. Visitor Guidelines discussed. Call the surgeon's office with any new illnesses, exposures, or additional questions prior to surgery. Please reference your [facility] Surgical Experience Booklet” for additional information to prepare for your upcoming surgery.

## 2023-08-09 DIAGNOSIS — Z96.652 AFTERCARE FOLLOWING LEFT KNEE JOINT REPLACEMENT SURGERY: Primary | ICD-10-CM

## 2023-08-09 DIAGNOSIS — Z47.1 AFTERCARE FOLLOWING LEFT KNEE JOINT REPLACEMENT SURGERY: Primary | ICD-10-CM

## 2023-08-11 NOTE — TELEPHONE ENCOUNTER
Was given form by PCP. Form scanned into patient's chart. Called patient informing him that form was completed. He will come to office today to .

## 2023-08-16 NOTE — ANESTHESIA PREPROCEDURE EVALUATION
Procedure:  ARTHROPLASTY KNEE TOTAL W ROBOT (Left: Knee)    Relevant Problems   CARDIO   (+) High blood pressure   (+) Hypercholesteremia      ENDO   (+) Type 2 diabetes mellitus without complication, without long-term current use of insulin (HCC)      MUSCULOSKELETAL   (+) Primary osteoarthritis of left knee      PULMONARY   (+) Dyspnea     Nodule of left lung negative for CA   GERD (gastroesophageal reflux disease       Physical Exam    Airway    Mallampati score: II  TM Distance: >3 FB  Neck ROM: full     Dental       Cardiovascular      Pulmonary      Other Findings        Anesthesia Plan  ASA Score- 2     Anesthesia Type- spinal with ASA Monitors. Additional Monitors:   Airway Plan:     Comment: AC and IPACK Blocks for post op pain per surgeon request. .       Plan Factors-    Chart reviewed. Obstructive sleep apnea risk education given perioperatively. Induction- intravenous. Postoperative Plan-     Informed Consent- Anesthetic plan and risks discussed with patient. I personally reviewed this patient with the CRNA. Discussed and agreed on the Anesthesia Plan with the CRNA. Connor Soto

## 2023-08-17 ENCOUNTER — HOSPITAL ENCOUNTER (INPATIENT)
Facility: HOSPITAL | Age: 73
LOS: 1 days | Discharge: HOME HEALTH | DRG: 470 | End: 2023-08-19
Attending: ORTHOPAEDIC SURGERY | Admitting: ORTHOPAEDIC SURGERY
Payer: MEDICARE

## 2023-08-17 ENCOUNTER — ANESTHESIA (OUTPATIENT)
Dept: PERIOP | Facility: HOSPITAL | Age: 73
DRG: 470 | End: 2023-08-17
Payer: MEDICARE

## 2023-08-17 DIAGNOSIS — E11.9 TYPE 2 DIABETES MELLITUS WITHOUT COMPLICATION, WITHOUT LONG-TERM CURRENT USE OF INSULIN (HCC): ICD-10-CM

## 2023-08-17 DIAGNOSIS — Z96.652 S/P TKR (TOTAL KNEE REPLACEMENT), LEFT: ICD-10-CM

## 2023-08-17 DIAGNOSIS — M17.12 PRIMARY OSTEOARTHRITIS OF LEFT KNEE: Primary | ICD-10-CM

## 2023-08-17 DIAGNOSIS — Z96.652 AFTERCARE FOLLOWING LEFT KNEE JOINT REPLACEMENT SURGERY: ICD-10-CM

## 2023-08-17 DIAGNOSIS — I10 HYPERTENSION, UNSPECIFIED TYPE: ICD-10-CM

## 2023-08-17 DIAGNOSIS — E78.00 HYPERCHOLESTEREMIA: ICD-10-CM

## 2023-08-17 DIAGNOSIS — Z47.1 AFTERCARE FOLLOWING LEFT KNEE JOINT REPLACEMENT SURGERY: ICD-10-CM

## 2023-08-17 LAB
ABO GROUP BLD: NORMAL
BLD GP AB SCN SERPL QL: NEGATIVE
GLUCOSE SERPL-MCNC: 129 MG/DL (ref 65–140)
GLUCOSE SERPL-MCNC: 145 MG/DL (ref 65–140)
GLUCOSE SERPL-MCNC: 167 MG/DL (ref 65–140)
GLUCOSE SERPL-MCNC: 188 MG/DL (ref 65–140)
RH BLD: POSITIVE
SPECIMEN EXPIRATION DATE: NORMAL

## 2023-08-17 PROCEDURE — C1713 ANCHOR/SCREW BN/BN,TIS/BN: HCPCS | Performed by: ORTHOPAEDIC SURGERY

## 2023-08-17 PROCEDURE — 86901 BLOOD TYPING SEROLOGIC RH(D): CPT | Performed by: ORTHOPAEDIC SURGERY

## 2023-08-17 PROCEDURE — C1776 JOINT DEVICE (IMPLANTABLE): HCPCS | Performed by: ORTHOPAEDIC SURGERY

## 2023-08-17 PROCEDURE — 27447 TOTAL KNEE ARTHROPLASTY: CPT | Performed by: PHYSICIAN ASSISTANT

## 2023-08-17 PROCEDURE — 86900 BLOOD TYPING SEROLOGIC ABO: CPT | Performed by: ORTHOPAEDIC SURGERY

## 2023-08-17 PROCEDURE — NC001 PR NO CHARGE: Performed by: ORTHOPAEDIC SURGERY

## 2023-08-17 PROCEDURE — 97163 PT EVAL HIGH COMPLEX 45 MIN: CPT

## 2023-08-17 PROCEDURE — 99222 1ST HOSP IP/OBS MODERATE 55: CPT | Performed by: INTERNAL MEDICINE

## 2023-08-17 PROCEDURE — 82948 REAGENT STRIP/BLOOD GLUCOSE: CPT

## 2023-08-17 PROCEDURE — S2900 ROBOTIC SURGICAL SYSTEM: HCPCS | Performed by: ORTHOPAEDIC SURGERY

## 2023-08-17 PROCEDURE — 27447 TOTAL KNEE ARTHROPLASTY: CPT | Performed by: ORTHOPAEDIC SURGERY

## 2023-08-17 PROCEDURE — 8E0Y0CZ ROBOTIC ASSISTED PROCEDURE OF LOWER EXTREMITY, OPEN APPROACH: ICD-10-PCS | Performed by: ORTHOPAEDIC SURGERY

## 2023-08-17 PROCEDURE — 86850 RBC ANTIBODY SCREEN: CPT | Performed by: ORTHOPAEDIC SURGERY

## 2023-08-17 PROCEDURE — 0SRD0J9 REPLACEMENT OF LEFT KNEE JOINT WITH SYNTHETIC SUBSTITUTE, CEMENTED, OPEN APPROACH: ICD-10-PCS | Performed by: ORTHOPAEDIC SURGERY

## 2023-08-17 PROCEDURE — C9290 INJ, BUPIVACAINE LIPOSOME: HCPCS | Performed by: ANESTHESIOLOGY

## 2023-08-17 DEVICE — ATTUNE KNEE SYSTEM TIBIAL BASE FIXED BEARING SIZE 6 CEMENTED
Type: IMPLANTABLE DEVICE | Site: KNEE | Status: FUNCTIONAL
Brand: ATTUNE

## 2023-08-17 DEVICE — ATTUNE KNEE SYSTEM FEMORAL POSTERIOR STABILIZED SIZE 5 LEFT CEMENTED
Type: IMPLANTABLE DEVICE | Site: KNEE | Status: FUNCTIONAL
Brand: ATTUNE

## 2023-08-17 DEVICE — SMARTSET HV HIGH VISCOSITY BONE CEMENT 40G
Type: IMPLANTABLE DEVICE | Site: KNEE | Status: FUNCTIONAL
Brand: SMARTSET

## 2023-08-17 DEVICE — ATTUNE KNEE SYSTEM TIBIAL INSERT FIXED BEARING POSTERIOR STABILIZED 5 10MM AOX
Type: IMPLANTABLE DEVICE | Site: KNEE | Status: FUNCTIONAL
Brand: ATTUNE

## 2023-08-17 DEVICE — ATTUNE PATELLA MEDIALIZED DOME 38MM CEMENTED AOX
Type: IMPLANTABLE DEVICE | Site: KNEE | Status: FUNCTIONAL
Brand: ATTUNE

## 2023-08-17 RX ORDER — PROPOFOL 10 MG/ML
INJECTION, EMULSION INTRAVENOUS AS NEEDED
Status: DISCONTINUED | OUTPATIENT
Start: 2023-08-17 | End: 2023-08-17

## 2023-08-17 RX ORDER — HYDROMORPHONE HCL/PF 1 MG/ML
0.5 SYRINGE (ML) INJECTION EVERY 2 HOUR PRN
Status: DISPENSED | OUTPATIENT
Start: 2023-08-17 | End: 2023-08-19

## 2023-08-17 RX ORDER — TRANEXAMIC ACID 10 MG/ML
1000 INJECTION, SOLUTION INTRAVENOUS ONCE
Status: COMPLETED | OUTPATIENT
Start: 2023-08-17 | End: 2023-08-17

## 2023-08-17 RX ORDER — BUPIVACAINE HYDROCHLORIDE 7.5 MG/ML
INJECTION, SOLUTION EPIDURAL; RETROBULBAR AS NEEDED
Status: DISCONTINUED | OUTPATIENT
Start: 2023-08-17 | End: 2023-08-17

## 2023-08-17 RX ORDER — INSULIN LISPRO 100 [IU]/ML
1-5 INJECTION, SOLUTION INTRAVENOUS; SUBCUTANEOUS
Status: DISCONTINUED | OUTPATIENT
Start: 2023-08-17 | End: 2023-08-19 | Stop reason: HOSPADM

## 2023-08-17 RX ORDER — MAGNESIUM HYDROXIDE/ALUMINUM HYDROXICE/SIMETHICONE 120; 1200; 1200 MG/30ML; MG/30ML; MG/30ML
30 SUSPENSION ORAL EVERY 6 HOURS PRN
Status: DISCONTINUED | OUTPATIENT
Start: 2023-08-17 | End: 2023-08-19 | Stop reason: HOSPADM

## 2023-08-17 RX ORDER — SODIUM CHLORIDE, SODIUM LACTATE, POTASSIUM CHLORIDE, CALCIUM CHLORIDE 600; 310; 30; 20 MG/100ML; MG/100ML; MG/100ML; MG/100ML
125 INJECTION, SOLUTION INTRAVENOUS CONTINUOUS
Status: DISCONTINUED | OUTPATIENT
Start: 2023-08-17 | End: 2023-08-19 | Stop reason: HOSPADM

## 2023-08-17 RX ORDER — FENTANYL CITRATE 50 UG/ML
INJECTION, SOLUTION INTRAMUSCULAR; INTRAVENOUS AS NEEDED
Status: DISCONTINUED | OUTPATIENT
Start: 2023-08-17 | End: 2023-08-17

## 2023-08-17 RX ORDER — PROPOFOL 10 MG/ML
INJECTION, EMULSION INTRAVENOUS CONTINUOUS PRN
Status: DISCONTINUED | OUTPATIENT
Start: 2023-08-17 | End: 2023-08-17

## 2023-08-17 RX ORDER — LIDOCAINE HYDROCHLORIDE 10 MG/ML
INJECTION, SOLUTION EPIDURAL; INFILTRATION; INTRACAUDAL; PERINEURAL AS NEEDED
Status: DISCONTINUED | OUTPATIENT
Start: 2023-08-17 | End: 2023-08-17

## 2023-08-17 RX ORDER — ONDANSETRON 2 MG/ML
4 INJECTION INTRAMUSCULAR; INTRAVENOUS EVERY 6 HOURS PRN
Status: DISCONTINUED | OUTPATIENT
Start: 2023-08-17 | End: 2023-08-19 | Stop reason: HOSPADM

## 2023-08-17 RX ORDER — OXYCODONE HYDROCHLORIDE 10 MG/1
10 TABLET ORAL EVERY 4 HOURS PRN
Status: DISCONTINUED | OUTPATIENT
Start: 2023-08-17 | End: 2023-08-18

## 2023-08-17 RX ORDER — MIDAZOLAM HYDROCHLORIDE 2 MG/2ML
INJECTION, SOLUTION INTRAMUSCULAR; INTRAVENOUS AS NEEDED
Status: DISCONTINUED | OUTPATIENT
Start: 2023-08-17 | End: 2023-08-17

## 2023-08-17 RX ORDER — OXYCODONE HYDROCHLORIDE 5 MG/1
TABLET ORAL
Qty: 30 TABLET | Refills: 0 | Status: SHIPPED | OUTPATIENT
Start: 2023-08-17 | End: 2023-08-18

## 2023-08-17 RX ORDER — GABAPENTIN 300 MG/1
300 CAPSULE ORAL ONCE
Status: COMPLETED | OUTPATIENT
Start: 2023-08-17 | End: 2023-08-17

## 2023-08-17 RX ORDER — BUPIVACAINE HYDROCHLORIDE 2.5 MG/ML
INJECTION, SOLUTION EPIDURAL; INFILTRATION; INTRACAUDAL AS NEEDED
Status: DISCONTINUED | OUTPATIENT
Start: 2023-08-17 | End: 2023-08-17

## 2023-08-17 RX ORDER — ENOXAPARIN SODIUM 100 MG/ML
40 INJECTION SUBCUTANEOUS DAILY
Status: DISCONTINUED | OUTPATIENT
Start: 2023-08-17 | End: 2023-08-19 | Stop reason: HOSPADM

## 2023-08-17 RX ORDER — ACETAMINOPHEN 325 MG/1
975 TABLET ORAL ONCE
Status: COMPLETED | OUTPATIENT
Start: 2023-08-17 | End: 2023-08-17

## 2023-08-17 RX ORDER — METOCLOPRAMIDE HYDROCHLORIDE 5 MG/ML
10 INJECTION INTRAMUSCULAR; INTRAVENOUS ONCE AS NEEDED
Status: DISCONTINUED | OUTPATIENT
Start: 2023-08-17 | End: 2023-08-17 | Stop reason: HOSPADM

## 2023-08-17 RX ORDER — CHLORHEXIDINE GLUCONATE 0.12 MG/ML
15 RINSE ORAL ONCE
Status: COMPLETED | OUTPATIENT
Start: 2023-08-17 | End: 2023-08-17

## 2023-08-17 RX ORDER — BUPIVACAINE HYDROCHLORIDE 5 MG/ML
INJECTION, SOLUTION EPIDURAL; INTRACAUDAL AS NEEDED
Status: DISCONTINUED | OUTPATIENT
Start: 2023-08-17 | End: 2023-08-17

## 2023-08-17 RX ORDER — TAMSULOSIN HYDROCHLORIDE 0.4 MG/1
0.8 CAPSULE ORAL
Status: DISCONTINUED | OUTPATIENT
Start: 2023-08-17 | End: 2023-08-19 | Stop reason: HOSPADM

## 2023-08-17 RX ORDER — DIPHENHYDRAMINE HCL 25 MG
25 TABLET ORAL EVERY 6 HOURS PRN
Status: DISCONTINUED | OUTPATIENT
Start: 2023-08-17 | End: 2023-08-19 | Stop reason: HOSPADM

## 2023-08-17 RX ORDER — CEFAZOLIN SODIUM 1 G/50ML
1000 SOLUTION INTRAVENOUS EVERY 8 HOURS
Status: COMPLETED | OUTPATIENT
Start: 2023-08-17 | End: 2023-08-18

## 2023-08-17 RX ORDER — ACETAMINOPHEN 325 MG/1
650 TABLET ORAL EVERY 4 HOURS PRN
Status: DISCONTINUED | OUTPATIENT
Start: 2023-08-17 | End: 2023-08-19 | Stop reason: HOSPADM

## 2023-08-17 RX ORDER — ONDANSETRON 2 MG/ML
INJECTION INTRAMUSCULAR; INTRAVENOUS AS NEEDED
Status: DISCONTINUED | OUTPATIENT
Start: 2023-08-17 | End: 2023-08-17

## 2023-08-17 RX ORDER — DOCUSATE SODIUM 100 MG/1
100 CAPSULE, LIQUID FILLED ORAL 2 TIMES DAILY
Status: DISCONTINUED | OUTPATIENT
Start: 2023-08-17 | End: 2023-08-19 | Stop reason: HOSPADM

## 2023-08-17 RX ORDER — SODIUM CHLORIDE, SODIUM LACTATE, POTASSIUM CHLORIDE, CALCIUM CHLORIDE 600; 310; 30; 20 MG/100ML; MG/100ML; MG/100ML; MG/100ML
50 INJECTION, SOLUTION INTRAVENOUS CONTINUOUS
Status: DISCONTINUED | OUTPATIENT
Start: 2023-08-17 | End: 2023-08-17

## 2023-08-17 RX ORDER — CEFAZOLIN SODIUM 2 G/50ML
2000 SOLUTION INTRAVENOUS ONCE
Status: COMPLETED | OUTPATIENT
Start: 2023-08-17 | End: 2023-08-17

## 2023-08-17 RX ORDER — ONDANSETRON 2 MG/ML
4 INJECTION INTRAMUSCULAR; INTRAVENOUS ONCE AS NEEDED
Status: DISCONTINUED | OUTPATIENT
Start: 2023-08-17 | End: 2023-08-17 | Stop reason: HOSPADM

## 2023-08-17 RX ORDER — OXYCODONE HYDROCHLORIDE 5 MG/1
5 TABLET ORAL EVERY 4 HOURS PRN
Status: DISCONTINUED | OUTPATIENT
Start: 2023-08-17 | End: 2023-08-18

## 2023-08-17 RX ORDER — PRAVASTATIN SODIUM 40 MG
40 TABLET ORAL
Status: DISCONTINUED | OUTPATIENT
Start: 2023-08-17 | End: 2023-08-19 | Stop reason: HOSPADM

## 2023-08-17 RX ORDER — BUPIVACAINE HYDROCHLORIDE 7.5 MG/ML
INJECTION, SOLUTION INTRASPINAL AS NEEDED
Status: DISCONTINUED | OUTPATIENT
Start: 2023-08-17 | End: 2023-08-17

## 2023-08-17 RX ORDER — FENTANYL CITRATE/PF 50 MCG/ML
50 SYRINGE (ML) INJECTION
Status: DISCONTINUED | OUTPATIENT
Start: 2023-08-17 | End: 2023-08-17 | Stop reason: HOSPADM

## 2023-08-17 RX ORDER — MAGNESIUM HYDROXIDE 1200 MG/15ML
LIQUID ORAL AS NEEDED
Status: DISCONTINUED | OUTPATIENT
Start: 2023-08-17 | End: 2023-08-17 | Stop reason: HOSPADM

## 2023-08-17 RX ORDER — HYDRALAZINE HYDROCHLORIDE 25 MG/1
25 TABLET, FILM COATED ORAL EVERY 8 HOURS PRN
Status: DISCONTINUED | OUTPATIENT
Start: 2023-08-17 | End: 2023-08-19 | Stop reason: HOSPADM

## 2023-08-17 RX ADMIN — OXYCODONE HYDROCHLORIDE 10 MG: 10 TABLET ORAL at 12:01

## 2023-08-17 RX ADMIN — BUPIVACAINE HYDROCHLORIDE 7.5 ML: 5 INJECTION, SOLUTION EPIDURAL; INTRACAUDAL; PERINEURAL at 07:05

## 2023-08-17 RX ADMIN — CEFAZOLIN SODIUM 2000 MG: 2 SOLUTION INTRAVENOUS at 07:40

## 2023-08-17 RX ADMIN — GABAPENTIN 300 MG: 300 CAPSULE ORAL at 06:12

## 2023-08-17 RX ADMIN — TAMSULOSIN HYDROCHLORIDE 0.8 MG: 0.4 CAPSULE ORAL at 16:12

## 2023-08-17 RX ADMIN — INSULIN LISPRO 1 UNITS: 100 INJECTION, SOLUTION INTRAVENOUS; SUBCUTANEOUS at 16:12

## 2023-08-17 RX ADMIN — BUPIVACAINE 5 ML: 13.3 INJECTION, SUSPENSION, LIPOSOMAL INFILTRATION at 07:10

## 2023-08-17 RX ADMIN — SODIUM CHLORIDE, SODIUM LACTATE, POTASSIUM CHLORIDE, AND CALCIUM CHLORIDE: .6; .31; .03; .02 INJECTION, SOLUTION INTRAVENOUS at 07:22

## 2023-08-17 RX ADMIN — PHENYLEPHRINE HYDROCHLORIDE 200 MCG: 10 INJECTION INTRAVENOUS at 07:43

## 2023-08-17 RX ADMIN — PROPOFOL 80 MCG/KG/MIN: 10 INJECTION, EMULSION INTRAVENOUS at 07:29

## 2023-08-17 RX ADMIN — PROPOFOL 40 MG: 10 INJECTION, EMULSION INTRAVENOUS at 07:29

## 2023-08-17 RX ADMIN — ONDANSETRON 4 MG: 2 INJECTION INTRAMUSCULAR; INTRAVENOUS at 07:29

## 2023-08-17 RX ADMIN — PHENYLEPHRINE HYDROCHLORIDE 80 MCG/MIN: 10 INJECTION INTRAVENOUS at 07:29

## 2023-08-17 RX ADMIN — CHLORHEXIDINE GLUCONATE 15 ML: 1.2 SOLUTION ORAL at 06:12

## 2023-08-17 RX ADMIN — OXYCODONE HYDROCHLORIDE 10 MG: 10 TABLET ORAL at 16:13

## 2023-08-17 RX ADMIN — ACETAMINOPHEN 650 MG: 325 TABLET, FILM COATED ORAL at 16:13

## 2023-08-17 RX ADMIN — LIDOCAINE HYDROCHLORIDE 50 MG: 10 INJECTION, SOLUTION EPIDURAL; INFILTRATION; INTRACAUDAL; PERINEURAL at 07:29

## 2023-08-17 RX ADMIN — BUPIVACAINE HYDROCHLORIDE 20 ML: 2.5 INJECTION, SOLUTION EPIDURAL; INFILTRATION; INTRACAUDAL at 07:10

## 2023-08-17 RX ADMIN — METFORMIN HYDROCHLORIDE 500 MG: 500 TABLET ORAL at 16:13

## 2023-08-17 RX ADMIN — CEFAZOLIN SODIUM 1000 MG: 1 SOLUTION INTRAVENOUS at 23:34

## 2023-08-17 RX ADMIN — CEFAZOLIN SODIUM 1000 MG: 1 SOLUTION INTRAVENOUS at 16:13

## 2023-08-17 RX ADMIN — OXYCODONE HYDROCHLORIDE 10 MG: 10 TABLET ORAL at 21:52

## 2023-08-17 RX ADMIN — ENOXAPARIN SODIUM 40 MG: 40 INJECTION SUBCUTANEOUS at 21:53

## 2023-08-17 RX ADMIN — PRAVASTATIN SODIUM 40 MG: 40 TABLET ORAL at 16:12

## 2023-08-17 RX ADMIN — MIDAZOLAM 2 MG: 1 INJECTION INTRAMUSCULAR; INTRAVENOUS at 07:12

## 2023-08-17 RX ADMIN — PHENYLEPHRINE HYDROCHLORIDE 200 MCG: 10 INJECTION INTRAVENOUS at 08:26

## 2023-08-17 RX ADMIN — DOCUSATE SODIUM 100 MG: 100 CAPSULE ORAL at 21:53

## 2023-08-17 RX ADMIN — BUPIVACAINE HYDROCHLORIDE IN DEXTROSE 1.4 ML: 7.5 INJECTION, SOLUTION SUBARACHNOID at 07:28

## 2023-08-17 RX ADMIN — FENTANYL CITRATE 50 MCG: 50 INJECTION, SOLUTION INTRAMUSCULAR; INTRAVENOUS at 07:12

## 2023-08-17 RX ADMIN — TRANEXAMIC ACID 1000 MG: 10 INJECTION, SOLUTION INTRAVENOUS at 07:40

## 2023-08-17 RX ADMIN — BUPIVACAINE 15 ML: 13.3 INJECTION, SUSPENSION, LIPOSOMAL INFILTRATION at 07:05

## 2023-08-17 RX ADMIN — HYDROMORPHONE HYDROCHLORIDE 0.5 MG: 1 INJECTION, SOLUTION INTRAMUSCULAR; INTRAVENOUS; SUBCUTANEOUS at 23:32

## 2023-08-17 RX ADMIN — ACETAMINOPHEN 975 MG: 325 TABLET, FILM COATED ORAL at 06:12

## 2023-08-17 NOTE — ANESTHESIA POSTPROCEDURE EVALUATION
Post-Op Assessment Note    CV Status:  Stable  Pain Score: 0    Pain management: adequate  Multimodal analgesia used between 6 hours prior to anesthesia start to PACU discharge    Mental Status:  Alert and awake   Hydration Status:  Euvolemic and stable   PONV Controlled:  Controlled   Airway Patency:  Patent   Two or more mitigation strategies used for obstructive sleep apnea   Post Op Vitals Reviewed: Yes      Staff: CRNA         No notable events documented.     BP   134/73   Temp  97.8   Pulse  99   Resp   12   SpO2   97

## 2023-08-17 NOTE — H&P (VIEW-ONLY)
Office Visit    5/30/2023  [address] [place] [address] Specialists JENNY Majano MD  Orthopedic Surgery  Primary osteoarthritis of left knee +7 more  Dx  Left Knee - Pain; Referred by Carmen Pena, [address] [place] [address]  Reason for Visit     Progress Notes  Norma Majano MD (Physician) • • Orthopedic Surgery  Expand All Collapse All  Assessment:    Diagnosis ICD-10-CM Associated Orders   1. Primary osteoarthritis of left knee  M17.12 Ambulatory Referral to Orthopedic Surgery       Comprehensive metabolic panel       Hemoglobin A1C W/EAG Estimation       Type and screen       Ambulatory referral to surgical optimization       Ambulatory referral to Physical Therapy       Case request operating room: ARTHROPLASTY KNEE TOTAL W ROBOT       Case request operating room: ARTHROPLASTY KNEE TOTAL W ROBOT       2. Chronic pain of left knee  M25.562 XR knee 1 or 2 vw left     G89.29 Comprehensive metabolic panel       Hemoglobin A1C W/EAG Estimation       Type and screen       Ambulatory referral to surgical optimization       Ambulatory referral to Physical Therapy       Case request operating room: ARTHROPLASTY KNEE TOTAL W ROBOT       Case request operating room: ARTHROPLASTY KNEE TOTAL W ROBOT       3. Effusion of left knee  M25.462         4. History of arthroscopy of left knee  Z98.890         5. Pre-operative laboratory examination  Z01.812         6. Pre-operative cardiovascular examination  Z01.810         7. Aftercare following left knee joint replacement surgery  Z47.1 enoxaparin (LOVENOX) 40 mg/0.4 mL     L70.466         8. Healthcare maintenance  Z00.00 ascorbic acid (VITAMIN C) 500 MG tablet       enoxaparin (LOVENOX) 40 mg/0.4 mL       ferrous sulfate 324 (65 Fe) mg       folic acid (FOLVITE) 1 mg tablet             Plan:  • Diagnostics reviewed and physical exam performed.   Diagnosis, treatment options and associated risks were discussed with the patient including no treatment, nonsurgical treatment and potential for surgical intervention. The patient was given the opportunity to ask questions regarding each. • He has had a multitude of nonoperative modalities without appreciable long-term relief of his left knee symptoms. Despite having a varus deformity of his left lower extremity cannot be passively corrected to neutral thus an  brace is not indicated. • Quality of life decision to pursue elective left total knee arthroplasty. Risks and benefits of a left total knee arthroplasty were discussed. Consents obtained. • Preoperative vitamins and postoperative Lovenox sent to his pharmacy.     To do next visit:  Return for post-op with x-rays left knee.     The above stated was discussed in layman's terms and the patient expressed understanding. All questions were answered to the patient's satisfaction.              Scribe Attestation    I,:  Chucky Morfin am acting as a scribe while in the presence of the attending physician.:       I,:  Denise Garcia MD personally performed the services described in this documentation    as scribed in my presence. :                 Subjective:   Adam Scott is a 71 y.o. male who presents today for 2nd opinion regarding his left knee due to persistent pain and stiffness. He injured his knee in October 2022 and underwent an arthroscopy in February 2023 by Dr. Leighton Mcburney. He cannot appreciate any relief from the arthroscopy as well as aspirations with a 3-shot euflexxa series and cortisone which he underwent post arthroscopy. He does find increased pain with weightbearing activities. He is stiffness getting up after prolonged sedentary positions. He also has difficulty ambulating stairs in an alternating fashion. 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 He does find that his left knee symptoms do limit his day-to-day activities as well as impedes on his quality of life. His symptoms can be severe and unrelenting at times.  He does present using a SPC for ambulatory assistance.      He is a diabetic with a recent hemoglobin A1c of 6.8.        Review of systems negative unless otherwise specified in HPI  Review of Systems     Medical History        Past Medical History:   Diagnosis Date   • Arthritis     • Colon polyp     • COVID 09/07/2021     and 12/2022   • Diabetes mellitus ([address])     • Dyslipidemia     • GERD (gastroesophageal reflux disease)     • Hyperlipidemia     • Nodule of left lung 2007     negative for CA   • Type 2 diabetes mellitus without complication, without long-term current use of insulin ([address]) 01/23/2019   • Use of cane as ambulatory aid              Surgical History         Past Surgical History:   Procedure Laterality Date   • COLONOSCOPY       • ELBOW SURGERY Bilateral     • FOOT TENDON SURGERY Bilateral       heel   • HAND SURGERY       • NJ ARTHRS KNE SURG W/MENISCECTOMY MED/LAT W/SHVG Right 05/23/2022     Procedure: KNEE ARTHROSCOPY WITH medial MENISCECTOMY chondroplasty synovectomy injection;  Surgeon: Luz Marina Bear DO;  Location: [facility];  Service: Orthopedics   • [place] ARTHRS KNE SURG W/MENISCECTOMY MED/LAT W/SHVG Left 2/6/2023     Procedure: Left knee arthroscopy: Medial meniscectomy; Lateral meniscectomy; Chondroplasty; Synovectomy;  Post arthroscopic injection of intra-articular joint space and peripheral portal;  Surgeon: Luz Marina Bear DO;  Location: [facility];  Service: Orthopedics   • [place] NEUROPLASTY &/TRANSPOS MEDIAN NRV CARPAL [place] Distance Left 06/30/2021     Procedure: RELEASE CARPAL TUNNEL;  Surgeon: Frieda Schwarz DO;  Location: [address] [place] [address] MAIN OR;  Service: Orthopedics            Family History         Family History   Problem Relation Age of Onset   • Heart disease Mother           hx MI   • Diabetes Mother     • Stroke Mother     • COPD Father           80 yrs   • Coronary artery disease Father     • Cancer Brother              Social History            Occupational History   • Not on file   Tobacco Use   • Smoking status: Former       Packs/day: 1.00       Years: 30.00       Total pack years: 30.00       Types: Cigarettes       Quit date:        Years since quittin.4   • Smokeless tobacco: Never   Vaping Use   • Vaping Use: Never used   Substance and Sexual Activity   • Alcohol use: Yes       Comment: occ   • Drug use: No   • Sexual activity: Yes       Partners: Female       Birth control/protection: Male Sterilization            Current Outpatient Medications:   •  ascorbic acid (VITAMIN C) 500 MG tablet, Take 1 tablet (500 mg total) by mouth 2 (two) times a day, Disp: 60 tablet, Rfl: 0  •  enoxaparin (LOVENOX) 40 mg/0.4 mL, Inject 0.4 mL (40 mg total) under the skin daily for 28 days To start postoperatively, Disp: 11.2 mL, Rfl: 0  •  ferrous sulfate 324 (65 Fe) mg, Take one tablet daily. , Disp: 30 tablet, Rfl: 0  •  folic acid (FOLVITE) 1 mg tablet, Take 1 tablet (1 mg total) by mouth daily, Disp: 30 tablet, Rfl: 0  •  acetaminophen (TYLENOL) 500 mg tablet, Take 500 mg by mouth every 6 (six) hours as needed for mild pain, Disp: , Rfl:   •  meloxicam (Mobic) 15 mg tablet, Take 1 tablet (15 mg total) by mouth daily, Disp: 90 tablet, Rfl: 2  •  metFORMIN (GLUCOPHAGE) 500 mg tablet, Take 1 tablet (500 mg total) by mouth 2 (two) times a day with meals, Disp: 180 tablet, Rfl: 3  •  phentermine (ADIPEX-P) 37.5 MG tablet, Take 1 tablet (37.5 mg total) by mouth daily (Patient not taking: Reported on 2023), Disp: 30 tablet, Rfl: 2  •  Promethazine-DM (PHENERGAN-DM) 6.25-15 mg/5 mL oral syrup, Take 5 mL by mouth 4 (four) times a day as needed for cough (Patient not taking: Reported on 2023), Disp: 240 mL, Rfl: 1  •  simvastatin (ZOCOR) 20 mg tablet, Take 1 tablet (20 mg total) by mouth daily at bedtime, Disp: 90 tablet, Rfl: 3  •  tamsulosin (FLOMAX) 0.4 mg, Take 2 capsules (0.8 mg total) by mouth daily with dinner, Disp: 180 capsule, Rfl: 3     No Known Allergies               Vitals:     23 3923 BP: 170/97   Pulse: 93         Objective:                     Left Knee Exam      Muscle Strength   The patient has normal left knee strength.     Tenderness   The patient is experiencing tenderness in the medial joint line.     Range of Motion   Extension: 5   Flexion: 110 (with crepitation and stiffness)      Other   Erythema: absent  Sensation: normal  Swelling: mild  Effusion: effusion present     Comments:    Varus alignment, it cannot be passively corrected to neutral  Intact extensor mechanism  Healed arthroscopy portals  Mildly antalgic gait                  Diagnostics, reviewed and taken today if performed as documented:     The attending physician has personally reviewed the pertinent films in PACS and interpretation is as follows:     Left knee x-rays taken and reviewed in the office today show: advanced degenerative changes with large  [address] (which was not present on his October 2022 films). Mild lateral joint space narrowing with modest patellofemoral compartment narrowing. Subchondral sclerosis and osteophyte formation present.         Procedures, if performed today:     Procedures     None performed       Portions of the record may have been created with voice recognition software.  Occasional wrong word or "sound a like" substitutions may have occurred due to the inherent limitations of voice recognition software.  Read the chart carefully and recognize, using context, where substitutions have occurred. Instructions       Return for post-op with x-rays left knee. 1. Primary osteoarthritis of left knee  Ambulatory Referral to Orthopedic Surgery       2. Chronic pain of left knee  XR knee 1 or 2 vw left       3. Effusion of left knee          4. History of arthroscopy of left knee          5. Pre-operative laboratory examination          6. Pre-operative cardiovascular examination          7. Aftercare following left knee joint replacement surgery          8.  Healthcare maintenance           Return for post-op with x-rays left knee.        Knee Replacement   AMBULATORY CARE:   What you need to know about knee replacement:  Knee replacement is surgery to replace all or part of your knee joint. It is also called knee arthroplasty.         How to prepare for knee replacement:   • Weeks before your surgery:       ? Your healthcare provider will check your overall health. He or she will ask about your current knee pain or stiffness. Tell your provider how pain or stiffness affects your daily activities or ability to play sports. He or she may also ask about fatigue, anxiety, or depression you may have.      ? Some medicines will need to be stopped weeks before surgery. These medicines include blood thinning medicine, such as aspirin and ibuprofen. It also includes some antirheumatic medicines. Make sure your healthcare provider knows all medicines you are taking. Also ask how long before surgery to stop taking them.     ? Your healthcare provider may have you do exercises to strengthen your leg muscles before surgery.     ? You may need x-rays to help your healthcare provider plan your surgery. Ask about any other tests you may need.     • The night before and the day of surgery:       ? Your healthcare provider may tell you not to eat or drink anything after midnight on the day of your surgery.      ? You will be told what medicines you can or cannot take the morning of surgery.     What will happen during knee replacement:   • You may be given general anesthesia to keep you asleep and free from pain during surgery. You may instead be given regional anesthesia, such as spinal or epidural anesthesia, or a peripheral nerve block. Regional anesthesia keeps you numb from the waist down, but you will be awake during surgery.      • Your surgeon will make an incision over your knee joint. He or she will remove the damaged parts of your knee joint and replace them with a knee implant.  The knee implant may be made of metal and plastic. Your surgeon may secure it with medical cement.      • Your surgeon will move the muscles and other tissues around your joint back into place. He or she will close your incision with stitches or staples. He or she may use strips of medical tape and a bandage to cover your wound.        What to expect after knee replacement:   • It is normal to have increased stiffness and pain after surgery. Your pain and stiffness should get better with exercise.     • Do not get out of bed until your healthcare provider says it is okay. The physical therapist will help you walk after your surgery. When you walk the same day after surgery, it helps decrease pain and improves the function of your knee. You may use crutches or a walker.     • You may be in the hospital 1 to 4 days, or you may go home shortly after surgery. Your healthcare provider may talk to you about rehabilitation you can do at home. A physical therapist can teach you exercises to help strengthen your knee and prevent stiffness. You may also need occupational therapy to teach you the best ways to bathe and dress.      • You may  be given a joint replacement ID card. The card will tell which joint was replaced and when it was replaced. Tell all healthcare providers about your joint replacement surgery.     Risks of knee replacement:  You may bleed more than expected or get an infection. Nerves or blood vessels may be damaged during surgery. After surgery, your knee may be stiff or numb. You may continue to have knee pain. You may get a blood clot in your leg. This may become life-threatening. Your implant may get loose or move out of place. The implant may get worn out over time and need to be replaced.   Call your local emergency number (91 in the [address]) for any of the following:   • You feel lightheaded, short of breath, and have chest pain.      • You cough up blood.     Seek care immediately if:   • Your leg feels warm, tender, and painful. It may look swollen and red.     • You cannot walk or move your knee.     • Blood soaks through your bandage.     • Your incision wound comes apart.     • Your incision area is red, swollen, or draining pus.     Call your doctor or surgeon if:   • You have a fever or chills.     • You have trouble moving or bending your knee.     • You have new knee pain or pain that does not get better with medicine.     • You have questions or concerns about your condition or care.     Medicines: You may  need any of the following:  • Prescription pain medicine  may be given. Ask your healthcare provider how to take this medicine safely. Some prescription pain medicines contain acetaminophen. Do not take other medicines that contain acetaminophen without talking to your healthcare provider. Too much acetaminophen may cause liver damage. Prescription pain medicine may cause constipation. Ask your healthcare provider how to prevent or treat constipation.      • NSAIDs , such as ibuprofen, help decrease swelling, pain, and fever. This medicine is available with or without a doctor's order. NSAIDs can cause stomach bleeding or kidney problems in certain people. If you take blood thinner medicine, always ask your healthcare provider if NSAIDs are safe for you. Always read the medicine label and follow directions.     • Blood thinners  help prevent blood clots. Clots can cause strokes, heart attacks, and death. The following are general safety guidelines to follow while you are taking a blood thinner:     ? Watch for bleeding and bruising while you take blood thinners. Watch for bleeding from your gums or nose. Watch for blood in your urine and bowel movements. Use a soft washcloth on your skin, and a soft toothbrush to brush your teeth. This can keep your skin and gums from bleeding. If you shave, use an electric shaver.  Do not play contact sports.      ? Tell your dentist and other healthcare providers that you take a blood thinner. Wear a bracelet or necklace that says you take this medicine.      ? Do not start or stop any other medicines unless your healthcare provider tells you to. Many medicines cannot be used with blood thinners.     ? Take your blood thinner exactly as prescribed by your healthcare provider. Do not skip does or take less than prescribed. Tell your provider right away if you forget to take your blood thinner, or if you take too much.     ? Warfarin  is a blood thinner that you may need to take. The following are things you should be aware of if you take warfarin:      - Foods and medicines can affect the amount of warfarin in your blood. Do not make major changes to your diet while you take warfarin. Warfarin works best when you eat about the same amount of vitamin K every day. Vitamin K is found in green leafy vegetables and certain other foods. Ask for more information about what to eat when you are taking warfarin.     - You will need to see your healthcare provider for follow-up visits when you are on warfarin. You will need regular blood tests. These tests are used to decide how much medicine you need.     • Take your medicine as directed. Contact your healthcare provider if you think your medicine is not helping or if you have side effects. Tell your provider if you are allergic to any medicine. Keep a list of the medicines, vitamins, and herbs you take. Include the amounts, and when and why you take them. Bring the list or the pill bottles to follow-up visits. Carry your medicine list with you in case of an emergency.     Self-care:   • Apply ice  on your knee for 15 to 20 minutes every hour or as directed. Use an ice pack, or put crushed ice in a plastic bag. Cover it with a towel. Ice helps prevent tissue damage and decreases swelling and pain.      • Do not get the area wet  until your healthcare provider says it is okay. When your provider says it is okay, carefully wash the area with soap and water. Dry the area and put on new, clean bandages as directed.     • Care for your incision area  as directed. Do not put powders or lotions over the area. If you have strips of medical tape over the incision area, let them fall off on their own. If they do not fall off within 14 days, gently remove them. Check the area for signs of infection, such as swelling, redness, or pus.     • Go to physical or occupational therapy , if directed. A physical therapist will teach you exercises to build muscle strength, decrease pain and swelling, and prevent blood clots. An occupational therapist will show you how to do daily activities safely. He or she may recommend assistive devices to help you dress, bathe, and  objects. The assistive devices will help you prevent knee damage from twisting or bending.     Prevent blood clots:   • Wear pressure stockings as directed. Pressure stockings help keep blood from pooling in your leg veins. Your healthcare provider can prescribe stockings that are right for you. Do not buy over-the-counter pressure stockings unless your healthcare provider says it is okay. They may not fit correctly or may have elastic that cuts off your circulation. Ask your healthcare provider when to start wearing pressure stockings and how long to wear them each day.          • Do not cross your legs for 6 weeks or as directed. Your risk for blood clots is greater when you cross your legs. You might also move your implant out of place.     Activity guidelines:   • Know your limits. Start activities slowly and give yourself rest periods. Pain and swelling can increase when you do too much. Do not do an activity until your healthcare provider says you are ready.     • Use assistive devices as directed. Your thigh muscles will be weak after surgery.  Assistive devices will help you not fall.      • Go up the stairs  by placing your non-operated leg on the step first. Then bring your operated leg to the same step. Repeat.     • Go down stairs  by placing your operated leg down on the step first. Then bring your non-operated leg to the same step. Repeat.     • Do light housekeeping only. Ask your healthcare provider which housekeeping activities are okay for you. Do not vacuum, change sheets on a bed, or anything that makes you reach up, bend, or twist.     • Do not drive for at least 6 weeks  or until your healthcare provider says it is okay.     • Do not play contact sports, tennis, golf, or ski  until your healthcare provider says it is okay. These activities can loosen your knee implant.     Prevent falls:     •   Remove all loose carpets and cords. These can cause you to trip and fall.     • Use a shower bench or chair  when you take a shower to limit the time you are standing.     • Use a toilet seat riser with arms  if your toilet seat is low. A toilet seat riser will help prevent bending or twisting your knee. Metal detectors and MRIs after joint replacement surgery:   • The metal in your joint may set off metal detectors, such as at an airport. Tell the officials about your joint replacement surgery. You may also want to show your joint replacement ID card, if you were given one.     • MRIs are considered safe for people with joint replacements. The type of metal used is not magnetic. Tell all healthcare providers about your joint replacement surgery.     Follow up with your healthcare provider as directed: Your provider may contact you weeks after your surgery. He or she may ask if surgery helped relieve your pain or stiffness. Tell your provider how well you are able to do your daily activities after surgery. Also tell him or her if you are having any problems with mobility or range of motion. Write down your questions so you remember to ask them during your visits.   © Copyright [facility] 2022 Information is for End User's use only and may not be sold, redistributed or otherwise used for commercial purposes. The above information is an  only. It is not intended as medical advice for individual conditions or treatments. Talk to your doctor, nurse or pharmacist before following any medical regimen to see if it is safe and effective for you.                After Visit Summary (Automatic SnapShot taken 5/30/2023)  Additional Documentation    Vitals:   /97    Pulse 93   Ht 5' 4" (1.626 m)   Wt 94.8 kg (209 lb)   BMI 35.87 kg/m²   BSA 1.99 m²      More Vitals   SmartForms:    [facility] PRE-CHARTING •    [facility] PCMH/PCSP WRAP UP REQUIREMENTS ADVANCED •    [facility] SCRIBE ATTESTATION   . ..(2 more)   Encounter Info:   Billing Info,   History,   Allergies,   Detailed Report        Communications    View All Conversations on this Encounter  Orders Placed       Comprehensive metabolic panel     Hemoglobin A1C W/EAG Estimation     Type and screen     XR knee 1 or 2 vw left     Ambulatory referral to surgical optimization Closed     Ambulatory referral to Physical Therapy Authorized     Case request operating room: [address] Once  All Encounter Results  Other Orders Performed     Ambulatory Referral to Orthopedic Surgery Closed  Medication Changes       Ascorbic Acid 500 mg Oral 2 times daily     Enoxaparin Sodium 40 mg Subcutaneous Daily, To start postoperatively     Ferrous Sulfate 324 (65 Fe) mg Take one tablet daily.      Folic Acid 1 mg Oral Daily  Medication List  Visit Diagnoses       Primary osteoarthritis of left knee     Chronic pain of left knee     Effusion of left knee     History of arthroscopy of left knee     Pre-operative laboratory examination     Pre-operative cardiovascular examination     Aftercare following left knee joint replacement surgery     Healthcare maintenance  Problem List  Encounters with Related Results     Appointment (7/13/2023)   Hospital Encounter (5/30/2023)

## 2023-08-17 NOTE — ANESTHESIA PROCEDURE NOTES
Peripheral Block    Patient location during procedure: holding area  Start time: 8/17/2023 7:10 AM  Reason for block: at surgeon's request and post-op pain management  Staffing  Performed by: Fred Barajas MD  Authorized by: Fred Barajas MD    Preanesthetic Checklist  Completed: patient identified, IV checked, site marked, risks and benefits discussed, surgical consent, monitors and equipment checked, pre-op evaluation and timeout performed  Peripheral Block  Prep: ChloraPrep  Patient monitoring: frequent blood pressure checks, continuous pulse oximetry and heart rate  Block type: IPACK  Laterality: left  Injection technique: single-shot  Procedures: ultrasound guided, Ultrasound guidance required for the procedure to increase accuracy and safety of medication placement and decrease risk of complications.   Ultrasound permanent image saved  Needle  Needle type: Stimuplex   Needle gauge: 20 G  Needle length: 4 in  Needle localization: anatomical landmarks and ultrasound guidance  Assessment  Injection assessment: incremental injection, frequent aspiration, injected with ease, negative aspiration, negative for heart rate change, no paresthesia on injection, no symptoms of intraneural/intravenous injection and needle tip visualized at all times  Paresthesia pain: none  Post-procedure:  site cleaned  patient tolerated the procedure well with no immediate complications

## 2023-08-17 NOTE — PLAN OF CARE
Problem: PHYSICAL THERAPY ADULT  Goal: Performs mobility at highest level of function for planned discharge setting. See evaluation for individualized goals. Description: Treatment/Interventions: ADL retraining, Functional transfer training, LE strengthening/ROM, Elevations, Therapeutic exercise, Endurance training, Patient/family training, Equipment eval/education, Gait training, Bed mobility, Spoke to nursing, Spoke to case management, OT  Equipment Recommended: Guille Gususan       See flowsheet documentation for full assessment, interventions and recommendations. 8/17/2023 1411 by Louis Zheng PT  Outcome: Progressing  Note: Prognosis: Good  Problem List: Decreased strength, Decreased range of motion, Decreased endurance, Impaired balance, Decreased mobility, Orthopedic restrictions, Pain  Assessment: Pt is a 68 y.o. male seen for a high complexity PT evaluation due to Ongoing medical management for primary dx, Increased reliance on more restrictive AD compared to baseline, Decreased activity tolerance compared to baseline, Fall risk, Increased assistance needed from caregiver at current time, s/p post op day 0, Current WBS, Continuous pulse oximetry monitoring , hemovac in place. Patient is s/p admit to [address] [place] [address] on 8/17/2023 for Primary osteoarthritis of left knee (M17.12)  Chronic pain of left knee (M25.562, G89.29). Patient  has a past medical history of Arthritis, Colon polyp, COVID, Diabetes mellitus ([address]), Dyslipidemia, Fallen arches, GERD (gastroesophageal reflux disease), Hyperlipidemia, Nodule of left lung, Type 2 diabetes mellitus without complication, without long-term current use of insulin ([address]), and Use of cane as ambulatory aid. .     PT now consulted to assess functional mobility and needs for safe d/c planning. Prior to admission, pt was I with functional mobility, ADLs, and IADLs.  Personal factors affecting status include [address] with 2+2+4 YOSSI, laundry in basement, increased need for assistance, medical status. Currently pt requires Min A for bed mobility, Min A for functional transfers with RW ; Min A for short distance ambulation to chair with RW. Pt presents functioning below baseline and w/ overall mobility deficits 2* to: decreased strength, decreased endurance, decreased mobility, impaired balance. These impairments place pt at risk for falls. Pt will continue to benefit from skilled PT interventions to address stated impairments; to maximize functional potential; for ongoing pt/family education; and DME needs. The patient's AM-PAC Basic Mobility Inpatient Short Form Raw Score Is 17. A Raw score of greater than or equal to 16 suggests the patient may benefit from discharge to home. PT is currently recommending home with OPPT on d/c from hospital. Will continue to follow as able. Barriers to Discharge: Inaccessible home environment (2+2+4 YOSSI)     PT Discharge Recommendation: Home with outpatient rehabilitation    See flowsheet documentation for full assessment.

## 2023-08-17 NOTE — PROGRESS NOTES
Progress Note - Orthopedics   Magui Allred 68 y.o. male MRN: [id number]  Unit/Bed#: -01      Subjective:    73 y.o.male POD 0 L TKA. No acute events, no new complaints. Patient doing well. Pain well controlled.      Labs:  0   Lab Value Date/Time    HCT 49.2 07/13/2023 0731    HCT 48.2 04/05/2023 1007    HCT 50.0 (H) 09/17/2022 0811    HCT 48.7 04/05/2018 0921    HGB 16.0 07/13/2023 0731    HGB 15.7 04/05/2023 1007    HGB 16.2 09/17/2022 0811    HGB 16.8 04/05/2018 0921    WBC 5.72 07/13/2023 0731    WBC 6.02 04/05/2023 1007    WBC 5.18 09/17/2022 0811    WBC 5.4 04/05/2018 0921    ESR 2 04/05/2018 0921       Meds:    Current Facility-Administered Medications:   •  acetaminophen (TYLENOL) tablet 650 mg, 650 mg, Oral, Q4H PRN, Juan Carlos Ramirez PA-C  •  aluminum-magnesium hydroxide-simethicone (MAALOX) oral suspension 30 mL, 30 mL, Oral, Q6H PRN, Juan Carlos Ramirez PA-C  •  ceFAZolin (ANCEF) IVPB (premix in dextrose) 1,000 mg 50 mL, 1,000 mg, Intravenous, Q8H, Juan Carlos Ramirez PA-C  •  docusate sodium (COLACE) capsule 100 mg, 100 mg, Oral, BID, Juan Carlos Ramirez PA-C  •  enoxaparin (LOVENOX) subcutaneous injection 40 mg, 40 mg, Subcutaneous, Daily, Juan Carlos Ramirez PA-C  •  hydrALAZINE (APRESOLINE) tablet 25 mg, 25 mg, Oral, Q8H PRN, Phuong Russell PA-C  •  HYDROmorphone (DILAUDID) injection 0.5 mg, 0.5 mg, Intravenous, Q2H PRN, Juan Carlos Ramirez PA-C  •  insulin lispro (HumaLOG) 100 units/mL subcutaneous injection 1-5 Units, 1-5 Units, Subcutaneous, TID AC **AND** Fingerstick Glucose (POCT), , , TID AC, Phuong Russell PA-C  •  insulin lispro (HumaLOG) 100 units/mL subcutaneous injection 1-5 Units, 1-5 Units, Subcutaneous, HS, Phuong Russell PA-C  •  lactated ringers bolus 1,000 mL, 1,000 mL, Intravenous, Once PRN **AND** lactated ringers bolus 1,000 mL, 1,000 mL, Intravenous, Once PRN, Juan Carlos Ramirez PA-C  •  lactated ringers infusion, 125 mL/hr, Intravenous, Continuous, Juan Carlos Ramirez PA-C, Last Rate: 125 mL/hr at 08/17/23 0855, 125 mL/hr at 08/17/23 0855  •  metFORMIN (GLUCOPHAGE) tablet 500 mg, 500 mg, Oral, BID With Meals, Phuong Russell PA-C  •  ondansetron (ZOFRAN) injection 4 mg, 4 mg, Intravenous, Q6H PRN, Erik Moran PA-C  •  oxyCODONE (ROXICODONE) immediate release tablet 10 mg, 10 mg, Oral, Q4H PRN, Erik Moran PA-C, 10 mg at 08/17/23 1201  •  oxyCODONE (ROXICODONE) IR tablet 5 mg, 5 mg, Oral, Q4H PRN, Erik Moran PA-C  •  pravastatin (PRAVACHOL) tablet 40 mg, 40 mg, Oral, Daily With Dinner, Erik Moran PA-C  •  sodium chloride 0.9 % bolus 1,000 mL, 1,000 mL, Intravenous, Once PRN **AND** sodium chloride 0.9 % bolus 1,000 mL, 1,000 mL, Intravenous, Once PRN, Erik Moran PA-C  •  tamsulosin (FLOMAX) capsule 0.8 mg, 0.8 mg, Oral, Daily With Jackie DrESTRELLA conrad    Blood Culture:   No results found for: "BLOODCX"    Wound Culture:   No results found for: "WOUNDCULT"    Ins and Outs:  I/O last 24 hours: In: -   Out: 18 [Urine:500; Drains:70]          Physical:  Vitals:    08/17/23 1227   BP: 134/74   Pulse: 95   Resp:    Temp: (!) 97.3 °F (36.3 °C)   SpO2: 97%     Musculoskeletal: left Lower Extremity  · Dressing c/d/i  · TTP kayleigh-incisionally  · Sensation intact to saphenous, sural, tibial, superficial peroneal nerve, and deep peroneal  · Motor intact to +FHL/EHL, +ankle dorsi/plantar flexion  · 2+ DP pulse  · Digits warm and well perfused  · Capillary refill < 2 seconds    Assessment:    73 y.o.male POD 0 L TKA. Patient doing well.      Plan:  · WBAT LLE  · Will monitor for ABLA and administer IVF/prbc as indicated for Greater than 2 gram drop or Hgb < 7  · PT/OT  · Pain control  · DVT ppx - lovenox  · Dispo: dispo pending PT and AM labs    Gaby Santamaria MD

## 2023-08-17 NOTE — ANESTHESIA PROCEDURE NOTES
Peripheral Block    Patient location during procedure: holding area  Start time: 8/17/2023 7:05 AM  Reason for block: at surgeon's request and post-op pain management  Staffing  Performed by: Issa Garay MD  Authorized by: Issa Garay MD    Preanesthetic Checklist  Completed: patient identified, IV checked, site marked, risks and benefits discussed, surgical consent, monitors and equipment checked, pre-op evaluation and timeout performed  Peripheral Block  Patient position: supine  Prep: ChloraPrep  Patient monitoring: frequent blood pressure checks, continuous pulse oximetry and heart rate  Block type: Adductor Canal  Laterality: left  Injection technique: single-shot  Procedures: ultrasound guided, Ultrasound guidance required for the procedure to increase accuracy and safety of medication placement and decrease risk of complications.   Ultrasound permanent image saved  Needle  Needle type: Stimuplex   Needle gauge: 20 G  Needle length: 4 in  Needle localization: anatomical landmarks and ultrasound guidance  Assessment  Injection assessment: incremental injection, frequent aspiration, injected with ease, negative aspiration, negative for heart rate change, no paresthesia on injection, no symptoms of intraneural/intravenous injection and needle tip visualized at all times  Paresthesia pain: none  Post-procedure:  site cleaned  patient tolerated the procedure well with no immediate complications

## 2023-08-17 NOTE — CONSULTS
Internal Medicine  Consultation Note    Patient: Aisha Talbot  Age/sex: 68 y.o. male  Medical Record #: [id number]    Assessment/Plan    Status Post left Total KNEE ARTHROPLASTY  • Continue post op pain control measures as prescribed. • Follow bowel regimen to help decrease narcotic induced constipation. •  Follow post operative hemoglobin with serial CBC and treat accordingly. • Monitor WBC and fever curve post op while encouraging use of incentive spirometer. • DVT prophylaxis in place and reviewed. Diabetes Mellitus  • Resume metformin  • Cont accuchecks and SSC  • BS trend reviewed  Results from last 7 days   Lab Units 23  0856 23  0601   POC GLUCOSE mg/dl 145* 167*       Hyperlipidemia  · Low cholesterol diet. · Continue statin. BPH  · Monitor for post-op retention. · Continue Flomax. Obesity  · Recommend ongoing attempts at weight loss. · BMI 35.5. PRE-OP HGB LEVEL: 16    Subjective/ HPI: Aisha Talbot was seen and examined. Hx of KNEE pain failed out patient conservative measures. Elected to undergo total KNEE arthroplasty We are asked to see patient for post op management of underlying medical co-morbidities as outlined above. Pt did well intra and post operatively with good hemodynamics. Pt currently comfortable and without any reported post op nausea. ROS:   A 10 point ROS was performed; negative except as noted above.      Social History:    Substance Use History:   Social History     Substance and Sexual Activity   Alcohol Use Yes    Comment: 2 x per week     Social History     Tobacco Use   Smoking Status Former   • Packs/day: 1.00   • Years: 35.00   • Total pack years: 35.00   • Types: Cigarettes   • Quit date:    • Years since quittin.6   Smokeless Tobacco Never     Social History     Substance and Sexual Activity   Drug Use No       Family History:    Family History   Problem Relation Age of Onset   • Heart disease Mother         hx MI   • Diabetes Mother    • Stroke Mother    • Arthritis Mother    • COPD Father         80 yrs   • Coronary artery disease Father    • Cancer Brother          Review of Scheduled Meds:  Current Facility-Administered Medications   Medication Dose Route Frequency Provider Last Rate   • acetaminophen  650 mg Oral Q4H PRN Zach ESTRELLA Mack     • aluminum-magnesium hydroxide-simethicone  30 mL Oral Q6H PRN Zach StallionESTRELLA     • cefazolin  1,000 mg Intravenous Q8H Zach ESTRELLA Mack     • docusate sodium  100 mg Oral BID Zach ESTRELLA Mack     • enoxaparin  40 mg Subcutaneous Daily Zach ESTRELLA Mack     • hydrALAZINE  25 mg Oral Q8H PRN Phuong Russell PA-C     • HYDROmorphone  0.5 mg Intravenous Q2H PRN Zach ESTRELLA Mack     • insulin lispro  1-5 Units Subcutaneous TID AC Phuong Russell PA-C     • insulin lispro  1-5 Units Subcutaneous HS Phuong Russell PA-C     • lactated ringers  1,000 mL Intravenous Once PRN Zach ESTRELLA Mack      And   • lactated ringers  1,000 mL Intravenous Once PRN Zach ESTRELLA Mack     • lactated ringers  125 mL/hr Intravenous Continuous ROSEY OrtizC 125 mL/hr (08/17/23 0855)   • metFORMIN  500 mg Oral BID With Meals Phuong Russell PA-C     • ondansetron  4 mg Intravenous Q6H PRN Zach ESTRELLA Mack     • oxyCODONE  10 mg Oral Q4H PRN Zach ESTRELLA Mack     • oxyCODONE  5 mg Oral Q4H PRN Zach StallESTRELLA tarango     • pravastatin  40 mg Oral Daily With Dominique Gillespie PA-C     • sodium chloride  1,000 mL Intravenous Once PRN Zach ESTRELLA Mack      And   • sodium chloride  1,000 mL Intravenous Once PRN Zach StallESTRELLA tarango     • tamsulosin  0.8 mg Oral Daily With Dominique Gillespie PA-C         Labs:                Invalid input(s): "LABGLOM", "CMP"               Results from last 7 days   Lab Units 08/17/23  0856 08/17/23  0601   POC GLUCOSE mg/dl 145* 167*       No results found for: "BLOODCX", "Amirah Porteous", "WOUNDCULT", "SPUTUMCULTUR"    Input and Output Summary (last 24 hours): Intake/Output Summary (Last 24 hours) at 2023 1223  Last data filed at 2023 8323  Gross per 24 hour   Intake --   Output 570 ml   Net -570 ml       Imaging:     No orders to display       *Labs /Radiology studiesLabs reviewed  *Medications reviewed and reconciled as needed  *Please refer to order section for additional ordered labs studies  *Case discussed with primary attending during morning huddle case rounds    Vitals:   Temp (24hrs), Av.4 °F (36.3 °C), Min:96.9 °F (36.1 °C), Max:97.8 °F (36.6 °C)    Temp:  [96.9 °F (36.1 °C)-97.8 °F (36.6 °C)] 97.2 °F (36.2 °C)  HR:  [] 103  Resp:  [16-21] 18  BP: (119-155)/(71-80) 132/71  SpO2:  [95 %-98 %] 96 %  Body mass index is 35.53 kg/m². Physical Exam:   General Appearance: no distress, conversive  HEENT: PERRLA, conjuctiva normal; oropharynx clear; mucous membranes moist;   Neck:  Supple, no lymphadenopathy or thyromegaly  Lungs: CTA, normal respiratory effort, no retractions, expiratory effort normal  CV: regular rate and rhythm , PMI normal   ABD: soft non tender, no masses , no hepatic or splenomegaly  EXT: DP pulses intact, no lymphadenopathy, no edema ;  left KNEE dressing in place  Skin: normal turgor, normal texture, no rash  Psych: affect normal, mood normal  Neuro: AAOx3          Invasive Devices     Peripheral Intravenous Line  Duration           Peripheral IV 23 Right Hand <1 day          Drain  Duration           Closed/Suction Drain Anterior; Left Knee Accordion 10 Fr. <1 day    Urethral Catheter Latex 16 Fr. <1 day                   Code Status: Level 1 - Full Code  Current Length of Stay: 0 day(s)    Total floor / unit time spent today 1 hour  Coordination of patient's care was performed in conjunction with primary service.  Time invested included review of patient's labs, vitals, and management of their comorbidities with continued monitoring, examination of patient as well as answering patient questions, documenting her findings and creating progress note in electronic medical record,  ordering appropriate diagnostic testing. Medical decision making for the day was made by supervising physician unless otherwise noted in their attestation statement. ** Please Note: Fluency Direct voice to text software may have been used in the creation of this document.  Audio transcription errors may occur**

## 2023-08-17 NOTE — PLAN OF CARE
Problem: MOBILITY - ADULT  Goal: Maintain or return to baseline ADL function  Description: INTERVENTIONS:  -  Assess patient's ability to carry out ADLs; assess patient's baseline for ADL function and identify physical deficits which impact ability to perform ADLs (bathing, care of mouth/teeth, toileting, grooming, dressing, etc.)  - Assess/evaluate cause of self-care deficits   - Assess range of motion  - Assess patient's mobility; develop plan if impaired  - Assess patient's need for assistive devices and provide as appropriate  - Encourage maximum independence but intervene and supervise when necessary  - Involve family in performance of ADLs  - Assess for home care needs following discharge   - Consider OT consult to assist with ADL evaluation and planning for discharge  - Provide patient education as appropriate  Outcome: Progressing  Goal: Maintains/Returns to pre admission functional level  Description: INTERVENTIONS:  - Perform BMAT or MOVE assessment daily.   - Set and communicate daily mobility goal to care team and patient/family/caregiver. - Collaborate with rehabilitation services on mobility goals if consulted  - Perform Range of Motion 3 times a day. - Reposition patient every 3 hours.   - Dangle patient 3 times a day  - Stand patient 3 times a day  - Ambulate patient 3 times a day  - Out of bed to chair 3 times a day   - Out of bed for meals 3 times a day  - Out of bed for toileting  - Record patient progress and toleration of activity level   Outcome: Progressing     Problem: PAIN - ADULT  Goal: Verbalizes/displays adequate comfort level or baseline comfort level  Description: Interventions:  - Encourage patient to monitor pain and request assistance  - Assess pain using appropriate pain scale  - Administer analgesics based on type and severity of pain and evaluate response  - Implement non-pharmacological measures as appropriate and evaluate response  - Consider cultural and social influences on pain and pain management  - Notify physician/advanced practitioner if interventions unsuccessful or patient reports new pain  Outcome: Progressing     Problem: INFECTION - ADULT  Goal: Absence or prevention of progression during hospitalization  Description: INTERVENTIONS:  - Assess and monitor for signs and symptoms of infection  - Monitor lab/diagnostic results  - Monitor all insertion sites, i.e. indwelling lines, tubes, and drains  - Monitor endotracheal if appropriate and nasal secretions for changes in amount and color  - [place] appropriate cooling/warming therapies per order  - Administer medications as ordered  - Instruct and encourage patient and family to use good hand hygiene technique  - Identify and instruct in appropriate isolation precautions for identified infection/condition  Outcome: Progressing     Problem: SAFETY ADULT  Goal: Patient will remain free of falls  Description: INTERVENTIONS:  - Educate patient/family on patient safety including physical limitations  - Instruct patient to call for assistance with activity   - Consult OT/PT to assist with strengthening/mobility   - Keep Call bell within reach  - Keep bed low and locked with side rails adjusted as appropriate  - Keep care items and personal belongings within reach  - Initiate and maintain comfort rounds  - Make Fall Risk Sign visible to staff  - Offer Toileting every 2 Hours, in advance of need  - Initiate/Maintain call bell alarm  - Obtain necessary fall risk management equipment: nonskid footwear   - Apply yellow socks and bracelet for high fall risk patients  - Consider moving patient to room near nurses station  Outcome: Progressing     Problem: DISCHARGE PLANNING  Goal: Discharge to home or other facility with appropriate resources  Description: INTERVENTIONS:  - Identify barriers to discharge w/patient and caregiver  - Arrange for needed discharge resources and transportation as appropriate  - Identify discharge learning needs (meds, wound care, etc.)  - Arrange for interpretive services to assist at discharge as needed  - Refer to Case Management Department for coordinating discharge planning if the patient needs post-hospital services based on physician/advanced practitioner order or complex needs related to functional status, cognitive ability, or social support system  Outcome: Progressing     Problem: Knowledge Deficit  Goal: Patient/family/caregiver demonstrates understanding of disease process, treatment plan, medications, and discharge instructions  Description: Complete learning assessment and assess knowledge base.   Interventions:  - Provide teaching at level of understanding  - Provide teaching via preferred learning methods  Outcome: Progressing

## 2023-08-17 NOTE — PLAN OF CARE
Problem: MOBILITY - ADULT  Goal: Maintain or return to baseline ADL function  Description: INTERVENTIONS:  -  Assess patient's ability to carry out ADLs; assess patient's baseline for ADL function and identify physical deficits which impact ability to perform ADLs (bathing, care of mouth/teeth, toileting, grooming, dressing, etc.)  - Assess/evaluate cause of self-care deficits   - Assess range of motion  - Assess patient's mobility; develop plan if impaired  - Assess patient's need for assistive devices and provide as appropriate  - Encourage maximum independence but intervene and supervise when necessary  - Involve family in performance of ADLs  - Assess for home care needs following discharge   - Consider OT consult to assist with ADL evaluation and planning for discharge  - Provide patient education as appropriate  Outcome: Progressing  Goal: Maintains/Returns to pre admission functional level  Description: INTERVENTIONS:  - Perform BMAT or MOVE assessment daily.   - Set and communicate daily mobility goal to care team and patient/family/caregiver. - Collaborate with rehabilitation services on mobility goals if consulted  - Perform Range of Motion 3 times a day. - Reposition patient every 2 hours.   - Dangle patient 3 times a day  - Stand patient 3 times a day  - Ambulate patient 3 times a day  - Out of bed to chair 3 times a day   - Out of bed for meals 3 times a day  - Out of bed for toileting  - Record patient progress and toleration of activity level   Outcome: Progressing     Problem: PAIN - ADULT  Goal: Verbalizes/displays adequate comfort level or baseline comfort level  Description: Interventions:  - Encourage patient to monitor pain and request assistance  - Assess pain using appropriate pain scale  - Administer analgesics based on type and severity of pain and evaluate response  - Implement non-pharmacological measures as appropriate and evaluate response  - Consider cultural and social influences on pain and pain management  - Notify physician/advanced practitioner if interventions unsuccessful or patient reports new pain  Outcome: Progressing     Problem: INFECTION - ADULT  Goal: Absence or prevention of progression during hospitalization  Description: INTERVENTIONS:  - Assess and monitor for signs and symptoms of infection  - Monitor lab/diagnostic results  - Monitor all insertion sites, i.e. indwelling lines, tubes, and drains  - Monitor endotracheal if appropriate and nasal secretions for changes in amount and color  - [place] appropriate cooling/warming therapies per order  - Administer medications as ordered  - Instruct and encourage patient and family to use good hand hygiene technique  - Identify and instruct in appropriate isolation precautions for identified infection/condition  Outcome: Progressing     Problem: SAFETY ADULT  Goal: Patient will remain free of falls  Description: INTERVENTIONS:  - Educate patient/family on patient safety including physical limitations  - Instruct patient to call for assistance with activity   - Consult OT/PT to assist with strengthening/mobility   - Keep Call bell within reach  - Keep bed low and locked with side rails adjusted as appropriate  - Keep care items and personal belongings within reach  - Initiate and maintain comfort rounds  - Make Fall Risk Sign visible to staff  - Offer Toileting every 2 Hours, in advance of need  - Initiate/Maintain alarm  - Obtain necessary fall risk management equipment: non-skid footwear, pt instructed to call for assistance, call bell in reach and pt rings appropriately, pt in room near nurses' station  - Apply yellow socks and bracelet for high fall risk patients  - Consider moving patient to room near nurses station  Outcome: Progressing     Problem: DISCHARGE PLANNING  Goal: Discharge to home or other facility with appropriate resources  Description: INTERVENTIONS:  - Identify barriers to discharge w/patient and caregiver  - Arrange for needed discharge resources and transportation as appropriate  - Identify discharge learning needs (meds, wound care, etc.)  - Arrange for interpretive services to assist at discharge as needed  - Refer to Case Management Department for coordinating discharge planning if the patient needs post-hospital services based on physician/advanced practitioner order or complex needs related to functional status, cognitive ability, or social support system  Outcome: Progressing     Problem: Knowledge Deficit  Goal: Patient/family/caregiver demonstrates understanding of disease process, treatment plan, medications, and discharge instructions  Description: Complete learning assessment and assess knowledge base.   Interventions:  - Provide teaching at level of understanding  - Provide teaching via preferred learning methods  Outcome: Progressing

## 2023-08-17 NOTE — PERIOPERATIVE NURSING NOTE
PACU to Floor TRANSFER NOTE      Pre-op Diagnosis:  Primary osteoarthritis of left knee [M17.12]  Chronic pain of left knee [M25.562, G89.29]    Procedure Done:  ARTHROPLASTY KNEE TOTAL W ROBOT (Left: Knee)     Post Op Diagnosis:  * No post-op diagnosis entered *    Any Significant Events Intra-Op:  none    Abnormal Assessment in PACU: none    Level of Consciousness:  Level of Consciousness: Alert/awake    Last Set of VS:   Vitals:    08/17/23 0855 08/17/23 0900 08/17/23 0915 08/17/23 0930   BP: 134/73 125/80 119/71 139/73   Pulse: 97 96 94 84   Resp: 18 16 21 21   Temp: 97.8 °F (36.6 °C)      TempSrc:       SpO2: 98% 96% 95% 97%   Weight:       Height:                    Baseline Neuro/developmental Status: AAOx4.    Labs Collected: No  Special Needs of the Patient: NA  Antibiotics: Given in OR    MEDICATION LIST LAST 24 HOURS  Periop Administered Medications from 08/15/2023 2132 to 08/17/2023 0932       Date/Time Order Dose Route Action Action by Comments     08/17/2023 0612 EDT acetaminophen (TYLENOL) tablet 975 mg 975 mg Oral Given Andi Mayer RN --     08/17/2023 0728 EDT bupivacaine (PF) (MARCAINE SPINAL) 0.75-8.25 % in dextrose 8.25 % intrathecal injection 1.4 mL Intrathecal Given Kymberly Lopez CRNA --     08/17/2023 0710 EDT bupivacaine (PF) (MARCAINE) 0.25 % injection 20 mL Perineural Given Hermelinda Slater MD [facility] COOK Block     08/17/2023 0705 EDT bupivacaine (PF) (MARCAINE) 0.5 % injection 7.5 mL Infiltration Given Hermelinda Slater MD [facility] Block     08/17/2023 0728 EDT bupivacaine (PF) (MARCAINE) 0.75 % injection 1.4 mL Intrapleural Canceled Entry Kymberly Lopez CRNA --     08/17/2023 0710 EDT bupivacaine liposomal (EXPAREL) 1.3 % injection 20 mL 5 mL Infiltration Given Hermelinda Slater MD [facility] COOK Block     08/17/2023 0705 EDT bupivacaine liposomal (EXPAREL) 1.3 % injection 20 mL 15 mL Infiltration Given Hermelinda Slater MD [facility] Block     08/17/2023 0740 EDT ceFAZolin (ANCEF) IVPB (premix in dextrose) 2,000 mg 50 mL 2,000 mg Intravenous Given [facility], CRNA --     08/17/2023 0612 EDT chlorhexidine (PERIDEX) 0.12 % oral rinse 15 mL 15 mL Swish & Spit Given Isaías Ragland, RN --     08/17/2023 5812 EDT fentanyl citrate (PF) 100 MCG/2ML 50 mcg Intravenous Given Stone Has, CRNA --     08/17/2023 0612 EDT gabapentin (NEURONTIN) capsule 300 mg 300 mg Oral Given Isaías Ragland, RN --     08/17/2023 0855 EDT lactated ringers infusion 125 mL/hr Intravenous Continued from [address], RN --     08/17/2023 7533 EDT lactated ringers infusion -- Intravenous New Bag Stone Has, CRNA --     08/17/2023 0729 EDT lidocaine (PF) (XYLOCAINE-MPF) 1 % injection 50 mg Intravenous Given [facility] Has, CRNA --     08/17/2023 2363 EDT midazolam (VERSED) injection 2 mg Intravenous Given [facility] Has, CRNA --     08/17/2023 0729 EDT ondansetron (ZOFRAN) injection 4 mg Intravenous Given [facility] Has, CRNA --     08/17/2023 0850 EDT phenylephrine (JOSE-SYNEPHRINE) 50 mg (STANDARD CONCENTRATION) in sodium chloride 0.9% 250 mL 0 mcg/min Intravenous Stopped Duana Has, CRNA --     08/17/2023 1772 EDT phenylephrine (JOSE-SYNEPHRINE) 50 mg (STANDARD CONCENTRATION) in sodium chloride 0.9% 250 mL 40 mcg/min Intravenous Rate/Dose Change Duana Has, CRNA --     08/17/2023 0831 EDT phenylephrine (JOSE-SYNEPHRINE) 50 mg (STANDARD CONCENTRATION) in sodium chloride 0.9% 250 mL 60 mcg/kg/min Intravenous Canceled Entry [facility] Has, CRNA --     08/17/2023 2843 EDT phenylephrine (JOSE-SYNEPHRINE) 50 mg (STANDARD CONCENTRATION) in sodium chloride 0.9% 250 mL 200 mcg Intravenous [address] [facility] Has, CRNA --     08/17/2023 9938 EDT phenylephrine (JOSE-SYNEPHRINE) 50 mg (STANDARD CONCENTRATION) in sodium chloride 0.9% 250 mL 200 mcg Intravenous New Bag Stone Sagastume CRNA --     08/17/2023 0729 EDT phenylephrine (JOSE-SYNEPHRINE) 50 mg (STANDARD CONCENTRATION) in sodium chloride 0.9% 250 mL 80 mcg/min Intravenous [address] Ludwig Hutchins CRNA --     08/17/2023 0500 EDT povidone-iodine (BETADINE) 10 % 20 mL in sodium chloride 0.9 % 500 mL irrigation bottle 520 mL Irrigation Given Marvin Oliver MD --     08/17/2023 6969 EDT propofol (DIPRIVAN) 1000 mg in 100 mL infusion (premix) 0 mcg/kg/min Intravenous Stopped Ludwig Hutchins CRNA --     08/17/2023 0739 EDT propofol (DIPRIVAN) 1000 mg in 100 mL infusion (premix) 60 mcg/kg/min Intravenous Rate/Dose Change Ludwig Hutchins CRNA --     08/17/2023 0729 EDT propofol (DIPRIVAN) 1000 mg in 100 mL infusion (premix) 80 mcg/kg/min Intravenous New Bag Ludwig Hutchins CRNA --     08/17/2023 0729 EDT propofol (DIPRIVAN) 200 MG/20ML bolus injection 40 mg Intravenous Given Ludwig Hutchins CRNA --     08/17/2023 0803 EDT sodium chloride 0.9 % irrigation solution 900 mL Irrigation Given Marvin Oliver MD --     08/17/2023 0740 EDT Tranexamic Acid-NaCl (CYKLOKAPRON) 1000-0.7 MG/100ML-% injection 1,000 mg 1,000 mg Intravenous Given Ludwig Hutchins CRNA --             IV Access:   Peripheral IV 08/17/23 Right Hand (Active)   Site Assessment WDL; Clean;Dry; Intact 08/17/23 0855   Dressing Type Transparent 08/17/23 0855   Line Status Flushed; Infusing 08/17/23 0855   Dressing Status Clean;Dry; Intact 08/17/23 0855     IV Fluids: lactated ringers, 125 mL/hr, Last Rate: 125 mL/hr (08/17/23 0855)        INTAKE / OUTPUT  I/O last 24 hours: In: -   Out: 18 [Urine:500; Drains:70]     WOUNDS  Wound 06/30/21 Wrist Left (Active)   Date First Assessed/Time First Assessed: 06/30/21 1405   Location: Wrist  Wound Location Orientation: Left  Wound Description (Comments): xeroform,4x4,cast padding, splint, ace      Assessments 6/30/2021  2:18 PM 6/30/2021  3:30 PM   Wound Description Clean;Dry; Intact Unable to assess   Treatments -- Ice applied   Dressing Dry dressing Dry dressing   Dressing Status Clean;Dry; Intact Clean;Dry; Intact       No associated orders.        Wound 05/23/22 Leg Right (Active)   Date First Assessed/Time First Assessed: 05/23/22 0756   Location: Leg  Wound Location Orientation: Right  Incision's 1st Dressing: DRESSING XEROFORM 1 X 8 (x1), SPONGE GAUZE 4 X 4 16 PLY STRL PLASTIC TRAY LF (x1), DRESSING SURGIPAD 5 X 9 IN STRL (x2). .. Assessments 5/23/2022  8:15 AM 5/23/2022  9:45 AM   Wound Description Unable to assess Unable to assess   Dressing Dry dressing Dry dressing   Dressing Status Clean;Dry; Intact Clean; Intact;Dry       No associated orders. Wound 02/06/23 Knee Left (Active)   Date First Assessed/Time First Assessed: 02/06/23 0734   Location: Knee  Wound Location Orientation: Left  Wound Description (Comments): Incision sutured closed. Incision's 1st Dressing: CAST PADDING 6 IN SYNTHETIC STRL (x1), ACE WRAP 6 IN STERILE (... Assessments 2/6/2023  9:00 AM 2/6/2023 10:00 AM   Wound Description Unable to assess Unable to assess   Treatments Ice applied --   Dressing Dry dressing Dry dressing   Dressing Status Clean;Dry; Intact Clean;Dry; Intact       No associated orders. Wound 08/17/23 Knee Left (Active)   Date First Assessed/Time First Assessed: 08/17/23 0805   Location: Knee  Wound Location Orientation: Left  Wound Description (Comments): staples, cool temp pad  Incision's 1st Dressing: DRESSING ACTICOAT AG 4 X 8 IN (x1), SPONGE GAUZE 4 X 4 16 PLY STR. .. Assessments 8/17/2023  8:05 AM 8/17/2023  9:30 AM   Wound Description -- Unable to assess   Zara-wound Assessment -- Unable to assess   Wound Site Closure Staples Staples; Unable to assess   Drainage Amount -- None   Dressing -- Other (Comment);Gauze; Abdominal dressing   Dressing Status Clean;Dry; Intact Clean;Dry; Intact       No associated orders. Time of Last Void or Time that Urinary Catheter was Removed Intra-Op: Ayala in place UO in PACU 400.     Patient family members in attendance upon patient transport to floor:  Not present    Patient Belongings: Not in [address] [facility],[address]. With wife. Additional Information: BG in PACU 145. Pain 3/10 L knee. Pt declines medication at this time.      Contact THADDEUS Vaca

## 2023-08-17 NOTE — CONSULTS
Office Visit    5/30/2023  [facility] CANDIDAJANET Juárez MD  Orthopedic Surgery  Primary osteoarthritis of left knee +7 more  Dx  Left Knee - Pain; Referred by Juan Solares, [address] [place] [address]  Reason for Visit     Progress Notes  Jairon Juárez MD (Physician) • • Orthopedic Surgery  Expand All Collapse All  Assessment:    Diagnosis ICD-10-CM Associated Orders   1. Primary osteoarthritis of left knee  M17.12 Ambulatory Referral to Orthopedic Surgery       Comprehensive metabolic panel       Hemoglobin A1C W/EAG Estimation       Type and screen       Ambulatory referral to surgical optimization       Ambulatory referral to Physical Therapy       Case request operating room: ARTHROPLASTY KNEE TOTAL W ROBOT       Case request operating room: ARTHROPLASTY KNEE TOTAL W ROBOT       2. Chronic pain of left knee  M25.562 XR knee 1 or 2 vw left     G89.29 Comprehensive metabolic panel       Hemoglobin A1C W/EAG Estimation       Type and screen       Ambulatory referral to surgical optimization       Ambulatory referral to Physical Therapy       Case request operating room: ARTHROPLASTY KNEE TOTAL W ROBOT       Case request operating room: ARTHROPLASTY KNEE TOTAL W ROBOT       3. Effusion of left knee  M25.462         4. History of arthroscopy of left knee  Z98.890         5. Pre-operative laboratory examination  Z01.812         6. Pre-operative cardiovascular examination  Z01.810         7. Aftercare following left knee joint replacement surgery  Z47.1 enoxaparin (LOVENOX) 40 mg/0.4 mL     I13.362         8. Healthcare maintenance  Z00.00 ascorbic acid (VITAMIN C) 500 MG tablet       enoxaparin (LOVENOX) 40 mg/0.4 mL       ferrous sulfate 324 (65 Fe) mg       folic acid (FOLVITE) 1 mg tablet             Plan:  • Diagnostics reviewed and physical exam performed.   Diagnosis, treatment options and associated risks were discussed with the patient including no treatment, nonsurgical treatment and potential for surgical intervention. The patient was given the opportunity to ask questions regarding each. • He has had a multitude of nonoperative modalities without appreciable long-term relief of his left knee symptoms. Despite having a varus deformity of his left lower extremity cannot be passively corrected to neutral thus an  brace is not indicated. • Quality of life decision to pursue elective left total knee arthroplasty. Risks and benefits of a left total knee arthroplasty were discussed. Consents obtained. • Preoperative vitamins and postoperative Lovenox sent to his pharmacy.     To do next visit:  Return for post-op with x-rays left knee.     The above stated was discussed in layman's terms and the patient expressed understanding. All questions were answered to the patient's satisfaction.              Scribe Attestation    I,:  Sharon Cabrera am acting as a scribe while in the presence of the attending physician.:       I,:  Maria De Jesus Beal MD personally performed the services described in this documentation    as scribed in my presence. :                 Subjective:   Hilary Leahy is a 71 y.o. male who presents today for 2nd opinion regarding his left knee due to persistent pain and stiffness. He injured his knee in October 2022 and underwent an arthroscopy in February 2023 by Dr. Cecilia Newton. He cannot appreciate any relief from the arthroscopy as well as aspirations with a 3-shot euflexxa series and cortisone which he underwent post arthroscopy. He does find increased pain with weightbearing activities. He is stiffness getting up after prolonged sedentary positions. He also has difficulty ambulating stairs in an alternating fashion. 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 He does find that his left knee symptoms do limit his day-to-day activities as well as impedes on his quality of life. His symptoms can be severe and unrelenting at times.  He does present using a SPC for ambulatory assistance.      He is a diabetic with a recent hemoglobin A1c of 6.8.        Review of systems negative unless otherwise specified in HPI  Review of Systems     Medical History        Past Medical History:   Diagnosis Date   • Arthritis     • Colon polyp     • COVID 09/07/2021     and 12/2022   • Diabetes mellitus ([address])     • Dyslipidemia     • GERD (gastroesophageal reflux disease)     • Hyperlipidemia     • Nodule of left lung 2007     negative for CA   • Type 2 diabetes mellitus without complication, without long-term current use of insulin ([address]) 01/23/2019   • Use of cane as ambulatory aid              Surgical History         Past Surgical History:   Procedure Laterality Date   • COLONOSCOPY       • ELBOW SURGERY Bilateral     • FOOT TENDON SURGERY Bilateral       heel   • HAND SURGERY       • IN ARTHRS KNE SURG W/MENISCECTOMY MED/LAT W/SHVG Right 05/23/2022     Procedure: KNEE ARTHROSCOPY WITH medial MENISCECTOMY chondroplasty synovectomy injection;  Surgeon: Junie Graham DO;  Location: [facility];  Service: Orthopedics   • IN ARTHRS KNE SURG W/MENISCECTOMY MED/LAT W/SHVG Left 2/6/2023     Procedure: Left knee arthroscopy: Medial meniscectomy; Lateral meniscectomy; Chondroplasty; Synovectomy;  Post arthroscopic injection of intra-articular joint space and peripheral portal;  Surgeon: Junie Graham DO;  Location: [facility];  Service: Orthopedics   • IN NEUROPLASTY &/TRANSPOS MEDIAN NRV CARPAL Jamel Footman Left 06/30/2021     Procedure: RELEASE CARPAL TUNNEL;  Surgeon: Lauren Weir DO;  Location: [address] [place] [address] MAIN OR;  Service: Orthopedics            Family History         Family History   Problem Relation Age of Onset   • Heart disease Mother           hx MI   • Diabetes Mother     • Stroke Mother     • COPD Father           80 yrs   • Coronary artery disease Father     • Cancer Brother              Social History            Occupational History   • Not on file   Tobacco Use   • Smoking status: Former       Packs/day: 1.00       Years: 30.00       Total pack years: 30.00       Types: Cigarettes       Quit date:        Years since quittin.4   • Smokeless tobacco: Never   Vaping Use   • Vaping Use: Never used   Substance and Sexual Activity   • Alcohol use: Yes       Comment: occ   • Drug use: No   • Sexual activity: Yes       Partners: Female       Birth control/protection: Male Sterilization            Current Outpatient Medications:   •  ascorbic acid (VITAMIN C) 500 MG tablet, Take 1 tablet (500 mg total) by mouth 2 (two) times a day, Disp: 60 tablet, Rfl: 0  •  enoxaparin (LOVENOX) 40 mg/0.4 mL, Inject 0.4 mL (40 mg total) under the skin daily for 28 days To start postoperatively, Disp: 11.2 mL, Rfl: 0  •  ferrous sulfate 324 (65 Fe) mg, Take one tablet daily. , Disp: 30 tablet, Rfl: 0  •  folic acid (FOLVITE) 1 mg tablet, Take 1 tablet (1 mg total) by mouth daily, Disp: 30 tablet, Rfl: 0  •  acetaminophen (TYLENOL) 500 mg tablet, Take 500 mg by mouth every 6 (six) hours as needed for mild pain, Disp: , Rfl:   •  meloxicam (Mobic) 15 mg tablet, Take 1 tablet (15 mg total) by mouth daily, Disp: 90 tablet, Rfl: 2  •  metFORMIN (GLUCOPHAGE) 500 mg tablet, Take 1 tablet (500 mg total) by mouth 2 (two) times a day with meals, Disp: 180 tablet, Rfl: 3  •  phentermine (ADIPEX-P) 37.5 MG tablet, Take 1 tablet (37.5 mg total) by mouth daily (Patient not taking: Reported on 2023), Disp: 30 tablet, Rfl: 2  •  Promethazine-DM (PHENERGAN-DM) 6.25-15 mg/5 mL oral syrup, Take 5 mL by mouth 4 (four) times a day as needed for cough (Patient not taking: Reported on 2023), Disp: 240 mL, Rfl: 1  •  simvastatin (ZOCOR) 20 mg tablet, Take 1 tablet (20 mg total) by mouth daily at bedtime, Disp: 90 tablet, Rfl: 3  •  tamsulosin (FLOMAX) 0.4 mg, Take 2 capsules (0.8 mg total) by mouth daily with dinner, Disp: 180 capsule, Rfl: 3     No Known Allergies               Vitals:     23 8839 BP: 170/97   Pulse: 93         Objective:                     Left Knee Exam      Muscle Strength   The patient has normal left knee strength.     Tenderness   The patient is experiencing tenderness in the medial joint line.     Range of Motion   Extension: 5   Flexion: 110 (with crepitation and stiffness)      Other   Erythema: absent  Sensation: normal  Swelling: mild  Effusion: effusion present     Comments:    Varus alignment, it cannot be passively corrected to neutral  Intact extensor mechanism  Healed arthroscopy portals  Mildly antalgic gait                  Diagnostics, reviewed and taken today if performed as documented:     The attending physician has personally reviewed the pertinent films in PACS and interpretation is as follows:     Left knee x-rays taken and reviewed in the office today show: advanced degenerative changes with large  [address] (which was not present on his October 2022 films). Mild lateral joint space narrowing with modest patellofemoral compartment narrowing. Subchondral sclerosis and osteophyte formation present.         Procedures, if performed today:     Procedures     None performed       Portions of the record may have been created with voice recognition software.  Occasional wrong word or "sound a like" substitutions may have occurred due to the inherent limitations of voice recognition software.  Read the chart carefully and recognize, using context, where substitutions have occurred. Instructions       Return for post-op with x-rays left knee. 1. Primary osteoarthritis of left knee  Ambulatory Referral to Orthopedic Surgery       2. Chronic pain of left knee  XR knee 1 or 2 vw left       3. Effusion of left knee          4. History of arthroscopy of left knee          5. Pre-operative laboratory examination          6. Pre-operative cardiovascular examination          7. Aftercare following left knee joint replacement surgery          8.  Healthcare maintenance           Return for post-op with x-rays left knee.        Knee Replacement   AMBULATORY CARE:   What you need to know about knee replacement:  Knee replacement is surgery to replace all or part of your knee joint. It is also called knee arthroplasty.         How to prepare for knee replacement:   • Weeks before your surgery:       ? Your healthcare provider will check your overall health. He or she will ask about your current knee pain or stiffness. Tell your provider how pain or stiffness affects your daily activities or ability to play sports. He or she may also ask about fatigue, anxiety, or depression you may have.      ? Some medicines will need to be stopped weeks before surgery. These medicines include blood thinning medicine, such as aspirin and ibuprofen. It also includes some antirheumatic medicines. Make sure your healthcare provider knows all medicines you are taking. Also ask how long before surgery to stop taking them.     ? Your healthcare provider may have you do exercises to strengthen your leg muscles before surgery.     ? You may need x-rays to help your healthcare provider plan your surgery. Ask about any other tests you may need.     • The night before and the day of surgery:       ? Your healthcare provider may tell you not to eat or drink anything after midnight on the day of your surgery.      ? You will be told what medicines you can or cannot take the morning of surgery.     What will happen during knee replacement:   • You may be given general anesthesia to keep you asleep and free from pain during surgery. You may instead be given regional anesthesia, such as spinal or epidural anesthesia, or a peripheral nerve block. Regional anesthesia keeps you numb from the waist down, but you will be awake during surgery.      • Your surgeon will make an incision over your knee joint. He or she will remove the damaged parts of your knee joint and replace them with a knee implant.  The knee implant may be made of metal and plastic. Your surgeon may secure it with medical cement.      • Your surgeon will move the muscles and other tissues around your joint back into place. He or she will close your incision with stitches or staples. He or she may use strips of medical tape and a bandage to cover your wound.        What to expect after knee replacement:   • It is normal to have increased stiffness and pain after surgery. Your pain and stiffness should get better with exercise.     • Do not get out of bed until your healthcare provider says it is okay. The physical therapist will help you walk after your surgery. When you walk the same day after surgery, it helps decrease pain and improves the function of your knee. You may use crutches or a walker.     • You may be in the hospital 1 to 4 days, or you may go home shortly after surgery. Your healthcare provider may talk to you about rehabilitation you can do at home. A physical therapist can teach you exercises to help strengthen your knee and prevent stiffness. You may also need occupational therapy to teach you the best ways to bathe and dress.      • You may  be given a joint replacement ID card. The card will tell which joint was replaced and when it was replaced. Tell all healthcare providers about your joint replacement surgery.     Risks of knee replacement:  You may bleed more than expected or get an infection. Nerves or blood vessels may be damaged during surgery. After surgery, your knee may be stiff or numb. You may continue to have knee pain. You may get a blood clot in your leg. This may become life-threatening. Your implant may get loose or move out of place. The implant may get worn out over time and need to be replaced.   Call your local emergency number (916 in the [address]) for any of the following:   • You feel lightheaded, short of breath, and have chest pain.      • You cough up blood.     Seek care immediately if:   • Your leg feels warm, tender, and painful. It may look swollen and red.     • You cannot walk or move your knee.     • Blood soaks through your bandage.     • Your incision wound comes apart.     • Your incision area is red, swollen, or draining pus.     Call your doctor or surgeon if:   • You have a fever or chills.     • You have trouble moving or bending your knee.     • You have new knee pain or pain that does not get better with medicine.     • You have questions or concerns about your condition or care.     Medicines: You may  need any of the following:  • Prescription pain medicine  may be given. Ask your healthcare provider how to take this medicine safely. Some prescription pain medicines contain acetaminophen. Do not take other medicines that contain acetaminophen without talking to your healthcare provider. Too much acetaminophen may cause liver damage. Prescription pain medicine may cause constipation. Ask your healthcare provider how to prevent or treat constipation.      • NSAIDs , such as ibuprofen, help decrease swelling, pain, and fever. This medicine is available with or without a doctor's order. NSAIDs can cause stomach bleeding or kidney problems in certain people. If you take blood thinner medicine, always ask your healthcare provider if NSAIDs are safe for you. Always read the medicine label and follow directions.     • Blood thinners  help prevent blood clots. Clots can cause strokes, heart attacks, and death. The following are general safety guidelines to follow while you are taking a blood thinner:     ? Watch for bleeding and bruising while you take blood thinners. Watch for bleeding from your gums or nose. Watch for blood in your urine and bowel movements. Use a soft washcloth on your skin, and a soft toothbrush to brush your teeth. This can keep your skin and gums from bleeding. If you shave, use an electric shaver.  Do not play contact sports.      ? Tell your dentist and other healthcare providers that you take a blood thinner. Wear a bracelet or necklace that says you take this medicine.      ? Do not start or stop any other medicines unless your healthcare provider tells you to. Many medicines cannot be used with blood thinners.     ? Take your blood thinner exactly as prescribed by your healthcare provider. Do not skip does or take less than prescribed. Tell your provider right away if you forget to take your blood thinner, or if you take too much.     ? Warfarin  is a blood thinner that you may need to take. The following are things you should be aware of if you take warfarin:      - Foods and medicines can affect the amount of warfarin in your blood. Do not make major changes to your diet while you take warfarin. Warfarin works best when you eat about the same amount of vitamin K every day. Vitamin K is found in green leafy vegetables and certain other foods. Ask for more information about what to eat when you are taking warfarin.     - You will need to see your healthcare provider for follow-up visits when you are on warfarin. You will need regular blood tests. These tests are used to decide how much medicine you need.     • Take your medicine as directed. Contact your healthcare provider if you think your medicine is not helping or if you have side effects. Tell your provider if you are allergic to any medicine. Keep a list of the medicines, vitamins, and herbs you take. Include the amounts, and when and why you take them. Bring the list or the pill bottles to follow-up visits. Carry your medicine list with you in case of an emergency.     Self-care:   • Apply ice  on your knee for 15 to 20 minutes every hour or as directed. Use an ice pack, or put crushed ice in a plastic bag. Cover it with a towel. Ice helps prevent tissue damage and decreases swelling and pain.      • Do not get the area wet  until your healthcare provider says it is okay. When your provider says it is okay, carefully wash the area with soap and water. Dry the area and put on new, clean bandages as directed.     • Care for your incision area  as directed. Do not put powders or lotions over the area. If you have strips of medical tape over the incision area, let them fall off on their own. If they do not fall off within 14 days, gently remove them. Check the area for signs of infection, such as swelling, redness, or pus.     • Go to physical or occupational therapy , if directed. A physical therapist will teach you exercises to build muscle strength, decrease pain and swelling, and prevent blood clots. An occupational therapist will show you how to do daily activities safely. He or she may recommend assistive devices to help you dress, bathe, and  objects. The assistive devices will help you prevent knee damage from twisting or bending.     Prevent blood clots:   • Wear pressure stockings as directed. Pressure stockings help keep blood from pooling in your leg veins. Your healthcare provider can prescribe stockings that are right for you. Do not buy over-the-counter pressure stockings unless your healthcare provider says it is okay. They may not fit correctly or may have elastic that cuts off your circulation. Ask your healthcare provider when to start wearing pressure stockings and how long to wear them each day.          • Do not cross your legs for 6 weeks or as directed. Your risk for blood clots is greater when you cross your legs. You might also move your implant out of place.     Activity guidelines:   • Know your limits. Start activities slowly and give yourself rest periods. Pain and swelling can increase when you do too much. Do not do an activity until your healthcare provider says you are ready.     • Use assistive devices as directed. Your thigh muscles will be weak after surgery.  Assistive devices will help you not fall.      • Go up the stairs  by placing your non-operated leg on the step first. Then bring your operated leg to the same step. Repeat.     • Go down stairs  by placing your operated leg down on the step first. Then bring your non-operated leg to the same step. Repeat.     • Do light housekeeping only. Ask your healthcare provider which housekeeping activities are okay for you. Do not vacuum, change sheets on a bed, or anything that makes you reach up, bend, or twist.     • Do not drive for at least 6 weeks  or until your healthcare provider says it is okay.     • Do not play contact sports, tennis, golf, or ski  until your healthcare provider says it is okay. These activities can loosen your knee implant.     Prevent falls:     •   Remove all loose carpets and cords. These can cause you to trip and fall.     • Use a shower bench or chair  when you take a shower to limit the time you are standing.     • Use a toilet seat riser with arms  if your toilet seat is low. A toilet seat riser will help prevent bending or twisting your knee. Metal detectors and MRIs after joint replacement surgery:   • The metal in your joint may set off metal detectors, such as at an airport. Tell the officials about your joint replacement surgery. You may also want to show your joint replacement ID card, if you were given one.     • MRIs are considered safe for people with joint replacements. The type of metal used is not magnetic. Tell all healthcare providers about your joint replacement surgery.     Follow up with your healthcare provider as directed: Your provider may contact you weeks after your surgery. He or she may ask if surgery helped relieve your pain or stiffness. Tell your provider how well you are able to do your daily activities after surgery. Also tell him or her if you are having any problems with mobility or range of motion. Write down your questions so you remember to ask them during your visits.   © Copyright [employer] 2022 Information is for End User's use only and may not be sold, redistributed or otherwise used for commercial purposes. The above information is an  only. It is not intended as medical advice for individual conditions or treatments. Talk to your doctor, nurse or pharmacist before following any medical regimen to see if it is safe and effective for you.                After Visit Summary (Automatic SnapShot taken 5/30/2023)  Additional Documentation    Vitals:   /97    Pulse 93   Ht 5' 4" (1.626 m)   Wt 94.8 kg (209 lb)   BMI 35.87 kg/m²   BSA 1.99 m²      More Vitals   SmartForms:    [facility] PRE-CHARTING •    [facility] PCMH/PCSP WRAP UP REQUIREMENTS ADVANCED •    [facility] SCRIBE ATTESTATION   . ..(2 more)   Encounter Info:   Billing Info,   History,   Allergies,   Detailed Report        Communications    View All Conversations on this Encounter  Orders Placed       Comprehensive metabolic panel     Hemoglobin A1C W/EAG Estimation     Type and screen     XR knee 1 or 2 vw left     Ambulatory referral to surgical optimization Closed     Ambulatory referral to Physical Therapy Authorized     Case request operating room: [address]  All Encounter Results  Other Orders Performed     Ambulatory Referral to Orthopedic Surgery Closed  Medication Changes       Ascorbic Acid 500 mg Oral 2 times daily     Enoxaparin Sodium 40 mg Subcutaneous Daily, To start postoperatively     Ferrous Sulfate 324 (65 Fe) mg Take one tablet daily.      Folic Acid 1 mg Oral Daily  Medication List  Visit Diagnoses       Primary osteoarthritis of left knee     Chronic pain of left knee     Effusion of left knee     History of arthroscopy of left knee     Pre-operative laboratory examination     Pre-operative cardiovascular examination     Aftercare following left knee joint replacement surgery     Healthcare maintenance  Problem List  Encounters with Related Results     Appointment (7/13/2023)   Hospital Encounter (5/30/2023)

## 2023-08-17 NOTE — OP NOTE
OPERATIVE REPORT  PATIENT NAME: Singh Carrera  : 1950  MRN: [id number]  Pt Location:  [facility] OR ROOM 18    Surgery Date: 2023    Surgeon(s) and Role:     * Michelle Myers MD - Primary     * Iva Richmond PA-C - Assisting     Preop Diagnosis:  Primary osteoarthritis of left knee [M17.12]  Chronic pain of left knee [M25.562, G89.29]    Post-Op Diagnosis Codes:     * Primary osteoarthritis of left knee [M17.12]     * Chronic pain of left knee [M25.562, G89.29]    Procedure(s):  Left - ARTHROPLASTY KNEE TOTAL W ROBOT    Specimens:  * No specimens in log *    Estimated Blood Loss:   Minimal    Drains:  Closed/Suction Drain Anterior; Left Knee Accordion 10 Fr. (Active)   Number of days: 0       Urethral Catheter Latex 16 Fr. (Active)   Number of days: 0       Anesthesia Type:   Spinal w/ Femoral Nerve Block     Operative Indications:  Primary osteoarthritis of left knee [M17.12]  Chronic pain of left knee [M25.562, G89.29]    Operative Findings:  depuy   Femur-5   Poly-10   Tibia-6   [id number]    Complications:   None    Knee Technique: Suture (direct) Repair  Knee Approach: Medial Parapatellar    Procedure and Technique: Following the induction medical level spinal anesthesia, Ayala catheters and sterilely introduced in this patient's bladder. Antibiotics administered. The left thigh was then fitted with a thigh-high tourniquet. The left lower extremity underwent sterile prep drape. The left lower extremity was exsanguinated gravity, tourniquet inflated to 300 mmHg. A midline knee incision was created the knee in flexion. Full-thickness flaps were raised in order to access the extensor mechanism. A medial arthrotomy was created to open up the knee joint. 2 half pins were placed in proximal tibia, 2 half pins were placed on distal femur. In doing so, modules were created. The arrays were then attached to the modules. Checkpoints were in the proximal tibia distal femur.   The knee was then registered. The surgery was planned out on computer, the plan was finalized. The robot was docked. First maneuver of the distal femoral cut followed by anterior posterior cuts. The chamfer cuts complete the process. Proximal tibia cut was made next. Care was taken preserve the intake of the medial collateral, lateral collateral, posterior structures during these maneuvers. The box cut was then made for the posterior stabilized unit, remnant medial and lateral meniscectomies then performed. Trials were inserted, the knee was taken through range of motion, found to be capable full extension, good flexion, stable throughout. The patella was then resurfaced while utilizing manufactures equipment, was found to be a size 38 mm button. The trial components were removed and the knee was prepared for insertion of cemented components. The cemented tibia, cemented femur, trial poly-, cemented patella placed. Excess cement was removed, the knee was brought into extension. The cement was allowed to cure. The trial poly was taken out, the knee was packed up. The tourniquet was deflated, and hemostasis was secured. The insert polyethylene was then snapped into position. The knee was taken their final range of motion, found to be capable full extension, good flexion, stable throughout, next patella tracking. Satisfied with the extent of surgery, the wounds were flushed with saline and closed. Betadine soak initiated. A drain is placed deep brought via cephalad stab incision. The arthrotomy was closed with number Vicryl suture. The subcu tissue closed with 2-0 Vicryl suture. The skin was closed with staples. Sterile dressings were applied. He was then transferred to recovery room in stable condition plans include physical therapy weight-bear tolerance, he will require DVT prophylaxis Lovenox.   Please note, there is no qualified orthopedic resident was available assist, and the assistance of Erasto Arroyo ESTRELLA was instrumental in the safe execution's patient surgery. Started the patient position, patient prepped draped, Intra-Op assistance, wound closure, dressing application, patient transfers, all of these were performed under my direct supervision   I was present for the entire procedure.     Patient Disposition:  PACU             SIGNATURE: Dre Kilgore MD  DATE: August 17, 2023  TIME: 8:43 AM

## 2023-08-17 NOTE — ANESTHESIA PROCEDURE NOTES
Spinal Block    Patient location during procedure: OR  Start time: 8/17/2023 7:28 AM  Reason for block: at surgeon's request and primary anesthetic  Staffing  Performed by: Emeli Levin CRNA  Authorized by: Markie Deleon MD    Preanesthetic Checklist  Completed: patient identified, IV checked, site marked, risks and benefits discussed, surgical consent, monitors and equipment checked, pre-op evaluation and timeout performed  Spinal Block  Patient position: sitting  Prep: ChloraPrep  Patient monitoring: heart rate, cardiac monitor, continuous pulse ox and frequent blood pressure checks  Approach: midline  Location: L3-4  Injection technique: single-shot  Needle  Needle type: pencil-tip   Needle gauge: 25 G  Needle length: 5 cm  Assessment  Sensory level: T10  Injection Assessment:  negative aspiration for heme, no paresthesia on injection and positive aspiration for clear CSF.   Post-procedure:  site cleaned

## 2023-08-17 NOTE — INTERVAL H&P NOTE
H&P reviewed. After examining the patient I find no changes in the patients condition since the H&P had been written. Vitals:    08/17/23 0554   BP: 155/77   Pulse: 99   Temp: (!) 96.9 °F (36.1 °C)   SpO2: 96%   Head: Present  CVS: RRR  Lungs: Clear bilateral  Abdomen: Present  Assessment: Adult male with osteoarthritis of the left knee that remains symptomatic spite appropriate nonsurgical treatments  Plan: Left total knee replacement.   Patient is familiar with risks and benefits

## 2023-08-17 NOTE — PHYSICAL THERAPY NOTE
Physical Therapy Evaluation     Patient's Name: Gautam Nielsen    Admitting Diagnosis  Primary osteoarthritis of left knee [M17.12]  Chronic pain of left knee [M25.562, G89.29]    Problem List  Patient Active Problem List   Diagnosis    Hypercholesteremia    High blood pressure    Dyspnea    Class 2 severe obesity due to excess calories with serious comorbidity and body mass index (BMI) of 35.0 to 35.9 in [facility])    Carpal tunnel syndrome on right    Carpal tunnel syndrome on left    Type 2 diabetes mellitus without complication, without long-term current use of insulin ([facility])    Tear of medial meniscus of right knee, current    Primary osteoarthritis of left knee       Past Medical History  Past Medical History:   Diagnosis Date    Arthritis     Colon polyp     COVID 09/07/2021    and 12/2022    Diabetes mellitus ([address])     Dyslipidemia     Fallen arches     GERD (gastroesophageal reflux disease)     Hyperlipidemia     Nodule of left lung 2007    negative for CA    Type 2 diabetes mellitus without complication, without long-term current use of insulin ([address]) 01/23/2019    Use of cane as ambulatory aid        Past Surgical History  Past Surgical History:   Procedure Laterality Date    COLONOSCOPY      ELBOW SURGERY Bilateral     FOOT TENDON SURGERY Bilateral     heel    HAND SURGERY      CA ARTHRS KNE SURG W/MENISCECTOMY MED/LAT W/SHVG Right 05/23/2022    Procedure: KNEE ARTHROSCOPY WITH medial MENISCECTOMY chondroplasty synovectomy injection;  Surgeon: Perez Haynes DO;  Location: [facility];  Service: Orthopedics    CA ARTHRS KNE SURG W/MENISCECTOMY MED/LAT W/SHVG Left 2/6/2023    Procedure: Left knee arthroscopy: Medial meniscectomy; Lateral meniscectomy; Chondroplasty; Synovectomy;  Post arthroscopic injection of intra-articular joint space and peripheral portal;  Surgeon: Perez Haynes DO;  Location: [facility];  Service: Orthopedics    CA NEUROPLASTY &/TRANSPOS MEDIAN NRV CARPAL Vanice Latus Left 06/30/2021    Procedure: RELEASE CARPAL TUNNEL;  Surgeon: Dillan Rendon DO;  Location: [facility] MAIN OR;  Service: Orthopedics          08/17/23 1320   PT Last Visit   PT Visit Date 08/17/23   Note Type   Note type Evaluation   Pain Assessment   Pain Assessment Tool 0-10   Pain Score 8   Pain Location/Orientation Orientation: Left; Location: Knee; Location: Incision   Pain Onset/Description Onset: Ongoing; Descriptor: Discomfort; Descriptor: Burning   Effect of Pain on Daily Activities limited mobility   Patient's Stated Pain Goal No pain   Hospital Pain Intervention(s) Repositioned; Ambulation/increased activity;Cold applied;Elevated; Emotional support   Restrictions/Precautions   Weight Bearing Precautions Per Order Yes   LLE Weight Bearing Per Order WBAT   Other Precautions WBS; Multiple lines; Fall Risk;Pain  (hemovac ; s/p L TKA)   Home Living   Type of [address] [place] [address] One level;Performs ADLs on one level; Able to live on main level with bedroom/bathroom;Stairs to enter with rails; Laundry in basement  (2+2+4 YOSSI)   Bathroom Shower/Tub Tub/shower unit   Bathroom Toilet Raised   Bathroom Equipment Commode   Bathroom Accessibility Accessible   Home Equipment Walker;Cane  (SPC used PTA)   Prior Function   Level of [place] Independent with ADLs; Independent with functional mobility; Independent with IADLS   Lives With Spouse   Receives Help From Family  (spouse works full time, but has taken off work for at least 1 week to assist patient as needed)   IADLs Independent with driving; Independent with meal prep; Independent with medication management   Falls in the last 6 months 0   Vocational Part time employment  (retired, 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)   General   Family/Caregiver Present Yes  (wife)   Cognition   Overall Cognitive Status WFL   Arousal/Participation Alert   Orientation Level Oriented X4   Memory Within functional limits   Following Commands Follows all commands and directions without difficulty   Comments Patient pleasant and cooperative   Subjective   Subjective Patient agreeable to PT evaluation   RUE Assessment   RUE Assessment WFL   LUE Assessment   LUE Assessment WFL   RLE Assessment   RLE Assessment WFL  (4/5 grossly)   LLE Assessment   LLE Assessment   (limited strength/mobility s/p L TKA ; hip/ankle strength at least 3/5)   Vision-Basic Assessment   Current Vision Wears glasses all the time   Bed Mobility   Supine to Sit 4  Minimal assistance   Additional items Assist x 1;HOB elevated; Increased time required;Verbal cues;LE management   Sit to Supine Unable to assess   Additional Comments in bed on arrival, but left in bedside chair following activity   Transfers   Sit to Stand 4  Minimal assistance   Additional items Assist x 1; Increased time required;Verbal cues   Stand to Sit 4  Minimal assistance   Additional items Assist x 1; Increased time required;Verbal cues   Additional Comments at [facility]   Ambulation/Elevation   Gait pattern Antalgic; Forward Flexion; Short stride; Excessively slow   Gait Assistance 4  Minimal assist   Additional items Assist x 1;Verbal cues   Assistive Device Rolling walker   Distance 2' to chair   Stair Management Assistance Not tested   Balance   Static Sitting Fair +   Dynamic Sitting Fair   Static Standing Fair -   Dynamic Standing Poor +   Ambulatory Poor +   Endurance Deficit   Endurance Deficit Yes   Endurance Deficit Description LLE pain + weakness   Activity Tolerance   Activity Tolerance Patient tolerated treatment well;Patient limited by pain   Nurse Made Aware RN cleared   Assessment   Prognosis Good   Problem List Decreased strength;Decreased range of motion;Decreased endurance; Impaired balance;Decreased mobility;Orthopedic restrictions;Pain   Assessment Pt is a 68 y.o. male seen for a high complexity PT evaluation due to Ongoing medical management for primary dx, Increased reliance on more restrictive AD compared to baseline, Decreased activity tolerance compared to baseline, Fall risk, Increased assistance needed from caregiver at current time, s/p post op day 0, Current WBS, Continuous pulse oximetry monitoring , hemovac in place. Patient is s/p admit to [address] [place] [address] on 8/17/2023 for Primary osteoarthritis of left knee (M17.12)  Chronic pain of left knee (M25.562, G89.29). Patient  has a past medical history of Arthritis, Colon polyp, COVID, Diabetes mellitus ([address]), Dyslipidemia, Fallen arches, GERD (gastroesophageal reflux disease), Hyperlipidemia, Nodule of left lung, Type 2 diabetes mellitus without complication, without long-term current use of insulin ([address]), and Use of cane as ambulatory aid. .     PT now consulted to assess functional mobility and needs for safe d/c planning. Prior to admission, pt was I with functional mobility, ADLs, and IADLs. Personal factors affecting status include [address] with 2+2+4 YOSSI, laundry in basement, increased need for assistance, medical status. Currently pt requires Min A for bed mobility, Min A for functional transfers with RW ; Min A for short distance ambulation to chair with RW. Pt presents functioning below baseline and w/ overall mobility deficits 2* to: decreased strength, decreased endurance, decreased mobility, impaired balance. These impairments place pt at risk for falls. Pt will continue to benefit from skilled PT interventions to address stated impairments; to maximize functional potential; for ongoing pt/family education; and DME needs. The patient's AM-PAC Basic Mobility Inpatient Short Form Raw Score Is 17. A Raw score of greater than or equal to 16 suggests the patient may benefit from discharge to home. PT is currently recommending home with OPPT on d/c from hospital. Will continue to follow as able.    Barriers to Discharge Inaccessible home environment  (2+2+4 YOSSI)   Goals   Patient Goals to have less pain   STG Expiration Date 08/31/23   Short Term Goal #1 In 14 days, patient will    1) increase strength in BUE/BLE by 1/2 to 1 full grade for increased strength and stability needed for functional mobility 2) improve bed mobility to MI for improved mobility and decreased need for assist 3) increase functional transfers to MI for improved safety and functional mobility 4) ambulate 250ft with MI using RW for increased endurance and safety ambulating home and community environments 5) improve balance by 1 grade for improved safety and stability and decreased risk for falls. 6) ascend/descend at least 8 stairs using HR with MI in order to safely enter home   PT Treatment Day 0   Plan   Treatment/Interventions ADL retraining;Functional transfer training;LE strengthening/ROM; Elevations; Therapeutic exercise; Endurance training;Patient/family training;Equipment eval/education;Gait training;Bed mobility;Spoke to nursing;Spoke to case management;OT   PT Frequency Twice a day   Recommendation   PT Discharge Recommendation Home with outpatient rehabilitation   Equipment Recommended [facility] Recommended Wheeled walker   AM-PAC Basic Mobility Inpatient   Turning in Flat Bed Without Bedrails 3   Lying on Back to Sitting on Edge of Flat Bed Without Bedrails 3   Moving Bed to Chair 3   Standing Up From Chair Using Arms 3   Walk in Room 3   Climb 3-5 Stairs With Railing 2   Basic Mobility Inpatient Raw Score 17   Basic Mobility Standardized Score 39.67   Highest Level Of Mobility   JH-HLM Goal 5: Stand one or more mins   JH-HLM Achieved 5: Stand (1 or more minutes)   Modified [place] Scale   Modified [place] Scale 4   End of Consult   Patient Position at End of Consult Bedside chair; All needs within reach         Jesus Emily, PT, DPT

## 2023-08-17 NOTE — DISCHARGE INSTR - AVS FIRST PAGE
Discharge Instructions - Orthopedics  Gail Giraldo 68 y.o. male MRN: [id number]  Unit/Bed#: APU 05    Weight Bearing Status:                                           Weight Bearing as tolerated to the left lower extremity. DVT prophylaxis:  Complete course of Lovenox as directed    Pain:  Continue analgesics as directed    Showering Instructions:   Do not shower until followup     Dressing Instructions:   Keep dressing clean, dry and intact until follow up appointment. Driving Instructions:  No driving until cleared by Orthopaedic Surgery. PT/OT:  Continue PT/OT on outpatient basis as directed    Appt Instructions: If you do not have your appointment, please call the clinic at [phone]  Otherwise followup as scheduled    Contact the office sooner if you experience any increased numbness/tingling in the extremities.       Miscellaneous:  None

## 2023-08-17 NOTE — PROGRESS NOTES
Pt taken to [facility] by [facility], OR transport. Lexis Milan called and notified of missing components of chart.

## 2023-08-18 DIAGNOSIS — Z96.652 AFTERCARE FOLLOWING LEFT KNEE JOINT REPLACEMENT SURGERY: Primary | ICD-10-CM

## 2023-08-18 DIAGNOSIS — Z47.1 AFTERCARE FOLLOWING LEFT KNEE JOINT REPLACEMENT SURGERY: Primary | ICD-10-CM

## 2023-08-18 DIAGNOSIS — M17.12 PRIMARY OSTEOARTHRITIS OF LEFT KNEE: ICD-10-CM

## 2023-08-18 LAB
ANION GAP SERPL CALCULATED.3IONS-SCNC: 6 MMOL/L
BUN SERPL-MCNC: 16 MG/DL (ref 5–25)
CALCIUM SERPL-MCNC: 8.5 MG/DL (ref 8.3–10.1)
CHLORIDE SERPL-SCNC: 103 MMOL/L (ref 96–108)
CO2 SERPL-SCNC: 27 MMOL/L (ref 21–32)
CREAT SERPL-MCNC: 1.04 MG/DL (ref 0.6–1.3)
ERYTHROCYTE [DISTWIDTH] IN BLOOD BY AUTOMATED COUNT: 13.8 % (ref 11.6–15.1)
GFR SERPL CREATININE-BSD FRML MDRD: 71 ML/MIN/1.73SQ M
GLUCOSE P FAST SERPL-MCNC: 172 MG/DL (ref 65–99)
GLUCOSE SERPL-MCNC: 159 MG/DL (ref 65–140)
GLUCOSE SERPL-MCNC: 172 MG/DL (ref 65–140)
GLUCOSE SERPL-MCNC: 185 MG/DL (ref 65–140)
GLUCOSE SERPL-MCNC: 200 MG/DL (ref 65–140)
HCT VFR BLD AUTO: 39.9 % (ref 36.5–49.3)
HGB BLD-MCNC: 13.1 G/DL (ref 12–17)
MCH RBC QN AUTO: 29.2 PG (ref 26.8–34.3)
MCHC RBC AUTO-ENTMCNC: 32.8 G/DL (ref 31.4–37.4)
MCV RBC AUTO: 89 FL (ref 82–98)
PLATELET # BLD AUTO: 140 THOUSANDS/UL (ref 149–390)
PMV BLD AUTO: 10.4 FL (ref 8.9–12.7)
POTASSIUM SERPL-SCNC: 4.5 MMOL/L (ref 3.5–5.3)
RBC # BLD AUTO: 4.49 MILLION/UL (ref 3.88–5.62)
SODIUM SERPL-SCNC: 136 MMOL/L (ref 135–147)
WBC # BLD AUTO: 9.24 THOUSAND/UL (ref 4.31–10.16)

## 2023-08-18 PROCEDURE — 97530 THERAPEUTIC ACTIVITIES: CPT

## 2023-08-18 PROCEDURE — NC001 PR NO CHARGE: Performed by: NURSE PRACTITIONER

## 2023-08-18 PROCEDURE — 82948 REAGENT STRIP/BLOOD GLUCOSE: CPT

## 2023-08-18 PROCEDURE — 85027 COMPLETE CBC AUTOMATED: CPT | Performed by: PHYSICIAN ASSISTANT

## 2023-08-18 PROCEDURE — 80048 BASIC METABOLIC PNL TOTAL CA: CPT | Performed by: PHYSICIAN ASSISTANT

## 2023-08-18 PROCEDURE — 99232 SBSQ HOSP IP/OBS MODERATE 35: CPT | Performed by: INTERNAL MEDICINE

## 2023-08-18 PROCEDURE — 97167 OT EVAL HIGH COMPLEX 60 MIN: CPT

## 2023-08-18 PROCEDURE — 99024 POSTOP FOLLOW-UP VISIT: CPT | Performed by: ORTHOPAEDIC SURGERY

## 2023-08-18 PROCEDURE — 97116 GAIT TRAINING THERAPY: CPT

## 2023-08-18 PROCEDURE — 99232 SBSQ HOSP IP/OBS MODERATE 35: CPT | Performed by: NURSE PRACTITIONER

## 2023-08-18 RX ORDER — HYDROCODONE BITARTRATE AND ACETAMINOPHEN 5; 325 MG/1; MG/1
0.5 TABLET ORAL EVERY 4 HOURS PRN
Status: DISCONTINUED | OUTPATIENT
Start: 2023-08-18 | End: 2023-08-19 | Stop reason: HOSPADM

## 2023-08-18 RX ORDER — HYDROCODONE BITARTRATE AND ACETAMINOPHEN 5; 325 MG/1; MG/1
1 TABLET ORAL EVERY 6 HOURS PRN
Qty: 30 TABLET | Refills: 0 | Status: SHIPPED | OUTPATIENT
Start: 2023-08-18 | End: 2023-08-18 | Stop reason: SINTOL

## 2023-08-18 RX ORDER — HYDROCODONE BITARTRATE AND ACETAMINOPHEN 5; 325 MG/1; MG/1
1 TABLET ORAL EVERY 4 HOURS PRN
Status: DISCONTINUED | OUTPATIENT
Start: 2023-08-18 | End: 2023-08-19 | Stop reason: HOSPADM

## 2023-08-18 RX ORDER — OXYCODONE HYDROCHLORIDE 5 MG/1
5 TABLET ORAL EVERY 4 HOURS PRN
Qty: 30 TABLET | Refills: 0 | Status: SHIPPED | OUTPATIENT
Start: 2023-08-18 | End: 2023-08-19 | Stop reason: SDUPTHER

## 2023-08-18 RX ORDER — LORATADINE 10 MG/1
10 TABLET ORAL DAILY
Status: DISCONTINUED | OUTPATIENT
Start: 2023-08-18 | End: 2023-08-19 | Stop reason: HOSPADM

## 2023-08-18 RX ADMIN — DOCUSATE SODIUM 100 MG: 100 CAPSULE ORAL at 16:49

## 2023-08-18 RX ADMIN — OXYCODONE HYDROCHLORIDE 10 MG: 10 TABLET ORAL at 08:35

## 2023-08-18 RX ADMIN — ENOXAPARIN SODIUM 40 MG: 40 INJECTION SUBCUTANEOUS at 22:26

## 2023-08-18 RX ADMIN — INSULIN LISPRO 1 UNITS: 100 INJECTION, SOLUTION INTRAVENOUS; SUBCUTANEOUS at 22:30

## 2023-08-18 RX ADMIN — INSULIN LISPRO 1 UNITS: 100 INJECTION, SOLUTION INTRAVENOUS; SUBCUTANEOUS at 06:43

## 2023-08-18 RX ADMIN — METFORMIN HYDROCHLORIDE 500 MG: 500 TABLET ORAL at 10:16

## 2023-08-18 RX ADMIN — LORATADINE 10 MG: 10 TABLET ORAL at 10:42

## 2023-08-18 RX ADMIN — INSULIN LISPRO 1 UNITS: 100 INJECTION, SOLUTION INTRAVENOUS; SUBCUTANEOUS at 16:50

## 2023-08-18 RX ADMIN — DOCUSATE SODIUM 100 MG: 100 CAPSULE ORAL at 08:35

## 2023-08-18 RX ADMIN — HYDROMORPHONE HYDROCHLORIDE 0.5 MG: 1 INJECTION, SOLUTION INTRAMUSCULAR; INTRAVENOUS; SUBCUTANEOUS at 05:31

## 2023-08-18 RX ADMIN — HYDROCODONE BITARTRATE AND ACETAMINOPHEN 1 TABLET: 5; 325 TABLET ORAL at 19:14

## 2023-08-18 RX ADMIN — PRAVASTATIN SODIUM 40 MG: 40 TABLET ORAL at 16:49

## 2023-08-18 RX ADMIN — ACETAMINOPHEN 650 MG: 325 TABLET, FILM COATED ORAL at 16:55

## 2023-08-18 RX ADMIN — TAMSULOSIN HYDROCHLORIDE 0.8 MG: 0.4 CAPSULE ORAL at 16:49

## 2023-08-18 RX ADMIN — METFORMIN HYDROCHLORIDE 500 MG: 500 TABLET ORAL at 17:29

## 2023-08-18 RX ADMIN — OXYCODONE HYDROCHLORIDE 10 MG: 10 TABLET ORAL at 04:15

## 2023-08-18 RX ADMIN — INSULIN LISPRO 1 UNITS: 100 INJECTION, SOLUTION INTRAVENOUS; SUBCUTANEOUS at 10:42

## 2023-08-18 NOTE — PROGRESS NOTES
Peripheral Nerve Block Follow-up Note - Acute Pain Service    Singh Carrera 68 y.o. male MRN: [id number]  Unit/Bed#: -01 Encounter: [id number]      Assessment:   Principal Problem:    Primary osteoarthritis of left knee    Singh Carrera is a 68y.o. year old male who is postop day 1 from a left total knee arthroplasty. Plan:   - Left adductor/IPACK block with Exparel was done preoperatively for postop pain control with resolving sensory deficits. Denied left lower extremity weakness or paresthesias. Multimodal analgesia with:  · Patient is reporting severe left anterior and posterior knee pain, episode of vomiting this morning and generalized itching mostly on his face and his chest, no rash. We will recommend opioid rotation and medication adjustments as follows:  · Start Claritin 10 mg daily for opioid-induced pruritus  · Discontinue IV Dilaudid  · Discontinue oxycodone  · Start Norco 5/325 mg tablets 0.5 tablet every 4 hours as needed for moderate pain  · Norco 5/325 mg tablets 1 tablet every 4 hours as needed for severe pain  · The patient is advised to apply ice or cold packs intermittently as needed to relieve pain; apply ice for no more than 20 minutes at a time. Bowel regimen  · Colace 100 mg twice a day  · Add Senokot daily    Postop pain is persistent, will rotate opioids given pruritus. If pain improves with opioid rotation and patient feels better this afternoon, okay for discharge from a pain management standpoint. Treatment plan was reviewed with bedside nursing staff and primary care service. Would recommend to continue Norco at time of discharge. APS will sign off at this time. Thank you for the consult. All opioids and other analgesics to be written at discretion of primary team. Please contact Acute Pain Service - [facility] via [employer] from [phone] with additional questions or concerns.  See [facility] or Eric for additional contacts and after hours information. Pain History  Current pain location(s): Left anterior and posterior knee  Pain Scale:   7-10  24 hour history: Patient sitting in chair, reports feeling tired and is mildly drowsy during encounter. Reporting severe pain in the left knee, he does get some improvement with oxycodone however he did have an episode of vomiting this morning and is reporting itching. Denied headache and dizziness, denied feelings of nausea at present time. Opioid requirement previous 24 hours: Oxycodone 40 mg, IV Dilaudid 1 mg.     Meds/Allergies   all current active meds have been reviewed, current meds:   Current Facility-Administered Medications   Medication Dose Route Frequency   • acetaminophen (TYLENOL) tablet 650 mg  650 mg Oral Q4H PRN   • aluminum-magnesium hydroxide-simethicone (MAALOX) oral suspension 30 mL  30 mL Oral Q6H PRN   • diphenhydrAMINE (BENADRYL) tablet 25 mg  25 mg Oral Q6H PRN   • docusate sodium (COLACE) capsule 100 mg  100 mg Oral BID   • enoxaparin (LOVENOX) subcutaneous injection 40 mg  40 mg Subcutaneous Daily   • hydrALAZINE (APRESOLINE) tablet 25 mg  25 mg Oral Q8H PRN   • HYDROcodone-acetaminophen (NORCO) 5-325 mg per tablet 0.5 tablet  0.5 tablet Oral Q4H PRN    Or   • HYDROcodone-acetaminophen (NORCO) 5-325 mg per tablet 1 tablet  1 tablet Oral Q4H PRN   • HYDROmorphone (DILAUDID) injection 0.5 mg  0.5 mg Intravenous Q2H PRN   • insulin lispro (HumaLOG) 100 units/mL subcutaneous injection 1-5 Units  1-5 Units Subcutaneous TID AC   • insulin lispro (HumaLOG) 100 units/mL subcutaneous injection 1-5 Units  1-5 Units Subcutaneous HS   • lactated ringers infusion  125 mL/hr Intravenous Continuous   • loratadine (CLARITIN) tablet 10 mg  10 mg Oral Daily   • metFORMIN (GLUCOPHAGE) tablet 500 mg  500 mg Oral BID With Meals   • ondansetron (ZOFRAN) injection 4 mg  4 mg Intravenous Q6H PRN   • pravastatin (PRAVACHOL) tablet 40 mg  40 mg Oral Daily With Dinner   • tamsulosin (FLOMAX) capsule 0.8 mg  0.8 mg Oral Daily With Dinner    and PTA meds:   Prior to Admission Medications   Prescriptions Last Dose Informant Patient Reported? Taking?   ascorbic acid (VITAMIN C) 500 MG tablet 8/16/2023 at am Self No Yes   Sig: Take 1 tablet (500 mg total) by mouth 2 (two) times a day   Patient taking differently: Take 500 mg by mouth daily   enoxaparin (LOVENOX) 40 mg/0.4 mL   No Yes   Sig: Inject 0.4 mL (40 mg total) under the skin daily for 28 days To start postoperatively   ferrous sulfate 324 (65 Fe) mg 8/16/2023 at am  No Yes   Sig: Take one tablet daily. folic acid (FOLVITE) 1 mg tablet 8/16/2023 at am  No Yes   Sig: Take 1 tablet (1 mg total) by mouth daily   meloxicam (Mobic) 15 mg tablet Past Month  No Yes   Sig: Take 1 tablet (15 mg total) by mouth daily   metFORMIN (GLUCOPHAGE) 500 mg tablet 8/16/2023 at macie  No Yes   Sig: Take 1 tablet (500 mg total) by mouth 2 (two) times a day with meals   simvastatin (ZOCOR) 20 mg tablet 8/16/2023 at macie  No Yes   Sig: Take 1 tablet (20 mg total) by mouth daily at bedtime   tamsulosin (FLOMAX) 0.4 mg 8/16/2023 at macie  No Yes   Sig: Take 2 capsules (0.8 mg total) by mouth daily with dinner      Facility-Administered Medications: None       No Known Allergies    Objective     Temp:  [97.2 °F (36.2 °C)-98.1 °F (36.7 °C)] 98 °F (36.7 °C)  HR:  [] 94  Resp:  [18-20] 20  BP: (126-134)/(71-77) 126/74    Physical Exam  Vitals reviewed. Constitutional:       General: He is not in acute distress. Appearance: He is not ill-appearing, toxic-appearing or diaphoretic. HENT:      Head: Normocephalic and atraumatic. Nose: Nose normal. No congestion or rhinorrhea. Mouth/Throat:      Mouth: Mucous membranes are moist.   Eyes:      Extraocular Movements: Extraocular movements intact. Cardiovascular:      Rate and Rhythm: Normal rate and regular rhythm. Pulmonary:      Effort: Pulmonary effort is normal. No tachypnea, bradypnea or respiratory distress. Breath sounds: Normal breath sounds. No wheezing, rhonchi or rales. Abdominal:      General: Bowel sounds are normal. There is no distension. Palpations: Abdomen is soft. Tenderness: There is no abdominal tenderness. Musculoskeletal:      Comments: Left knee ace wrap intact   Skin:     General: Skin is warm and dry. Coloration: Skin is not pale. Findings: No rash. Neurological:      Mental Status: He is alert and oriented to person, place, and time. Mental status is at baseline. Psychiatric:         Attention and Perception: Attention normal.         Mood and Affect: Mood normal. Mood is not anxious. Speech: Speech normal.         Behavior: Behavior normal. Behavior is cooperative. Lab Results:   Results from last 7 days   Lab Units 08/18/23  0457   WBC Thousand/uL 9.24   HEMOGLOBIN g/dL 13.1   HEMATOCRIT % 39.9   PLATELETS Thousands/uL 140*      Results from last 7 days   Lab Units 08/18/23  0457   POTASSIUM mmol/L 4.5   CHLORIDE mmol/L 103   CO2 mmol/L 27   BUN mg/dL 16   CREATININE mg/dL 1.04   CALCIUM mg/dL 8.5           Counseling / Coordination of Care  Total floor / unit time spent today 20 minutes. Greater than 50% of total time was spent with the patient and / or family counseling and / or coordination of care. A description of the counseling / coordination of care: Chart review included vital signs, laboratory values, past medical history, progress notes and medications. Reviewed postoperative pain management which was inclusive of opioid and nonopioid medications and peripheral nerve block and duration of effectiveness of local anesthetics. Treatment plan was reviewed with bedside nursing staff, patient and primary care service. Please note that the APS provides consultative services regarding pain management only.   With the exception of ketamine, peripheral nerve catheters, and epidural infusions (and except when indicated), final decisions regarding starting or changing doses of analgesic medications are at the discretion of the consulting service. Off hours consultation and/or medication management is generally not available.     DREA Waters  Acute Pain Service

## 2023-08-18 NOTE — OCCUPATIONAL THERAPY NOTE
Occupational Therapy Evaluation     Patient Name: Arlet Diana  [id number] Date: 8/18/2023  Problem List  Principal Problem:    Primary osteoarthritis of left knee    Past Medical History  Past Medical History:   Diagnosis Date    Arthritis     Colon polyp     COVID 09/07/2021    and 12/2022    Diabetes mellitus ([address])     Dyslipidemia     Fallen arches     GERD (gastroesophageal reflux disease)     Hyperlipidemia     Nodule of left lung 2007    negative for CA    Type 2 diabetes mellitus without complication, without long-term current use of insulin ([address]) 01/23/2019    Use of cane as ambulatory aid      Past Surgical History  Past Surgical History:   Procedure Laterality Date    COLONOSCOPY      ELBOW SURGERY Bilateral     FOOT TENDON SURGERY Bilateral     heel    HAND SURGERY      MD ARTHRP KNE CONDYLE&PLATU MEDIAL&LAT COMPARTMENTS Left 8/17/2023    Procedure: ARTHROPLASTY KNEE TOTAL W ROBOT;  Surgeon: Cinthya Roa MD;  Location:  MAIN OR;  Service: Orthopedics    MD ARTHRS KNE SURG W/MENISCECTOMY MED/LAT W/SHVG Right 05/23/2022    Procedure: KNEE ARTHROSCOPY WITH medial MENISCECTOMY chondroplasty synovectomy injection;  Surgeon: María Collins DO;  Location: [facility];  Service: Orthopedics    MD ARTHRS KNE SURG W/MENISCECTOMY MED/LAT W/SHVG Left 2/6/2023    Procedure: Left knee arthroscopy: Medial meniscectomy; Lateral meniscectomy; Chondroplasty; Synovectomy;  Post arthroscopic injection of intra-articular joint space and peripheral portal;  Surgeon: María Collins DO;  Location: [facility];  Service: Orthopedics    MD NEUROPLASTY &/TRANSPOS MEDIAN NRV CARPAL Oleta Fleischer Left 06/30/2021    Procedure: RELEASE CARPAL TUNNEL;  Surgeon: Juana Styles DO;  Location: [address] [place] [address] MAIN OR;  Service: Orthopedics           08/18/23 0839   OT Last Visit   OT Visit Date 08/18/23   Note Type   Note type Evaluation   Pain Assessment   Pain Assessment Tool 0-10   Pain Score 8   Pain Location/Orientation Orientation: Left;Location: Knee   Hospital Pain Intervention(s) Repositioned; Ambulation/increased activity   Restrictions/Precautions   Weight Bearing Precautions Per Order Yes   LLE Weight Bearing Per Order WBAT   Other Precautions Multiple lines; Fall Risk;Pain;WBS  (hemovac drain)   Home Living   Type of [address] [place] [address] Center  One level  (2+2+4 YOSSI)   Bathroom Shower/Tub Tub/shower unit   Bathroom Toilet Raised   Bathroom Equipment Commode  (pt reports BSC over toilet, + "I was supposed to be getting one" re SC)   Home Equipment Walker;Cane  (primarily used cane PTA)   Prior Function   Level of [place] Independent with ADLs; Independent with IADLS   Lives With Spouse   Receives Help From Family   IADLs Independent with driving; Independent with medication management   Falls in the last 6 months 0   Vocational Part time employment   Lifestyle   Autonomy PTA, pt reports being I with ADLs, IADLs, cane for fnxl mobility, (+)    Reciprocal Relationships Wife   Service to Others Retired from [employer], now works part time as a    [employer] Volunteer    General   Additional Pertinent History Pt reports wife has taken off work for ~ 1 week to assist upon d/c   Subjective   Subjective "I am so tired"   ADL   Where Assessed Chair   Eating Assistance 7  Independent   Grooming Assistance 7  Independent   UB Bathing Assistance 5  Supervision/Setup   LB [address] 5  Supervision/Setup   LB Dressing Assistance 4  Minimal Assistance   LB Dressing Deficit Don/doff L sock; Don/doff R sock; Thread RLE into pants; Thread LLE into pants; Thread RLE into underwear; Thread LLE into underwear;Pull up over hips;Don/doff R shoe;Don/doff L shoe  (donned underwear, shorts, and crocs + practiced doffing/donning socks)   Toileting Assistance  5  Supervision/Setup   Additional Comments LB MIN A s/p skilled education on LHAE   Bed Mobility   Supine to Sit Unable to assess   Sit to Supine Unable to assess   Additional Comments Pt seated OOB in chair upon arrival   Transfers   Sit to Stand 5  Supervision   Additional items Increased time required;Verbal cues;Armrests   Stand to Sit 5  Supervision   Additional items Increased time required;Verbal cues;Armrests   Additional Comments transfers with RW, cues for hand placement   Functional Mobility   Functional Mobility 5  Supervision   Additional items Rolling walker   Balance   Static Sitting Fair +   Dynamic Sitting Fair   Static Standing Fair   Dynamic Standing Fair -   Ambulatory Fair -   Activity Tolerance   Activity Tolerance Patient tolerated treatment well   Nurse Made Aware yes   RUE Assessment   RUE Assessment WFL   LUE Assessment   LUE Assessment WFL   Vision-Basic Assessment   Current Vision Wears glasses all the time   Cognition   Overall Cognitive Status WFL   Arousal/Participation Alert; Responsive; Cooperative   Attention Within functional limits   Orientation Level Oriented X4   Memory Within functional limits   Following Commands Follows all commands and directions without difficulty   Comments Pt pleasant and cooperative t/o session   Assessment   Assessment Pt is a 68 y.o. male admitted to [facility] on 8/17/2023 w/ Primary osteoarthritis of left knee. Pt now s/p LLE TKA.  has a past medical history of Arthritis, Colon polyp, COVID, Diabetes mellitus, Dyslipidemia, Fallen arches, GERD, Hyperlipidemia, Nodule of left lung. Pt with active OT orders and activity orders. As per pt report, pta, resides with his wife in a 1STH, 8STE. Pt was I w/  ADLS and IADLS, (+) drove. Upon evaluation, pt currently requires S with RW for transfers and mobility. Pt noted to have increased difficulty with LBD, specifically threading over LLE. Pt educated on [address] and dressing techniques. Pt currently requires I eating, I grooming, S UB ADLs, MIN A LB ADLs, and S toileting.  Provided additional education re safety techniques/awareness and precautions. Verbalized understanding. Pt reports wife will be home and able to assist as needed upon d/c. Primarily limited by increased pain. From OT standpoint, recommendation would be home with OPPT as planned and increased social support. No further acute OT needs. D/C OT. Please re-consult if needed. Thank you. Pt was left after session with all current needs met. The patient's raw score on the AM-PAC Daily Activity Inpatient Short Form is 20. A raw score of greater than or equal to 19 suggests the patient may benefit from discharge to home. Please refer to the recommendation of the Occupational Therapist for safe discharge planning.    Goals   Patient Goals to decrease his pain and sleep   Plan   OT Frequency Eval only   Recommendation   OT Discharge Recommendation Home with outpatient rehabilitation  (OPPT as planned)   AM-PAC Daily Activity Inpatient   Lower Body Dressing 3   Bathing 3   Toileting 3   Upper Body Dressing 3   Grooming 4   Eating 4   Daily Activity Raw Score 20   Daily Activity Standardized Score (Calc for Raw Score >=11) 42.03   AM-PAC Applied Cognition Inpatient   Following a Speech/Presentation 4   Understanding Ordinary Conversation 4   Taking Medications 4   Remembering Where Things Are Placed or Put Away 4   Remembering List of 4-5 Errands 4   Taking Care of Complicated Tasks 4   Applied Cognition Raw Score 24   Applied Cognition Standardized Score 62.21     Asmita Joyce MS, OTR/L

## 2023-08-18 NOTE — DISCHARGE SUMMARY
ORTHOPEDICS DISCHARGE SUMMARY  Emily Myers 68 y.o. male MRN: [id number]  Unit/Bed#: -01    Attending Physician: Armando Mullins    Admitting diagnosis: Primary osteoarthritis of left knee [M17.12]  Chronic pain of left knee [M25.562, G89.29]    Discharge diagnosis: Primary osteoarthritis of left knee [M17.12]  Chronic pain of left knee [U78.408, G89.29]    Date of admission: 8/17/2023    Date of discharge: 08/18/23         Procedure: Left Total Knee Arthroplasty    HPI:  This is a 68y.o. year old male that presented to the office with signs and symptoms of left knee osteoarthritis. They tried and failed conservative treatment measures and wished to proceed with surgical intervention. The risks, benefits, and complications of the procedure were discussed with the patient and informed consent was obtained. Hospital Course: The patient was admitted to the hospital on 8/17/2023 and underwent an uncomplicated left total knee arthroplasty. They were transferred to the floor after a brief stay in the post-anesthesia care unit. Their pain was well managed with IV and oral pain medications. They began therapy on post operative day #1. Lovenox 40mg was also started for DVT prophylaxis 12 hours post operatively. Hemovac drain was removed on POD1. On discharge date pt was cleared by PT and the medicine team and determined to be safe for discharge. Daily discussion was had with the patient, nursing staff, orthopaedic team, and family members if present. All questions were answered to the patients satisifaction. 0   Lab Value Date/Time    HGB 13.1 08/18/2023 0457    HGB 16.0 07/13/2023 0731    HGB 15.7 04/05/2023 1007    HGB 16.2 09/17/2022 0811    HGB 15.8 06/16/2021 0713    HGB 16.6 01/30/2019 0806    HGB 17.1 (H) 01/17/2019 0849    HGB 16.8 04/05/2018 0921       Greater than 2 gram drop which qualifies for diagnosis of acute blood loss anemia.   Vital signs remained stable and pt was resuscitated with IVF as needed   Body mass index is 35.53 kg/m². morbidly obese. Recommend behavior modifications, nutrition and physical activity. Discharge Instructions: The patient was discharged weight bearing as tolerated to the left lower extremity. Nolon Andreas will be continued for 28 days. Continue PT/OT. Take pain medications as instructed. Discharge Medications: For the complete list of discharge medications, please refer to the patient's medication reconciliation.

## 2023-08-18 NOTE — PLAN OF CARE
Problem: MOBILITY - ADULT  Goal: Maintain or return to baseline ADL function  Description: INTERVENTIONS:  -  Assess patient's ability to carry out ADLs; assess patient's baseline for ADL function and identify physical deficits which impact ability to perform ADLs (bathing, care of mouth/teeth, toileting, grooming, dressing, etc.)  - Assess/evaluate cause of self-care deficits   - Assess range of motion  - Assess patient's mobility; develop plan if impaired  - Assess patient's need for assistive devices and provide as appropriate  - Encourage maximum independence but intervene and supervise when necessary  - Involve family in performance of ADLs  - Assess for home care needs following discharge   - Consider OT consult to assist with ADL evaluation and planning for discharge  - Provide patient education as appropriate  Outcome: Progressing  Goal: Maintains/Returns to pre admission functional level  Description: INTERVENTIONS:  - Perform BMAT or MOVE assessment daily.   - Set and communicate daily mobility goal to care team and patient/family/caregiver. - Collaborate with rehabilitation services on mobility goals if consulted  - Perform Range of Motion 3 times a day. - Reposition patient every 2 hours.   - Dangle patient 3 times a day  - Stand patient 3 times a day  - Ambulate patient 3 times a day  - Out of bed to chair 3 times a day   - Out of bed for meals 3 times a day  - Out of bed for toileting  - Record patient progress and toleration of activity level   Outcome: Progressing     Problem: PAIN - ADULT  Goal: Verbalizes/displays adequate comfort level or baseline comfort level  Description: Interventions:  - Encourage patient to monitor pain and request assistance  - Assess pain using appropriate pain scale  - Administer analgesics based on type and severity of pain and evaluate response  - Implement non-pharmacological measures as appropriate and evaluate response  - Consider cultural and social influences on pain and pain management  - Notify physician/advanced practitioner if interventions unsuccessful or patient reports new pain  Outcome: Progressing     Problem: INFECTION - ADULT  Goal: Absence or prevention of progression during hospitalization  Description: INTERVENTIONS:  - Assess and monitor for signs and symptoms of infection  - Monitor lab/diagnostic results  - Monitor all insertion sites, i.e. indwelling lines, tubes, and drains  - Monitor endotracheal if appropriate and nasal secretions for changes in amount and color  - [place] appropriate cooling/warming therapies per order  - Administer medications as ordered  - Instruct and encourage patient and family to use good hand hygiene technique  - Identify and instruct in appropriate isolation precautions for identified infection/condition  Outcome: Progressing     Problem: SAFETY ADULT  Goal: Patient will remain free of falls  Description: INTERVENTIONS:  - Educate patient/family on patient safety including physical limitations  - Instruct patient to call for assistance with activity   - Consult OT/PT to assist with strengthening/mobility   - Keep Call bell within reach  - Keep bed low and locked with side rails adjusted as appropriate  - Keep care items and personal belongings within reach  - Initiate and maintain comfort rounds  - Make Fall Risk Sign visible to staff  - Offer Toileting every  Hours, in advance of need  - Initiate/Maintain alarm  - Obtain necessary fall risk management equipment:   - Apply yellow socks and bracelet for high fall risk patients  - Consider moving patient to room near nurses station  Outcome: Progressing     Problem: DISCHARGE PLANNING  Goal: Discharge to home or other facility with appropriate resources  Description: INTERVENTIONS:  - Identify barriers to discharge w/patient and caregiver  - Arrange for needed discharge resources and transportation as appropriate  - Identify discharge learning needs (meds, wound care, etc.)  - Arrange for interpretive services to assist at discharge as needed  - Refer to Case Management Department for coordinating discharge planning if the patient needs post-hospital services based on physician/advanced practitioner order or complex needs related to functional status, cognitive ability, or social support system  Outcome: Progressing     Problem: Knowledge Deficit  Goal: Patient/family/caregiver demonstrates understanding of disease process, treatment plan, medications, and discharge instructions  Description: Complete learning assessment and assess knowledge base.   Interventions:  - Provide teaching at level of understanding  - Provide teaching via preferred learning methods  Outcome: Progressing

## 2023-08-18 NOTE — PLAN OF CARE
Problem: PHYSICAL THERAPY ADULT  Goal: Performs mobility at highest level of function for planned discharge setting. See evaluation for individualized goals. Description: Treatment/Interventions: ADL retraining, Functional transfer training, LE strengthening/ROM, Elevations, Therapeutic exercise, Endurance training, Patient/family training, Equipment eval/education, Gait training, Bed mobility, Spoke to nursing, Spoke to case management, OT  Equipment Recommended: Kari Cullen       See flowsheet documentation for full assessment, interventions and recommendations. 8/18/2023 1618 by Zuly Rhoades PTA  Outcome: Progressing  Note: Prognosis: Good  Problem List: Decreased strength, Decreased range of motion, Decreased endurance, Impaired balance, Decreased mobility, Orthopedic restrictions, Pain  Assessment: pt continues to perform all tasks with supervision on levels, demonstrating good carryover between sessions. He did so with improved pace & apparent weight bearing on LLE from beginning of session to end, but still remains heavily RW reliant due to pain & LLE weakness. Pt recalled sequencing on steps & although performed all tasks with safe approach, continues to have significant difficulty due to aforementioned pain & weakness. He was able to negotiate increased quantity of steps, but will need to negotiate full flight of steps to enter home from basement level at d/c to avoid attempting steps without rails to enter front door which likely won't be feasable given difficulties demosntrated to this point. Anticpate he will be able to progress to full flight negotiation with practice, and was instructed to continue standing & ambulation on levels outside of PT with spouse & staff to improve tolerance to activity. PT POC & dc recommendation remains appropriate pending successful stair trial in upcoming sessions.   Barriers to Discharge: Inaccessible home environment  Barriers to Discharge Comments: improved ambulation & stair trial  PT Discharge Recommendation: Home with outpatient rehabilitation    See flowsheet documentation for full assessment. 8/18/2023 1607 by Denita Nobles PTA  Outcome: Progressing  Note: Prognosis: Good  Problem List: Decreased strength, Decreased range of motion, Decreased endurance, Impaired balance, Decreased mobility, Orthopedic restrictions, Pain  Assessment: Pt demonstrated improved functional endurance & independence with all transfers & near household ambulation today, but remains limited in all areas of activity by LLE pain, especially during stance phase of gait, where he intially attempted NWB swlnging with UE support to negotiate room. Pt was corrected to begin weight bearing to tolerance on L foot to reduce reliance on RW. He did so successfully, but demosntrated slow pace with inconsistant stride length, especially as he fatigued. Pt trialed steps as noted above, requiring considerable assist due to LLE weakness & pain during stance while ascending, but required less assist to descend. Anticipate he will demosntrate improvements in all aspects of mobiilty as he continues to recover from acute effects of surgery, and continues to demonstrate improved LLE function to reduce effort required to perform all tasks. PT POC & d/c recommendation remains appropriate at this time. Barriers to Discharge: Inaccessible home environment  Barriers to Discharge Comments: improved ambulation & stair trial  PT Discharge Recommendation: Home with outpatient rehabilitation    See flowsheet documentation for full assessment.

## 2023-08-18 NOTE — PLAN OF CARE
Problem: PHYSICAL THERAPY ADULT  Goal: Performs mobility at highest level of function for planned discharge setting. See evaluation for individualized goals. Description: Treatment/Interventions: ADL retraining, Functional transfer training, LE strengthening/ROM, Elevations, Therapeutic exercise, Endurance training, Patient/family training, Equipment eval/education, Gait training, Bed mobility, Spoke to nursing, Spoke to case management, OT  Equipment Recommended: Olivia Vaca       See flowsheet documentation for full assessment, interventions and recommendations. Outcome: Progressing  Note: Prognosis: Good  Problem List: Decreased strength, Decreased range of motion, Decreased endurance, Impaired balance, Decreased mobility, Orthopedic restrictions, Pain  Assessment: Pt demonstrated improved functional endurance & independence with all transfers & near household ambulation today, but remains limited in all areas of activity by LLE pain, especially during stance phase of gait, where he intially attempted NWB swlnging with UE support to negotiate room. Pt was corrected to begin weight bearing to tolerance on L foot to reduce reliance on RW. He did so successfully, but demosntrated slow pace with inconsistant stride length, especially as he fatigued. Pt trialed steps as noted above, requiring considerable assist due to LLE weakness & pain during stance while ascending, but required less assist to descend. Anticipate he will demosntrate improvements in all aspects of mobiilty as he continues to recover from acute effects of surgery, and continues to demonstrate improved LLE function to reduce effort required to perform all tasks. PT POC & d/c recommendation remains appropriate at this time.   Barriers to Discharge: Inaccessible home environment  Barriers to Discharge Comments: improved ambulation & stair trial  PT Discharge Recommendation: Home with outpatient rehabilitation    See flowsheet documentation for full assessment.

## 2023-08-18 NOTE — PROGRESS NOTES
Progress Note - Orthopedics   Adam Scott 68 y.o. male MRN: [id number]  Unit/Bed#: -01      Subjective:    73 y.o.male POD 1 L TKA. No acute events, no new complaints. Patient doing well. Pain well controlled. Labs pending. Worked with PT yesterday, recommending home w o/p rehab.     Labs:  0   Lab Value Date/Time    HCT 49.2 07/13/2023 0731    HCT 48.2 04/05/2023 1007    HCT 50.0 (H) 09/17/2022 0811    HCT 48.7 04/05/2018 0921    HGB 16.0 07/13/2023 0731    HGB 15.7 04/05/2023 1007    HGB 16.2 09/17/2022 0811    HGB 16.8 04/05/2018 0921    WBC 5.72 07/13/2023 0731    WBC 6.02 04/05/2023 1007    WBC 5.18 09/17/2022 0811    WBC 5.4 04/05/2018 0921    ESR 2 04/05/2018 0921       Meds:    Current Facility-Administered Medications:   •  acetaminophen (TYLENOL) tablet 650 mg, 650 mg, Oral, Q4H PRN, Dilan Franklin PA-C, 650 mg at 08/17/23 1613  •  aluminum-magnesium hydroxide-simethicone (MAALOX) oral suspension 30 mL, 30 mL, Oral, Q6H PRN, Dilan Franklin PA-C  •  diphenhydrAMINE (BENADRYL) tablet 25 mg, 25 mg, Oral, Q6H PRN, Denise Garcia MD  •  docusate sodium (COLACE) capsule 100 mg, 100 mg, Oral, BID, Dilan Franklin PA-C, 100 mg at 08/17/23 2153  •  enoxaparin (LOVENOX) subcutaneous injection 40 mg, 40 mg, Subcutaneous, Daily, Dilan Franklin PA-C, 40 mg at 08/17/23 2153  •  hydrALAZINE (APRESOLINE) tablet 25 mg, 25 mg, Oral, Q8H PRN, Phuong Russell PA-C  •  HYDROmorphone (DILAUDID) injection 0.5 mg, 0.5 mg, Intravenous, Q2H PRN, Dilan Franklin PA-C, 0.5 mg at 08/18/23 0531  •  insulin lispro (HumaLOG) 100 units/mL subcutaneous injection 1-5 Units, 1-5 Units, Subcutaneous, TID AC, 1 Units at 08/17/23 1612 **AND** Fingerstick Glucose (POCT), , , TID AC, Phuong Russell PA-C  •  insulin lispro (HumaLOG) 100 units/mL subcutaneous injection 1-5 Units, 1-5 Units, Subcutaneous, HS, Phuong Russell PA-C  •  lactated ringers bolus 1,000 mL, 1,000 mL, Intravenous, Once PRN **AND** lactated ringers bolus 1,000 mL, 1,000 mL, Intravenous, Once PRN, Alice Cohen PA-C  •  lactated ringers infusion, 125 mL/hr, Intravenous, Continuous, Alice Cohen PA-C, Stopped at 08/18/23 0230  •  metFORMIN (GLUCOPHAGE) tablet 500 mg, 500 mg, Oral, BID With Meals, Phuong Russell PA-C, 500 mg at 08/17/23 1613  •  ondansetron (ZOFRAN) injection 4 mg, 4 mg, Intravenous, Q6H PRN, Alice Cohen PA-C  •  oxyCODONE (ROXICODONE) immediate release tablet 10 mg, 10 mg, Oral, Q4H PRN, Alice Cohen PA-C, 10 mg at 08/18/23 0415  •  oxyCODONE (ROXICODONE) IR tablet 5 mg, 5 mg, Oral, Q4H PRN, Alice Cohen PA-C  •  pravastatin (PRAVACHOL) tablet 40 mg, 40 mg, Oral, Daily With Orvin Denver, PA-C, 40 mg at 08/17/23 1612  •  sodium chloride 0.9 % bolus 1,000 mL, 1,000 mL, Intravenous, Once PRN **AND** sodium chloride 0.9 % bolus 1,000 mL, 1,000 mL, Intravenous, Once PRN, Alice Cohen PA-C  •  tamsulosin [facility]) capsule 0.8 mg, 0.8 mg, Oral, Daily With Dinner, Alice Cohen PA-C, 0.8 mg at 08/17/23 1612    Blood Culture:   No results found for: "BLOODCX"    Wound Culture:   No results found for: "WOUNDCULT"    Ins and Outs:  I/O last 24 hours: In: 1510.4 [I.V.:1510.4]  Out: 8489 [Urine:2200; Drains:420]          Physical:  Vitals:    08/17/23 2013   BP: 126/74   Pulse: 94   Resp: 20   Temp: 98 °F (36.7 °C)   SpO2: 96%     Musculoskeletal: left Lower Extremity  · Dressing c/d/i  · TTP kayleigh-incisionally  · Sensation intact to saphenous, sural, tibial, superficial peroneal nerve, and deep peroneal  · Motor intact to +FHL/EHL, +ankle dorsi/plantar flexion  · 2+ DP pulse  · Digits warm and well perfused  · Capillary refill < 2 seconds    Assessment:    73 y.o.male POD 1 L TKA. Patient doing well.      Plan:  · WBAT LLE  · Will monitor for ABLA and administer IVF/prbc as indicated for Greater than 2 gram drop or Hgb < 7  · PT/OT  · Pain control  · DVT ppx - lovenox  · Dispo: dispo pending PT and AM labs    Ilan Lua MD

## 2023-08-18 NOTE — PROGRESS NOTES
Pastoral Care Progress Note    2023  Patient: Annika Perez : 1950  Admission Date & Time: 2023 0545  MRN: [id number] CSN: [id number]        Made rounding visit with Pt and wife. Listened as he talked about his pain/surprise over the amount of pain, expressed empathy and support. Shared a story or two from my own knee replacement, we laughed and lamented together.  remains available.

## 2023-08-18 NOTE — PHYSICAL THERAPY NOTE
Physical Therapy Progress Note     08/18/23 1405   PT Last Visit   PT Visit Date 08/18/23   Note Type   Note Type BID visit/treatment   Pain Assessment   Pain Assessment Tool FLACC   Pain Rating: FLACC (Rest) - Face 1   Pain Rating: FLACC (Rest) - Legs 0   Pain Rating: FLACC (Rest) - Activity 0   Pain Rating: FLACC (Rest) - Cry 1   Pain Rating: FLACC (Rest) - Consolability 0   Score: FLACC (Rest) 2   Pain Rating: FLACC (Activity) - Face 2   Pain Rating: FLACC (Activity) - Legs 1   Pain Rating: FLACC (Activity) - Activity 1   Pain Rating: FLACC (Activity) - Cry 2   Pain Rating: FLACC (Activity) - Consolability 1   Score: FLACC (Activity) 7   Restrictions/Precautions   LLE Weight Bearing Per Order WBAT   Other Precautions WBS;Pain; Fall Risk   Subjective   Subjective Pt encountered seated in recliner, reporting feeling better overall compared to AM session, although still reports pain behind knee, itchiness & began falling asleep during seated rests & at conclusion of session. Spouse present throughout session & observed activity, reporting no new questions after conclusion of session. Transfers   Sit to Stand 5  Supervision   Additional items Assist x 1; Armrests; Increased time required   Stand to Sit 5  Supervision   Additional items Assist x 1; Armrests; Increased time required   Ambulation/Elevation   Gait pattern Step to;Short stride; Foward flexed; Inconsistent kirill; Shuffling;Decreased L stance;Decreased foot clearance; Antalgic; Improper Weight shift   Gait Assistance 5  Supervision   Additional items Assist x 1   Assistive Device Rolling walker   Distance 40' x 2   Stair Management Assistance 3  Moderate assist   Additional items Assist x 1   Stair Management Technique Two rails; Step to pattern; Foreward   Number of Stairs 5   Balance   Static Sitting Fair +   Static Standing Fair   Ambulatory Fair -   Activity Tolerance   Activity Tolerance Patient tolerated treatment well;Patient limited by fatigue;Patient limited by pain   Nurse Made Aware THADDEUS Monroe   Assessment   Prognosis Good   Problem List Decreased strength;Decreased range of motion;Decreased endurance; Impaired balance;Decreased mobility;Orthopedic restrictions;Pain   Assessment pt continues to perform all tasks with supervision on levels, demonstrating good carryover between sessions. He did so with improved pace & apparent weight bearing on LLE from beginning of session to end, but still remains heavily RW reliant due to pain & LLE weakness. Pt recalled sequencing on steps & although performed all tasks with safe approach, continues to have significant difficulty due to aforementioned pain & weakness. He was able to negotiate increased quantity of steps, but will need to negotiate full flight of steps to enter home from basement level at d/c to avoid attempting steps without rails to enter front door which likely won't be feasable given difficulties demosntrated to this point. Anticpate he will be able to progress to full flight negotiation with practice, and was instructed to continue standing & ambulation on levels outside of PT with spouse & staff to improve tolerance to activity. PT POC & dc recommendation remains appropriate pending successful stair trial in upcoming sessions. Goals   Patient Goals to go home safely   STG Expiration Date 08/31/23   PT Treatment Day 2   Plan   Treatment/Interventions Functional transfer training;Elevations;LE strengthening/ROM; Therapeutic exercise; Endurance training;Patient/family training;Equipment eval/education; Bed mobility;Gait training   Progress Progressing toward goals   PT Frequency Twice a day   Recommendation   PT Discharge Recommendation Home with outpatient rehabilitation   Equipment Recommended Walker   AM-PAC Basic Mobility Inpatient   Turning in Flat Bed Without Bedrails 3   Lying on Back to Sitting on Edge of Flat Bed Without Bedrails 3   Moving Bed to Chair 4   Standing Up From Chair Using Arms 4 Walk in Room 3   Climb 3-5 Stairs With Railing 2   Basic Mobility Inpatient Raw Score 19   Basic Mobility Standardized Score 42.48   Highest Level Of Mobility   JH-HLM Goal 6: Walk 10 steps or more   JH-HLM Achieved 7: Walk 25 feet or more       Layne Moulton PTA    An [facility] Basic Mobility Raw Score less than 17 suggests pt would benefit from post acute rehab. Please also refer to the recommendation of the Physical Therapist for safe discharge planning.

## 2023-08-18 NOTE — CONSULTS
Please see APS progress notes for details of encounter.     Omega Mccullough, DREA  Acute Pain Service

## 2023-08-18 NOTE — PHYSICAL THERAPY NOTE
Physical Therapy Progress Note     08/18/23 1003   PT Last Visit   PT Visit Date 08/18/23   Note Type   Note Type Treatment   Pain Assessment   Pain Assessment Tool FLACC   Pain Rating: FLACC (Rest) - Face 1   Pain Rating: FLACC (Rest) - Legs 1   Pain Rating: FLACC (Rest) - Activity 1   Pain Rating: FLACC (Rest) - Cry 1   Pain Rating: FLACC (Rest) - Consolability 0   Score: FLACC (Rest) 4   Pain Rating: FLACC (Activity) - Face 2   Pain Rating: FLACC (Activity) - Legs 1   Pain Rating: FLACC (Activity) - Activity 1   Pain Rating: FLACC (Activity) - Cry 2   Pain Rating: FLACC (Activity) - Consolability 1   Score: FLACC (Activity) 7   Restrictions/Precautions   LLE Weight Bearing Per Order WBAT   Other Precautions WBS;Pain; Fall Risk   Subjective   Subjective Pt encountered seated in recliner, pleasant and agreeable to treatment. Reports pain in posterior aspect of knee, but otherwise offers no complaints with activity. Does report nausea prior to session, as well as general itchiness & excessive fatigue otherwise. Transfers   Sit to Stand 5  Supervision   Additional items Assist x 1; Armrests; Increased time required   Stand to Sit 5  Supervision   Additional items Assist x 1; Armrests; Increased time required   Ambulation/Elevation   Gait pattern Step to;Short stride; Foward flexed; Inconsistent kirill; Shuffling;Decreased R stance;Decreased foot clearance; Antalgic; Improper Weight shift   Gait Assistance 5  Supervision   Additional items Assist x 1   Assistive Device Rolling walker   Distance 40'   Stair Management Assistance 3  Moderate assist   Additional items Assist x 1   Stair Management Technique Two rails; Step to pattern; Foreward   Number of Stairs 2   Balance   Static Sitting Fair +   Static Standing Fair   Ambulatory Fair -   Activity Tolerance   Activity Tolerance Patient tolerated treatment well   Nurse Made Aware THADDEUS Monroe   Assessment   Prognosis Good   Problem List Decreased strength;Decreased range of motion;Decreased endurance; Impaired balance;Decreased mobility;Orthopedic restrictions;Pain   Assessment Pt demonstrated improved functional endurance & independence with all transfers & near household ambulation today, but remains limited in all areas of activity by LLE pain, especially during stance phase of gait, where he intially attempted NWB swlnging with UE support to negotiate room. Pt was corrected to begin weight bearing to tolerance on L foot to reduce reliance on RW. He did so successfully, but demosntrated slow pace with inconsistant stride length, especially as he fatigued. Pt trialed steps as noted above, requiring considerable assist due to LLE weakness & pain during stance while ascending, but required less assist to descend. Anticipate he will demosntrate improvements in all aspects of mobiilty as he continues to recover from acute effects of surgery, and continues to demonstrate improved LLE function to reduce effort required to perform all tasks. PT POC & d/c recommendation remains appropriate at this time. Barriers to Discharge Inaccessible home environment   Barriers to Discharge Comments improved ambulation & stair trial   Goals   Patient Goals to get some sleep   STG Expiration Date 08/31/23   PT Treatment Day 1   Plan   Treatment/Interventions Functional transfer training;Elevations;LE strengthening/ROM; Therapeutic exercise; Endurance training;Patient/family training;Equipment eval/education;Gait training;Bed mobility   Progress Progressing toward goals   PT Frequency Twice a day   Recommendation   PT Discharge Recommendation Home with outpatient rehabilitation   AM-PAC Basic Mobility Inpatient   Turning in Flat Bed Without Bedrails 3   Lying on Back to Sitting on Edge of Flat Bed Without Bedrails 3   Moving Bed to Chair 3   Standing Up From Chair Using Arms 3   Walk in Room 3   Climb 3-5 Stairs With Railing 2   Basic Mobility Inpatient Raw Score 17   Basic Mobility Standardized Score 39.67   Highest Level Of Mobility   JH-HLM Goal 5: Stand one or more mins   JH-HLM Achieved 7: Walk 25 feet or more       Ania Paredes PTA    An [facility] Basic Mobility Raw Score less than 17 suggests pt would benefit from post acute rehab. Please also refer to the recommendation of the Physical Therapist for safe discharge planning.

## 2023-08-18 NOTE — PROGRESS NOTES
Internal Medicine Progress Note  Patient: Cathleen Oh  Age/sex: 68 y.o. male  Medical Record #: [id number]      ASSESSMENT/PLAN: (Interval History)  Cathleen Oh is seen and examined and management for following issues:    Status Post left Total KNEE ARTHROPLASTY  • Pain controlled  • Continue encourage incentive spirometry; monitor fever curve  • DVT prophylaxis in place and reviewed  Results from last 7 days   Lab Units 08/18/23  0457   WBC Thousand/uL 9.24   HEMOGLOBIN g/dL 13.1   HEMATOCRIT % 39.9   PLATELETS Thousands/uL 140*   •     Diabetes Mellitus  • Resume metformin  • Cont accuchecks and SSC  • BS trend reviewed     Hyperlipidemia  • Low cholesterol diet. • Continue statin. BPH  • Monitor for post-op retention. • Continue Flomax. Obesity  • Recommend ongoing attempts at weight loss. • BMI 35.5. Thrombocytopenia  · Mild. · Will continue to monitor. The above assessment and plan was reviewed and updated as determined by my evaluation of the patient on 8/18/2023.     Labs:   Results from last 7 days   Lab Units 08/18/23  0457   WBC Thousand/uL 9.24   HEMOGLOBIN g/dL 13.1   HEMATOCRIT % 39.9   PLATELETS Thousands/uL 140*     Results from last 7 days   Lab Units 08/18/23  0457   SODIUM mmol/L 136   POTASSIUM mmol/L 4.5   CHLORIDE mmol/L 103   CO2 mmol/L 27   BUN mg/dL 16   CREATININE mg/dL 1.04   CALCIUM mg/dL 8.5             Results from last 7 days   Lab Units 08/18/23  0642 08/17/23  2135 08/17/23  1556   POC GLUCOSE mg/dl 185* 129 188*       Review of Scheduled Meds:  Current Facility-Administered Medications   Medication Dose Route Frequency Provider Last Rate   • acetaminophen  650 mg Oral Q4H PRN Tasha Olsen PA-C     • aluminum-magnesium hydroxide-simethicone  30 mL Oral Q6H PRN Tasha Olsen PA-C     • diphenhydrAMINE  25 mg Oral Q6H PRN Darius Lucas MD     • docusate sodium  100 mg Oral BID Tasha Olsen PA-C     • enoxaparin  40 mg Subcutaneous Daily Debbie Flores Indira Angeles PA-C     • hydrALAZINE  25 mg Oral Q8H PRN Phuong Russell PA-C     • HYDROmorphone  0.5 mg Intravenous Q2H PRN Derrimandeep Michelle PA-C     • insulin lispro  1-5 Units Subcutaneous TID AC Phuong Russell PA-C     • insulin lispro  1-5 Units Subcutaneous HS Phuong Russell PA-C     • lactated ringers  1,000 mL Intravenous Once PRN Derrill HardESTRELLA      And   • lactated ringers  1,000 mL Intravenous Once PRN Derrill HardESTRELLA     • lactated ringers  125 mL/hr Intravenous Continuous Derrimandeep Michelle PA-C Stopped (08/18/23 0230)   • metFORMIN  500 mg Oral BID With Meals Phuong Russell PA-C     • ondansetron  4 mg Intravenous Q6H PRN Derrill HardESTRELLA     • oxyCODONE  10 mg Oral Q4H PRN Derrill HardESTRELLA     • oxyCODONE  5 mg Oral Q4H PRN Derrill HardESTRELLA     • pravastatin  40 mg Oral Daily With Janette Delaney PA-C     • sodium chloride  1,000 mL Intravenous Once PRN Derrill HardESTRELLA      And   • sodium chloride  1,000 mL Intravenous Once PRN Derrill HardESTRELLA     • tamsulosin  0.8 mg Oral Daily With Dinner Derrimandeep Michelle PA-C         Subjective/ HPI: Patient seen and examined. Patients overnight issues or events were reviewed with nursing or staff during rounds or morning huddle session. New or overnight issues include the following:     Pt seen in his room. He had one episode of emesis after breakfast. He denies any nausea currently. He denies any other complaints. ROS:   A 10 point ROS was performed; negative except as noted above.        Imaging:     No orders to display       *Labs /Radiology studies Reviewed  *Medications  reviewed and reconciled as needed  *Please refer to order section for additional ordered labs studies  *Case discussed with primary attending during morning huddle case rounds    Physical Examination:  Vitals:   Vitals:    08/17/23 1227 08/17/23 1420 08/17/23 1638 08/17/23 2013   BP: 134/74 131/75 129/76 126/74   Pulse: 95 102 103 94   Resp:   18 20   Temp: Lucia Angles ) 97.3 °F (36.3 °C) 97.6 °F (36.4 °C) 98.1 °F (36.7 °C) 98 °F (36.7 °C)   TempSrc:       SpO2: 97% 96% 96% 96%   Weight:       Height:           General Appearance: no distress, conversive  HEENT: PERRLA, conjuctiva normal; oropharynx clear; mucous membranes moist;   Neck:  Supple, no lymphadenopathy or thyromegaly  Lungs: CTA, normal respiratory effort, no retractions, expiratory effort normal  CV: regular rate and rhythm , PMI normal   ABD: soft non tender, no masses , no hepatic or splenomegaly  EXT: DP pulses intact, no lymphadenopathy, no edema; Lt knee surgical dressing in place  Skin: normal turgor, normal texture, no rash  Psych: affect normal, mood normal  Neuro: AAOx3    : bazzi removed ; due to void    The above physical exam was reviewed and updated as determined by my evaluation of the patient on 8/18/2023. Invasive Devices     Peripheral Intravenous Line  Duration           Peripheral IV 08/17/23 Right Hand 1 day          Drain  Duration           Closed/Suction Drain Anterior; Left Knee Accordion 10 Fr. 1 day                   VTE Pharmacologic Prophylaxis: Enoxaparin  Code Status: Level 1 - Full Code  Current Length of Stay: 0 day(s)      Total floor / unit time spent today 30 minutes  Coordination of patient's care was performed in conjunction with primary service. Time invested included review of patient's labs, vitals, and management of their comorbidities with continued monitoring, examination of patient as well as answering patient questions, documenting her findings and creating progress note in electronic medical record,  ordering appropriate diagnostic testing. Medical decision making for the day was made by supervising physician unless otherwise noted in their attestation statement. ** Please Note:  voice to text software may have been used in the creation of this document.  Although proof errors in transcription or interpretation are a potential of such software**

## 2023-08-19 VITALS
SYSTOLIC BLOOD PRESSURE: 137 MMHG | DIASTOLIC BLOOD PRESSURE: 73 MMHG | OXYGEN SATURATION: 94 % | RESPIRATION RATE: 18 BRPM | HEIGHT: 64 IN | WEIGHT: 207 LBS | HEART RATE: 118 BPM | BODY MASS INDEX: 35.34 KG/M2 | TEMPERATURE: 98.3 F

## 2023-08-19 LAB
ANION GAP SERPL CALCULATED.3IONS-SCNC: 5 MMOL/L
BUN SERPL-MCNC: 15 MG/DL (ref 5–25)
CALCIUM SERPL-MCNC: 8.6 MG/DL (ref 8.3–10.1)
CHLORIDE SERPL-SCNC: 105 MMOL/L (ref 96–108)
CO2 SERPL-SCNC: 25 MMOL/L (ref 21–32)
CREAT SERPL-MCNC: 1.06 MG/DL (ref 0.6–1.3)
GFR SERPL CREATININE-BSD FRML MDRD: 69 ML/MIN/1.73SQ M
GLUCOSE SERPL-MCNC: 165 MG/DL (ref 65–140)
GLUCOSE SERPL-MCNC: 177 MG/DL (ref 65–140)
GLUCOSE SERPL-MCNC: 184 MG/DL (ref 65–140)
POTASSIUM SERPL-SCNC: 4.2 MMOL/L (ref 3.5–5.3)
SODIUM SERPL-SCNC: 135 MMOL/L (ref 135–147)

## 2023-08-19 PROCEDURE — 80048 BASIC METABOLIC PNL TOTAL CA: CPT | Performed by: PHYSICIAN ASSISTANT

## 2023-08-19 PROCEDURE — NC001 PR NO CHARGE: Performed by: ORTHOPAEDIC SURGERY

## 2023-08-19 PROCEDURE — 97116 GAIT TRAINING THERAPY: CPT

## 2023-08-19 PROCEDURE — 99232 SBSQ HOSP IP/OBS MODERATE 35: CPT | Performed by: INTERNAL MEDICINE

## 2023-08-19 PROCEDURE — 97530 THERAPEUTIC ACTIVITIES: CPT

## 2023-08-19 PROCEDURE — 99024 POSTOP FOLLOW-UP VISIT: CPT | Performed by: ORTHOPAEDIC SURGERY

## 2023-08-19 PROCEDURE — 82948 REAGENT STRIP/BLOOD GLUCOSE: CPT

## 2023-08-19 RX ORDER — OXYCODONE HYDROCHLORIDE 5 MG/1
5 TABLET ORAL EVERY 4 HOURS PRN
Qty: 30 TABLET | Refills: 0 | Status: SHIPPED | OUTPATIENT
Start: 2023-08-19 | End: 2023-08-24 | Stop reason: SDUPTHER

## 2023-08-19 RX ADMIN — INSULIN LISPRO 1 UNITS: 100 INJECTION, SOLUTION INTRAVENOUS; SUBCUTANEOUS at 11:04

## 2023-08-19 RX ADMIN — LORATADINE 10 MG: 10 TABLET ORAL at 08:55

## 2023-08-19 RX ADMIN — HYDROCODONE BITARTRATE AND ACETAMINOPHEN 1 TABLET: 5; 325 TABLET ORAL at 04:39

## 2023-08-19 RX ADMIN — METFORMIN HYDROCHLORIDE 500 MG: 500 TABLET ORAL at 10:08

## 2023-08-19 RX ADMIN — DOCUSATE SODIUM 100 MG: 100 CAPSULE ORAL at 08:55

## 2023-08-19 RX ADMIN — HYDROCODONE BITARTRATE AND ACETAMINOPHEN 1 TABLET: 5; 325 TABLET ORAL at 11:02

## 2023-08-19 RX ADMIN — INSULIN LISPRO 1 UNITS: 100 INJECTION, SOLUTION INTRAVENOUS; SUBCUTANEOUS at 06:15

## 2023-08-19 NOTE — DISCHARGE SUMMARY
ORTHOPEDICS DISCHARGE SUMMARY  Arlet Diana 68 y.o. male MRN: [id number]  Unit/Bed#:     Attending Physician: Sanjuanita Escobar    Admitting diagnosis: Primary osteoarthritis of left knee [M17.12]  Chronic pain of left knee [M25.562, G89.29]    Discharge diagnosis: Primary osteoarthritis of left knee [M17.12]  Chronic pain of left knee [T93.934, G89.29]    Date of admission: 8/17/2023    Date of discharge: 08/19/23         Procedure: Left TKA    HPI:  This is a 68y.o. year old male that presented to the office with signs and symptoms of left knee osteoarthritis. They tried and failed conservative treatment measures and wished to proceed with surgical intervention. The risks, benefits, and complications of the procedure were discussed with the patient and informed consent was obtained. Hospital Course: The patient was admitted to the hospital on 8/17/2023 and underwent an uncomplicated left total knee arthroplasty. They were transferred to the floor after a brief stay in the post-anesthesia care unit. Their pain was well managed with IV and oral pain medications. They began therapy on post operative day #1. Lovenox was also started for DVT prophylaxis 12 hours post operatively. Hemovac drain was removed on POD1. On discharge date pt was cleared by PT and the medicine team and determined to be safe for discharge. Daily discussion was had with the patient, nursing staff, orthopaedic team, and family members if present. All questions were answered to the patients satisifaction. 0   Lab Value Date/Time    HGB 13.1 08/18/2023 0457    HGB 16.0 07/13/2023 0731    HGB 15.7 04/05/2023 1007    HGB 16.2 09/17/2022 0811    HGB 15.8 06/16/2021 0713    HGB 16.6 01/30/2019 0806    HGB 17.1 (H) 01/17/2019 0849    HGB 16.8 04/05/2018 0921       Vital signs remained stable and pt was resuscitated with IVF as needed       Discharge Instructions:   The patient was discharged weight bearing as tolerated to the left lower extremity. Lovenox will be continued for 28 days. Continue PT/OT. Take pain medications as instructed. Discharge Medications: For the complete list of discharge medications, please refer to the patient's medication reconciliation.

## 2023-08-19 NOTE — PLAN OF CARE
Problem: PHYSICAL THERAPY ADULT  Goal: Performs mobility at highest level of function for planned discharge setting. See evaluation for individualized goals. Description: Treatment/Interventions: ADL retraining, Functional transfer training, LE strengthening/ROM, Elevations, Therapeutic exercise, Endurance training, Patient/family training, Equipment eval/education, Gait training, Bed mobility, Spoke to nursing, Spoke to case management, OT  Equipment Recommended: Cynthia Sullivan       See flowsheet documentation for full assessment, interventions and recommendations. 8/19/2023 1547 by Ghassan Cote PTA  Outcome: Adequate for Discharge  Note: Prognosis: Good  Problem List: Decreased strength, Decreased range of motion, Decreased endurance, Decreased mobility, Orthopedic restrictions, Pain  Assessment: The patient is ambulating on levels much better today with much improved pain. He continues to have notable difficulty with the first step while utilizing the forward approach as he has significant pain. He is able to perform subsequent steps with less pain. With the third trial a backwards approach was utilized with much improvement and significantly less pain. He was able to then turn around on the step safely to continue ascending the other steps forward. 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 Of note the patient did not have his PRN Norco pain medication prior to the session, and educated him as to the benefit and importance of taking it prior to therapy sessions here and outpatient. The patient feels confident mobilizing around at home once he is in the house. Discussed with the team about home therapy as he does have a significant amount of stairs to perform (2+2+5 with no railing vs 13 with railing) to enter and exit the house.  Due to his exacerbated pain with stairs and the difficulty he would benefit from therapy at home first. He had no further questions nor concerns, and he feels confident about returning home today. Educated him on continued mobility at home, home exercises, ice application, and to not place anything under his knee. Barriers to Discharge: None  Barriers to Discharge Comments: improved ambulation & stair trial  PT Discharge Recommendation: (S) Home with home health rehabilitation    See flowsheet documentation for full assessment.      8/19/2023 1547 by Lisa Aranda PTA  Reactivated

## 2023-08-19 NOTE — PROGRESS NOTES
Internal Medicine Progress Note  Patient: Guillermo Boyce  Age/sex: 68 y.o. male  Medical Record #: [id number]      ASSESSMENT/PLAN: (Interval History)  Guillermo Boyce is seen and examined and management for following issues:    Status Post left Total KNEE ARTHROPLASTY 8/17/23  • Pain controlled per Ortho  • Continue to encourage incentive spirometry    DM2  • HbA1C on 7/13/23 was 7.1  • Continue Metformin 500mg BID (restarted 8/17)  • Cont accuchecks and SSC  • No changes for today     Hyperlipidemia  • Continue statin.     BPH  • Continue Flomax.     Thrombocytopenia  • Mild. • Likely reactive    Tachycardia  · O2 sats are normal, no SOB  · Has not had a fever  · Hemoglobin yesterday was WNL at 13.1  · On review of OP office visits/OR at [facility], he has a tendency to have some mild tachycardia. Office visit 2/21/23 HR was 109    OK home from IM perspective      The above assessment and plan was reviewed and updated as determined by my evaluation of the patient on 8/19/2023.     Labs:   Results from last 7 days   Lab Units 08/18/23  0457   WBC Thousand/uL 9.24   HEMOGLOBIN g/dL 13.1   HEMATOCRIT % 39.9   PLATELETS Thousands/uL 140*     Results from last 7 days   Lab Units 08/19/23  0543 08/18/23  0457   SODIUM mmol/L 135 136   POTASSIUM mmol/L 4.2 4.5   CHLORIDE mmol/L 105 103   CO2 mmol/L 25 27   BUN mg/dL 15 16   CREATININE mg/dL 1.06 1.04   CALCIUM mg/dL 8.6 8.5             Results from last 7 days   Lab Units 08/19/23  0541 08/18/23  2228 08/18/23  1032   POC GLUCOSE mg/dl 165* 159* 200*       Review of Scheduled Meds:  Current Facility-Administered Medications   Medication Dose Route Frequency Provider Last Rate   • acetaminophen  650 mg Oral Q4H PRN Adia Reagan PA-C     • aluminum-magnesium hydroxide-simethicone  30 mL Oral Q6H PRN Adia Reagan PA-C     • diphenhydrAMINE  25 mg Oral Q6H PRN Cecile Elizabeth MD     • docusate sodium  100 mg Oral BID Adia Reagan PA-C     • enoxaparin  40 mg Subcutaneous Daily Ilene ESTRELLA Yates     • hydrALAZINE  25 mg Oral Q8H PRN Phuong Russell PA-C     • HYDROcodone-acetaminophen  0.5 tablet Oral Q4H PRN DREA Dawkins      Or   • HYDROcodone-acetaminophen  1 tablet Oral Q4H PRN EverleROLANDO GreenNP     • insulin lispro  1-5 Units Subcutaneous TID AC Phuong Russell PA-C     • insulin lispro  1-5 Units Subcutaneous HS Phuong Russell PA-C     • lactated ringers  125 mL/hr Intravenous Continuous Ilene Channel, PA-C Stopped (08/18/23 0230)   • loratadine  10 mg Oral Daily DREA Dawkins     • metFORMIN  500 mg Oral BID With Meals Phuong Russell PA-C     • ondansetron  4 mg Intravenous Q6H PRN Ilene ESTRELLA Yates     • pravastatin  40 mg Oral Daily With Thedora Bence, PA-C     • tamsulosin  0.8 mg Oral Daily With Dinner Ilene Yates PA-C         Subjective/ HPI: Patient seen and examined. Patients overnight issues or events were reviewed with nursing or staff during rounds or morning huddle session. New or overnight issues include the following:     No new or overnight issues. Offers no complaints. No further N/V. He is eating. No BM since Wednesday = told him to take Miralax at home today. Voiding w/o issue. ROS:   A 10 point ROS was performed; negative except as noted above.        Imaging:     No orders to display       *Labs /Radiology studies reviewed  *Medications reviewed and reconciled as needed  *Please refer to order section for additional ordered labs studies  *Case discussed with primary attending during morning huddle case rounds    Physical Examination:  Vitals:   Vitals:    08/18/23 1034 08/18/23 1519 08/18/23 1927 08/19/23 0734   BP: 122/72 129/75 137/74 137/73   Pulse: (!) 106 (!) 111 (!) 113 (!) 118   Resp: 12 12 20 18   Temp: 98.1 °F (36.7 °C) 98.4 °F (36.9 °C) 98.2 °F (36.8 °C) 98.3 °F (36.8 °C)   TempSrc:       SpO2: 91% 95% 93% 94%   Weight:       Height:           General Appearance: no distress, conversive  HEENT: PERRLA, conjuctiva normal; oropharynx clear; mucous membranes moist   Neck:  Supple, normal ROM  Lungs: CTA, normal respiratory effort, no retractions, expiratory effort normal  CV: regular rate and rhythm; no rubs/murmurs/gallops, PMI normal   ABD: soft; ND/NT; +BS  EXT: no edema  Skin: normal turgor, normal texture, no rashes  Psych: affect normal, mood normal  Neuro: AAO      The above physical exam was reviewed and updated as determined by my evaluation of the patient on 8/19/2023. Invasive Devices     Peripheral Intravenous Line  Duration           Peripheral IV 08/17/23 Right Hand 2 days                   VTE Pharmacologic Prophylaxis: Enoxaparin  Code Status: Level 1 - Full Code  Current Length of Stay: 1 day(s)      Total time spent:  30 minutes with more than 50% spent counseling/coordinating care. Counseling includes discussion with patient re: progress  and discussion with patient of his/her current medical state/information. Coordination of patient's care was performed in conjunction with primary service. Time invested included review of patient's labs, vitals, and management of their comorbidities with continued monitoring. In addition, this patient was discussed with medical team including physician and advanced extenders. The care of the patient was extensively discussed and appropriate treatment plan was formulated unique for this patient. Medical decision making for the day was made by supervising physician unless otherwise noted in their attestation statement. ** Please Note:  voice to text software may have been used in the creation of this document.  Although proof errors in transcription or interpretation are a potential of such software**

## 2023-08-19 NOTE — PHYSICAL THERAPY NOTE
Physical Therapy Progress Note     08/19/23 1040   PT Last Visit   PT Visit Date 08/19/23   Note Type   Note Type BID visit/treatment   Pain Assessment   Pain Assessment Tool 0-10   Pain Score 10 - Worst Possible Pain   Pain Location/Orientation Orientation: Left; Location: Knee   Pain Onset/Description Frequency: Intermittent  (Pain severe with steps and mobility.)   Hospital Pain Intervention(s) Cold applied;Repositioned; Ambulation/increased activity; Emotional support   Restrictions/Precautions   LLE Weight Bearing Per Order WBAT   Other Precautions Pain; Fall Risk   Subjective   Subjective The patient notes that he has been walking around this morning much better than yesterday. 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 Transfers   Sit to Stand 6  Modified independent   Additional items Increased time required   Stand to Sit 6  Modified independent   Additional items Increased time required   Ambulation/Elevation   Gait pattern Excessively slow; Step to   Gait Assistance 6  Modified independent   Assistive Device Rolling walker   Distance 70 feet, 20 feet x 2, 30 feet x 2. Stair Management Assistance 5  Supervision   Additional items Assist x 1;Verbal cues; Tactile cues   Stair Management Technique Two rails; Step to pattern; Foreward   Number of Stairs 7  (Ascending. Trainer steps x 3. First step backwards x 1.)   Balance   Static Sitting Normal   Dynamic Sitting Good   Static Standing Fair +   Dynamic Standing Fair   Ambulatory Fair   Activity Tolerance   Activity Tolerance Patient tolerated treatment well;Patient limited by pain   [facility] Hong Correa; Cathleen Waller RN CM. Assessment   Prognosis Good   Problem List Decreased strength;Decreased range of motion;Decreased endurance;Decreased mobility;Orthopedic restrictions;Pain   Assessment The patient is ambulating on levels much better today with much improved pain.  He continues to have notable difficulty with the first step while utilizing the forward approach as he has significant pain. He is able to perform subsequent steps with less pain. With the third trial a backwards approach was utilized with much improvement and significantly less pain. He was able to then turn around on the step safely to continue ascending the other steps forward. 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 Of note the patient did not have his PRN Norco pain medication prior to the session, and educated him as to the benefit and importance of taking it prior to therapy sessions here and outpatient. The patient feels confident mobilizing around at home once he is in the house. Discussed with the team about home therapy as he does have a significant amount of stairs to perform (2+2+5 with no railing vs 13 with railing) to enter and exit the house. Due to his exacerbated pain with stairs and the difficulty he would benefit from therapy at home first. He had no further questions nor concerns, and he feels confident about returning home today. Educated him on continued mobility at home, home exercises, ice application, and to not place anything under his knee. Barriers to Discharge None   Goals   Patient Goals To have less pain. STG Expiration Date 08/31/23   PT Treatment Day 3   Plan   Treatment/Interventions Functional transfer training;LE strengthening/ROM; Elevations; Therapeutic exercise; Endurance training;Patient/family training;Gait training;Bed mobility   Progress Progressing toward goals   PT Frequency Twice a day   Recommendation   PT Discharge Recommendation (S)  Home with home health rehabilitation   [address] [place] [address]; Other (Comment)  (Com[employer].)   Walker Package Recommended Wheeled walker   AM-PAC Basic Mobility Inpatient   Turning in Flat Bed Without Bedrails 4   Lying on Back to Sitting on [facility] of Flat Bed Without Bedrails 3   Moving Bed to Chair 4   Standing Up From Chair Using Arms 4   Walk in Room 3   Climb 3-5 Stairs With Railing 3   Basic Mobility Inpatient Raw Score 21   Basic Mobility Standardized Score 45.55   Highest Level Of Mobility   JH-HLM Goal 6: Walk 10 steps or more   JH-HLM Achieved 7: Walk 25 feet or more         An AM-PAC Basic Mobility raw score less than 16 suggests the patient may benefit from discharge to post-acute rehab services.     Wendy Kumari, PTA

## 2023-08-19 NOTE — CASE MANAGEMENT
Case Management Assessment    Patient name Jez Woodard  [id number] [id number]  : 1950 Date 2023       Current Admission Date: 2023  Current Admission Diagnosis:Primary osteoarthritis of left knee   Patient Active Problem List    Diagnosis Date Noted   • Primary osteoarthritis of left knee 2023   • Tear of medial meniscus of right knee, current 2022   • Type 2 diabetes mellitus without complication, without long-term current use of insulin ([address]) 2021   • Carpal tunnel syndrome on right    • Carpal tunnel syndrome on left    • Class 2 severe obesity due to excess calories with serious comorbidity and body mass index (BMI) of 35.0 to 35.9 in adult ([address]) 2019   • Hypercholesteremia 08/15/2018   • High blood pressure 08/15/2018   • Dyspnea 08/15/2018      LOS (days): 1  Geometric Mean LOS (GMLOS) (days):   Days to GMLOS:     OBJECTIVE:    Risk of Unplanned Readmission Score: 5.05         Current admission status: Inpatient       Preferred Pharmacy:   [address] [place] [address] #[id number] [facility], [address]  Phone: [phone] Fax: [address] [place] [address] [address] [place] [address] [place] [address]  Phone: [phone] Fax: [phone]    Primary Care Provider: DREA Garcia    Primary Insurance: MEDICARE  Secondary Insurance: AARP    ASSESSMENT:  Darshan Proxies     [address] [facility], [address] [facility] Representative - Spouse   Primary Phone: [phone] (Mobile)                              Patient Information  Admitted from[de-identified] Home  Mental Status: Alert  During Assessment patient was accompanied by: Spouse  Assessment information provided by[de-identified] Patient  Support Systems: Spouse/significant other  [place] of Residence: [facility] do you live in?: [address] entry access options. Select all that apply. Willi [place] Stairs  Number of steps to enter home.: 9 (2+2 + 5 to enter)  Do the steps have railings?: Yes  [place] Current Residence[place]  Living Arrangements: Lives w/ Spouse/significant other    Activities of Daily Living Prior to Admission  Functional Status: Independent  Does patient use assisted devices?: Yes  Assisted Devices (DME) used: Straight Cane  Does patient currently own DME?: Yes  What DME does the patient currently own?: Bedside Com[employer], [employer]  Does patient have a history of Outpatient Therapy (PT/OT)?: Yes  Does the patient have a history of Short-Term Rehab?: No  Does patient have a history of HHC?: No  Does patient currently have [facility]?: No         Patient Information Continued  Does patient have prescription coverage?: Yes  Does patient have a history of substance abuse?: No         Means of Transportation  Means of Transport to Appts[de-identified] Family transport   Spoke with patient and his wife in regards to the plan for dc. Patient independent and driving prior to admission. Patient lives in a raised ran home with 13 step through garage to enter and 2+2 with walkway and additional 5 steps to enter home then first floor setup. Patient will have his  to help as needed to enter home initially. Requesting home health for PT /OT. [place] referrals made with [facility] first choice. Spoke with patient and made his aware that [facility] will see him in 24-48 hours. Patient is agreeable. Britt Baird aware  Confirmed patient has needed DME- walker cane and commode. Patient states that pain medication is not available at [address] [place] [address] in [facility]. Nurse confirmed that it is available through [facility].  Patient confirmed he received his medication from [facility]  Patient wife to transport to home

## 2023-08-19 NOTE — PLAN OF CARE
Problem: MOBILITY - ADULT  Goal: Maintain or return to baseline ADL function  Description: INTERVENTIONS:  -  Assess patient's ability to carry out ADLs; assess patient's baseline for ADL function and identify physical deficits which impact ability to perform ADLs (bathing, care of mouth/teeth, toileting, grooming, dressing, etc.)  - Assess/evaluate cause of self-care deficits   - Assess range of motion  - Assess patient's mobility; develop plan if impaired  - Assess patient's need for assistive devices and provide as appropriate  - Encourage maximum independence but intervene and supervise when necessary  - Involve family in performance of ADLs  - Assess for home care needs following discharge   - Consider OT consult to assist with ADL evaluation and planning for discharge  - Provide patient education as appropriate  Outcome: Progressing  Goal: Maintains/Returns to pre admission functional level  Description: INTERVENTIONS:  - Perform BMAT or MOVE assessment daily.   - Set and communicate daily mobility goal to care team and patient/family/caregiver. - Collaborate with rehabilitation services on mobility goals if consulted  - Perform Range of Motion 3 times a day. - Reposition patient every 3 hours.   - Dangle patient 3 times a day  - Stand patient 3 times a day  - Ambulate patient 3 times a day  - Out of bed to chair 3 times a day   - Out of bed for meals 3 times a day  - Out of bed for toileting  - Record patient progress and toleration of activity level   Outcome: Progressing     Problem: PAIN - ADULT  Goal: Verbalizes/displays adequate comfort level or baseline comfort level  Description: Interventions:  - Encourage patient to monitor pain and request assistance  - Assess pain using appropriate pain scale  - Administer analgesics based on type and severity of pain and evaluate response  - Implement non-pharmacological measures as appropriate and evaluate response  - Consider cultural and social influences on pain and pain management  - Notify physician/advanced practitioner if interventions unsuccessful or patient reports new pain  Outcome: Progressing     Problem: INFECTION - ADULT  Goal: Absence or prevention of progression during hospitalization  Description: INTERVENTIONS:  - Assess and monitor for signs and symptoms of infection  - Monitor lab/diagnostic results  - Monitor all insertion sites, i.e. indwelling lines, tubes, and drains  - Monitor endotracheal if appropriate and nasal secretions for changes in amount and color  - [place] appropriate cooling/warming therapies per order  - Administer medications as ordered  - Instruct and encourage patient and family to use good hand hygiene technique  - Identify and instruct in appropriate isolation precautions for identified infection/condition  Outcome: Progressing     Problem: SAFETY ADULT  Goal: Patient will remain free of falls  Description: INTERVENTIONS:  - Educate patient/family on patient safety including physical limitations  - Instruct patient to call for assistance with activity   - Consult OT/PT to assist with strengthening/mobility   - Keep Call bell within reach  - Keep bed low and locked with side rails adjusted as appropriate  - Keep care items and personal belongings within reach  - Initiate and maintain comfort rounds  - Make Fall Risk Sign visible to staff  - Offer Toileting every 2-4  Hours, in advance of need  - Initiate/Maintain call bell alarm  - Obtain necessary fall risk management equipment: nonskid footwear, adequate lighting, orientation to surrounding areas   - Apply yellow socks and bracelet for high fall risk patients  - Consider moving patient to room near nurses station  Outcome: Progressing     Problem: DISCHARGE PLANNING  Goal: Discharge to home or other facility with appropriate resources  Description: INTERVENTIONS:  - Identify barriers to discharge w/patient and caregiver  - Arrange for needed discharge resources and transportation as appropriate  - Identify discharge learning needs (meds, wound care, etc.)  - Arrange for interpretive services to assist at discharge as needed  - Refer to Case Management Department for coordinating discharge planning if the patient needs post-hospital services based on physician/advanced practitioner order or complex needs related to functional status, cognitive ability, or social support system  Outcome: Progressing     Problem: Knowledge Deficit  Goal: Patient/family/caregiver demonstrates understanding of disease process, treatment plan, medications, and discharge instructions  Description: Complete learning assessment and assess knowledge base.   Interventions:  - Provide teaching at level of understanding  - Provide teaching via preferred learning methods  Outcome: Progressing

## 2023-08-19 NOTE — PROGRESS NOTES
Progress Note - Orthopedics   Annika Perez 68 y.o. male MRN: [id number]  Unit/Bed#: -01      Subjective:    73 y.o.male POD 2 L TKA. No acute events, no new complaints. Patient doing well. Pain well controlled. Labs pending.      Labs:  0   Lab Value Date/Time    HCT 39.9 08/18/2023 0457    HCT 49.2 07/13/2023 0731    HCT 48.2 04/05/2023 1007    HCT 48.7 04/05/2018 0921    HGB 13.1 08/18/2023 0457    HGB 16.0 07/13/2023 0731    HGB 15.7 04/05/2023 1007    HGB 16.8 04/05/2018 0921    WBC 9.24 08/18/2023 0457    WBC 5.72 07/13/2023 0731    WBC 6.02 04/05/2023 1007    WBC 5.4 04/05/2018 0921    ESR 2 04/05/2018 2900       Meds:    Current Facility-Administered Medications:   •  acetaminophen (TYLENOL) tablet 650 mg, 650 mg, Oral, Q4H PRN, Erik Moran PA-C, 650 mg at 08/18/23 1655  •  aluminum-magnesium hydroxide-simethicone (MAALOX) oral suspension 30 mL, 30 mL, Oral, Q6H PRN, Erik Moran PA-C  •  diphenhydrAMINE (BENADRYL) tablet 25 mg, 25 mg, Oral, Q6H PRN, Myrtle Booht MD  •  docusate sodium (COLACE) capsule 100 mg, 100 mg, Oral, BID, Erik Moran PA-C, 100 mg at 08/18/23 1649  •  enoxaparin (LOVENOX) subcutaneous injection 40 mg, 40 mg, Subcutaneous, Daily, Erik Moran PA-C, 40 mg at 08/18/23 2226  •  hydrALAZINE (APRESOLINE) tablet 25 mg, 25 mg, Oral, Q8H PRN, Phuong Russell PA-C  •  HYDROcodone-acetaminophen (NORCO) 5-325 mg per tablet 0.5 tablet, 0.5 tablet, Oral, Q4H PRN **OR** HYDROcodone-acetaminophen (NORCO) 5-325 mg per tablet 1 tablet, 1 tablet, Oral, Q4H PRN, Maribell Lard, CRNP, 1 tablet at 08/19/23 0439  •  HYDROmorphone (DILAUDID) injection 0.5 mg, 0.5 mg, Intravenous, Q2H PRN, Erik Moran PA-C, 0.5 mg at 08/18/23 0531  •  insulin lispro (HumaLOG) 100 units/mL subcutaneous injection 1-5 Units, 1-5 Units, Subcutaneous, TID AC, 1 Units at 08/19/23 0615 **AND** Fingerstick Glucose (POCT), , , TID AC, Phuong Russell PA-C  •  insulin lispro (HumaLOG) 100 units/mL subcutaneous injection 1-5 Units, 1-5 Units, Subcutaneous, HS, Phuong Russell PA-C, 1 Units at 08/18/23 2230  •  lactated ringers infusion, 125 mL/hr, Intravenous, Continuous, Tasha Olsen PA-C, Stopped at 08/18/23 0230  •  loratadine (CLARITIN) tablet 10 mg, 10 mg, Oral, Daily, DREA Mullen, 10 mg at 08/18/23 1042  •  metFORMIN (GLUCOPHAGE) tablet 500 mg, 500 mg, Oral, BID With Meals, Phuong Russell PA-C, 500 mg at 08/18/23 1729  •  ondansetron (ZOFRAN) injection 4 mg, 4 mg, Intravenous, Q6H PRN, Tasha Olsen PA-C  •  pravastatin (PRAVACHOL) tablet 40 mg, 40 mg, Oral, Daily With Emily Philip PA-C, 40 mg at 08/18/23 1649  •  tamsulosin (FLOMAX) capsule 0.8 mg, 0.8 mg, Oral, Daily With Emily MAYA Philip-C, 0.8 mg at 08/18/23 1649    Blood Culture:   No results found for: "BLOODCX"    Wound Culture:   No results found for: "WOUNDCULT"    Ins and Outs:  I/O last 24 hours: In: 1510.4 [I.V.:1510.4]  Out: 6045 [Urine:3275; Drains:150]          Physical:  Vitals:    08/18/23 1927   BP: 137/74   Pulse: (!) 113   Resp: 20   Temp: 98.2 °F (36.8 °C)   SpO2: 93%     MSK: Left Lower Extremity  · Dressing c/d/i  · TTP kayleigh-incisionally  · SILT LLE  · Motor intact to FHL/EHL, ankle DF/PF  · Digits WWP with BCR    Assessment:    73 y.o.male POD 2 L TKA. Patient doing well.      Plan:  · WBAT LLE  · Will monitor for ABLA and administer IVF/prbc as indicated for Greater than 2 gram drop or Hgb < 7  · PT/OT  · Pain control  · DVT ppx - lovenox  · Dispo: dispo pending PT and AM labs    Rod Piper MD

## 2023-08-19 NOTE — PLAN OF CARE
Problem: MOBILITY - ADULT  Goal: Maintain or return to baseline ADL function  Description: INTERVENTIONS:  -  Assess patient's ability to carry out ADLs; assess patient's baseline for ADL function and identify physical deficits which impact ability to perform ADLs (bathing, care of mouth/teeth, toileting, grooming, dressing, etc.)  - Assess/evaluate cause of self-care deficits   - Assess range of motion  - Assess patient's mobility; develop plan if impaired  - Assess patient's need for assistive devices and provide as appropriate  - Encourage maximum independence but intervene and supervise when necessary  - Involve family in performance of ADLs  - Assess for home care needs following discharge   - Consider OT consult to assist with ADL evaluation and planning for discharge  - Provide patient education as appropriate  Outcome: Progressing  Goal: Maintains/Returns to pre admission functional level  Description: INTERVENTIONS:  - Perform BMAT or MOVE assessment daily.   - Set and communicate daily mobility goal to care team and patient/family/caregiver. - Collaborate with rehabilitation services on mobility goals if consulted  - Perform Range of Motion3 times a day. - Reposition patient every 2 hours.   - Dangle patient 3times a day  - Stand patient 3 times a day  - Ambulate patient 3 times a day  - Out of bed to [place] times a day   - Out of bed for meals 3 times a day  - Out of bed for toileting  - Record patient progress and toleration of activity level   Outcome: Progressing     Problem: PAIN - ADULT  Goal: Verbalizes/displays adequate comfort level or baseline comfort level  Description: Interventions:  - Encourage patient to monitor pain and request assistance  - Assess pain using appropriate pain scale  - Administer analgesics based on type and severity of pain and evaluate response  - Implement non-pharmacological measures as appropriate and evaluate response  - Consider cultural and social influences on pain and pain management  - Notify physician/advanced practitioner if interventions unsuccessful or patient reports new pain  Outcome: Progressing     Problem: INFECTION - ADULT  Goal: Absence or prevention of progression during hospitalization  Description: INTERVENTIONS:  - Assess and monitor for signs and symptoms of infection  - Monitor lab/diagnostic results  - Monitor all insertion sites, i.e. indwelling lines, tubes, and drains  - Monitor endotracheal if appropriate and nasal secretions for changes in amount and color  - [place] appropriate cooling/warming therapies per order  - Administer medications as ordered  - Instruct and encourage patient and family to use good hand hygiene technique  - Identify and instruct in appropriate isolation precautions for identified infection/condition  Outcome: Progressing     Problem: SAFETY ADULT  Goal: Patient will remain free of falls  Description: INTERVENTIONS:  - Educate patient/family on patient safety including physical limitations  - Instruct patient to call for assistance with activity   - Consult OT/PT to assist with strengthening/mobility   - Keep Call bell within reach  - Keep bed low and locked with side rails adjusted as appropriate  - Keep care items and personal belongings within reach  - Initiate and maintain comfort rounds  - Make Fall Risk Sign visible to staff  - Offer Toileting every Hours, in advance of need  - Initiate/Maintain alarm  - Obtain necessary fall risk management equipment:   - Apply yellow socks and bracelet for high fall risk patients  - Consider moving patient to room near nurses station  Outcome: Progressing     Problem: DISCHARGE PLANNING  Goal: Discharge to home or other facility with appropriate resources  Description: INTERVENTIONS:  - Identify barriers to discharge w/patient and caregiver  - Arrange for needed discharge resources and transportation as appropriate  - Identify discharge learning needs (meds, wound care, etc.)  - Arrange for interpretive services to assist at discharge as needed  - Refer to Case Management Department for coordinating discharge planning if the patient needs post-hospital services based on physician/advanced practitioner order or complex needs related to functional status, cognitive ability, or social support system  Outcome: Progressing     Problem: Knowledge Deficit  Goal: Patient/family/caregiver demonstrates understanding of disease process, treatment plan, medications, and discharge instructions  Description: Complete learning assessment and assess knowledge base.   Interventions:  - Provide teaching at level of understanding  - Provide teaching via preferred learning methods  Outcome: Progressing

## 2023-08-21 ENCOUNTER — TELEPHONE (OUTPATIENT)
Dept: OBGYN CLINIC | Facility: HOSPITAL | Age: 73
End: 2023-08-21

## 2023-08-21 ENCOUNTER — TRANSITIONAL CARE MANAGEMENT (OUTPATIENT)
Dept: FAMILY MEDICINE CLINIC | Facility: CLINIC | Age: 73
End: 2023-08-21

## 2023-08-21 ENCOUNTER — TELEPHONE (OUTPATIENT)
Dept: FAMILY MEDICINE CLINIC | Facility: CLINIC | Age: 73
End: 2023-08-21

## 2023-08-21 RX ORDER — HYDROCODONE BITARTRATE AND ACETAMINOPHEN 5; 325 MG/1; MG/1
TABLET ORAL
COMMUNITY
Start: 2023-08-18

## 2023-08-21 NOTE — TELEPHONE ENCOUNTER
Patient contacted for a postoperative follow up assessment. Patient reports doing well this morning. He did send a [employer], concerned with some redness local to the knee. We discussed postoperative monitoring of the redness, to notify our team if fevers, drainage, spreading redness/warmth are present (all of which he denies right now). Pt reports foot swelling, which we discussed, he is icing his knee, we discussed icing all areas of pain and swelling. He reports "little" dry drainage, which we discussed also. He was ordered [facility], they are coming this morning. We reviewed patients AVS medication list. Patient is taking Tylenol 1000mg PRN- we discussed max of 3000mg/24 hours,  Oxycodone 5mg PRN,Lovenox daily, Colace 100mg BID and Miralax . Patient has not yet had a BM but is passing gas. We discussed increasing fluid and fiber intake also. Patient denies nausea, vomiting, abdominal pain, chest pain, shortness of breath, fever, dizziness and calf pain. Patient does not have any other questions or concerns at this time. Pt was encouraged to call with any questions, concerns or issues.

## 2023-08-22 ENCOUNTER — TELEMEDICINE (OUTPATIENT)
Dept: FAMILY MEDICINE CLINIC | Facility: CLINIC | Age: 73
End: 2023-08-22
Payer: MEDICARE

## 2023-08-22 ENCOUNTER — TELEPHONE (OUTPATIENT)
Dept: FAMILY MEDICINE CLINIC | Facility: CLINIC | Age: 73
End: 2023-08-22

## 2023-08-22 VITALS — RESPIRATION RATE: 20 BRPM

## 2023-08-22 DIAGNOSIS — M17.12 PRIMARY OSTEOARTHRITIS OF LEFT KNEE: Primary | ICD-10-CM

## 2023-08-22 DIAGNOSIS — Z96.652 HISTORY OF ARTHROPLASTY OF LEFT KNEE: ICD-10-CM

## 2023-08-22 PROCEDURE — 99495 TRANSJ CARE MGMT MOD F2F 14D: CPT | Performed by: NURSE PRACTITIONER

## 2023-08-22 NOTE — TELEPHONE ENCOUNTER
Elham Slaughter from [facility] called in regards to patient. Reports he is having increased left knee pain, rates it almost 10/10, stated she does not see any signs of infection. Reported one of their clinicians is going to follow up with patient tomorrow, and she had told him to use ice and pain medication as prescribed.

## 2023-08-22 NOTE — PROGRESS NOTES
Virtual TCM Visit:    Verification of patient location:    Patient is located at Home in the following state in which I hold an active license [place]    Assessment/Plan:        Problem List Items Addressed This Visit        Musculoskeletal and Integument    Primary osteoarthritis of left knee - Primary   Other Visit Diagnoses     History of arthroplasty of left knee               Reason for visit is TCM hospital follow up    Encounter provider DREA Arzate     Provider located at [address] [place], [place] [address]y [address] [place] [facility] [address] [phone]    Recent Visits  Date Type Provider Dept   08/21/23 Telephone [address]  Primary Care   Showing recent visits within past 7 days and meeting all other requirements  Today's Visits  Date Type Provider Dept   08/22/23 Telemedicine DREA Arzate [facility] Primary Care   Showing today's visits and meeting all other requirements  Future Appointments  No visits were found meeting these conditions. Showing future appointments within next 150 days and meeting all other requirements       After connecting through [employer], the patient was identified by name and date of birth. Shayna Shaffer was informed that this is a telemedicine visit and that the visit is being conducted through the [employer]. He agrees to proceed. [facility] He acknowledged consent and understanding of privacy and security of the video platform. The patient has agreed to participate and understands they can discontinue the visit at any time. Patient is aware this is a billable service. Transitional Care Management Review:  Shayna Shaffer is a 68 y.o. male here for TCM follow up.      During the TCM phone call patient stated:    TCM Call     Date and time call was made  8/21/2023 11:30 AM    Patient was hospitialized at  [address] [place] [address]    Date of Admission  08/17/23    Date of discharge  08/19/23 Diagnosis  Primary osteorthritis left knee    Disposition  Home; Home health services    Current Symptoms  None      TCM Call     Post hospital issues  None    Scheduled for follow up? Yes    Did you obtain your prescribed medications  Yes    Do you need help managing your prescriptions or medications  No    Is transportation to your appointment needed  No    I have advised the patient to call PCP with any new or worsening symptoms  Kady JAIMES    Living Arrangements  Spouse or Significiant other    Support System  Spouse    The type of support provided  Emotional; Physical; Other (comment)    Do you have social support  Yes, as much as I need        Subjective:     Patient ID: Magui Allred is a 68 y.o. male. Attending Physician: Adelaide Goodman     Admitting diagnosis: Primary osteoarthritis of left knee (M17.12)  Chronic pain of left knee (M25.562, G89.29)     Discharge diagnosis: Primary osteoarthritis of left knee (M17.12)  Chronic pain of left knee (M25.562, G89.29)     Date of admission: 8/17/2023     Date of discharge: 08/19/23          Procedure: Left TKA     HPI:  This is a 68y.o. year old male that presented to the office with signs and symptoms of left knee osteoarthritis. They tried and failed conservative treatment measures and wished to proceed with surgical intervention. The risks, benefits, and complications of the procedure were discussed with the patient and informed consent was obtained.  [facility] Course: The patient was admitted to the hospital on 8/17/2023 and underwent an uncomplicated left total knee arthroplasty. They were transferred to the floor after a brief stay in the post-anesthesia care unit. Their pain was well managed with IV and oral pain medications. They began therapy on post operative day #1. Lovenox was also started for DVT prophylaxis 12 hours post operatively. Hemovac drain was removed on POD1.   On discharge date pt was cleared by PT and the medicine team and determined to be safe for discharge. TCM Call     Date and time call was made  8/21/2023 11:30 AM    Patient was hospitialized at  [facility]    Date of Admission  08/17/23    Date of discharge  08/19/23    Diagnosis  Primary osteorthritis left knee    Disposition  Home; Home health services    Current Symptoms  None      TCM Call     Post hospital issues  None    Scheduled for follow up? Yes    Did you obtain your prescribed medications  Yes    Do you need help managing your prescriptions or medications  No    Is transportation to your appointment needed  No    I have advised the patient to call PCP with any new or worsening symptoms    Kady JAIMES    Living Arrangements  Spouse or Significiant other    Support System  Spouse    The type of support provided  Emotional; Physical; Other (comment)    Do you have social support  Yes, as much as I need          Review of Systems   Constitutional: Negative for activity change, diaphoresis, fatigue and fever. HENT: Negative for congestion, facial swelling, hearing loss, rhinorrhea, sinus pressure, sinus pain, sneezing, sore throat and voice change. Eyes: Negative for discharge and visual disturbance. Respiratory: Negative for cough, choking, chest tightness, shortness of breath, wheezing and stridor. Cardiovascular: Negative for chest pain, palpitations and leg swelling. Gastrointestinal: Positive for constipation. Negative for abdominal distention, abdominal pain, diarrhea, nausea and vomiting. Endocrine: Negative for polydipsia, polyphagia and polyuria. Genitourinary: Negative for difficulty urinating, dysuria, frequency and urgency. Musculoskeletal: Positive for arthralgias, gait problem and joint swelling. Skin: Negative for color change, rash and wound. Neurological: Negative for dizziness, syncope, speech difficulty, weakness, light-headedness and headaches. Hematological: Negative for adenopathy.  Does not bruise/bleed easily. Psychiatric/Behavioral: Negative for agitation, behavioral problems, confusion, hallucinations, sleep disturbance and suicidal ideas. The patient is not nervous/anxious. Objective:    Vitals:    08/22/23 1042   Resp: 20       Physical Exam  Vitals and nursing note reviewed. Exam conducted with a chaperone present (wife- Era). Constitutional:       General: He is not in acute distress. Appearance: He is well-developed. He is not ill-appearing, toxic-appearing or diaphoretic. Pulmonary:      Effort: Pulmonary effort is normal. No respiratory distress. Musculoskeletal:         General: Swelling (left knee) and tenderness (left knee) present. Left lower leg: Edema present. Skin:     Coloration: Skin is not pale. Neurological:      Mental Status: He is alert and oriented to person, place, and time. Psychiatric:         Mood and Affect: Mood normal.         Speech: Speech normal.         Behavior: Behavior normal. Behavior is cooperative. Thought Content: Thought content normal.         Judgment: Judgment normal.         Medications have been reviewed by provider in current encounter    I spent 12 minutes with the patient during this visit. DREA Harper      VIRTUAL VISIT DISCLAIMER    Jose Francisco Allan verbally agrees to participate in [employer]. Pt is aware that [employer] could be limited without vital signs or the ability to perform a full hands-on physical Remy Hart understands he or the provider may request at any time to terminate the video visit and request the patient to seek care or treatment in person.

## 2023-08-24 ENCOUNTER — OFFICE VISIT (OUTPATIENT)
Dept: OBGYN CLINIC | Facility: HOSPITAL | Age: 73
End: 2023-08-24

## 2023-08-24 ENCOUNTER — HOSPITAL ENCOUNTER (OUTPATIENT)
Dept: RADIOLOGY | Facility: HOSPITAL | Age: 73
Discharge: HOME | End: 2023-08-24
Attending: ORTHOPAEDIC SURGERY
Payer: MEDICARE

## 2023-08-24 ENCOUNTER — TELEPHONE (OUTPATIENT)
Age: 73
End: 2023-08-24

## 2023-08-24 VITALS
BODY MASS INDEX: 35.53 KG/M2 | HEART RATE: 120 BPM | SYSTOLIC BLOOD PRESSURE: 142 MMHG | HEIGHT: 64 IN | DIASTOLIC BLOOD PRESSURE: 73 MMHG

## 2023-08-24 DIAGNOSIS — Z96.652 AFTERCARE FOLLOWING LEFT KNEE JOINT REPLACEMENT SURGERY: ICD-10-CM

## 2023-08-24 DIAGNOSIS — Z47.1 AFTERCARE FOLLOWING LEFT KNEE JOINT REPLACEMENT SURGERY: ICD-10-CM

## 2023-08-24 DIAGNOSIS — M17.12 PRIMARY OSTEOARTHRITIS OF LEFT KNEE: ICD-10-CM

## 2023-08-24 PROCEDURE — 73560 X-RAY EXAM OF KNEE 1 OR 2: CPT

## 2023-08-24 PROCEDURE — 99024 POSTOP FOLLOW-UP VISIT: CPT | Performed by: ORTHOPAEDIC SURGERY

## 2023-08-24 RX ORDER — CEPHALEXIN 500 MG/1
CAPSULE ORAL
Qty: 4 CAPSULE | Refills: 2 | Status: SHIPPED | OUTPATIENT
Start: 2023-08-24 | End: 2024-08-24

## 2023-08-24 RX ORDER — OXYCODONE HYDROCHLORIDE 5 MG/1
5 TABLET ORAL EVERY 4 HOURS PRN
Qty: 30 TABLET | Refills: 0 | Status: SHIPPED | OUTPATIENT
Start: 2023-08-24

## 2023-08-24 NOTE — PROGRESS NOTES
Assessment:    S/P Left TKA DOS 8/17/2023    Plan:  · Ok to shower   · Encourage elevation to decrease soft tissue swelling  · Continue in PT - Patient is currently receiving home PT and is planning on outpatient PT once he is able to do steps  · Dental prophylaxis forwarded today  · Oxycodone refilled  · Continue Lovenox as directed  · Follow up next week for staple removal    To do next visit:  No follow-ups on file. The above stated was discussed in layman's terms and the patient expressed understanding. All questions were answered to the patient's satisfaction. Subjective:   Hanane Batista is a 68 y.o. male who presents   Today one week postop right knee. He is doing well with expected postop pain and soft tissue swelling/bruising. Taking oxy prn. Receiving home PT.       Review of systems negative unless otherwise specified in HPI    Past Medical History:   Diagnosis Date   • Arthritis    • Colon polyp    • COVID 09/07/2021    and 12/2022   • Diabetes mellitus ([address])    • Dyslipidemia    • Fallen arches    • GERD (gastroesophageal reflux disease)    • Hyperlipidemia    • Nodule of left lung 2007    negative for CA   • Type 2 diabetes mellitus without complication, without long-term current use of insulin ([address]) 01/23/2019   • Use of cane as ambulatory aid        Past Surgical History:   Procedure Laterality Date   • COLONOSCOPY     • ELBOW SURGERY Bilateral    • FOOT TENDON SURGERY Bilateral     heel   • HAND SURGERY     • AR ARTHRP KNE CONDYLE&PLATU MEDIAL&LAT COMPARTMENTS Left 8/17/2023    Procedure: ARTHROPLASTY KNEE TOTAL W ROBOT;  Surgeon: Jacob Goddard MD;  [facility]: [facility];  Service: Orthopedics   • AR ARTHRS KNE SURG W/MENISCECTOMY MED/LAT W/SHVG Right 05/23/2022    Procedure: KNEE ARTHROSCOPY WITH medial MENISCECTOMY chondroplasty synovectomy injection;  Surgeon: Dasia Mills DO;  Location: [facility];  Service: Orthopedics   • AR ARTHRS KNE SURG W/MENISCECTOMY MED/LAT W/SHVG Left 2023    Procedure: Left knee arthroscopy: Medial meniscectomy; Lateral meniscectomy; Chondroplasty; Synovectomy;  Post arthroscopic injection of intra-articular joint space and peripheral portal;  Surgeon: Ann Hill DO;  Location: [facility];  Service: Orthopedics   • AK NEUROPLASTY &/TRANSPOS MEDIAN NRV CARPAL Cammie Tam Left 2021    Procedure: RELEASE CARPAL TUNNEL;  Surgeon: Matt Singh DO;  Location: [facility] MAIN OR;  Service: Orthopedics       Family History   Problem Relation Age of Onset   • Heart disease Mother         hx MI   • Diabetes Mother    • Stroke Mother    • Arthritis Mother    • COPD Father         80 yrs   • Coronary artery disease Father    • Cancer Brother        Social History     Occupational History   • Not on file   Tobacco Use   • Smoking status: Former     Packs/day: 1.00     Years: 35.00     Total pack years: 35.00     Types: Cigarettes     Quit date:      Years since quittin.6   • Smokeless tobacco: Never   Vaping Use   • Vaping Use: Never used   Substance and Sexual Activity   • Alcohol use: Yes     Comment: 2 x per week   • Drug use: No   • Sexual activity: Yes     Partners: Female     Birth control/protection: Male Sterilization         Current Outpatient Medications:   •  enoxaparin (LOVENOX) 40 mg/0.4 mL, Inject 0.4 mL (40 mg total) under the skin daily for 28 days To start postoperatively, Disp: 11.2 mL, Rfl: 0  •  HYDROcodone-acetaminophen (NORCO) 5-325 mg per tablet, , Disp: , Rfl:   •  metFORMIN (GLUCOPHAGE) 500 mg tablet, Take 1 tablet (500 mg total) by mouth 2 (two) times a day with meals, Disp: 180 tablet, Rfl: 3  •  oxyCODONE (Roxicodone) 5 immediate release tablet, Take 1 tablet (5 mg total) by mouth every 4 (four) hours as needed for severe pain for up to 30 doses Max Daily Amount: 30 mg, Disp: 30 tablet, Rfl: 0  •  simvastatin (ZOCOR) 20 mg tablet, Take 1 tablet (20 mg total) by mouth daily at bedtime, Disp: 90 tablet, Rfl: 3  • tamsulosin (FLOMAX) 0.4 mg, Take 2 capsules (0.8 mg total) by mouth daily with dinner, Disp: 180 capsule, Rfl: 3    No Known Allergies         Vitals:    08/24/23 1324   BP: 142/73   Pulse: (!) 120       Objective:                    Left knee incision healing well without infection, staples intact  Moderate diffuse soft tissue swelling and ecchymosis present to the right mid thigh into lower calf  ROM 10-60 degrees     Diagnostics, reviewed and taken today if performed as documented: The attending physician has personally reviewed the pertinent films in PACS and interpretation is as follows:  Xray right knee confirms well bonded stable right total knee arthroplasty      Procedures, if performed today:    None performed      Portions of the record may have been created with voice recognition software. Occasional wrong word or "sound a like" substitutions may have occurred due to the inherent limitations of voice recognition software. Read the chart carefully and recognize, using context, where substitutions have occurred.

## 2023-08-24 NOTE — TELEPHONE ENCOUNTER
PA for OXY cannot be done on [employer]  [facility]    Covered:  Retail, Mail Order, Specialty    Unknown:  Long-Term Care  Member ID:  [id number]  Group ID:  [id number]  Group name:  Mabel Ferrari /CUBA TREJO, [facility] VALUE SCRIPT     [address]:  [id number]  PCN:  Josephine Jimenez

## 2023-08-31 ENCOUNTER — OFFICE VISIT (OUTPATIENT)
Dept: OBGYN CLINIC | Facility: HOSPITAL | Age: 73
End: 2023-08-31

## 2023-08-31 VITALS
BODY MASS INDEX: 35.53 KG/M2 | HEART RATE: 102 BPM | DIASTOLIC BLOOD PRESSURE: 90 MMHG | SYSTOLIC BLOOD PRESSURE: 148 MMHG | HEIGHT: 64 IN

## 2023-08-31 DIAGNOSIS — Z47.1 AFTERCARE FOLLOWING LEFT KNEE JOINT REPLACEMENT SURGERY: Primary | ICD-10-CM

## 2023-08-31 DIAGNOSIS — Z96.652 AFTERCARE FOLLOWING LEFT KNEE JOINT REPLACEMENT SURGERY: Primary | ICD-10-CM

## 2023-08-31 PROCEDURE — 99024 POSTOP FOLLOW-UP VISIT: CPT | Performed by: ORTHOPAEDIC SURGERY

## 2023-08-31 NOTE — PROGRESS NOTES
Assessment:   Diagnosis ICD-10-CM Associated Orders   1. Aftercare following left knee joint replacement surgery  Z47.1     Z96.652           Plan:  • Second postoperative visit 2 weeks status post left total knee arthroplasty. His incision is adequately healed and staples removed successfully. • He may get his incision wet, do not submerge, pat dry. • Continue physical therapy and home exercise program to maximize recovery. Focus on extension. Ice and elevate as indicated for swelling reduction. Complete Lovenox for DVT prophylaxis. Walker to cane progression when able to do so. • He is to remain out of work ()    To do next visit:  Return in about 4 weeks (around 9/28/2023) for re-check. The above stated was discussed in layman's terms and the patient expressed understanding. All questions were answered to the patient's satisfaction. Scribe Attestation    I,:  Trina Nichole am acting as a scribe while in the presence of the attending physician.:       I,:  Ruperto Fuchs MD personally performed the services described in this documentation    as scribed in my presence.:             Subjective:   Virgilio Wakefield is a 68 y.o. male who presents with his wife second postoperative visit 2 weeks status post left total knee arthroplasty. Overall he is doing well. He presents today using a rolling walker for ambulatory assistance. He denies any calf or thigh pain. Denies any fevers or chills. He does feel improved compared to last week. He stopped taking his oxycodone this past Sunday. He has been administering his Lovenox for DVT prophylaxis.       Review of systems negative unless otherwise specified in HPI  Review of Systems    Past Medical History:   Diagnosis Date   • Arthritis    • Colon polyp    • COVID 09/07/2021    and 12/2022   • Diabetes mellitus ([address])    • Dyslipidemia    • Fallen arches    • GERD (gastroesophageal reflux disease)    • Hyperlipidemia    • Nodule of left lung     negative for CA   • Type 2 diabetes mellitus without complication, without long-term current use of insulin ([address]) 2019   • Use of cane as ambulatory aid        Past Surgical History:   Procedure Laterality Date   • COLONOSCOPY     • ELBOW SURGERY Bilateral    • FOOT TENDON SURGERY Bilateral     heel   • HAND SURGERY     • ID ARTHRP KNE CONDYLE&PLATU MEDIAL&LAT COMPARTMENTS Left 2023    Procedure: ARTHROPLASTY KNEE TOTAL W ROBOT;  Surgeon: Reshma Palacios MD;  Location:  MAIN OR;  Service: Orthopedics   • ID ARTHRS KNE SURG W/MENISCECTOMY MED/LAT W/SHVG Right 2022    Procedure: KNEE ARTHROSCOPY WITH medial MENISCECTOMY chondroplasty synovectomy injection;  Surgeon: Juanito Monteiro DO;  [facility];  Service: Orthopedics   • ID ARTHRS KNE SURG W/MENISCECTOMY MED/LAT W/SHVG Left 2023    Procedure: Left knee arthroscopy: Medial meniscectomy; Lateral meniscectomy; Chondroplasty; Synovectomy;  Post arthroscopic injection of intra-articular joint space and peripheral portal;  Surgeon: Juanito Monteiro DO;  [facility]: [facility];  Service: Orthopedics   • ID NEUROPLASTY &/TRANSPOS MEDIAN NRV CARPAL Jose Ramon David Left 2021    Procedure: RELEASE CARPAL TUNNEL;  Surgeon: Corby Gerber DO;  Location: [facility] MAIN OR;  Service: Orthopedics       Family History   Problem Relation Age of Onset   • Heart disease Mother         hx MI   • Diabetes Mother    • Stroke Mother    • Arthritis Mother    • COPD Father         80 yrs   • Coronary artery disease Father    • Cancer Brother        Social History     Occupational History   • Not on file   Tobacco Use   • Smoking status: Former     Packs/day: 1.00     Years: 35.00     Total pack years: 35.00     Types: Cigarettes     Quit date:      Years since quittin.6   • Smokeless tobacco: Never   Vaping Use   • Vaping Use: Never used   Substance and Sexual Activity   • Alcohol use: Yes     Comment: 2 x per week   • Drug use: No   • Sexual activity: Yes     Partners: Female     Birth control/protection: Male Sterilization         Current Outpatient Medications:   •  cephalexin (KEFLEX) 500 mg capsule, Four tablets one hour prior to cleaning, Disp: 4 capsule, Rfl: 2  •  enoxaparin (LOVENOX) 40 mg/0.4 mL, Inject 0.4 mL (40 mg total) under the skin daily for 28 days To start postoperatively, Disp: 11.2 mL, Rfl: 0  •  HYDROcodone-acetaminophen (NORCO) 5-325 mg per tablet, , Disp: , Rfl:   •  metFORMIN (GLUCOPHAGE) 500 mg tablet, Take 1 tablet (500 mg total) by mouth 2 (two) times a day with meals, Disp: 180 tablet, Rfl: 3  •  oxyCODONE (Roxicodone) 5 immediate release tablet, Take 1 tablet (5 mg total) by mouth every 4 (four) hours as needed for severe pain for up to 30 doses Max Daily Amount: 30 mg, Disp: 30 tablet, Rfl: 0  •  simvastatin (ZOCOR) 20 mg tablet, Take 1 tablet (20 mg total) by mouth daily at bedtime, Disp: 90 tablet, Rfl: 3  •  tamsulosin (FLOMAX) 0.4 mg, Take 2 capsules (0.8 mg total) by mouth daily with dinner, Disp: 180 capsule, Rfl: 3    No Known Allergies         Vitals:    08/31/23 1328   BP: 148/90   Pulse: 102       Objective:                    Left Knee Exam     Range of Motion   Left knee extension: 10-15 degrees. Left knee flexion: 80-85 degrees. Other   Swelling: moderate    Comments:    Intact extensor mechanism  Healed anterior incision with staples in place which were removed successfully. Appropriate amount of warmth  No signs of infection  Calf and thigh            Diagnostics, reviewed and taken today if performed as documented:    None performed          Procedures, if performed today:    Procedures    None performed      Portions of the record may have been created with voice recognition software. Occasional wrong word or "sound a like" substitutions may have occurred due to the inherent limitations of voice recognition software.   Read the chart carefully and recognize, using context, where substitutions have occurred.

## 2023-09-05 NOTE — PROGRESS NOTES
PT Re-Evaluation     Today's date: 2023  Patient name: Virgilio Wakefield  : 1950  MRN: [id number]  Referring provider: Ruperto Fuchs  Dx:   Encounter Diagnosis     ICD-10-CM    1. Primary osteoarthritis of left knee  M17.12       2. S/P total knee arthroplasty, left  Z96.652       3. Left knee pain, unspecified chronicity  M25.562                      Assessment  Assessment details: Virgilio Wakefield is a 71 y.o. male with a history of OA, dyslipidemia, GERD, DM, hyperlipidemia, bilateral elbow surgery, L carpal tunnel that presents for a post-op physical therapy RE evaluation/POC UPDATE. The patient demonstrates signs and symptoms consistent with L knee OA s/p L TKA( ). During the examination the patient demonstrated decreased L knee strength, decreased L knee ROM, gait dysfunction, and L knee pain. The patient's impairments are causing the following functional limitations: difficulty with prolonged standing, prolonged walking, walking on unlevel surfaces, difficulty squatting/kneeling, difficulty stair-climbing, and difficulty transferring from low surfaces. The patient will benefit from continued skilled PT services to address impairments, work towards goals, and restore PLOF. Impairments: abnormal gait, abnormal or restricted ROM, activity intolerance, impaired balance, impaired physical strength, lacks appropriate home exercise program, pain with function and weight-bearing intolerance  Functional limitations: difficulty with prolonged standing, prolonged walking, walking on unlevel surfaces, difficulty squatting/kneeling, difficulty stair-climbing, and difficulty transferring from low surfaces. Symptom irritability: moderateUnderstanding of Dx/Px/POC: good   Prognosis: good    Goals  GOALS TO BE ACHIEVED BY THE END OF THE PRE-OP VISIT  1. Patient have all questions answered / receive pre operative education regarding precautions/wound care/ADL's. MET   2.   Patient to be independent with his phase 1 post operative exercises to perform pre surgery. MET     GOALS TO BE ACHIEVED POST OPERATIVELY    STG: Achieve in 4-6 weeks  1. Patient's L knee pain at worst less than 3/10 to allow for proper gait. PROGRESSING 9/6  2. Patient's L knee ROM improve by 10-15 degrees to improve squatting. PROGRESSING 9/6  3. L LE MMT improve to > 4/5 all motions tested to improve ADL/recreational activities. PROGRESSING 9/6  4. Timed up and go score improve to less than 14 seconds to indicate improved balance/decreased falls risk. PROGRESSING 9/6    LTG:  Achieve in 6-12 weeks  1. Patient's knee FOTO score improve to > 70% to indicate a return to normal functioning. PROGRESSING 9/6  2. Patient achieve personal goal of walking without pain/difficulty. PROGRESSING 9/6  3. Patient to achieve independence with home exercise plan. PROGRESSING 9/6  4. 30 second sit to stand score improve TO > 15 stands to indicate improved transfers. PROGRESSING 9/6    NEW GOALS MADE DURING POC UPDATE ON 9/6/23 to be achieved in 4-12 weeks  1. 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 Plan  Plan details: RE-ASSESS 1X/MONTH  Patient would benefit from: skilled physical therapy  Planned modality interventions: TENS, cryotherapy and thermotherapy: hydrocollator packs  Other planned modality interventions: IASTM / NMES  Planned therapy interventions: joint mobilization, manual therapy, massage, neuromuscular re-education, patient education, postural training, self care, strengthening, stretching, therapeutic activities, therapeutic exercise, home exercise program, abdominal trunk stabilization, balance, balance/weight bearing training and gait training  Frequency: 1-3X/WK.   Duration in weeks: 12  Plan of Care beginning date: 9/6/2023  Plan of Care expiration date: 12/6/2023  Treatment plan discussed with: PTA and patient        Subjective Evaluation    History of Present Illness  Date of onset: 10/6/2022  Date of surgery: 2023  Mechanism of injury: surgery  Mechanism of injury: SUBJECTIVE: 23: The patient presents for a post op PT re-evaluation / [address] [place] [address]. The patient is now 3 weeks s/p L TKA done by Dr. Cem Howard on 23. The patient received home care x 2 weeks after surgery. The patient reports difficulty with walking, donning shoes/socks, shower transfers, and ambulate up/down the steps. The patient will benefit from continued PT to achieve his goals of therapy. INJURY HISTORY: Adam Scott is a 71 y.o. male that presents to outpatient physical therapy pre-operatively( upcoming L TKA) with complaints of L knee pain and difficulty functioning/walking. The patient reports difficulty using the vacuum, unable to lift/carry items, squat, and kneel. The patient's main goal for physical therapy is to walk pain free at the L knee. The patient presents pre-operatively to get objective measures, learn phase 1 exercises, and receive education on the surgery/self care.               Recurrent probem    Patient Goals  Patient goals for therapy: decreased pain, improved balance, increased motion, increased strength, independence with ADLs/IADLs and return to sport/leisure activities  Patient goal: walk pain free / function pain free( progressing)  Pain  Current pain ratin  At best pain ratin (sit/rest)  At worst pain rating: 10  Location: L knee anterior medial knee  Quality: dull ache, grinding and sharp  Relieving factors: rest  Aggravating factors: standing, walking and stair climbing  Progression: worsening    Social Support  Steps to enter house: yes  Stairs in house: yes   Lives in: multiple-level home  Lives with: spouse    Employment status: working (drives school bus )  Hand dominance: right    Treatments  Previous treatment: physical therapy  Current treatment: physical therapy  Discharged from (in last 30 days): inpatient hospitalization        Objective Observations   Left Knee   Positive for adhesive scar and incision. Additional Observation Details  Patient's postoperative incisions look well healed. Reviewed S/S of DVT. Patient shown scar massage    Palpation     Additional Palpation Details  (+) TTP L groin    Tenderness     Additional Tenderness Details  NO TTP    Neurological Testing     Sensation     Knee   Left Knee   Intact: light touch  Hyposensation: light touch    Right Knee   Intact: light touch     Comments   Left light touch: L patella. Active Range of Motion   Left Knee   Flexion: 82 degrees with pain  Extension: -18 degrees with pain    Right Knee   Flexion: 130 degrees with pain  Extension: 2 degrees with pain    Passive Range of Motion   Left Knee   Flexion: 84 degrees with pain  Extension: -17 degrees with pain    Right Knee   Flexion: 130 degrees   Extension: 2 degrees     Mobility   Patellar Mobility:   Left Knee   Hypomobile: left medial, left lateral, left superior and left inferior    Right Knee   WFL: medial, lateral, superior and inferior    Strength/Myotome Testing     Left Hip   Planes of Motion   Flexion: 3+  Extension: 3+  Abduction: 3+  Adduction: 3+    Right Hip   Planes of Motion   Flexion: 4+  Extension: 4  Abduction: 4  Adduction: 4+    Left Knee   Flexion: 3  Extension: 3  Quadriceps contraction: poor    Right Knee   Flexion: 5  Extension: 5  Quadriceps contraction: good    Tests     Additional Tests Details  Gait impairments: Patient ambulates with SPC WBAT B/L LE. The patient demonstrates slow gait speed, unequal step lengths,( +) antalgic gait, decreased L knee flexion/extension ROM, and decreased heel-toe rollover L LE.     FOTO: 30% ( predicted 60%)    TU23: 20 seconds w/ SPC  Pre-op: 20 seconds w/ SPC; (+) difficulty upon standing up    30 SSTS: post op : NOT DONE  Pre-op: 7 stands no H - slow    KOOS: 17= 44.905                 Re-Evaluation:10/4  [email or web address]     PRECAUTIONS: HX ANT LUMBAR derangement; HIGH TISSUE IRRITABILITY / HIGH PAIN RATING  Knee Specialty Daily Treatment Diary     Manual Therapy 7/6 9/6      Scar massage  DONE x 2 mins - instructed for HEP      Hamstring stretch  2x:15      Prone Quad stretch        Patellar mobs  Done AD      Knee stretch on edge of mat  Done AD x 5 mins flex/ext          Ther Ex 7/6 9/6      Nu Step for LE strength S=10  *L2 x 10 mins  After stretch  L1 x 4 mins      Biodex Bike for knee ROM/ endurance S=        Heel slides flex/abd x15 ea x15 ea              EXT BOARD        Prone knee flex         ANKLE PUMP X15 EA x20 w/ heel propped for EXT      SLR all planes  *              LAQ x15 x15      Hamstring stretch  *instructed for HEP      Calf stretch  *Instructed for HEP      Standing hamstring curls  *      HR/TR  *      Mini Squat  *                      Total gym squats (deep)        Neuro Re-ed        TKE  *      Gluteal set x15 x15      QS x15 x15      Wall slides with feet stagger        Bridges  *      Fitter board        Eccentric Leg press        Clam Shells        QS+ SLR        CSMi balance, "+" squat                sidestepping  *      Heel-toe amb  *      Mirror walking  *      Ther Act        Amb up/down steps        Chair squats        Crate carry        Step ups  *          Modalities        CP  KNEE                                The patient was given a new home exercise plan with instruction, pictures, and verbal feedback. The patient accepts and understands the new home activities.

## 2023-09-06 ENCOUNTER — OFFICE VISIT (OUTPATIENT)
Dept: PHYSICAL THERAPY | Facility: CLINIC | Age: 73
End: 2023-09-06
Payer: MEDICARE

## 2023-09-06 DIAGNOSIS — M25.562 LEFT KNEE PAIN, UNSPECIFIED CHRONICITY: ICD-10-CM

## 2023-09-06 DIAGNOSIS — M17.12 PRIMARY OSTEOARTHRITIS OF LEFT KNEE: Primary | ICD-10-CM

## 2023-09-06 DIAGNOSIS — Z96.652 S/P TOTAL KNEE ARTHROPLASTY, LEFT: ICD-10-CM

## 2023-09-06 PROCEDURE — 97110 THERAPEUTIC EXERCISES: CPT | Performed by: PHYSICAL THERAPIST

## 2023-09-06 PROCEDURE — 97140 MANUAL THERAPY 1/> REGIONS: CPT | Performed by: PHYSICAL THERAPIST

## 2023-09-06 PROCEDURE — 97112 NEUROMUSCULAR REEDUCATION: CPT | Performed by: PHYSICAL THERAPIST

## 2023-09-07 ENCOUNTER — OFFICE VISIT (OUTPATIENT)
Dept: PHYSICAL THERAPY | Facility: CLINIC | Age: 73
End: 2023-09-07
Payer: MEDICARE

## 2023-09-07 DIAGNOSIS — M17.12 PRIMARY OSTEOARTHRITIS OF LEFT KNEE: Primary | ICD-10-CM

## 2023-09-07 DIAGNOSIS — M25.562 LEFT KNEE PAIN, UNSPECIFIED CHRONICITY: ICD-10-CM

## 2023-09-07 DIAGNOSIS — Z96.652 S/P TOTAL KNEE ARTHROPLASTY, LEFT: ICD-10-CM

## 2023-09-07 PROCEDURE — 97110 THERAPEUTIC EXERCISES: CPT

## 2023-09-07 PROCEDURE — 97140 MANUAL THERAPY 1/> REGIONS: CPT

## 2023-09-07 PROCEDURE — 97112 NEUROMUSCULAR REEDUCATION: CPT

## 2023-09-07 NOTE — PROGRESS NOTES
Daily Note     Today's date: 2023  Patient name: Gail Giraldo  : 1950  MRN: [id number]  Referring provider: Castro Roa  Dx:   Encounter Diagnosis     ICD-10-CM    1. Primary osteoarthritis of left knee  M17.12       2. S/P total knee arthroplasty, left  Z96.652       3. Left knee pain, unspecified chronicity  M25.562           Start Time: 1314          Subjective: Patient feels he is better today then yesterday with the pain. He rates the pain 4/10 in the left knee. He continues to have a lot of pain with knee extension and was putting pillow under knee (encouraged patient not to). Objective: See treatment diary below    Patient continues to be swollen in left leg and was encouraged to elevate the leg more without increasing knee flexion. Instructions given on proper performance. He was also educated on rolling thight and hamstring (can also massage areas) for pain relief and swelling decrease. Assessment: Tolerated treatment fair+. Patient would benefit from continued PT for stretching and strengthening. Patient was able to add exercises to his program with little difficulty and discomfort. He has a lot more pain with knee extension. He is limited in both directions of knee movement. Patient was sore when he left department. Plan: Continue per plan of care. Progress treatment as tolerated.        Re-Evaluation:10/4  [email or web address]     PRECAUTIONS: HX ANT LUMBAR derangement; HIGH TISSUE IRRITABILITY / HIGH PAIN RATING  Knee Specialty Daily Treatment Diary     Manual Therapy      Scar massage  DONE x 2 mins - instructed for HEP      PROM   :30x3 flex/ext     Hamstring stretch  2x:15 2x :15     Prone Quad stretch        Patellar mobs  Done AD      Knee stretch on edge of mat  Done AD x 5 mins flex/ext x5 min flex/ext         Ther Ex      Nu Step for LE strength S=10  *L2 x 10 mins  After stretch  L1 x 4 mins xL2 x10 min     Biodex Bike for knee ROM/ endurance S=        Heel slides flex/abd x15 ea x15 ea x10ea             EXT BOARD        Prone knee flex         ANKLE PUMP X15 EA x20 w/ heel propped for EXT x20 w/ heel propped for EXT     SLR all planes  *              LAQ x15 x15 x15     Hamstring stretch  *instructed for HEP      Calf stretch  *Instructed for HEP      Standing hamstring curls  * x10      HR/TR  * x10 ea     Mini Squat  * x10                     Total gym squats (deep)        Neuro Re-ed        TKE  *      Gluteal set x15 x15 x15     QS x15 x15 x15     Wall slides with feet stagger        Bridges  * x10     Fitter board        Eccentric Leg press        Clam Shells        QS+ SLR        CSMi balance, "+" squat                sidestepping  * 10ft x4     Heel-toe amb  * 10ft x4     Mirror walking  * 10ft x6     Ther Act        Amb up/down steps        Chair squats        Crate carry        Step ups  *          Modalities        CP  KNEE                          Access Code: [email or web address]  URL: [email or web address]  Date: 09/07/2023  Prepared by: Ayan Florentino    Exercises  - 4 Way Patellar [place]  - 2 x daily - 7 x weekly - 1 sets - 10 reps - 3 hold  - Seated Calf Stretch with Strap  - 2 x daily - 7 x weekly - 1 sets - 4 reps - 30 hold  - Seated Hamstring Stretch  - 2 x daily - 7 x weekly - 1 sets - 4 reps - 30 hold  - Supine Quadricep Sets  - 2 x daily - 7 x weekly - 2 sets - 10 reps - 5 hold  - Supine Gluteal Sets  - 2 x daily - 7 x weekly - 2 sets - 10 reps - 5 hold  - Supine Heel Slide  - 2 x daily - 7 x weekly - 2 sets - 10 reps - 5 hold  - Supine Hip Abduction  - 2 x daily - 6 x weekly - 2 sets - 10 reps - 5 hold  - Seated Ankle Pumps  - 3 x daily - 7 x weekly - 3 sets - 10 reps  - Supine Bridge  - 2 x daily - 7 x weekly - 1 sets - 10 reps  - Side Stepping with Counter Support  - 2 x daily - 7 x weekly - 1 sets - 4 reps  - Tandem Walking with Counter Support  - 2 x daily - 7 x weekly - 1 sets - 4 reps    Patient Education  - Scar Massage

## 2023-09-11 ENCOUNTER — OFFICE VISIT (OUTPATIENT)
Dept: PHYSICAL THERAPY | Facility: CLINIC | Age: 73
End: 2023-09-11
Payer: MEDICARE

## 2023-09-11 DIAGNOSIS — M25.562 LEFT KNEE PAIN, UNSPECIFIED CHRONICITY: ICD-10-CM

## 2023-09-11 DIAGNOSIS — M17.12 PRIMARY OSTEOARTHRITIS OF LEFT KNEE: Primary | ICD-10-CM

## 2023-09-11 DIAGNOSIS — Z96.652 S/P TOTAL KNEE ARTHROPLASTY, LEFT: ICD-10-CM

## 2023-09-11 PROCEDURE — 97110 THERAPEUTIC EXERCISES: CPT | Performed by: PHYSICAL THERAPIST

## 2023-09-11 PROCEDURE — 97112 NEUROMUSCULAR REEDUCATION: CPT | Performed by: PHYSICAL THERAPIST

## 2023-09-11 PROCEDURE — 97140 MANUAL THERAPY 1/> REGIONS: CPT | Performed by: PHYSICAL THERAPIST

## 2023-09-11 NOTE — PROGRESS NOTES
Daily Note     Today's date: 2023  Patient name: Annika Perez  : 1950  MRN: [id number]  Referring provider: Myrtle Booth  Dx:   Encounter Diagnosis     ICD-10-CM    1. Primary osteoarthritis of left knee  M17.12       2. S/P total knee arthroplasty, left  Z96.652       3. Left knee pain, unspecified chronicity  M25.562                      Subjective: The patient's main complaint is pain at his L posterior knee, "burning". Objective: See treatment diary below      Assessment: The patient was be suffering from some neural tension at the L LE as the ankle pumps and any sustained L knee extension stretching seems to irritate the patient's symptoms. The patient was shown repetitive stretches without a hold time to compensate for the irritated tissues. The patient was given encouragement to increase his activity levels at home. The patient's HEP was updated. The patient will benefit from continued PT to achieve his goals of therapy. Plan: Continue per plan of care. Progress treatment as tolerated. Re-Evaluation:10/4  [email or web address]     PRECAUTIONS: HX ANT LUMBAR derangement; HIGH TISSUE IRRITABILITY / HIGH PAIN RATING  Knee Specialty Daily Treatment Diary     Manual Therapy     Scar massage  DONE x 2 mins - instructed for HEP  DONE AD    PROM   :30x3 flex/ext     Hamstring stretch  2x:15 2x :15     Prone Quad stretch        Patellar mobs  Done AD      Knee stretch on edge of mat  Done AD x 5 mins flex/ext x5 min flex/ext X 6 mins ea flex/ext        Ther Ex     Nu Step for LE strength S=10  *L2 x 10 mins  After stretch  L1 x 4 mins xL2 x10 min L3 x 5 mins    Biodex Bike for knee ROM/ endurance S=        Heel slides flex/abd x15 ea x15 ea x10ea x10            EXT BOARD        Prone knee flex         ANKLE PUMP X15 EA x20 w/ heel propped for EXT x20 w/ heel propped for EXT x20 p!p!     SLR all planes  *              LAQ x15 x15 x15 x15    Hamstring stretch  *instructed for HEP      Calf stretch  *Instructed for HEP  Step 4x:30    Standing hamstring curls  * x10      HR/TR  * x10 ea     Mini Squat  * x10     Self knee flex/ext stretch    2x10 ea            Total gym squats (deep)    2x10    Neuro Re-ed        TKE  *      Gluteal set x15 x15 x15 Prone x15    QS x15 x15 x15 Prone x15    Wall slides with feet stagger        Bridges  * x10     Fitter board        Eccentric Leg press        Clam Shells        QS+ SLR        CSMi balance, "+" squat                sidestepping  * 10ft x4     Heel-toe amb  * 10ft x4     Mirror walking  * 10ft x6             Amb up/down steps    X 2 mins up SOS 2 HR  Down STS 2 HR    Chair squats        Crate carry        Step ups  *  x10 fwd        Modalities        CP  KNEE                          Access Code: [email or web address]  URL: [email or web address]  Date: 09/07/2023  Prepared by: Vega Florentino    Exercises  - 4 Way Patellar [place]  - 2 x daily - 7 x weekly - 1 sets - 10 reps - 3 hold  - Seated Calf Stretch with Strap  - 2 x daily - 7 x weekly - 1 sets - 4 reps - 30 hold  - Seated Hamstring Stretch  - 2 x daily - 7 x weekly - 1 sets - 4 reps - 30 hold  - Supine Quadricep Sets  - 2 x daily - 7 x weekly - 2 sets - 10 reps - 5 hold  - Supine Gluteal Sets  - 2 x daily - 7 x weekly - 2 sets - 10 reps - 5 hold  - Supine Heel Slide  - 2 x daily - 7 x weekly - 2 sets - 10 reps - 5 hold  - Supine Hip Abduction  - 2 x daily - 6 x weekly - 2 sets - 10 reps - 5 hold  - Seated Ankle Pumps  - 3 x daily - 7 x weekly - 3 sets - 10 reps  - Supine Bridge  - 2 x daily - 7 x weekly - 1 sets - 10 reps  - Side Stepping with Counter Support  - 2 x daily - 7 x weekly - 1 sets - 4 reps  - Tandem Walking with Counter Support  - 2 x daily - 7 x weekly - 1 sets - 4 reps    Patient Education  - Scar Massage

## 2023-09-13 ENCOUNTER — OFFICE VISIT (OUTPATIENT)
Dept: PHYSICAL THERAPY | Facility: CLINIC | Age: 73
End: 2023-09-13
Payer: MEDICARE

## 2023-09-13 DIAGNOSIS — M17.12 PRIMARY OSTEOARTHRITIS OF LEFT KNEE: Primary | ICD-10-CM

## 2023-09-13 DIAGNOSIS — M25.562 LEFT KNEE PAIN, UNSPECIFIED CHRONICITY: ICD-10-CM

## 2023-09-13 DIAGNOSIS — Z96.652 S/P TOTAL KNEE ARTHROPLASTY, LEFT: ICD-10-CM

## 2023-09-13 PROCEDURE — 97112 NEUROMUSCULAR REEDUCATION: CPT

## 2023-09-13 PROCEDURE — 97110 THERAPEUTIC EXERCISES: CPT

## 2023-09-13 PROCEDURE — 97140 MANUAL THERAPY 1/> REGIONS: CPT

## 2023-09-13 NOTE — PROGRESS NOTES
Daily Note     Today's date: 2023  Patient name: Valerie Wheat  : 1950  MRN: [id number]  Referring provider: Carmen Putnam  Dx:   Encounter Diagnosis     ICD-10-CM    1. Primary osteoarthritis of left knee  M17.12       2. S/P total knee arthroplasty, left  Z96.652       3. Left knee pain, unspecified chronicity  M25.562           Start Time: 1357          Subjective: Patient states 20 minutes ago he had a lot of pain from stretching and strengthening. Now his pain is a 2/10 in the left knee. Patient came to therapy without assistive device for the first time today. Objective: See treatment diary below    Patient's home exercise program updated to include additional exercises. He accepted new exercises. Reviewed and educated on edema control with elevation and pain relief with ice. He was educated on desensitization of incision using different substances. Assessment: Tolerated treatment well. Patient would benefit from continued PT for stretching and strengtheing. Annita was able to add exercises to his program with little difficulty and discomfort. Patient has poor heel strike on operated side due to limited knee flexion. He seemed to understand all education given during session. He felt ok when he left department. Plan: Progress treatment as tolerated.        Re-Evaluation:10/4  [email or web address]     PRECAUTIONS: HX ANT LUMBAR derangement; HIGH TISSUE IRRITABILITY / HIGH PAIN RATING  Knee Specialty Daily Treatment Diary     Manual Therapy    Scar massage  DONE x 2 mins - instructed for HEP  DONE AD Desensitization and    PROM   :30x3 flex/ext     Hamstring stretch  2x:15 2x :15     Prone Quad stretch        Patellar mobs  Done AD      Knee stretch on edge of mat  Done AD x 5 mins flex/ext x5 min flex/ext X 6 mins ea flex/ext X 6 mins  flex/ext       Ther Ex    Nu Step for LE strength S=10  *L2 x 10 mins  After stretch  L1 x 4 mins xL2 x10 min L3 x 5 mins L3 x10 min   Biodex Bike for knee ROM/ endurance S=        Heel slides flex/abd x15 ea x15 ea x10ea x10 x10           EXT BOARD        Prone knee flex         ANKLE PUMP X15 EA x20 w/ heel propped for EXT x20 w/ heel propped for EXT x20 p!p! SLR all planes  *              LAQ x15 x15 x15 x15 x30   Hamstring stretch  *instructed for HEP      Calf stretch  *Instructed for HEP  Step 4x:30 LG wedge :30x3   Standing hamstring curls  * x10      HR/TR  * x10 ea     Mini Squat  * x10     Self knee flex/ext stretch    2x10 ea            Total gym squats (deep)    2x10 2x10   Neuro Re-ed        TKE  *      Gluteal set x15 x15 x15 Prone x15 Prone x10   QS x15 x15 x15 Prone x15 Prone x10   Wall slides with feet stagger        Bridges  * x10     Fitter board     x10 ea   Eccentric Leg press        Clam Shells        QS+ SLR        CSMi balance, "+" squat        airex stand     EO HT/EC :30x2 ea   tandem stand     :30x2 B/L           sidestepping  * 10ft x4     Heel-toe amb  * 10ft x4     Mirror walking  * 10ft x6             Amb up/down steps    X 2 mins up SOS 2 HR  Down STS 2 HR x4 up SOS 2 HR  Down STS 2 HR   Chair squats        Crate carry        Step ups  *  x10 fwd Lat 4" x10       Modalities        CP  KNEE                          Access Code: [email or web address]  URL: [email or web address]  Date: 09/13/2023  Prepared by: Luiza Florentino    Exercises  - 4 Way Patellar [place]  - 2 x daily - 7 x weekly - 1 sets - 10 reps - 3 hold  - Seated Calf Stretch with Strap  - 2 x daily - 7 x weekly - 1 sets - 4 reps - 30 hold  - Seated Hamstring Stretch  - 2 x daily - 7 x weekly - 1 sets - 4 reps - 30 hold  - Supine Quadricep Sets  - 2 x daily - 7 x weekly - 2 sets - 10 reps - 5 hold  - Supine Gluteal Sets  - 2 x daily - 7 x weekly - 2 sets - 10 reps - 5 hold  - Supine Heel Slide  - 2 x daily - 7 x weekly - 2 sets - 10 reps - 5 hold  - Supine Hip Abduction  - 2 x daily - 6 x weekly - 2 sets - 10 reps - 5 hold  - Seated Ankle Pumps  - 3 x daily - 7 x weekly - 3 sets - 10 reps  - Supine Bridge  - 2 x daily - 7 x weekly - 1 sets - 10 reps  - Side Stepping with Counter Support  - 2 x daily - 7 x weekly - 1 sets - 4 reps  - Tandem Walking with Counter Support  - 2 x daily - 7 x weekly - 1 sets - 4 reps  - Prone Quadriceps Set  - 2 x daily - 7 x weekly - 2 sets - 15 reps - 5 hold  - Seated Knee Extension Stretch with Chair  - 5 x daily - 7 x weekly - 1 sets - 1 reps - 3-5 mins hold  - Mirian loaded knee extension in standing   - 5 x daily - 7 x weekly - 1 sets - 10 reps - 2 hold  - Standing Knee Flexion Stretch on Step (Mirrored)  - 5 x daily - 7 x weekly - 1 sets - 10 reps - 2 hold  - Step Up  - 2 x daily - 7 x weekly - 2 sets - 10 reps  - Seated Long Arc Quad (Mirrored)  - 5 x daily - 7 x weekly - 1 sets - 15 reps - 3 hold  - Standing Tandem Balance with Counter Support  - 2 x daily - 7 x weekly - 1 sets - 2 reps - 30 sec hold  - [place] Stance Eyes Closed on Foam Pad  - 2 x daily - 7 x weekly - 1 sets - 2 reps - 30 sec hold  - [place] Stance on Foam Pad with Head Rotation  - 2 x daily - 7 x weekly - 1 sets - 2 reps - 30 sec hold    Patient Education  - Scar Massage

## 2023-09-15 ENCOUNTER — OFFICE VISIT (OUTPATIENT)
Dept: PHYSICAL THERAPY | Facility: CLINIC | Age: 73
End: 2023-09-15
Payer: MEDICARE

## 2023-09-15 DIAGNOSIS — M25.562 LEFT KNEE PAIN, UNSPECIFIED CHRONICITY: ICD-10-CM

## 2023-09-15 DIAGNOSIS — M17.12 PRIMARY OSTEOARTHRITIS OF LEFT KNEE: Primary | ICD-10-CM

## 2023-09-15 DIAGNOSIS — Z96.652 S/P TOTAL KNEE ARTHROPLASTY, LEFT: ICD-10-CM

## 2023-09-15 PROCEDURE — 97140 MANUAL THERAPY 1/> REGIONS: CPT

## 2023-09-15 PROCEDURE — 97112 NEUROMUSCULAR REEDUCATION: CPT

## 2023-09-15 PROCEDURE — 97110 THERAPEUTIC EXERCISES: CPT

## 2023-09-15 NOTE — PROGRESS NOTES
Daily Note     Today's date: 9/15/2023  Patient name: Jennifer Dsouza  : 1950  MRN: [id number]  Referring provider: Arpit Luna  Dx:   Encounter Diagnosis     ICD-10-CM    1. Primary osteoarthritis of left knee  M17.12       2. S/P total knee arthroplasty, left  Z96.652       3. Left knee pain, unspecified chronicity  M25.562           Start Time: 1000          Subjective: Patient states he has a 2/10 pain/thigh tightness that does not want to ease up yet. The thigh hurts more then the knee. Objective: See treatment diary below      Assessment: Tolerated treatment well. Patient would benefit from continued PT for stretching and strengthening. Annita was able to perform his exercises with verbal cues for proper performance of exercises. He was sore by the end of the session so some standing exercises were held. Patient seemed to understand all education on exercises. Patient felt sore leaving department and was going to ice knee when he returned home. Plan: Continue per plan of care. Progress treatment as tolerated.        Re-Evaluation:10/4  [email or web address]     PRECAUTIONS: HX ANT LUMBAR derangement; HIGH TISSUE IRRITABILITY / HIGH PAIN RATING  Knee Specialty Daily Treatment Diary     Manual Therapy 9/15 9/6 9/7 9/11 9/13   Scar massage Upper scar performed and rolled thigh 9 min DONE x 2 mins - instructed for HEP  DONE AD Desensitization     PROM   :30x3 flex/ext     Hamstring stretch  2x:15 2x :15     Prone Quad stretch        Patellar mobs  Done AD      Knee stretch on edge of mat X 6 mins  flex/ext Done AD x 5 mins flex/ext x5 min flex/ext X 6 mins ea flex/ext X 6 mins  flex/ext       Ther Ex 9/15 9/6 9/7 9/11 9/13   Nu Step for LE strength S=10 L3 x10 min *L2 x 10 mins  After stretch  L1 x 4 mins L2 x10 min L3 x 5 mins L3 x10 min   Biodex Bike for knee ROM/ endurance S=        Heel slides flex/abd x10 ea x15 ea x10ea x10 x10           EXT BOARD        Prone knee flex         ANKLE PUMP  x20 w/ heel propped for EXT x20 w/ heel propped for EXT x20 p!p! SLR all planes  *              LAQ 2x15 x15 x15 x15 x30   Hamstring stretch 90/90 :15x5 *instructed for HEP      Calf stretch Wedge :30x3 *Instructed for HEP  Step 4x:30 LG wedge :30x3   Standing hamstring curls  * x10      HR/TR  * x10 ea     Mini Squat  * x10     Self knee flex/ext stretch    2x10 ea            Total gym squats (deep)    2x10 2x10   Neuro Re-ed        TKE  *      Gluteal set Prone x10 x15 x15 Prone x15 Prone x10   QS Prone x10 x15 x15 Prone x15 Prone x10   Wall slides with feet stagger        Bridges  * x10     Fitter board x10 ea    x10 ea   Eccentric Leg press        Clam Shells        QS+ SLR        CSMi balance, "+" squat        airex stand     EO HT/EC :30x2 ea   tandem stand     :30x2 B/L           sidestepping  * 10ft x4     Heel-toe amb  * 10ft x4     Mirror walking  * 10ft x6             Amb up/down steps x4 up SOS 1 HR  Down STS 1 HR   X 2 mins up SOS 2 HR  Down STS 2 HR x4 up SOS 2 HR  Down STS 2 HR   Chair squats        Crate carry        Step ups 6" x10 ea *  x10 fwd Lat 4" x10       Modalities        CP  KNEE                          Access Code: [email or web address]  URL: [email or web address]  Date: 09/13/2023  Prepared by: Jennifer Florentino    Exercises  - 4 Way Patellar [place]  - 2 x daily - 7 x weekly - 1 sets - 10 reps - 3 hold  - Seated Calf Stretch with Strap  - 2 x daily - 7 x weekly - 1 sets - 4 reps - 30 hold  - Seated Hamstring Stretch  - 2 x daily - 7 x weekly - 1 sets - 4 reps - 30 hold  - Supine Quadricep Sets  - 2 x daily - 7 x weekly - 2 sets - 10 reps - 5 hold  - Supine Gluteal Sets  - 2 x daily - 7 x weekly - 2 sets - 10 reps - 5 hold  - Supine Heel Slide  - 2 x daily - 7 x weekly - 2 sets - 10 reps - 5 hold  - Supine Hip Abduction  - 2 x daily - 6 x weekly - 2 sets - 10 reps - 5 hold  - Seated Ankle Pumps  - 3 x daily - 7 x weekly - 3 sets - 10 reps  - Supine Bridge  - 2 x daily - 7 x weekly - 1 sets - 10 reps  - Side Stepping with Counter Support  - 2 x daily - 7 x weekly - 1 sets - 4 reps  - Tandem Walking with Counter Support  - 2 x daily - 7 x weekly - 1 sets - 4 reps  - Prone Quadriceps Set  - 2 x daily - 7 x weekly - 2 sets - 15 reps - 5 hold  - Seated Knee Extension Stretch with Chair  - 5 x daily - 7 x weekly - 1 sets - 1 reps - 3-5 mins hold  - Mirian loaded knee extension in standing   - 5 x daily - 7 x weekly - 1 sets - 10 reps - 2 hold  - Standing Knee Flexion Stretch on Step (Mirrored)  - 5 x daily - 7 x weekly - 1 sets - 10 reps - 2 hold  - Step Up  - 2 x daily - 7 x weekly - 2 sets - 10 reps  - Seated Long Arc Quad (Mirrored)  - 5 x daily - 7 x weekly - 1 sets - 15 reps - 3 hold  - Standing Tandem Balance with Counter Support  - 2 x daily - 7 x weekly - 1 sets - 2 reps - 30 sec hold  - Romberg Stance Eyes Closed on Foam Pad  - 2 x daily - 7 x weekly - 1 sets - 2 reps - 30 sec hold  - Romberg Stance on Foam Pad with Head Rotation  - 2 x daily - 7 x weekly - 1 sets - 2 reps - 30 sec hold    Patient Education  - Scar Massage

## 2023-09-18 ENCOUNTER — OFFICE VISIT (OUTPATIENT)
Dept: PHYSICAL THERAPY | Facility: CLINIC | Age: 73
End: 2023-09-18
Payer: MEDICARE

## 2023-09-18 DIAGNOSIS — Z96.652 S/P TOTAL KNEE ARTHROPLASTY, LEFT: ICD-10-CM

## 2023-09-18 DIAGNOSIS — M25.562 LEFT KNEE PAIN, UNSPECIFIED CHRONICITY: ICD-10-CM

## 2023-09-18 DIAGNOSIS — M17.12 PRIMARY OSTEOARTHRITIS OF LEFT KNEE: Primary | ICD-10-CM

## 2023-09-18 PROCEDURE — 97112 NEUROMUSCULAR REEDUCATION: CPT

## 2023-09-18 PROCEDURE — 97110 THERAPEUTIC EXERCISES: CPT

## 2023-09-18 PROCEDURE — 97140 MANUAL THERAPY 1/> REGIONS: CPT

## 2023-09-18 NOTE — PROGRESS NOTES
Daily Note     Today's date: 2023  Patient name: Valeria Nance  : 1950  MRN: [id number]  Referring provider: Leon García  Dx:   Encounter Diagnosis     ICD-10-CM    1. Primary osteoarthritis of left knee  M17.12       2. S/P total knee arthroplasty, left  Z96.652       3. Left knee pain, unspecified chronicity  M25.562           Start Time: 6275          Subjective: Patient states his knee pain is "not bad" today. He rates the pain in left knee a 1-2/10. He feels the knee is doing more, but still limited. Objective: See treatment diary below    Incision continues to be healing nicely and only a few scabs still present. He added prone knee hang for HEP during session. Assessment: Tolerated treatment well. Patient would benefit from continued PT for stretching and strengthening. Patient was able to increase and add exercises for challenge. He still felt the knee was tight with end ranges. Patient seemed to understand all education given during session. Improvement of ROM seen by end of session, but there continues to both end range limitations. Patient felt his pain was the same, but he was looser by the end of the session. Plan: Continue per plan of care. Progress treatment as tolerated.        Re-Evaluation:10/4  [email or web address]     PRECAUTIONS: HX ANT LUMBAR derangement; HIGH TISSUE IRRITABILITY / HIGH PAIN RATING  Knee Specialty Daily Treatment Diary     Manual Therapy 9/15 9/18 9/7 9/11 9/13   Scar massage Upper scar performed and rolled thigh 9 min   DONE AD Desensitization     PROM  :30x3 flex/ ext :30x3 flex/ext     Hamstring stretch   2x :15     Prone Quad stretch        Patellar mobs        Knee stretch on edge of mat X 6 mins  flex/ext x6 min flex/ ext x5 min flex/ext X 6 mins ea flex/ext X 6 mins  flex/ext       Ther Ex 9/15 9/18 9/7 9/11 9/13   Nu Step for LE strength S=10 L3 x10 min L3 x5 min L2 x10 min L3 x 5 mins L3 x10 min   Biodex Bike for knee ROM/ endurance S= x5 min  1/2 rotation      Heel slides flex/abd x10 ea x10 ea x10ea x10 x10           EXT BOARD        Prone knee flex         ANKLE PUMP   x20 w/ heel propped for EXT x20 p!p! SLR all planes  Ext x10              LAQ 2x15 1# 2x10 x15 x15 x30   Hamstring stretch 90/90 :15x5       Calf stretch Wedge :30x3 Wedge :30x3  Step 4x:30 LG wedge :30x3   Standing hamstring curls   x10      HR/TR   x10 ea     Mini Squat   x10     Self knee flex/ext stretch  2x10 ea  2x10 ea            Total gym squats (deep)    2x10 2x10   Neuro Re-ed        TKE        Gluteal set Prone x10 Prone x10 x15 Prone x15 Prone x10   QS Prone x10 Prone x10 x15 Prone x15 Prone x10   Wall slides with feet stagger        Bridges   x10     Fitter board x10 ea x10 ea   x10 ea   Eccentric Leg press        Clam Shells        QS+ SLR        CSMi balance, "+" squat        airex stand     EO HT/EC :30x2 ea   tandem stand     :30x2 B/L           sidestepping   10ft x4     Heel-toe amb   10ft x4     Mirror walking   10ft x6             Amb up/down steps x4 up SOS 1 HR  Down STS 1 HR x4 up SOS 1 HR  Down STS 1 HR  X 2 mins up SOS 2 HR  Down STS 2 HR x4 up SOS 2 HR  Down STS 2 HR   Chair squats        Crate carry        Step ups 6" x10 ea 6" x10 ea  x10 fwd Lat 4" x10       Modalities        CP  KNEE                          Access Code: [email or web address]  URL: [email or web address]  Date: 09/13/2023  Prepared by: Jennifer Florentino    Exercises  - 4 Way Patellar [place]  - 2 x daily - 7 x weekly - 1 sets - 10 reps - 3 hold  - Seated Calf Stretch with Strap  - 2 x daily - 7 x weekly - 1 sets - 4 reps - 30 hold  - Seated Hamstring Stretch  - 2 x daily - 7 x weekly - 1 sets - 4 reps - 30 hold  - Supine Quadricep Sets  - 2 x daily - 7 x weekly - 2 sets - 10 reps - 5 hold  - Supine Gluteal Sets  - 2 x daily - 7 x weekly - 2 sets - 10 reps - 5 hold  - Supine Heel Slide  - 2 x daily - 7 x weekly - 2 sets - 10 reps - 5 hold  - Supine Hip Abduction  - 2 x daily - 6 x weekly - 2 sets - 10 reps - 5 hold  - Seated Ankle Pumps  - 3 x daily - 7 x weekly - 3 sets - 10 reps  - Supine Bridge  - 2 x daily - 7 x weekly - 1 sets - 10 reps  - Side Stepping with Counter Support  - 2 x daily - 7 x weekly - 1 sets - 4 reps  - Tandem Walking with Counter Support  - 2 x daily - 7 x weekly - 1 sets - 4 reps  - Prone Quadriceps Set  - 2 x daily - 7 x weekly - 2 sets - 15 reps - 5 hold  - Seated Knee Extension Stretch with Chair  - 5 x daily - 7 x weekly - 1 sets - 1 reps - 3-5 mins hold  - Mirian loaded knee extension in standing   - 5 x daily - 7 x weekly - 1 sets - 10 reps - 2 hold  - Standing Knee Flexion Stretch on Step (Mirrored)  - 5 x daily - 7 x weekly - 1 sets - 10 reps - 2 hold  - Step Up  - 2 x daily - 7 x weekly - 2 sets - 10 reps  - Seated Long Arc Quad (Mirrored)  - 5 x daily - 7 x weekly - 1 sets - 15 reps - 3 hold  - Standing Tandem Balance with Counter Support  - 2 x daily - 7 x weekly - 1 sets - 2 reps - 30 sec hold  - Romberg Stance Eyes Closed on Foam Pad  - 2 x daily - 7 x weekly - 1 sets - 2 reps - 30 sec hold  - Romberg Stance on Foam Pad with Head Rotation  - 2 x daily - 7 x weekly - 1 sets - 2 reps - 30 sec hold    Patient Education  - Scar Massage

## 2023-09-20 ENCOUNTER — OFFICE VISIT (OUTPATIENT)
Dept: PHYSICAL THERAPY | Facility: CLINIC | Age: 73
End: 2023-09-20
Payer: MEDICARE

## 2023-09-20 DIAGNOSIS — M25.562 LEFT KNEE PAIN, UNSPECIFIED CHRONICITY: ICD-10-CM

## 2023-09-20 DIAGNOSIS — M17.12 PRIMARY OSTEOARTHRITIS OF LEFT KNEE: Primary | ICD-10-CM

## 2023-09-20 DIAGNOSIS — Z96.652 S/P TOTAL KNEE ARTHROPLASTY, LEFT: ICD-10-CM

## 2023-09-20 PROCEDURE — 97140 MANUAL THERAPY 1/> REGIONS: CPT

## 2023-09-20 PROCEDURE — 97112 NEUROMUSCULAR REEDUCATION: CPT

## 2023-09-20 PROCEDURE — 97110 THERAPEUTIC EXERCISES: CPT

## 2023-09-20 NOTE — PROGRESS NOTES
Daily Note     Today's date: 2023  Patient name: Emily Myers  : 1950  MRN: [id number]  Referring provider: Kaylyn Castleman  Dx:   Encounter Diagnosis     ICD-10-CM    1. Primary osteoarthritis of left knee  M17.12       2. S/P total knee arthroplasty, left  Z96.652       3. Left knee pain, unspecified chronicity  M25.562           Start Time:   Stop Time: 945  Total time in clinic (min): 46 minutes    Subjective: Patient states no functional changes since last session. Objective: See treatment diary below    Incision continues to be healing nicely without scabs. Assessment: Tolerated treatment well. Patient would benefit from continued PT for stretching and strengthening. He still felt the knee was tight with end ranges however good visual improvements were seen today throughout therapy session. Patient seemed to understand all education given during session. Improvement of ROM seen by end of session, but there continues to both end range limitations. Patient felt his pain was the same, but he was looser by the end of the session. Plan: Continue per plan of care. Progress treatment as tolerated.        Re-Evaluation:10/4  [email or web address]     PRECAUTIONS: HX ANT LUMBAR derangement; HIGH TISSUE IRRITABILITY / HIGH PAIN RATING  Knee Specialty Daily Treatment Diary     Manual Therapy 9/15 9/18 9/20 9/11 9/13   Scar massage Upper scar performed and rolled thigh 9 min   DONE AD Desensitization     PROM  :30x3 flex/ ext :30x3 flex/ext     Hamstring stretch   2x :15     Prone Quad stretch        Patellar mobs        Knee stretch on edge of mat X 6 mins  flex/ext x6 min flex/ ext x5 min flex/ext X 6 mins ea flex/ext X 6 mins  flex/ext       Ther Ex 9/15 9/18 9/20 9/11 9/13   Nu Step for LE strength S=10 L3 x10 min L3 x5 min L2 x10 min L3 x 5 mins L3 x10 min   Biodex Bike for knee ROM/ endurance S=  x5 min  1/2 rotation x5 min  1/2 rotation     Heel slides flex/abd x10 ea x10 ea x10ea x10 x10           EXT BOARD        Prone knee flex         ANKLE PUMP    x20 p!p! SLR all planes  Ext x10              LAQ 2x15 1# 2x10 3x10 x15 x30   Hamstring stretch 90/90 :15x5       Calf stretch Wedge :30x3 Wedge :30x3 Wedge :30x3 Step 4x:30 LG wedge :30x3   Standing hamstring curls        HR/TR        Mini Squat   STS 25x     Self knee flex/ext stretch  2x10 ea 2x10 ea 2x10 ea            Total gym squats (deep)    2x10 2x10   Neuro Re-ed        TKE        Gluteal set Prone x10 Prone x10 x15 Prone x15 Prone x10   QS Prone x10 Prone x10 x15 Prone x15 Prone x10   Wall slides with feet stagger        Bridges        Fitter board x10 ea x10 ea x10 ea  x10 ea   Eccentric Leg press        Clam Shells        QS+ SLR        CSMi balance, "+" squat        airex stand     EO HT/EC :30x2 ea   tandem stand     :30x2 B/L           sidestepping        Heel-toe amb        Mirror walking                Amb up/down steps x4 up SOS 1 HR  Down STS 1 HR x4 up SOS 1 HR  Down STS 1 HR x4 up SOS 1 HR  Down STS 1 HR X 2 mins up SOS 2 HR  Down STS 2 HR x4 up SOS 2 HR  Down STS 2 HR   Chair squats        Crate carry        Step ups 6" x10 ea 6" x10 ea 6" x10 ea x10 fwd Lat 4" x10       Modalities        CP  KNEE                          Access Code: [email or web address]  URL: [email or web address]  Date: 09/13/2023  Prepared by: Hermann Florentino    Exercises  - 4 Way Patellar [place]  - 2 x daily - 7 x weekly - 1 sets - 10 reps - 3 hold  - Seated Calf Stretch with Strap  - 2 x daily - 7 x weekly - 1 sets - 4 reps - 30 hold  - Seated Hamstring Stretch  - 2 x daily - 7 x weekly - 1 sets - 4 reps - 30 hold  - Supine Quadricep Sets  - 2 x daily - 7 x weekly - 2 sets - 10 reps - 5 hold  - Supine Gluteal Sets  - 2 x daily - 7 x weekly - 2 sets - 10 reps - 5 hold  - Supine Heel Slide  - 2 x daily - 7 x weekly - 2 sets - 10 reps - 5 hold  - Supine Hip Abduction  - 2 x daily - 6 x weekly - 2 sets - 10 reps - 5 hold  - Seated Ankle Pumps  - 3 x daily - 7 x weekly - 3 sets - 10 reps  - Supine Bridge  - 2 x daily - 7 x weekly - 1 sets - 10 reps  - Side Stepping with Counter Support  - 2 x daily - 7 x weekly - 1 sets - 4 reps  - Tandem Walking with Counter Support  - 2 x daily - 7 x weekly - 1 sets - 4 reps  - Prone Quadriceps Set  - 2 x daily - 7 x weekly - 2 sets - 15 reps - 5 hold  - Seated Knee Extension Stretch with Chair  - 5 x daily - 7 x weekly - 1 sets - 1 reps - 3-5 mins hold  - Mirian loaded knee extension in standing   - 5 x daily - 7 x weekly - 1 sets - 10 reps - 2 hold  - Standing Knee Flexion Stretch on Step (Mirrored)  - 5 x daily - 7 x weekly - 1 sets - 10 reps - 2 hold  - Step Up  - 2 x daily - 7 x weekly - 2 sets - 10 reps  - Seated Long Arc Quad (Mirrored)  - 5 x daily - 7 x weekly - 1 sets - 15 reps - 3 hold  - Standing Tandem Balance with Counter Support  - 2 x daily - 7 x weekly - 1 sets - 2 reps - 30 sec hold  - Romberg Stance Eyes Closed on Foam Pad  - 2 x daily - 7 x weekly - 1 sets - 2 reps - 30 sec hold  - Romberg Stance on Foam Pad with Head Rotation  - 2 x daily - 7 x weekly - 1 sets - 2 reps - 30 sec hold    Patient Education  - Scar Massage

## 2023-09-22 ENCOUNTER — OFFICE VISIT (OUTPATIENT)
Dept: PHYSICAL THERAPY | Facility: CLINIC | Age: 73
End: 2023-09-22
Payer: MEDICARE

## 2023-09-22 DIAGNOSIS — M25.562 LEFT KNEE PAIN, UNSPECIFIED CHRONICITY: ICD-10-CM

## 2023-09-22 DIAGNOSIS — Z96.652 S/P TOTAL KNEE ARTHROPLASTY, LEFT: ICD-10-CM

## 2023-09-22 DIAGNOSIS — M17.12 PRIMARY OSTEOARTHRITIS OF LEFT KNEE: Primary | ICD-10-CM

## 2023-09-22 PROCEDURE — 97140 MANUAL THERAPY 1/> REGIONS: CPT

## 2023-09-22 PROCEDURE — 97112 NEUROMUSCULAR REEDUCATION: CPT

## 2023-09-22 PROCEDURE — 97110 THERAPEUTIC EXERCISES: CPT

## 2023-09-22 NOTE — PROGRESS NOTES
Daily Note     Today's date: 2023  Patient name: Hanane Batista  : 1950  MRN: [id number]  Referring provider: Jacob Goddard  Dx:   Encounter Diagnosis     ICD-10-CM    1. Primary osteoarthritis of left knee  M17.12       2. S/P total knee arthroplasty, left  Z96.652       3. Left knee pain, unspecified chronicity  M25.562           Start Time: 0915          Subjective: Patient states he is only achy in the knee. Once in awhile he has sharp nerve pain in joint line. Objective: See treatment diary below    Patient now has heel strike during gait with quick off. Assessment: Tolerated treatment well. Patient would benefit from continued PT for stretching and strengthening. Annita was able to add exercises to program. He felt it was strange to do step downs, but no pain. Patient seemed to understand all education on new exercises. He was a little more sore by the end of the session. Plan: Continue per plan of care. Progress treatment as tolerated.        Re-Evaluation:10/4  [email or web address]     PRECAUTIONS: HX ANT LUMBAR derangement; HIGH TISSUE IRRITABILITY / HIGH PAIN RATING  Knee Specialty Daily Treatment Diary     Manual Therapy 9/15 9/18 9/20 9/22 9/13   Scar massage Upper scar performed and rolled thigh 9 min    Desensitization     PROM  :30x3 flex/ ext :30x3 flex/ext :15x4 flex /ext    Hamstring stretch   2x :15     Prone Quad stretch        Patellar mobs        Knee stretch on edge of mat X 6 mins  flex/ext x6 min flex/ ext x5 min flex/ext 6 min flex/ ext X 6 mins  flex/ext       Ther Ex 9/15 9/18 9/20 9/22 9/13   Nu Step for LE strength S=10 L3 x10 min L3 x5 min L2 x10 min L3 x6.5 min L3 x10 min   Biodex Bike for knee ROM/ endurance S=  x5 min  1/2 rotation x5 min  1/2 rotation x5 min  1/2 rotation    Heel slides flex/abd x10 ea x10 ea x10ea x10 ea x10           EXT BOARD        Prone knee flex         ANKLE PUMP        SLR all planes  Ext x10              LAQ 2x15 1# 2x10 3x10 1# 3x10 x30   Hamstring stretch 90/90 :15x5       Calf stretch Wedge :30x3 Wedge :30x3 Wedge :30x3 Wedge :30x3 LG wedge :30x3   Standing hamstring curls        HR/TR        Mini Squat   STS 25x     Self knee flex/ext stretch  2x10 ea 2x10 ea 8" Step :10x10 ea            Total gym squats (deep)     2x10   Neuro Re-ed        TKE        Gluteal set Prone x10 Prone x10 x15 Prone x15 Prone x10   QS Prone x10 Prone x10 x15 Prone x15 Prone x10   Wall slides with feet stagger        Bridges        Fitter board x10 ea x10 ea x10 ea x10 ea  x10 ea   Eccentric Leg press        Clam Shells        QS+ SLR        CSMi balance, "+" squat    Sq= x10  B= L1-2    airex stand     EO HT/EC :30x2 ea   tandem stand     :30x2 B/L           sidestepping        Heel-toe amb        Mirror walking        Step down    2" x10    Amb up/down steps x4 up SOS 1 HR  Down STS 1 HR x4 up SOS 1 HR  Down STS 1 HR x4 up SOS 1 HR  Down STS 1 HR  x4 up SOS 2 HR  Down STS 2 HR   Chair squats        Crate carry        Step ups 6" x10 ea 6" x10 ea 6" x10 ea 6" x10 ea Lat 4" x10       Modalities        CP  KNEE                          Access Code: [email or web address]  URL: [email or web address]  Date: 09/13/2023  Prepared by: Keny Florentino    Exercises  - 4 Way Patellar [place]  - 2 x daily - 7 x weekly - 1 sets - 10 reps - 3 hold  - Seated Calf Stretch with Strap  - 2 x daily - 7 x weekly - 1 sets - 4 reps - 30 hold  - Seated Hamstring Stretch  - 2 x daily - 7 x weekly - 1 sets - 4 reps - 30 hold  - Supine Quadricep Sets  - 2 x daily - 7 x weekly - 2 sets - 10 reps - 5 hold  - Supine Gluteal Sets  - 2 x daily - 7 x weekly - 2 sets - 10 reps - 5 hold  - Supine Heel Slide  - 2 x daily - 7 x weekly - 2 sets - 10 reps - 5 hold  - Supine Hip Abduction  - 2 x daily - 6 x weekly - 2 sets - 10 reps - 5 hold  - Seated Ankle Pumps  - 3 x daily - 7 x weekly - 3 sets - 10 reps  - Supine Bridge  - 2 x daily - 7 x weekly - 1 sets - 10 reps  - Side Stepping with Counter Support  - 2 x daily - 7 x weekly - 1 sets - 4 reps  - Tandem Walking with Counter Support  - 2 x daily - 7 x weekly - 1 sets - 4 reps  - Prone Quadriceps Set  - 2 x daily - 7 x weekly - 2 sets - 15 reps - 5 hold  - Seated Knee Extension Stretch with Chair  - 5 x daily - 7 x weekly - 1 sets - 1 reps - 3-5 mins hold  - Mirian loaded knee extension in standing   - 5 x daily - 7 x weekly - 1 sets - 10 reps - 2 hold  - Standing Knee Flexion Stretch on Step (Mirrored)  - 5 x daily - 7 x weekly - 1 sets - 10 reps - 2 hold  - Step Up  - 2 x daily - 7 x weekly - 2 sets - 10 reps  - Seated Long Arc Quad (Mirrored)  - 5 x daily - 7 x weekly - 1 sets - 15 reps - 3 hold  - Standing Tandem Balance with Counter Support  - 2 x daily - 7 x weekly - 1 sets - 2 reps - 30 sec hold  - Romberg Stance Eyes Closed on Foam Pad  - 2 x daily - 7 x weekly - 1 sets - 2 reps - 30 sec hold  - Romberg Stance on Foam Pad with Head Rotation  - 2 x daily - 7 x weekly - 1 sets - 2 reps - 30 sec hold    Patient Education  - Scar Massage

## 2023-09-25 ENCOUNTER — OFFICE VISIT (OUTPATIENT)
Dept: PHYSICAL THERAPY | Facility: CLINIC | Age: 73
End: 2023-09-25
Payer: MEDICARE

## 2023-09-25 DIAGNOSIS — Z96.652 S/P TOTAL KNEE ARTHROPLASTY, LEFT: ICD-10-CM

## 2023-09-25 DIAGNOSIS — M25.562 LEFT KNEE PAIN, UNSPECIFIED CHRONICITY: ICD-10-CM

## 2023-09-25 DIAGNOSIS — M17.12 PRIMARY OSTEOARTHRITIS OF LEFT KNEE: Primary | ICD-10-CM

## 2023-09-25 PROCEDURE — 97112 NEUROMUSCULAR REEDUCATION: CPT

## 2023-09-25 PROCEDURE — 97110 THERAPEUTIC EXERCISES: CPT

## 2023-09-25 PROCEDURE — 97140 MANUAL THERAPY 1/> REGIONS: CPT

## 2023-09-25 NOTE — PROGRESS NOTES
Daily Note     Today's date: 2023  Patient name: Nikole Bower  : 1950  MRN: [id number]  Referring provider: Zoya Garcia  Dx:   Encounter Diagnosis     ICD-10-CM    1. Primary osteoarthritis of left knee  M17.12       2. S/P total knee arthroplasty, left  Z96.652       3. Left knee pain, unspecified chronicity  M25.562           Start Time: 0900          Subjective: Patient states he has a little pain in the left knee on medial and lateral knee. He continues with the numbness in the knee. He has been working on his swelling. He feels overall the knee is doing well. Objective: See treatment diary below      Assessment: Tolerated treatment well. Patient would benefit from continued PT for stretching and strengthening. Patient was able to add exercises to his program with little difficulty and discomfort. His right knee bothered him a little on total gym and left thigh after prone quad stretch. He felt good by the end of the session. Plan: Continue per plan of care. Progress treatment as tolerated.        Re-Evaluation:10/4  [email or web address]     PRECAUTIONS: HX ANT LUMBAR derangement; HIGH TISSUE IRRITABILITY / HIGH PAIN RATING  Knee Specialty Daily Treatment Diary     Manual Therapy 9/15 9/18 9/20 9/22 9/25   Scar massage Upper scar performed and rolled thigh 9 min       PROM  :30x3 flex/ ext :30x3 flex/ext :15x4 flex /ext :15x4 flex ext   Hamstring stretch   2x :15     Prone Quad stretch     :15x2  Towel @ thigh :15x1   Patellar mobs        Knee stretch on edge of mat X 6 mins  flex/ext x6 min flex/ ext x5 min flex/ext 6 min flex/ ext 6 min flex/ ext       Ther Ex 9/15 9/18 9/20 9/22 9/25   Nu Step for LE strength S=10 L3 x10 min L3 x5 min L2 x10 min L3 x6.5 min L3 x5 min   Biodex Bike for knee ROM/ endurance S=  x5 min  1/2 rotation x5 min  1/2 rotation x5 min  1/2 rotation x5 min 1/2 rotation   Heel slides flex/abd x10 ea x10 ea x10ea x10 ea x10 ea           EXT BOARD Prone knee flex         ANKLE PUMP        SLR all planes  Ext x10              LAQ 2x15 1# 2x10 3x10 1# 3x10 1# 3x10   Hamstring stretch 90/90 :15x5       Calf stretch Wedge :30x3 Wedge :30x3 Wedge :30x3 Wedge :30x3 Wedge :30x3   Standing hamstring curls        HR/TR        Mini Squat   STS 25x     Self knee flex/ext stretch  2x10 ea 2x10 ea 8" Step :10x10 ea 8" Step :10x10 ea           Total gym squats (deep)     x10   Neuro Re-ed        TKE        Gluteal set Prone x10 Prone x10 x15 Prone x15 Prone x15   QS Prone x10 Prone x10 x15 Prone x15 Prone x15   Wall slides with feet stagger        Bridges        Fitter board x10 ea x10 ea x10 ea x10 ea  x10 ea   Eccentric Leg press        Clam Shells        QS+ SLR        CSMi balance, "+" squat    Sq= x10  B= L1-2 Sq= x10  B= L2-4   airex stand        tandem stand                sidestepping        Heel-toe amb        Mirror walking        Step down    2" x10 Step overs L 2" x10    Amb up/down steps x4 up SOS 1 HR  Down STS 1 HR x4 up SOS 1 HR  Down STS 1 HR x4 up SOS 1 HR  Down STS 1 HR     Chair squats        Crate carry        Step ups 6" x10 ea 6" x10 ea 6" x10 ea 6" x10 ea 8" x10 FWD  6" x10 LAT       Modalities        CP  KNEE                          Access Code: [email or web address]  URL: [email or web address]  Date: 09/13/2023  Prepared by: Carmela Florentino    Exercises  - 4 Way Patellar Bitely  - 2 x daily - 7 x weekly - 1 sets - 10 reps - 3 hold  - Seated Calf Stretch with Strap  - 2 x daily - 7 x weekly - 1 sets - 4 reps - 30 hold  - Seated Hamstring Stretch  - 2 x daily - 7 x weekly - 1 sets - 4 reps - 30 hold  - Supine Quadricep Sets  - 2 x daily - 7 x weekly - 2 sets - 10 reps - 5 hold  - Supine Gluteal Sets  - 2 x daily - 7 x weekly - 2 sets - 10 reps - 5 hold  - Supine Heel Slide  - 2 x daily - 7 x weekly - 2 sets - 10 reps - 5 hold  - Supine Hip Abduction  - 2 x daily - 6 x weekly - 2 sets - 10 reps - 5 hold  - Seated Ankle Pumps  - 3 x daily - 7 x weekly - 3 sets - 10 reps  - Supine Bridge  - 2 x daily - 7 x weekly - 1 sets - 10 reps  - Side Stepping with Counter Support  - 2 x daily - 7 x weekly - 1 sets - 4 reps  - Tandem Walking with Counter Support  - 2 x daily - 7 x weekly - 1 sets - 4 reps  - Prone Quadriceps Set  - 2 x daily - 7 x weekly - 2 sets - 15 reps - 5 hold  - Seated Knee Extension Stretch with Chair  - 5 x daily - 7 x weekly - 1 sets - 1 reps - 3-5 mins hold  - Mirian loaded knee extension in standing   - 5 x daily - 7 x weekly - 1 sets - 10 reps - 2 hold  - Standing Knee Flexion Stretch on Step (Mirrored)  - 5 x daily - 7 x weekly - 1 sets - 10 reps - 2 hold  - Step Up  - 2 x daily - 7 x weekly - 2 sets - 10 reps  - Seated Long Arc Quad (Mirrored)  - 5 x daily - 7 x weekly - 1 sets - 15 reps - 3 hold  - Standing Tandem Balance with Counter Support  - 2 x daily - 7 x weekly - 1 sets - 2 reps - 30 sec hold  - Romberg Stance Eyes Closed on Foam Pad  - 2 x daily - 7 x weekly - 1 sets - 2 reps - 30 sec hold  - Romberg Stance on Foam Pad with Head Rotation  - 2 x daily - 7 x weekly - 1 sets - 2 reps - 30 sec hold    Patient Education  - Scar Massage

## 2023-09-27 ENCOUNTER — OFFICE VISIT (OUTPATIENT)
Dept: OBGYN CLINIC | Facility: HOSPITAL | Age: 73
End: 2023-09-27

## 2023-09-27 VITALS
HEIGHT: 64 IN | WEIGHT: 202 LBS | DIASTOLIC BLOOD PRESSURE: 72 MMHG | SYSTOLIC BLOOD PRESSURE: 117 MMHG | HEART RATE: 101 BPM | BODY MASS INDEX: 34.49 KG/M2

## 2023-09-27 DIAGNOSIS — Z96.652 AFTERCARE FOLLOWING LEFT KNEE JOINT REPLACEMENT SURGERY: Primary | ICD-10-CM

## 2023-09-27 DIAGNOSIS — Z47.1 AFTERCARE FOLLOWING LEFT KNEE JOINT REPLACEMENT SURGERY: Primary | ICD-10-CM

## 2023-09-27 PROCEDURE — 99024 POSTOP FOLLOW-UP VISIT: CPT | Performed by: PHYSICIAN ASSISTANT

## 2023-09-27 NOTE — LETTER
September 27, 2023     Patient: Kurt Perkins  YOB: 1950  Date of Visit: 9/27/2023      To Whom it May Concern:    Kurt Perkins is under my professional care. Jennifer Sheets was seen in my office on 9/27/2023. Jennifer Sheets may return to work on 10/2/2023 without restrictions . He is allowed to drive at this time without limitations. If you have any questions or concerns, please don't hesitate to call.          Sincerely,          Irma Parada PA-C        CC: No Recipients

## 2023-09-28 NOTE — PROGRESS NOTES
PT Re-Evaluation     Today's date: 2023  Patient name: Annika Perez  : 1950  MRN: [id number]  Referring provider: Myrtle Booth  Dx:   Encounter Diagnosis     ICD-10-CM    1. Primary osteoarthritis of left knee  M17.12       2. S/P total knee arthroplasty, left  Z96.652       3. Left knee pain, unspecified chronicity  M25.562                      Assessment  Assessment details: Annika Perez is a 71 y.o. male with a history of OA, dyslipidemia, GERD, DM, hyperlipidemia, bilateral elbow surgery, L carpal tunnel that presents for a physical therapy RE evaluation. The patient demonstrates signs and symptoms consistent with L knee OA s/p L TKA( ). During the examination the patient demonstrated improved but impaired L knee strength, improved but impaired L knee ROM, improved but impaired gait, and decreased L knee pain. The patient has made functional gains since starting therapy. The patient is now able to descend steps more normally. The patient notes improvement with walking but does still wobble at times. The patient continues to have difficulty with ambulating up the steps normally and prolonged standing/walking. The patient will benefit from continued skilled PT to address impairments, work towards goals, and restore patient PLOF. Impairments: abnormal gait, abnormal or restricted ROM, activity intolerance, impaired balance, impaired physical strength, lacks appropriate home exercise program, pain with function and weight-bearing intolerance  Functional limitations: difficulty with prolonged standing, prolonged walking, walking on unlevel surfaces, difficulty squatting/kneeling, difficulty stair-climbing, and difficulty transferring from low surfaces. Symptom irritability: lowUnderstanding of Dx/Px/POC: good   Prognosis: good    Goals  GOALS TO BE ACHIEVED BY THE END OF THE PRE-OP VISIT  1.   Patient have all questions answered / receive pre operative education regarding precautions/wound care/ADL's. MET   2. Patient to be independent with his phase 1 post operative exercises to perform pre surgery. MET     GOALS TO BE ACHIEVED POST OPERATIVELY    STG: Achieve in 4-6 weeks  1. Patient's L knee pain at worst less than 3/10 to allow for proper gait. PROGRESSING 9/29  2. Patient's L knee ROM improve by 10-15 degrees to improve squatting. PROGRESSING 9/29  3. L LE MMT improve to > 4/5 all motions tested to improve ADL/recreational activities. PROGRESSING 9/29  4. Timed up and go score improve to less than 14 seconds to indicate improved balance/decreased falls risk. MET 9/29    LTG:  Achieve in 6-12 weeks  1. Patient's knee FOTO score improve to > 70% to indicate a return to normal functioning. PROGRESSING 9/29  2. Patient achieve personal goal of walking without pain/difficulty. PROGRESSING 9/29  3. Patient to achieve independence with home exercise plan. PROGRESSING 9/29  4. 30 second sit to stand score improve TO > 15 stands to indicate improved transfers. PROGRESSING 9/29    NEW GOALS MADE DURING POC UPDATE ON 9/6/23 to be achieved in 4-12 weeks  1. 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 PROGRESSING 9/29    Plan  Plan details: RE-ASSESS 1X/MONTH  Patient would benefit from: skilled physical therapy  Planned modality interventions: TENS, cryotherapy and thermotherapy: hydrocollator packs  Other planned modality interventions: IASTM / NMES  Planned therapy interventions: joint mobilization, manual therapy, massage, neuromuscular re-education, patient education, postural training, self care, strengthening, stretching, therapeutic activities, therapeutic exercise, home exercise program, abdominal trunk stabilization, balance, balance/weight bearing training and gait training  Frequency: 1-3X/WK.   Duration in weeks: 12  Plan of Care beginning date: 9/6/2023  Plan of Care expiration date: 12/6/2023  Treatment plan discussed with: FAUSTO and patient        Subjective Evaluation    History of Present Illness  Date of onset: 10/6/2022  Date of surgery: 2023  Mechanism of injury: surgery  Mechanism of injury: SUBJECTIVE: 23: The patient reports improvement since last outpatient therapy assessment. The patient is now able to descend steps more normally. The patient notes improvement with walking but does still wobble at times. The patient continues to have difficulty with ambulating up the steps normally and prolonged standing/walking. The patient will benefit from continued PT focused on achieving patient specific goals. SUBJECTIVE: 23: The patient presents for a post op PT re-evaluation / [address] [place] [address]. The patient is now 3 weeks s/p L TKA done by Dr. Nicolette Moise on 23. The patient received home care x 2 weeks after surgery. The patient reports difficulty with walking, donning shoes/socks, shower transfers, and ambulate up/down the steps. The patient will benefit from continued PT to achieve his goals of therapy. INJURY HISTORY: Shayan Barry is a 71 y.o. male that presents to outpatient physical therapy pre-operatively( upcoming L TKA) with complaints of L knee pain and difficulty functioning/walking. The patient reports difficulty using the vacuum, unable to lift/carry items, squat, and kneel. The patient's main goal for physical therapy is to walk pain free at the L knee. The patient presents pre-operatively to get objective measures, learn phase 1 exercises, and receive education on the surgery/self care.               Recurrent probem    Patient Goals  Patient goals for therapy: decreased pain, improved balance, increased motion, increased strength, independence with ADLs/IADLs and return to sport/leisure activities  Patient goal: walk pain free / function pain free( progressing)  Pain  Current pain ratin  At best pain ratin (sit/rest)  At worst pain ratin  Location: L knee anterior medial knee  Quality: dull ache, grinding and sharp  Relieving factors: rest  Aggravating factors: standing, walking and stair climbing  Progression: improved    Social Support  Steps to enter house: yes  Stairs in house: yes   Lives in: multiple-level home  Lives with: spouse    Employment status: working (drives school bus )  Hand dominance: right    Treatments  Previous treatment: physical therapy  Current treatment: physical therapy  Discharged from (in last 30 days): inpatient hospitalization        Objective     Observations   Left Knee   Positive for adhesive scar and incision. Additional Observation Details  Patient's postoperative incisions look well healed. Reviewed S/S of DVT. Patient shown scar massage    Palpation     Additional Palpation Details  NO TTP    Tenderness     Additional Tenderness Details  NO TTP    Neurological Testing     Sensation     Knee   Left Knee   Intact: light touch  Hyposensation: light touch    Right Knee   Intact: light touch     Comments   Left light touch: L patella. Active Range of Motion   Left Knee   Flexion: 108 degrees with pain  Extension: -8 degrees with pain    Right Knee   Flexion: 130 degrees with pain  Extension: 2 degrees with pain    Additional Active Range of Motion Details  IMPROVED 10 deg EXT    Passive Range of Motion   Left Knee   Flexion: 110 degrees with pain  Extension: -7 degrees with pain    Right Knee   Flexion: 130 degrees   Extension: 2 degrees     Mobility   Patellar Mobility:   Left Knee   WFL: medial, lateral, superior and inferior.      Right Knee   WFL: medial, lateral, superior and inferior    Strength/Myotome Testing     Left Hip   Planes of Motion   Flexion: 4-  Extension: 4-  Abduction: 4  Adduction: 4    Right Hip   Planes of Motion   Flexion: 4+  Extension: 4  Abduction: 4  Adduction: 4+    Left Knee   Flexion: 4-  Extension: 4-  Quadriceps contraction: fair    Right Knee   Flexion: 5  Extension: 5  Quadriceps contraction: good    Additional Strength Details  IMPROVED  IMPROVED    Tests     Additional Tests Details  Gait impairments: Patient ambulates with NO ADWBAT B/L LE. The patient demonstrates improved gait speed, more equal step lengths, mild( +) antalgic gait, improved L knee flexion/extension ROM, and improvedheel-toe rollover L LE.     FOTO: 44% ( predicted 60%) ( improved 14%)    TU: 10 sec no AD   23: 20 seconds w/ SPC  Pre-op: 20 seconds w/ SPC; (+) difficulty upon standing up    30 SSTS: : 14 stands - mild R lateral trunk lean  post op : NOT DONE  Pre-op: 7 stands no H - slow    KOOS: 17= 44.905                 Re-Evaluation:10/27  [email or web address]     PRECAUTIONS: HX ANT LUMBAR derangement; HIGH TISSUE IRRITABILITY / HIGH PAIN RATING  Knee Specialty Daily Treatment Diary     Manual Therapy    Scar massage        PROM :15x4 ea :30x3 flex/ ext :30x3 flex/ext :15x4 flex /ext :15x4 flex ext   Hamstring stretch   2x :15     Prone Quad stretch     :15x2  Towel @ thigh :15x1   Patellar mobs Done AD       Knee stretch on edge of mat X 6 mins  x6 min flex/ ext x5 min flex/ext 6 min flex/ ext 6 min flex/ ext       Ther Ex    Nu Step for LE strength S=10 L3 x 5 mins L3 x5 min L2 x10 min L3 x6.5 min L3 x5 min   Biodex Bike for knee ROM/ endurance S= x5 min full rotation x5 min  1/2 rotation x5 min  1/2 rotation x5 min  1/2 rotation x5 min 1/2 rotation   Heel slides flex/abd x10 ea x10 ea x10ea x10 ea x10 ea           EXT BOARD X 2 mins       Prone knee flex         ANKLE PUMP        SLR all planes  Ext x10              LAQ 3# 2x15 1# 2x10 3x10 1# 3x10 1# 3x10   Hamstring stretch        Calf stretch Wedge :30x3 Wedge :30x3 Wedge :30x3 Wedge :30x3 Wedge :30x3   Standing hamstring curls        HR/TR        Mini Squat   STS 25x     Self knee flex/ext stretch  2x10 ea 2x10 ea 8" Step :10x10 ea 8" Step :10x10 ea           Total gym squats (deep) 2x10    x10   Neuro Re-ed        TKE Gluteal set NV Prone x10 x15 Prone x15 Prone x15   QS NV Prone x10 x15 Prone x15 Prone x15   Wall slides with feet stagger        Bridges        Fitter board x10 x10 ea x10 ea x10 ea  x10 ea   Eccentric Leg press        Clam Shells        QS+ SLR        CSMi balance, "+" squat    Sq= x10  B= L1-2 Sq= x10  B= L2-4   airex stand        tandem stand                sidestepping        Heel-toe amb        Mirror walking        Step down    2" x10 Step overs L 2" x10    Amb up/down steps  x4 up SOS 1 HR  Down STS 1 HR x4 up SOS 1 HR  Down STS 1 HR     Chair squats x14       Crate carry        Step ups 8" x10 FWD  6" x10 LAT 6" x10 ea 6" x10 ea 6" x10 ea 8" x10 FWD  6" x10 LAT       Modalities        CP  KNEE                                  The patient was given a new home exercise plan with instruction, pictures, and verbal feedback. The patient accepts and understands the new home activities.

## 2023-09-29 ENCOUNTER — EVALUATION (OUTPATIENT)
Dept: PHYSICAL THERAPY | Facility: CLINIC | Age: 73
End: 2023-09-29
Payer: MEDICARE

## 2023-09-29 DIAGNOSIS — M17.12 PRIMARY OSTEOARTHRITIS OF LEFT KNEE: Primary | ICD-10-CM

## 2023-09-29 DIAGNOSIS — Z96.652 S/P TOTAL KNEE ARTHROPLASTY, LEFT: ICD-10-CM

## 2023-09-29 DIAGNOSIS — M25.562 LEFT KNEE PAIN, UNSPECIFIED CHRONICITY: ICD-10-CM

## 2023-09-29 PROCEDURE — 97140 MANUAL THERAPY 1/> REGIONS: CPT | Performed by: PHYSICAL THERAPIST

## 2023-09-29 PROCEDURE — 97110 THERAPEUTIC EXERCISES: CPT | Performed by: PHYSICAL THERAPIST

## 2023-09-29 PROCEDURE — 97112 NEUROMUSCULAR REEDUCATION: CPT | Performed by: PHYSICAL THERAPIST

## 2023-10-02 NOTE — PROGRESS NOTES
68 y.o. male presenting to the office for 6 week follow up status post left total knee arthroplasty (dos: 8/17/23) Patient states that his pain is mostly resolved. He is not taking medications for pain. Patient denies functional limitations from the knee. Patient continues to have numbness surrounding the surgical site. Patient denies any other radicular symptoms. Patient denies symptoms of an infection. Patient denies any instability. Patient denies any other acute or associated complaints. Past Medical History:   Diagnosis Date   • Arthritis    • Colon polyp    • COVID 09/07/2021    and 12/2022   • Diabetes mellitus ([address])    • Dyslipidemia    • Fallen arches    • GERD (gastroesophageal reflux disease)    • Hyperlipidemia    • Nodule of left lung 2007    negative for CA   • Type 2 diabetes mellitus without complication, without long-term current use of insulin ([address]) 01/23/2019   • Use of cane as ambulatory aid      Past Surgical History:   Procedure Laterality Date   • COLONOSCOPY     • ELBOW SURGERY Bilateral    • FOOT TENDON SURGERY Bilateral     heel   • HAND SURGERY     • WI ARTHRP KNE CONDYLE&PLATU MEDIAL&LAT COMPARTMENTS Left 8/17/2023    Procedure: ARTHROPLASTY KNEE TOTAL W ROBOT;  Surgeon: Dre Kilgore MD;  Location:  MAIN OR;  Service: Orthopedics   • WI ARTHRS KNE SURG W/MENISCECTOMY MED/LAT W/SHVG Right 05/23/2022    Procedure: KNEE ARTHROSCOPY WITH medial MENISCECTOMY chondroplasty synovectomy injection;  Surgeon: Milo Griffin DO;  Location: [facility];  Service: Orthopedics   • WI ARTHRS KNE SURG W/MENISCECTOMY MED/LAT W/SHVG Left 2/6/2023    Procedure: Left knee arthroscopy: Medial meniscectomy; Lateral meniscectomy; Chondroplasty; Synovectomy;  Post arthroscopic injection of intra-articular joint space and peripheral portal;  Surgeon: Milo Griffin DO;  Location: [facility];  Service: Orthopedics   • WI NEUROPLASTY &/TRANSPOS MEDIAN NRV CARPAL Angi Landry Left 06/30/2021 Procedure: RELEASE CARPAL TUNNEL;  Surgeon: Irma Hensley DO;  Location: [address] [place] [address] OR;  Service: Orthopedics     Results Reviewed     None          Physical Exam  Physical Exam  Constitutional:       Appearance: He is well-developed. HENT:      Head: Normocephalic and atraumatic. Eyes:      Pupils: Pupils are equal, round, and reactive to light. Neck:      Trachea: No tracheal deviation. Cardiovascular:      Rate and Rhythm: Normal rate and regular rhythm. Pulmonary:      Effort: Pulmonary effort is normal.      Breath sounds: Normal breath sounds. Musculoskeletal:      Cervical back: Neck supple. Left knee: No effusion. Skin:     General: Skin is warm and dry. Neurological:      Mental Status: He is alert and oriented to person, place, and time. Psychiatric:         Behavior: Behavior normal.       Left Knee Exam     Muscle Strength   The patient has normal left knee strength. Tenderness   The patient is experiencing no tenderness.      Range of Motion   Extension: -5   Flexion: 100     Tests   Varus: negative Valgus: negative    Other   Erythema: absent  Scars: present  Sensation: normal  Swelling: mild  Effusion: no effusion present            Assessment/Plan  68 y.o. male 6 week follow up status post left total knee arthroplasty   · Continue with activities as tolerated  · Continue with physical therapy activities  · Completed DVT ppx (4 weeks post-op)  · Hold off on dental appointments for 12 weeks post-op  · Take antibiotics prior to any dental appointments  · Follow up in 6 weeks for evaluation with x-ray

## 2023-10-03 ENCOUNTER — OFFICE VISIT (OUTPATIENT)
Dept: PHYSICAL THERAPY | Facility: CLINIC | Age: 73
End: 2023-10-03
Payer: MEDICARE

## 2023-10-03 DIAGNOSIS — M17.12 PRIMARY OSTEOARTHRITIS OF LEFT KNEE: Primary | ICD-10-CM

## 2023-10-03 DIAGNOSIS — M25.562 LEFT KNEE PAIN, UNSPECIFIED CHRONICITY: ICD-10-CM

## 2023-10-03 DIAGNOSIS — Z96.652 S/P TOTAL KNEE ARTHROPLASTY, LEFT: ICD-10-CM

## 2023-10-03 PROCEDURE — 97112 NEUROMUSCULAR REEDUCATION: CPT

## 2023-10-03 PROCEDURE — 97110 THERAPEUTIC EXERCISES: CPT

## 2023-10-03 PROCEDURE — 97140 MANUAL THERAPY 1/> REGIONS: CPT

## 2023-10-03 NOTE — PROGRESS NOTES
Daily Note     Today's date: 10/3/2023  Patient name: Hanane Batista  : 1950  MRN: [id number]  Referring provider: Jacob Goddard  Dx:   Encounter Diagnosis     ICD-10-CM    1. Primary osteoarthritis of left knee  M17.12       2. S/P total knee arthroplasty, left  Z96.652       3. Left knee pain, unspecified chronicity  M25.562           Start Time: 1016          Subjective: Patient states his left knee hurts a little today and he rates the pain a 2/10. 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 It was the first time wearing the outfit for months to a year. Objective: See treatment diary below      Assessment: Tolerated treatment well. Patient would benefit from continued PT for stretching and strengthening. Patient was able to increase some of the exercises with few verbal cues for proper performance. He was tender along joint line, sometimes medial and sometimes lateral depending on exercise. Limited end range of motion continues to be present for flexion and extension. Patient states he was still sore, but he was "looser" by the end of the session. He felt better over all. Plan: Continue per plan of care. Progress treatment as tolerated.        Re-Evaluation:10/27  [email or web address]     PRECAUTIONS: HX ANT LUMBAR derangement; HIGH TISSUE IRRITABILITY / HIGH PAIN RATING  Knee Specialty Daily Treatment Diary     Manual Therapy 9/29 10/3 9/20 9/22 9/25   Scar massage        PROM :15x4 ea :15x4 ea :30x3 flex/ext :15x4 flex /ext :15x4 flex ext   Hamstring stretch   2x :15     Prone Quad stretch     :15x2  Towel @ thigh :15x1   Patellar mobs Done AD stretches x10 ea      Knee stretch on edge of mat X 6 mins  x6 min x5 min flex/ext 6 min flex/ ext 6 min flex/ ext       Ther Ex 9/29 10/3 9/20 9/22 9/25   Nu Step for LE strength S=10 L3 x 5 mins L3 x5 min L2 x10 min L3 x6.5 min L3 x5 min   Biodex Bike for knee ROM/ endurance S= x5 min full rotation x5 min full rotation x5 min  1/2 rotation x5 min  1/2 rotation x5 min 1/2 rotation   Heel slides flex/abd x10 ea x10 ea x10ea x10 ea x10 ea           EXT BOARD X 2 mins x3 min      Prone knee flex         ANKLE PUMP        SLR all planes                LAQ 3# 2x15 3# x30 3x10 1# 3x10 1# 3x10   Hamstring stretch        Calf stretch Wedge :30x3 wedge :30x3 Wedge :30x3 Wedge :30x3 Wedge :30x3   Standing hamstring curls        HR/TR        Mini Squat   STS 25x     Self knee flex/ext stretch   2x10 ea 8" Step :10x10 ea 8" Step :10x10 ea           Total gym squats (deep) 2x10 2x15   x10   Neuro Re-ed        TKE        Gluteal set NV Prone x10 x15 Prone x15 Prone x15   QS NV Prone x10 x15 Prone x15 Prone x15   Wall slides with feet stagger        Bridges        Fitter board x10 x10 x10 ea x10 ea  x10 ea   Eccentric Leg press        Clam Shells        QS+ SLR        CSMi balance, "+" squat    Sq= x10  B= L1-2 Sq= x10  B= L2-4   airex stand        tandem stand                sidestepping        Heel-toe amb        Mirror walking        Step down    2" x10 Step overs L 2" x10    Amb up/down steps   x4 up SOS 1 HR  Down STS 1 HR     Chair squats x14 x15      Crate carry        Step ups 8" x10 FWD  6" x10 LAT 8" x10 FWD  6" x10 LAT 6" x10 ea 6" x10 ea 8" x10 FWD  6" x10 LAT       Modalities        CP  KNEE

## 2023-10-06 ENCOUNTER — OFFICE VISIT (OUTPATIENT)
Dept: PHYSICAL THERAPY | Facility: CLINIC | Age: 73
End: 2023-10-06
Payer: MEDICARE

## 2023-10-06 DIAGNOSIS — Z96.652 S/P TOTAL KNEE ARTHROPLASTY, LEFT: ICD-10-CM

## 2023-10-06 DIAGNOSIS — M17.12 PRIMARY OSTEOARTHRITIS OF LEFT KNEE: Primary | ICD-10-CM

## 2023-10-06 DIAGNOSIS — M25.562 LEFT KNEE PAIN, UNSPECIFIED CHRONICITY: ICD-10-CM

## 2023-10-06 PROCEDURE — 97530 THERAPEUTIC ACTIVITIES: CPT | Performed by: PHYSICAL THERAPIST

## 2023-10-06 PROCEDURE — 97110 THERAPEUTIC EXERCISES: CPT | Performed by: PHYSICAL THERAPIST

## 2023-10-06 PROCEDURE — 97140 MANUAL THERAPY 1/> REGIONS: CPT | Performed by: PHYSICAL THERAPIST

## 2023-10-06 NOTE — PROGRESS NOTES
Daily Note     Today's date: 10/6/2023  Patient name: Emily Myers  : 1950  MRN: [id number]  Referring provider: Kaylyn Castleman  Dx:   Encounter Diagnosis     ICD-10-CM    1. Primary osteoarthritis of left knee  M17.12       2. S/P total knee arthroplasty, left  Z96.652       3. Left knee pain, unspecified chronicity  M25.562           Start Time: 0915  Stop Time: 1000  Total time in clinic (min): 45 minutes    Subjective: States he is feeling good today. Did not feel so good yesterday, but thinks that is more due to the weather. C/o difficulty descending stairs. Feels unstable and like he doesn't have enough movement to lower himself. Objective: See treatment diary below      Assessment: Tolerated treatment well. Noted tightness of L quad with limited eccentric control likely contributing to difficulty descending stairs. Patient would benefit from continued PT working on end range of motion and functional strength. Plan: Continue per plan of care. Progress treatment as tolerated.        Re-Evaluation:10/27  [email or web address]     PRECAUTIONS: HX ANT LUMBAR derangement; HIGH TISSUE IRRITABILITY / HIGH PAIN RATING  Knee Specialty Daily Treatment Diary     Manual Therapy 9/29 10/3 10/6 9/22 9/25   Scar massage        PROM :15x4 ea :15x4 ea  :15x4 flex /ext :15x4 flex ext   Hamstring stretch        Prone Quad stretch   :30x4  :15x2  Towel @ thigh :15x1   Patellar mobs Done AD stretches x10 ea Grade III-IV all     Knee stretch on edge of mat X 6 mins  x6 min  6 min flex/ ext 6 min flex/ ext       Ther Ex 9/29 10/3 10/6 9/22 9/25   Nu Step for LE strength S=10 L3 x 5 mins L3 x5 min L3 x5 min L3 x6.5 min L3 x5 min   Biodex Bike for knee ROM/ endurance S= x5 min full rotation x5 min full rotation x5 min full rotation x5 min  1/2 rotation x5 min 1/2 rotation   Heel slides flex/abd x10 ea x10 ea  x10 ea x10 ea           EXT BOARD X 2 mins x3 min x3 min     Prone knee flex         ANKLE PUMP SLR all planes                LAQ 3# 2x15 3# x30 HOIST pl 2 2x10 1# 3x10 1# 3x10   Hamstring stretch        Calf stretch Wedge :30x3 wedge :30x3 wedge :30x3 Wedge :30x3 Wedge :30x3   Standing hamstring curls        HR/TR   CSMi 50/50 HR 3x10     Mini Squat   CSMi 50/50 3x10     Self knee flex/ext stretch    8" Step :10x10 ea 8" Step :10x10 ea           Total gym squats (deep) 2x10 2x15   x10   Neuro Re-ed        TKE        Gluteal set NV Prone x10  Prone x15 Prone x15   QS NV Prone x10  Prone x15 Prone x15   Wall slides with feet stagger        Bridges        Fitter board x10 x10 With stops 10x ea x10 ea  x10 ea   Eccentric Leg press   Leg press seat 5 (bilat up, L down) 95# 10x     Clam Shells        QS+ SLR        CSMi balance, "+" squat    Sq= x10  B= L1-2 Sq= x10  B= L2-4   airex stand        tandem stand                sidestepping        Heel-toe amb        Mirror walking        Step down    2" x10 Step overs L 2" x10    Amb up/down steps        Chair squats x14 x15      Crate carry        Step ups 8" x10 FWD  6" x10 LAT 8" x10 FWD  6" x10 LAT 8" x10 FWD  8" x10 LAT 6" x10 ea 8" x10 FWD  6" x10 LAT       Modalities        CP  KNEE

## 2023-10-10 ENCOUNTER — APPOINTMENT (OUTPATIENT)
Dept: PHYSICAL THERAPY | Facility: CLINIC | Age: 73
End: 2023-10-10
Payer: MEDICARE

## 2023-10-11 ENCOUNTER — OFFICE VISIT (OUTPATIENT)
Dept: PHYSICAL THERAPY | Facility: CLINIC | Age: 73
End: 2023-10-11
Payer: MEDICARE

## 2023-10-11 DIAGNOSIS — M17.12 PRIMARY OSTEOARTHRITIS OF LEFT KNEE: Primary | ICD-10-CM

## 2023-10-11 DIAGNOSIS — Z96.652 S/P TOTAL KNEE ARTHROPLASTY, LEFT: ICD-10-CM

## 2023-10-11 DIAGNOSIS — M25.562 LEFT KNEE PAIN, UNSPECIFIED CHRONICITY: ICD-10-CM

## 2023-10-11 PROCEDURE — 97530 THERAPEUTIC ACTIVITIES: CPT | Performed by: PHYSICAL THERAPIST

## 2023-10-11 PROCEDURE — 97110 THERAPEUTIC EXERCISES: CPT | Performed by: PHYSICAL THERAPIST

## 2023-10-11 PROCEDURE — 97140 MANUAL THERAPY 1/> REGIONS: CPT | Performed by: PHYSICAL THERAPIST

## 2023-10-11 NOTE — PROGRESS NOTES
Daily Note     Today's date: 10/11/2023  Patient name: Hanane Batista  : 1950  MRN: [id number]  Referring provider: Jacob Goddard  Dx:   Encounter Diagnosis     ICD-10-CM    1. Primary osteoarthritis of left knee  M17.12       2. S/P total knee arthroplasty, left  Z96.652       3. Left knee pain, unspecified chronicity  M25.562           Start Time: 0900  Stop Time: 09  Total time in clinic (min): 45 minutes    Subjective: Patient states right leg is starting to be worse than L. Objective: See treatment diary below      Assessment: Tolerated treatment well. Showed good mechanics and minimal VC today. Did note increased WB though R LE with functional activities. Gait assessment noted decreased stance on LLE with quick transition from foot flat to swing phase. Patient instructed to over exaggerate push off of toes over the next few days to try to normalize gait. Patient would benefit from continued PT      Plan: Continue per plan of care. Progress treatment as tolerated.        Re-Evaluation:10/27  [email or web address]     PRECAUTIONS: HX ANT LUMBAR derangement; HIGH TISSUE IRRITABILITY / HIGH PAIN RATING  Knee Specialty Daily Treatment Diary     Manual Therapy 9/29 10/3 10/6 10/11 9/25   Scar massage        PROM :15x4 ea :15x4 ea   :15x4 flex ext   Hamstring stretch        Prone Quad stretch   :30x4 :30x4 :15x2  Towel @ thigh :15x1   Patellar mobs Done AD stretches x10 ea Grade III-IV all Grade III-IV all    Knee stretch on edge of mat X 6 mins  x6 min   6 min flex/ ext       Ther Ex 9/29 10/3 10/6 10/11 9/25   Nu Step for LE strength S=10 L3 x 5 mins L3 x5 min L3 x5 min L3 5 min L3 x5 min   Biodex Bike for knee ROM/ endurance S= x5 min full rotation x5 min full rotation x5 min full rotation x5 min full rotation x5 min 1/2 rotation   Heel slides flex/abd x10 ea x10 ea   x10 ea           EXT BOARD X 2 mins x3 min x3 min x3 min    Prone knee flex         ANKLE PUMP        SLR all planes LAQ 3# 2x15 3# x30 HOIST pl 2 2x10 nv 1# 3x10   Hamstring stretch        Calf stretch Wedge :30x3 wedge :30x3 wedge :30x3 wedge :30x3 Wedge :30x3   Standing hamstring curls        HR/TR   CSMi 50/50 HR 3x10 CSMi 50/50 HR 3x10    Mini Squat   CSMi 50/50 3x10 CSMi 50/50 3x10    Self knee flex/ext stretch     8" Step :10x10 ea           Total gym squats (deep) 2x10 2x15   x10   Neuro Re-ed        TKE        Gluteal set NV Prone x10   Prone x15   QS NV Prone x10   Prone x15   Wall slides with feet stagger        Bridges        Fitter board x10 x10 With stops 10x ea With stops 10x ea x10 ea   Eccentric Leg press   Leg press seat 5 (bilat up, L down) 95# 10x Leg press seat 5 (bilat up, L down) 95# 15x    Clam Shells        QS+ SLR        CSMi balance, "+" squat     Sq= x10  B= L2-4   airex stand        tandem stand                sidestepping        Heel-toe amb        Mirror walking        Step down     Step overs L 2" x10    Amb up/down steps        Mirror walking    5 min    Chair squats x14 x15      Crate carry        Step ups 8" x10 FWD  6" x10 LAT 8" x10 FWD  6" x10 LAT 8" x10 FWD  8" x10 LAT 8" x10 FWD  8" x10 LAT 8" x10 FWD  6" x10 LAT       Modalities        CP  KNEE

## 2023-10-13 ENCOUNTER — APPOINTMENT (OUTPATIENT)
Dept: PHYSICAL THERAPY | Facility: CLINIC | Age: 73
End: 2023-10-13
Payer: MEDICARE

## 2023-10-13 ENCOUNTER — OFFICE VISIT (OUTPATIENT)
Dept: PODIATRY | Facility: CLINIC | Age: 73
End: 2023-10-13
Payer: MEDICARE

## 2023-10-13 VITALS
WEIGHT: 206.8 LBS | BODY MASS INDEX: 35.3 KG/M2 | HEART RATE: 104 BPM | HEIGHT: 64 IN | SYSTOLIC BLOOD PRESSURE: 136 MMHG | DIASTOLIC BLOOD PRESSURE: 69 MMHG

## 2023-10-13 DIAGNOSIS — B35.1 ONYCHOMYCOSIS: Primary | ICD-10-CM

## 2023-10-13 DIAGNOSIS — E11.9 TYPE 2 DIABETES MELLITUS WITHOUT COMPLICATION, WITHOUT LONG-TERM CURRENT USE OF INSULIN (HCC): ICD-10-CM

## 2023-10-13 PROCEDURE — 11721 DEBRIDE NAIL 6 OR MORE: CPT | Performed by: STUDENT IN AN ORGANIZED HEALTH CARE EDUCATION/TRAINING PROGRAM

## 2023-10-13 NOTE — PROGRESS NOTES
Assessment/Plan:    No problem-specific Assessment & Plan notes found for this encounter. Diagnoses and all orders for this visit:    Onychomycosis    Type 2 diabetes mellitus without complication, without long-term current use of insulin ([address])      Plan:     1. A thorough neurovascular exam was performed. Patient presents for at-risk foot care. Patient has no acute concerns today. Patient has significant lower extremity risk due to neuropathy, parasthesia, edema, and trophic skin changes to the lower extremity. Patient has Q9  findings and is recommended for at risk foot care every 3 months. 2. All 10 toenails were debridement as follow: using nail nipper, cory, and curette, nails were sharply debrided, reduced in thickness and length. Devitalized nail tissue and fungal debris excised and removed. Patient tolerated well. 3. Patient was educated on importance of glycemic control, daily foot assessment and proper shoe gear. Educational materials were provided. Patient will follow up annually for diabetic foot risk assessment. 4. Return in 10-12 weeks. Subjective:      Patient ID: Charo Tijerina is a 68 y.o. male. HPI:  Charo Tijerina is a 68 y.o. male who presents with painful, elongated toenails. They have difficulty applying their socks and shoes due to the elongation of the nails. The pressure within their shoe gear is painful and they have been unable to cut their nails adequately. Patient states pain is 1/10 in shoe gear. Pain with pressure. Requires at risk foot care.            The following portions of the patient's history were reviewed and updated as appropriate: He  has a past medical history of Arthritis, Colon polyp, COVID (09/07/2021), Diabetes mellitus ([address]), Dyslipidemia, Fallen arches, GERD (gastroesophageal reflux disease), Hyperlipidemia, Nodule of left lung (2007), Type 2 diabetes mellitus without complication, without long-term current use of insulin ([address]) (01/23/2019), and Use of cane as ambulatory aid. He   Patient Active Problem List    Diagnosis Date Noted    Primary osteoarthritis of left knee 07/17/2023    Tear of medial meniscus of right knee, current 05/23/2022    Type 2 diabetes mellitus without complication, without long-term current use of insulin ([address]) 11/30/2021    Carpal tunnel syndrome on right     Carpal tunnel syndrome on left     Class 2 severe obesity due to excess calories with serious comorbidity and body mass index (BMI) of 35.0 to 35.9 in adult  07/25/2019    Hypercholesteremia 08/15/2018    High blood pressure 08/15/2018    Dyspnea 08/15/2018     He  has a past surgical history that includes Foot Tendon Surgery (Bilateral); Elbow surgery (Bilateral); Colonoscopy; pr neuroplasty &/transpos median nrv carpal tunne (Left, 06/30/2021); pr arthrs kne surg w/meniscectomy med/lat w/shvg (Right, 05/23/2022); Hand surgery; pr arthrs kne surg w/meniscectomy med/lat w/shvg (Left, 2/6/2023); and pr arthrp kne condyle&platu medial&lat compartments (Left, 8/17/2023).   Current Outpatient Medications   Medication Sig Dispense Refill    cephalexin (KEFLEX) 500 mg capsule Four tablets one hour prior to cleaning 4 capsule 2    HYDROcodone-acetaminophen (NORCO) 5-325 mg per tablet       metFORMIN (GLUCOPHAGE) 500 mg tablet Take 1 tablet (500 mg total) by mouth 2 (two) times a day with meals 180 tablet 3    oxyCODONE (Roxicodone) 5 immediate release tablet Take 1 tablet (5 mg total) by mouth every 4 (four) hours as needed for severe pain for up to 30 doses Max Daily Amount: 30 mg 30 tablet 0    simvastatin (ZOCOR) 20 mg tablet Take 1 tablet (20 mg total) by mouth daily at bedtime 90 tablet 3    tamsulosin (FLOMAX) 0.4 mg Take 2 capsules (0.8 mg total) by mouth daily with dinner 180 capsule 3    enoxaparin (LOVENOX) 40 mg/0.4 mL Inject 0.4 mL (40 mg total) under the skin daily for 28 days To start postoperatively 11.2 mL 0     No current facility-administered medications for this visit. .    Review of Systems   All other systems reviewed and are negative. Objective:      /69 (BP Location: Left arm, Patient Position: Sitting, Cuff Size: Extra-Large)   Pulse 104   Ht 5' 4" (1.626 m)   Wt 93.8 kg (206 lb 12.8 oz)   BMI 35.50 kg/m²          Physical Exam  Vitals reviewed. Feet:      Comments: On exam patient has thickened, hypertrophic, discolored, brittle toenails with subungual debris and tenderness x10.   Patient has lower extremity edema  PAtients skin is atrophic, thickened nails, and decreased pedal hair  Patient has decreased pinprick and vibratory sensation to his feet and parasthesia

## 2023-10-18 ENCOUNTER — RA CDI HCC (OUTPATIENT)
Dept: OTHER | Facility: HOSPITAL | Age: 73
End: 2023-10-18

## 2023-10-18 ENCOUNTER — OFFICE VISIT (OUTPATIENT)
Dept: PHYSICAL THERAPY | Facility: CLINIC | Age: 73
End: 2023-10-18
Payer: MEDICARE

## 2023-10-18 DIAGNOSIS — Z96.652 S/P TOTAL KNEE ARTHROPLASTY, LEFT: ICD-10-CM

## 2023-10-18 DIAGNOSIS — M17.12 PRIMARY OSTEOARTHRITIS OF LEFT KNEE: Primary | ICD-10-CM

## 2023-10-18 DIAGNOSIS — M25.562 LEFT KNEE PAIN, UNSPECIFIED CHRONICITY: ICD-10-CM

## 2023-10-18 PROCEDURE — 97112 NEUROMUSCULAR REEDUCATION: CPT

## 2023-10-18 PROCEDURE — 97110 THERAPEUTIC EXERCISES: CPT

## 2023-10-18 NOTE — PROGRESS NOTES
Daily Note     Today's date: 10/18/2023  Patient name: Edil Mcghee  : 1950  MRN: [id number]  Referring provider: Norma Majano  Dx:   Encounter Diagnosis     ICD-10-CM    1. Primary osteoarthritis of left knee  M17.12       2. S/P total knee arthroplasty, left  Z96.652       3. Left knee pain, unspecified chronicity  M25.562           Start Time: 901          Subjective: Patient states he has minimal pain in the left knee (1/10). Objective: See treatment diary below      Assessment: Tolerated treatment well. Patient would benefit from continued PT for stretching and strengthening. Patient was able to perform his exercises with little difficulty and discomfort. Few verbal cues needed for proper performance of exercises. Patient felt good when he left department. Plan: Continue per plan of care. Progress treatment as tolerated.        Re-Evaluation:10/27  [email or web address]     PRECAUTIONS: HX ANT LUMBAR derangement; HIGH TISSUE IRRITABILITY / HIGH PAIN RATING  Knee Specialty Daily Treatment Diary     Manual Therapy 9/29 10/3 10/6 10/11 10/18   Scar massage        PROM :15x4 ea :15x4 ea      Hamstring stretch        Prone Quad stretch   :30x4 :30x4 :30x4   Patellar mobs Done AD stretches x10 ea Grade III-IV all Grade III-IV all Glides x10 ea   Knee stretch on edge of mat X 6 mins  x6 min          Ther Ex 9/29 10/3 10/6 10/11 10/18   Nu Step for LE strength S=10 L3 x 5 mins L3 x5 min L3 x5 min L3 5 min L3 5 min   Biodex Bike for knee ROM/ endurance S= x5 min full rotation x5 min full rotation x5 min full rotation x5 min full rotation x5 min full rotation   Heel slides flex/abd x10 ea x10 ea              EXT BOARD X 2 mins x3 min x3 min x3 min X3 min   Prone knee flex         ANKLE PUMP        SLR all planes                LAQ 3# 2x15 3# x30 HOIST pl 2 2x10 nv HOIST pl 2 2x10   Hamstring stretch        Calf stretch Wedge :30x3 wedge :30x3 wedge :30x3 wedge :30x3 Wedge :30x3   Standing hamstring curls        HR/TR   CSMi 50/50 HR 3x10 CSMi 50/50 HR 3x10 CSMi 50/50 HR 3x10   Mini Squat   CSMi 50/50 3x10 CSMi 50/50 3x10 CSMi 50/50 HR 3x10   Self knee flex/ext stretch                Total gym squats (deep) 2x10 2x15      Neuro Re-ed        TKE        Gluteal set NV Prone x10      QS NV Prone x10      Wall slides with feet stagger        Bridges        Fitter board x10 x10 With stops 10x ea With stops 10x ea With stops 10x ea   Eccentric Leg press   Leg press seat 5 (bilat up, L down) 95# 10x Leg press seat 5 (bilat up, L down) 95# 15x Leg press seat 5 (bilat up, L down) 95# 15x   Clam Shells        QS+ SLR        CSMi balance, "+" squat        airex stand        tandem stand                sidestepping        Heel-toe amb        Mirror walking        Step down        Amb up/down steps        Mirror walking    5 min X5 min   Chair squats x14 x15      Crate carry        Step ups 8" x10 FWD  6" x10 LAT 8" x10 FWD  6" x10 LAT 8" x10 FWD  8" x10 LAT 8" x10 FWD  8" x10 LAT 8" x10 FWD  8" x10 LAT       Modalities        CP  KNEE

## 2023-10-19 ENCOUNTER — APPOINTMENT (OUTPATIENT)
Dept: PHYSICAL THERAPY | Facility: CLINIC | Age: 73
End: 2023-10-19
Payer: MEDICARE

## 2023-10-20 ENCOUNTER — OFFICE VISIT (OUTPATIENT)
Dept: FAMILY MEDICINE CLINIC | Facility: CLINIC | Age: 73
End: 2023-10-20

## 2023-10-20 VITALS
DIASTOLIC BLOOD PRESSURE: 76 MMHG | BODY MASS INDEX: 35.44 KG/M2 | HEART RATE: 94 BPM | OXYGEN SATURATION: 98 % | SYSTOLIC BLOOD PRESSURE: 128 MMHG | TEMPERATURE: 96.5 F | RESPIRATION RATE: 18 BRPM | HEIGHT: 64 IN | WEIGHT: 207.6 LBS

## 2023-10-20 DIAGNOSIS — Z12.11 SCREENING FOR COLON CANCER: ICD-10-CM

## 2023-10-20 DIAGNOSIS — E78.00 HYPERCHOLESTEREMIA: ICD-10-CM

## 2023-10-20 DIAGNOSIS — E11.9 TYPE 2 DIABETES MELLITUS WITHOUT COMPLICATION, WITHOUT LONG-TERM CURRENT USE OF INSULIN (HCC): ICD-10-CM

## 2023-10-20 DIAGNOSIS — Z12.5 PROSTATE CANCER SCREENING: ICD-10-CM

## 2023-10-20 DIAGNOSIS — Z00.00 ENCOUNTER FOR MEDICARE ANNUAL WELLNESS EXAM: Primary | ICD-10-CM

## 2023-10-20 DIAGNOSIS — Z23 ENCOUNTER FOR IMMUNIZATION: ICD-10-CM

## 2023-10-20 LAB — SL AMB POCT HEMOGLOBIN AIC: 7 (ref ?–6.5)

## 2023-10-20 NOTE — PROGRESS NOTES
Assessment and Plan:     Problem List Items Addressed This Visit          Endocrine    Type 2 diabetes mellitus without complication, without long-term current use of insulin (HCC)    Relevant Orders    Albumin / creatinine urine ratio    Comprehensive metabolic panel    CBC and differential    HEMOGLOBIN A1C W/ EAG ESTIMATION    POCT hemoglobin A1c (Completed)       Other    Hypercholesteremia    Relevant Orders    Lipid panel     Other Visit Diagnoses       Encounter for Medicare annual wellness exam    -  Primary    Screening for colon cancer        Encounter for immunization        Relevant Orders    influenza vaccine, high-dose, PF 0.7 mL (FLUZONE HIGH-DOSE) (Completed)    Prostate cancer screening        Relevant Orders    PSA, Total Screen          BMI Counseling: Body mass index is 35.63 kg/m². The BMI is above normal. Nutrition recommendations include decreasing portion sizes, encouraging healthy choices of fruits and vegetables, decreasing fast food intake, consuming healthier snacks, limiting drinks that contain sugar, moderation in carbohydrate intake and increasing intake of lean protein. Rationale for BMI follow-up plan is due to patient being overweight or obese. Depression Screening and Follow-up Plan: Patient was screened for depression during today's encounter. They screened negative with a PHQ-2 score of 0. Preventive health issues were discussed with patient, and age appropriate screening tests were ordered as noted in patient's After Visit Summary. Personalized health advice and appropriate referrals for health education or preventive services given if needed, as noted in patient's After Visit Summary. History of Present Illness:     Patient presents for a Medicare Wellness Visit    Here for 6 month mark anthony-  Reviewed current medications and labs done in July. 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  Today in office- 7.0  Recently had knee surgery- is doing well other than some limitations with stairs. Is not taking any pain medications. Overall doing really well, better than he was at this time last year. Patient Care Team:  Benjamin Jang as PCP - General (Family Medicine)  Benjamin Jang as PCP - PCP-[facility]  Dali Clancy RN as Nurse Navigator (Orthopedics)     Review of Systems:     Review of Systems   Constitutional:  Negative for activity change, diaphoresis, fatigue and fever. HENT:  Negative for congestion, facial swelling, hearing loss, rhinorrhea, sinus pressure, sinus pain, sneezing, sore throat and voice change. Eyes:  Negative for discharge and visual disturbance. Respiratory:  Negative for cough, choking, chest tightness, shortness of breath, wheezing and stridor. Cardiovascular:  Negative for chest pain, palpitations and leg swelling. Gastrointestinal:  Negative for abdominal distention, abdominal pain, constipation, diarrhea, nausea and vomiting. Endocrine: Negative for polydipsia, polyphagia and polyuria. Genitourinary:  Negative for difficulty urinating, dysuria, frequency and urgency. Musculoskeletal:  Positive for arthralgias. Negative for myalgias, neck pain and neck stiffness. Skin:  Negative for color change, rash and wound. Neurological:  Negative for dizziness, syncope, speech difficulty, weakness, light-headedness and headaches. Hematological:  Negative for adenopathy. Does not bruise/bleed easily. Psychiatric/Behavioral:  Negative for agitation, behavioral problems, confusion, hallucinations, sleep disturbance and suicidal ideas. The patient is not nervous/anxious.          Problem List:     Patient Active Problem List   Diagnosis    Hypercholesteremia    High blood pressure    Dyspnea    Class 2 severe obesity due to excess calories with serious comorbidity and body mass index (BMI) of 35.0 to 35.9 in adult     Carpal tunnel syndrome on right    Carpal tunnel syndrome on left    Type 2 diabetes mellitus without complication, without long-term current use of insulin (HCC)    Tear of medial meniscus of right knee, current    Primary osteoarthritis of left knee      Past Medical and Surgical History:     Past Medical History:   Diagnosis Date    Arthritis     Colon polyp     COVID 09/07/2021    and 12/2022    Diabetes mellitus ([address])     Dyslipidemia     Fallen arches     GERD (gastroesophageal reflux disease)     Hyperlipidemia     Nodule of left lung 2007    negative for CA    Type 2 diabetes mellitus without complication, without long-term current use of insulin ([address]) 01/23/2019    Use of cane as ambulatory aid      Past Surgical History:   Procedure Laterality Date    COLONOSCOPY      ELBOW SURGERY Bilateral     FOOT TENDON SURGERY Bilateral     heel    HAND SURGERY      MD ARTHRP KNE CONDYLE&PLATU MEDIAL&LAT COMPARTMENTS Left 8/17/2023    Procedure: ARTHROPLASTY KNEE TOTAL W ROBOT;  Surgeon: Sera Smith MD;  Location:  MAIN OR;  Service: Orthopedics    MD ARTHRS KNE SURG W/MENISCECTOMY MED/LAT W/SHVG Right 05/23/2022    Procedure: KNEE ARTHROSCOPY WITH medial MENISCECTOMY chondroplasty synovectomy injection;  Surgeon: Lisandro Prince DO;  Location: [facility];  Service: Orthopedics    MD ARTHRS KNE SURG W/MENISCECTOMY MED/LAT W/SHVG Left 2/6/2023    Procedure: Left knee arthroscopy: Medial meniscectomy; Lateral meniscectomy; Chondroplasty; Synovectomy;  Post arthroscopic injection of intra-articular joint space and peripheral portal;  Surgeon: Lisandro Prince DO;  Location: [facility];  Service: Orthopedics    MD NEUROPLASTY &/TRANSPOS MEDIAN NRV CARPAL Kvng Filbert Left 06/30/2021    Procedure: RELEASE CARPAL TUNNEL;  Surgeon: Jeffeyr Barclay DO;  Location: [facility] MAIN OR;  Service: Orthopedics      Family History:     Family History   Problem Relation Age of Onset    Heart disease Mother         hx MI    Diabetes Mother     Stroke Mother     Arthritis Mother     COPD Father         80 yrs    Coronary artery disease Father     Cancer Brother       Social History:     Social History     Socioeconomic History    Marital status: /Civil Union     Spouse name: None    Number of children: None    Years of education: None    Highest education level: None   Occupational History    None   Tobacco Use    Smoking status: Former     Packs/day: 1.00     Years: 35.00     Total pack years: 35.00     Types: Cigarettes     Quit date:      Years since quittin.8    Smokeless tobacco: Never   Vaping Use    Vaping Use: Never used   Substance and Sexual Activity    Alcohol use: Yes     Comment: 2 x per week    Drug use: No    Sexual activity: Yes     Partners: Female     Birth control/protection: Male Sterilization   Other Topics Concern    None   Social History Narrative    None     Social Determinants of Health     Financial Resource Strain: Low Risk  (10/20/2023)    Overall Financial Resource Strain (CARDIA)     Difficulty of Paying Living Expenses: Not very hard   Food Insecurity: Not on file   Transportation Needs: No Transportation Needs (10/20/2023)    PRAPARE - Transportation     Lack of Transportation (Medical): No     Lack of Transportation (Non-Medical): No   Physical Activity: Not on file   Stress: Not on file   Social Connections: Not on file   Intimate Partner Violence: Not on file   Housing Stability: Not on file      Medications and Allergies:     Current Outpatient Medications   Medication Sig Dispense Refill    metFORMIN (GLUCOPHAGE) 500 mg tablet Take 1 tablet (500 mg total) by mouth 2 (two) times a day with meals 180 tablet 3    simvastatin (ZOCOR) 20 mg tablet Take 1 tablet (20 mg total) by mouth daily at bedtime 90 tablet 3    tamsulosin (FLOMAX) 0.4 mg Take 2 capsules (0.8 mg total) by mouth daily with dinner 180 capsule 3     No current facility-administered medications for this visit.      No Known Allergies   Immunizations:     Immunization History Administered Date(s) Administered    COVID-19 MODERNA VACC 0.5 ML IM 01/12/2021, 02/07/2021    H1N1, All Formulations 01/09/2010    INFLUENZA 09/01/2012, 11/04/2015, 10/13/2017, 10/15/2018, 11/22/2020, 10/17/2022    Influenza, high dose seasonal 0.7 mL 10/30/2020, 10/17/2022, 10/20/2023    Pneumococcal Conjugate 13-Valent 10/15/2018    Pneumococcal Polysaccharide PPV23 08/05/2021    Zoster 11/04/2015    Zoster Vaccine Recombinant 11/02/2019, 02/20/2020, 02/20/2020    influenza, trivalent, adjuvanted 10/07/2019      Health Maintenance:         Topic Date Due    Colorectal Cancer Screening  08/03/2023    Hepatitis C Screening  Completed         Topic Date Due    COVID-19 Vaccine (3 - Lonne Cleaves series) 04/04/2021      Medicare Screening Tests and Risk Assessments:     Wendie Pradhan is here for his Subsequent Wellness visit. Health Risk Assessment:   Patient rates overall health as excellent. Patient feels that their physical health rating is much better. Patient is very satisfied with their life. Eyesight was rated as slightly worse. Hearing was rated as slightly worse. Patient feels that their emotional and mental health rating is much better. Patients states they are never, rarely angry. Patient states they are never, rarely unusually tired/fatigued. Pain experienced in the last 7 days has been some. Patient's pain rating has been 2/10. Patient states that he has experienced no weight loss or gain in last 6 months. Depression Screening:   PHQ-2 Score: 0      Fall Risk Screening: In the past year, patient has experienced: no history of falling in past year      Home Safety:  Patient has trouble with stairs inside or outside of their home. Patient has working smoke alarms and has working carbon monoxide detector. Home safety hazards include: none. Nutrition:   Current diet is Regular. Medications:   Patient is not currently taking any over-the-counter supplements. Patient is able to manage medications. Activities of Daily Living (ADLs)/Instrumental Activities of Daily Living (IADLs):   Walk and transfer into and out of bed and chair?: Yes  Dress and groom yourself?: Yes    Bathe or shower yourself?: Yes    Feed yourself? Yes  Do your laundry/housekeeping?: Yes  Manage your money, pay your bills and track your expenses?: Yes  Make your own meals?: Yes    Do your own shopping?: Yes    Previous Hospitalizations:   Any hospitalizations or ED visits within the last 12 months?: No      Advance Care Planning:   Living will: No    Durable POA for healthcare: Yes    Advanced directive: No    Advanced directive counseling given: Yes      PREVENTIVE SCREENINGS      Cardiovascular Screening:    General: Screening Not Indicated and History Lipid Disorder    Due for: Lipid Panel      Diabetes Screening:     General: Screening Not Indicated and History Diabetes    Due for: Blood Glucose      Colorectal Cancer Screening:     General: Screening Current      Prostate Cancer Screening:    General: Screening Current    Due for: PSA      Abdominal Aortic Aneurysm (AAA) Screening:    Risk factors include: age between 70-75 yo and tobacco use        Lung Cancer Screening:     General: Screening Not Indicated      Hepatitis C Screening:    General: Screening Current    Screening, Brief Intervention, and Referral to Treatment (SBIRT)    Screening  Typical number of drinks in a day: 0  Typical number of drinks in a week: 0  Interpretation: Low risk drinking behavior. No results found. Physical Exam:     /76   Pulse 94   Temp (!) 96.5 °F (35.8 °C) (Tympanic)   Resp 18   Ht 5' 4" (1.626 m)   Wt 94.2 kg (207 lb 9.6 oz)   SpO2 98%   BMI 35.63 kg/m²     Physical Exam  Vitals and nursing note reviewed. Constitutional:       General: He is not in acute distress. Appearance: He is well-developed. He is not diaphoretic.    HENT:      Right Ear: External ear normal.      Left Ear: External ear normal.      Mouth/Throat: Pharynx: No oropharyngeal exudate. Eyes:      General:         Right eye: No discharge. Left eye: No discharge. Neck:      Thyroid: No thyromegaly. Vascular: No carotid bruit. Trachea: No tracheal deviation. Cardiovascular:      Rate and Rhythm: Normal rate and regular rhythm. Heart sounds: Normal heart sounds. No murmur heard. Pulmonary:      Effort: Pulmonary effort is normal. No respiratory distress. Breath sounds: Normal breath sounds. No wheezing. Musculoskeletal:         General: Normal range of motion. Cervical back: Normal range of motion and neck supple. Right lower leg: No edema. Left lower leg: Edema (since knee surgery, is improving slowly) present. Skin:     General: Skin is warm and dry. Coloration: Skin is not pale. Findings: No erythema or rash. Neurological:      Mental Status: He is alert and oriented to person, place, and time. Coordination: Coordination normal.   Psychiatric:         Mood and Affect: Mood normal.         Behavior: Behavior normal.         Thought Content:  Thought content normal.         Judgment: Judgment normal.          DREA Diaz

## 2023-10-23 ENCOUNTER — OFFICE VISIT (OUTPATIENT)
Dept: PHYSICAL THERAPY | Facility: CLINIC | Age: 73
End: 2023-10-23
Payer: MEDICARE

## 2023-10-23 DIAGNOSIS — Z96.652 S/P TOTAL KNEE ARTHROPLASTY, LEFT: ICD-10-CM

## 2023-10-23 DIAGNOSIS — M25.562 LEFT KNEE PAIN, UNSPECIFIED CHRONICITY: ICD-10-CM

## 2023-10-23 DIAGNOSIS — M17.12 PRIMARY OSTEOARTHRITIS OF LEFT KNEE: Primary | ICD-10-CM

## 2023-10-23 DIAGNOSIS — E66.01 CLASS 2 SEVERE OBESITY DUE TO EXCESS CALORIES WITH SERIOUS COMORBIDITY AND BODY MASS INDEX (BMI) OF 35.0 TO 35.9 IN ADULT: ICD-10-CM

## 2023-10-23 DIAGNOSIS — E66.01 CLASS 2 SEVERE OBESITY DUE TO EXCESS CALORIES WITH SERIOUS COMORBIDITY AND BODY MASS INDEX (BMI) OF 36.0 TO 36.9 IN ADULT: Primary | ICD-10-CM

## 2023-10-23 PROCEDURE — 97112 NEUROMUSCULAR REEDUCATION: CPT | Performed by: PHYSICAL THERAPIST

## 2023-10-23 PROCEDURE — 97140 MANUAL THERAPY 1/> REGIONS: CPT | Performed by: PHYSICAL THERAPIST

## 2023-10-23 PROCEDURE — 97110 THERAPEUTIC EXERCISES: CPT | Performed by: PHYSICAL THERAPIST

## 2023-10-23 RX ORDER — PHENTERMINE HYDROCHLORIDE 37.5 MG/1
37.5 TABLET ORAL DAILY
Qty: 30 TABLET | Refills: 2 | Status: SHIPPED | OUTPATIENT
Start: 2023-10-23

## 2023-10-23 NOTE — PROGRESS NOTES
Daily Note     Today's date: 10/23/2023  Patient name: Gautam Nielsen  : 1950  MRN: [id number]  Referring provider: Ludwig Ann  Dx:   Encounter Diagnosis     ICD-10-CM    1. Primary osteoarthritis of left knee  M17.12       2. S/P total knee arthroplasty, left  Z96.652       3. Left knee pain, unspecified chronicity  M25.562                      Subjective: The patient notes doing well with the L knee. He feels some ache and stiffness in the AM which causes mild difficulty with doing the steps. Objective: See treatment diary below      Assessment: The patient tolerated all activities well today. The patient was given cues for proper posture and technique to perform the exercises properly. The patient is still lacking full knee extension so he was instructed to utilize an extension stretching device at home for the next week with extensive verbal instruction given. The patient accepts and understands. There were no complaints of increased pain or problems after the session today. The patient will benefit from continued skilled physical therapy to progress towards achieving patient centered goals. Plan: Continue per plan of care. Progress treatment as tolerated.        Re-Evaluation:10/27  [id number]     PRECAUTIONS: HX ANT LUMBAR derangement; HIGH TISSUE IRRITABILITY / HIGH PAIN RATING  Knee Specialty Daily Treatment Diary     Manual Therapy 10/23 10/3 10/6 10/11 10/18   Scar massage        PROM  :15x4 ea      Hamstring stretch        Prone Quad stretch 2x:30  2x:30 contract/relax  :30x4 :30x4 :30x4   Patellar mobs Done AD grade 4 stretches x10 ea Grade III-IV all Grade III-IV all Glides x10 ea   Knee stretch on edge of mat  x6 min          Ther Ex 10/23 10/3 10/6 10/11 10/18   Nu Step for LE strength S=8 L4 x 5 mins L3 x5 min L3 x5 min L3 5 min L3 5 min   Biodex Bike for knee ROM/ endurance S= L1 x 5 mins x5 min full rotation x5 min full rotation x5 min full rotation x5 min full rotation   Heel slides flex/abd  x10 ea              EXT BOARD X 3 mins x3 min x3 min x3 min X3 min   Prone knee flex / QS X10ea after stretch       ANKLE PUMP        SLR all planes                LAQ 3 pl 3x10 3# x30 HOIST pl 2 2x10 nv HOIST pl 2 2x10   Hamstring stretch        Calf stretch  wedge :30x3 wedge :30x3 wedge :30x3 Wedge :30x3   Standing hamstring curls        HR/TR x30  CSMi 50/50 HR 3x10 CSMi 50/50 HR 3x10 CSMi 50/50 HR 3x10   Mini Squat "+" x30 CSMi  CSMi 50/50 3x10 CSMi 50/50 3x10 CSMi 50/50 HR 3x10   Self knee flex/ext stretch        Extensionator home device Instruction to use at home and treatment x 5 mins total       Total gym squats (deep)  2x15      Neuro Re-ed        TKE        Gluteal set  Prone x10      QS  Prone x10      Wall slides with feet stagger        [employer] board X15 ea x10 With stops 10x ea With stops 10x ea With stops 10x ea   Eccentric Leg press  S=5 102.5# x20  Leg press seat 5 (bilat up, L down) 95# 10x Leg press seat 5 (bilat up, L down) 95# 15x Leg press seat 5 (bilat up, L down) 95# 15x   Clam Shells        QS+ SLR        CSMi balance, "+" squat        airex stand        tandem stand                sidestepping        Heel-toe amb        Mirror walking        Step down        Amb up/down steps        Mirror walking [id number]   5 min X5 min   Chair squats  x15      Crate carry        Step ups 8" fwd/lat x15 ea 8" x10 FWD  6" x10 LAT 8" x10 FWD  8" x10 LAT 8" x10 FWD  8" x10 LAT 8" x10 FWD  8" x10 LAT       Modalities        CP  KNEE

## 2023-10-25 ENCOUNTER — OFFICE VISIT (OUTPATIENT)
Dept: PHYSICAL THERAPY | Facility: CLINIC | Age: 73
End: 2023-10-25
Payer: MEDICARE

## 2023-10-25 DIAGNOSIS — Z96.652 S/P TOTAL KNEE ARTHROPLASTY, LEFT: ICD-10-CM

## 2023-10-25 DIAGNOSIS — M25.562 LEFT KNEE PAIN, UNSPECIFIED CHRONICITY: ICD-10-CM

## 2023-10-25 DIAGNOSIS — M17.12 PRIMARY OSTEOARTHRITIS OF LEFT KNEE: Primary | ICD-10-CM

## 2023-10-25 PROCEDURE — 97140 MANUAL THERAPY 1/> REGIONS: CPT

## 2023-10-25 PROCEDURE — 97110 THERAPEUTIC EXERCISES: CPT

## 2023-10-25 PROCEDURE — 97112 NEUROMUSCULAR REEDUCATION: CPT

## 2023-10-25 NOTE — PROGRESS NOTES
Daily Note     Today's date: 10/25/2023  Patient name: Christine Alexander  : 1950  MRN: [id number]  Referring provider: Violette Ortega  Dx:   Encounter Diagnosis     ICD-10-CM    1. Primary osteoarthritis of left knee  M17.12       2. S/P total knee arthroplasty, left  Z96.652       3. Left knee pain, unspecified chronicity  M25.562           Start Time: 9006          Subjective: Patient has [employer] trying the extension stretch at home when he has time. It has been going ok. Presently he has no pain in the left knee. Objective: See treatment diary below      Assessment: Tolerated treatment well. Patient would benefit from continued PT for stretching and strengthening. He was able to increase some of his exercises during session. Patient felt good when he left department. Plan: Continue per plan of care. Progress treatment as tolerated.        Re-Evaluation:10/27  [email or web address]     PRECAUTIONS: HX ANT LUMBAR derangement; HIGH TISSUE IRRITABILITY / HIGH PAIN RATING  Knee Specialty Daily Treatment Diary     Manual Therapy 10/23 10/25 10/6 10/11 10/18   Scar massage        PROM        Hamstring stretch        Prone Quad stretch 2x:30  2x:30 contract/relax 2x:30  2x:30 contract/relax :30x4 :30x4 :30x4   Patellar mobs Done AD grade 4 glides x10 ea Grade III-IV all Grade III-IV all Glides x10 ea   Knee stretch on edge of mat            Ther Ex 10/23 10/25 10/6 10/11 10/18   Nu Step for LE strength S=8 L4 x 5 mins L3 x5 min L3 x5 min L3 5 min L3 5 min   Biodex Bike for knee ROM/ endurance S= L1 x 5 mins L1x5 min  x5 min full rotation x5 min full rotation x5 min full rotation   Heel slides flex/abd                EXT BOARD X 3 mins X3 min x3 min x3 min X3 min   Prone knee flex / QS X10ea after stretch X10 ea after stretch      ANKLE PUMP        SLR all planes                LAQ 3 pl 3x10 3 pl 3x10 HOIST pl 2 2x10 nv HOIST pl 2 2x10   Hamstring stretch        Calf stretch   wedge :30x3 wedge :30x3 Wedge :30x3   Standing hamstring curls        HR/TR x30 x30 CSMi 50/50 HR 3x10 CSMi 50/50 HR 3x10 CSMi 50/50 HR 3x10   Mini Squat "+" x30 CSMi "+" x30 CSMi CSMi 50/50 3x10 CSMi 50/50 3x10 CSMi 50/50 HR 3x10   Self knee flex/ext stretch        Extensionator home device Instruction to use at home and treatment x 5 mins total       Total gym squats (deep)        Neuro Re-ed        TKE        Gluteal set        QS        Wall slides with feet stagger        [employer] S39 ea x15 With stops 10x ea With stops 10x ea With stops 10x ea   Eccentric Leg press  S=5 102.5# x20 102.5# x20 Leg press seat 5 (bilat up, L down) 95# 10x Leg press seat 5 (bilat up, L down) 95# 15x Leg press seat 5 (bilat up, L down) 95# 15x   Clam Shells        QS+ SLR        CSMi balance, "+" squat        airex stand        tandem stand                sidestepping        Heel-toe amb        Mirror walking        Step down        Amb up/down steps        Mirror walking [id number] 30ft x8 w/ verbal cues  5 min X5 min   Chair squats        Crate carry        Step ups 8" fwd/lat x15 ea 8" fwd/lat x15 ea 8" x10 FWD  8" x10 LAT 8" x10 FWD  8" x10 LAT 8" x10 FWD  8" x10 LAT       Modalities        CP  KNEE

## 2023-10-28 NOTE — PROGRESS NOTES
PT Re-Evaluation  and PT Discharge    Today's date: 10/30/2023  Patient name: Sari Juarez  : 1950  MRN: [id number]  Referring provider: Milton Bustillo  Dx:   Encounter Diagnosis     ICD-10-CM    1. Primary osteoarthritis of left knee  M17.12       2. S/P total knee arthroplasty, left  Z96.652       3. Left knee pain, unspecified chronicity  M25.562                      Assessment  Assessment details: Sari Juarez is a 71 y.o. male with a history of OA, dyslipidemia, GERD, DM, hyperlipidemia, bilateral elbow surgery, L carpal tunnel that presents for a physical therapy RE evaluation/ DISCHARGE. The patient demonstrates signs and symptoms consistent with L knee OA s/p L TKA( ). During the examination the patient demonstrated improved L knee strength, improved but impaired L knee ROM, improved gait, and decreased L knee pain. The patient has made functional gains since starting therapy. The patient is now able to stand, walk, climb steps, and perform most work related activities without L knee pain. The patient continues to have difficulty with descending the steps normally. The patient feels independent with his HEP. The patient will be discharged from formal PT to an independent HEP          Symptom irritability: lowUnderstanding of Dx/Px/POC: good   Prognosis: good    Goals  GOALS TO BE ACHIEVED BY THE END OF THE PRE-OP VISIT  1. Patient have all questions answered / receive pre operative education regarding precautions/wound care/ADL's. MET   2. Patient to be independent with his phase 1 post operative exercises to perform pre surgery. MET     GOALS TO BE ACHIEVED POST OPERATIVELY    STG: Achieve in 4-6 weeks  1. Patient's L knee pain at worst less than 3/10 to allow for proper gait. PARTIALLY MET 10/30  2. Patient's L knee ROM improve by 10-15 degrees to improve squatting. MET  3.  L LE MMT improve to > 4/5 all motions tested to improve ADL/recreational activities. MET  4. Timed up and go score improve to less than 14 seconds to indicate improved balance/decreased falls risk. MET 9/29    LTG:  Achieve in 6-12 weeks  1. Patient's knee FOTO score improve to > 70% to indicate a return to normal functioning. MET  2. Patient achieve personal goal of walking without pain/difficulty. PARTIALLY MET  3. Patient to achieve independence with home exercise plan. MET  4. 30 second sit to stand score improve TO > 15 stands to indicate improved transfers. N/A    NEW GOALS MADE DURING POC UPDATE ON 9/6/23 to be achieved in 4-12 weeks  1. 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 PARTIALLY MET    Plan  Plan details: D/C PT TO AN INDEPENDENT HEP  Treatment plan discussed with: PTA and patient        Subjective Evaluation    History of Present Illness  Date of onset: 10/6/2022  Date of surgery: 8/17/2023  Mechanism of injury: surgery  Mechanism of injury: SUBJECTIVE: 10/30/23: The patient reports improvement in L knee ROM and function since last therapy assessment. The patient is now able to stand, walk, climb steps, and perform most work related activities without L knee pain. The patient continues to have difficulty with descending the steps normally. The patient feels independent with his HEP. The patient will be discharged from formal PT to an independent HEP. SUBJECTIVE: 9/29/23: The patient reports improvement since last outpatient therapy assessment. The patient is now able to descend steps more normally. The patient notes improvement with walking but does still wobble at times. The patient continues to have difficulty with ambulating up the steps normally and prolonged standing/walking. The patient will benefit from continued PT focused on achieving patient specific goals. SUBJECTIVE: 9/6/23: The patient presents for a post op PT re-evaluation / [address] [place] [address].   The patient is now 3 weeks s/p L TKA done by Dr. Guillen Callum on 23. The patient received home care x 2 weeks after surgery. The patient reports difficulty with walking, donning shoes/socks, shower transfers, and ambulate up/down the steps. The patient will benefit from continued PT to achieve his goals of therapy. INJURY HISTORY: Franco Valles is a 71 y.o. male that presents to outpatient physical therapy pre-operatively( upcoming L TKA) with complaints of L knee pain and difficulty functioning/walking. The patient reports difficulty using the vacuum, unable to lift/carry items, squat, and kneel. The patient's main goal for physical therapy is to walk pain free at the L knee. The patient presents pre-operatively to get objective measures, learn phase 1 exercises, and receive education on the surgery/self care. Recurrent probem    Patient Goals  Patient goals for therapy: increased strength  Patient goal: walk pain free / function pain free( partially met)  Pain  Current pain ratin  At best pain ratin (sit/rest)  At worst pain rating: 3  Location: L knee anterior medial knee  Quality: dull ache and sharp  Relieving factors: rest  Aggravating factors: stair climbing  Progression: improved    Social Support  Steps to enter house: yes  Stairs in house: yes   Lives in: multiple-level home  Lives with: spouse    Employment status: working (drives school bus )  Hand dominance: right    Treatments  Previous treatment: physical therapy        Objective     Observations   Left Knee   Negative for adhesive scar and incision. Additional Observation Details  Patient's postoperative incisions look well healed and now non adherent      Palpation     Additional Palpation Details  NO TTP    Tenderness     Additional Tenderness Details  NO TTP    Neurological Testing     Sensation     Knee   Left Knee   Intact: Light touch  Diminished: Light touch     Right Knee   Intact: light touch     Comments   Left light touch: L patella.      Active Range of Motion   Left Knee   Flexion: 117 degrees   Extension: -3 degrees     Right Knee   Flexion: 130 degrees with pain  Extension: 2 degrees with pain    Additional Active Range of Motion Details  IMPROVED     Passive Range of Motion   Left Knee   Flexion: 120 degrees   Extension: -2 degrees     Right Knee   Flexion: 130 degrees   Extension: 2 degrees     Additional Passive Range of Motion Details  IMPROVED    Mobility   Patellar Mobility:   Left Knee   WFL: medial, lateral, superior and inferior. Right Knee   WFL: medial, lateral, superior and inferior    Strength/Myotome Testing     Left Hip   Planes of Motion   Flexion: 4+  Extension: 4+  Abduction: 4  Adduction: 4+    Right Hip   Planes of Motion   Flexion: 4+  Extension: 4  Abduction: 4  Adduction: 4+    Left Knee   Flexion: 4+  Extension: 4+  Quadriceps contraction: fair    Right Knee   Flexion: 5  Extension: 5  Quadriceps contraction: good    Additional Strength Details  IMPROVED  IMPROVED    Tests     Additional Tests Details  Gait impairments: Patient ambulates with NO ADWBAT B/L LE. The patient demonstrates improved gait speed, more equal step lengths, mild( +) antalgic gait, improved L knee flexion/extension ROM, and improvedheel-toe rollover L LE.     FOTO: 72% ( predicted 60%) ( improved 42%)    TU: 10 sec no AD   23: 20 seconds w/ SPC  Pre-op: 20 seconds w/ SPC; (+) difficulty upon standing up    30 SSTS: : 14 stands - mild R lateral trunk lean  post op : NOT DONE  Pre-op: 7 stands no H - slow    KOOS: 17= [phone]                 Re-Evaluation:  [id number]     PRECAUTIONS: HX ANT LUMBAR derangement; HIGH TISSUE IRRITABILITY / HIGH PAIN RATING  Knee Specialty Daily Treatment Diary     Manual Therapy 10/23 10/25 10/30 10/11 10/18   Scar massage        PROM        Hamstring stretch        Prone Quad stretch 2x:30  2x:30 contract/relax 2x:30  2x:30 contract/relax 4x:30 :30x4 :30x4   Patellar mobs Done AD grade 4 glides x10 ea X10 ea all Grade III-IV all Glides x10 ea   Knee stretch on edge of mat   2x:30 ea         Ther Ex 10/23 10/25 10/30 10/11 10/18   Nu Step for LE strength S=8 L4 x 5 mins L3 x5 min L3 x 5 mins L3 5 min L3 5 min   Biodex Bike for knee ROM/ endurance S= L1 x 5 mins L1x5 min  L1 x 5 mins x5 min full rotation x5 min full rotation   Heel slides flex/abd                EXT BOARD X 3 mins X3 min X5 mins x3 min X3 min   Prone knee flex / QS X10ea after stretch X10 ea after stretch      ANKLE PUMP        SLR all planes                LAQ 3 pl 3x10 3 pl 3x10  nv HOIST pl 2 2x10   Hamstring stretch        Calf stretch    wedge :30x3 Wedge :30x3   Standing hamstring curls        HR/TR x30 x30  CSMi 50/50 HR 3x10 CSMi 50/50 HR 3x10   Mini Squat "+" x30 CSMi "+" x30 CSMi  CSMi 50/50 3x10 CSMi 50/50 HR 3x10   Self knee flex/ext stretch        Extensionator home device Instruction to use at home and treatment x 5 mins total       Total gym squats (deep)        Neuro Re-ed        TKE        Gluteal set        QS        Wall slides with feet stagger        [employer] X54 ea x15  With stops 10x ea With stops 10x ea   Eccentric Leg press  S=5 102.5# x20 102.5# x20  Leg press seat 5 (bilat up, L down) 95# 15x Leg press seat 5 (bilat up, L down) 95# 15x   Clam Shells        QS+ SLR        CSMi balance, "+" squat        airex stand        tandem stand                sidestepping        Heel-toe amb        Mirror walking        Step down        Amb up/down steps        Mirror walking [id number] 30ft x8 w/ verbal cues  5 min X5 min   Chair squats        Crate carry        Step ups 8" fwd/lat x15 ea 8" fwd/lat x15 ea  8" x10 FWD  8" x10 LAT 8" x10 FWD  8" x10 LAT       Modalities        CP  KNEE No

## 2023-10-30 ENCOUNTER — EVALUATION (OUTPATIENT)
Dept: PHYSICAL THERAPY | Facility: CLINIC | Age: 73
End: 2023-10-30
Payer: MEDICARE

## 2023-10-30 DIAGNOSIS — M25.562 LEFT KNEE PAIN, UNSPECIFIED CHRONICITY: ICD-10-CM

## 2023-10-30 DIAGNOSIS — Z47.1 AFTERCARE FOLLOWING LEFT KNEE JOINT REPLACEMENT SURGERY: Primary | ICD-10-CM

## 2023-10-30 DIAGNOSIS — M17.12 PRIMARY OSTEOARTHRITIS OF LEFT KNEE: Primary | ICD-10-CM

## 2023-10-30 DIAGNOSIS — Z96.652 S/P TOTAL KNEE ARTHROPLASTY, LEFT: ICD-10-CM

## 2023-10-30 DIAGNOSIS — Z96.652 AFTERCARE FOLLOWING LEFT KNEE JOINT REPLACEMENT SURGERY: Primary | ICD-10-CM

## 2023-10-30 PROCEDURE — 97140 MANUAL THERAPY 1/> REGIONS: CPT | Performed by: PHYSICAL THERAPIST

## 2023-10-30 PROCEDURE — 97110 THERAPEUTIC EXERCISES: CPT | Performed by: PHYSICAL THERAPIST

## 2023-11-08 ENCOUNTER — HOSPITAL ENCOUNTER (OUTPATIENT)
Dept: RADIOLOGY | Facility: HOSPITAL | Age: 73
Discharge: HOME | End: 2023-11-08
Attending: ORTHOPAEDIC SURGERY
Payer: MEDICARE

## 2023-11-08 ENCOUNTER — OFFICE VISIT (OUTPATIENT)
Dept: OBGYN CLINIC | Facility: HOSPITAL | Age: 73
End: 2023-11-08

## 2023-11-08 VITALS
DIASTOLIC BLOOD PRESSURE: 82 MMHG | SYSTOLIC BLOOD PRESSURE: 144 MMHG | HEART RATE: 99 BPM | BODY MASS INDEX: 35.63 KG/M2 | HEIGHT: 64 IN

## 2023-11-08 DIAGNOSIS — Z47.1 AFTERCARE FOLLOWING LEFT KNEE JOINT REPLACEMENT SURGERY: ICD-10-CM

## 2023-11-08 DIAGNOSIS — Z47.1 AFTERCARE FOLLOWING LEFT KNEE JOINT REPLACEMENT SURGERY: Primary | ICD-10-CM

## 2023-11-08 DIAGNOSIS — Z96.652 AFTERCARE FOLLOWING LEFT KNEE JOINT REPLACEMENT SURGERY: Primary | ICD-10-CM

## 2023-11-08 DIAGNOSIS — Z96.652 AFTERCARE FOLLOWING LEFT KNEE JOINT REPLACEMENT SURGERY: ICD-10-CM

## 2023-11-08 PROCEDURE — 73560 X-RAY EXAM OF KNEE 1 OR 2: CPT

## 2023-11-08 PROCEDURE — 99024 POSTOP FOLLOW-UP VISIT: CPT | Performed by: ORTHOPAEDIC SURGERY

## 2023-11-08 RX ORDER — CEPHALEXIN 500 MG/1
CAPSULE ORAL
Qty: 40 CAPSULE | Refills: 0 | Status: SHIPPED | OUTPATIENT
Start: 2023-11-08 | End: 2023-11-09

## 2023-11-08 NOTE — PROGRESS NOTES
Assessment:  1. Aftercare following left knee joint replacement surgery            Plan:  3 months s/p left TKA with robot, 8/17/2023   He is doing well. He is encouraged to remain active including HEP. The patient is counseled on prophylactic antibiotic use prior to dental visits. Antibiotics prescribed  He should follow up in 3 months      To do next visit:  Return in about 3 months (around 2/8/2024) for re-check with x-rays. The above stated was discussed in layman's terms and the patient expressed understanding. All questions were answered to the patient's satisfaction. Scribe Attestation      I,:  Mati Lima am acting as a scribe while in the presence of the attending physician.:       I,:  Melinda Crump MD personally performed the services described in this documentation    as scribed in my presence.:               Subjective:   Kurt Perkins is a 68 y.o. male who presents 3 months s/p left TKA with robot, 8/17/2023. He is doing well. Today he has no left knee pain complaints yet can feel weak descending stairs. He denies medications for this issue. He denies fever, chills or shortness of breath.         Review of systems negative unless otherwise specified in HPI    Past Medical History:   Diagnosis Date    Arthritis     Colon polyp     COVID 09/07/2021    and 12/2022    Diabetes mellitus ([address])     Dyslipidemia     Fallen arches     GERD (gastroesophageal reflux disease)     Hyperlipidemia     Nodule of left lung 2007    negative for CA    Type 2 diabetes mellitus without complication, without long-term current use of insulin ([address]) 01/23/2019    Use of cane as ambulatory aid        Past Surgical History:   Procedure Laterality Date    COLONOSCOPY      ELBOW SURGERY Bilateral     FOOT TENDON SURGERY Bilateral     heel    HAND SURGERY      ME ARTHRP KNE CONDYLE&PLATU MEDIAL&LAT COMPARTMENTS Left 8/17/2023    Procedure: ARTHROPLASTY KNEE TOTAL W ROBOT;  Surgeon: Alba Diaz Eric Jean MD;  Location:  MAIN OR;  Service: Orthopedics    TN ARTHRS KNE SURG W/MENISCECTOMY MED/LAT W/SHVG Right 2022    Procedure: KNEE ARTHROSCOPY WITH medial MENISCECTOMY chondroplasty synovectomy injection;  Surgeon: Kirsten Jose DO;  Location: [facility];  Service: Orthopedics    TN ARTHRS KNE SURG W/MENISCECTOMY MED/LAT W/SHVG Left 2023    Procedure: Left knee arthroscopy: Medial meniscectomy; Lateral meniscectomy; Chondroplasty; Synovectomy;  Post arthroscopic injection of intra-articular joint space and peripheral portal;  Surgeon: Kirsten Jose DO;  Location: [facility];  Service: Orthopedics    TN NEUROPLASTY &/TRANSPOS MEDIAN NRV CARPAL [facility] Muss Left 2021    Procedure: RELEASE CARPAL TUNNEL;  Surgeon: Bria Collado DO;  Location: [address] [place] [address] MAIN OR;  Service: Orthopedics       Family History   Problem Relation Age of Onset    Heart disease Mother         hx MI    Diabetes Mother     Stroke Mother     Arthritis Mother     COPD Father         80 yrs    Coronary artery disease Father     Cancer Brother        Social History     Occupational History    Not on file   Tobacco Use    Smoking status: Former     Packs/day: 1.00     Years: 35.00     Total pack years: 35.00     Types: Cigarettes     Quit date:      Years since quittin.8    Smokeless tobacco: Never   Vaping Use    Vaping Use: Never used   Substance and Sexual Activity    Alcohol use: Yes     Comment: 2 x per week    Drug use: No    Sexual activity: Yes     Partners: Female     Birth control/protection: Male Sterilization         Current Outpatient Medications:     metFORMIN (GLUCOPHAGE) 500 mg tablet, Take 1 tablet (500 mg total) by mouth 2 (two) times a day with meals, Disp: 180 tablet, Rfl: 3    phentermine (ADIPEX-P) 37.5 MG tablet, Take 1 tablet (37.5 mg total) by mouth daily, Disp: 30 tablet, Rfl: 2    simvastatin (ZOCOR) 20 mg tablet, Take 1 tablet (20 mg total) by mouth daily at bedtime, Disp: 90 tablet, Rfl: 3    tamsulosin (FLOMAX) 0.4 mg, Take 2 capsules (0.8 mg total) by mouth daily with dinner, Disp: 180 capsule, Rfl: 3    No Known Allergies         Vitals:    11/08/23 1329   BP: 144/82   Pulse: 99       Objective:  Physical exam  General: Awake, Alert, Oriented  Eyes: Pupils equal, round and reactive to light  Heart: regular rate and rhythm  Lungs: No audible wheezing  Abdomen: soft                    Ortho Exam  Left knee:  Well healed anterior incision  No erythema and no ecchymosis  Stable to varus and valgus stress  Extensor mechanism intact  Extension 0  Flexion 120  Calf compartments soft and supple  Sensation intact  Toes are warm sensate and mobile      Diagnostics, reviewed and taken today if performed as documented: The attending physician has personally reviewed the pertinent films in PACS and interpretation is as follows:  Left knee x-ray:  Well aligned prosthesis with no acute changes. Procedures, if performed today:    Procedures    None performed      Portions of the record may have been created with voice recognition software. Occasional wrong word or "sound a like" substitutions may have occurred due to the inherent limitations of voice recognition software. Read the chart carefully and recognize, using context, where substitutions have occurred.

## 2023-12-05 ENCOUNTER — TELEPHONE (OUTPATIENT)
Dept: FAMILY MEDICINE CLINIC | Facility: CLINIC | Age: 73
End: 2023-12-05

## 2023-12-09 DIAGNOSIS — E11.9 TYPE 2 DIABETES MELLITUS WITHOUT COMPLICATION, WITHOUT LONG-TERM CURRENT USE OF INSULIN (HCC): ICD-10-CM

## 2023-12-20 ENCOUNTER — OFFICE VISIT (OUTPATIENT)
Dept: FAMILY MEDICINE CLINIC | Facility: CLINIC | Age: 73
End: 2023-12-20
Payer: MEDICARE

## 2023-12-20 VITALS
HEIGHT: 64 IN | HEART RATE: 106 BPM | WEIGHT: 204.4 LBS | BODY MASS INDEX: 34.89 KG/M2 | TEMPERATURE: 98.4 F | DIASTOLIC BLOOD PRESSURE: 80 MMHG | RESPIRATION RATE: 16 BRPM | SYSTOLIC BLOOD PRESSURE: 116 MMHG | OXYGEN SATURATION: 98 %

## 2023-12-20 DIAGNOSIS — H61.21 IMPACTED CERUMEN OF RIGHT EAR: Primary | ICD-10-CM

## 2023-12-20 DIAGNOSIS — M65.342 TRIGGER RING FINGER OF LEFT HAND: ICD-10-CM

## 2023-12-20 PROCEDURE — 69210 REMOVE IMPACTED EAR WAX UNI: CPT | Performed by: INTERNAL MEDICINE

## 2023-12-20 PROCEDURE — 99213 OFFICE O/P EST LOW 20 MIN: CPT | Performed by: INTERNAL MEDICINE

## 2023-12-20 NOTE — PROGRESS NOTES
[facility]        NAME: Jason Miguel is a 73 y.o. male  : 1950    MRN: [id number]  DATE: 2023  TIME: 9:53 AM    Assessment and Plan   1. Impacted cerumen of right ear  -     Ear cerumen removal    2. Trigger ring finger of left hand  -     Ambulatory Referral to Hand Surgery; Future             Chief Complaint     Chief Complaint   Patient presents with   • ear cleaning   • trigger finger         History of Present Illness       72yo male here for acute visit due to cerumen impaction. States he was getting hearing aids adjusted and TM not visualized. Used OTC cerumen removal product in the interim.     Also concerned about trigger of left hand, 4th digit, and would like to know about a hand surgeon.         Ear cerumen removal    Date/Time: 2023 9:20 AM    Performed by: Mary Miramontes MD  Authorized by: Mary Miramontes MD  Universal Protocol:  Procedure performed by:    Patient location:  Clinic  Procedure details:     Location:  R ear    Procedure type: irrigation with instrumentation      Instrumentation: curette      Approach:  External  Post-procedure details:     Complication:  None    Hearing quality:  Improved    Patient tolerance of procedure:  Tolerated well, no immediate complications        Review of Systems   Review of Systems   Constitutional:  Negative for appetite change, chills and fever.   HENT:  Positive for hearing loss. Negative for ear discharge and ear pain.    Musculoskeletal:  Positive for arthralgias.         Current Medications       Current Outpatient Medications:   •  metFORMIN (GLUCOPHAGE) 500 mg tablet, TAKE 1 TABLET TWICE DAILY  WITH MEALS, Disp: 180 tablet, Rfl: 3  •  phentermine (ADIPEX-P) 37.5 MG tablet, Take 1 tablet (37.5 mg total) by mouth daily, Disp: 30 tablet, Rfl: 2  •  simvastatin (ZOCOR) 20 mg tablet, Take 1 tablet (20 mg total) by mouth daily at bedtime, Disp: 90 tablet, Rfl: 3  •  tamsulosin (FLOMAX) 0.4  mg, Take 2 capsules (0.8 mg total) by mouth daily with dinner, Disp: 180 capsule, Rfl: 3    Current Allergies     Allergies as of 12/20/2023   • (No Known Allergies)            The following portions of the patient's history were reviewed and updated as appropriate: allergies, current medications, past family history, past medical history, past social history, past surgical history and problem list.     Past Medical History:   Diagnosis Date   • Arthritis    • Colon polyp    • COVID 09/07/2021    and 12/2022   • Diabetes mellitus ([facility])    • Dyslipidemia    • Fallen arches    • GERD (gastroesophageal reflux disease)    • Hyperlipidemia    • Nodule of left lung 2007    negative for CA   • Type 2 diabetes mellitus without complication, without long-term current use of insulin ([facility]) 01/23/2019   • Use of cane as ambulatory aid        Past Surgical History:   Procedure Laterality Date   • COLONOSCOPY     • ELBOW SURGERY Bilateral    • FOOT TENDON SURGERY Bilateral     heel   • HAND SURGERY     • SD ARTHRP KNE CONDYLE&PLATU MEDIAL&LAT COMPARTMENTS Left 8/17/2023    Procedure: ARTHROPLASTY KNEE TOTAL W ROBOT;  Surgeon: Denton Nichole MD;  Location:  MAIN OR;  Service: Orthopedics   • SD ARTHRS KNE SURG W/MENISCECTOMY MED/LAT W/SHVG Right 05/23/2022    Procedure: KNEE ARTHROSCOPY WITH medial MENISCECTOMY chondroplasty synovectomy injection;  Surgeon: Hank Shirley DO;  [facility];  Service: Orthopedics   • SD ARTHRS KNE SURG W/MENISCECTOMY MED/LAT W/SHVG Left 2/6/2023    Procedure: Left knee arthroscopy: Medial meniscectomy; Lateral meniscectomy; Chondroplasty; Synovectomy; Post arthroscopic injection of intra-articular joint space and peripheral portal;  Surgeon: Hank Shirley DO;  [facility];  Service: Orthopedics   • SD NEUROPLASTY &/TRANSPOS MEDIAN NRV CARPAL TUNNE Left 06/30/2021    Procedure: RELEASE CARPAL TUNNEL;  Surgeon: Hank Shirley DO;  Location:  MAIN OR;  Service:  "Orthopedics       Family History   Problem Relation Age of Onset   • Heart disease Mother         hx MI   • Diabetes Mother    • Stroke Mother    • Arthritis Mother    • COPD Father         84 yrs   • Coronary artery disease Father    • Cancer Brother          Medications have been verified.        Objective   /80   Pulse (!) 106   Temp 98.4 °F (36.9 °C) (Tympanic)   Resp 16   Ht 5' 4\" (1.626 m)   Wt 92.7 kg (204 lb 6.4 oz)   SpO2 98%   BMI 35.09 kg/m²        Physical Exam     Physical Exam  Vitals reviewed.   Constitutional:       General: He is not in acute distress.     Appearance: He is normal weight.   HENT:      Right Ear: Hearing, tympanic membrane, ear canal and external ear normal.      Left Ear: Hearing, tympanic membrane, ear canal and external ear normal.   Musculoskeletal:      Left hand: Tenderness (flexor tendon sheath left palm) present. Decreased range of motion (4th digit).   Neurological:      General: No focal deficit present.      Mental Status: He is alert.   Psychiatric:         Mood and Affect: Mood and affect normal.         Behavior: Behavior normal.             Results:  Lab Results   Component Value Date    SODIUM 135 08/19/2023    K 4.2 08/19/2023     08/19/2023    CO2 25 08/19/2023    BUN 15 08/19/2023    CREATININE 1.06 08/19/2023    GLUC 177 (H) 08/19/2023    CALCIUM 8.6 08/19/2023       Lab Results   Component Value Date    HGBA1C 7.0 (A) 10/20/2023       Lab Results   Component Value Date    WBC 9.24 08/18/2023    HGB 13.1 08/18/2023    HCT 39.9 08/18/2023    MCV 89 08/18/2023     (L) 08/18/2023         "

## 2023-12-22 ENCOUNTER — TELEPHONE (OUTPATIENT)
Dept: FAMILY MEDICINE CLINIC | Facility: CLINIC | Age: 73
End: 2023-12-22

## 2023-12-22 NOTE — TELEPHONE ENCOUNTER
Patient stopped by the office to drop off his  Diabetic waiver to be filled out, scanned into his chart and put in PCP's folder up front. Please call Patient when finished [phone]

## 2023-12-26 NOTE — TELEPHONE ENCOUNTER
Forms completed. Called patient to notify.     Given to MR to scan in chart.     Patient will stop by tomorrow to .

## 2024-01-02 ENCOUNTER — TELEPHONE (OUTPATIENT)
Age: 74
End: 2024-01-02

## 2024-01-02 NOTE — TELEPHONE ENCOUNTER
Caller: Patient    Doctor: Dionisio    Reason for call: Patient had trigger finger sx today. Questioned if he should have taken antibiotics prior to sx? Please contact patient to advise    Call back#: [phone]

## 2024-01-02 NOTE — TELEPHONE ENCOUNTER
Called and spoke w/pt and relayed GIANNA Lynn's msg.  Pt states understands and not further questions/concerns at this time.

## 2024-01-03 LAB
LEFT EYE DIABETIC RETINOPATHY: NORMAL
RIGHT EYE DIABETIC RETINOPATHY: NORMAL

## 2024-01-11 DIAGNOSIS — E78.00 HYPERCHOLESTEREMIA: ICD-10-CM

## 2024-01-11 RX ORDER — SIMVASTATIN 20 MG
20 TABLET ORAL
Qty: 90 TABLET | Refills: 3 | Status: SHIPPED | OUTPATIENT
Start: 2024-01-11

## 2024-01-11 NOTE — TELEPHONE ENCOUNTER
Patient requesting refill(s) of: Zocor    Last filled: 11/23/22  Last appt: 12/20/23  Next appt: 4/25/24  [facility]

## 2024-01-25 DIAGNOSIS — Z47.1 AFTERCARE FOLLOWING LEFT KNEE JOINT REPLACEMENT SURGERY: Primary | ICD-10-CM

## 2024-01-25 DIAGNOSIS — Z96.652 AFTERCARE FOLLOWING LEFT KNEE JOINT REPLACEMENT SURGERY: Primary | ICD-10-CM

## 2024-02-08 ENCOUNTER — HOSPITAL ENCOUNTER (OUTPATIENT)
Dept: RADIOLOGY | Facility: HOSPITAL | Age: 74
Discharge: HOME | End: 2024-02-08
Attending: ORTHOPAEDIC SURGERY
Payer: MEDICARE

## 2024-02-08 ENCOUNTER — HOSPITAL ENCOUNTER (OUTPATIENT)
Dept: RADIOLOGY | Facility: HOSPITAL | Age: 74
Discharge: HOME | End: 2024-02-08
Attending: ORTHOPAEDIC SURGERY

## 2024-02-08 ENCOUNTER — HOSPITAL ENCOUNTER (OUTPATIENT)
Dept: RADIOLOGY | Facility: HOSPITAL | Age: 74
Discharge: HOME | End: 2024-02-08
Payer: MEDICARE

## 2024-02-08 ENCOUNTER — OFFICE VISIT (OUTPATIENT)
Dept: OBGYN CLINIC | Facility: HOSPITAL | Age: 74
End: 2024-02-08
Payer: MEDICARE

## 2024-02-08 VITALS
HEIGHT: 64 IN | BODY MASS INDEX: 35.09 KG/M2 | HEART RATE: 103 BPM | SYSTOLIC BLOOD PRESSURE: 148 MMHG | DIASTOLIC BLOOD PRESSURE: 88 MMHG

## 2024-02-08 DIAGNOSIS — Z96.652 AFTERCARE FOLLOWING LEFT KNEE JOINT REPLACEMENT SURGERY: ICD-10-CM

## 2024-02-08 DIAGNOSIS — Z47.1 AFTERCARE FOLLOWING LEFT KNEE JOINT REPLACEMENT SURGERY: ICD-10-CM

## 2024-02-08 DIAGNOSIS — Z98.890 HISTORY OF ARTHROSCOPY OF RIGHT KNEE: ICD-10-CM

## 2024-02-08 DIAGNOSIS — Z98.890 S/P LEFT KNEE ARTHROSCOPY: Primary | ICD-10-CM

## 2024-02-08 DIAGNOSIS — M25.561 CHRONIC PAIN OF RIGHT KNEE: ICD-10-CM

## 2024-02-08 DIAGNOSIS — G89.29 CHRONIC PAIN OF RIGHT KNEE: ICD-10-CM

## 2024-02-08 PROCEDURE — 73560 X-RAY EXAM OF KNEE 1 OR 2: CPT

## 2024-02-08 PROCEDURE — 73562 X-RAY EXAM OF KNEE 3: CPT

## 2024-02-08 PROCEDURE — 99213 OFFICE O/P EST LOW 20 MIN: CPT | Performed by: ORTHOPAEDIC SURGERY

## 2024-02-08 NOTE — PROGRESS NOTES
Assessment:  1. S/P left knee arthroscopy        2. Aftercare following left knee joint replacement surgery        3. Chronic pain of right knee  XR knee 3 vw right non injury      4. History of arthroscopy of right knee            Plan:  Approximately 6 months status post left total knee arthroplasty with robot, DOS 8/17/2023, right knee osteoarthritis   The patient's x-rays were reviewed today.  The patient is doing well overall.  The patient has no restrictions at this time.  The patient is encouraged to utilize a large cushion pad while kneeling if he is able to kneel.  Continue with gentle range of motion and home exercise plan.  I will see the patient back in approximately 5 months for possible right knee corticosteroid injection prior to vacation.       To do next visit:  Return in about 5 months (around 7/8/2024) for f/u, R, Knee, Possible CSI.    The above stated was discussed in layman's terms and the patient expressed understanding.  All questions were answered to the patient's satisfaction.       Scribe Attestation    I,:  Wendy Tony am acting as a scribe while in the presence of the attending physician.:       I,:  Denton Nichole MD personally performed the services described in this documentation    as scribed in my presence.:             Subjective:   Jason Miguel is a 73 y.o. male who presents approximately 6 months status post left total knee arthroplasty performed with the robot on 8/17/2023.  Patient reports to the office today for his 6-month postoperative appointment. The patient has occasional aching in his knee. He also reports it clicks. He also notes difficulty with kneeling. Other than that, he is very pleased with his knee. The patient notes an increase in right knee symptoms, however this is sporadic and not constant.  The patient has pain after prolonged sitting and when going up steps.  He has a history of right knee arthroscopy with medial menisectomy, chondroplasty,  synovectomy injection performed on 5/23/22. The patient last received an injection on 9/26/22. He does not wish to have an injection today as he is not having daily pain.  The patient has a vacation planned at the end of July and worries about right knee pain at that time.       Review of systems negative unless otherwise specified in HPI    Past Medical History:   Diagnosis Date   • Arthritis    • Colon polyp    • COVID 09/07/2021    and 12/2022   • Diabetes mellitus (HCC)    • Dyslipidemia    • Fallen arches    • GERD (gastroesophageal reflux disease)    • Hyperlipidemia    • Nodule of left lung 2007    negative for CA   • Type 2 diabetes mellitus without complication, without long-term current use of insulin (HCC) 01/23/2019   • Use of cane as ambulatory aid        Past Surgical History:   Procedure Laterality Date   • COLONOSCOPY     • ELBOW SURGERY Bilateral    • FOOT TENDON SURGERY Bilateral     heel   • HAND SURGERY     • RI ARTHRP KNE CONDYLE&PLATU MEDIAL&LAT COMPARTMENTS Left 8/17/2023    Procedure: ARTHROPLASTY KNEE TOTAL W ROBOT;  Surgeon: Denton Nichole MD;  Location:  MAIN OR;  Service: Orthopedics   • RI ARTHRS KNE SURG W/MENISCECTOMY MED/LAT W/SHVG Right 05/23/2022    Procedure: KNEE ARTHROSCOPY WITH medial MENISCECTOMY chondroplasty synovectomy injection;  Surgeon: Hank Shirley DO;  Location: [facility];  Service: Orthopedics   • RI ARTHRS KNE SURG W/MENISCECTOMY MED/LAT W/SHVG Left 2/6/2023    Procedure: Left knee arthroscopy: Medial meniscectomy; Lateral meniscectomy; Chondroplasty; Synovectomy; Post arthroscopic injection of intra-articular joint space and peripheral portal;  Surgeon: Hank Shirley DO;  Location: [facility];  Service: Orthopedics   • RI NEUROPLASTY &/TRANSPOS MEDIAN NRV CARPAL TUNNE Left 06/30/2021    Procedure: RELEASE CARPAL TUNNEL;  Surgeon: Hank Shirley DO;  Location:  MAIN OR;  Service: Orthopedics       Family History   Problem Relation Age of Onset    • Heart disease Mother         hx MI   • Diabetes Mother    • Stroke Mother    • Arthritis Mother    • COPD Father         84 yrs   • Coronary artery disease Father    • Cancer Brother        Social History     Occupational History   • Not on file   Tobacco Use   • Smoking status: Former     Current packs/day: 0.00     Average packs/day: 1 pack/day for 35.0 years (35.0 ttl pk-yrs)     Types: Cigarettes     Start date:      Quit date:      Years since quittin.1   • Smokeless tobacco: Never   Vaping Use   • Vaping status: Never Used   Substance and Sexual Activity   • Alcohol use: Yes     Comment: 2 x per week   • Drug use: No   • Sexual activity: Yes     Partners: Female     Birth control/protection: Male Sterilization         Current Outpatient Medications:   •  metFORMIN (GLUCOPHAGE) 500 mg tablet, TAKE 1 TABLET TWICE DAILY  WITH MEALS, Disp: 180 tablet, Rfl: 3  •  phentermine (ADIPEX-P) 37.5 MG tablet, Take 1 tablet (37.5 mg total) by mouth daily, Disp: 30 tablet, Rfl: 2  •  simvastatin (ZOCOR) 20 mg tablet, Take 1 tablet (20 mg total) by mouth daily at bedtime, Disp: 90 tablet, Rfl: 3  •  tamsulosin (FLOMAX) 0.4 mg, Take 2 capsules (0.8 mg total) by mouth daily with dinner, Disp: 180 capsule, Rfl: 3    No Known Allergies         Vitals:    24 1404   BP: 148/88   Pulse: 103       Objective:  Physical exam  General: Awake, Alert, Oriented  Eyes: Pupils equal, round and reactive to light  Heart: regular rate and rhythm  Lungs: No audible wheezing  Abdomen: soft                    Ortho Exam  Left Knee:   Surgical incision is healing well for the postoperative course.    There is no erythema or drainage present.    There is a normal postoperative effusion.    TTP none  Range of motion from 0 to 120.    The patient is able to perform a straight leg raise.    Stable to valgus and varus stress    Calf is soft and nontender.   Extremity is warm and well perfused.   Toes are pink and mobile  Sensation  "is intact.    Brisk capillary refill  The patient is neurovascular intact distally.  The patient ambulates without aid.       Diagnostics, reviewed and taken today if performed as documented:      The attending physician has personally reviewed the pertinent films in PACS and interpretation is as follows:    X-rays taken 2/8/2024 of left knee demonstrate orthopedic hardware in stable alignment without evidence of loosening or failure.  No acute fracture.     X-rays taken 2/8/2024 of right knee demonstrate tricompartmental degenerative changes.  Moderate to severe degenerative changes at the medial tibiofemoral and patellofemoral joint space.  No acute fracture.    Procedures, if performed today:    Procedures    None performed      Portions of the record may have been created with voice recognition software.  Occasional wrong word or \"sound a like\" substitutions may have occurred due to the inherent limitations of voice recognition software.  Read the chart carefully and recognize, using context, where substitutions have occurred.    "

## 2024-02-19 ENCOUNTER — OFFICE VISIT (OUTPATIENT)
Dept: FAMILY MEDICINE CLINIC | Facility: CLINIC | Age: 74
End: 2024-02-19
Payer: MEDICARE

## 2024-02-19 VITALS
WEIGHT: 204 LBS | HEART RATE: 96 BPM | RESPIRATION RATE: 18 BRPM | DIASTOLIC BLOOD PRESSURE: 72 MMHG | BODY MASS INDEX: 34.83 KG/M2 | TEMPERATURE: 97.9 F | SYSTOLIC BLOOD PRESSURE: 148 MMHG | OXYGEN SATURATION: 96 % | HEIGHT: 64 IN

## 2024-02-19 DIAGNOSIS — J06.9 VIRAL UPPER RESPIRATORY TRACT INFECTION: Primary | ICD-10-CM

## 2024-02-19 DIAGNOSIS — J02.9 SORE THROAT: ICD-10-CM

## 2024-02-19 DIAGNOSIS — R05.1 ACUTE COUGH: ICD-10-CM

## 2024-02-19 DIAGNOSIS — J06.9 VIRAL UPPER RESPIRATORY TRACT INFECTION: ICD-10-CM

## 2024-02-19 PROBLEM — E11.9 TYPE 2 DIABETES MELLITUS WITHOUT COMPLICATION, WITHOUT LONG-TERM CURRENT USE OF INSULIN (HCC): Status: RESOLVED | Noted: 2021-11-30 | Resolved: 2024-02-19

## 2024-02-19 PROBLEM — E66.01 CLASS 2 SEVERE OBESITY DUE TO EXCESS CALORIES WITH SERIOUS COMORBIDITY AND BODY MASS INDEX (BMI) OF 35.0 TO 35.9 IN ADULT: Status: RESOLVED | Noted: 2019-07-25 | Resolved: 2024-02-19

## 2024-02-19 PROBLEM — E66.812 CLASS 2 SEVERE OBESITY DUE TO EXCESS CALORIES WITH SERIOUS COMORBIDITY AND BODY MASS INDEX (BMI) OF 35.0 TO 35.9 IN ADULT (HCC): Status: RESOLVED | Noted: 2019-07-25 | Resolved: 2024-02-19

## 2024-02-19 PROCEDURE — 87636 SARSCOV2 & INF A&B AMP PRB: CPT | Performed by: INTERNAL MEDICINE

## 2024-02-19 PROCEDURE — 99213 OFFICE O/P EST LOW 20 MIN: CPT | Performed by: INTERNAL MEDICINE

## 2024-02-19 RX ORDER — DEXTROMETHORPHAN HYDROBROMIDE AND PROMETHAZINE HYDROCHLORIDE 15; 6.25 MG/5ML; MG/5ML
5 SYRUP ORAL 4 TIMES DAILY PRN
Qty: 240 ML | Refills: 0 | Status: SHIPPED | OUTPATIENT
Start: 2024-02-19

## 2024-02-19 NOTE — PROGRESS NOTES
[facility]        NAME: Jason Miguel is a 73 y.o. male  : 1950    MRN: [id number]  DATE: 2024  TIME: 1:26 PM    Assessment and Plan   1. Viral upper respiratory tract infection  -     Covid/Flu- Office Collect Normal; Future  -     promethazine-dextromethorphan (PHENERGAN-DM) 6.25-15 mg/5 mL oral syrup; Take 5 mL by mouth 4 (four) times a day as needed for cough    2. Acute cough  -     Covid/Flu- Office Collect Normal; Future  -     promethazine-dextromethorphan (PHENERGAN-DM) 6.25-15 mg/5 mL oral syrup; Take 5 mL by mouth 4 (four) times a day as needed for cough    3. Sore throat  -     Covid/Flu- Office Collect Normal; Future  -     promethazine-dextromethorphan (PHENERGAN-DM) 6.25-15 mg/5 mL oral syrup; Take 5 mL by mouth 4 (four) times a day as needed for cough             Chief Complaint     Chief Complaint   Patient presents with   • Cold Like Symptoms     Pt state he had a cold for over a week. Symptoms: cough, nasal congestion, and mucus mainly clear.          History of Present Illness       74yo male here for acute visit due to URI. Symptoms began a week ago, with rhinorrhea, congestion, sore throat and cough. No fever, chills, chest tightness or SOB. Multiple family members also sick recently. Taking Mucinex. Had to reschedule his colonoscopy due to symptoms.     Reports some stiffness and swelling in left hand for past few months. No weakness or paresthesia.         Review of Systems   Review of Systems   Constitutional:  Negative for appetite change, chills, fatigue and fever.   HENT:  Positive for congestion, postnasal drip, rhinorrhea, sinus pressure and sore throat.    Respiratory:  Positive for cough and chest tightness. Negative for shortness of breath.          Current Medications       Current Outpatient Medications:   •  metFORMIN (GLUCOPHAGE) 500 mg tablet, TAKE 1 TABLET TWICE DAILY  WITH MEALS, Disp: 180 tablet, Rfl: 3  •  phentermine  (ADIPEX-P) 37.5 MG tablet, Take 1 tablet (37.5 mg total) by mouth daily, Disp: 30 tablet, Rfl: 2  •  promethazine-dextromethorphan (PHENERGAN-DM) 6.25-15 mg/5 mL oral syrup, Take 5 mL by mouth 4 (four) times a day as needed for cough, Disp: 240 mL, Rfl: 0  •  simvastatin (ZOCOR) 20 mg tablet, Take 1 tablet (20 mg total) by mouth daily at bedtime, Disp: 90 tablet, Rfl: 3  •  tamsulosin (FLOMAX) 0.4 mg, Take 2 capsules (0.8 mg total) by mouth daily with dinner, Disp: 180 capsule, Rfl: 3    Current Allergies     Allergies as of 02/19/2024   • (No Known Allergies)            The following portions of the patient's history were reviewed and updated as appropriate: allergies, current medications, past family history, past medical history, past social history, past surgical history and problem list.     Past Medical History:   Diagnosis Date   • Arthritis    • Colon polyp    • COVID 09/07/2021    and 12/2022   • Diabetes mellitus (HCC)    • Dyslipidemia    • Fallen arches    • GERD (gastroesophageal reflux disease)    • Hyperlipidemia    • Nodule of left lung 2007    negative for CA   • Type 2 diabetes mellitus without complication, without long-term current use of insulin (HCC) 01/23/2019   • Use of cane as ambulatory aid        Past Surgical History:   Procedure Laterality Date   • COLONOSCOPY     • ELBOW SURGERY Bilateral    • FOOT TENDON SURGERY Bilateral     heel   • HAND SURGERY     • MT ARTHRP KNE CONDYLE&PLATU MEDIAL&LAT COMPARTMENTS Left 8/17/2023    Procedure: ARTHROPLASTY KNEE TOTAL W ROBOT;  Surgeon: Denton Nichole MD;  Location:  MAIN OR;  Service: Orthopedics   • MT ARTHRS KNE SURG W/MENISCECTOMY MED/LAT W/SHVG Right 05/23/2022    Procedure: KNEE ARTHROSCOPY WITH medial MENISCECTOMY chondroplasty synovectomy injection;  Surgeon: Hank Shirley DO;  [facility]: [facility];  Service: Orthopedics   • MT ARTHRS KNE SURG W/MENISCECTOMY MED/LAT W/SHVG Left 2/6/2023    Procedure: Left knee arthroscopy:  "Medial meniscectomy; Lateral meniscectomy; Chondroplasty; Synovectomy; Post arthroscopic injection of intra-articular joint space and peripheral portal;  Surgeon: Hank Shirley DO;  [facility];  Service: Orthopedics   • MS NEUROPLASTY &/TRANSPOS MEDIAN NRV CARPAL TUNNE Left 06/30/2021    Procedure: RELEASE CARPAL TUNNEL;  Surgeon: Hank Shirley DO;  Location:  MAIN OR;  Service: Orthopedics       Family History   Problem Relation Age of Onset   • Heart disease Mother         hx MI   • Diabetes Mother    • Stroke Mother    • Arthritis Mother    • COPD Father         84 yrs   • Coronary artery disease Father    • Cancer Brother          Medications have been verified.        Objective   /72   Pulse 96   Temp 97.9 °F (36.6 °C)   Resp 18   Ht 5' 4\" (1.626 m)   Wt 92.5 kg (204 lb)   SpO2 96%   BMI 35.02 kg/m²        Physical Exam     Physical Exam  Vitals reviewed.   Constitutional:       General: He is not in acute distress.     Appearance: He is normal weight.   HENT:      Head: Normocephalic and atraumatic.      Right Ear: Tympanic membrane, ear canal and external ear normal.      Left Ear: Tympanic membrane, ear canal and external ear normal.      Mouth/Throat:      Pharynx: Posterior oropharyngeal erythema present. No oropharyngeal exudate.   Cardiovascular:      Rate and Rhythm: Normal rate and regular rhythm.      Heart sounds: No murmur heard.     No friction rub. No gallop.   Pulmonary:      Effort: Pulmonary effort is normal. No respiratory distress.      Breath sounds: No wheezing, rhonchi or rales.   Lymphadenopathy:      Cervical: No cervical adenopathy.   Neurological:      General: No focal deficit present.      Mental Status: He is alert.   Psychiatric:         Mood and Affect: Mood normal.         Behavior: Behavior normal.             Results:  Lab Results   Component Value Date    SODIUM 135 08/19/2023    K 4.2 08/19/2023     08/19/2023    CO2 25 08/19/2023    BUN 15 " 08/19/2023    CREATININE 1.06 08/19/2023    GLUC 177 (H) 08/19/2023    CALCIUM 8.6 08/19/2023       Lab Results   Component Value Date    HGBA1C 7.0 (A) 10/20/2023       Lab Results   Component Value Date    WBC 9.24 08/18/2023    HGB 13.1 08/18/2023    HCT 39.9 08/18/2023    MCV 89 08/18/2023     (L) 08/18/2023

## 2024-02-20 LAB
FLUAV RNA RESP QL NAA+PROBE: NEGATIVE
FLUBV RNA RESP QL NAA+PROBE: NEGATIVE
SARS-COV-2 RNA RESP QL NAA+PROBE: POSITIVE

## 2024-02-21 ENCOUNTER — OFFICE VISIT (OUTPATIENT)
Dept: PODIATRY | Facility: CLINIC | Age: 74
End: 2024-02-21
Payer: MEDICARE

## 2024-02-21 VITALS
WEIGHT: 204 LBS | SYSTOLIC BLOOD PRESSURE: 133 MMHG | DIASTOLIC BLOOD PRESSURE: 71 MMHG | HEART RATE: 112 BPM | BODY MASS INDEX: 34.83 KG/M2 | HEIGHT: 64 IN

## 2024-02-21 DIAGNOSIS — M79.89 LEG SWELLING: ICD-10-CM

## 2024-02-21 DIAGNOSIS — B35.1 ONYCHOMYCOSIS: Primary | ICD-10-CM

## 2024-02-21 DIAGNOSIS — E11.9 TYPE 2 DIABETES MELLITUS WITHOUT COMPLICATION, WITHOUT LONG-TERM CURRENT USE OF INSULIN (HCC): ICD-10-CM

## 2024-02-21 PROCEDURE — 11721 DEBRIDE NAIL 6 OR MORE: CPT | Performed by: STUDENT IN AN ORGANIZED HEALTH CARE EDUCATION/TRAINING PROGRAM

## 2024-02-21 PROCEDURE — 99213 OFFICE O/P EST LOW 20 MIN: CPT | Performed by: STUDENT IN AN ORGANIZED HEALTH CARE EDUCATION/TRAINING PROGRAM

## 2024-02-21 NOTE — PROGRESS NOTES
Assessment/Plan:    No problem-specific Assessment & Plan notes found for this encounter.       Diagnoses and all orders for this visit:    Onychomycosis    Type 2 diabetes mellitus without complication, without long-term current use of insulin (HCC)    Leg swelling      Plan:     1. A thorough neurovascular exam was performed. Patient presents for at-risk foot care.  Patient has no acute concerns today.  Patient has significant lower extremity risk due to neuropathy, parasthesia, edema, and trophic skin changes to the lower extremity. Patient has Q9  findings and is recommended for at risk foot care every 3 months.     2. All 10 toenails were debridement as follow: using nail nipper, cory, and curette, nails were sharply debrided, reduced in thickness and length. Devitalized nail tissue and fungal debris excised and removed. Patient tolerated well.       3. Patient was educated on importance of glycemic control, daily foot assessment and proper shoe gear. Patient will follow up annually for diabetic foot risk assessment.  Recommended patient to wear compression stockings swelling from possibly total knee replacement.     4. Return in 10-12 weeks.     5.  Recent PCP notes reviewed, recent blood work reviewed    Lab Results   Component Value Date    HGBA1C 7.0 (A) 10/20/2023           Subjective:      Patient ID: Jason Miguel is a 73 y.o. male.    HPI:  Jason Miguel is a 73 y.o. male who presents with painful, elongated toenails.  They have difficulty applying their socks and shoes due to the elongation of the nails. The pressure within their shoe gear is painful and they have been unable to cut their nails adequately. Patient states pain is 1/10 in shoe gear. Pain with pressure. Requires at risk foot care.  No other complaints.          The following portions of the patient's history were reviewed and updated as appropriate: He  has a past medical history of Arthritis, Colon polyp, COVID (09/07/2021),  "Diabetes mellitus (HCC), Dyslipidemia, Fallen arches, GERD (gastroesophageal reflux disease), Hyperlipidemia, Nodule of left lung (2007), Type 2 diabetes mellitus without complication, without long-term current use of insulin (HCC) (01/23/2019), and Use of cane as ambulatory aid.  He   Patient Active Problem List    Diagnosis Date Noted    Primary osteoarthritis of left knee 07/17/2023    Tear of medial meniscus of right knee, current 05/23/2022    Carpal tunnel syndrome on right     Carpal tunnel syndrome on left     Hypercholesteremia 08/15/2018    High blood pressure 08/15/2018    Dyspnea 08/15/2018     He  has a past surgical history that includes Foot Tendon Surgery (Bilateral); Elbow surgery (Bilateral); Colonoscopy; pr neuroplasty &/transpos median nrv carpal tunne (Left, 06/30/2021); pr arthrs kne surg w/meniscectomy med/lat w/shvg (Right, 05/23/2022); Hand surgery; pr arthrs kne surg w/meniscectomy med/lat w/shvg (Left, 2/6/2023); and pr arthrp kne condyle&platu medial&lat compartments (Left, 8/17/2023).  Current Outpatient Medications   Medication Sig Dispense Refill    metFORMIN (GLUCOPHAGE) 500 mg tablet TAKE 1 TABLET TWICE DAILY  WITH MEALS 180 tablet 3    phentermine (ADIPEX-P) 37.5 MG tablet Take 1 tablet (37.5 mg total) by mouth daily 30 tablet 2    promethazine-dextromethorphan (PHENERGAN-DM) 6.25-15 mg/5 mL oral syrup Take 5 mL by mouth 4 (four) times a day as needed for cough 240 mL 0    simvastatin (ZOCOR) 20 mg tablet Take 1 tablet (20 mg total) by mouth daily at bedtime 90 tablet 3    tamsulosin (FLOMAX) 0.4 mg Take 2 capsules (0.8 mg total) by mouth daily with dinner 180 capsule 3     No current facility-administered medications for this visit.   .    Review of Systems   All other systems reviewed and are negative.        Objective:      /71   Pulse (!) 112   Ht 5' 4\" (1.626 m)   Wt 92.5 kg (204 lb)   BMI 35.02 kg/m²          Physical Exam  Vitals reviewed.   Cardiovascular:      " Pulses: Pulses are weak.           Dorsalis pedis pulses are 2+ on the right side and 2+ on the left side.        Posterior tibial pulses are 1+ on the right side and 1+ on the left side.   Feet:      Right foot:      Protective Sensation: 10 sites tested.  7 sites sensed.      Skin integrity: Dry skin present.      Toenail Condition: Right toenails are abnormally thick and long. Fungal disease present.     Left foot:      Protective Sensation: 10 sites tested.  9 sites sensed.      Skin integrity: Dry skin present.      Toenail Condition: Left toenails are abnormally thick and long. Fungal disease present.     Comments: On exam patient has thickened, hypertrophic, discolored, brittle toenails with subungual debris and tenderness x10.  Patient has lower extremity edema.   Patients skin is atrophic, thickened nails, and decreased pedal hair. Patient has decreased pinprick and vibratory sensation to feet and parasthesia.           Diabetic Foot Exam    Patient's shoes and socks removed.    Right Foot/Ankle   Right Foot Inspection  Skin Exam: dry skin.     Toe Exam: ROM and strength within normal limits.     Sensory   Vibration: intact  Proprioception: intact  Monofilament testing: diminished    Vascular  The right DP pulse is 2+. The right PT pulse is 1+.     Left Foot/Ankle  Left Foot Inspection  Skin Exam: dry skin.     Toe Exam: ROM and strength within normal limits and swelling.     Sensory   Vibration: intact  Proprioception: intact  Monofilament testing: diminished    Vascular  The left DP pulse is 2+. The left PT pulse is 1+.     Assign Risk Category  No deformity present  Loss of protective sensation  Weak pulses  Risk: 2

## 2024-03-07 DIAGNOSIS — E11.9 TYPE 2 DIABETES MELLITUS WITHOUT COMPLICATION, WITHOUT LONG-TERM CURRENT USE OF INSULIN (HCC): Primary | ICD-10-CM

## 2024-03-24 NOTE — PRE-PROCEDURE INSTRUCTIONS
[facility] - Labor and Delivery   Section   Operative Note     SUMMARY      Date of Procedure: 3/24/2024     Procedure: Procedure(s) (LRB):   SECTION (N/A)     Surgeon(s) and Role:     * James Hernandez MD - Primary             PREOPERATIVE DIAGNOSIS:    History of previous  delivery  Active labor  POSTOPERATIVE DIAGNOSIS:       1. Term IUP  2. Status post repeat lower transverse       EBL: 500ml     OPERATION:       1. repeat low transverse .        Complications:  none     Findings: viable female infant.   Normal-appearing uterus tubes and ovaries.    Anesthesia:  spinal     Assist: Dr. REIKA Reyes, who whose assistance was essential during all the key portions of this repeat procedure.     PROCEDURE IN DETAIL:  After informed consent was verified the patient was taken to the operating room with IV fluid running.  2 grams of Ancef was given prior to the case.  Spinal anesthesia was administered after which she was prepped and draped in dorsal supine position with leftward tilt.  After adequate anesthesia was verified a Pfannenstiel skin incision was made with a scalpel and carried through to the underlying layer of fascia sharply.  The fascial incision was extended laterally with curved Barboza scissors.  The superior aspect of the fascial incision was grasped with Brittani clamps x two and dissected off the underlying rectus muscles both bluntly and sharply.  The rectus muscles were  in the midline.  The peritoneum was  then entered sharply with Metzenbaum scissors.  The peritoneal incision was then extended laterally also with digits.  The bladder blade was then placed.  Vesical peritoneum was developed and entered sharply with Metzenbaum scissors.  Bladder blade was then replaced retracting the bladder away from the lower uterine segment.  The lower uterine segment of the uterus was incised in a transverse fashion with the scalpel.  The uterine  Pre-Surgery Instructions:   Medication Instructions   • acetaminophen (TYLENOL) 500 mg tablet Uses PRN- OK to take day of surgery   • meloxicam (Mobic) 15 mg tablet Stop taking 3 days prior to surgery  • metFORMIN (GLUCOPHAGE) 500 mg tablet Hold day of surgery  • Promethazine-DM (PHENERGAN-DM) 6 25-15 mg/5 mL oral syrup Uses PRN- OK to take day of surgery   • simvastatin (ZOCOR) 20 mg tablet Take night before surgery   • tamsulosin (FLOMAX) 0 4 mg Take night before surgery   Covid screening negative as per patient  Reviewed pre op medicine and showering instructions with patient via phone call, verbalizes understanding  Advised patient to stop taking non prescribed vitamins, herbal meds and NSAID's as of 1/31  Advised may take Tylenol products if needed  Advised to take DOS medicine with a small sip water  Advised NPO after MN prior to surgery and surgical services will call (2/3) with scheduled time of hospital arrival     Advised to notify surgeon's office of any change in health status from now until surgery  Pt verbalized understanding of all instructions  Patient not interested in referral to sleep medicine  incision was then extended laterally with digits.  The infant's head was delivered with vacuum-assist and fundal pressure. No pop-offs.  Oropharynx and nares were bulb suctioned.  The cord was doubly clamped and cut.  The infant was then handed off to awaiting attendants.  The placenta was delivered spontaneously.  The uterus was then exteriorized and cleared of all clots and debris.  Using Luis clamps, the endopelvic facia of the lower uterine segment was carefully dissected away from the bladder. The uterine incision was then closed with 0 Vicryl in running locking fashion.  A figure of 8 suture with 0-Vicryl was placed near the left margin to further ensure hemostasis.     The posterior cul-de-sac was irrigated.  The uterus was replaced in the abdomen and the uterine incision noted to be hemostatic.  The peritoneum was closed with 2-0 Vicryl in running fashion.  The fascia was reapproximated with 0 Vicryl in running fashion.  The subcutaneous tissue was reapproximated with 2-0 plain gut in running fashion.  The skin was closed InSorb in subcuticular fashion.  The skin was then sealed with Dermabond skin adhesive.  Sponge, lap and needle counts were correct x three.  There were no complications.          Complications: No        Condition: Good     Disposition: PACU - hemodynamically stable.

## 2024-04-04 DIAGNOSIS — E11.9 TYPE 2 DIABETES MELLITUS WITHOUT COMPLICATION, WITHOUT LONG-TERM CURRENT USE OF INSULIN (HCC): Primary | ICD-10-CM

## 2024-04-04 NOTE — TELEPHONE ENCOUNTER
----- Message from DREA Morin sent at 4/4/2024  2:44 PM EDT -----  Regarding: FW: Weight loss  Contact: [phone]  Please send me 0.5mg weekly to [facility] for approval      ----- Message -----  From: Sabi Emerson  Sent: 4/4/2024   2:14 PM EDT  To: DREA Morin  Subject: FW: Weight loss                                    ----- Message -----  From: Jason Miguel  Sent: 4/4/2024   2:02 PM EDT  To: [facility]  Subject: Weight loss                                      .25 mg

## 2024-04-09 ENCOUNTER — APPOINTMENT (OUTPATIENT)
Dept: LAB | Facility: CLINIC | Age: 74
End: 2024-04-09
Payer: MEDICARE

## 2024-04-09 DIAGNOSIS — Z12.5 PROSTATE CANCER SCREENING: ICD-10-CM

## 2024-04-09 DIAGNOSIS — E11.9 TYPE 2 DIABETES MELLITUS WITHOUT COMPLICATION, WITHOUT LONG-TERM CURRENT USE OF INSULIN (HCC): ICD-10-CM

## 2024-04-09 DIAGNOSIS — E78.00 HYPERCHOLESTEREMIA: ICD-10-CM

## 2024-04-09 LAB
ALBUMIN SERPL BCP-MCNC: 4.5 G/DL (ref 3.5–5)
ALP SERPL-CCNC: 52 U/L (ref 34–104)
ALT SERPL W P-5'-P-CCNC: 19 U/L (ref 7–52)
ANION GAP SERPL CALCULATED.3IONS-SCNC: 8 MMOL/L (ref 4–13)
AST SERPL W P-5'-P-CCNC: 16 U/L (ref 13–39)
BASOPHILS # BLD AUTO: 0.05 THOUSANDS/ÂΜL (ref 0–0.1)
BASOPHILS NFR BLD AUTO: 1 % (ref 0–1)
BILIRUB SERPL-MCNC: 0.4 MG/DL (ref 0.2–1)
BUN SERPL-MCNC: 15 MG/DL (ref 5–25)
CALCIUM SERPL-MCNC: 9.2 MG/DL (ref 8.4–10.2)
CHLORIDE SERPL-SCNC: 102 MMOL/L (ref 96–108)
CHOLEST SERPL-MCNC: 110 MG/DL
CO2 SERPL-SCNC: 27 MMOL/L (ref 21–32)
CREAT SERPL-MCNC: 0.95 MG/DL (ref 0.6–1.3)
CREAT UR-MCNC: 59.1 MG/DL
EOSINOPHIL # BLD AUTO: 0.23 THOUSAND/ÂΜL (ref 0–0.61)
EOSINOPHIL NFR BLD AUTO: 4 % (ref 0–6)
ERYTHROCYTE [DISTWIDTH] IN BLOOD BY AUTOMATED COUNT: 13.9 % (ref 11.6–15.1)
EST. AVERAGE GLUCOSE BLD GHB EST-MCNC: 157 MG/DL
GFR SERPL CREATININE-BSD FRML MDRD: 79 ML/MIN/1.73SQ M
GLUCOSE P FAST SERPL-MCNC: 123 MG/DL (ref 65–99)
HBA1C MFR BLD: 7.1 %
HCT VFR BLD AUTO: 51.7 % (ref 36.5–49.3)
HDLC SERPL-MCNC: 40 MG/DL
HGB BLD-MCNC: 16.7 G/DL (ref 12–17)
IMM GRANULOCYTES # BLD AUTO: 0.02 THOUSAND/UL (ref 0–0.2)
IMM GRANULOCYTES NFR BLD AUTO: 0 % (ref 0–2)
LDLC SERPL CALC-MCNC: 56 MG/DL (ref 0–100)
LYMPHOCYTES # BLD AUTO: 1.69 THOUSANDS/ÂΜL (ref 0.6–4.47)
LYMPHOCYTES NFR BLD AUTO: 28 % (ref 14–44)
MCH RBC QN AUTO: 28.7 PG (ref 26.8–34.3)
MCHC RBC AUTO-ENTMCNC: 32.3 G/DL (ref 31.4–37.4)
MCV RBC AUTO: 89 FL (ref 82–98)
MICROALBUMIN UR-MCNC: <7 MG/L
MICROALBUMIN/CREAT 24H UR: <12 MG/G CREATININE (ref 0–30)
MONOCYTES # BLD AUTO: 0.52 THOUSAND/ÂΜL (ref 0.17–1.22)
MONOCYTES NFR BLD AUTO: 9 % (ref 4–12)
NEUTROPHILS # BLD AUTO: 3.48 THOUSANDS/ÂΜL (ref 1.85–7.62)
NEUTS SEG NFR BLD AUTO: 58 % (ref 43–75)
NONHDLC SERPL-MCNC: 70 MG/DL
NRBC BLD AUTO-RTO: 0 /100 WBCS
PLATELET # BLD AUTO: 181 THOUSANDS/UL (ref 149–390)
PMV BLD AUTO: 10.9 FL (ref 8.9–12.7)
POTASSIUM SERPL-SCNC: 4.8 MMOL/L (ref 3.5–5.3)
PROT SERPL-MCNC: 7 G/DL (ref 6.4–8.4)
PSA SERPL-MCNC: 2.82 NG/ML (ref 0–4)
RBC # BLD AUTO: 5.82 MILLION/UL (ref 3.88–5.62)
SODIUM SERPL-SCNC: 137 MMOL/L (ref 135–147)
TRIGL SERPL-MCNC: 69 MG/DL
WBC # BLD AUTO: 5.99 THOUSAND/UL (ref 4.31–10.16)

## 2024-04-09 PROCEDURE — 83036 HEMOGLOBIN GLYCOSYLATED A1C: CPT

## 2024-04-09 PROCEDURE — 80061 LIPID PANEL: CPT

## 2024-04-09 PROCEDURE — G0103 PSA SCREENING: HCPCS

## 2024-04-09 PROCEDURE — 85025 COMPLETE CBC W/AUTO DIFF WBC: CPT

## 2024-04-09 PROCEDURE — 82043 UR ALBUMIN QUANTITATIVE: CPT

## 2024-04-09 PROCEDURE — 82570 ASSAY OF URINE CREATININE: CPT

## 2024-04-09 PROCEDURE — 80053 COMPREHEN METABOLIC PANEL: CPT

## 2024-04-09 PROCEDURE — 36415 COLL VENOUS BLD VENIPUNCTURE: CPT

## 2024-04-25 ENCOUNTER — OFFICE VISIT (OUTPATIENT)
Dept: FAMILY MEDICINE CLINIC | Facility: CLINIC | Age: 74
End: 2024-04-25
Payer: MEDICARE

## 2024-04-25 VITALS
SYSTOLIC BLOOD PRESSURE: 124 MMHG | WEIGHT: 204 LBS | OXYGEN SATURATION: 98 % | HEART RATE: 84 BPM | RESPIRATION RATE: 18 BRPM | BODY MASS INDEX: 34.83 KG/M2 | DIASTOLIC BLOOD PRESSURE: 60 MMHG | TEMPERATURE: 97.3 F | HEIGHT: 64 IN

## 2024-04-25 DIAGNOSIS — E66.01 CLASS 2 SEVERE OBESITY DUE TO EXCESS CALORIES WITH SERIOUS COMORBIDITY AND BODY MASS INDEX (BMI) OF 35.0 TO 35.9 IN ADULT (HCC): ICD-10-CM

## 2024-04-25 DIAGNOSIS — R94.6 ABNORMAL THYROID FUNCTION TEST: ICD-10-CM

## 2024-04-25 DIAGNOSIS — E78.00 HYPERCHOLESTEREMIA: ICD-10-CM

## 2024-04-25 DIAGNOSIS — E11.9 TYPE 2 DIABETES MELLITUS WITHOUT COMPLICATION, WITHOUT LONG-TERM CURRENT USE OF INSULIN (HCC): Primary | ICD-10-CM

## 2024-04-25 DIAGNOSIS — E66.01 CLASS 2 SEVERE OBESITY DUE TO EXCESS CALORIES WITH SERIOUS COMORBIDITY AND BODY MASS INDEX (BMI) OF 36.0 TO 36.9 IN ADULT (HCC): ICD-10-CM

## 2024-04-25 PROCEDURE — 99214 OFFICE O/P EST MOD 30 MIN: CPT | Performed by: NURSE PRACTITIONER

## 2024-04-25 PROCEDURE — G2211 COMPLEX E/M VISIT ADD ON: HCPCS | Performed by: NURSE PRACTITIONER

## 2024-04-25 NOTE — PROGRESS NOTES
Name: Jason Miguel      : 1950      MRN: [id number]  Encounter Provider: DREA Morin  Encounter Date: 2024   Encounter department: [facility] PRIMARY CARE    Assessment & Plan     1. Type 2 diabetes mellitus without complication, without long-term current use of insulin (HCC)  -     Hemoglobin A1C; Future; Expected date: 2024  -     Comprehensive metabolic panel; Future; Expected date: 2024  -     CBC and differential; Future; Expected date: 2024    2. Hypercholesteremia  -     Lipid Panel with Direct LDL reflex; Future; Expected date: 2024    3. Class 2 severe obesity due to excess calories with serious comorbidity and body mass index (BMI) of 36.0 to 36.9 in adult ([facility])    4. Class 2 severe obesity due to excess calories with serious comorbidity and body mass index (BMI) of 35.0 to 35.9 in adult (HCC)  -     TSH, 3rd generation with Free T4 reflex; Future    5. Abnormal thyroid function test  -     TSH, 3rd generation with Free T4 reflex; Future           Subjective      Here for 6 month medcheck and lab review-  He has been taking Ozempic for over a month, is now on 0.5mg weekly. He is the same weight as when he started and his HgA1c increased from 7.0 to 7.1. Continues to take Metformin 500mg twice daily       Review of Systems   Constitutional:  Negative for activity change, diaphoresis, fatigue and fever.   HENT:  Negative for congestion, facial swelling, hearing loss, rhinorrhea, sinus pressure, sinus pain, sneezing, sore throat and voice change.    Eyes:  Negative for discharge and visual disturbance.   Respiratory:  Negative for cough, choking, chest tightness, shortness of breath, wheezing and stridor.    Cardiovascular:  Negative for chest pain, palpitations and leg swelling.   Gastrointestinal:  Negative for abdominal distention, abdominal pain, constipation, diarrhea, nausea and vomiting.   Endocrine: Negative for polydipsia, polyphagia and  "polyuria.   Genitourinary:  Negative for difficulty urinating, dysuria, frequency and urgency.   Musculoskeletal:  Positive for arthralgias and gait problem. Negative for back pain, joint swelling, myalgias, neck pain and neck stiffness.   Skin:  Negative for color change, rash and wound.   Neurological:  Negative for dizziness, syncope, speech difficulty, weakness, light-headedness and headaches.   Hematological:  Negative for adenopathy. Does not bruise/bleed easily.   Psychiatric/Behavioral:  Negative for agitation, behavioral problems, confusion, hallucinations, sleep disturbance and suicidal ideas. The patient is not nervous/anxious.        Current Outpatient Medications on File Prior to Visit   Medication Sig    metFORMIN (GLUCOPHAGE) 500 mg tablet TAKE 1 TABLET TWICE DAILY  WITH MEALS    phentermine (ADIPEX-P) 37.5 MG tablet Take 1 tablet (37.5 mg total) by mouth daily    semaglutide, 0.25 or 0.5 mg/dose, (Ozempic, 0.25 or 0.5 MG/DOSE,) 2 mg/3 mL injection pen Inject 0.75 mL (0.5 mg total) under the skin every 7 days    simvastatin (ZOCOR) 20 mg tablet Take 1 tablet (20 mg total) by mouth daily at bedtime    tamsulosin (FLOMAX) 0.4 mg Take 2 capsules (0.8 mg total) by mouth daily with dinner    promethazine-dextromethorphan (PHENERGAN-DM) 6.25-15 mg/5 mL oral syrup Take 5 mL by mouth 4 (four) times a day as needed for cough (Patient not taking: Reported on 4/25/2024)       Objective     /60   Pulse 84   Temp (!) 97.3 °F (36.3 °C)   Resp 18   Ht 5' 4\" (1.626 m)   Wt 92.5 kg (204 lb)   SpO2 98%   BMI 35.02 kg/m²     Physical Exam  Vitals and nursing note reviewed.   Constitutional:       General: He is not in acute distress.     Appearance: Normal appearance. He is well-developed. He is not diaphoretic.   Neck:      Thyroid: No thyromegaly.      Vascular: No carotid bruit.   Cardiovascular:      Rate and Rhythm: Normal rate and regular rhythm.      Heart sounds: Normal heart sounds. No murmur " heard.  Pulmonary:      Effort: Pulmonary effort is normal. No respiratory distress.      Breath sounds: Normal breath sounds. No wheezing.   Musculoskeletal:         General: No tenderness or deformity. Normal range of motion.      Cervical back: Normal range of motion and neck supple.      Right lower leg: No edema.      Left lower leg: No edema.   Skin:     General: Skin is warm and dry.   Neurological:      Mental Status: He is alert and oriented to person, place, and time.   Psychiatric:         Mood and Affect: Mood normal.         Speech: Speech normal.         Behavior: Behavior normal.         Thought Content: Thought content normal.         Judgment: Judgment normal.       DREA Morin

## 2024-04-25 NOTE — PATIENT INSTRUCTIONS
Continue same medications  Discussed increasing Ozempic every 28 days from 0.5mg to 1mg to 2mg   Call for refill when needed and what current dose you're taking  6 month [facility] and medicare wellness or call sooner for problems/concerns

## 2024-05-14 DIAGNOSIS — N40.0 ENLARGED PROSTATE: ICD-10-CM

## 2024-05-14 RX ORDER — TAMSULOSIN HYDROCHLORIDE 0.4 MG/1
0.8 CAPSULE ORAL
Qty: 180 CAPSULE | Refills: 3 | Status: SHIPPED | OUTPATIENT
Start: 2024-05-14 | End: 2024-05-15 | Stop reason: SDUPTHER

## 2024-05-14 NOTE — TELEPHONE ENCOUNTER
Patient requesting refill(s) of:  Tamsulosin     Last filled: 4/7/2023  Last appt: 4/25/2024  Next appt: 10/31/2024  Pharmacy:    [facility] [place]

## 2024-05-15 DIAGNOSIS — N40.0 ENLARGED PROSTATE: ICD-10-CM

## 2024-05-16 RX ORDER — TAMSULOSIN HYDROCHLORIDE 0.4 MG/1
0.8 CAPSULE ORAL
Qty: 180 CAPSULE | Refills: 1 | Status: SHIPPED | OUTPATIENT
Start: 2024-05-16

## 2024-05-22 ENCOUNTER — OFFICE VISIT (OUTPATIENT)
Dept: PODIATRY | Facility: CLINIC | Age: 74
End: 2024-05-22
Payer: MEDICARE

## 2024-05-22 VITALS
DIASTOLIC BLOOD PRESSURE: 77 MMHG | HEIGHT: 64 IN | HEART RATE: 94 BPM | BODY MASS INDEX: 34.83 KG/M2 | SYSTOLIC BLOOD PRESSURE: 146 MMHG | WEIGHT: 204 LBS

## 2024-05-22 DIAGNOSIS — B35.1 ONYCHOMYCOSIS: Primary | ICD-10-CM

## 2024-05-22 DIAGNOSIS — E11.9 TYPE 2 DIABETES MELLITUS WITHOUT COMPLICATION, WITHOUT LONG-TERM CURRENT USE OF INSULIN (HCC): ICD-10-CM

## 2024-05-22 PROCEDURE — 11721 DEBRIDE NAIL 6 OR MORE: CPT | Performed by: STUDENT IN AN ORGANIZED HEALTH CARE EDUCATION/TRAINING PROGRAM

## 2024-05-22 NOTE — PROGRESS NOTES
Ambulatory Visit  Name: Jason Miguel      : 1950      MRN: [id number]  Encounter Provider: Yumiko Gaitan DPM  Encounter Date: 2024   Encounter department: [facility] PODIATRY [place]    Assessment & Plan   1. Onychomycosis  2. Type 2 diabetes mellitus without complication, without long-term current use of insulin ([facility])    Plan:      1. A thorough neurovascular exam was performed. Patient presents for at-risk foot care.  Patient has no acute concerns today.  Patient has significant lower extremity risk due to neuropathy, parasthesia, edema, and trophic skin changes to the lower extremity. Patient has Q9  findings and is recommended for at risk foot care every 3 months.     2. All 10 toenails were debridement as follow: using nail nipper, cory, and curette, nails were sharply debrided, reduced in thickness and length. Devitalized nail tissue and fungal debris excised and removed. Patient tolerated well.       3. Patient was educated on importance of glycemic control, daily foot assessment and proper shoe gear. Patient will follow up annually for diabetic foot risk assessment.  Recommended patient to wear compression stockings swelling from possibly total knee replacement.     4. Return in 10-12 weeks.       History of Present Illness   HPI:  Jason Miguel is a 73 y.o. male who presents with painful, elongated toenails. Requires at risk foot care.       Review of Systems   All other systems reviewed and are negative.    Medical History Reviewed by provider this encounter:       Past Medical History   Past Medical History:   Diagnosis Date    Arthritis     Colon polyp     COVID 2021    and 2022    Diabetes mellitus (HCC)     Dyslipidemia     Fallen arches     GERD (gastroesophageal reflux disease)     Hyperlipidemia     Nodule of left lung     negative for CA    Type 2 diabetes mellitus without complication, without long-term current use of insulin (HCC) 2019    Use of cane  as ambulatory aid      Past Surgical History:   Procedure Laterality Date    COLONOSCOPY      ELBOW SURGERY Bilateral     FOOT TENDON SURGERY Bilateral     heel    HAND SURGERY      SC ARTHRP KNE CONDYLE&PLATU MEDIAL&LAT COMPARTMENTS Left 8/17/2023    Procedure: ARTHROPLASTY KNEE TOTAL W ROBOT;  Surgeon: Denton Nichole MD;  Location:  MAIN OR;  Service: Orthopedics    SC ARTHRS KNE SURG W/MENISCECTOMY MED/LAT W/SHVG Right 05/23/2022    Procedure: KNEE ARTHROSCOPY WITH medial MENISCECTOMY chondroplasty synovectomy injection;  Surgeon: Hank Shirley DO;  Location: [facility];  Service: Orthopedics    SC ARTHRS KNE SURG W/MENISCECTOMY MED/LAT W/SHVG Left 2/6/2023    Procedure: Left knee arthroscopy: Medial meniscectomy; Lateral meniscectomy; Chondroplasty; Synovectomy; Post arthroscopic injection of intra-articular joint space and peripheral portal;  Surgeon: Hank Shirley DO;  Location: [facility];  Service: Orthopedics    SC NEUROPLASTY &/TRANSPOS MEDIAN NRV CARPAL TUNNE Left 06/30/2021    Procedure: RELEASE CARPAL TUNNEL;  Surgeon: Hank Shirley DO;  Location:  MAIN OR;  Service: Orthopedics     Family History   Problem Relation Age of Onset    Heart disease Mother         hx MI    Diabetes Mother     Stroke Mother     Arthritis Mother     COPD Father         84 yrs    Coronary artery disease Father     Cancer Brother      Current Outpatient Medications on File Prior to Visit   Medication Sig Dispense Refill    metFORMIN (GLUCOPHAGE) 500 mg tablet TAKE 1 TABLET TWICE DAILY  WITH MEALS 180 tablet 3    phentermine (ADIPEX-P) 37.5 MG tablet Take 1 tablet (37.5 mg total) by mouth daily 30 tablet 2    semaglutide, 0.25 or 0.5 mg/dose, (Ozempic, 0.25 or 0.5 MG/DOSE,) 2 mg/3 mL injection pen Inject 0.75 mL (0.5 mg total) under the skin every 7 days 9 mL 0    simvastatin (ZOCOR) 20 mg tablet Take 1 tablet (20 mg total) by mouth daily at bedtime 90 tablet 3    tamsulosin (FLOMAX) 0.4 mg Take 2 capsules  "(0.8 mg total) by mouth daily with dinner 180 capsule 1    promethazine-dextromethorphan (PHENERGAN-DM) 6.25-15 mg/5 mL oral syrup Take 5 mL by mouth 4 (four) times a day as needed for cough (Patient not taking: Reported on 4/25/2024) 240 mL 0     No current facility-administered medications on file prior to visit.   No Known Allergies   Current Outpatient Medications on File Prior to Visit   Medication Sig Dispense Refill    metFORMIN (GLUCOPHAGE) 500 mg tablet TAKE 1 TABLET TWICE DAILY  WITH MEALS 180 tablet 3    phentermine (ADIPEX-P) 37.5 MG tablet Take 1 tablet (37.5 mg total) by mouth daily 30 tablet 2    semaglutide, 0.25 or 0.5 mg/dose, (Ozempic, 0.25 or 0.5 MG/DOSE,) 2 mg/3 mL injection pen Inject 0.75 mL (0.5 mg total) under the skin every 7 days 9 mL 0    simvastatin (ZOCOR) 20 mg tablet Take 1 tablet (20 mg total) by mouth daily at bedtime 90 tablet 3    tamsulosin (FLOMAX) 0.4 mg Take 2 capsules (0.8 mg total) by mouth daily with dinner 180 capsule 1    promethazine-dextromethorphan (PHENERGAN-DM) 6.25-15 mg/5 mL oral syrup Take 5 mL by mouth 4 (four) times a day as needed for cough (Patient not taking: Reported on 4/25/2024) 240 mL 0     No current facility-administered medications on file prior to visit.      Objective     /77 (BP Location: Left arm, Patient Position: Sitting, Cuff Size: Large)   Pulse 94   Ht 5' 4\" (1.626 m)   Wt 92.5 kg (204 lb)   BMI 35.02 kg/m²     Physical Exam  Vitals reviewed.   Feet:      Comments: On exam patient has thickened, hypertrophic, discolored, brittle toenails with subungual debris and tenderness x10.  Patient has lower extremity edema.   Patients skin is atrophic, thickened nails, and decreased pedal hair. Patient has decreased pinprick and vibratory sensation to feet and parasthesia.           "

## 2024-05-29 DIAGNOSIS — E11.9 TYPE 2 DIABETES MELLITUS WITHOUT COMPLICATION, WITHOUT LONG-TERM CURRENT USE OF INSULIN (HCC): ICD-10-CM

## 2024-07-09 ENCOUNTER — OFFICE VISIT (OUTPATIENT)
Dept: OBGYN CLINIC | Facility: HOSPITAL | Age: 74
End: 2024-07-09
Payer: MEDICARE

## 2024-07-09 ENCOUNTER — HOSPITAL ENCOUNTER (OUTPATIENT)
Dept: RADIOLOGY | Facility: HOSPITAL | Age: 74
Discharge: HOME | End: 2024-07-09
Attending: ORTHOPAEDIC SURGERY
Payer: MEDICARE

## 2024-07-09 VITALS
BODY MASS INDEX: 33.8 KG/M2 | WEIGHT: 198 LBS | HEART RATE: 99 BPM | DIASTOLIC BLOOD PRESSURE: 83 MMHG | HEIGHT: 64 IN | SYSTOLIC BLOOD PRESSURE: 148 MMHG

## 2024-07-09 DIAGNOSIS — Z96.652 AFTERCARE FOLLOWING LEFT KNEE JOINT REPLACEMENT SURGERY: Primary | ICD-10-CM

## 2024-07-09 DIAGNOSIS — Z96.652 AFTERCARE FOLLOWING LEFT KNEE JOINT REPLACEMENT SURGERY: ICD-10-CM

## 2024-07-09 DIAGNOSIS — Z47.1 AFTERCARE FOLLOWING LEFT KNEE JOINT REPLACEMENT SURGERY: ICD-10-CM

## 2024-07-09 DIAGNOSIS — M25.561 CHRONIC PAIN OF RIGHT KNEE: ICD-10-CM

## 2024-07-09 DIAGNOSIS — G89.29 CHRONIC PAIN OF RIGHT KNEE: ICD-10-CM

## 2024-07-09 DIAGNOSIS — Z47.1 AFTERCARE FOLLOWING LEFT KNEE JOINT REPLACEMENT SURGERY: Primary | ICD-10-CM

## 2024-07-09 DIAGNOSIS — M17.11 PRIMARY OSTEOARTHRITIS OF RIGHT KNEE: ICD-10-CM

## 2024-07-09 PROCEDURE — 20610 DRAIN/INJ JOINT/BURSA W/O US: CPT

## 2024-07-09 PROCEDURE — 99214 OFFICE O/P EST MOD 30 MIN: CPT | Performed by: ORTHOPAEDIC SURGERY

## 2024-07-09 PROCEDURE — 73560 X-RAY EXAM OF KNEE 1 OR 2: CPT

## 2024-07-09 RX ORDER — LIDOCAINE HYDROCHLORIDE 10 MG/ML
2 INJECTION, SOLUTION INFILTRATION; PERINEURAL
Status: COMPLETED | OUTPATIENT
Start: 2024-07-09 | End: 2024-07-09

## 2024-07-09 RX ORDER — BUPIVACAINE HYDROCHLORIDE 2.5 MG/ML
2 INJECTION, SOLUTION INFILTRATION; PERINEURAL
Status: COMPLETED | OUTPATIENT
Start: 2024-07-09 | End: 2024-07-09

## 2024-07-09 RX ORDER — METHYLPREDNISOLONE ACETATE 40 MG/ML
2 INJECTION, SUSPENSION INTRA-ARTICULAR; INTRALESIONAL; INTRAMUSCULAR; SOFT TISSUE
Status: COMPLETED | OUTPATIENT
Start: 2024-07-09 | End: 2024-07-09

## 2024-07-09 RX ADMIN — METHYLPREDNISOLONE ACETATE 2 ML: 40 INJECTION, SUSPENSION INTRA-ARTICULAR; INTRALESIONAL; INTRAMUSCULAR; SOFT TISSUE at 09:00

## 2024-07-09 RX ADMIN — LIDOCAINE HYDROCHLORIDE 2 ML: 10 INJECTION, SOLUTION INFILTRATION; PERINEURAL at 09:00

## 2024-07-09 RX ADMIN — BUPIVACAINE HYDROCHLORIDE 2 ML: 2.5 INJECTION, SOLUTION INFILTRATION; PERINEURAL at 09:00

## 2024-07-09 NOTE — PROGRESS NOTES
Assessment:   Diagnosis ICD-10-CM Associated Orders   1. Aftercare following left knee joint replacement surgery  Z47.1 XR knee 1 or 2 vw left    Z96.652       2. Primary osteoarthritis of right knee  M17.11 Large joint arthrocentesis      3. Chronic pain of right knee  M25.561 Large joint arthrocentesis    G89.29           Plan:  73 y.o. male 1 year s/p left TKA  Continue weight bearing activities as tolerated   Continue increasing physical activity  The patient was reminded about prophylactic antibiotic use prior to dental visits for the next year   No further follow up necessary for left knee   Steroid injection offered, accepted, and provided to right knee today   Prior auth started today for visco supplementation to right knee   Follow up once visco arrives in office     The above stated was discussed in layman's terms and the patient expressed understanding.  All questions were answered to the patient's satisfaction.     To do next visit:  Return in about 3 months (around 10/9/2024) for right knee.      Subjective:   Jason Miguel is a 73 y.o. male who presents 11 months s/p left total knee arthroplasty.  He is doing very well.  He denies pain, discomfort or instability of the left knee.  He denies concerns or complaints.  He does admit to pain of the right knee today.  Patient has known osteoarthritis of the right knee with chronic pain.  He denies treatments in the past to the right knee.  Patient offered, accepted, and provided to right knee today.  Patient interested in trying visco supplementation injections to the right knee which was placed today.   Right knee pain 7/10 today     Review of systems negative unless otherwise specified in HPI    Past Medical History:   Diagnosis Date    Arthritis     Colon polyp     COVID 09/07/2021    and 12/2022    Diabetes mellitus (HCC)     Dyslipidemia     Fallen arches     GERD (gastroesophageal reflux disease)     Hyperlipidemia     Nodule of left lung 2007     negative for CA    Type 2 diabetes mellitus without complication, without long-term current use of insulin ([facility]) 2019    Use of cane as ambulatory aid        Past Surgical History:   Procedure Laterality Date    COLONOSCOPY      ELBOW SURGERY Bilateral     FOOT TENDON SURGERY Bilateral     heel    HAND SURGERY      KS ARTHRP KNE CONDYLE&PLATU MEDIAL&LAT COMPARTMENTS Left 2023    Procedure: ARTHROPLASTY KNEE TOTAL W ROBOT;  Surgeon: Denton Nichole MD;  Location:  MAIN OR;  Service: Orthopedics    KS ARTHRS KNE SURG W/MENISCECTOMY MED/LAT W/SHVG Right 2022    Procedure: KNEE ARTHROSCOPY WITH medial MENISCECTOMY chondroplasty synovectomy injection;  Surgeon: Hank Shirley DO;  [facility]: [facility];  Service: Orthopedics    KS ARTHRS KNE SURG W/MENISCECTOMY MED/LAT W/SHVG Left 2023    Procedure: Left knee arthroscopy: Medial meniscectomy; Lateral meniscectomy; Chondroplasty; Synovectomy; Post arthroscopic injection of intra-articular joint space and peripheral portal;  Surgeon: Hank Shirley DO;  [facility]: [facility];  Service: Orthopedics    KS NEUROPLASTY &/TRANSPOS MEDIAN NRV CARPAL TUNNE Left 2021    Procedure: RELEASE CARPAL TUNNEL;  Surgeon: Hank Shirley DO;  Location:  MAIN OR;  Service: Orthopedics       Family History   Problem Relation Age of Onset    Heart disease Mother         hx MI    Diabetes Mother     Stroke Mother     Arthritis Mother     COPD Father         84 yrs    Coronary artery disease Father     Cancer Brother        Social History     Occupational History    Not on file   Tobacco Use    Smoking status: Former     Current packs/day: 0.00     Average packs/day: 1 pack/day for 35.0 years (35.0 ttl pk-yrs)     Types: Cigarettes     Start date:      Quit date:      Years since quittin.5    Smokeless tobacco: Never   Vaping Use    Vaping status: Never Used   Substance and Sexual Activity    Alcohol use: Yes     Comment: 2 x per week     Drug use: No    Sexual activity: Yes     Partners: Female     Birth control/protection: Male Sterilization         Current Outpatient Medications:     metFORMIN (GLUCOPHAGE) 500 mg tablet, TAKE 1 TABLET TWICE DAILY  WITH MEALS, Disp: 180 tablet, Rfl: 3    phentermine (ADIPEX-P) 37.5 MG tablet, Take 1 tablet (37.5 mg total) by mouth daily, Disp: 30 tablet, Rfl: 2    promethazine-dextromethorphan (PHENERGAN-DM) 6.25-15 mg/5 mL oral syrup, Take 5 mL by mouth 4 (four) times a day as needed for cough (Patient not taking: Reported on 4/25/2024), Disp: 240 mL, Rfl: 0    semaglutide, 0.25 or 0.5 mg/dose, (Ozempic, 0.25 or 0.5 MG/DOSE,) 2 mg/3 mL injection pen, Inject 0.75 mL (0.5 mg total) under the skin every 7 days, Disp: 9 mL, Rfl: 0    simvastatin (ZOCOR) 20 mg tablet, Take 1 tablet (20 mg total) by mouth daily at bedtime, Disp: 90 tablet, Rfl: 3    tamsulosin (FLOMAX) 0.4 mg, Take 2 capsules (0.8 mg total) by mouth daily with dinner, Disp: 180 capsule, Rfl: 1    No Known Allergies         Vitals:    07/09/24 0850   BP: 148/83   Pulse: 99       Objective:  Physical exam  General: Awake, Alert, Oriented  Eyes: Pupils equal, round and reactive to light  Heart: regular rate and rhythm  Lungs: No audible wheezing  Abdomen: soft                    Ortho Exam  Left knee:  Well healed anterior incision  No erythema and no ecchymosis  Stable to varus and valgus stress  Extensor mechanism intact  Extension 0  Flexion 130  Calf compartments soft and supple  Sensation intact  Toes are warm sensate and mobile     Right knee:   No erythema or ecchymosis  Mild edema   Trace effusion   Normal strength  Good ROM with crepitation  Flexion: 110  Extension: 0  Calf compartments soft and supple  Sensation intact  Toes are warm sensate and mobile       Diagnostics, reviewed and taken today if performed as documented:    left knee x-ray:   - Well aligned prosthesis without evidence of fractures, acute changes, or hardware failure    "      Procedures, if performed today:    Large joint arthrocentesis: R knee  Universal Protocol:  Consent: Verbal consent obtained.  Risks and benefits: risks, benefits and alternatives were discussed  Consent given by: patient  Site marked: the operative site was marked  Supporting Documentation  Indications: pain   Procedure Details  Location: knee - R knee  Needle size: 22 G  Approach: anterolateral  Medications administered: 2 mL bupivacaine 0.25 %; 2 mL lidocaine 1 %; 2 mL methylPREDNISolone acetate 40 mg/mL    Patient tolerance: patient tolerated the procedure well with no immediate complications  Dressing:  Sterile dressing applied          Portions of the record may have been created with voice recognition software.  Occasional wrong word or \"sound a like\" substitutions may have occurred due to the inherent limitations of voice recognition software.  Read the chart carefully and recognize, using context, where substitutions have occurred.      "

## 2024-07-11 DIAGNOSIS — E11.9 TYPE 2 DIABETES MELLITUS WITHOUT COMPLICATION, WITHOUT LONG-TERM CURRENT USE OF INSULIN (HCC): Primary | ICD-10-CM

## 2024-07-22 ENCOUNTER — PATIENT MESSAGE (OUTPATIENT)
Dept: FAMILY MEDICINE CLINIC | Facility: CLINIC | Age: 74
End: 2024-07-22

## 2024-07-22 DIAGNOSIS — G89.29 CHRONIC LOW BACK PAIN, UNSPECIFIED BACK PAIN LATERALITY, UNSPECIFIED WHETHER SCIATICA PRESENT: Primary | ICD-10-CM

## 2024-07-22 DIAGNOSIS — M54.50 CHRONIC LOW BACK PAIN, UNSPECIFIED BACK PAIN LATERALITY, UNSPECIFIED WHETHER SCIATICA PRESENT: Primary | ICD-10-CM

## 2024-07-23 ENCOUNTER — TELEPHONE (OUTPATIENT)
Dept: FAMILY MEDICINE CLINIC | Facility: CLINIC | Age: 74
End: 2024-07-23

## 2024-07-23 DIAGNOSIS — M54.50 LOW BACK PAIN, UNSPECIFIED BACK PAIN LATERALITY, UNSPECIFIED CHRONICITY, UNSPECIFIED WHETHER SCIATICA PRESENT: Primary | ICD-10-CM

## 2024-07-23 RX ORDER — METHYLPREDNISOLONE 4 MG/1
TABLET ORAL
Qty: 21 EACH | Refills: 0 | Status: SHIPPED | OUTPATIENT
Start: 2024-07-23

## 2024-08-05 NOTE — TELEPHONE ENCOUNTER
Caller: patient    Doctor: romaine    Reason for call: pt would like to schedule surgery    Call back#: [phone] We are sending 2 prescriptions to your pharmacy, as discussed.  Please take as prescribed.  If symptoms worsen or new concerning symptoms arise, please report to the nearest emergency department.    Thank you for allowing us to provide you with medical care today.  We realize that you have many choices for your emergency care needs.  We thank you for choosing [facility].  Please choose us in the future for any continued health care needs.     The exam and treatment you received in the Emergency Department were for an emergent problem and are not intended as complete care. It is important that you follow up with a doctor, nurse practitioner, or physician assistant for ongoing care. If your symptoms worsen or you do not improve as expected and you are unable to reach your usual health care provider, you should return to the Emergency Department.  We are available 24 hours a day.     Please make an appointment with your health care provider(s) for follow up of your Emergency Department visit.  Take this sheet with you when you go to your follow-up visit.

## 2024-08-06 DIAGNOSIS — G89.29 CHRONIC LOW BACK PAIN, UNSPECIFIED BACK PAIN LATERALITY, UNSPECIFIED WHETHER SCIATICA PRESENT: Primary | ICD-10-CM

## 2024-08-06 DIAGNOSIS — M54.50 CHRONIC LOW BACK PAIN, UNSPECIFIED BACK PAIN LATERALITY, UNSPECIFIED WHETHER SCIATICA PRESENT: Primary | ICD-10-CM

## 2024-08-06 RX ORDER — METHYLPREDNISOLONE 4 MG/1
TABLET ORAL
Qty: 21 EACH | Refills: 0 | Status: SHIPPED | OUTPATIENT
Start: 2024-08-06 | End: 2024-08-15 | Stop reason: SDUPTHER

## 2024-08-08 NOTE — PROGRESS NOTES
PT Evaluation     Today's date: 2024  Patient name: Jason Miguel  : 1950  MRN: [id number]  Referring provider: Donald Gamboa PT  Dx:   Encounter Diagnosis     ICD-10-CM    1. Chronic low back pain, unspecified back pain laterality, unspecified whether sciatica present  M54.50 Ambulatory referral to PT spine    G89.29                      Assessment  Impairments: abnormal or restricted ROM, activity intolerance, impaired balance, impaired physical strength, lacks appropriate home exercise program, pain with function and poor posture   Functional limitations: difficulty with prolonged standing, prolonged walking, difficulty standing up from a seated position, and difficulty lifting/carrying heavy objects.  Symptom irritability: moderate    Assessment details: Jason Miguel is a pleasant 74 y.o. male that presents for a direct access evaluation with complaints of L anterior/lateral hip pain and chronic low back pain.  The patient demonstrates signs and symptoms consistent with chronic low back pain with L LE radiculopathy.  The patient's impairments are causing the following functional limitations: difficulty with prolonged standing, prolonged walking, difficulty standing up from a seated position, and difficulty lifting/carrying heavy objects.  The patient will benefit from treatment of core stabilization, spine mobilization/mobility exercises, posture correction, traction, and functional retraining to address impairments, work towards personal goals, and restore PLOF.  The patient's greatest concern is the pain he is experiencing.  No further referral appears necessary at this time based upon examination results.    Impairments include:  1) lumbar hypomobility - addressing with mobs and mobility exercises    2) Core muscle weakness - addressing with neuromotor retraining /core strengthening.  3) LE neural tension - addressing with mobs and mobility exercises   4) Poor posture/activity  "intolerance - addressing with functional retraining                           Understanding of Dx/Px/POC: good     Prognosis: good  Prognosis details: Positive prognostic indicators include: positive attitude toward recovery, good understanding of diagnosis/treatment plan, and absence of observed red flags.      Negative prognostic indicators include: Significant medical Hx, chronicity of condition, lacking true directional preference    Goals    STG: Achieve in 4-6 weeks  1.  Decrease lumbar pain at worst by 50% to improve activity tolerance for self/care and leisure activities.  2.  Improve Lumbo-pelvic core strength by 1/2 muscle grade  or \" good activation\" to improve ability to change body positions without difficulty.  3.  Improve lumbar ROM to \" WFL\" to facilitate normal movement patterns for ADL's/work tasks.  4.  Pt will report overall improved tolerance for sustained mobility/activity by at least 25-50% since SOC with reduced pain levels and reduced fear avoidance.    LTG: Achieve in 8-12 weeks  1.  Patient's will be able to return to getting up from sitting position without L hip pain.  2.  Patient tolerate prolonged standing/walking without L hip/LE pain to achieve patient specific goal.  3.  Patient will be independent with home exercise program.   4.  Patient will be able to manage symptoms independently.        Plan  Patient would benefit from: skilled physical therapy  Planned modality interventions: cryotherapy, thermotherapy: hydrocollator packs and traction    Planned therapy interventions: joint mobilization, manual therapy, neuromuscular re-education, patient education, postural training, self care, strengthening, stretching, therapeutic activities, therapeutic exercise, home exercise program, abdominal trunk stabilization, balance, body mechanics training and IASTM    Frequency: 1-3x/wk.  Duration in weeks: 12  Plan of Care beginning date: 8/12/2024  Plan of Care expiration date: " "2024  Treatment plan discussed with: PTA and patient  Plan details: RE-ASSESS 1X/MONTH        Subjective Evaluation    History of Present Illness  Date of onset: 5/15/2024  Mechanism of injury: Jason Miguel is a 74 y.o. male that presents to outpatient physical therapy with complaints of chronic L LE pain and difficulty functioning.  The patient reports a worsening of pain at his R anterior lateral thigh onset without known cause.  The patient is known to me and has had long history of L LE pain and lumbar issues.  The patient notes difficulty with going sitting to standing.  He notes once his L hip/thigh is \" warmed up\" he is able to walk and stand without difficulty.  The patient notes intermittent tingling at the L anterior hip.  The patient has taken 2 courses of Medrol dose packs to address the pain and inflammation which helped to decrease his pain/symptoms.  The patient is seeing a chiropractor as well who is focusing on soft tissue treatment at his L hip flexors.  The patient's main goal for physical therapy is to get rid of the pain so that he can stand up from a seated position without pain.             Recurrent probem    Patient Goals  Patient goals for therapy: decreased pain, increased motion, increased strength, independence with ADLs/IADLs, return to sport/leisure activities and return to work  Patient goal: stand up without pain/difficulty L hip  Pain  Current pain ratin  At best pain ratin  At worst pain rating: 10  Location: L anterior/lateral hip  Quality: needle-like and sharp  Relieving factors: rest (sit)  Aggravating factors: standing, walking and lifting  Progression: worsening    Social Support  Steps to enter house: yes  Stairs in house: yes   Lives in: multiple-level home  Lives with: spouse    Employment status: working (drives school bus)  Treatments  Previous treatment: medication  Current treatment: chiropractic and physical therapy      Objective     Concurrent " Complaints  Negative for night pain, disturbed sleep, bladder dysfunction, bowel dysfunction, saddle (S4) numbness, history of cancer, history of trauma and infection    Static Posture     Lumbar Spine   Increased lordosis.   Lumbar spine (Left): Shifted.     Pelvis   Anterior pelvic tilt    Postural Observations  Seated posture: fair  Standing posture: fair      Neurological Testing     Sensation     Lumbar   Left   Intact: light touch    Right   Intact: light touch    Reflexes   Left   Patellar (L4): normal (2+)  Achilles (S1): normal (2+)  Babinski sign: negative    Right   Patellar (L4): normal (2+)  Achilles (S1): normal (2+)  Babinski sign: negative    Active Range of Motion     Lumbar   Flexion: Active lumbar flexion: lumbar.  with pain Restriction level: minimal  Extension: Active lumbar extension: lumbar.  with pain Restriction level: moderate  Left lateral flexion:  Restriction level: minimal  Right lateral flexion:  Restriction level: minimal  Left rotation:  Restriction level: minimal  Right rotation:  Restriction level: minimal    Additional Active Range of Motion Details  Prone knee flexion: L 108 deg p! R: 130 deg    Joint Play     Hypomobile: L3, L4, L5 and S1   Mechanical Assessment    Cervical      Thoracic      Lumbar    Standing flexion: repeated movements   Pain location:peripheralized  Pain intensity: worse  Pain level: increased  Standing extension:   Pain location: no change  Pain intensity: worse    Strength/Myotome Testing     Lumbar   Left   Heel walk: normal  Toe walk: normal    Right   Heel walk: normal  Toe walk: normal    Left Hip   Planes of Motion   Flexion: 4-  Abduction: 4  Adduction: 4    Right Hip   Planes of Motion   Flexion: 4  Abduction: 4  Adduction: 4    Left Knee   Flexion: 4  Extension: 4    Right Knee   Flexion: 4  Extension: 4    Left Ankle/Foot   Dorsiflexion: 5  Plantar flexion: 5  Great toe extension: 5    Right Ankle/Foot   Dorsiflexion: 5  Plantar flexion: 5  Great  toe extension: 5    Muscle Activation     Additional Muscle Activation Details  Decreased TrA, multifidus, gluteal activation with transfers / position changes / dynamic movement      Tests     Lumbar   Negative SIJ compression and sacral spring .     Left   Positive femoral stretch.   Negative passive SLR and slump test.     Right   Positive slump test.   Negative passive SLR.     Left Pelvic Girdle/Sacrum   Negative: active SLR test.     Right Pelvic Girdle/Sacrum   Negative: active SLR test.     Left Hip   Negative FADIR.     Right Hip   Negative FADIR.     Additional Tests Details  FOTO: 67% ( predicted 73%)    30SSTS: 10 stands no hand (+) shift to Right                 Precautions: OA,DM  RE-EVAL:9/9  Access Code [email or web address]   Specialty Daily Treatment Diary     Manual  8/12       Lumbar P/A DONE AD grade 3               Manual nerve glides L Femoral nerve?       Piriformis Stretch B/L test       Prone Quad stretch *           Therapeutic Exercise 8/12       Treadmill Ambulation for endurance *       UBE Stand ALT FWD/BACK for core/posture strength                                Bridges *       Standing lumbar flexion   1x10  WORSE       Prone press ups 2x10       Standing back extensions 1x10       Hip sags                        Stand Hip ABD+EXT *               Neuro Re-ed        Core brace *       kegels *       Multifidi *       Glute sets *       Quad DLS UE  LE  UE+LE *       Quad knee lifts        Clam shells *       MTP/LTP/ Anti-rotations        Towel roll education, Posture correction; movement precautions DONE ad       Lift/chop T-band        SLB        Posture corrections seated *               Sidestepping        Heel toe Amb                        Therapeutic Activity        Steps ups fwd/side        Crate carry        Crate lifts 3 levels        Lunges        Chair squats            Modalities        MHP/CP to L/B                               The patient was given a new home exercise plan with  instruction, pictures, and verbal feedback.  The patient accepts and understands the new home activities.

## 2024-08-12 ENCOUNTER — EVALUATION (OUTPATIENT)
Dept: PHYSICAL THERAPY | Facility: CLINIC | Age: 74
End: 2024-08-12
Payer: MEDICARE

## 2024-08-12 VITALS
SYSTOLIC BLOOD PRESSURE: 145 MMHG | DIASTOLIC BLOOD PRESSURE: 82 MMHG | HEART RATE: 86 BPM | TEMPERATURE: 96 F | OXYGEN SATURATION: 98 %

## 2024-08-12 DIAGNOSIS — M54.50 CHRONIC LOW BACK PAIN, UNSPECIFIED BACK PAIN LATERALITY, UNSPECIFIED WHETHER SCIATICA PRESENT: Primary | ICD-10-CM

## 2024-08-12 DIAGNOSIS — G89.29 CHRONIC LOW BACK PAIN, UNSPECIFIED BACK PAIN LATERALITY, UNSPECIFIED WHETHER SCIATICA PRESENT: Primary | ICD-10-CM

## 2024-08-12 PROCEDURE — 97110 THERAPEUTIC EXERCISES: CPT | Performed by: PHYSICAL THERAPIST

## 2024-08-12 PROCEDURE — 97162 PT EVAL MOD COMPLEX 30 MIN: CPT | Performed by: PHYSICAL THERAPIST

## 2024-08-12 PROCEDURE — 97140 MANUAL THERAPY 1/> REGIONS: CPT | Performed by: PHYSICAL THERAPIST

## 2024-08-14 ENCOUNTER — OFFICE VISIT (OUTPATIENT)
Dept: PHYSICAL THERAPY | Facility: CLINIC | Age: 74
End: 2024-08-14
Payer: MEDICARE

## 2024-08-14 DIAGNOSIS — G89.29 CHRONIC LOW BACK PAIN, UNSPECIFIED BACK PAIN LATERALITY, UNSPECIFIED WHETHER SCIATICA PRESENT: Primary | ICD-10-CM

## 2024-08-14 DIAGNOSIS — M54.50 CHRONIC LOW BACK PAIN, UNSPECIFIED BACK PAIN LATERALITY, UNSPECIFIED WHETHER SCIATICA PRESENT: Primary | ICD-10-CM

## 2024-08-14 PROCEDURE — 97140 MANUAL THERAPY 1/> REGIONS: CPT | Performed by: PHYSICAL THERAPIST

## 2024-08-14 PROCEDURE — 97110 THERAPEUTIC EXERCISES: CPT | Performed by: PHYSICAL THERAPIST

## 2024-08-14 PROCEDURE — 97112 NEUROMUSCULAR REEDUCATION: CPT | Performed by: PHYSICAL THERAPIST

## 2024-08-14 NOTE — PROGRESS NOTES
Daily Note     Today's date: 2024  Patient name: Jason Miguel  : 1950  MRN: [id number]  Referring provider: Donald Gamboa, DESHAUN  Dx:   Encounter Diagnosis     ICD-10-CM    1. Chronic low back pain, unspecified back pain laterality, unspecified whether sciatica present  M54.50     G89.29                      Subjective: The patient is still having difficulty transitioning from sit to stand and notes the first 20 steps are painful.  The patient has 3/10 pain at his L anterior hip with walking and 8/10 pain with standing up from a seated position.  The patient notes feeling good after last session until early this morning in bed.  The patient notes he did a lot of driving yesterday and did not use a towel roll.      Objective: See treatment diary below      Assessment: We attempted a multitude of interventions spanning extension, flexion, and mid range directions which did not consistently relieve the patient's symptoms.  Overall the extension exercises seemed to be the most helpful and the patient was tolerant of a P/A glide in prone which should've provoked peripheral pain if it was the wrong direction - it did not.  We attempted a femoral nerve glide in side lying which did not change his symptoms.  The patient continues to have the most difficulty with transition movements sit to stand, supine to sit, and rolling on mat.  We will focus on stabilization exercises next visit as their seems to be a component of instability.  The patient will benefit from continued PT to achieve his goals of therapy.      Plan: Continue per plan of care. Will attempt Ham string stretching in sitting next visit, continue to test direction preference, and incorporate core strengthening.        Precautions: OA,DM  RE-EVAL:  Access Code [email or web address]   Specialty Daily Treatment Diary     Manual        Lumbar P/A DONE AD grade 3 DONE grade 3              Manual nerve glides L Femoral nerve?       Piriformis  Stretch B/L test       Prone Quad stretch *           Therapeutic Exercise 8/12 8/14      Treadmill Ambulation for endurance * 1.2 mph x 6 mins      UBE Stand ALT FWD/BACK for core/posture strength                                Bridges *       Standing lumbar flexion   1x10  WORSE Supine D K->C x10  NC      Prone press ups 2x10 1x10 p!  1x10 after P/A - better      Standing back extensions 1x10 1x10      Hip sags                        Stand Hip ABD+EXT * NV              Neuro Re-ed        Core brace *       kegels *       Multifidi *       Glute sets * Prone 2x10      Quad DLS UE  LE  UE+LE *       Quad knee lifts        Clam shells *       MTP/LTP/ Anti-rotations        Towel roll education, Posture correction; movement precautions DONE ad REVIEWED       Lift/chop T-band        SLB        Posture corrections seated * X10 WORSE      Femoral N glide side lying  X20 - NC      Sidestepping        Heel toe Amb                        Therapeutic Activity        Steps ups fwd/side        Crate carry        Crate lifts 3 levels        Lunges        Chair squats            Modalities        MHP/CP to L/B

## 2024-08-15 DIAGNOSIS — G89.29 CHRONIC LOW BACK PAIN, UNSPECIFIED BACK PAIN LATERALITY, UNSPECIFIED WHETHER SCIATICA PRESENT: ICD-10-CM

## 2024-08-15 DIAGNOSIS — M54.50 CHRONIC LOW BACK PAIN, UNSPECIFIED BACK PAIN LATERALITY, UNSPECIFIED WHETHER SCIATICA PRESENT: ICD-10-CM

## 2024-08-15 RX ORDER — METHYLPREDNISOLONE 4 MG
TABLET, DOSE PACK ORAL
Qty: 21 EACH | Refills: 0 | Status: SHIPPED | OUTPATIENT
Start: 2024-08-15

## 2024-08-19 ENCOUNTER — OFFICE VISIT (OUTPATIENT)
Dept: PHYSICAL THERAPY | Facility: CLINIC | Age: 74
End: 2024-08-19
Payer: MEDICARE

## 2024-08-19 DIAGNOSIS — M54.50 CHRONIC LOW BACK PAIN, UNSPECIFIED BACK PAIN LATERALITY, UNSPECIFIED WHETHER SCIATICA PRESENT: Primary | ICD-10-CM

## 2024-08-19 DIAGNOSIS — G89.29 CHRONIC LOW BACK PAIN, UNSPECIFIED BACK PAIN LATERALITY, UNSPECIFIED WHETHER SCIATICA PRESENT: Primary | ICD-10-CM

## 2024-08-19 PROCEDURE — 97110 THERAPEUTIC EXERCISES: CPT | Performed by: PHYSICAL THERAPIST

## 2024-08-19 PROCEDURE — 97112 NEUROMUSCULAR REEDUCATION: CPT | Performed by: PHYSICAL THERAPIST

## 2024-08-19 NOTE — PROGRESS NOTES
Daily Note     Today's date: 2024  Patient name: Jason Miguel  : 1950  MRN: [id number]  Referring provider: Olesya Wheatley CRNP  Dx:   Encounter Diagnosis     ICD-10-CM    1. Chronic low back pain, unspecified back pain laterality, unspecified whether sciatica present  M54.50     G89.29                      Subjective: The patient received some medication - steroids which he started on Friday and has noticed some relief.  The patient feels L lateral hip with radiation down his L anterior thigh to his knee.  The patient does feel some discomfort at his L/B.      Objective: See treatment diary below      Assessment: The patient responded well to repeated lumbar flexion in standing and supine.  His distal L anterior thigh symptoms decreased and centralized towards the lumbar spine and posterior L hip.  The patient's home exercise program was updated and the patient was given extensive education on how to interpret his symptoms and makes changes to his HEP accordingly.  The patient notes discomfort at his lumbar spine and no L anterior thigh symptoms.  The patient will benefit from continued PT to achieve his goals of therapy.      Plan: Continue per plan of care.  Progress treatment as tolerated.       Precautions: OA,DM  RE-EVAL:  Access Code [email or web address]   Specialty Daily Treatment Diary   BASELINE: L LAT HIP PAIN, L ANTERIOR HIP AND THIGH MOST DISTAL    Manual       Lumbar P/A DONE AD grade 3 DONE grade 3              Manual nerve glides L Femoral nerve?       Piriformis Stretch B/L test       Prone Quad stretch *           Therapeutic Exercise      Treadmill Ambulation for endurance * 1.2 mph x 6 mins 1.3 - 2.0 MPH  X 7 MINS     UBE Stand ALT FWD/BACK for core/posture strength   90/70 x 4 mins ALT     Bike   L1 x 8 mins     Ham stretch seated                Bridges *       Standing lumbar flexion   1x10  WORSE Supine D K->C x10  NC Stand 2x10 BETTER L ant thigh, (+) L  lat hip pain    Supine D K->C  3x10     Prone press ups 2x10 1x10 p!  1x10 after P/A - better      Standing back extensions 1x10 1x10      Hip sags                        Stand Hip ABD+EXT * NV              Neuro Re-ed        Core brace *       kegels *       Multifidi *       Glute sets * Prone 2x10      Quad DLS UE  LE  UE+LE *       Quad knee lifts        Clam shells *       MTP/LTP/ Anti-rotations        Towel roll education, Posture correction; movement precautions DONE ad REVIEWED  Discussion on symptom interpretation and changes to HEP done AD x 10 mins     Lift/chop T-band        SLB        Posture corrections seated * X10 WORSE      Femoral N glide side lying  X20 - NC      Sidestepping        Heel toe Amb                        Therapeutic Activity        Steps ups fwd/side        Crate carry        Crate lifts 3 levels        Lunges        Chair squats            Modalities        MHP/CP to L/B                                   The patient was given a new home exercise plan with instruction, pictures, and verbal feedback.  The patient accepts and understands the new home activities.

## 2024-08-21 ENCOUNTER — APPOINTMENT (OUTPATIENT)
Dept: PHYSICAL THERAPY | Facility: CLINIC | Age: 74
End: 2024-08-21
Payer: MEDICARE

## 2024-08-21 DIAGNOSIS — M54.50 CHRONIC LOW BACK PAIN, UNSPECIFIED BACK PAIN LATERALITY, UNSPECIFIED WHETHER SCIATICA PRESENT: Primary | ICD-10-CM

## 2024-08-21 DIAGNOSIS — G89.29 CHRONIC LOW BACK PAIN, UNSPECIFIED BACK PAIN LATERALITY, UNSPECIFIED WHETHER SCIATICA PRESENT: Primary | ICD-10-CM

## 2024-08-21 NOTE — PROGRESS NOTES
Daily Note     Today's date: 2024  Patient name: Jason Miguel  : 1950  MRN: [id number]  Referring provider: Olesya Wheatley CRNP  Dx:   Encounter Diagnosis     ICD-10-CM    1. Chronic low back pain, unspecified back pain laterality, unspecified whether sciatica present  M54.50     G89.29                      Subjective: ***      Objective: See treatment diary below      Assessment: Tolerated treatment {Tolerated treatment :[id number]}. Patient {assessment:7070888121}      Plan: {PLAN:8598834217}     Precautions: OA,DM  RE-EVAL:  Access Code [email or web address]   Specialty Daily Treatment Diary   BASELINE: L LAT HIP PAIN, L ANTERIOR HIP AND THIGH MOST DISTAL    Manual       Lumbar P/A DONE AD grade 3 DONE grade 3              Manual nerve glides L Femoral nerve?       Piriformis Stretch B/L test       Prone Quad stretch *           Therapeutic Exercise      Treadmill Ambulation for endurance * 1.2 mph x 6 mins 1.3 - 2.0 MPH  X 7 MINS     UBE Stand ALT FWD/BACK for core/posture strength   90/70 x 4 mins ALT     Bike   L1 x 8 mins     Ham stretch seated                Bridges *       Standing lumbar flexion   1x10  WORSE Supine D K->C x10  NC Stand 2x10 BETTER L ant thigh, (+) L lat hip pain    Supine D K->C  3x10     Prone press ups 2x10 1x10 p!  1x10 after P/A - better      Standing back extensions 1x10 1x10      Hip sags                        Stand Hip ABD+EXT * NV              Neuro Re-ed        Core brace *       kegels *       Multifidi *       Glute sets * Prone 2x10      Quad DLS UE  LE  UE+LE *       Quad knee lifts        Clam shells *       MTP/LTP/ Anti-rotations        Towel roll education, Posture correction; movement precautions DONE ad REVIEWED  Discussion on symptom interpretation and changes to HEP done AD x 10 mins     Lift/chop T-band        SLB        Posture corrections seated * X10 WORSE      Femoral N glide side lying  X20 - NC      Sidestepping         Heel toe Amb                        Therapeutic Activity        Steps ups fwd/side        Crate carry        Crate lifts 3 levels        Lunges        Chair squats            Modalities        MHP/CP to L/B

## 2024-08-26 ENCOUNTER — OFFICE VISIT (OUTPATIENT)
Dept: PHYSICAL THERAPY | Facility: CLINIC | Age: 74
End: 2024-08-26
Payer: MEDICARE

## 2024-08-26 DIAGNOSIS — G89.29 CHRONIC LOW BACK PAIN, UNSPECIFIED BACK PAIN LATERALITY, UNSPECIFIED WHETHER SCIATICA PRESENT: Primary | ICD-10-CM

## 2024-08-26 DIAGNOSIS — M54.50 CHRONIC LOW BACK PAIN, UNSPECIFIED BACK PAIN LATERALITY, UNSPECIFIED WHETHER SCIATICA PRESENT: Primary | ICD-10-CM

## 2024-08-26 PROCEDURE — 97112 NEUROMUSCULAR REEDUCATION: CPT | Performed by: PHYSICAL THERAPIST

## 2024-08-26 PROCEDURE — 97110 THERAPEUTIC EXERCISES: CPT | Performed by: PHYSICAL THERAPIST

## 2024-08-26 NOTE — PROGRESS NOTES
Daily Note     Today's date: 2024  Patient name: Jason Miguel  : 1950  MRN: [id number]  Referring provider: Olesya Wheatley CRNP  Dx:   Encounter Diagnosis     ICD-10-CM    1. Chronic low back pain, unspecified back pain laterality, unspecified whether sciatica present  M54.50     G89.29                      Subjective: The patient reports feeling 2/10 pain at his L low back and L groin area to start the session.      Objective: See treatment diary below      Assessment: The patient tolerated all activities well today.  We focused on flexion based lumbar stretching coupled with L LE stretching and core activation flexion bias.  There were no complaints of increased pain or problems after the session today.  The patient will benefit from continued skilled physical therapy to progress towards achieving patient centered goals.         Plan: Continue per plan of care.  Progress treatment as tolerated.       Precautions: OA,DM  RE-EVAL:  Access Code [email or web address]   Specialty Daily Treatment Diary   BASELINE: L LAT HIP PAIN, L ANTERIOR HIP AND THIGH MOST DISTAL    Manual      Lumbar P/A DONE AD grade 3 DONE grade 3              Manual nerve glides L Femoral nerve?   L manual nerve gliding hip flex/add, knee, ankle x 10 reps    Piriformis Stretch B/L test   4x:30    Prone Quad stretch *           Therapeutic Exercise     Treadmill Ambulation for endurance * 1.2 mph x 6 mins 1.3 - 2.0 MPH  X 7 MINS 1.4 mph to 2.0 mph x 8 mins    UBE Stand ALT FWD/BACK for core/posture strength   90/70 x 4 mins ALT L2 x 4 mins ALT    Bike   L1 x 8 mins L1 x 10 mins    Ham stretch seated        Seated lumbar flexion    1x10 after TM    Bridges        Standing lumbar flexion   1x10  WORSE Supine D K->C x10  NC Stand 2x10 BETTER L ant thigh, (+) L lat hip pain    Supine D K->C  3x10           Supine DK->C  x15    Prone press ups 2x10 1x10 p!  1x10 after P/A - better HOLD HOLD    Standing  back extensions 1x10 1x10 HOLD HOLD    Hip sags                        Stand Hip ABD+EXT  NV              Neuro Re-ed        Core brace *   X15 supine    kegels *       Multifidi *       Glute sets * Prone 2x10      Quad DLS UE  LE  UE+LE *       Quad Cat/cow    *    Clam shells *   x15    MTP/LTP/ Anti-rotations        Towel roll education, Posture correction; movement precautions DONE ad REVIEWED  Discussion on symptom interpretation and changes to HEP done AD x 10 mins     Posterior pelvic tilt    *x20    SLB        Posture corrections seated * X10 WORSE      Femoral N glide side lying  X20 - NC      Sidestepping        Heel toe Amb                        Therapeutic Activity        Steps ups fwd/side        Crate carry        Crate lifts 3 levels        Lunges        Chair squats            Modalities        MHP/CP to L/B                             The patient was given a new home exercise plan with instruction, pictures, and verbal feedback.  The patient accepts and understands the new home activities.

## 2024-08-27 ENCOUNTER — CONSULT (OUTPATIENT)
Age: 74
End: 2024-08-27
Payer: MEDICARE

## 2024-08-27 VITALS
DIASTOLIC BLOOD PRESSURE: 79 MMHG | BODY MASS INDEX: 33.67 KG/M2 | HEIGHT: 64 IN | WEIGHT: 197.2 LBS | SYSTOLIC BLOOD PRESSURE: 129 MMHG | HEART RATE: 88 BPM

## 2024-08-27 DIAGNOSIS — M54.2 NECK PAIN: ICD-10-CM

## 2024-08-27 DIAGNOSIS — G89.4 CHRONIC PAIN SYNDROME: ICD-10-CM

## 2024-08-27 DIAGNOSIS — M51.16 LUMBAR DISC DISEASE WITH RADICULOPATHY: Primary | ICD-10-CM

## 2024-08-27 PROCEDURE — 99204 OFFICE O/P NEW MOD 45 MIN: CPT | Performed by: ANESTHESIOLOGY

## 2024-08-27 PROCEDURE — G2211 COMPLEX E/M VISIT ADD ON: HCPCS | Performed by: ANESTHESIOLOGY

## 2024-08-27 NOTE — PROGRESS NOTES
Assessment:  1. Lumbar disc disease with radiculopathy    2. Neck pain    3. Chronic pain syndrome        Plan:  Patient is a 74-year-old male with complaints of neck pain and left leg pain over the past few months with chronic pacing secondary to cervical spondylosis, lumbar radiculopathy presents to office for initial consultation.  At this time we will obtain some diagnostic imaging or patient continue with conservative therapy.  Patient does report radiculopathy in the L2 and L3 nerve root distribution left lower extremity.  1.  We will order an x-ray of the cervical spine to better assess the degenerative change and correlate patient's neck pain  2.  Will order an MRI of the lumbar spine to better assess the discogenic to 5 by patient's left lower extreme radicular symptoms  3.  We will obtain the x-ray of the lumbar spine at this office visit  4.  Follow-up 1 month to review diagnostic imaging plan interventional management        History of Present Illness:    The patient is a 74 y.o. male who presents for consultation in regards to Neck Pain, Back Pain, Groin Pain, and Leg Pain.  Symptoms have been present for 3 months. Symptoms began without any precipitating injury or trauma. Pain is reported to be 4 on the numeric rating scale.  Symptoms are felt nearly constantly and worst in the morning.  Symptoms are characterized as burning, sharp, and cutting.  Symptoms are associated with no weakness.  Aggravating factors include sitting and coughing/sneezing.  Relieving factors include lying down, standing, walking, and exercise.  No change in symptoms with kneeling, bending, leaning forward, leaning bckward, relaxation, and bowel movements.  Treatments that have been helpful include physical therapy, chiropractic manipulation, TENS unit, and heat/ice. Medications to relieve symptoms include Tylenol, steroids.    Review of Systems:    Review of Systems   Constitutional:  Negative for chills, fatigue and fever.    HENT:  Negative for hearing loss, sinus pain, sore throat and trouble swallowing.    Eyes:  Negative for pain and visual disturbance.   Respiratory:  Negative for shortness of breath and wheezing.    Cardiovascular:  Negative for chest pain and palpitations.   Gastrointestinal:  Negative for abdominal pain, constipation and nausea.   Endocrine: Negative for polydipsia and polyuria.   Genitourinary:  Negative for difficulty urinating.   Musculoskeletal:  Negative for arthralgias, gait problem, joint swelling and myalgias.   Skin:  Negative for rash.   Neurological:  Negative for dizziness, weakness and headaches.   Hematological:  Does not bruise/bleed easily.   Psychiatric/Behavioral:  Negative for dysphoric mood. The patient is not nervous/anxious.    All other systems reviewed and are negative.      Past Medical History:   Diagnosis Date    Arthritis     Colon polyp     COVID 09/07/2021    and 12/2022    Diabetes mellitus (HCC)     Dyslipidemia     Fallen arches     GERD (gastroesophageal reflux disease)     Hyperlipidemia     Nodule of left lung 2007    negative for CA    Type 2 diabetes mellitus without complication, without long-term current use of insulin (HCC) 01/23/2019    Use of cane as ambulatory aid        Past Surgical History:   Procedure Laterality Date    COLONOSCOPY      ELBOW SURGERY Bilateral     FOOT TENDON SURGERY Bilateral     heel    HAND SURGERY      UT ARTHRP KNE CONDYLE&PLATU MEDIAL&LAT COMPARTMENTS Left 8/17/2023    Procedure: ARTHROPLASTY KNEE TOTAL W ROBOT;  Surgeon: Denton Nichole MD;  Location:  MAIN OR;  Service: Orthopedics    UT ARTHRS KNE SURG W/MENISCECTOMY MED/LAT W/SHVG Right 05/23/2022    Procedure: KNEE ARTHROSCOPY WITH medial MENISCECTOMY chondroplasty synovectomy injection;  Surgeon: Hank Shirley DO;  Location: [facility];  Service: Orthopedics    UT ARTHRS KNE SURG W/MENISCECTOMY MED/LAT W/SHVG Left 2/6/2023    Procedure: Left knee arthroscopy: Medial  meniscectomy; Lateral meniscectomy; Chondroplasty; Synovectomy; Post arthroscopic injection of intra-articular joint space and peripheral portal;  Surgeon: Hank Shirley DO;  Location: [facility];  Service: Orthopedics    VT NEUROPLASTY &/TRANSPOS MEDIAN NRV CARPAL TUNNE Left 2021    Procedure: RELEASE CARPAL TUNNEL;  Surgeon: Hank Shirley DO;  Location:  MAIN OR;  Service: Orthopedics       Family History   Problem Relation Age of Onset    Heart disease Mother         hx MI    Diabetes Mother     Stroke Mother     Arthritis Mother     COPD Father         84 yrs    Coronary artery disease Father     Cancer Brother        Social History     Occupational History    Not on file   Tobacco Use    Smoking status: Former     Current packs/day: 0.00     Average packs/day: 1 pack/day for 35.0 years (35.0 ttl pk-yrs)     Types: Cigarettes     Start date:      Quit date:      Years since quittin.6    Smokeless tobacco: Never   Vaping Use    Vaping status: Never Used   Substance and Sexual Activity    Alcohol use: Yes     Comment: 2 x per week    Drug use: No    Sexual activity: Yes     Partners: Female     Birth control/protection: Male Sterilization         Current Outpatient Medications:     metFORMIN (GLUCOPHAGE) 500 mg tablet, TAKE 1 TABLET TWICE DAILY  WITH MEALS, Disp: 180 tablet, Rfl: 3    methylPREDNISolone 4 MG tablet therapy pack, Use as directed on package, Disp: 21 each, Rfl: 0    phentermine (ADIPEX-P) 37.5 MG tablet, Take 1 tablet (37.5 mg total) by mouth daily, Disp: 30 tablet, Rfl: 2    semaglutide, 1 mg/dose, (Ozempic) 4 mg/3 mL injection pen, Inject 0.75 mL (1 mg total) under the skin once a week, Disp: 9 mL, Rfl: 0    simvastatin (ZOCOR) 20 mg tablet, Take 1 tablet (20 mg total) by mouth daily at bedtime, Disp: 90 tablet, Rfl: 3    tamsulosin (FLOMAX) 0.4 mg, Take 2 capsules (0.8 mg total) by mouth daily with dinner, Disp: 180 capsule, Rfl: 1    promethazine-dextromethorphan  "(PHENERGAN-DM) 6.25-15 mg/5 mL oral syrup, Take 5 mL by mouth 4 (four) times a day as needed for cough (Patient not taking: Reported on 4/25/2024), Disp: 240 mL, Rfl: 0    No Known Allergies    Physical Exam:    /79   Pulse 88   Ht 5' 4\" (1.626 m)   Wt 89.4 kg (197 lb 3.2 oz)   BMI 33.85 kg/m²     Constitutional: normal, well developed, well nourished, alert, in no distress and non-toxic and no overt pain behavior. and obese  Eyes: anicteric  HEENT: grossly intact  Neck: supple, symmetric, trachea midline and no masses   Pulmonary:even and unlabored  Cardiovascular:No edema or pitting edema present  Skin:Normal without rashes or lesions and well hydrated  Psychiatric:Mood and affect appropriate  Neurologic:Cranial Nerves II-XII grossly intact  Musculoskeletal:antalgic    Lumbar/Sacral Spine examination demonstrates.  Decreased range of motion lumbar spine with pain upon: flexion, lateral rotation to the left/right, and bending to the left/right.  Bilateral lumbar paraspinals tender to palpation. Muscle spasms noted in the lumbar area bilaterally. 4/5 left lower extremity strength in all muscle groups. Positive seated straight leg raise for bilateral lower extremities.  Sensitivity to light touch intact bilateral lower extremities. 2+ reflexes in the patella and Achilles.  No ankle clonus        Imaging  No orders to display       No orders of the defined types were placed in this encounter.    "

## 2024-08-27 NOTE — PATIENT INSTRUCTIONS
Patient Education     Core Strengthening Exercises on Back or on Hands and Knees   About this topic   Your core muscles are in your chest, back, buttock, and stomach area. They are your abdominal, back, and pelvis muscles. These muscles help keep your body stable when using your arms or legs. They also help with balance and posture. There are many exercises you can do to keep these muscles strong.  If you have back problems like a compression fracture or a ruptured disc, doing some of these exercises could make your problem worse. Some of these exercises may cause lower back pain.  General   Before starting with a program, ask your doctor if you are healthy enough to do these exercises. Your doctor may have you work with a , chiropractor, or physical therapist to make a safe exercise program to meet your needs.  Strengthening Exercises   Strengthening exercises keep your muscles firm and strong. Start by repeating each exercise 2 to 3 times. Work up to doing each exercise 10 times. Try to do the exercises 2 to 3 times each day. Hold each exercise for 3 to 5 seconds. Do all exercises slowly.  Hip lifts ? Lie on your back with your knees bent and feet flat on the floor. Tighten your stomach muscles and push your heels into the floor to lift your buttocks off the floor. Relax.  Pelvic tilts ? Lie on your back with your knees bent and feet flat on the floor. Tighten your stomach muscles and press your lower back down to the floor. Relax.  Straight leg raises lying down ? Lie on your back with one leg straight. Bend your other knee so the foot is flat on the bed. Keeping your leg straight, lift the leg up to the level of your other knee. Lower it back down. Repeat with the other leg.  Knee flex lying down ? Lie on your back with both knees bent and your feet flat on the floor. Tighten your belly muscles. Raise one leg up and back down as if you are marching in slow motion. Keep belly muscles tight while you move  your leg. Switch legs. To make this exercise harder, raise both arms straight up in the air. Tighten your belly muscles. When you raise one leg up, reach the opposite arm over your head. Switch, moving the opposite arm and leg until you have done 10 repetitions on each side.  Abdominal crunches ? Lie on your back with both knees bent. Keep your feet flat on the floor. Place your hands in one of these positions. Try starting with the first position since it is the easiest. As you get better, use the other positions to make it harder.  Crunches with arms at sides.  Crunches with arms across chest.  Crunches with arms behind head. Be careful not to interlock your fingers behind your neck or head while doing crunches. This may add tension to your neck and cause strain.  Look at the ceiling. Tighten your belly muscles and lift your shoulders and upper back off the floor. Breathe out while you are doing this. Lower your shoulders to the floor. Breathe in while you are doing this. Relax your belly muscles all the way before starting another crunch.  Arm and leg lifts on hands and knees ? Start on your hands and knees. With all of these exercises, keep your back as level as possible. If you are having trouble with this, you may want to put a small object on your back such as a book. If it falls off, you are not keeping your back level enough during the exercise.  Lift one arm up to shoulder level and hold. Lower it back down. Now, lift up the other arm and hold.  Lift one leg up and kick it straight out until it is in line with your back and hold. Lower it back down. Now, lift up the other leg and hold.  Lift one arm and the OPPOSITE leg up at the same time and hold. Lower them down. Now, repeat using the other arm and leg. This is a very hard exercise. It may take time to be able to do this.               What will the results be?   Stronger core  Better balance  More toned belly and back muscles  Easier to do daily  activities  Better sports performance  Less low back pain  Helpful tips   Stay active and work out to keep your muscles strong and flexible.  Keep a healthy weight to avoid putting too much stress on your spine. Eat a healthy diet to keep your muscles healthy.  Be sure you do not hold your breath when exercising. This can raise your blood pressure. If you tend to hold your breath, try counting out loud when exercising. If any exercise bothers you, stop right away.  Try walking or cycling at an easy pace for a few minutes to warm up your muscles. Do this again after exercising.  Exercise may be slightly uncomfortable, but you should not have sharp pains. If you do get sharp pains, stop what you are doing. If the sharp pains continue, call your doctor.  Last Reviewed Date   2021-03-18  Consumer Information Use and Disclaimer   This generalized information is a limited summary of diagnosis, treatment, and/or medication information. It is not meant to be comprehensive and should be used as a tool to help the user understand and/or assess potential diagnostic and treatment options. It does NOT include all information about conditions, treatments, medications, side effects, or risks that may apply to a specific patient. It is not intended to be medical advice or a substitute for the medical advice, diagnosis, or treatment of a health care provider based on the health care provider's examination and assessment of a patient’s specific and unique circumstances. Patients must speak with a health care provider for complete information about their health, medical questions, and treatment options, including any risks or benefits regarding use of medications. This information does not endorse any treatments or medications as safe, effective, or approved for treating a specific patient. [employer] and its affiliates disclaim any warranty or liability relating to this information or the use thereof. The use of this information  is governed by the Terms of Use, available at https://www.wolters[employer]suwer.com/en/know/clinical-effectiveness-terms   Copyright   Copyright © 2024 [employer] and its affiliates and/or licensors. All rights reserved.

## 2024-08-28 ENCOUNTER — OFFICE VISIT (OUTPATIENT)
Dept: PHYSICAL THERAPY | Facility: CLINIC | Age: 74
End: 2024-08-28
Payer: MEDICARE

## 2024-08-28 DIAGNOSIS — G89.29 CHRONIC LOW BACK PAIN, UNSPECIFIED BACK PAIN LATERALITY, UNSPECIFIED WHETHER SCIATICA PRESENT: Primary | ICD-10-CM

## 2024-08-28 DIAGNOSIS — M54.50 CHRONIC LOW BACK PAIN, UNSPECIFIED BACK PAIN LATERALITY, UNSPECIFIED WHETHER SCIATICA PRESENT: Primary | ICD-10-CM

## 2024-08-28 PROCEDURE — 97112 NEUROMUSCULAR REEDUCATION: CPT | Performed by: PHYSICAL THERAPIST

## 2024-08-28 PROCEDURE — 97140 MANUAL THERAPY 1/> REGIONS: CPT | Performed by: PHYSICAL THERAPIST

## 2024-08-28 PROCEDURE — 97110 THERAPEUTIC EXERCISES: CPT | Performed by: PHYSICAL THERAPIST

## 2024-08-28 NOTE — PROGRESS NOTES
Daily Note     Today's date: 2024  Patient name: Jason Miguel  : 1950  MRN: [id number]  Referring provider: Olesya Wheatley CRNP  Dx:   Encounter Diagnosis     ICD-10-CM    1. Chronic low back pain, unspecified back pain laterality, unspecified whether sciatica present  M54.50     G89.29                      Subjective: The patient reports waking up in the morning has been painful each morning for the past 2-3 weeks.  The patient has been doing  double knee to chest, clam shells, and seated lumbar flexion which does help to decrease the patient's pain and stiffness.  The patient is also using a TENS unit.      Objective: See treatment diary below      Assessment: The patient tolerated all activities well today.  The patient ws given information/education/handout regarding sleep positioning and he was encouraged to perform the exercises multiple times per day.  The patient is experiencing less pain throughout the day.  There were no complaints of increased pain or problems after the session today.  The patient will benefit from continued skilled physical therapy to progress towards achieving patient centered goals.         Plan: Continue per plan of care.  Progress treatment as tolerated.       Precautions: OA,DM  RE-EVAL:  Access Code [email or web address]   Specialty Daily Treatment Diary   BASELINE: L LAT HIP PAIN, L ANTERIOR HIP AND THIGH MOST DISTAL    Manual     Lumbar P/A DONE AD grade 3 DONE grade 3              Manual nerve glides L Femoral nerve?   L manual nerve gliding hip flex/add, knee, ankle x 10 reps DONE ALL AD   Piriformis Stretch B/L test   4x:30 4x:30 B/L   Prone Quad stretch *           Therapeutic Exercise    Treadmill Ambulation for endurance * 1.2 mph x 6 mins 1.3 - 2.0 MPH  X 7 MINS 1.4 mph to 2.0 mph x 8 mins 1.5 mph - 2.3 mph x 8 mins   UBE Stand ALT FWD/BACK for core/posture strength   90/70 x 4 mins ALT L2 x 4 mins ALT L2 x 4 mins    Bike   L1 x 8 mins L1 x 10 mins    Ham stretch seated        Seated lumbar flexion    1x10 after TM 1x10 after TM+UBE   Bridges        Standing lumbar flexion   1x10  WORSE Supine D K->C x10  NC Stand 2x10 BETTER L ant thigh, (+) L lat hip pain    Supine D K->C  3x10           Supine DK->C  x15           Supine D->C  2x10   Prone press ups 2x10 1x10 p!  1x10 after P/A - better HOLD HOLD    Standing back extensions 1x10 1x10 HOLD HOLD    Hip sags                        Stand Hip ABD+EXT  NV              Neuro Re-ed        Core brace *   X15 supine X10 w/ UBE   kegels *       Multifidi *       Glute sets * Prone 2x10      Quad DLS UE  LE  UE+LE *       Quad Cat/cow    *    Clam shells *   x15 X20 RED B/L   MTP/LTP/ Anti-rotations        Towel roll education, Posture correction; movement precautions DONE ad REVIEWED  Discussion on symptom interpretation and changes to HEP done AD x 10 mins  Sleep education including demonstration of night roll   Posterior pelvic tilt    *x20 X20  Demo showing patient can do these in supine, standing, and sitting - attempt stand/sit NV   SLB        Posture corrections seated * X10 WORSE      Femoral N glide side lying  X20 - NC      Sidestepping        Heel toe Amb                        Therapeutic Activity        Steps ups fwd/side        Crate carry        Crate lifts 3 levels        Lunges        Chair squats            Modalities        MHP/CP to L/B                               The patient was given a new home exercise plan with instruction, pictures, and verbal feedback.  The patient accepts and understands the new home activities.

## 2024-09-04 ENCOUNTER — HOSPITAL ENCOUNTER (OUTPATIENT)
Dept: MRI IMAGING | Facility: HOSPITAL | Age: 74
Discharge: HOME | End: 2024-09-04
Payer: MEDICARE

## 2024-09-04 ENCOUNTER — OFFICE VISIT (OUTPATIENT)
Dept: PHYSICAL THERAPY | Facility: CLINIC | Age: 74
End: 2024-09-04
Payer: MEDICARE

## 2024-09-04 DIAGNOSIS — G89.4 CHRONIC PAIN SYNDROME: ICD-10-CM

## 2024-09-04 DIAGNOSIS — M54.50 CHRONIC LOW BACK PAIN, UNSPECIFIED BACK PAIN LATERALITY, UNSPECIFIED WHETHER SCIATICA PRESENT: Primary | ICD-10-CM

## 2024-09-04 DIAGNOSIS — M54.2 NECK PAIN: ICD-10-CM

## 2024-09-04 DIAGNOSIS — G89.29 CHRONIC LOW BACK PAIN, UNSPECIFIED BACK PAIN LATERALITY, UNSPECIFIED WHETHER SCIATICA PRESENT: Primary | ICD-10-CM

## 2024-09-04 DIAGNOSIS — M51.16 LUMBAR DISC DISEASE WITH RADICULOPATHY: ICD-10-CM

## 2024-09-04 PROCEDURE — 97112 NEUROMUSCULAR REEDUCATION: CPT | Performed by: PHYSICAL THERAPIST

## 2024-09-04 PROCEDURE — 97140 MANUAL THERAPY 1/> REGIONS: CPT | Performed by: PHYSICAL THERAPIST

## 2024-09-04 PROCEDURE — 97110 THERAPEUTIC EXERCISES: CPT | Performed by: PHYSICAL THERAPIST

## 2024-09-04 PROCEDURE — 72148 MRI LUMBAR SPINE W/O DYE: CPT

## 2024-09-04 NOTE — PROGRESS NOTES
Daily Note     Today's date: 2024  Patient name: Jason Miguel  : 1950  MRN: [id number]  Referring provider: Olesya Wheatley CRNP  Dx:   Encounter Diagnosis     ICD-10-CM    1. Chronic low back pain, unspecified back pain laterality, unspecified whether sciatica present  M54.50     G89.29                      Subjective: The patient notes he is significantly better compared to when he started  therapy.  The patient now has no pain at the L anterior hip except for rare brief moments of pain which come and go quickly.  The patient notes just mild stiffness at the lumbar spine.      Objective: See treatment diary below      Assessment: The patient was very pleasant and agreeable during the session today.  The patient tolerated the exercises fairly well today.  The patient notes improvement with going sit to stand and also his nights are going much better with little to no pain.  The patient was educated to stay active and perform his exercises often.  The patient reports feeling good at the end of the treatment today.  The patient will be re-assessed next visit to determine the need for continued PT.        Plan: Continue per plan of care.  Progress treatment as tolerated.  RE-EVAL NV with possible DC if patient remains status quo or improved.     Precautions: OA,DM  RE-EVAL:  Access Code [email or web address]   Specialty Daily Treatment Diary   BASELINE: L LAT HIP PAIN, L ANTERIOR HIP AND THIGH MOST DISTAL    Manual     Lumbar P/A  DONE grade 3              Manual nerve glides DONE ALL AD   L manual nerve gliding hip flex/add, knee, ankle x 10 reps DONE ALL AD   Piriformis Stretch B/L 4x:30 B/L   4x:30 4x:30 B/L   Prone Quad stretch            Therapeutic Exercise    Treadmill Ambulation for endurance 2.0 mph - 2.3mph x 13 mins 1.2 mph x 6 mins 1.3 - 2.0 MPH  X 7 MINS 1.4 mph to 2.0 mph x 8 mins 1.5 mph - 2.3 mph x 8 mins   UBE Stand ALT FWD/BACK for core/posture  strength   90/70 x 4 mins ALT L2 x 4 mins ALT L2 x 4 mins   Bike   L1 x 8 mins L1 x 10 mins    Ham stretch seated        Seated lumbar flexion reviewed   1x10 after TM 1x10 after TM+UBE   Bridges        Standing lumbar flexion             Supine DK->C 2x10 Supine D K->C x10  NC Stand 2x10 BETTER L ant thigh, (+) L lat hip pain    Supine D K->C  3x10           Supine DK->C  x15           Supine D->C  2x10   Prone press ups  1x10 p!  1x10 after P/A - better HOLD HOLD    Standing back extensions  1x10 HOLD HOLD    Hip sags                        Stand Hip ABD+EXT  NV              Neuro Re-ed        Core brace    X15 supine X10 w/ UBE   kegels        Multifidi        Glute sets x10 Prone 2x10      Quad DLS UE  LE  UE+LE        Quad Cat/cow x15   *    Clam shells GRN x20   x15 X20 RED B/L   MTP/LTP/ Anti-rotations        Towel roll education, Posture correction; movement precautions Reviewed sleep positioning, exercise schedule, treatment frequency versus maintenance schedule DONE AD REVIEWED  Discussion on symptom interpretation and changes to HEP done AD x 10 mins  Sleep education including demonstration of night roll   Posterior pelvic tilt x20   *x20 X20  Demo showing patient can do these in supine, standing, and sitting - attempt stand/sit NV   SLB        Posture corrections seated  X10 WORSE      Femoral N glide side lying  X20 - NC      Sidestepping        Heel toe Amb                        Therapeutic Activity        Steps ups fwd/side        Crate carry        Crate lifts 3 levels        Lunges        Chair squats            Modalities        MHP/CP to L/B

## 2024-09-07 NOTE — PROGRESS NOTES
PT Re-Evaluation  and PT Discharge    Today's date: 9/10/2024  Patient name: Jason Miguel  : 1950  MRN: [id number]  Referring provider: Olesya Wheatley CRNP  Dx:   Encounter Diagnosis     ICD-10-CM    1. Chronic low back pain, unspecified back pain laterality, unspecified whether sciatica present  M54.50     G89.29                      Assessment  Impairments: abnormal or restricted ROM, activity intolerance, impaired balance, impaired physical strength, lacks appropriate home exercise program, pain with function and poor posture   Functional limitations: difficulty with prolonged standing, prolonged walking, difficulty standing up from a seated position, and difficulty lifting/carrying heavy objects.  Symptom irritability: moderate    Assessment details: Jason Miguel is a 74 y.o. male that has attended 7 sessions of physical therapy for chronic low back pain with L LE radiculopathy. presents for a re-evaluation today.  During the examination the patient demonstrates: improved lumbar ROM/mobility, improved activity tolerance, improved LE/core strength, and decreased L hip/Low back pain.  The patient has made functional gains since starting therapy.  The patient is now able to***.  The patient continues to have difficulty with ***.  The patient will benefit from continued skilled PT to address impairments, work towards goals, and restore patient PLOF.        Impairments include:  1) lumbar hypomobility - addressing with mobs and mobility exercises    2) Core muscle weakness - addressing with neuromotor retraining /core strengthening.  3) LE neural tension - addressing with mobs and mobility exercises   4) Poor posture/activity intolerance - addressing with functional retraining                           Understanding of Dx/Px/POC: good     Prognosis: good  Prognosis details: Positive prognostic indicators include: positive attitude toward recovery, good understanding of diagnosis/treatment plan,  "and absence of observed red flags.      Negative prognostic indicators include: Significant medical Hx, chronicity of condition, lacking true directional preference    Goals    STG: Achieve in 4-6 weeks  1.  Decrease lumbar pain at worst by 50% to improve activity tolerance for self/care and leisure activities.  2.  Improve Lumbo-pelvic core strength by 1/2 muscle grade  or \" good activation\" to improve ability to change body positions without difficulty.  3.  Improve lumbar ROM to \" WFL\" to facilitate normal movement patterns for ADL's/work tasks.  4.  Pt will report overall improved tolerance for sustained mobility/activity by at least 25-50% since SOC with reduced pain levels and reduced fear avoidance.    LTG: Achieve in 8-12 weeks  1.  Patient's will be able to return to getting up from sitting position without L hip pain.  2.  Patient tolerate prolonged standing/walking without L hip/LE pain to achieve patient specific goal.  3.  Patient will be independent with home exercise program.   4.  Patient will be able to manage symptoms independently.        Plan  Patient would benefit from: skilled physical therapy  Planned modality interventions: cryotherapy, thermotherapy: hydrocollator packs and traction    Planned therapy interventions: joint mobilization, manual therapy, neuromuscular re-education, patient education, postural training, self care, strengthening, stretching, therapeutic activities, therapeutic exercise, home exercise program, abdominal trunk stabilization, balance, body mechanics training and IASTM    Frequency: 1-3x/wk.  Duration in weeks: 12  Plan of Care beginning date: 8/12/2024  Plan of Care expiration date: 11/11/2024  Treatment plan discussed with: PTA and patient  Plan details: RE-ASSESS 1X/MONTH      Subjective Evaluation    History of Present Illness  Date of onset: 5/15/2024  Mechanism of injury: SUBJECTIVE: 9/10/24: The patient reports an improvement in activity tolerance, LE " "strength, and lumbar/ L hip ROM since starting therapy.  The patient is now able to transfer sit to stand, car transfer, and get up in the AM with much less L hip / low back pain.  The patient has been able to manage his symptoms with repeated lumbar flexion exercises.  The patient will be ***.          INJURY HISTORY: Jason Miguel is a 74 y.o. male that presents to outpatient physical therapy with complaints of chronic L LE pain and difficulty functioning.  The patient reports a worsening of pain at his R anterior lateral thigh onset without known cause.  The patient is known to me and has had long history of L LE pain and lumbar issues.  The patient notes difficulty with going sitting to standing.  He notes once his L hip/thigh is \" warmed up\" he is able to walk and stand without difficulty.  The patient notes intermittent tingling at the L anterior hip.  The patient has taken 2 courses of Medrol dose packs to address the pain and inflammation which helped to decrease his pain/symptoms.  The patient is seeing a chiropractor as well who is focusing on soft tissue treatment at his L hip flexors.  The patient's main goal for physical therapy is to get rid of the pain so that he can stand up from a seated position without pain.             Recurrent probem    Patient Goals  Patient goals for therapy: decreased pain, increased motion, increased strength, independence with ADLs/IADLs, return to sport/leisure activities and return to work  Patient goal: stand up without pain/difficulty L hip  Pain  Current pain ratin  At best pain ratin  At worst pain rating: 10  Location: L anterior/lateral hip  Quality: needle-like and sharp  Relieving factors: rest (sit)  Aggravating factors: standing, walking and lifting  Progression: worsening    Social Support  Steps to enter house: yes  Stairs in house: yes   Lives in: multiple-level home  Lives with: spouse    Employment status: working (drives school " bus)  Treatments  Previous treatment: medication  Current treatment: chiropractic and physical therapy      Objective     Concurrent Complaints  Negative for night pain, disturbed sleep, bladder dysfunction, bowel dysfunction, saddle (S4) numbness, history of cancer, history of trauma and infection    Static Posture     Lumbar Spine   Increased lordosis.   Lumbar spine (Left): Shifted.     Pelvis   Anterior pelvic tilt    Postural Observations  Seated posture: fair  Standing posture: fair      Neurological Testing     Sensation     Lumbar   Left   Intact: light touch    Right   Intact: light touch    Reflexes   Left   Patellar (L4): normal (2+)  Achilles (S1): normal (2+)  Babinski sign: negative    Right   Patellar (L4): normal (2+)  Achilles (S1): normal (2+)  Babinski sign: negative    Active Range of Motion     Lumbar   Flexion: Active lumbar flexion: lumbar.  with pain Restriction level: minimal  Extension: Active lumbar extension: lumbar.  with pain Restriction level: moderate  Left lateral flexion:  Restriction level: minimal  Right lateral flexion:  Restriction level: minimal  Left rotation:  Restriction level: minimal  Right rotation:  Restriction level: minimal    Additional Active Range of Motion Details  Prone knee flexion: L 108 deg p! R: 130 deg    Joint Play     Hypomobile: L3, L4, L5 and S1   Mechanical Assessment    Cervical      Thoracic      Lumbar    Standing flexion: repeated movements   Pain location:peripheralized  Pain intensity: worse  Pain level: increased  Standing extension:   Pain location: no change  Pain intensity: worse    Strength/Myotome Testing     Lumbar   Left   Heel walk: normal  Toe walk: normal    Right   Heel walk: normal  Toe walk: normal    Left Hip   Planes of Motion   Flexion: 4-  Abduction: 4  Adduction: 4    Right Hip   Planes of Motion   Flexion: 4  Abduction: 4  Adduction: 4    Left Knee   Flexion: 4  Extension: 4    Right Knee   Flexion: 4  Extension: 4    Left  Ankle/Foot   Dorsiflexion: 5  Plantar flexion: 5  Great toe extension: 5    Right Ankle/Foot   Dorsiflexion: 5  Plantar flexion: 5  Great toe extension: 5    Muscle Activation     Additional Muscle Activation Details  Decreased TrA, multifidus, gluteal activation with transfers / position changes / dynamic movement      Tests     Lumbar   Negative SIJ compression and sacral spring .     Left   Positive femoral stretch.   Negative passive SLR and slump test.     Right   Positive slump test.   Negative passive SLR.     Left Pelvic Girdle/Sacrum   Negative: active SLR test.     Right Pelvic Girdle/Sacrum   Negative: active SLR test.     Left Hip   Negative FADIR.     Right Hip   Negative FADIR.     Additional Tests Details  FOTO: 67% ( predicted 73%)    30SSTS: 10 stands no hand (+) shift to Right                 Precautions: OA,DM  RE-EVAL:9/9  Access Code [email or web address]   Specialty Daily Treatment Diary   BASELINE: L LAT HIP PAIN, L ANTERIOR HIP AND THIGH MOST DISTAL    Manual  9/4 9/10 8/19 8/26 8/28   Lumbar P/A                Manual nerve glides DONE ALL AD   L manual nerve gliding hip flex/add, knee, ankle x 10 reps DONE ALL AD   Piriformis Stretch B/L 4x:30 B/L   4x:30 4x:30 B/L   Prone Quad stretch            Therapeutic Exercise 9/4 9/10 8/19 8/26 8/28   Treadmill Ambulation for endurance 2.0 mph - 2.3mph x 13 mins  1.3 - 2.0 MPH  X 7 MINS 1.4 mph to 2.0 mph x 8 mins 1.5 mph - 2.3 mph x 8 mins   UBE Stand ALT FWD/BACK for core/posture strength   90/70 x 4 mins ALT L2 x 4 mins ALT L2 x 4 mins   Bike   L1 x 8 mins L1 x 10 mins    Ham stretch seated        Seated lumbar flexion reviewed   1x10 after TM 1x10 after TM+UBE   Bridges        Standing lumbar flexion             Supine DK->C 2x10  Stand 2x10 BETTER L ant thigh, (+) L lat hip pain    Supine D K->C  3x10           Supine DK->C  x15           Supine D->C  2x10   Prone press ups   HOLD HOLD    Standing back extensions   HOLD HOLD    Hip sags                         Stand Hip ABD+EXT                Neuro Re-ed        Core brace    X15 supine X10 w/ UBE   kegels        Multifidi        Glute sets x10       Quad DLS UE  LE  UE+LE        Quad Cat/cow x15   *    Clam shells GRN x20   x15 X20 RED B/L   MTP/LTP/ Anti-rotations        Towel roll education, Posture correction; movement precautions Reviewed sleep positioning, exercise schedule, treatment frequency versus maintenance schedule DONE AD  Discussion on symptom interpretation and changes to HEP done AD x 10 mins  Sleep education including demonstration of night roll   Posterior pelvic tilt x20   *x20 X20  Demo showing patient can do these in supine, standing, and sitting - attempt stand/sit NV   SLB        Posture corrections seated        Femoral N glide side lying        Sidestepping        Heel toe Amb                        Therapeutic Activity        Steps ups fwd/side        Crate carry        Crate lifts 3 levels        Lunges        Chair squats            Modalities        MHP/CP to L/B                                 The patient was given a new home exercise plan with instruction, pictures, and verbal feedback.  The patient accepts and understands the new home activities.

## 2024-09-08 DIAGNOSIS — J02.9 SORE THROAT: ICD-10-CM

## 2024-09-08 DIAGNOSIS — R05.1 ACUTE COUGH: ICD-10-CM

## 2024-09-08 DIAGNOSIS — J06.9 VIRAL UPPER RESPIRATORY TRACT INFECTION: ICD-10-CM

## 2024-09-08 RX ORDER — DEXTROMETHORPHAN HYDROBROMIDE AND PROMETHAZINE HYDROCHLORIDE 15; 6.25 MG/5ML; MG/5ML
5 SYRUP ORAL 4 TIMES DAILY PRN
Qty: 240 ML | Refills: 0 | Status: SHIPPED | OUTPATIENT
Start: 2024-09-08

## 2024-09-09 ENCOUNTER — APPOINTMENT (OUTPATIENT)
Dept: PHYSICAL THERAPY | Facility: CLINIC | Age: 74
End: 2024-09-09
Payer: MEDICARE

## 2024-09-10 ENCOUNTER — APPOINTMENT (OUTPATIENT)
Dept: PHYSICAL THERAPY | Facility: CLINIC | Age: 74
End: 2024-09-10
Payer: MEDICARE

## 2024-09-10 DIAGNOSIS — M54.50 CHRONIC LOW BACK PAIN, UNSPECIFIED BACK PAIN LATERALITY, UNSPECIFIED WHETHER SCIATICA PRESENT: Primary | ICD-10-CM

## 2024-09-10 DIAGNOSIS — G89.29 CHRONIC LOW BACK PAIN, UNSPECIFIED BACK PAIN LATERALITY, UNSPECIFIED WHETHER SCIATICA PRESENT: Primary | ICD-10-CM

## 2024-09-16 DIAGNOSIS — E11.9 TYPE 2 DIABETES MELLITUS WITHOUT COMPLICATION, WITHOUT LONG-TERM CURRENT USE OF INSULIN (HCC): Primary | ICD-10-CM

## 2024-09-17 ENCOUNTER — OFFICE VISIT (OUTPATIENT)
Age: 74
End: 2024-09-17
Payer: MEDICARE

## 2024-09-17 VITALS
DIASTOLIC BLOOD PRESSURE: 76 MMHG | BODY MASS INDEX: 33.63 KG/M2 | WEIGHT: 197 LBS | SYSTOLIC BLOOD PRESSURE: 127 MMHG | HEIGHT: 64 IN | HEART RATE: 86 BPM

## 2024-09-17 DIAGNOSIS — E11.9 TYPE 2 DIABETES MELLITUS WITHOUT COMPLICATION, WITHOUT LONG-TERM CURRENT USE OF INSULIN (HCC): ICD-10-CM

## 2024-09-17 DIAGNOSIS — M48.061 SPINAL STENOSIS OF LUMBAR REGION, UNSPECIFIED WHETHER NEUROGENIC CLAUDICATION PRESENT: Primary | ICD-10-CM

## 2024-09-17 DIAGNOSIS — G89.29 CHRONIC BILATERAL LOW BACK PAIN, UNSPECIFIED WHETHER SCIATICA PRESENT: ICD-10-CM

## 2024-09-17 DIAGNOSIS — M47.816 LUMBAR SPONDYLOSIS: ICD-10-CM

## 2024-09-17 DIAGNOSIS — M54.50 CHRONIC BILATERAL LOW BACK PAIN, UNSPECIFIED WHETHER SCIATICA PRESENT: ICD-10-CM

## 2024-09-17 PROCEDURE — G2211 COMPLEX E/M VISIT ADD ON: HCPCS | Performed by: ANESTHESIOLOGY

## 2024-09-17 PROCEDURE — 99214 OFFICE O/P EST MOD 30 MIN: CPT | Performed by: ANESTHESIOLOGY

## 2024-09-17 NOTE — PATIENT INSTRUCTIONS
Patient Education     Core Strengthening Exercises on Back or on Hands and Knees   About this topic   Your core muscles are in your chest, back, buttock, and stomach area. They are your abdominal, back, and pelvis muscles. These muscles help keep your body stable when using your arms or legs. They also help with balance and posture. There are many exercises you can do to keep these muscles strong.  If you have back problems like a compression fracture or a ruptured disc, doing some of these exercises could make your problem worse. Some of these exercises may cause lower back pain.  General   Before starting with a program, ask your doctor if you are healthy enough to do these exercises. Your doctor may have you work with a , chiropractor, or physical therapist to make a safe exercise program to meet your needs.  Strengthening Exercises   Strengthening exercises keep your muscles firm and strong. Start by repeating each exercise 2 to 3 times. Work up to doing each exercise 10 times. Try to do the exercises 2 to 3 times each day. Hold each exercise for 3 to 5 seconds. Do all exercises slowly.  Hip lifts ? Lie on your back with your knees bent and feet flat on the floor. Tighten your stomach muscles and push your heels into the floor to lift your buttocks off the floor. Relax.  Pelvic tilts ? Lie on your back with your knees bent and feet flat on the floor. Tighten your stomach muscles and press your lower back down to the floor. Relax.  Straight leg raises lying down ? Lie on your back with one leg straight. Bend your other knee so the foot is flat on the bed. Keeping your leg straight, lift the leg up to the level of your other knee. Lower it back down. Repeat with the other leg.  Knee flex lying down ? Lie on your back with both knees bent and your feet flat on the floor. Tighten your belly muscles. Raise one leg up and back down as if you are marching in slow motion. Keep belly muscles tight while you move  your leg. Switch legs. To make this exercise harder, raise both arms straight up in the air. Tighten your belly muscles. When you raise one leg up, reach the opposite arm over your head. Switch, moving the opposite arm and leg until you have done 10 repetitions on each side.  Abdominal crunches ? Lie on your back with both knees bent. Keep your feet flat on the floor. Place your hands in one of these positions. Try starting with the first position since it is the easiest. As you get better, use the other positions to make it harder.  Crunches with arms at sides.  Crunches with arms across chest.  Crunches with arms behind head. Be careful not to interlock your fingers behind your neck or head while doing crunches. This may add tension to your neck and cause strain.  Look at the ceiling. Tighten your belly muscles and lift your shoulders and upper back off the floor. Breathe out while you are doing this. Lower your shoulders to the floor. Breathe in while you are doing this. Relax your belly muscles all the way before starting another crunch.  Arm and leg lifts on hands and knees ? Start on your hands and knees. With all of these exercises, keep your back as level as possible. If you are having trouble with this, you may want to put a small object on your back such as a book. If it falls off, you are not keeping your back level enough during the exercise.  Lift one arm up to shoulder level and hold. Lower it back down. Now, lift up the other arm and hold.  Lift one leg up and kick it straight out until it is in line with your back and hold. Lower it back down. Now, lift up the other leg and hold.  Lift one arm and the OPPOSITE leg up at the same time and hold. Lower them down. Now, repeat using the other arm and leg. This is a very hard exercise. It may take time to be able to do this.               What will the results be?   Stronger core  Better balance  More toned belly and back muscles  Easier to do daily  activities  Better sports performance  Less low back pain  Helpful tips   Stay active and work out to keep your muscles strong and flexible.  Keep a healthy weight to avoid putting too much stress on your spine. Eat a healthy diet to keep your muscles healthy.  Be sure you do not hold your breath when exercising. This can raise your blood pressure. If you tend to hold your breath, try counting out loud when exercising. If any exercise bothers you, stop right away.  Try walking or cycling at an easy pace for a few minutes to warm up your muscles. Do this again after exercising.  Exercise may be slightly uncomfortable, but you should not have sharp pains. If you do get sharp pains, stop what you are doing. If the sharp pains continue, call your doctor.  Last Reviewed Date   2021-03-18  Consumer Information Use and Disclaimer   This generalized information is a limited summary of diagnosis, treatment, and/or medication information. It is not meant to be comprehensive and should be used as a tool to help the user understand and/or assess potential diagnostic and treatment options. It does NOT include all information about conditions, treatments, medications, side effects, or risks that may apply to a specific patient. It is not intended to be medical advice or a substitute for the medical advice, diagnosis, or treatment of a health care provider based on the health care provider's examination and assessment of a patient’s specific and unique circumstances. Patients must speak with a health care provider for complete information about their health, medical questions, and treatment options, including any risks or benefits regarding use of medications. This information does not endorse any treatments or medications as safe, effective, or approved for treating a specific patient. [employer] and its affiliates disclaim any warranty or liability relating to this information or the use thereof. The use of this information  is governed by the Terms of Use, available at https://www.woltersSymformuwer.com/en/know/clinical-effectiveness-terms   Copyright   Copyright © 2024 [employer] and its affiliates and/or licensors. All rights reserved.

## 2024-09-17 NOTE — PROGRESS NOTES
Assessment:  1. Spinal stenosis of lumbar region, unspecified whether neurogenic claudication present    2. Lumbar spondylosis    3. Chronic bilateral low back pain, unspecified whether sciatica present        Plan:  Patient is a 74-year-old male with complaints of low back pain and left leg pain with chronic pain some secondary to lumbar radiculopathy, lumbar degenerative disease presents to office for follow-up visit.  MRI of lumbar spine was ordered at last office visit which shows multilevel broad-based disc bulges with mild facet arthropathy most significant at L3-L4 and L4-L5 showing facet arthropathy with ligamentum flavum thickening and broad disc bulge causing severe canal stenosis and bilateral subarticular stenosis in addition to moderate bilateral foraminal narrowing.  1.  We discussed core strengthening exercises and the efficacy of interlaminar epidural injections for his current symptoms.  Patient's symptoms are mainly when standing therefore we feel that conservative therapy would be the best means to help alleviate his symptoms and keep them at bay.  2.  Follow-up as needed    I have spent a total time of 30 minutes in caring for this patient on the day of the visit/encounter including Diagnostic results, Prognosis, Risks and benefits of tx options, and Patient and family education.           History of Present Illness:  The patient is a 74 y.o. male who presents for a follow up office visit in regards to Back Pain and Leg Pain.   The patient’s current symptoms include 8 out of 10 intermittent burning, sharp, shooting pain with any particular time pattern.    Current pain medications includes: None.      I have personally reviewed and/or updated the patient's past medical history, past surgical history, family history, social history, current medications, allergies, and vital signs today.         Review of Systems  Review of Systems   Constitutional:  Negative for unexpected weight change.   HENT:   Negative for hearing loss.    Eyes:  Negative for visual disturbance.   Respiratory:  Negative for shortness of breath.    Cardiovascular:  Negative for leg swelling.   Gastrointestinal:  Negative for constipation.   Endocrine: Negative for polyuria.   Genitourinary:  Negative for difficulty urinating.   Musculoskeletal:  Positive for gait problem. Negative for joint swelling and myalgias.        Joint stiffness   Skin:  Negative for rash.   Neurological:  Negative for weakness and headaches.   Psychiatric/Behavioral:  Negative for decreased concentration.    All other systems reviewed and are negative.      Past Medical History:   Diagnosis Date    Arthritis     Colon polyp     COVID 09/07/2021    and 12/2022    Diabetes mellitus (HCC)     Dyslipidemia     Fallen arches     GERD (gastroesophageal reflux disease)     Hyperlipidemia     Nodule of left lung 2007    negative for CA    Type 2 diabetes mellitus without complication, without long-term current use of insulin (HCC) 01/23/2019    Use of cane as ambulatory aid        Past Surgical History:   Procedure Laterality Date    COLONOSCOPY      ELBOW SURGERY Bilateral     FOOT TENDON SURGERY Bilateral     heel    HAND SURGERY      TX ARTHRP KNE CONDYLE&PLATU MEDIAL&LAT COMPARTMENTS Left 8/17/2023    Procedure: ARTHROPLASTY KNEE TOTAL W ROBOT;  Surgeon: Denton Nichole MD;  Location:  MAIN OR;  Service: Orthopedics    TX ARTHRS KNE SURG W/MENISCECTOMY MED/LAT W/SHVG Right 05/23/2022    Procedure: KNEE ARTHROSCOPY WITH medial MENISCECTOMY chondroplasty synovectomy injection;  Surgeon: Hank Shirley DO;  Location: [facility];  Service: Orthopedics    TX ARTHRS KNE SURG W/MENISCECTOMY MED/LAT W/SHVG Left 2/6/2023    Procedure: Left knee arthroscopy: Medial meniscectomy; Lateral meniscectomy; Chondroplasty; Synovectomy; Post arthroscopic injection of intra-articular joint space and peripheral portal;  Surgeon: Hank Shirley DO;  Location: [facility];   Service: Orthopedics    HI NEUROPLASTY &/TRANSPOS MEDIAN NRV CARPAL TUNNE Left 2021    Procedure: RELEASE CARPAL TUNNEL;  Surgeon: Hank Shirley DO;  Location:  MAIN OR;  Service: Orthopedics       Family History   Problem Relation Age of Onset    Heart disease Mother         hx MI    Diabetes Mother     Stroke Mother     Arthritis Mother     COPD Father         84 yrs    Coronary artery disease Father     Cancer Brother        Social History     Occupational History    Not on file   Tobacco Use    Smoking status: Former     Current packs/day: 0.00     Average packs/day: 1 pack/day for 35.0 years (35.0 ttl pk-yrs)     Types: Cigarettes     Start date:      Quit date:      Years since quittin.7    Smokeless tobacco: Never   Vaping Use    Vaping status: Never Used   Substance and Sexual Activity    Alcohol use: Yes     Comment: 2 x per week    Drug use: No    Sexual activity: Yes     Partners: Female     Birth control/protection: Male Sterilization         Current Outpatient Medications:     metFORMIN (GLUCOPHAGE) 500 mg tablet, TAKE 1 TABLET TWICE DAILY  WITH MEALS, Disp: 180 tablet, Rfl: 3    phentermine (ADIPEX-P) 37.5 MG tablet, Take 1 tablet (37.5 mg total) by mouth daily, Disp: 30 tablet, Rfl: 2    promethazine-dextromethorphan (PHENERGAN-DM) 6.25-15 mg/5 mL oral syrup, Take 5 mL by mouth 4 (four) times a day as needed for cough, Disp: 240 mL, Rfl: 0    semaglutide, 1 mg/dose, (Ozempic) 4 mg/3 mL injection pen, Inject 0.75 mL (1 mg total) under the skin once a week, Disp: 9 mL, Rfl: 0    simvastatin (ZOCOR) 20 mg tablet, Take 1 tablet (20 mg total) by mouth daily at bedtime, Disp: 90 tablet, Rfl: 3    tamsulosin (FLOMAX) 0.4 mg, Take 2 capsules (0.8 mg total) by mouth daily with dinner, Disp: 180 capsule, Rfl: 1    methylPREDNISolone 4 MG tablet therapy pack, Use as directed on package (Patient not taking: Reported on 2024), Disp: 21 each, Rfl: 0    No Known Allergies    Physical  "Exam:    /76   Pulse 86   Ht 5' 4\" (1.626 m)   Wt 89.4 kg (197 lb)   BMI 33.81 kg/m²     Constitutional:normal, well developed, well nourished, alert, in no distress and non-toxic and no overt pain behavior. and obese  Eyes:anicteric  HEENT:grossly intact  Neck:supple, symmetric, trachea midline and no masses   Pulmonary:even and unlabored  Cardiovascular:No edema or pitting edema present  Skin:Normal without rashes or lesions and well hydrated  Psychiatric:Mood and affect appropriate  Neurologic:Cranial Nerves II-XII grossly intact  Musculoskeletal:antalgic    Lumbar/Sacral Spine examination demonstrates.  Full range of motion lumbar spine with pain upon: flexion, lateral rotation to the left/right, and bending to the left/right.  Bilateral lumbar paraspinals tender to palpation. Muscle spasms noted in the lumbar area bilaterally. 4/5 lower extremity strength in all muscle groups bilaterally. Positive seated straight leg raise for bilateral lower extremities.  Sensitivity to light touch intact bilateral lower extremities. 2+ reflexes in the patella and Achilles.  No ankle clonus    Imaging      MRI LUMBAR SPINE WITHOUT CONTRAST     INDICATION: M51.16: Intervertebral disc disorders with radiculopathy, lumbar region  M54.2: Cervicalgia  G89.4: Chronic pain syndrome.     COMPARISON: Radiographs from August 27, 2024     TECHNIQUE:  Multiplanar, multisequence imaging of the lumbar spine was performed. .        IMAGE QUALITY:  Diagnostic     FINDINGS:     VERTEBRAL BODIES:  There are 5 lumbar type vertebral bodies. Lordotic straightening. Grade 1 retrolisthesis at L5-S1. No compression fracture. Normal marrow signal is identified within the visualized bony structures.  No discrete marrow lesion.     SACRUM:  Normal signal within the sacrum. No evidence of insufficiency or stress fracture.     DISTAL CORD AND CONUS:  Normal size and signal within the distal cord and conus.     PARASPINAL SOFT TISSUES:  " Paraspinal soft tissues are unremarkable.     LOWER THORACIC DISC SPACES: Mild noncompressive lower thoracic degenerative change.     LUMBAR DISC SPACES:     L1-L2: Shallow disc bulge. No significant stenosis.     L2-L3: Broad disc bulge. Mild facet hypertrophy. No significant canal stenosis. Minimal bilateral foraminal narrowing.     L3-L4: Broad disc bulge. Left subarticular extrusion with caudal migration. Bilateral facet arthropathy and ligamentum flavum thickening. Severe canal stenosis. Left worse than right subarticular stenosis with compression of the intraspinal L4 nerve   roots. Marginal osteophytes contribute to moderate bilateral foraminal stenosis, worse on the left.     L4-L5: Bilateral facet arthropathy and ligamentum flavum thickening. Broad disc bulge with marginal osteophytes. Severe canal stenosis and bilateral subarticular stenosis. Bilateral foraminal protrusions produce moderate bilateral foraminal narrowing,   worse on the left.     L5-S1: Disc height loss. Grade 1 retrolisthesis. Broad disc osteophyte. Moderate bilateral facet arthropathy no canal stenosis. Marginal osteophytes contribute to moderate bilateral foraminal narrowing.     OTHER FINDINGS:  None.     IMPRESSION:     L3-4 left subarticular extrusion with caudal migration impinges on the intraspinal left L4 nerve root, correlate clinically for left L4 radiculopathy.     Severe canal stenoses at L3-4 and L4-5 due to factors described above.     See comments above for full analysis.

## 2024-09-18 RX ORDER — SEMAGLUTIDE 1.34 MG/ML
INJECTION, SOLUTION SUBCUTANEOUS
Qty: 9 ML | Refills: 1 | Status: SHIPPED | OUTPATIENT
Start: 2024-09-18

## 2024-09-19 NOTE — PROGRESS NOTES
PT Re-Evaluation     Today's date: 2024  Patient name: Jason Miguel  : 1950  MRN: [id number]  Referring provider: Olesya Wheatley CRNP  Dx:   Encounter Diagnosis     ICD-10-CM    1. Chronic low back pain, unspecified back pain laterality, unspecified whether sciatica present  M54.50     G89.29                      Assessment  Impairments: abnormal or restricted ROM, activity intolerance, impaired balance, impaired physical strength, lacks appropriate home exercise program, pain with function and poor posture   Functional limitations: difficulty with prolonged standing, prolonged walking, difficulty standing up from a seated position, and difficulty lifting/carrying heavy objects.  Symptom irritability: moderate    Assessment details: Jason Miguel is a 74 y.o. male that has attended 7 sessions of physical therapy for chronic LBP presents for a re-evaluation today.  During the examination the patient demonstrates: *** lumbar ROM, improved but impaired LE/core strength, decreased L LE pain, and improved but impaired activity tolerance .  The patient has made functional gains since starting therapy.  The patient is now able to***.  The patient continues to have difficulty with ***.  The patient will benefit from continued skilled PT to address impairments, work towards goals, and restore patient PLOF.        Impairments include:  1) lumbar hypomobility - addressing with mobs and mobility exercises    2) Core muscle weakness - addressing with neuromotor retraining /core strengthening.  3) LE neural tension - addressing with mobs and mobility exercises   4) Poor posture/activity intolerance - addressing with functional retraining                           Understanding of Dx/Px/POC: good     Prognosis: good  Prognosis details: Positive prognostic indicators include: positive attitude toward recovery, good understanding of diagnosis/treatment plan, and absence of observed red flags.      Negative  "prognostic indicators include: Significant medical Hx, chronicity of condition, lacking true directional preference    Goals    STG: Achieve in 4-6 weeks  1.  Decrease lumbar pain at worst by 50% to improve activity tolerance for self/care and leisure activities. PROGRESSING  2.  Improve Lumbo-pelvic core strength by 1/2 muscle grade  or \" good activation\" to improve ability to change body positions without difficulty. PROGRESSING  3.  Improve lumbar ROM to \" WFL\" to facilitate normal movement patterns for ADL's/work tasks. PROGRESSING  4.  Pt will report overall improved tolerance for sustained mobility/activity by at least 25-50% since SOC with reduced pain levels and reduced fear avoidance. PROGRESSING    LTG: Achieve in 8-12 weeks  1.  Patient's will be able to return to getting up from sitting position without L hip pain. PROGRESSING  2.  Patient tolerate prolonged standing/walking without L hip/LE pain to achieve patient specific goal. PROGRESSING  3.  Patient will be independent with home exercise program. PROGRESSING  4.  Patient will be able to manage symptoms independently. PROGRESSING        Plan  Patient would benefit from: skilled physical therapy  Planned modality interventions: cryotherapy, thermotherapy: hydrocollator packs and traction    Planned therapy interventions: joint mobilization, manual therapy, neuromuscular re-education, patient education, postural training, self care, strengthening, stretching, therapeutic activities, therapeutic exercise, home exercise program, abdominal trunk stabilization, balance, body mechanics training and IASTM    Frequency: 1-3x/wk.  Duration in weeks: 12  Plan of Care beginning date: 8/12/2024  Plan of Care expiration date: 11/11/2024  Treatment plan discussed with: PTA and patient  Plan details: RE-ASSESS 1X/MONTH        Subjective Evaluation    History of Present Illness  Date of onset: 5/15/2024  Mechanism of injury: SUBJECTIVE: 9/23/24:  The patient has not " "attended therapy since 2024 due to illness not related to his therapy diagnosis.  The patient has seen spine/pain management who diagnosed the patient with spinal stenosis.  The patient reports some improvement since starting therapy. The patient is now able to***.  The patient continues to have difficulty with ***.  The patient will benefit from continued PT focused on achieving patient specific goals.            INJURY HISTORY: Jason Miguel is a 74 y.o. male that presents to outpatient physical therapy with complaints of chronic L LE pain and difficulty functioning.  The patient reports a worsening of pain at his L anterior lateral thigh onset without known cause.  The patient is known to me and has had long history of L LE pain and lumbar issues.  The patient notes difficulty with going sitting to standing.  He notes once his L hip/thigh is \" warmed up\" he is able to walk and stand without difficulty.  The patient notes intermittent tingling at the L anterior hip.  The patient has taken 2 courses of Medrol dose packs to address the pain and inflammation which helped to decrease his pain/symptoms.  The patient is seeing a chiropractor as well who is focusing on soft tissue treatment at his L hip flexors.  The patient's main goal for physical therapy is to get rid of the pain so that he can stand up from a seated position without pain.             Recurrent probem    Patient Goals  Patient goals for therapy: decreased pain, increased motion, increased strength, independence with ADLs/IADLs, return to sport/leisure activities and return to work  Patient goal: stand up without pain/difficulty L hip - progressing  Pain  Current pain ratin  At best pain ratin  At worst pain rating: 10  Location: L anterior/lateral hip  Quality: needle-like and sharp  Relieving factors: rest (sit)  Aggravating factors: standing, walking and lifting  Progression: worsening    Social Support  Steps to enter house: " yes  Stairs in house: yes   Lives in: multiple-level home  Lives with: spouse    Employment status: working (drives school bus)  Treatments  Previous treatment: medication  Current treatment: chiropractic and physical therapy        Objective     Concurrent Complaints  Negative for night pain, disturbed sleep, bladder dysfunction, bowel dysfunction, saddle (S4) numbness, history of cancer, history of trauma and infection    Static Posture     Lumbar Spine   Increased lordosis.   Lumbar spine (Left): Shifted.     Pelvis   Anterior pelvic tilt    Postural Observations  Seated posture: fair  Standing posture: fair      Neurological Testing     Sensation     Lumbar   Left   Intact: light touch    Right   Intact: light touch    Reflexes   Left   Patellar (L4): normal (2+)  Achilles (S1): normal (2+)  Babinski sign: negative    Right   Patellar (L4): normal (2+)  Achilles (S1): normal (2+)  Babinski sign: negative    Active Range of Motion     Lumbar   Flexion: Active lumbar flexion: lumbar.  with pain Restriction level: minimal  Extension: Active lumbar extension: lumbar.  with pain Restriction level: moderate  Left lateral flexion:  Restriction level: minimal  Right lateral flexion:  Restriction level: minimal  Left rotation:  Restriction level: minimal  Right rotation:  Restriction level: minimal    Additional Active Range of Motion Details  Prone knee flexion: L 108 deg p! R: 130 deg    Joint Play     Hypomobile: L3, L4, L5 and S1   Mechanical Assessment    Cervical      Thoracic      Lumbar    Standing flexion: repeated movements   Pain location:peripheralized  Pain intensity: worse  Pain level: increased  Standing extension:   Pain location: no change  Pain intensity: worse    Strength/Myotome Testing     Lumbar   Left   Heel walk: normal  Toe walk: normal    Right   Heel walk: normal  Toe walk: normal    Left Hip   Planes of Motion   Flexion: 4-  Abduction: 4  Adduction: 4    Right Hip   Planes of Motion   Flexion:  4  Abduction: 4  Adduction: 4    Left Knee   Flexion: 4  Extension: 4    Right Knee   Flexion: 4  Extension: 4    Left Ankle/Foot   Dorsiflexion: 5  Plantar flexion: 5  Great toe extension: 5    Right Ankle/Foot   Dorsiflexion: 5  Plantar flexion: 5  Great toe extension: 5    Muscle Activation     Additional Muscle Activation Details  Decreased TrA, multifidus, gluteal activation with transfers / position changes / dynamic movement      Tests     Lumbar   Negative SIJ compression and sacral spring .     Left   Positive femoral stretch.   Negative passive SLR and slump test.     Right   Positive slump test.   Negative passive SLR.     Left Pelvic Girdle/Sacrum   Negative: active SLR test.     Right Pelvic Girdle/Sacrum   Negative: active SLR test.     Left Hip   Negative FADIR.     Right Hip   Negative FADIR.     Additional Tests Details  FOTO: 67% ( predicted 73%)    30SSTS: 10 stands no hand (+) shift to Right                   Precautions: OA,DM  RE-EVAL:10/21  Access Code [email or web address]   Specialty Daily Treatment Diary   BASELINE: L LAT HIP PAIN, L ANTERIOR HIP AND THIGH MOST DISTAL    Manual  9/4 9/23 8/19 8/26 8/28   Lumbar P/A                Manual nerve glides DONE ALL AD   L manual nerve gliding hip flex/add, knee, ankle x 10 reps DONE ALL AD   Piriformis Stretch B/L 4x:30 B/L   4x:30 4x:30 B/L   Prone Quad stretch            Therapeutic Exercise 9/4 9/23 8/19 8/26 8/28   Treadmill Ambulation for endurance 2.0 mph - 2.3mph x 13 mins  1.3 - 2.0 MPH  X 7 MINS 1.4 mph to 2.0 mph x 8 mins 1.5 mph - 2.3 mph x 8 mins   UBE Stand ALT FWD/BACK for core/posture strength   90/70 x 4 mins ALT L2 x 4 mins ALT L2 x 4 mins   Bike   L1 x 8 mins L1 x 10 mins    Ham stretch seated        Seated lumbar flexion reviewed   1x10 after TM 1x10 after TM+UBE   Bridges  *      Standing lumbar flexion             Supine DK->C 2x10  Stand 2x10 BETTER L ant thigh, (+) L lat hip pain    Supine D K->C  3x10           Supine DK->C  x15            Supine D->C  2x10   Hip flexor stretch  *              Stand Hip ABD+EXT  *              Neuro Re-ed        Core brace    X15 supine X10 w/ UBE   kegels        Multifidi        Glute sets x10 *      Quad DLS UE  LE  UE+LE        Quad Cat/cow x15   *    Clam shells GRN x20   x15 X20 RED B/L   MTP/LTP/ Anti-rotations        Towel roll education, Posture correction; movement precautions Reviewed sleep positioning, exercise schedule, treatment frequency versus maintenance schedule DONE AD  Discussion on symptom interpretation and changes to HEP done AD x 10 mins  Sleep education including demonstration of night roll   Posterior pelvic tilt x20   *x20 X20  Demo showing patient can do these in supine, standing, and sitting - attempt stand/sit NV           Therapeutic Activity        Steps ups fwd/side        Crate carry        Crate lifts 3 levels        Lunges        Chair squats            Modalities        MHP/CP to L/B                                 The patient was given a new home exercise plan with instruction, pictures, and verbal feedback.  The patient accepts and understands the new home activities.

## 2024-09-23 ENCOUNTER — APPOINTMENT (OUTPATIENT)
Dept: PHYSICAL THERAPY | Facility: CLINIC | Age: 74
End: 2024-09-23
Payer: MEDICARE

## 2024-09-23 DIAGNOSIS — G89.29 CHRONIC LOW BACK PAIN, UNSPECIFIED BACK PAIN LATERALITY, UNSPECIFIED WHETHER SCIATICA PRESENT: Primary | ICD-10-CM

## 2024-09-23 DIAGNOSIS — M54.50 CHRONIC LOW BACK PAIN, UNSPECIFIED BACK PAIN LATERALITY, UNSPECIFIED WHETHER SCIATICA PRESENT: Primary | ICD-10-CM

## 2024-09-24 DIAGNOSIS — R05.1 ACUTE COUGH: ICD-10-CM

## 2024-09-24 DIAGNOSIS — J02.9 SORE THROAT: ICD-10-CM

## 2024-09-24 DIAGNOSIS — J06.9 VIRAL UPPER RESPIRATORY TRACT INFECTION: ICD-10-CM

## 2024-09-24 NOTE — PROGRESS NOTES
PT Re-Evaluation     Today's date: 2024  Patient name: Jason Miguel  : 1950  MRN: [id number]  Referring provider: Olesya Wheatley CRNP  Dx:   Encounter Diagnosis     ICD-10-CM    1. Chronic low back pain, unspecified back pain laterality, unspecified whether sciatica present  M54.50     G89.29                      Assessment  Symptom irritability: low    Assessment details: Jason Miguel is a 74 y.o. male that has attended 7 sessions of physical therapy for chronic LBP presents for a re-evaluation/ DISCHARGE today.  The patient has not attended therapy since 2024 due to illness not related to his therapy diagnosis.  During the examination the patient demonstrates: improved lumbar ROM, improved LE/core strength, decreased L LE pain, and improved activity tolerance .  The patient has made functional gains since starting therapy.  The patient reports improvement since starting therapy. The patient is now able togo sit to stand and walk immediately without pain or delay.  The patient is able to walk for 30 mins without difficulty.  The patient is back to doing his ADL's and house hold work.  The patient feels independent with his HEP and feels he can discharge formal PT.        Impairments include:  1) lumbar hypomobility - addressing with mobs and mobility exercises    2) Core muscle weakness - addressing with neuromotor retraining /core strengthening.  3) LE neural tension - addressing with mobs and mobility exercises   4) Poor posture/activity intolerance - addressing with functional retraining                           Understanding of Dx/Px/POC: good     Prognosis: good  Prognosis details: Positive prognostic indicators include: positive attitude toward recovery, good understanding of diagnosis/treatment plan, and absence of observed red flags.      Negative prognostic indicators include: Significant medical Hx, chronicity of condition, lacking true directional  "preference    Goals    STG: Achieve in 4-6 weeks  1.  Decrease lumbar pain at worst by 50% to improve activity tolerance for self/care and leisure activities. MET  2.  Improve Lumbo-pelvic core strength by 1/2 muscle grade  or \" good activation\" to improve ability to change body positions without difficulty. MET  3.  Improve lumbar ROM to \" WFL\" to facilitate normal movement patterns for ADL's/work tasks. MET  4.  Pt will report overall improved tolerance for sustained mobility/activity by at least 25-50% since SOC with reduced pain levels and reduced fear avoidance. MET    LTG: Achieve in 8-12 weeks  1.  Patient's will be able to return to getting up from sitting position without L hip pain. MET  2.  Patient tolerate prolonged standing/walking without L hip/LE pain to achieve patient specific goal. PARTIALLY MET  3.  Patient will be independent with home exercise program. MET  4.  Patient will be able to manage symptoms independently. MET        Plan    Treatment plan discussed with: PTA and patient  Plan details: D/C PT TO AN INDEPENDENT HEP - HEP UPDATED TODAY        Subjective Evaluation    History of Present Illness  Date of onset: 5/15/2024  Mechanism of injury: SUBJECTIVE: 9/25/24:  The patient has not attended therapy since 9/4/2024 due to illness not related to his therapy diagnosis.  The patient has seen spine/pain management who diagnosed the patient with spinal stenosis.  The patient reports improvement since starting therapy. The patient is now able togo sit to stand and walk immediately without pain or delay.  The patient is able to walk for 30 mins without difficulty.  The patient is back to doing his ADL's and house hold work.  The patient feels independent with his HEP and feels he can discharge formal PT.            INJURY HISTORY: Jason Miguel is a 74 y.o. male that presents to outpatient physical therapy with complaints of chronic L LE pain and difficulty functioning.  The patient reports a " "worsening of pain at his L anterior lateral thigh onset without known cause.  The patient is known to me and has had long history of L LE pain and lumbar issues.  The patient notes difficulty with going sitting to standing.  He notes once his L hip/thigh is \" warmed up\" he is able to walk and stand without difficulty.  The patient notes intermittent tingling at the L anterior hip.  The patient has taken 2 courses of Medrol dose packs to address the pain and inflammation which helped to decrease his pain/symptoms.  The patient is seeing a chiropractor as well who is focusing on soft tissue treatment at his L hip flexors.  The patient's main goal for physical therapy is to get rid of the pain so that he can stand up from a seated position without pain.             Recurrent probem    Patient Goals  Patient goal: stand up without pain/difficulty L hip - MET  Pain  Current pain ratin  At best pain ratin  At worst pain ratin  Location: midline lumbar spine  Quality: tight  Relieving factors: rest (sit)  Aggravating factors: standing  Progression: improved    Social Support  Steps to enter house: yes  Stairs in house: yes   Lives in: multiple-level home  Lives with: spouse    Employment status: working (drives school bus)  Treatments  Previous treatment: medication, physical therapy and chiropractic        Objective     Concurrent Complaints  Negative for night pain, disturbed sleep, bladder dysfunction, bowel dysfunction, saddle (S4) numbness, history of cancer, history of trauma and infection    Static Posture     Lumbar Spine   Increased lordosis.   Lumbar spine (Left): Shifted.     Pelvis   Anterior pelvic tilt    Postural Observations  Seated posture: fair  Standing posture: fair      Neurological Testing     Sensation     Lumbar   Left   Intact: light touch    Right   Intact: light touch    Reflexes   Left   Patellar (L4): normal (2+)  Achilles (S1): normal (2+)  Babinski sign: negative    Right "   Patellar (L4): normal (2+)  Achilles (S1): normal (2+)  Babinski sign: negative    Active Range of Motion     Lumbar   Flexion: Active lumbar flexion: lumbar.  with pain Restriction level: minimal  Extension: Active lumbar extension: lumbar.  with pain Restriction level: moderate  Left lateral flexion:  Restriction level: minimal  Right lateral flexion:  Restriction level: minimal  Left rotation:  Restriction level: minimal  Right rotation:  Restriction level: minimal    Additional Active Range of Motion Details  Prone knee flexion: L 108 deg p! R: 130 deg    Joint Play     Hypomobile: L3, L4, L5 and S1   Mechanical Assessment    Cervical      Thoracic      Lumbar    Standing flexion: repeated movements   Pain location:peripheralized  Pain intensity: worse  Pain level: increased  Standing extension:   Pain location: no change  Pain intensity: worse    Strength/Myotome Testing     Lumbar   Left   Heel walk: normal  Toe walk: normal    Right   Heel walk: normal  Toe walk: normal    Left Hip   Planes of Motion   Flexion: 4-  Abduction: 4  Adduction: 4    Right Hip   Planes of Motion   Flexion: 4  Abduction: 4  Adduction: 4    Left Knee   Flexion: 4  Extension: 4    Right Knee   Flexion: 4  Extension: 4    Left Ankle/Foot   Dorsiflexion: 5  Plantar flexion: 5  Great toe extension: 5    Right Ankle/Foot   Dorsiflexion: 5  Plantar flexion: 5  Great toe extension: 5    Muscle Activation     Additional Muscle Activation Details  Decreased TrA, multifidus, gluteal activation with transfers / position changes / dynamic movement      Tests     Lumbar   Negative SIJ compression and sacral spring .     Left   Positive femoral stretch.   Negative passive SLR and slump test.     Right   Negative passive SLR and slump test.     Left Pelvic Girdle/Sacrum   Negative: active SLR test.     Right Pelvic Girdle/Sacrum   Negative: active SLR test.     Left Hip   Negative FADIR.     Right Hip   Negative FADIR.     Additional Tests  Details  FOTO: 79% ( predicted 73%)( IMPROVED)    30SSTS: 9/25: 13 stands no shift (IMPROVED)  IE:10 stands no hand (+) shift to Right                   Precautions: OA,DM  RE-EVAL:10/23  Access Code [email or web address]   Specialty Daily Treatment Diary   BASELINE: L LAT HIP PAIN, L ANTERIOR HIP AND THIGH MOST DISTAL    Manual  9/4 9/25 8/19 8/26 8/28   Lumbar P/A                Manual nerve glides DONE ALL AD   L manual nerve gliding hip flex/add, knee, ankle x 10 reps DONE ALL AD   Piriformis Stretch B/L 4x:30 B/L   4x:30 4x:30 B/L   Prone Quad stretch            Therapeutic Exercise 9/4 9/25 8/19 8/26 8/28   Treadmill Ambulation for endurance 2.0 mph - 2.3mph x 13 mins 2.0-2.3 mph x 9 mins 1.3 - 2.0 MPH  X 7 MINS 1.4 mph to 2.0 mph x 8 mins 1.5 mph - 2.3 mph x 8 mins   UBE Stand ALT FWD/BACK for core/posture strength   90/70 x 4 mins ALT L2 x 4 mins ALT L2 x 4 mins   Bike   L1 x 8 mins L1 x 10 mins    Ham stretch seated        Seated lumbar flexion reviewed   1x10 after TM 1x10 after TM+UBE   Bridges  *x20      Standing lumbar flexion             Supine DK->C 2x10 DK->C x20 Stand 2x10 BETTER L ant thigh, (+) L lat hip pain    Supine D K->C  3x10           Supine DK->C  x15           Supine D->C  2x10   Hip flexor stretch  *4x:15              Stand Hip ABD+EXT  *              Neuro Re-ed        Core brace    X15 supine X10 w/ UBE   kegels        Multifidi        Glute sets x10 *x20      Quad DLS UE  LE  UE+LE        Quad Cat/cow x15   *    Clam shells GRN x20 reviewed  x15 X20 RED B/L   MTP/LTP/ Anti-rotations        Towel roll education, Posture correction; movement precautions Reviewed sleep positioning, exercise schedule, treatment frequency versus maintenance schedule DONE AD Reviewed maintenance dosage versus treatment dosage Discussion on symptom interpretation and changes to HEP done AD x 10 mins  Sleep education including demonstration of night roll   Posterior pelvic tilt x20 reviewed  *x20 X20  Demo showing patient  can do these in supine, standing, and sitting - attempt stand/sit NV           Therapeutic Activity        Steps ups fwd/side        Crate carry        Crate lifts 3 levels        Lunges        Chair squats            Modalities        MHP/CP to L/B                                 The patient was given a new home exercise plan with instruction, pictures, and verbal feedback.  The patient accepts and understands the new home activities.

## 2024-09-25 ENCOUNTER — EVALUATION (OUTPATIENT)
Dept: PHYSICAL THERAPY | Facility: CLINIC | Age: 74
End: 2024-09-25
Payer: MEDICARE

## 2024-09-25 DIAGNOSIS — G89.29 CHRONIC LOW BACK PAIN, UNSPECIFIED BACK PAIN LATERALITY, UNSPECIFIED WHETHER SCIATICA PRESENT: Primary | ICD-10-CM

## 2024-09-25 DIAGNOSIS — M54.50 CHRONIC LOW BACK PAIN, UNSPECIFIED BACK PAIN LATERALITY, UNSPECIFIED WHETHER SCIATICA PRESENT: Primary | ICD-10-CM

## 2024-09-25 PROCEDURE — 97110 THERAPEUTIC EXERCISES: CPT | Performed by: PHYSICAL THERAPIST

## 2024-09-25 PROCEDURE — 97112 NEUROMUSCULAR REEDUCATION: CPT | Performed by: PHYSICAL THERAPIST

## 2024-09-25 RX ORDER — DEXTROMETHORPHAN HYDROBROMIDE AND PROMETHAZINE HYDROCHLORIDE 15; 6.25 MG/5ML; MG/5ML
5 SYRUP ORAL 4 TIMES DAILY PRN
Qty: 240 ML | Refills: 0 | Status: SHIPPED | OUTPATIENT
Start: 2024-09-25

## 2024-10-10 ENCOUNTER — PROCEDURE VISIT (OUTPATIENT)
Dept: OBGYN CLINIC | Facility: HOSPITAL | Age: 74
End: 2024-10-10
Payer: MEDICARE

## 2024-10-10 VITALS
WEIGHT: 202 LBS | BODY MASS INDEX: 34.49 KG/M2 | HEART RATE: 96 BPM | SYSTOLIC BLOOD PRESSURE: 126 MMHG | HEIGHT: 64 IN | DIASTOLIC BLOOD PRESSURE: 72 MMHG

## 2024-10-10 DIAGNOSIS — G89.29 CHRONIC PAIN OF RIGHT KNEE: ICD-10-CM

## 2024-10-10 DIAGNOSIS — M25.561 CHRONIC PAIN OF RIGHT KNEE: ICD-10-CM

## 2024-10-10 DIAGNOSIS — M17.11 PRIMARY OSTEOARTHRITIS OF RIGHT KNEE: Primary | ICD-10-CM

## 2024-10-10 PROCEDURE — 20610 DRAIN/INJ JOINT/BURSA W/O US: CPT

## 2024-10-10 RX ORDER — LIDOCAINE HYDROCHLORIDE 10 MG/ML
4 INJECTION, SOLUTION INFILTRATION; PERINEURAL
Status: COMPLETED | OUTPATIENT
Start: 2024-10-10 | End: 2024-10-10

## 2024-10-10 RX ADMIN — LIDOCAINE HYDROCHLORIDE 4 ML: 10 INJECTION, SOLUTION INFILTRATION; PERINEURAL at 13:15

## 2024-10-10 NOTE — PROGRESS NOTES
Assessment:   Diagnosis ICD-10-CM Associated Orders   1. Primary osteoarthritis of right knee  M17.11 Large joint arthrocentesis      2. Chronic pain of right knee  M25.561 Large joint arthrocentesis    G89.29           Plan:  74 y.o. male with right knee osteoarthritis   History of left TKA without issues or concerns   Injections of corticosteroid to the right knee in the past some symptomatic relief   Opted to try visco supplementation injections for additional pain relief   Offered, accepted, and provided with Durolane to right knee today (RP)  Patient tolerated procedure well   Ice and post-injection protocol advised   Follow up in about 3 months     The above stated was discussed in layman's terms and the patient expressed understanding.  All questions were answered to the patient's satisfaction.     To do next visit:  Return in about 3 months (around 1/10/2025) for right knee.      Subjective:   Jason Miguel is a 74 y.o. male who presents for follow up of his right knee.  Patient has known osteoarthritis of the right knee which has been treated in the past with intermittent injections of corticosteroid.  He presents today for administration of visco supplementation to the right knee.  Patient educated on mechanism of injection prior to administration of medication.     Pain score 9/10     History of left total knee arthroplasty with Dr. Nichole 8/17/2023    Review of systems negative unless otherwise specified in HPI    Past Medical History:   Diagnosis Date    Arthritis     Colon polyp     COVID 09/07/2021    and 12/2022    Diabetes mellitus (HCC)     Dyslipidemia     Fallen arches     GERD (gastroesophageal reflux disease)     Hyperlipidemia     Nodule of left lung 2007    negative for CA    Type 2 diabetes mellitus without complication, without long-term current use of insulin (HCC) 01/23/2019    Use of cane as ambulatory aid        Past Surgical History:   Procedure Laterality Date    COLONOSCOPY       ELBOW SURGERY Bilateral     FOOT TENDON SURGERY Bilateral     heel    HAND SURGERY      MD ARTHRP KNE CONDYLE&PLATU MEDIAL&LAT COMPARTMENTS Left 2023    Procedure: ARTHROPLASTY KNEE TOTAL W ROBOT;  Surgeon: Denton Nichole MD;  Location:  MAIN OR;  Service: Orthopedics    MD ARTHRS KNE SURG W/MENISCECTOMY MED/LAT W/SHVG Right 2022    Procedure: KNEE ARTHROSCOPY WITH medial MENISCECTOMY chondroplasty synovectomy injection;  Surgeon: Hank Shirley DO;  Location: [facility];  Service: Orthopedics    MD ARTHRS KNE SURG W/MENISCECTOMY MED/LAT W/SHVG Left 2023    Procedure: Left knee arthroscopy: Medial meniscectomy; Lateral meniscectomy; Chondroplasty; Synovectomy; Post arthroscopic injection of intra-articular joint space and peripheral portal;  Surgeon: Hank Shirley DO;  Location: [facility];  Service: Orthopedics    MD NEUROPLASTY &/TRANSPOS MEDIAN NRV CARPAL TUNNE Left 2021    Procedure: RELEASE CARPAL TUNNEL;  Surgeon: Hank Shirley DO;  Location:  MAIN OR;  Service: Orthopedics       Family History   Problem Relation Age of Onset    Heart disease Mother         hx MI    Diabetes Mother     Stroke Mother     Arthritis Mother     COPD Father         84 yrs    Coronary artery disease Father     Cancer Brother        Social History     Occupational History    Not on file   Tobacco Use    Smoking status: Former     Current packs/day: 0.00     Average packs/day: 1 pack/day for 35.0 years (35.0 ttl pk-yrs)     Types: Cigarettes     Start date:      Quit date:      Years since quittin.7    Smokeless tobacco: Never   Vaping Use    Vaping status: Never Used   Substance and Sexual Activity    Alcohol use: Yes     Comment: 2 x per week    Drug use: No    Sexual activity: Yes     Partners: Female     Birth control/protection: Male Sterilization         Current Outpatient Medications:     metFORMIN (GLUCOPHAGE) 500 mg tablet, TAKE 1 TABLET TWICE DAILY  WITH MEALS, Disp: 180  tablet, Rfl: 3    methylPREDNISolone 4 MG tablet therapy pack, Use as directed on package (Patient not taking: Reported on 8/27/2024), Disp: 21 each, Rfl: 0    phentermine (ADIPEX-P) 37.5 MG tablet, Take 1 tablet (37.5 mg total) by mouth daily, Disp: 30 tablet, Rfl: 2    promethazine-dextromethorphan (PHENERGAN-DM) 6.25-15 mg/5 mL oral syrup, Take 5 mL by mouth 4 (four) times a day as needed for cough, Disp: 240 mL, Rfl: 0    semaglutide, 1 mg/dose, (Ozempic, 1 MG/DOSE,) 4 mg/3 mL injection pen, INJECT 1 MG UNDER THE SKIN WEEKLY, Disp: 9 mL, Rfl: 1    simvastatin (ZOCOR) 20 mg tablet, Take 1 tablet (20 mg total) by mouth daily at bedtime, Disp: 90 tablet, Rfl: 3    tamsulosin (FLOMAX) 0.4 mg, Take 2 capsules (0.8 mg total) by mouth daily with dinner, Disp: 180 capsule, Rfl: 1    No Known Allergies         Vitals:    10/10/24 1255   BP: 126/72   Pulse: 96       Objective:  Physical exam  General: Awake, Alert, Oriented  Eyes: Pupils equal, round and reactive to light  Heart: regular rate and rhythm  Lungs: No audible wheezing  Abdomen: soft                    Right Knee Exam     Tenderness   The patient is experiencing tenderness in the lateral joint line and medial joint line.    Range of Motion   Extension:  normal   Flexion:  normal Right knee flexion: with crepitation and stiffness.    Other   Erythema: absent  Scars: absent  Swelling: mild  Effusion: no effusion present            Diagnostics, reviewed and taken today if performed as documented:    None performed        Procedures, if performed today:    Large joint arthrocentesis: R knee  Universal Protocol:  Consent: Verbal consent obtained.  Risks and benefits: risks, benefits and alternatives were discussed  Consent given by: patient  Site marked: the operative site was marked  Supporting Documentation  Indications: pain   Procedure Details  Location: knee - R knee  Needle size: 22 G  Medications administered: 4 mL lidocaine 1 %; 3 mL sodium hyaluronate 60  "MG/3ML    Patient tolerance: patient tolerated the procedure well with no immediate complications  Dressing:  Sterile dressing applied            Portions of the record may have been created with voice recognition software.  Occasional wrong word or \"sound a like\" substitutions may have occurred due to the inherent limitations of voice recognition software.  Read the chart carefully and recognize, using context, where substitutions have occurred.        "

## 2024-10-14 ENCOUNTER — OFFICE VISIT (OUTPATIENT)
Age: 74
End: 2024-10-14
Payer: MEDICARE

## 2024-10-14 ENCOUNTER — APPOINTMENT (OUTPATIENT)
Dept: LAB | Facility: CLINIC | Age: 74
End: 2024-10-14
Payer: MEDICARE

## 2024-10-14 VITALS
BODY MASS INDEX: 34.83 KG/M2 | SYSTOLIC BLOOD PRESSURE: 147 MMHG | DIASTOLIC BLOOD PRESSURE: 80 MMHG | HEART RATE: 88 BPM | WEIGHT: 204 LBS | HEIGHT: 64 IN

## 2024-10-14 DIAGNOSIS — E78.00 HYPERCHOLESTEREMIA: ICD-10-CM

## 2024-10-14 DIAGNOSIS — M79.89 LEG SWELLING: ICD-10-CM

## 2024-10-14 DIAGNOSIS — E66.01 CLASS 2 SEVERE OBESITY DUE TO EXCESS CALORIES WITH SERIOUS COMORBIDITY AND BODY MASS INDEX (BMI) OF 35.0 TO 35.9 IN ADULT (HCC): ICD-10-CM

## 2024-10-14 DIAGNOSIS — E11.9 TYPE 2 DIABETES MELLITUS WITHOUT COMPLICATION, WITHOUT LONG-TERM CURRENT USE OF INSULIN (HCC): ICD-10-CM

## 2024-10-14 DIAGNOSIS — E66.812 CLASS 2 SEVERE OBESITY DUE TO EXCESS CALORIES WITH SERIOUS COMORBIDITY AND BODY MASS INDEX (BMI) OF 35.0 TO 35.9 IN ADULT (HCC): ICD-10-CM

## 2024-10-14 DIAGNOSIS — R94.6 ABNORMAL THYROID FUNCTION TEST: ICD-10-CM

## 2024-10-14 DIAGNOSIS — B35.1 ONYCHOMYCOSIS: Primary | ICD-10-CM

## 2024-10-14 LAB
ALBUMIN SERPL BCG-MCNC: 4.1 G/DL (ref 3.5–5)
ALP SERPL-CCNC: 41 U/L (ref 34–104)
ALT SERPL W P-5'-P-CCNC: 19 U/L (ref 7–52)
ANION GAP SERPL CALCULATED.3IONS-SCNC: 9 MMOL/L (ref 4–13)
AST SERPL W P-5'-P-CCNC: 14 U/L (ref 13–39)
BASOPHILS # BLD AUTO: 0.05 THOUSANDS/ΜL (ref 0–0.1)
BASOPHILS NFR BLD AUTO: 1 % (ref 0–1)
BILIRUB SERPL-MCNC: 0.48 MG/DL (ref 0.2–1)
BUN SERPL-MCNC: 14 MG/DL (ref 5–25)
CALCIUM SERPL-MCNC: 8.8 MG/DL (ref 8.4–10.2)
CHLORIDE SERPL-SCNC: 103 MMOL/L (ref 96–108)
CHOLEST SERPL-MCNC: 100 MG/DL
CO2 SERPL-SCNC: 25 MMOL/L (ref 21–32)
CREAT SERPL-MCNC: 0.88 MG/DL (ref 0.6–1.3)
EOSINOPHIL # BLD AUTO: 0.24 THOUSAND/ΜL (ref 0–0.61)
EOSINOPHIL NFR BLD AUTO: 4 % (ref 0–6)
ERYTHROCYTE [DISTWIDTH] IN BLOOD BY AUTOMATED COUNT: 13.6 % (ref 11.6–15.1)
EST. AVERAGE GLUCOSE BLD GHB EST-MCNC: 128 MG/DL
GFR SERPL CREATININE-BSD FRML MDRD: 85 ML/MIN/1.73SQ M
GLUCOSE P FAST SERPL-MCNC: 110 MG/DL (ref 65–99)
HBA1C MFR BLD: 6.1 %
HCT VFR BLD AUTO: 48.6 % (ref 36.5–49.3)
HDLC SERPL-MCNC: 33 MG/DL
HGB BLD-MCNC: 15.7 G/DL (ref 12–17)
IMM GRANULOCYTES # BLD AUTO: 0.01 THOUSAND/UL (ref 0–0.2)
IMM GRANULOCYTES NFR BLD AUTO: 0 % (ref 0–2)
LDLC SERPL CALC-MCNC: 41 MG/DL (ref 0–100)
LYMPHOCYTES # BLD AUTO: 1.86 THOUSANDS/ΜL (ref 0.6–4.47)
LYMPHOCYTES NFR BLD AUTO: 31 % (ref 14–44)
MCH RBC QN AUTO: 29.6 PG (ref 26.8–34.3)
MCHC RBC AUTO-ENTMCNC: 32.3 G/DL (ref 31.4–37.4)
MCV RBC AUTO: 92 FL (ref 82–98)
MONOCYTES # BLD AUTO: 0.57 THOUSAND/ΜL (ref 0.17–1.22)
MONOCYTES NFR BLD AUTO: 9 % (ref 4–12)
NEUTROPHILS # BLD AUTO: 3.33 THOUSANDS/ΜL (ref 1.85–7.62)
NEUTS SEG NFR BLD AUTO: 55 % (ref 43–75)
NRBC BLD AUTO-RTO: 0 /100 WBCS
PLATELET # BLD AUTO: 160 THOUSANDS/UL (ref 149–390)
PMV BLD AUTO: 10.6 FL (ref 8.9–12.7)
POTASSIUM SERPL-SCNC: 4.2 MMOL/L (ref 3.5–5.3)
PROT SERPL-MCNC: 6.3 G/DL (ref 6.4–8.4)
RBC # BLD AUTO: 5.31 MILLION/UL (ref 3.88–5.62)
SODIUM SERPL-SCNC: 137 MMOL/L (ref 135–147)
TRIGL SERPL-MCNC: 132 MG/DL
TSH SERPL DL<=0.05 MIU/L-ACNC: 0.63 UIU/ML (ref 0.45–4.5)
WBC # BLD AUTO: 6.06 THOUSAND/UL (ref 4.31–10.16)

## 2024-10-14 PROCEDURE — 83036 HEMOGLOBIN GLYCOSYLATED A1C: CPT

## 2024-10-14 PROCEDURE — 11721 DEBRIDE NAIL 6 OR MORE: CPT

## 2024-10-14 PROCEDURE — 80061 LIPID PANEL: CPT

## 2024-10-14 PROCEDURE — 36415 COLL VENOUS BLD VENIPUNCTURE: CPT

## 2024-10-14 PROCEDURE — RECHECK

## 2024-10-14 PROCEDURE — 84443 ASSAY THYROID STIM HORMONE: CPT

## 2024-10-14 PROCEDURE — 85025 COMPLETE CBC W/AUTO DIFF WBC: CPT

## 2024-10-14 PROCEDURE — 80053 COMPREHEN METABOLIC PANEL: CPT

## 2024-10-14 NOTE — PROGRESS NOTES
Jason Miguel  1950  AT RISK FOOT CARE    1. Onychomycosis        2. Type 2 diabetes mellitus without complication, without long-term current use of insulin ([facility])  Ambulatory Referral to Podiatry      3. Leg swelling            Patient presents for at-risk foot care.  Patient has no acute concerns today.  Patient has significant lower extremity risk due to neuropathy, parasthesia, edema, and trophic skin changes to the lower extremity.    On exam patient has thickened, hypertrophic, discolored, brittle toenails with subungual debris and tenderness x10   Callus: 0  Patient has lower extremity edema  Patients skin is atrophic, thickened nails, and decreased pedal hair  Patient has decreased pinprick and vibratory sensation to his feet and parasthesia    Today's treatment includes:  Debridement of toenails. Using nail nipper, cory, and curette, nails were sharply debrided, reduced in thickness and length. Devitalized nail tissue and fungal debris excised and removed. Patient tolerated well.        Discussed proper shoe gear, daily inspections of feet, and general foot health with patient. Patient has Q9  findings and is recommended for at risk foot care every 9-10 weeks.    Patients most recent complete clinical foot exam was on: 10/14/2024    Diagnosis and options discussed with patient  Patient agreeable to the plan as stated below    -DM foot risk is low. Recommend 3 month f/u  -Discussed DM risk to lower extremities, proper shoe gear, and daily monitoring of feet.   -Discussed weight loss and suitable exercise regiment.   -Reviewed most recent PCP visit on 2/19/2024  -Educated on A1C and the risks of poorly controlled Diabetes. Reviewed recent A1C:  Lab Results   Component Value Date    HGBA1C 7.1 (H) 04/09/2024    HGBA1C 6.6 (H) 04/05/2018   .       Diabetic Foot Exam    Patient's shoes and socks removed.    Right Foot/Ankle   Right Foot Inspection  Skin Exam: skin normal and skin intact. No dry skin, no  warmth, no callus, no erythema, no maceration, no abnormal color, no pre-ulcer, no ulcer and no callus.     Toe Exam: ROM and strength within normal limits.     Sensory   Monofilament testing: intact    Vascular  Capillary refills: < 3 seconds  The right DP pulse is 2+. The right PT pulse is 2+.     Left Foot/Ankle  Left Foot Inspection  Skin Exam: skin normal and skin intact. No dry skin, no warmth, no erythema, no maceration, normal color, no pre-ulcer, no ulcer and no callus.     Toe Exam: ROM and strength within normal limits.     Sensory   Monofilament testing: intact    Vascular  Capillary refills: < 3 seconds  The left DP pulse is 2+. The left PT pulse is 2+.     Assign Risk Category  No deformity present  No loss of protective sensation  No weak pulses  Risk: 0

## 2024-10-24 ENCOUNTER — RA CDI HCC (OUTPATIENT)
Dept: OTHER | Facility: HOSPITAL | Age: 74
End: 2024-10-24

## 2024-10-31 ENCOUNTER — OFFICE VISIT (OUTPATIENT)
Dept: FAMILY MEDICINE CLINIC | Facility: CLINIC | Age: 74
End: 2024-10-31
Payer: MEDICARE

## 2024-10-31 VITALS
HEART RATE: 93 BPM | RESPIRATION RATE: 16 BRPM | BODY MASS INDEX: 34.49 KG/M2 | SYSTOLIC BLOOD PRESSURE: 120 MMHG | OXYGEN SATURATION: 97 % | WEIGHT: 202 LBS | DIASTOLIC BLOOD PRESSURE: 68 MMHG | TEMPERATURE: 97.4 F | HEIGHT: 64 IN

## 2024-10-31 DIAGNOSIS — E11.9 TYPE 2 DIABETES MELLITUS WITHOUT COMPLICATION, WITHOUT LONG-TERM CURRENT USE OF INSULIN (HCC): ICD-10-CM

## 2024-10-31 DIAGNOSIS — E78.00 HYPERCHOLESTEREMIA: ICD-10-CM

## 2024-10-31 DIAGNOSIS — Z12.5 PROSTATE CANCER SCREENING: ICD-10-CM

## 2024-10-31 DIAGNOSIS — Z23 ENCOUNTER FOR IMMUNIZATION: ICD-10-CM

## 2024-10-31 DIAGNOSIS — Z00.00 ENCOUNTER FOR MEDICARE ANNUAL WELLNESS EXAM: Primary | ICD-10-CM

## 2024-10-31 PROCEDURE — 90662 IIV NO PRSV INCREASED AG IM: CPT | Performed by: NURSE PRACTITIONER

## 2024-10-31 PROCEDURE — 99214 OFFICE O/P EST MOD 30 MIN: CPT | Performed by: NURSE PRACTITIONER

## 2024-10-31 PROCEDURE — 90677 PCV20 VACCINE IM: CPT | Performed by: NURSE PRACTITIONER

## 2024-10-31 PROCEDURE — G0008 ADMIN INFLUENZA VIRUS VAC: HCPCS | Performed by: NURSE PRACTITIONER

## 2024-10-31 PROCEDURE — G0009 ADMIN PNEUMOCOCCAL VACCINE: HCPCS | Performed by: NURSE PRACTITIONER

## 2024-10-31 PROCEDURE — G0438 PPPS, INITIAL VISIT: HCPCS | Performed by: NURSE PRACTITIONER

## 2024-10-31 NOTE — PROGRESS NOTES
Ambulatory Visit  Name: Jason Miguel      : 1950      MRN: [id number]  Encounter Provider: DREA Morin  Encounter Date: 10/31/2024   Encounter department: [facility] PRIMARY CARE    Assessment & Plan  Type 2 diabetes mellitus without complication, without long-term current use of insulin (HCC)    Lab Results   Component Value Date    HGBA1C 6.1 (H) 10/14/2024       Orders:    Comprehensive metabolic panel; Future    CBC and differential; Future    Albumin / creatinine urine ratio; Future    Hemoglobin A1C; Future    semaglutide, 2 mg/dose, (Ozempic) 8 mg/ mL injection pen; Inject 0.75 mL (2 mg total) under the skin every 7 days    Hypercholesteremia    Orders:    Lipid Panel with Direct LDL reflex; Future    Prostate cancer screening    Orders:    PSA, Total Screen; Future    Encounter for immunization    Orders:    Pneumococcal Conjugate Vaccine 20-valent (Pcv20)    influenza vaccine, high-dose, PF 0.5 mL (Fluzone High Dose)    Encounter for Medicare annual wellness exam           Depression Screening and Follow-up Plan: Patient was screened for depression during today's encounter. They screened negative with a PHQ-2 score of 0.      Preventive health issues were discussed with patient, and age appropriate screening tests were ordered as noted in patient's After Visit Summary. Personalized health advice and appropriate referrals for health education or preventive services given if needed, as noted in patient's After Visit Summary.    History of Present Illness     Here for 6 month medcheck- Diabetes- HgA1c 6.1, reviewed all medications and recent bloodwork results- all questions answered. He is following with specialists for his right knee pain and back pain. Requesting to increase Ozempic to 2mg weekly to help with weight loss. It does curb his appetite, denies nausea       Patient Care Team:  DREA Morin as PCP - General (Family Medicine)  DREA Morin as PCP -  PCP-[facility] Attributed-Roster    Review of Systems   Constitutional:  Negative for activity change, diaphoresis, fatigue and fever.   HENT:  Negative for congestion, facial swelling, hearing loss, rhinorrhea, sinus pressure, sinus pain, sneezing, sore throat and voice change.    Eyes:  Negative for discharge and visual disturbance.   Respiratory:  Negative for cough, choking, chest tightness, shortness of breath, wheezing and stridor.    Cardiovascular:  Negative for chest pain, palpitations and leg swelling.   Gastrointestinal:  Negative for abdominal distention, abdominal pain, constipation, diarrhea, nausea and vomiting.   Endocrine: Negative for polydipsia, polyphagia and polyuria.   Genitourinary:  Negative for difficulty urinating, dysuria, frequency and urgency.   Musculoskeletal:  Positive for arthralgias, back pain and gait problem.   Skin:  Negative for color change, rash and wound.   Neurological:  Negative for dizziness, syncope, speech difficulty, weakness, light-headedness and headaches.   Hematological:  Negative for adenopathy. Does not bruise/bleed easily.   Psychiatric/Behavioral:  Negative for agitation, behavioral problems, confusion, hallucinations, sleep disturbance and suicidal ideas. The patient is not nervous/anxious.      Medical History Reviewed by provider this encounter:  Tobacco  Allergies  Meds  Problems  Med Hx  Surg Hx  Fam Hx       Annual Wellness Visit Questionnaire   Jason VELEZ is here for his Subsequent Wellness visit.     Health Risk Assessment:   Patient rates overall health as excellent. Patient feels that their physical health rating is much better. Patient is very satisfied with their life. Eyesight was rated as same. Hearing was rated as slightly worse. Patient feels that their emotional and mental health rating is much better. Patients states they are sometimes angry. Patient states they are sometimes unusually tired/fatigued. Pain experienced in the last  7 days has been none. Patient states that he has experienced no weight loss or gain in last 6 months.     Depression Screening:   PHQ-2 Score: 0      Fall Risk Screening:   In the past year, patient has experienced: no history of falling in past year      Home Safety:  Patient does not have trouble with stairs inside or outside of their home. Patient has working smoke alarms and has working carbon monoxide detector. Home safety hazards include: none.     Nutrition:   Current diet is Regular.     Medications:   Patient is not currently taking any over-the-counter supplements. Patient is able to manage medications.     Activities of Daily Living (ADLs)/Instrumental Activities of Daily Living (IADLs):   Walk and transfer into and out of bed and chair?: Yes  Dress and groom yourself?: Yes    Bathe or shower yourself?: Yes    Feed yourself? Yes  Do your laundry/housekeeping?: Yes  Manage your money, pay your bills and track your expenses?: Yes  Make your own meals?: Yes    Do your own shopping?: Yes    Previous Hospitalizations:   Any hospitalizations or ED visits within the last 12 months?: No      Advance Care Planning:   Living will: Yes    Durable POA for healthcare: Yes    Advanced directive: Yes      PREVENTIVE SCREENINGS      Cardiovascular Screening:    General: Screening Not Indicated and History Lipid Disorder    Due for: Lipid Panel      Diabetes Screening:     General: Screening Not Indicated and History Diabetes    Due for: Blood Glucose      Colorectal Cancer Screening:     General: Screening Current      Prostate Cancer Screening:    General: Screening Current    Due for: PSA      Abdominal Aortic Aneurysm (AAA) Screening:    Risk factors include: age between 65-76 yo and tobacco use        Lung Cancer Screening:     General: Screening Not Indicated      Hepatitis C Screening:    General: Screening Current    Screening, Brief Intervention, and Referral to Treatment (SBIRT)    Screening  Typical number of  "drinks in a day: 1  Typical number of drinks in a week: 4  Interpretation: Low risk drinking behavior.    AUDIT-C Screenin) How often did you have a drink containing alcohol in the past year? monthly or less  2) How many drinks did you have on a typical day when you were drinking in the past year? 1 to 2  3) How often did you have 6 or more drinks on one occasion in the past year? less than monthly    AUDIT-C Score: 2  Interpretation: Score 0-3 (male): Negative screen for alcohol misuse    Single Item Drug Screening:  How often have you used an illegal drug (including marijuana) or a prescription medication for non-medical reasons in the past year? never    Single Item Drug Screen Score: 0  Interpretation: Negative screen for possible drug use disorder    Social Determinants of Health     Financial Resource Strain: Low Risk  (10/20/2023)    Overall Financial Resource Strain (CARDIA)     Difficulty of Paying Living Expenses: Not very hard   Food Insecurity: No Food Insecurity (10/31/2024)    Hunger Vital Sign     Worried About Running Out of Food in the Last Year: Never true     Ran Out of Food in the Last Year: Never true   Transportation Needs: No Transportation Needs (10/31/2024)    PRAPARE - Transportation     Lack of Transportation (Medical): No     Lack of Transportation (Non-Medical): No   Housing Stability: Low Risk  (10/31/2024)    Housing Stability Vital Sign     Unable to Pay for Housing in the Last Year: No     Number of Times Moved in the Last Year: 0     Homeless in the Last Year: No   Utilities: Not At Risk (10/31/2024)    [facility] Utilities     Threatened with loss of utilities: No     No results found.    Objective     /68   Pulse 93   Temp (!) 97.4 °F (36.3 °C)   Resp 16   Ht 5' 4\" (1.626 m)   Wt 91.6 kg (202 lb)   SpO2 97%   BMI 34.67 kg/m²     Physical Exam  Vitals and nursing note reviewed.   Constitutional:       General: He is not in acute distress.     Appearance: Normal " appearance. He is well-developed. He is not diaphoretic.   Neck:      Thyroid: No thyromegaly.      Vascular: No carotid bruit.   Cardiovascular:      Rate and Rhythm: Normal rate and regular rhythm.      Heart sounds: Normal heart sounds. No murmur heard.  Pulmonary:      Effort: Pulmonary effort is normal. No respiratory distress.      Breath sounds: Normal breath sounds. No wheezing.   Musculoskeletal:         General: Tenderness (back/knees- chronic) present. No deformity.      Cervical back: Normal range of motion and neck supple.      Right lower leg: No edema.      Left lower leg: No edema (very mild swelling from left knee replacement).   Skin:     General: Skin is warm and dry.   Neurological:      Mental Status: He is alert and oriented to person, place, and time.   Psychiatric:         Mood and Affect: Mood normal.         Speech: Speech normal.         Behavior: Behavior normal.         Thought Content: Thought content normal.         Judgment: Judgment normal.

## 2024-10-31 NOTE — PATIENT INSTRUCTIONS
Medicare Preventive Visit Patient Instructions  Thank you for completing your Welcome to Medicare Visit or Medicare Annual Wellness Visit today. Your next wellness visit will be due in one year (11/1/2025).  The screening/preventive services that you may require over the next 5-10 years are detailed below. Some tests may not apply to you based off risk factors and/or age. Screening tests ordered at today's visit but not completed yet may show as past due. Also, please note that scanned in results may not display below.  Preventive Screenings:  Service Recommendations Previous Testing/Comments   Colorectal Cancer Screening  Colonoscopy    Fecal Occult Blood Test (FOBT)/Fecal Immunochemical Test (FIT)  Fecal DNA/Cologuard Test  Flexible Sigmoidoscopy Age: 45-75 years old   Colonoscopy: every 10 years (May be performed more frequently if at higher risk)  OR  FOBT/FIT: every 1 year  OR  Cologuard: every 3 years  OR  Sigmoidoscopy: every 5 years  Screening may be recommended earlier than age 45 if at higher risk for colorectal cancer. Also, an individualized decision between you and your healthcare provider will decide whether screening between the ages of 76-85 would be appropriate. Colonoscopy: 03/12/2024  FOBT/FIT: Not on file  Cologuard: Not on file  Sigmoidoscopy: Not on file          Prostate Cancer Screening Individualized decision between patient and health care provider in men between ages of 55-69   Medicare will cover every 12 months beginning on the day after your 50th birthday PSA: 2.82 ng/mL           Hepatitis C Screening Once for adults born between 1945 and 1965  More frequently in patients at high risk for Hepatitis C Hep C Antibody: 01/30/2019        Diabetes Screening 1-2 times per year if you're at risk for diabetes or have pre-diabetes Fasting glucose: 110 mg/dL (10/14/2024)  A1C: 6.1 % (10/14/2024)      Cholesterol Screening Once every 5 years if you don't have a lipid disorder. May order more  often based on risk factors. Lipid panel: 10/14/2024         Other Preventive Screenings Covered by Medicare:  Abdominal Aortic Aneurysm (AAA) Screening: covered once if your at risk. You're considered to be at risk if you have a family history of AAA or a male between the age of 65-75 who smoking at least 100 cigarettes in your lifetime.  Lung Cancer Screening: covers low dose CT scan once per year if you meet all of the following conditions: (1) Age 55-77; (2) No signs or symptoms of lung cancer; (3) Current smoker or have quit smoking within the last 15 years; (4) You have a tobacco smoking history of at least 20 pack years (packs per day x number of years you smoked); (5) You get a written order from a healthcare provider.  Glaucoma Screening: covered annually if you're considered high risk: (1) You have diabetes OR (2) Family history of glaucoma OR (3)  aged 50 and older OR (4)  American aged 65 and older  Osteoporosis Screening: covered every 2 years if you meet one of the following conditions: (1) Have a vertebral abnormality; (2) On glucocorticoid therapy for more than 3 months; (3) Have primary hyperparathyroidism; (4) On osteoporosis medications and need to assess response to drug therapy.  HIV Screening: covered annually if you're between the age of 15-65. Also covered annually if you are younger than 15 and older than 65 with risk factors for HIV infection. For pregnant patients, it is covered up to 3 times per pregnancy.    Immunizations:  Immunization Recommendations   Influenza Vaccine Annual influenza vaccination during flu season is recommended for all persons aged >= 6 months who do not have contraindications   Pneumococcal Vaccine   * Pneumococcal conjugate vaccine = PCV13 (Prevnar 13), PCV15 (Vaxneuvance), PCV20 (Prevnar 20)  * Pneumococcal polysaccharide vaccine = PPSV23 (Pneumovax) Adults 19-63 yo with certain risk factors or if 65+ yo  If never received any pneumonia  vaccine: recommend Prevnar 20 (PCV20)  Give PCV20 if previously received 1 dose of PCV13 or PPSV23   Hepatitis B Vaccine 3 dose series if at intermediate or high risk (ex: diabetes, end stage renal disease, liver disease)   Respiratory syncytial virus (RSV) Vaccine - COVERED BY MEDICARE PART D  * RSVPreF3 (Arexvy) CDC recommends that adults 60 years of age and older may receive a single dose of RSV vaccine using shared clinical decision-making (SCDM)   Tetanus (Td) Vaccine - COST NOT COVERED BY MEDICARE PART B Following completion of primary series, a booster dose should be given every 10 years to maintain immunity against tetanus. Td may also be given as tetanus wound prophylaxis.   Tdap Vaccine - COST NOT COVERED BY MEDICARE PART B Recommended at least once for all adults. For pregnant patients, recommended with each pregnancy.   Shingles Vaccine (Shingrix) - COST NOT COVERED BY MEDICARE PART B  2 shot series recommended in those 19 years and older who have or will have weakened immune systems or those 50 years and older     Health Maintenance Due:      Topic Date Due   • Colorectal Cancer Screening  03/12/2029   • Hepatitis C Screening  Completed     Immunizations Due:      Topic Date Due   • Influenza Vaccine (1) 09/01/2024   • COVID-19 Vaccine (3 - 2023-24 season) 09/01/2024     Advance Directives   What are advance directives?  Advance directives are legal documents that state your wishes and plans for medical care. These plans are made ahead of time in case you lose your ability to make decisions for yourself. Advance directives can apply to any medical decision, such as the treatments you want, and if you want to donate organs.   What are the types of advance directives?  There are many types of advance directives, and each state has rules about how to use them. You may choose a combination of any of the following:  Living will:  This is a written record of the treatment you want. You can also choose which  treatments you do not want, which to limit, and which to stop at a certain time. This includes surgery, medicine, IV fluid, and tube feedings.   Durable power of  for healthcare (DPAHC):  This is a written record that states who you want to make healthcare choices for you when you are unable to make them for yourself. This person, called a proxy, is usually a family member or a friend. You may choose more than 1 proxy.  Do not resuscitate (DNR) order:  A DNR order is used in case your heart stops beating or you stop breathing. It is a request not to have certain forms of treatment, such as CPR. A DNR order may be included in other types of advance directives.  Medical directive:  This covers the care that you want if you are in a coma, near death, or unable to make decisions for yourself. You can list the treatments you want for each condition. Treatment may include pain medicine, surgery, blood transfusions, dialysis, IV or tube feedings, and a ventilator (breathing machine).  Values history:  This document has questions about your views, beliefs, and how you feel and think about life. This information can help others choose the care that you would choose.  Why are advance directives important?  An advance directive helps you control your care. Although spoken wishes may be used, it is better to have your wishes written down. Spoken wishes can be misunderstood, or not followed. Treatments may be given even if you do not want them. An advance directive may make it easier for your family to make difficult choices about your care.   Weight Management   Why it is important to manage your weight:  Being overweight increases your risk of health conditions such as heart disease, high blood pressure, type 2 diabetes, and certain types of cancer. It can also increase your risk for osteoarthritis, sleep apnea, and other respiratory problems. Aim for a slow, steady weight loss. Even a small amount of weight loss can  lower your risk of health problems.  How to lose weight safely:  A safe and healthy way to lose weight is to eat fewer calories and get regular exercise. You can lose up about 1 pound a week by decreasing the number of calories you eat by 500 calories each day.   Healthy meal plan for weight management:  A healthy meal plan includes a variety of foods, contains fewer calories, and helps you stay healthy. A healthy meal plan includes the following:  Eat whole-grain foods more often.  A healthy meal plan should contain fiber. Fiber is the part of grains, fruits, and vegetables that is not broken down by your body. Whole-grain foods are healthy and provide extra fiber in your diet. Some examples of whole-grain foods are whole-wheat breads and pastas, oatmeal, brown rice, and bulgur.  Eat a variety of vegetables every day.  Include dark, leafy greens such as spinach, kale, michael greens, and mustard greens. Eat yellow and orange vegetables such as carrots, sweet potatoes, and winter squash.   Eat a variety of fruits every day.  Choose fresh or canned fruit (canned in its own juice or light syrup) instead of juice. Fruit juice has very little or no fiber.  Eat low-fat dairy foods.  Drink fat-free (skim) milk or 1% milk. Eat fat-free yogurt and low-fat cottage cheese. Try low-fat cheeses such as mozzarella and other reduced-fat cheeses.  Choose meat and other protein foods that are low in fat.  Choose beans or other legumes such as split peas or lentils. Choose fish, skinless poultry (chicken or turkey), or lean cuts of red meat (beef or pork). Before you cook meat or poultry, cut off any visible fat.   Use less fat and oil.  Try baking foods instead of frying them. Add less fat, such as margarine, sour cream, regular salad dressing and mayonnaise to foods. Eat fewer high-fat foods. Some examples of high-fat foods include french fries, doughnuts, ice cream, and cakes.  Eat fewer sweets.  Limit foods and drinks that are  high in sugar. This includes candy, cookies, regular soda, and sweetened drinks.  Exercise:  Exercise at least 30 minutes per day on most days of the week. Some examples of exercise include walking, biking, dancing, and swimming. You can also fit in more physical activity by taking the stairs instead of the elevator or parking farther away from stores. Ask your healthcare provider about the best exercise plan for you.      © Copyright [employer] 2018 Information is for End User's use only and may not be sold, redistributed or otherwise used for commercial purposes. All illustrations and images included in CareNotes® are the copyrighted property of [employer] Inc[employer] or [place]

## 2024-10-31 NOTE — ASSESSMENT & PLAN NOTE
Lab Results   Component Value Date    HGBA1C 6.1 (H) 10/14/2024       Orders:    Comprehensive metabolic panel; Future    CBC and differential; Future    Albumin / creatinine urine ratio; Future    Hemoglobin A1C; Future    semaglutide, 2 mg/dose, (Ozempic) 8 mg/ mL injection pen; Inject 0.75 mL (2 mg total) under the skin every 7 days

## 2024-12-02 ENCOUNTER — OCCMED (OUTPATIENT)
Dept: URGENT CARE | Facility: CLINIC | Age: 74
End: 2024-12-02

## 2024-12-02 DIAGNOSIS — Z02.89 ENCOUNTER FOR PHYSICAL EXAMINATION RELATED TO EMPLOYMENT: Primary | ICD-10-CM

## 2024-12-13 ENCOUNTER — TELEPHONE (OUTPATIENT)
Dept: FAMILY MEDICINE CLINIC | Facility: CLINIC | Age: 74
End: 2024-12-13

## 2024-12-13 NOTE — TELEPHONE ENCOUNTER
[place]. Asking if [place] can complete. Put in PCP folder up front.

## 2024-12-17 ENCOUNTER — TELEPHONE (OUTPATIENT)
Age: 74
End: 2024-12-17

## 2024-12-31 DIAGNOSIS — E78.00 HYPERCHOLESTEREMIA: ICD-10-CM

## 2024-12-31 RX ORDER — SIMVASTATIN 20 MG
20 TABLET ORAL
Qty: 90 TABLET | Refills: 1 | Status: SHIPPED | OUTPATIENT
Start: 2024-12-31

## 2025-01-14 ENCOUNTER — OFFICE VISIT (OUTPATIENT)
Dept: OBGYN CLINIC | Facility: HOSPITAL | Age: 75
End: 2025-01-14
Payer: MEDICARE

## 2025-01-14 VITALS — WEIGHT: 197 LBS | HEIGHT: 64 IN | BODY MASS INDEX: 33.63 KG/M2

## 2025-01-14 DIAGNOSIS — Z79.01 ANTICOAGULATION MANAGEMENT ENCOUNTER: ICD-10-CM

## 2025-01-14 DIAGNOSIS — Z51.81 ANTICOAGULATION MANAGEMENT ENCOUNTER: ICD-10-CM

## 2025-01-14 DIAGNOSIS — M17.11 PRIMARY OSTEOARTHRITIS OF RIGHT KNEE: ICD-10-CM

## 2025-01-14 DIAGNOSIS — M25.561 CHRONIC PAIN OF RIGHT KNEE: Primary | ICD-10-CM

## 2025-01-14 DIAGNOSIS — E11.9 TYPE 2 DIABETES MELLITUS WITHOUT COMPLICATION, WITHOUT LONG-TERM CURRENT USE OF INSULIN (HCC): ICD-10-CM

## 2025-01-14 DIAGNOSIS — G89.29 CHRONIC PAIN OF RIGHT KNEE: Primary | ICD-10-CM

## 2025-01-14 PROCEDURE — 99214 OFFICE O/P EST MOD 30 MIN: CPT | Performed by: ORTHOPAEDIC SURGERY

## 2025-01-14 RX ORDER — ENOXAPARIN SODIUM 100 MG/ML
40 INJECTION SUBCUTANEOUS DAILY
Qty: 11.2 ML | Refills: 0 | Status: SHIPPED | OUTPATIENT
Start: 2025-01-14 | End: 2025-02-11

## 2025-01-14 RX ORDER — SODIUM CHLORIDE, SODIUM LACTATE, POTASSIUM CHLORIDE, CALCIUM CHLORIDE 600; 310; 30; 20 MG/100ML; MG/100ML; MG/100ML; MG/100ML
125 INJECTION, SOLUTION INTRAVENOUS CONTINUOUS
OUTPATIENT
Start: 2025-01-14

## 2025-01-14 RX ORDER — TRANEXAMIC ACID 10 MG/ML
1000 INJECTION, SOLUTION INTRAVENOUS ONCE
OUTPATIENT
Start: 2025-01-14 | End: 2025-01-14

## 2025-01-14 RX ORDER — ASCORBIC ACID 500 MG
500 TABLET ORAL DAILY
Qty: 30 TABLET | Refills: 0 | Status: SHIPPED | OUTPATIENT
Start: 2025-01-14

## 2025-01-14 RX ORDER — FOLIC ACID 1 MG/1
1 TABLET ORAL DAILY
Qty: 30 TABLET | Refills: 0 | Status: SHIPPED | OUTPATIENT
Start: 2025-01-14

## 2025-01-14 RX ORDER — MUPIROCIN 20 MG/G
OINTMENT TOPICAL 2 TIMES DAILY
Qty: 15 G | Refills: 0 | Status: SHIPPED | OUTPATIENT
Start: 2025-01-14

## 2025-01-14 RX ORDER — CEFAZOLIN SODIUM 2 G/50ML
2000 SOLUTION INTRAVENOUS ONCE
OUTPATIENT
Start: 2025-01-14 | End: 2025-01-14

## 2025-01-14 RX ORDER — CHLORHEXIDINE GLUCONATE ORAL RINSE 1.2 MG/ML
15 SOLUTION DENTAL ONCE
OUTPATIENT
Start: 2025-01-14 | End: 2025-01-14

## 2025-01-14 RX ORDER — FERROUS SULFATE 324(65)MG
324 TABLET, DELAYED RELEASE (ENTERIC COATED) ORAL
Qty: 60 TABLET | Refills: 0 | Status: SHIPPED | OUTPATIENT
Start: 2025-01-14

## 2025-01-14 NOTE — PROGRESS NOTES
Assessment:  1. Chronic pain of right knee        2. Primary osteoarthritis of right knee            Plan:  Right knee osteoarthritis  The patient will be scheduled for right total knee arthroplasty.  We feel the patient will find significant relief per current symptoms, findings on imaging and exam.  Risks, benefits, precautions and expectations were discussed. Risks include blood loss, potential infection and blood clots.  Preoperative vitamins were prescribed.     The patient should follow up after surgery.        To do next visit:  Return for post-op with x-rays.    The above stated was discussed in layman's terms and the patient expressed understanding.  All questions were answered to the patient's satisfaction.       Scribe Attestation      I,:  Franklin Johnson MA am acting as a scribe while in the presence of the attending physician.:       I,:  Denton Nichole MD personally performed the services described in this documentation    as scribed in my presence.:               Subjective:   Jason Miguel is a 74 y.o. male who presents for follow up of right knee.  He is s/p right knee visco-supplement with short-lived benefit, 10/10/2024.  Today he complains of return of left knee pain.  His pain effects his daily activity and sleep.  Most activity aggravates while rest alleviates.  He has tried activity modification, oral medications, steroid and visco-supplement with diminishing benefit.        Review of systems negative unless otherwise specified in HPI    Past Medical History:   Diagnosis Date    Arthritis        Past Surgical History:   Procedure Laterality Date    COLONOSCOPY      ELBOW SURGERY Bilateral     FOOT TENDON SURGERY Bilateral     heel    HAND SURGERY      MT ARTHRP CHRISTY CONDYLE&PLATU MEDIAL&LAT COMPARTMENTS Left 8/17/2023    Procedure: ARTHROPLASTY KNEE TOTAL W ROBOT;  Surgeon: Denton Nichole MD;  Location: [facility];  Service: Orthopedics    MT ARTHRS KNE SURG W/MENISCECTOMY  MED/LAT W/SHVG Right 2022    Procedure: KNEE ARTHROSCOPY WITH medial MENISCECTOMY chondroplasty synovectomy injection;  Surgeon: Hank Shirley DO;  Location: [facility];  Service: Orthopedics    DE ARTHRS KNE SURG W/MENISCECTOMY MED/LAT W/SHVG Left 2023    Procedure: Left knee arthroscopy: Medial meniscectomy; Lateral meniscectomy; Chondroplasty; Synovectomy; Post arthroscopic injection of intra-articular joint space and peripheral portal;  Surgeon: Hank Shirley DO;  Location: [facility];  Service: Orthopedics    DE NEUROPLASTY &/TRANSPOS MEDIAN NRV CARPAL TUNNE Left 2021    Procedure: RELEASE CARPAL TUNNEL;  Surgeon: Hank Shirley DO;  Location:  MAIN OR;  Service: Orthopedics       Family History   Problem Relation Age of Onset    Heart disease Mother         hx MI    Diabetes Mother     Stroke Mother     Arthritis Mother     COPD Father         84 yrs    Coronary artery disease Father     Cancer Brother        Social History     Occupational History    Not on file   Tobacco Use    Smoking status: Former     Current packs/day: 0.00     Average packs/day: 1 pack/day for 35.0 years (35.0 ttl pk-yrs)     Types: Cigarettes     Start date:      Quit date:      Years since quittin.0    Smokeless tobacco: Never   Vaping Use    Vaping status: Never Used   Substance and Sexual Activity    Alcohol use: Yes     Comment: 2 x per week    Drug use: No    Sexual activity: Yes     Partners: Female     Birth control/protection: Male Sterilization         Current Outpatient Medications:     metFORMIN (GLUCOPHAGE) 500 mg tablet, TAKE 1 TABLET TWICE DAILY  WITH MEALS, Disp: 180 tablet, Rfl: 3    semaglutide, 2 mg/dose, (Ozempic) 8 mg/ mL injection pen, Inject 0.75 mL (2 mg total) under the skin every 7 days, Disp: 9 mL, Rfl: 1    simvastatin (ZOCOR) 20 mg tablet, TAKE 1 TABLET DAILY AT BEDTIME, Disp: 90 tablet, Rfl: 1    tamsulosin (FLOMAX) 0.4 mg, TAKE 2 CAPSULES DAILY WITH DINNER, Disp: 180  "capsule, Rfl: 1    No Known Allergies       There were no vitals filed for this visit.    Objective:  Physical exam  General: Awake, Alert, Oriented  Eyes: Pupils equal, round and reactive to light  Heart: regular rate and rhythm  Lungs: No audible wheezing  Abdomen: soft                    Ortho Exam  Right knee:  Varus alignment  No erythema or ecchymosis  No effusion or swelling  Normal strength  Good ROM with crepitus  -3 to 120  Calf compartments soft and supple  Sensation intact  Toes are warm sensate and mobile      Diagnostics, reviewed and taken today if performed as documented:    None performed     Procedures, if performed today:    Procedures    None performed      Portions of the record may have been created with voice recognition software.  Occasional wrong word or \"sound a like\" substitutions may have occurred due to the inherent limitations of voice recognition software.  Read the chart carefully and recognize, using context, where substitutions have occurred.    "

## 2025-01-21 ENCOUNTER — TELEPHONE (OUTPATIENT)
Dept: OBGYN CLINIC | Facility: HOSPITAL | Age: 75
End: 2025-01-21

## 2025-01-22 ENCOUNTER — ANESTHESIA EVENT (OUTPATIENT)
Age: 75
End: 2025-01-22
Payer: MEDICARE

## 2025-01-22 LAB
LEFT EYE DIABETIC RETINOPATHY: NORMAL
RIGHT EYE DIABETIC RETINOPATHY: NORMAL

## 2025-01-29 ENCOUNTER — APPOINTMENT (OUTPATIENT)
Dept: LAB | Facility: CLINIC | Age: 75
End: 2025-01-29
Payer: MEDICARE

## 2025-01-29 DIAGNOSIS — M17.11 PRIMARY OSTEOARTHRITIS OF RIGHT KNEE: ICD-10-CM

## 2025-01-29 DIAGNOSIS — M25.561 CHRONIC PAIN OF RIGHT KNEE: ICD-10-CM

## 2025-01-29 DIAGNOSIS — Z79.01 ANTICOAGULATION MANAGEMENT ENCOUNTER: ICD-10-CM

## 2025-01-29 DIAGNOSIS — Z51.81 ANTICOAGULATION MANAGEMENT ENCOUNTER: ICD-10-CM

## 2025-01-29 DIAGNOSIS — G89.29 CHRONIC PAIN OF RIGHT KNEE: ICD-10-CM

## 2025-01-29 LAB
ALBUMIN SERPL BCG-MCNC: 4.5 G/DL (ref 3.5–5)
ALP SERPL-CCNC: 41 U/L (ref 34–104)
ALT SERPL W P-5'-P-CCNC: 24 U/L (ref 7–52)
ANION GAP SERPL CALCULATED.3IONS-SCNC: 7 MMOL/L (ref 4–13)
APTT PPP: 36 SECONDS (ref 23–34)
AST SERPL W P-5'-P-CCNC: 16 U/L (ref 13–39)
BASOPHILS # BLD AUTO: 0.05 THOUSANDS/ΜL (ref 0–0.1)
BASOPHILS NFR BLD AUTO: 1 % (ref 0–1)
BILIRUB SERPL-MCNC: 0.44 MG/DL (ref 0.2–1)
BUN SERPL-MCNC: 16 MG/DL (ref 5–25)
CALCIUM SERPL-MCNC: 9.3 MG/DL (ref 8.4–10.2)
CHLORIDE SERPL-SCNC: 104 MMOL/L (ref 96–108)
CO2 SERPL-SCNC: 26 MMOL/L (ref 21–32)
CREAT SERPL-MCNC: 0.81 MG/DL (ref 0.6–1.3)
EOSINOPHIL # BLD AUTO: 0.13 THOUSAND/ΜL (ref 0–0.61)
EOSINOPHIL NFR BLD AUTO: 2 % (ref 0–6)
ERYTHROCYTE [DISTWIDTH] IN BLOOD BY AUTOMATED COUNT: 13.4 % (ref 11.6–15.1)
EST. AVERAGE GLUCOSE BLD GHB EST-MCNC: 123 MG/DL
GFR SERPL CREATININE-BSD FRML MDRD: 88 ML/MIN/1.73SQ M
GLUCOSE P FAST SERPL-MCNC: 107 MG/DL (ref 65–99)
HBA1C MFR BLD: 5.9 %
HCT VFR BLD AUTO: 50.8 % (ref 36.5–49.3)
HGB BLD-MCNC: 16.6 G/DL (ref 12–17)
IMM GRANULOCYTES # BLD AUTO: 0.02 THOUSAND/UL (ref 0–0.2)
IMM GRANULOCYTES NFR BLD AUTO: 0 % (ref 0–2)
INR PPP: 0.94 (ref 0.85–1.19)
LYMPHOCYTES # BLD AUTO: 1.91 THOUSANDS/ΜL (ref 0.6–4.47)
LYMPHOCYTES NFR BLD AUTO: 32 % (ref 14–44)
MCH RBC QN AUTO: 29.2 PG (ref 26.8–34.3)
MCHC RBC AUTO-ENTMCNC: 32.7 G/DL (ref 31.4–37.4)
MCV RBC AUTO: 89 FL (ref 82–98)
MONOCYTES # BLD AUTO: 0.38 THOUSAND/ΜL (ref 0.17–1.22)
MONOCYTES NFR BLD AUTO: 6 % (ref 4–12)
NEUTROPHILS # BLD AUTO: 3.52 THOUSANDS/ΜL (ref 1.85–7.62)
NEUTS SEG NFR BLD AUTO: 59 % (ref 43–75)
NRBC BLD AUTO-RTO: 0 /100 WBCS
PLATELET # BLD AUTO: 207 THOUSANDS/UL (ref 149–390)
PMV BLD AUTO: 10.2 FL (ref 8.9–12.7)
POTASSIUM SERPL-SCNC: 4.4 MMOL/L (ref 3.5–5.3)
PROT SERPL-MCNC: 6.9 G/DL (ref 6.4–8.4)
PROTHROMBIN TIME: 12.9 SECONDS (ref 12.3–15)
RBC # BLD AUTO: 5.69 MILLION/UL (ref 3.88–5.62)
SODIUM SERPL-SCNC: 137 MMOL/L (ref 135–147)
WBC # BLD AUTO: 6.01 THOUSAND/UL (ref 4.31–10.16)

## 2025-01-29 PROCEDURE — 85730 THROMBOPLASTIN TIME PARTIAL: CPT

## 2025-01-29 PROCEDURE — 83036 HEMOGLOBIN GLYCOSYLATED A1C: CPT

## 2025-01-29 PROCEDURE — 36415 COLL VENOUS BLD VENIPUNCTURE: CPT

## 2025-01-29 PROCEDURE — 85610 PROTHROMBIN TIME: CPT

## 2025-01-29 PROCEDURE — 87081 CULTURE SCREEN ONLY: CPT

## 2025-01-29 PROCEDURE — 80053 COMPREHEN METABOLIC PANEL: CPT

## 2025-01-29 PROCEDURE — 85025 COMPLETE CBC W/AUTO DIFF WBC: CPT

## 2025-01-30 LAB — MRSA NOSE QL CULT: NORMAL

## 2025-01-30 NOTE — TELEPHONE ENCOUNTER
Preoperative Elective Admission Assessment    Preop testing is completed.    Living Situation:    Who does pt live with: spouse  What kind of home: ranch  How do they enter the home: front  How many levels in home: 1   # of steps to enter home: 6  Sleeping arrangement: first/entry floor  Where is Bathroom: first floor   Where is the tub or shower: first floor, walk in shower w/ seat  Toilet height? Concerns for low toilets: Per pt, he had knee surgery 2 years ago and did not have issue with toilets. States he has grab bars for toilets. Declines order for BSC or RTS.       First Floor Setup:   Is there a bathroom: Yes  Where would pt sleep: bed     DME:  Pt has cane and walker from previous surgery. Declines order for BSC or RTS. No DME ordered. Instructed pt to bring walker on DOS.     We discussed clearing pathways in the home and making sure there is accessibly to use the walker, for example, removing throw rugs.      Patient's Current Level of Function: Ambulates: Independently and ADLs: Independent    Post-op Caregiver: spouse  Caregiver Name and phone number for Inpatient discharge needs: PhilomenaEra lincoln (Spouse)  [phone] (Mobile)   Currently receive any HHC/aides/community supports: No     Post-op Transport: spouse or sister   To/from hospital: spouse  To/from PT 2-3x/week:  spouse or sister   Uses community transport now: No     Outpatient Physical Therapy Site:  Site: [place]  pre and post-op appts scheduled? Yes     Medication Management: self  Preferred Pharmacy for Post-op Medications: [facility] - [address] [[id number]] (Meds to Beds)  Blood Management Vitamin Regimen: Pt confirms taking as prescribed  Post-op anticoagulant: prescribed preoperatively - patient has at home ready for after surgery use only. Educated that Lovenox is for POSTOP use only  Has Bactroban for 5 days preop: Yes  Educated on Preoperative Bathing Instructions, and use of Soap for 5 days  before surgery.      DC Plan: Pt plans to be discharged home    Barriers to DC identified preoperatively: none identified    BMI: 33.81    Patient Education:  Pt educated on post-op pain, early mobilization (POD0), LOS goals, OP PT goals, and preoperative bathing. Patient educated that our goal is to appropriately discharge patient based off their post-op function while striving to maintain maximal independence. The goal is to discharge patient to home and for them to attend outpatient physical therapy.    Assigned to care team? Yes

## 2025-02-04 PROBLEM — M17.11 PRIMARY OSTEOARTHRITIS OF RIGHT KNEE: Status: ACTIVE | Noted: 2023-07-17

## 2025-02-06 ENCOUNTER — EVALUATION (OUTPATIENT)
Dept: PHYSICAL THERAPY | Facility: CLINIC | Age: 75
End: 2025-02-06
Payer: MEDICARE

## 2025-02-06 DIAGNOSIS — G89.29 CHRONIC PAIN OF RIGHT KNEE: Primary | ICD-10-CM

## 2025-02-06 DIAGNOSIS — M25.561 CHRONIC PAIN OF RIGHT KNEE: Primary | ICD-10-CM

## 2025-02-06 DIAGNOSIS — M17.11 PRIMARY OSTEOARTHRITIS OF RIGHT KNEE: ICD-10-CM

## 2025-02-06 PROCEDURE — 97112 NEUROMUSCULAR REEDUCATION: CPT | Performed by: PHYSICAL THERAPIST

## 2025-02-06 PROCEDURE — 97162 PT EVAL MOD COMPLEX 30 MIN: CPT | Performed by: PHYSICAL THERAPIST

## 2025-02-06 PROCEDURE — 97110 THERAPEUTIC EXERCISES: CPT | Performed by: PHYSICAL THERAPIST

## 2025-02-06 NOTE — PRE-PROCEDURE INSTRUCTIONS
Pre-Surgery Instructions:   Medication Instructions    ascorbic acid (VITAMIN C) 500 MG tablet Hold day of surgery.    ferrous sulfate 324 (65 Fe) mg Hold day of surgery.    folic acid (FOLVITE) 1 mg tablet Hold day of surgery.    metFORMIN (GLUCOPHAGE) 500 mg tablet Hold day of surgery.    mupirocin (BACTROBAN) 2 % ointment Take day of surgery.    semaglutide, 2 mg/dose, (Ozempic) 8 mg/ mL injection pen Stop taking 7 days prior to surgery.    simvastatin (ZOCOR) 20 mg tablet Take night before surgery    tamsulosin (FLOMAX) 0.4 mg Take night before surgery      Medication instructions for day surgery reviewed. Please use only a sip of water to take your instructed medications. Avoid all over the counter vitamins, supplements and NSAIDS for one week prior to surgery per anesthesia guidelines. Tylenol is ok to take as needed.     You will receive a call one business day prior to surgery with an arrival time and hospital directions. If your surgery is scheduled on a Monday, the hospital will be calling you on the Friday prior to your surgery. If you have not heard from anyone by 8pm, please call the hospital supervisor through the hospital  at [phone]. ([place] [phone] or [place] [phone]).    Do not eat or drink anything after midnight the night before your surgery, including candy, mints, lifesavers, or chewing gum. Do not drink alcohol 24hrs before your surgery. Try not to smoke at least 24hrs before your surgery.       Follow the pre surgery showering instructions as listed in the “My Surgical Experience Booklet” or otherwise provided by your surgeon's office. Do not use a blade to shave the surgical area 1 week before surgery. It is okay to use a clean electric clippers up to 24 hours before surgery. Do not apply any lotions, creams, including makeup, cologne, deodorant, or perfumes after showering on the day of your surgery. Do not use dry shampoo, hair spray, hair gel, or any type of hair  products.     No contact lenses, eye make-up, or artificial eyelashes. Remove nail polish, including gel polish, and any artificial, gel, or acrylic nails if possible. Remove all jewelry including rings and body piercing jewelry.     Wear causal clothing that is easy to take on and off. Consider your type of surgery.    Keep any valuables, jewelry, piercings at home. Please bring any specially ordered equipment (sling, braces) if indicated.    Arrange for a responsible person to drive you to and from the hospital on the day of your surgery. Please confirm the visitor policy for the day of your procedure when you receive your phone call with an arrival time.     Call the surgeon's office with any new illnesses, exposures, or additional questions prior to surgery.    Please reference your “My Surgical Experience Booklet” for additional information to prepare for your upcoming surgery.    See Geriatric Assessment below...  Frank Total Score: 20  PHQ- 9 Depression Scale:0  Nutrition Assessment Score:  METS:7.34  Incentive Spirometry Level:   Health goals:  -What are your overall health goals? (quit smoking, wt. loss, rest, decrease stress)  Lose more weight  -What brings you strength? (family, friends, [employer], health)  Family friends  -What activities are important to you? (exercise, reading, travel, work)  maintaining his yard,

## 2025-02-06 NOTE — PROGRESS NOTES
PT Evaluation     Today's date: 2025  Patient name: Jason Miguel  : 1950  MRN: [id number]  Referring provider: Denton Nichole,*  Dx:   Encounter Diagnosis     ICD-10-CM    1. Chronic pain of right knee  M25.561     G89.29       2. Primary osteoarthritis of right knee  M17.11                        Assessment  Impairments: abnormal gait, abnormal or restricted ROM, activity intolerance, impaired balance, impaired physical strength, lacks appropriate home exercise program, pain with function and poor posture   Functional limitations: difficulty with prolonged standing, prolonged walking, walking on unlevel surfaces, difficulty squatting/kneeling, difficulty stair-climbing, and difficulty transferring from low surfaces.  Symptom irritability: low    Assessment details: Jason Miguel is a 74 y.o. male with a history of arthritis, DM, BMI>30, chronic pain, and high cholesterol that presents for a pre-operative(R TKA), moderate complexity, physical therapy initial evaluation.  The patient demonstrates signs and symptoms consistent with chronic pain R knee, R knee OA.  During the examination the patient demonstrated decreased R LE strength, decreased R knee ROM, gait dysfunction, and R knee pain.  The patient's impairments are causing the following functional limitations: difficulty with prolonged standing, prolonged walking, walking on unlevel surfaces, difficulty squatting/kneeling, difficulty stair-climbing, and difficulty transferring from low surfaces.  The patient's clinical presentation is evolving due to a number of participation restrictions, significant medial history, and functional limitation.  The patient will benefit from skilled PT services to address impairments, work towards goals, and restore PLOF.      Understanding of Dx/Px/POC: good     Prognosis: good  Prognosis details: Positive prognostic indicators include: positive attitude toward recovery, good understanding of  diagnosis/treatment plan, and absence of observed red flags.      Negative prognostic indicators include: Significant medical Hx, chronicity of condition    Goals  GOALS TO BE ACHIEVED BY THE END OF THE PRE-OP VISIT  1.  Patient have all questions answered / receive pre operative education regarding precautions/wound care/sling/ADL's.  MET   2.  Patient to be independent with his phase 1 post operative exercises to perform pre surgery.  MET       GOALS TO BE ACHIEVED POST OPERATIVELY    STG: Achieve in 4-6 weeks  1.  Patient's R knee pain at worst less than 2/10 to allow for proper gait.  2.  Patient's R knee ROM improve to 0-90 degrees to improve squatting.  3.  R LE MMT improve to > 4+/5 all motions tested to improve ADL/recreational activities.  4.  Timed up and go score improve to less than 14 seconds to indicate improved balance/decreased falls risk    LTG:  Achieve in 6-12 weeks  1.  Patient's knee FOTO score improve to > 65% to indicate a return to normal functioning.  2.  Patient achieve personal goal of function.  3. Patient to achieve independence with home exercise plan.  4. 30 second sit to stand score improve to > 14 stands to indicate improved transfers.        Plan  Patient would benefit from: skilled physical therapy  Planned modality interventions: cryotherapy, thermotherapy: hydrocollator packs, neuromuscular electric stimulation and electrical stimulation/Russian stimulation    Planned therapy interventions: joint mobilization, manual therapy, neuromuscular re-education, patient education, postural training, self care, strengthening, stretching, therapeutic activities, therapeutic exercise, home exercise program, abdominal trunk stabilization, balance, IASTM, gait training and balance/weight bearing training    Frequency: 1-3x/wk.  Duration in weeks: 12  Plan of Care beginning date: 2/6/2025  Plan of Care expiration date: 5/6/2025  Treatment plan discussed with: PTA and patient  Plan details:  RE-ASSESS 1X/MONTH        Subjective Evaluation    History of Present Illness  Mechanism of injury: Jason Miguel is a 74 y.o. male that presents to outpatient physical therapy with complaints of R knee pain and difficulty functioning.  The patient reports chronic R knee pain impacting his function despite attempting conservative care.  The patient notes difficulty with pivoting while walking, carrying laundry, squatting/kneeling, and ADL's.  The patient denies any N/T at the R LE.  The patient presents pre-operatively to get objective measures, learn phase 1 exercises, and receive education on the surgery/self care.  The patient's main goal for physical therapy is to perform ADL's and walk pain free.             Recurrent probem    Patient Goals  Patient goals for therapy: decreased pain, increased motion, increased strength, independence with ADLs/IADLs, return to sport/leisure activities, improved balance and return to work  Patient goal: perform ADL's and walk pain free  Pain  Current pain ratin  At best pain ratin  At worst pain ratin  Location: R medial knee joint line  Quality: dull ache and sharp  Relieving factors: rest  Aggravating factors: standing, walking, lifting and stair climbing  Progression: worsening    Social Support  Steps to enter house: yes  Stairs in house: yes   Lives in: multiple-level home  Lives with: spouse    Employment status: working (drive school bus)  Treatments  Current treatment: physical therapy      Objective     Observations     Additional Observation Details  The patient was educated on caring for R knee incisions and watching for signs of infection to prepare for upcoming R TKA  surgery.  All patient questions related to the incision care and infection signs were answered at this time.  We also reviewed ambulation/transfers with an assistive device, post operative movement contraindications/precautions, and ADL's.  The patient accepted and understood all  information - patient was advised to call if any other questions arise pre-surgery.          Palpation     Additional Palpation Details  NO TTP    Tenderness     Additional Tenderness Details  NO TTP    Neurological Testing     Sensation     Knee   Left Knee   Intact: Light touch    Right Knee   Intact: light touch     Active Range of Motion   Left Knee   Flexion: 121 degrees   Extension: 2 degrees     Right Knee   Flexion: 115 degrees   Extension: 2 degrees     Passive Range of Motion   Left Knee   Flexion: WFL    Right Knee   Flexion: WFL    Mobility   Patellar Mobility:   Left Knee   WFL: medial, lateral, superior and inferior.     Right Knee   WFL: medial, lateral, superior and inferior    Strength/Myotome Testing     Left Hip   Planes of Motion   Flexion: 4  Extension: 4-  Abduction: 4  Adduction: 4  External rotation: 4  Internal rotation: 4    Right Hip   Planes of Motion   Flexion: 4  Extension: 4-  Abduction: 4  Adduction: 4  External rotation: 4  Internal rotation: 4    Left Knee   Flexion: 4+  Extension: 4+  Quadriceps contraction: good    Right Knee   Flexion: 4  Extension: 4  Quadriceps contraction: fair    Tests     Additional Tests Details  KOOS: 54.84    Gait impairments: Patient ambulates with no AD WBAT R LE.  The patient demonstrates slow gait speed, equal step lengths, and normal heel-toe rollover.    TU sec  30SSTS: 14 stands 0H                 Re-Evaluation:  PRECAUTIONS:Hx L LE radiculopathy - flexion responder  Knee Specialty Daily Treatment Diary     Manual Therapy 2/6       MFR/STM/ IASTM         Hamstring stretch        Prone Quad stretch        Patellar mobs        Knee stretch on edge of mat            Ther Ex 2/6       Nu Step for LE strength S=        Biodex Bike for knee ROM/ endurance S=        Heel slides flex/abd X15 ea                EXT BOARD        Prone knee flex         Ankle pumps knee EXT x25       SLR all planes        Ball squeeze        LAQ x15       Hamstring stretch  "       Calf stretch        Standing hamstring curls        HR/TR        Mini Squat        Step knee flexion        Stand lumbar EXT        Total gym squats (deep)        Neuro Re-ed        TKE        Gluteal set x15       QS x15       Wall slides with feet stagger        Bridges        Fitter board        Eccentric Leg press        Clam Shells        Heel-toe amb        Tandem stand        CSMi balance, \"+\" squat        Therapeutic ACT        sidestepping                Mirror walking                Amb up/down steps        Chair squats        Reverse Lunges mirror        Step ups            Modalities        CP  KNEE                               The patient was given a new home exercise plan with instruction, pictures, and verbal feedback.  The patient accepts and understands the new home activities.    "

## 2025-02-10 ENCOUNTER — TELEPHONE (OUTPATIENT)
Age: 75
End: 2025-02-10

## 2025-02-10 NOTE — TELEPHONE ENCOUNTER
Caller: Patient    Doctor: Dr. 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  He can be reached at the number below.      Call back#: [phone]

## 2025-02-11 ENCOUNTER — OFFICE VISIT (OUTPATIENT)
Age: 75
End: 2025-02-11
Payer: MEDICARE

## 2025-02-11 VITALS
OXYGEN SATURATION: 97 % | SYSTOLIC BLOOD PRESSURE: 128 MMHG | BODY MASS INDEX: 34.31 KG/M2 | HEART RATE: 83 BPM | DIASTOLIC BLOOD PRESSURE: 72 MMHG | WEIGHT: 201 LBS | HEIGHT: 64 IN

## 2025-02-11 DIAGNOSIS — M25.561 CHRONIC PAIN OF RIGHT KNEE: ICD-10-CM

## 2025-02-11 DIAGNOSIS — G89.29 CHRONIC PAIN OF RIGHT KNEE: ICD-10-CM

## 2025-02-11 DIAGNOSIS — M17.11 PRIMARY OSTEOARTHRITIS OF RIGHT KNEE: ICD-10-CM

## 2025-02-11 PROCEDURE — 99215 OFFICE O/P EST HI 40 MIN: CPT | Performed by: INTERNAL MEDICINE

## 2025-02-11 NOTE — PATIENT INSTRUCTIONS
BEFORE SURGERY    Contact your surgical nurse navigator or surgical provider with any questions regarding preoperative plan or schedule.  Stop all over the counter supplements, herbal, naturopathic  medications for 1 week prior to surgery UNLESS prescribed by your surgeon  Hold NSAIDS (i.e. advil, alleve, motrin, ibuprofen, celebrex) minimum 5 days prior to surgery  Follow presurgical medication instructions provided by preadmission nursing team reviewed during your presurgery phone call  Strategies for optimizing your surgery through breathing exercises, nutrition and physical activity can be found at [email or web address]/best  Call [phone] with any presurgical concerns or medications questions or use the messaging feature in your docTrackr chaim to contact your provider    AFTER SURGERY    Recommend using Tylenol ( acetaminophen ) 1000 mg every eight hours during the first week post discharge along with icing the area for 20 mins every 3-4 hours while awake can be helpful in reducing your need for post operative opioid use. This opioid sparing plan can be used along side your surgeons pain plan.  Use stool softener over the counter (colace) daily after surgery during the first 1-2 weeks to avoid post operative constipation issues  If no bowel movement within 3 days after surgery then use over the counter Miralax in addition to your stool softener   If cleared by your surgical team for activity then early and often walking is encouraged and can be important in prevention of post surgical blood clots. Additionally spend as much time out of bed as possible and allowed by your surgical team  Use your incentive spirometer twice per hour in the first seven days after surgery to help prevent post surgery lung complications and infections  It is very important you follow the instructions from your surgeon regarding any medications for after surgery blood clot prevention. Compliance with these medications or interventions is very  important.  Call [phone] with any post discharge concerns or medical issues or use the messaging feature in your [facility] chaim to contact your provider

## 2025-02-11 NOTE — PROGRESS NOTES
Internal Medicine Pre-Operative Evaluation:     Reason for Visit: Pre-operative Evaluation for Risk Stratification and Optimization    Patient ID: Jason Miguel is a 74 y.o. male.     Case: [id number] Date/Time: 02/27/25 0800   Procedure: ARTHROPLASTY KNEE TOTAL W ROBOT (Right: Knee)   Anesthesia type: Choice   Diagnosis:      Chronic pain of right knee [M25.561, G89.29]      Primary osteoarthritis of right knee [M17.11]   Pre-op diagnosis:      Chronic pain of right knee [M25.561, G89.29]      Primary osteoarthritis of right knee [M17.11]   Location:  OR ROOM 06 / [facility]   Surgeons: Denton Nichole MD         Recommendations to Proceed withSurgery    Patient is considered to be Low risk for Medium risk procedure.     After evaluation and discussion with patient with emphasis that all surgery has some degree of inherent risk it is acknowledged by patient this risk is Acceptable.    Patient is optimized and may proceed with planned procedure.     Assessment    Pre-operative Medical Evaluation for planned surgery  Recommendations as listed in PLAN section below  Contact surgical nurse  navigator with any questions regarding preoperative plan or schedule.      Assessment & Plan  Primary osteoarthritis of right knee  Failed outpatient conservative measures  Elected to undergo total joint arthroplasty    Type 2 diabetes mellitus without complication, without long-term current use of insulin (HCC)    Lab Results   Component Value Date    HGBA1C 5.9 (H) 01/29/2025     Hold glp1  Cont metformin and CCD  Hypercholesteremia  Continue low cholesterol diet and statin therapy    Pre-op examination             Plan:     1. Further preoperative workup as follows:   - none no further testing required may proceed with surgery    2. Preoperative Medication Management Review performed by PAT nursing  YES    3. Patient requires further consultation with:   No Consults  Required    4. Discharge Planning / Barriers to Discharge  none identified - patients has post discharge therapy plan in place, transportation arranged for discharge day, adequate family support at home to assist with discharge to home.        Subjective:           History of Present Illness:     Jason Miguel is a 74 y.o. male who presents to the office today for a preoperative consultation at the request of surgeon. The patient understands this is an elective procedure and not emergent. They are electing to undergo planned procedure with an understanding that all surgery has inherent risk. They have worked with their surgeon and failed conservative treatment measures. Today they present for preoperative risk assessment and recommendations for optimization in preparation for surgery.    Pt seen in center for perioperative medicine for upcoming proposed surgery. They have failed previous conservative measures and have elected surgical intervention.     Pt meets presurgical lab and BMI optimization goals.      Jason Miguel has an IN HOSPITAL cardiac risk of RCI RISK CLASS I (0 risk factors, risk of major cardiac compl. appr. 0.5%) based on RCRI calculator    Cardiac Risk Estimation: per the Revised Cardiac Risk Index (Circ. 100:1043, 1999),         Pre-op Exam    Pre-operative Risk Factors:    History of cerebrovascular disease: No    History of ischemic heart disease: No  Pre-operative treatment with insulin: No  Pre-operative creatinine >2 mg/dL: No    History of congestive heart failure: No        ROS: No TIA's or unusual headaches, no dysphagia.  No prolonged cough. No dyspnea or chest pain on exertion.  No abdominal pain, change in bowel habits, black or bloody stools.  No urinary tract or BPH symptoms.  Positive reported pain in arthritic joint. Positive difficulty with gait. No skin rashes or issues.      Objective:    Vitals:    02/11/25 1204   BP: 128/72   Pulse: 83   SpO2: 97%           General  Appearance: no distress, conversive  HEENT: PERRLA, conjuctiva normal; oropharynx clear; mucous membranes moist;   Neck:  Supple, no lymphadenopathy or thyromegaly  Lungs: breath sounds normal, normal respiratory effort, no retractions, expiratory effort normal  CV: normal heart sounds S1/S2, PMI normal   ABD: soft non tender, no masses , no hepatic or splenomegaly  EXT: DP pulses intact, no lymphadenopathy, no edema  Skin: normal turgor, normal texture, no rash  Psych: affect normal, mood normal  Neuro: AAOx3        The following portions of the patient's history were reviewed and updated as appropriate: allergies, current medications, past family history, past medical history, past social history, past surgical history and problem list.     Past History:       Past Medical History:   Diagnosis Date    Arthritis     Past Surgical History:   Procedure Laterality Date    COLONOSCOPY      ELBOW SURGERY Bilateral     FOOT TENDON SURGERY Bilateral     heel    HAND SURGERY      DC ARTHRP KNE CONDYLE&PLATU MEDIAL&LAT COMPARTMENTS Left 8/17/2023    Procedure: ARTHROPLASTY KNEE TOTAL W ROBOT;  Surgeon: Denton Nichole MD;  Location:  MAIN OR;  Service: Orthopedics    DC ARTHRS KNE SURG W/MENISCECTOMY MED/LAT W/SHVG Right 05/23/2022    Procedure: KNEE ARTHROSCOPY WITH medial MENISCECTOMY chondroplasty synovectomy injection;  Surgeon: Hank Shirley DO;  Location: [facility];  Service: Orthopedics    DC ARTHRS KNE SURG W/MENISCECTOMY MED/LAT W/SHVG Left 2/6/2023    Procedure: Left knee arthroscopy: Medial meniscectomy; Lateral meniscectomy; Chondroplasty; Synovectomy; Post arthroscopic injection of intra-articular joint space and peripheral portal;  Surgeon: Hank Shirley DO;  Location: [facility];  Service: Orthopedics    DC NEUROPLASTY &/TRANSPOS MEDIAN NRV CARPAL TUNNE Left 06/30/2021    Procedure: RELEASE CARPAL TUNNEL;  Surgeon: Hank Shirley DO;  Location:  MAIN OR;  Service: Orthopedics     "      Social History     Tobacco Use    Smoking status: Former     Current packs/day: 0.00     Average packs/day: 1 pack/day for 35.0 years (35.0 ttl pk-yrs)     Types: Cigarettes     Start date:      Quit date:      Years since quittin.1    Smokeless tobacco: Never   Vaping Use    Vaping status: Never Used   Substance Use Topics    Alcohol use: Yes     Comment: 2 x per week    Drug use: No     Family History   Problem Relation Age of Onset    Heart disease Mother         hx MI    Diabetes Mother     Stroke Mother     Arthritis Mother     COPD Father         84 yrs    Coronary artery disease Father     Cancer Brother           Allergies:     No Known Allergies     Current Medications:     Current Outpatient Medications   Medication Instructions    ascorbic acid (VITAMIN C) 500 mg, Oral, Daily, Start 30 days prior to surgery    enoxaparin (LOVENOX) 40 mg, Subcutaneous, Daily, Start injections after surgery    ferrous sulfate 324 mg, Oral, 2 times daily before meals, Start 30 days prior to surgery    folic acid (FOLVITE) 1 mg, Oral, Daily, Start 30 days prior to surgery    metFORMIN (GLUCOPHAGE) 500 mg, Oral, 2 times daily with meals    mupirocin (BACTROBAN) 2 % ointment Topical, 2 times daily, Apply pea size amount to each nostril 2x/day for 5 days prior to procedure    semaglutide (2 mg/dose) (OZEMPIC) 2 mg, Subcutaneous, Every 7 days    simvastatin (ZOCOR) 20 mg, Daily at bedtime    tamsulosin (FLOMAX) 0.4 mg TAKE 2 CAPSULES DAILY WITH DINNER           PRE-OP WORKSHEET DATA    Assessment of Pre-Operative Risks     MLJ Quality Hard Stops:    BMI (<40) : Estimated body mass index is 33.81 kg/m² as calculated from the following:    Height as of 25: 5' 4\" (1.626 m).    Weight as of 25: 89.4 kg (197 lb).    Hgb ( >11):   Lab Results   Component Value Date    HGB 16.6 2025    HGB 15.7 10/14/2024    HGB 16.7 2024       HbA1c (<7.5) :   Lab Results   Component Value Date    HGBA1C 5.9 (H) " 01/29/2025       GFR (>60) (Less then 45 = Nephrology consult):    Lab Results   Component Value Date    EGFR 88 01/29/2025    EGFR 85 10/14/2024    EGFR 79 04/09/2024            Pre-Op Data Reviewed:       Laboratory Results: I have personally reviewed the pertinent reports    EKG: I personally reviewed and interpreted available tracings in the electronic medical record    No results found for this or any previous visit (from the past 4464 hours).    OLD RECORDS: reviewed old records in the chart review section if EHR on day of visit.    Previous cardiopulmonary studies within the past year:  Echocardiogram: no   Cardiac Catheterization: no  Stress Test: no      Time of visit including pre-visit chart review, visit and post-visit coordination of plan and care , review of pre-surgical lab work, preparation and time spent documenting note in electronic medical record, time spent face-to-face in physical examination answering patient questions by care team 45 minutes             [facility]

## 2025-02-19 DIAGNOSIS — Z96.651 AFTERCARE FOLLOWING RIGHT KNEE JOINT REPLACEMENT SURGERY: Primary | ICD-10-CM

## 2025-02-19 DIAGNOSIS — Z47.1 AFTERCARE FOLLOWING RIGHT KNEE JOINT REPLACEMENT SURGERY: Primary | ICD-10-CM

## 2025-02-25 ENCOUNTER — OFFICE VISIT (OUTPATIENT)
Age: 75
End: 2025-02-25
Payer: MEDICARE

## 2025-02-25 VITALS — HEIGHT: 64 IN | BODY MASS INDEX: 34.31 KG/M2 | WEIGHT: 201 LBS

## 2025-02-25 DIAGNOSIS — E11.9 TYPE 2 DIABETES MELLITUS WITHOUT COMPLICATION, WITHOUT LONG-TERM CURRENT USE OF INSULIN (HCC): ICD-10-CM

## 2025-02-25 DIAGNOSIS — B35.1 ONYCHOMYCOSIS: Primary | ICD-10-CM

## 2025-02-25 PROCEDURE — 99213 OFFICE O/P EST LOW 20 MIN: CPT

## 2025-02-25 NOTE — PROGRESS NOTES
Jason Miguel  1950  AT RISK FOOT CARE    1. Onychomycosis        2. Type 2 diabetes mellitus without complication, without long-term current use of insulin (HCC)            Patient presents for at-risk foot care.  Patient has no acute concerns today.  Patient has significant lower extremity risk due to neuropathy, parasthesia, edema, and trophic skin changes to the lower extremity.    On exam patient has thickened, hypertrophic, discolored, brittle toenails with subungual debris and tenderness x10   Callus: 0  Patient has lower extremity edema  Patients skin is atrophic, thickened nails, and decreased pedal hair  Patient has decreased pinprick and vibratory sensation to his feet and parasthesia    Today's treatment includes:    Discussed proper shoe gear, daily inspections of feet, and general foot health with patient. Patient has Q9  findings and is recommended for at risk foot care every 9-10 weeks.    Patients most recent complete clinical foot exam was on: 2/25/2025      Diabetic Foot Exam    Patient's shoes and socks removed.    Right Foot/Ankle   Right Foot Inspection  Skin Exam: skin normal and skin intact. No dry skin, no warmth, no callus, no erythema, no maceration, no abnormal color, no pre-ulcer, no ulcer and no callus.     Toe Exam: ROM and strength within normal limits.     Sensory   Monofilament testing: intact    Vascular  The right DP pulse is 2+. The right PT pulse is 2+.     Left Foot/Ankle  Left Foot Inspection  Skin Exam: skin normal and skin intact. No dry skin, no warmth, no erythema, no maceration, normal color, no pre-ulcer, no ulcer and no callus.     Toe Exam: ROM and strength within normal limits.     Sensory   Monofilament testing: intact    Vascular  The left DP pulse is 2+. The left PT pulse is 2+.     Assign Risk Category  No deformity present  No loss of protective sensation  No weak pulses  Risk: 0

## 2025-02-26 NOTE — ANESTHESIA PREPROCEDURE EVALUATION
Procedure:  ARTHROPLASTY KNEE TOTAL W ROBOT (Right: Knee)    Relevant Problems   CARDIO   (+) Hypercholesteremia      ENDO   (+) Type 2 diabetes mellitus without complication, without long-term current use of insulin (HCC)      MUSCULOSKELETAL   (+) Primary osteoarthritis of right knee      NEURO/PSYCH   (+) Chronic pain syndrome      Lab Results   Component Value Date    HGBA1C 5.9 (H) 01/29/2025         Physical Exam    Airway    Mallampati score: II  TM Distance: >3 FB  Neck ROM: full     Dental   No notable dental hx     Cardiovascular  Rhythm: regular, Rate: normal    Pulmonary   Breath sounds clear to auscultation    Other Findings  Intercisor Distance > 3cm          Anesthesia Plan  ASA Score- 2     Anesthesia Type- spinal with ASA Monitors.         Additional Monitors:     Airway Plan:     Comment: Discussed benefits/risks of neuraxial anesthesia including possibility of discomfort at site of injection, post-dural puncture headache, block failure, and more rare complications such as bleeding, infection, and permanent nerve damage. If block fails or there is difficulty placing neuraxial block, general anesthesia was discussed as back-up plan. Patient understands and wishes to proceed. All questions answered.     Discussed nerve block to assist with post-operative analgesia. Discussed possibility of uncommon complications including permanent nerve injury, damage to blood vessels, infection local anesthetic toxicity, and nerve block failure. Patient understands and wishes to proceed.     .       Plan Factors-Exercise tolerance (METS): >4 METS.    Chart reviewed. EKG reviewed.  Existing labs reviewed.                   Induction-     Postoperative Plan- Plan for postoperative opioid use.     Perioperative Resuscitation Plan - Level 1 - Full Code.       Informed Consent- Anesthetic plan and risks discussed with patient.  I personally reviewed this patient with the CRNA. Discussed and agreed on the Anesthesia Plan  with the CRNA..      NPO Status:  No vitals data found for the desired time range.

## 2025-02-26 NOTE — DISCHARGE INSTR - AVS FIRST PAGE
Discharge Instructions: Knee replacement with Dr. Nichole    Weight Bearing Status:                                           Weight Bearing as tolerated to the right lower extremity with assistive devices.     Pain Management/Medications  You may resume your usual medications.  You may stop pre-operative vitamins (Folic acid, Ferrous sulfate, and Vitamin C).   Please take the following medications:  Anti-coagulation (blood clot prevention) - Complete Lovenox injections for 28 days.   Pain medication:  Oxycodone 5 m tablet every 6 hours as needed for severe pain  Robaxin (Muscle relaxer) 500 m tablet up to 3 times a day as needed for mild pain and muscle discomfort  Tylenol 1000 mg: up to three times daily as needed for mild to moderate pain. Do not exceed 3000mg daily   Continue applying ice to your knee on and off for about 20 minutes as needed.  Continue to elevate your operative leg, with ankle above the level of your heart when possible.    If you have questions or pain concerns, please contact the office.   If you need refills of your medications prior to your next office visit, please contact the office.     Showering/Dressing Instructions:   Do not shower until first follow up appointment.  Keep surgical dressing clean and dry until follow up appointment.  You may adjust the ACE bandage as it slides down   No baths, swimming, or submerging your leg until cleared to do so.      Driving Instructions:  No driving until cleared by Orthopaedic Surgery.    PT/OT:  Continue PT/OT on outpatient basis as directed    Follow up instructions:   Follow up as scheduled on 3/6/2025 in [place].  If you need to change or cancel your appointment for any reason, please call the clinic at [phone]    Please contact the office if you experience any of the following:  Excessive bleeding (bleeding through your dressing)  Fever greater than 101 degrees F after 48 hours (low grade fevers the day or two after surgery  are normal)  Persistent nausea or vomiting  Decreased sensation or discoloration of the operative limb  Pain or swelling that is getting worse and not better with medication    Miscellaneous:  Advise against any dental cleanings or procedures for 3 months after surgery.   - If there is a dental emergency, please contact the office for further instructions.

## 2025-02-27 ENCOUNTER — ANESTHESIA (OUTPATIENT)
Age: 75
End: 2025-02-27
Payer: MEDICARE

## 2025-02-27 ENCOUNTER — HOSPITAL ENCOUNTER (OUTPATIENT)
Age: 75
Setting detail: OUTPATIENT SURGERY
Discharge: HOME | End: 2025-02-28
Attending: ORTHOPAEDIC SURGERY | Admitting: ORTHOPAEDIC SURGERY
Payer: MEDICARE

## 2025-02-27 DIAGNOSIS — G89.29 CHRONIC PAIN OF RIGHT KNEE: ICD-10-CM

## 2025-02-27 DIAGNOSIS — M25.561 CHRONIC PAIN OF RIGHT KNEE: ICD-10-CM

## 2025-02-27 DIAGNOSIS — M17.11 PRIMARY OSTEOARTHRITIS OF RIGHT KNEE: Primary | ICD-10-CM

## 2025-02-27 PROBLEM — R39.198 DIFFICULTY URINATING: Status: ACTIVE | Noted: 2025-02-27

## 2025-02-27 LAB — GLUCOSE SERPL-MCNC: 117 MG/DL (ref 65–140)

## 2025-02-27 PROCEDURE — C1776 JOINT DEVICE (IMPLANTABLE): HCPCS | Performed by: ORTHOPAEDIC SURGERY

## 2025-02-27 PROCEDURE — 27447 TOTAL KNEE ARTHROPLASTY: CPT

## 2025-02-27 PROCEDURE — NC001 PR NO CHARGE: Performed by: ORTHOPAEDIC SURGERY

## 2025-02-27 PROCEDURE — 99222 1ST HOSP IP/OBS MODERATE 55: CPT | Performed by: INTERNAL MEDICINE

## 2025-02-27 PROCEDURE — S2900 ROBOTIC SURGICAL SYSTEM: HCPCS | Performed by: ORTHOPAEDIC SURGERY

## 2025-02-27 PROCEDURE — 97163 PT EVAL HIGH COMPLEX 45 MIN: CPT | Performed by: PHYSICAL THERAPIST

## 2025-02-27 PROCEDURE — 97167 OT EVAL HIGH COMPLEX 60 MIN: CPT

## 2025-02-27 PROCEDURE — C1713 ANCHOR/SCREW BN/BN,TIS/BN: HCPCS | Performed by: ORTHOPAEDIC SURGERY

## 2025-02-27 PROCEDURE — 82948 REAGENT STRIP/BLOOD GLUCOSE: CPT

## 2025-02-27 PROCEDURE — 27447 TOTAL KNEE ARTHROPLASTY: CPT | Performed by: ORTHOPAEDIC SURGERY

## 2025-02-27 DEVICE — ATTUNE PATELLA MEDIALIZED DOME 38MM CEMENTED AOX
Type: IMPLANTABLE DEVICE | Site: KNEE | Status: FUNCTIONAL
Brand: ATTUNE

## 2025-02-27 DEVICE — ATTUNE KNEE SYSTEM TIBIAL INSERT FIXED BEARING POSTERIOR STABILIZED 5 8MM AOX
Type: IMPLANTABLE DEVICE | Site: KNEE | Status: FUNCTIONAL
Brand: ATTUNE

## 2025-02-27 DEVICE — SMARTSET HV HIGH VISCOSITY BONE CEMENT 40G
Type: IMPLANTABLE DEVICE | Site: KNEE | Status: FUNCTIONAL
Brand: SMARTSET

## 2025-02-27 DEVICE — ATTUNE KNEE SYSTEM FEMORAL POSTERIOR STABILIZED SIZE 5 RIGHT CEMENTED
Type: IMPLANTABLE DEVICE | Site: KNEE | Status: FUNCTIONAL
Brand: ATTUNE

## 2025-02-27 DEVICE — ATTUNE KNEE SYSTEM TIBIAL BASE FIXED BEARING SIZE 5 CEMENTED
Type: IMPLANTABLE DEVICE | Site: KNEE | Status: FUNCTIONAL
Brand: ATTUNE

## 2025-02-27 RX ORDER — MIDAZOLAM HYDROCHLORIDE 2 MG/2ML
INJECTION, SOLUTION INTRAMUSCULAR; INTRAVENOUS AS NEEDED
Status: DISCONTINUED | OUTPATIENT
Start: 2025-02-27 | End: 2025-02-27

## 2025-02-27 RX ORDER — SODIUM CHLORIDE, SODIUM LACTATE, POTASSIUM CHLORIDE, CALCIUM CHLORIDE 600; 310; 30; 20 MG/100ML; MG/100ML; MG/100ML; MG/100ML
125 INJECTION, SOLUTION INTRAVENOUS CONTINUOUS
Status: DISCONTINUED | OUTPATIENT
Start: 2025-02-27 | End: 2025-02-28 | Stop reason: HOSPADM

## 2025-02-27 RX ORDER — PROPOFOL 10 MG/ML
INJECTION, EMULSION INTRAVENOUS CONTINUOUS PRN
Status: DISCONTINUED | OUTPATIENT
Start: 2025-02-27 | End: 2025-02-27

## 2025-02-27 RX ORDER — MAGNESIUM HYDROXIDE 1200 MG/15ML
LIQUID ORAL AS NEEDED
Status: DISCONTINUED | OUTPATIENT
Start: 2025-02-27 | End: 2025-02-27 | Stop reason: HOSPADM

## 2025-02-27 RX ORDER — FENTANYL CITRATE 50 UG/ML
INJECTION, SOLUTION INTRAMUSCULAR; INTRAVENOUS CONTINUOUS PRN
Status: DISCONTINUED | OUTPATIENT
Start: 2025-02-27 | End: 2025-02-27

## 2025-02-27 RX ORDER — SODIUM CHLORIDE, SODIUM LACTATE, POTASSIUM CHLORIDE, CALCIUM CHLORIDE 600; 310; 30; 20 MG/100ML; MG/100ML; MG/100ML; MG/100ML
125 INJECTION, SOLUTION INTRAVENOUS CONTINUOUS
Status: DISCONTINUED | OUTPATIENT
Start: 2025-02-27 | End: 2025-02-27

## 2025-02-27 RX ORDER — ACETAMINOPHEN 500 MG
1000 TABLET ORAL EVERY 6 HOURS PRN
Qty: 90 TABLET | Refills: 0 | Status: SHIPPED | OUTPATIENT
Start: 2025-02-27

## 2025-02-27 RX ORDER — CALCIUM CARBONATE 500 MG/1
1000 TABLET, CHEWABLE ORAL DAILY PRN
Status: DISCONTINUED | OUTPATIENT
Start: 2025-02-27 | End: 2025-02-28 | Stop reason: HOSPADM

## 2025-02-27 RX ORDER — GABAPENTIN 100 MG/1
100 CAPSULE ORAL EVERY 8 HOURS
Status: DISCONTINUED | OUTPATIENT
Start: 2025-02-27 | End: 2025-02-28 | Stop reason: HOSPADM

## 2025-02-27 RX ORDER — ACETAMINOPHEN 325 MG/1
975 TABLET ORAL EVERY 6 HOURS PRN
Status: DISCONTINUED | OUTPATIENT
Start: 2025-02-27 | End: 2025-02-28 | Stop reason: HOSPADM

## 2025-02-27 RX ORDER — CEFAZOLIN SODIUM 1 G/50ML
1000 SOLUTION INTRAVENOUS EVERY 8 HOURS
Status: COMPLETED | OUTPATIENT
Start: 2025-02-27 | End: 2025-02-28

## 2025-02-27 RX ORDER — CHLORHEXIDINE GLUCONATE ORAL RINSE 1.2 MG/ML
15 SOLUTION DENTAL ONCE
Status: COMPLETED | OUTPATIENT
Start: 2025-02-27 | End: 2025-02-27

## 2025-02-27 RX ORDER — ONDANSETRON 2 MG/ML
4 INJECTION INTRAMUSCULAR; INTRAVENOUS EVERY 6 HOURS PRN
Status: DISCONTINUED | OUTPATIENT
Start: 2025-02-27 | End: 2025-02-28 | Stop reason: HOSPADM

## 2025-02-27 RX ORDER — METHOCARBAMOL 500 MG/1
500 TABLET, FILM COATED ORAL EVERY 6 HOURS SCHEDULED
Status: DISCONTINUED | OUTPATIENT
Start: 2025-02-27 | End: 2025-02-28 | Stop reason: HOSPADM

## 2025-02-27 RX ORDER — FENTANYL CITRATE/PF 50 MCG/ML
50 SYRINGE (ML) INJECTION
Refills: 0 | Status: DISCONTINUED | OUTPATIENT
Start: 2025-02-27 | End: 2025-02-27 | Stop reason: HOSPADM

## 2025-02-27 RX ORDER — BUPIVACAINE HYDROCHLORIDE 2.5 MG/ML
INJECTION, SOLUTION EPIDURAL; INFILTRATION; INTRACAUDAL
Status: DISCONTINUED | OUTPATIENT
Start: 2025-02-27 | End: 2025-02-27

## 2025-02-27 RX ORDER — ENOXAPARIN SODIUM 100 MG/ML
40 INJECTION SUBCUTANEOUS DAILY
Status: DISCONTINUED | OUTPATIENT
Start: 2025-02-27 | End: 2025-02-28 | Stop reason: HOSPADM

## 2025-02-27 RX ORDER — DEXAMETHASONE SODIUM PHOSPHATE 10 MG/ML
INJECTION, SOLUTION INTRAMUSCULAR; INTRAVENOUS AS NEEDED
Status: DISCONTINUED | OUTPATIENT
Start: 2025-02-27 | End: 2025-02-27

## 2025-02-27 RX ORDER — PRAVASTATIN SODIUM 40 MG
40 TABLET ORAL
Status: DISCONTINUED | OUTPATIENT
Start: 2025-02-27 | End: 2025-02-28 | Stop reason: HOSPADM

## 2025-02-27 RX ORDER — TAMSULOSIN HYDROCHLORIDE 0.4 MG/1
0.4 CAPSULE ORAL
Status: DISCONTINUED | OUTPATIENT
Start: 2025-02-27 | End: 2025-02-28 | Stop reason: HOSPADM

## 2025-02-27 RX ORDER — CEFAZOLIN SODIUM 2 G/50ML
2000 SOLUTION INTRAVENOUS ONCE
Status: COMPLETED | OUTPATIENT
Start: 2025-02-27 | End: 2025-02-27

## 2025-02-27 RX ORDER — CELECOXIB 200 MG/1
200 CAPSULE ORAL ONCE
Status: COMPLETED | OUTPATIENT
Start: 2025-02-27 | End: 2025-02-27

## 2025-02-27 RX ORDER — OXYCODONE HYDROCHLORIDE 5 MG/1
5 TABLET ORAL EVERY 4 HOURS PRN
Status: DISCONTINUED | OUTPATIENT
Start: 2025-02-27 | End: 2025-02-28 | Stop reason: HOSPADM

## 2025-02-27 RX ORDER — OXYCODONE HYDROCHLORIDE 10 MG/1
10 TABLET ORAL EVERY 4 HOURS PRN
Status: DISCONTINUED | OUTPATIENT
Start: 2025-02-27 | End: 2025-02-28 | Stop reason: HOSPADM

## 2025-02-27 RX ORDER — ACETAMINOPHEN 325 MG/1
975 TABLET ORAL ONCE
Status: COMPLETED | OUTPATIENT
Start: 2025-02-27 | End: 2025-02-27

## 2025-02-27 RX ORDER — ONDANSETRON 2 MG/ML
INJECTION INTRAMUSCULAR; INTRAVENOUS AS NEEDED
Status: DISCONTINUED | OUTPATIENT
Start: 2025-02-27 | End: 2025-02-27

## 2025-02-27 RX ORDER — TRANEXAMIC ACID 10 MG/ML
1000 INJECTION, SOLUTION INTRAVENOUS ONCE
Status: COMPLETED | OUTPATIENT
Start: 2025-02-27 | End: 2025-02-27

## 2025-02-27 RX ORDER — METHOCARBAMOL 500 MG/1
500 TABLET, FILM COATED ORAL 3 TIMES DAILY PRN
Qty: 60 TABLET | Refills: 0 | Status: SHIPPED | OUTPATIENT
Start: 2025-02-27

## 2025-02-27 RX ORDER — HYDROMORPHONE HCL/PF 1 MG/ML
0.5 SYRINGE (ML) INJECTION
Status: DISCONTINUED | OUTPATIENT
Start: 2025-02-27 | End: 2025-02-27 | Stop reason: HOSPADM

## 2025-02-27 RX ORDER — DOCUSATE SODIUM 100 MG/1
100 CAPSULE, LIQUID FILLED ORAL 2 TIMES DAILY
Status: DISCONTINUED | OUTPATIENT
Start: 2025-02-27 | End: 2025-02-28 | Stop reason: HOSPADM

## 2025-02-27 RX ORDER — GLYCOPYRROLATE 0.2 MG/ML
INJECTION INTRAMUSCULAR; INTRAVENOUS AS NEEDED
Status: DISCONTINUED | OUTPATIENT
Start: 2025-02-27 | End: 2025-02-27

## 2025-02-27 RX ORDER — HYDROMORPHONE HCL/PF 1 MG/ML
0.5 SYRINGE (ML) INJECTION EVERY 2 HOUR PRN
Status: DISCONTINUED | OUTPATIENT
Start: 2025-02-27 | End: 2025-02-28 | Stop reason: HOSPADM

## 2025-02-27 RX ORDER — OXYCODONE HYDROCHLORIDE 5 MG/1
5 TABLET ORAL EVERY 6 HOURS PRN
Qty: 30 TABLET | Refills: 0 | Status: SHIPPED | OUTPATIENT
Start: 2025-02-27 | End: 2025-03-09

## 2025-02-27 RX ADMIN — BUPIVACAINE HYDROCHLORIDE 10 ML: 2.5 INJECTION, SOLUTION EPIDURAL; INFILTRATION; INTRACAUDAL at 07:37

## 2025-02-27 RX ADMIN — BUPIVACAINE 10 ML: 13.3 INJECTION, SUSPENSION, LIPOSOMAL INFILTRATION at 07:39

## 2025-02-27 RX ADMIN — OXYCODONE HYDROCHLORIDE 5 MG: 5 TABLET ORAL at 10:31

## 2025-02-27 RX ADMIN — TAMSULOSIN HYDROCHLORIDE 0.4 MG: 0.4 CAPSULE ORAL at 16:04

## 2025-02-27 RX ADMIN — GLYCOPYRROLATE 0.2 MCG: 0.2 INJECTION INTRAMUSCULAR; INTRAVENOUS at 08:08

## 2025-02-27 RX ADMIN — CHLORHEXIDINE GLUCONATE 15 ML: 1.2 RINSE ORAL at 06:40

## 2025-02-27 RX ADMIN — OXYCODONE HYDROCHLORIDE 5 MG: 5 TABLET ORAL at 14:46

## 2025-02-27 RX ADMIN — BUPIVACAINE HYDROCHLORIDE 10 ML: 2.5 INJECTION, SOLUTION EPIDURAL; INFILTRATION; INTRACAUDAL; PERINEURAL at 07:39

## 2025-02-27 RX ADMIN — Medication 6 MG: at 21:51

## 2025-02-27 RX ADMIN — GABAPENTIN 100 MG: 100 CAPSULE ORAL at 12:11

## 2025-02-27 RX ADMIN — GABAPENTIN 100 MG: 100 CAPSULE ORAL at 20:25

## 2025-02-27 RX ADMIN — PRAVASTATIN SODIUM 40 MG: 40 TABLET ORAL at 16:04

## 2025-02-27 RX ADMIN — OXYCODONE HYDROCHLORIDE 5 MG: 5 TABLET ORAL at 19:34

## 2025-02-27 RX ADMIN — CELECOXIB 200 MG: 200 CAPSULE ORAL at 06:37

## 2025-02-27 RX ADMIN — METHOCARBAMOL 500 MG: 500 TABLET ORAL at 12:11

## 2025-02-27 RX ADMIN — ACETAMINOPHEN 325MG 975 MG: 325 TABLET ORAL at 06:37

## 2025-02-27 RX ADMIN — PHENYLEPHRINE HYDROCHLORIDE 50 MCG/MIN: 10 INJECTION INTRAVENOUS at 07:52

## 2025-02-27 RX ADMIN — MIDAZOLAM 2 MG: 1 INJECTION INTRAMUSCULAR; INTRAVENOUS at 07:36

## 2025-02-27 RX ADMIN — FENTANYL CITRATE 50 MCG: 50 INJECTION INTRAMUSCULAR; INTRAVENOUS at 10:02

## 2025-02-27 RX ADMIN — ENOXAPARIN SODIUM 40 MG: 40 INJECTION SUBCUTANEOUS at 19:34

## 2025-02-27 RX ADMIN — MEPIVACAINE HYDROCHLORIDE 3 ML: 15 INJECTION, SOLUTION EPIDURAL; INFILTRATION at 07:51

## 2025-02-27 RX ADMIN — ACETAMINOPHEN 975 MG: 325 TABLET ORAL at 17:57

## 2025-02-27 RX ADMIN — METHOCARBAMOL 500 MG: 500 TABLET ORAL at 23:53

## 2025-02-27 RX ADMIN — CEFAZOLIN SODIUM 2000 MG: 2 SOLUTION INTRAVENOUS at 07:47

## 2025-02-27 RX ADMIN — DEXAMETHASONE SODIUM PHOSPHATE 10 MG: 10 INJECTION, SOLUTION INTRAMUSCULAR; INTRAVENOUS at 08:20

## 2025-02-27 RX ADMIN — METFORMIN HYDROCHLORIDE 500 MG: 500 TABLET ORAL at 16:05

## 2025-02-27 RX ADMIN — SODIUM CHLORIDE, SODIUM LACTATE, POTASSIUM CHLORIDE, AND CALCIUM CHLORIDE 125 ML/HR: .6; .31; .03; .02 INJECTION, SOLUTION INTRAVENOUS at 19:34

## 2025-02-27 RX ADMIN — SODIUM CHLORIDE, SODIUM LACTATE, POTASSIUM CHLORIDE, AND CALCIUM CHLORIDE 125 ML/HR: .6; .31; .03; .02 INJECTION, SOLUTION INTRAVENOUS at 06:41

## 2025-02-27 RX ADMIN — FENTANYL CITRATE 50 MCG: 50 INJECTION INTRAMUSCULAR; INTRAVENOUS at 09:54

## 2025-02-27 RX ADMIN — TRANEXAMIC ACID 1000 MG: 10 INJECTION, SOLUTION INTRAVENOUS at 07:47

## 2025-02-27 RX ADMIN — PROPOFOL 100 MCG/KG/MIN: 10 INJECTION, EMULSION INTRAVENOUS at 07:52

## 2025-02-27 RX ADMIN — ONDANSETRON 4 MG: 2 INJECTION INTRAMUSCULAR; INTRAVENOUS at 08:20

## 2025-02-27 RX ADMIN — HYDROMORPHONE HYDROCHLORIDE 0.5 MG: 1 INJECTION, SOLUTION INTRAMUSCULAR; INTRAVENOUS; SUBCUTANEOUS at 11:02

## 2025-02-27 RX ADMIN — SODIUM CHLORIDE, SODIUM LACTATE, POTASSIUM CHLORIDE, AND CALCIUM CHLORIDE 125 ML/HR: .6; .31; .03; .02 INJECTION, SOLUTION INTRAVENOUS at 12:11

## 2025-02-27 RX ADMIN — DOCUSATE SODIUM 100 MG: 100 CAPSULE, LIQUID FILLED ORAL at 12:11

## 2025-02-27 RX ADMIN — SODIUM CHLORIDE, SODIUM LACTATE, POTASSIUM CHLORIDE, AND CALCIUM CHLORIDE: .6; .31; .03; .02 INJECTION, SOLUTION INTRAVENOUS at 08:51

## 2025-02-27 RX ADMIN — DOCUSATE SODIUM 100 MG: 100 CAPSULE, LIQUID FILLED ORAL at 17:38

## 2025-02-27 RX ADMIN — CEFAZOLIN SODIUM 1000 MG: 1 SOLUTION INTRAVENOUS at 16:05

## 2025-02-27 RX ADMIN — CEFAZOLIN SODIUM 1000 MG: 1 SOLUTION INTRAVENOUS at 23:50

## 2025-02-27 RX ADMIN — METHOCARBAMOL 500 MG: 500 TABLET ORAL at 17:38

## 2025-02-27 NOTE — ANESTHESIA PROCEDURE NOTES
Peripheral Block    Patient location during procedure: holding area  Start time: 2/27/2025 7:39 AM  Reason for block: at surgeon's request and post-op pain management  Staffing  Performed by: Lewis Durand MD  Authorized by: Lewis Durand MD    Preanesthetic Checklist  Completed: patient identified, IV checked, site marked, risks and benefits discussed, surgical consent, monitors and equipment checked, pre-op evaluation and timeout performed  Peripheral Block  Patient position: supine  Prep: ChloraPrep  Patient monitoring: frequent blood pressure checks, continuous pulse oximetry and heart rate  Block type: IPACK  Laterality: right  Injection technique: single-shot  Procedures: ultrasound guided, Ultrasound guidance required for the procedure to increase accuracy and safety of medication placement and decrease risk of complications.  Ultrasound permanent image saved  bupivacaine (PF) (MARCAINE) 0.25 % injection 20 mL - Perineural   10 mL - 2/27/2025 7:39:00 AM  bupivacaine liposomal (EXPAREL) 1.3 % injection 20 mL - Perineural   10 mL - 2/27/2025 7:39:00 AM  Needle  Needle type: Stimuplex   Needle gauge: 20 G  Needle length: 4 in  Needle localization: anatomical landmarks and ultrasound guidance  Assessment  Injection assessment: incremental injection, frequent aspiration, injected with ease, negative aspiration, negative for heart rate change, no paresthesia on injection, no symptoms of intraneural/intravenous injection and needle tip visualized at all times  Paresthesia pain: none  Post-procedure:  site cleaned  patient tolerated the procedure well with no immediate complications

## 2025-02-27 NOTE — PHYSICAL THERAPY NOTE
PT EVALUATION    Pt. Name: Jason Miguel  Pt. Age: 74 y.o.  MRN: [id number]  LENGTH OF STAY: 0    Patient Active Problem List   Diagnosis    Hypercholesteremia    Carpal tunnel syndrome on right    Carpal tunnel syndrome on left    Type 2 diabetes mellitus without complication, without long-term current use of insulin (HCC)    Tear of medial meniscus of right knee, current    Primary osteoarthritis of right knee    Lumbar disc disease with radiculopathy    Neck pain    Chronic pain syndrome       Admitting Diagnoses:   Chronic pain of right knee [M25.561, G89.29]  Primary osteoarthritis of right knee [M17.11]    Past Medical History:   Diagnosis Date    Arthritis     PONV (postoperative nausea and vomiting)        Past Surgical History:   Procedure Laterality Date    COLONOSCOPY      ELBOW SURGERY Bilateral     FOOT TENDON SURGERY Bilateral     heel    HAND SURGERY      [place] ARTHRP KNE CONDYLE&PLATU MEDIAL&LAT COMPARTMENTS Left 8/17/2023    Procedure: ARTHROPLASTY KNEE TOTAL W ROBOT;  Surgeon: Denton Nichole MD;  Location:  MAIN OR;  Service: Orthopedics    [place] ARTHRS KNE SURG W/MENISCECTOMY MED/LAT W/SHVG Right 05/23/2022    Procedure: KNEE ARTHROSCOPY WITH medial MENISCECTOMY chondroplasty synovectomy injection;  Surgeon: Hank Shirley DO;  Location: [facility];  Service: Orthopedics    [place] ARTHRS KNE SURG W/MENISCECTOMY MED/LAT W/SHVG Left 2/6/2023    Procedure: Left knee arthroscopy: Medial meniscectomy; Lateral meniscectomy; Chondroplasty; Synovectomy; Post arthroscopic injection of intra-articular joint space and peripheral portal;  Surgeon: Hank Shirley DO;  [facility]: [facility];  Service: Orthopedics    [place] NEUROPLASTY &/TRANSPOS MEDIAN NRV CARPAL TUNNE Left 06/30/2021    Procedure: RELEASE CARPAL TUNNEL;  Surgeon: Hank Shirley DO;  Location:  MAIN OR;  Service: Orthopedics       Imaging Studies:  No orders to display        02/27/25 1229   PT Last Visit   PT Visit Date  02/27/25   Note Type   Note type Evaluation   Pain Assessment   Pain Assessment Tool 0-10   Pain Score 2   Pain Location/Orientation Orientation: Right;Location: Knee   Hospital Pain Intervention(s) Cold applied;Repositioned;Ambulation/increased activity;Elevated;Emotional support;Rest   Restrictions/Precautions   Weight Bearing Precautions Per Order Yes   RLE Weight Bearing Per Order WBAT   Other Precautions Fall Risk;Pain;Chair Alarm;Bed Alarm  (hemovac)   Home Living   Type of Home House  (ranch)   Home Layout One level;Performs ADLs on one level;Able to live on main level with bedroom/bathroom;Stairs to enter with rails  (5+1 YOSSI L RAIL)   Bathroom Shower/Tub Walk-in shower   Bathroom Toilet Raised   Bathroom Equipment Grab bars in shower;Commode;Grab bars around toilet;Shower chair   Bathroom Accessibility Accessible   Home Equipment Walker;Cane;Grab bars   Prior Function   Level of [place] Independent with ADLs;Independent with functional mobility;Independent with IADLS   Lives With Spouse   Receives Help From Family   IADLs Independent with driving;Independent with meal prep;Independent with medication management   Falls in the last 6 months 0   Vocational Retired   Comments (+)    General   Family/Caregiver Present Yes   Cognition   Overall Cognitive Status WFL   Arousal/Participation Alert   Orientation Level Oriented X4   Following Commands Follows all commands and directions without difficulty   Comments pt pleasant   RUE Assessment   RUE Assessment   (see OT note)   LUE Assessment   LUE Assessment   (see OT note)   RLE Assessment   RLE Assessment X  (did not assess knee due to post op, able to flex hip and move ankle through normal ROM)   LLE Assessment   LLE Assessment WFL   Light Touch   RLE Light Touch Grossly intact   LLE Light Touch Grossly intact   Bed Mobility   Supine to Sit 5  Supervision   Additional items Increased time required;Verbal cues;Bedrails;HOB elevated   Sit to Supine  Unable to assess   Additional Comments greeted in supine   Transfers   Sit to Stand 5  Supervision   Additional items Increased time required;Verbal cues  (RW)   Stand to Sit 5  Supervision   Additional items Increased time required;Verbal cues  (RW)   Additional Comments cues for RW safety.   Ambulation/Elevation   Gait pattern Improper Weight shift;Antalgic;Decreased R stance;Step to;Excessively slow;Decreased toe off;Decreased heel strike;Decreased hip extension   Gait Assistance 5  Supervision   Additional items Verbal cues   Assistive Device Rolling walker   Distance 15'x2   Stair Management Assistance Not tested   Ambulation/Elevation Additional Comments cues for gait pattern with RW   Balance   Static Sitting Good   Dynamic Sitting Fair +   Static Standing Fair   Dynamic Standing Fair -   Ambulatory Fair -   Endurance Deficit   Endurance Deficit No   Activity Tolerance   Activity Tolerance Patient tolerated treatment well   Medical Staff Made Aware RN Betty, OT Sheryl   Nurse Made Aware yes   Assessment   Prognosis Good   Problem List Decreased strength;Decreased range of motion;Decreased endurance;Impaired balance;Decreased mobility;Orthopedic restrictions;Pain   Assessment Pt is a 74 y.o. male who presented [facility] on [facility]/27/[facility]25 s/p R TKA done by Dr. Nichole. Precautions include WBAT. Pt  has a past medical history of Arthritis and PONV (postoperative nausea and vomiting).. Pt greeted at bedside for PT evaluation on 02/27/25. Pt referred to PT for functional mobility evaluation & D/C planning w/ orders of activity beginning POD #0 and activity as tolerated. PTA, pt reports being I w/o AD and I w/ IADLs. Personal factors affecting pt at time of IE include: stairs to enter home. Please find objective findings from PT assessment regarding body systems outlined above with impairments and limitations including weakness, decreased ROM, impaired balance, decreased endurance, gait deviations, pain, decreased activity  tolerance, decreased functional mobility tolerance, fall risk, and orthopedic restrictions.  Please see flow sheet above for objective findings and level of assistance required for safe completion of functional tasks. Pt able to perform supine to sit with bedrails and S. Pt able to perform sit<>stand with RW and S. Pt then ambualte 15'x2 to the bathroom with RW and S. Able to transfer from toilet with S and RW. Pt demonstrated dec endurance and tolerance to activity. Denies reports of dizziness or SOB t/o session. Patient was left in recliner chair  with call bell and all needs within reach. Pt was educated on fall precautions and reinforced w/ good understanding. Based on pt presentation and impaired function, pt would benefit from level III, (minimal resource intensity) at D/C. From PT/mobility standpoint, pt would benefit from OPPT to address deficits as defined above and maximize level of independence and return pt to PLOF. HEP given and reviewed with patient, no questions at this time. CM to follow. Nsg staff to continue to mobilized pt (OOB in chair for all meals & ambulate in room/unit) as tolerated to prevent further decline in function. Nsg notified. Co-eval performed with OT to complete this evaluation for the pts best interest given pts medical complexity and functional level.   Barriers to Discharge Inaccessible home environment  (YOSSI)   Goals   Patient Goals to go to bathroom   STG Expiration Date 03/06/25   Short Term Goal #1 1).  Pt will perform bed mobility with Cory demonstrating appropriate technique 100% of the time in order to improve function.2)  Perform all transfers with Cory demonstrating safe and appropriate technique 100% of the time in order to improve ability to negotiate safely in home environment.3) Amb with least restrictive AD > 200'x1 with Cory in order to demonstrate ability to negotiate in home environment.4)  Improve overall strength and balance 1/2 grade in order to optimize  ability to perform functional tasks and reduce fall risk.5) Increase activity tolerance to 45 minutes in order to improve endurance to functional tasks.6)  Negotiate stairs using most appropriate technique and Cory in order to be able to negotiate safely in home environment. 7) PT for ongoing patient and family/caregiver education, DME needs and d/c planning in order to promote highest level of function in least restrictive environment.   Plan   Treatment/Interventions Functional transfer training;LE strengthening/ROM;Elevations;Therapeutic exercise;Endurance training;Bed mobility;Gait training;Spoke to nursing;OT;Family   PT Frequency Twice a day  (prn)   Discharge Recommendation   Rehab Resource Intensity Level, PT III (Minimum Resource Intensity)   Equipment Recommended Walker  (pt owns)   Additional Comments The patient's AM-PAC Basic Mobility Inpatient Short Form Raw Score is 18. A Raw score of greater than 16 suggests the patient may benefit from discharge to home. Please also refer to the recommendation of the Physical Therapist for safe discharge planning.   AM-PAC Basic Mobility Inpatient   Turning in Flat Bed Without Bedrails 3   Lying on Back to Sitting on Edge of Flat Bed Without Bedrails 3   Moving Bed to Chair 3   Standing Up From Chair Using Arms 3   Walk in Room 3   Climb 3-5 Stairs With Railing 3   Basic Mobility Inpatient Raw Score 18   Basic Mobility Standardized Score 41.05   [facility] Highest Level Of Mobility   -HLM Goal 6: Walk 10 steps or more   -HLM Achieved 6: Walk 10 steps or more   End of Consult   Patient Position at End of Consult All needs within reach;Bed/Chair alarm activated;Bedside chair   End of Consult Comments pt stable, left in recliner chair with wife in the room. call bell and alarm on     Hx/personal factors: co-morbidities, inaccessible home, pain, WB restrictions, and fall risk  Examination: dec mobility, dec balance, dec endurance, dec amb, risk for falls, pain,  assessed body system, balance, endurance, amb, D/C disposition & fall risk, WB restrictions, impairements in locomotion, musculoskeletal, balance, endurance, posture, coordination  Clinical: unpredictable (ongoing medical status, risk for falls, POD #0, and pain mgt)  Complexity: high  Cheryl Billy, PT

## 2025-02-27 NOTE — ANESTHESIA PROCEDURE NOTES
Peripheral Block    Patient location during procedure: holding area  Start time: 2/27/2025 7:37 AM  Reason for block: at surgeon's request and post-op pain management  Staffing  Performed by: Lewis Durand MD  Authorized by: Lewis Durand MD    Preanesthetic Checklist  Completed: patient identified, IV checked, site marked, risks and benefits discussed, surgical consent, monitors and equipment checked, pre-op evaluation and timeout performed  Peripheral Block  Patient position: supine  Prep: ChloraPrep  Patient monitoring: frequent blood pressure checks, continuous pulse oximetry and heart rate  Block type: Adductor Canal  Laterality: right  Injection technique: single-shot  Procedures: ultrasound guided, Ultrasound guidance required for the procedure to increase accuracy and safety of medication placement and decrease risk of complications.  Ultrasound permanent image saved  bupivacaine (PF) (MARCAINE) 0.25 % injection 20 mL - Perineural   10 mL - 2/27/2025 7:37:00 AM  bupivacaine liposomal (EXPAREL) 1.3 % injection 20 mL - Perineural   10 mL - 2/27/2025 7:37:00 AM  Needle  Needle type: Stimuplex   Needle gauge: 20 G  Needle length: 4 in  Needle localization: anatomical landmarks and ultrasound guidance  Assessment  Injection assessment: incremental injection, frequent aspiration, injected with ease, negative aspiration, negative for heart rate change, no paresthesia on injection, no symptoms of intraneural/intravenous injection and needle tip visualized at all times  Paresthesia pain: none  Post-procedure:  site cleaned  patient tolerated the procedure well with no immediate complications

## 2025-02-27 NOTE — ANESTHESIA PROCEDURE NOTES
Spinal Block    Patient location during procedure: OR  Start time: 2/27/2025 7:51 AM  Reason for block: procedure for pain and at surgeon's request  Staffing  Performed by: Cheryl Jimenez CRNA  Authorized by: Cheryl Jimenez CRNA    Preanesthetic Checklist  Completed: patient identified, IV checked, site marked, risks and benefits discussed, surgical consent, monitors and equipment checked, pre-op evaluation and timeout performed  Spinal Block  Patient position: sitting  Prep: ChloraPrep and site prepped and draped  Patient monitoring: frequent blood pressure checks, continuous pulse ox and heart rate  Approach: midline  Location: L3-4  Needle  Needle type: Pencan   Needle gauge: 24 G  Needle length: 4 in  Assessment  Sensory level: T4  Injection Assessment:  negative aspiration for heme, no paresthesia on injection and positive aspiration for clear CSF.  Post-procedure:  site cleaned

## 2025-02-27 NOTE — OCCUPATIONAL THERAPY NOTE
Occupational Therapy Evaluation     Patient Name: Jason Miguel  Today's Date: 2/27/2025  Problem List  Principal Problem:    Primary osteoarthritis of right knee    Past Medical History  Past Medical History:   Diagnosis Date    Arthritis     PONV (postoperative nausea and vomiting)      Past Surgical History  Past Surgical History:   Procedure Laterality Date    COLONOSCOPY      ELBOW SURGERY Bilateral     FOOT TENDON SURGERY Bilateral     heel    HAND SURGERY      [place] ARTHRP KNE CONDYLE&PLATU MEDIAL&LAT COMPARTMENTS Left 8/17/2023    Procedure: ARTHROPLASTY KNEE TOTAL W ROBOT;  Surgeon: Denton Nichole MD;  Location:  MAIN OR;  Service: Orthopedics    [place] ARTHRS KNE SURG W/MENISCECTOMY MED/LAT W/SHVG Right 05/23/2022    Procedure: KNEE ARTHROSCOPY WITH medial MENISCECTOMY chondroplasty synovectomy injection;  Surgeon: Hank Shirley DO;  [facility]: [facility];  Service: Orthopedics    [place] ARTHRS KNE SURG W/MENISCECTOMY MED/LAT W/SHVG Left 2/6/2023    Procedure: Left knee arthroscopy: Medial meniscectomy; Lateral meniscectomy; Chondroplasty; Synovectomy; Post arthroscopic injection of intra-articular joint space and peripheral portal;  Surgeon: Hank Shirley DO;  Location: [facility];  Service: Orthopedics    [place] NEUROPLASTY &/TRANSPOS MEDIAN NRV CARPAL TUNNE Left 06/30/2021    Procedure: RELEASE CARPAL TUNNEL;  Surgeon: Hank Shirley DO;  Location:  MAIN OR;  Service: Orthopedics        02/27/25 1228   OT Last Visit   OT Visit Date 02/27/25   Note Type   Note type Evaluation   Additional Comments pt greeted in supine, agreeable to OT evaluation   Pain Assessment   Pain Assessment Tool 0-10   Pain Score 2   Pain Location/Orientation Orientation: Right;Location: Knee   [facility] Pain Intervention(s) Repositioned;Ambulation/increased activity;Emotional support;Rest   Restrictions/Precautions   Weight Bearing Precautions Per Order Yes   RLE Weight Bearing Per Order WBAT   Other Precautions Chair  Alarm;Bed Alarm;WBS;Multiple lines;Fall Risk;Pain   Home Living   Type of Home House  (ranch)   Home Layout One level;Performs ADLs on one level;Able to live on main level with bedroom/bathroom;Stairs to enter with rails  (5+1 YOSSI L RAIL)   Bathroom Shower/Tub Walk-in shower   Bathroom Toilet Raised   Bathroom Equipment Grab bars in shower;Shower chair;Grab bars around toilet;Commode   Bathroom Accessibility Accessible   Home Equipment Walker;Cane;Grab bars   Prior Function   Level of [place] Independent with ADLs;Independent with functional mobility;Independent with IADLS   Lives With Spouse   Receives Help From Family   IADLs Independent with driving;Independent with meal prep;Independent with medication management   Falls in the last 6 months 0   Vocational Retired   Comments (+)    Lifestyle   [place] Independent with alll ADLs/IADLs, no AD use at baseline   Reciprocal Relationships Spouse   Service to Others Retired   Intrinsic Gratification voluteer    General   Family/Caregiver Present Yes   ADL   Where Assessed Edge of bed   Eating Assistance 5  Supervision/Setup   Grooming Assistance 5  Supervision/Setup   UB Bathing Assistance 5  Supervision/Setup   LB Bathing Assistance 4  Minimal Assistance   UB Dressing Assistance 5  Supervision/Setup   LB Dressing Assistance 4  Minimal Assistance   Toileting Assistance  4  Minimal Assistance   Bed Mobility   Supine to Sit 5  Supervision   Additional items Increased time required;Verbal cues;HOB elevated;Bedrails   Sit to Supine Unable to assess   Additional Comments Pt greeted in supine, ended session EOB.   Transfers   Sit to Stand 5  Supervision   Additional items Bedrails;Increased time required;Verbal cues  (RW)   Stand to Sit 5  Supervision   Additional items Increased time required;Verbal cues;Armrests  (RW)   Toilet transfer   (standing to void)   Additional Comments verbal cues for hand placement and safety with use of RW   Functional  Mobility   Functional Mobility 5  Supervision   Additional Comments Pt completed functional mobility in room distance with use of RW and S   Additional items Rolling walker   Balance   Static Sitting Good   Dynamic Sitting Fair +   Static Standing Fair   Dynamic Standing Fair -   Ambulatory Fair -   Activity Tolerance   Activity Tolerance Patient tolerated treatment well   Medical Staff Made Aware PT Candice, Pt seen for co-evaluation/treatment with skilled Physical Therapy due to pt's medical complexity, decreased endurance, overall functional level, overall safety, and post surgical day #0.   Nurse Made Aware THADDEUS Hernández   RUE Assessment   RUE Assessment WFL   LUE Assessment   LUE Assessment WFL   Hand Function   Gross Motor Coordination Functional   Fine Motor Coordination Functional   Cognition   Overall Cognitive Status WFL   Arousal/Participation Alert;Cooperative   Attention Within functional limits   Orientation Level Oriented X4   Memory Within functional limits   Following Commands Follows all commands and directions without difficulty   Comments Cooperative and pleasant   Assessment   Limitation Decreased ADL status;Decreased endurance;Decreased self-care trans;Decreased high-level ADLs   Prognosis Good   Assessment Pt is a 74 y.o. male seen for OT evaluation s/p adm to [facility] on 2/27/2025 w/ Primary osteoarthritis of right knee . Comorbidities affecting pt’s functional performance include a significant PMH of arthritis, PONV, hx of L TKA (2023), DM. Pt with active OT orders and activity orders for Activity beginning POD #0. WBAT RLE. Hemovac. Pt lives w/ spouse in a ranch style house with 6 YOSSI. Pt has walkin shower w/ seated and standard toilets. At baseline, pt was independent with all ADLs/IADLs. Pt completed supine to sit with S. Pt completed sit to stand with S and use of RW. Pt completed functional mobility from EOB to bathroom with use of RW and S. Pt completed voiding in standing in bathroom  with use of urinal. Pt completed functional mobility from bathroom to chair. Stand to sit into chair with use of armrests with S. Upon evaluation, pt currently requires S for UB ADLs, Danyell for LB ADLs, Danyell for toileting, S for bed mobility, S for functional mobility, and S for transfers 2* the following deficits impacting occupational performance: weakness, decreased strength , decreased balance, decreased activity tolerance, increased pain, and orthopedic restrictions. These impairments, as well at pt’s personal factors of: YOSSI home environment, difficulty performing ADLs, difficulty performing IADLs, difficulty performing transfers/mobility, WBS, fall risk , and new use of AD for functional transfers/mobility limit pt’s ability to safely engage in all baseline areas of occupation. Based on the aforementioned OT evaluation, functional performance deficits, and assessments, pt has been identified as a high complexity evaluation. Pt to continue to benefit from continued acute OT services during hospital stay to address defined deficits and to maximize level of functional independence in the following Occupational Performance areas: grooming, bathing/shower, toilet hygiene, dressing, health maintenance, functional mobility, community mobility, clothing management, cleaning, household maintenance, and job performance/volunteering. From OT standpoint, recommend No post-acute rehabilitation needs upon D/C. OT will continue to follow pt 3-5x/wk.   Goals   Patient Goals to walk   STG Time Frame 1-3   Short Term Goal #1 Pt will improve activity tolerance to G for min 30 min treatment sessions for increase engagement in functional tasks   Short Term Goal #2 Pt will complete bed mobility at a mod I level w/ G balance/safety demonstrated to decrease caregiver assistance required   Short Term Goal  Pt will complete LB dressing/self care w/ mod I using adaptive device and DME as needed   LTG Time Frame 3-7   Long Term Goal #1  Pt will complete toileting w/ mod I w/ G hygiene/thoroughness using DME as needed   Long Term Goal #2 Pt will improve functional transfers to mod I on/off all surfaces using DME as needed w/ G balance/safety   Long Term Goal Pt will improve functional mobility during ADL/IADL/leisure tasks to mod I using DME as needed w/ G balance/safety   Plan   Treatment Interventions ADL retraining;Functional transfer training;Endurance training;Patient/family training;Equipment evaluation/education;Compensatory technique education;Continued evaluation;Energy conservation;Activityengagement   Goal Expiration Date 03/06/25   OT Treatment Day 0   OT Frequency 3-5x/wk   Discharge Recommendation   Rehab Resource Intensity Level, OT No post-acute rehabilitation needs   Additional Comments  The patient's raw score on the AM-PAC Daily Activity Inpatient Short Form is 20 . A raw score of greater than or equal to 19 suggests the patient may benefit from discharge to home. Please refer to the recommendation of the Occupational Therapist for safe discharge planning.   AM-PAC Daily Activity Inpatient   Lower Body Dressing 3   Bathing 3   Toileting 3   Upper Body Dressing 3   Grooming 4   Eating 4   Daily Activity Raw Score 20   Daily Activity Standardized Score (Calc for Raw Score >=11) 42.03   AM-PAC Applied Cognition Inpatient   Following a Speech/Presentation 4   Understanding Ordinary Conversation 4   Taking Medications 4   Remembering Where Things Are Placed or Put Away 4   Remembering List of 4-5 Errands 4   Taking Care of Complicated Tasks 4   Applied Cognition Raw Score 24   Applied Cognition Standardized Score 62.21   End of Consult   Education Provided Yes;Family or social support of family present for education by provider   Patient Position at End of Consult Bedside chair;Bed/Chair alarm activated;All needs within reach   Nurse Communication Nurse aware of consult   End of Consult Comments Pt seated OOB in chair with chair alarm  activated at end of session. Call bell and phone within reach. All needs met and pt reports no further questions for OT at this time.   Sheryl Kline, OT

## 2025-02-27 NOTE — PLAN OF CARE
Problem: PHYSICAL THERAPY ADULT  Goal: Performs mobility at highest level of function for planned discharge setting.  See evaluation for individualized goals.  Description: Treatment/Interventions: Functional transfer training, LE strengthening/ROM, Elevations, Therapeutic exercise, Endurance training, Bed mobility, Gait training, Spoke to nursing, OT, Family  Equipment Recommended: Walker (pt owns)       See flowsheet documentation for full assessment, interventions and recommendations.  Note: Prognosis: Good  Problem List: Decreased strength, Decreased range of motion, Decreased endurance, Impaired balance, Decreased mobility, Orthopedic restrictions, Pain  Assessment: Pt is a 74 y.o. male who presented to [facility] on 2/27/2025 s/p R TKA done by Dr. Nichole. Precautions include WBAT. Pt  has a past medical history of Arthritis and PONV (postoperative nausea and vomiting).. Pt greeted at bedside for PT evaluation on 02/27/25. Pt referred to PT for functional mobility evaluation & D/C planning w/ orders of activity beginning POD #0 and activity as tolerated. PTA, pt reports being I w/o AD and I w/ IADLs. Personal factors affecting pt at time of IE include: stairs to enter home. Please find objective findings from PT assessment regarding body systems outlined above with impairments and limitations including weakness, decreased ROM, impaired balance, decreased endurance, gait deviations, pain, decreased activity tolerance, decreased functional mobility tolerance, fall risk, and orthopedic restrictions.  Please see flow sheet above for objective findings and level of assistance required for safe completion of functional tasks. Pt able to perform supine to sit with bedrails and S. Pt able to perform sit<>stand with RW and S. Pt then ambualte 15'x2 to the bathroom with RW and S. Able to transfer from toilet with S and RW. Pt demonstrated dec endurance and tolerance to activity. Denies reports of dizziness or SOB t/o  session. Patient was left in recliner chair  with call bell and all needs within reach. Pt was educated on fall precautions and reinforced w/ good understanding. Based on pt presentation and impaired function, pt would benefit from level III, (minimal resource intensity) at D/C. From PT/mobility standpoint, pt would benefit from OPPT to address deficits as defined above and maximize level of independence and return pt to PLOF. HEP given and reviewed with patient, no questions at this time. CM to follow. Nsg staff to continue to mobilized pt (OOB in chair for all meals & ambulate in room/unit) as tolerated to prevent further decline in function. Nsg notified. Co-eval performed with OT to complete this evaluation for the pts best interest given pts medical complexity and functional level.  Barriers to Discharge: Inaccessible home environment ([facility])     Rehab Resource Intensity Level, PT: III (Minimum Resource Intensity)    See flowsheet documentation for full assessment.

## 2025-02-27 NOTE — ANESTHESIA POSTPROCEDURE EVALUATION
Post-Op Assessment Note    CV Status:  Stable  Pain Score: 0    Pain management: adequate       Mental Status:  Alert and awake   Hydration Status:  Euvolemic   PONV Controlled:  Controlled   Airway Patency:  Patent     Post Op Vitals Reviewed: Yes    No anethesia notable event occurred.    Staff: CRNA           Last Filed PACU Vitals:  Vitals Value Taken Time   Temp 97.6 °F (36.4 °C) 02/27/25 0905   Pulse 85 02/27/25 0915   /69 02/27/25 0915   Resp 16 02/27/25 0915   SpO2 96 % 02/27/25 0915       Modified Pat:     Vitals Value Taken Time   Activity 1 02/27/25 0905   Respiration 1 02/27/25 0905   Circulation 0 02/27/25 0905   Consciousness 1 02/27/25 0905   Oxygen Saturation 1 02/27/25 0905     Modified Pat Score: 4

## 2025-02-27 NOTE — H&P
H&P Exam - Orthopedics   Jason Miguel 74 y.o. male MRN: [id number]      Assessment/Plan   Assessment:  right Knee Osteoarthritis in this adult male who continues to have both pain and dysfunction despite appropriate nonsurgical treatments    Plan:  right  Total Knee Arthroplasty.  Patient is familiar with risks, benefits, alternatives    TOTAL KNEE REPLACEMENT INDICATIONS AND RISKS    We had a lengthy discussion with the patient regarding the potential options for treatment. The patient has had an extensive conservative management course up until this time and therefore I do not feel that any additional conservative management will provide additional relief. The patient's symptoms have progressively worsened to the point where they now limit the patient's daily activities and quality of life.     At this time, I feel that this patient would be an excellent candidate for a total knee replacement. The patient has failed non-operative care and continues to have unacceptable symptoms. We discussed the treatment options and alternatives and the risks and benefits of surgery in great detail.    While no guarantees can be made, total knee replacement has a very high success rate in terms of relieving a patient's knee pain and returning them to a more active, independent lifestyle for 10-15 years or more. All surgery carries some risk; for knee replacement, the complication rate is low but may include: death (very rare), infection, bleeding requiring transfusion, blood clots in legs traveling to lungs, nerve and/or blood vessel damage, bone fracture, persistent knee pain and/or stiffness, and repeat surgery(ies). The risk of a major complication is about 1-2 per 1000 cases. Total knee replacement should only be done if conservative treatment has failed. The revision rate for total knee replacements is about 1-2% per year; in other words, 85-95% of knee replacements may last 10 years; 75-85% last 20 years; and so on,  assuming no injury to the knee. Finally we discussed anesthesia related complications which will be discussed in greater detail with the anesthesia team before surgery.     The patient was shown total knee booklets, diagrams and/or models and all of their questions have been answered at the present time. The patient may call or come in if they have any other concerns or questions. The patient also understands the post-operative rehabilitation process and the need for their cooperation and participation, and that their results may be compromised by their lack of compliance. The patient would like to proceed. Patient is encouraged to seek additional opinions if they so desire. The patient voiced their understanding of the surgical plan and potential complications and wishes to proceed with surgery.      History of Present Illness   HPI:  Jason Miguel is a 74 y.o. male who presents with pain in the right knee. Patient is no longer getting adequate relief from non-operative modalities. Patient is continuing to have debilitating pain from their knee, interfering with daily activities and sleep. Patient denies any concerns with infections, new neuropathies, or any acute injuries.     Historical Information  Review Of Systems:   Skin: Normal  Neuro: See HPI  Musculoskeletal: See HPI  14 point review of systems negative except as stated above     Past Medical History:   Past Medical History:   Diagnosis Date    Arthritis     PONV (postoperative nausea and vomiting)        Past Surgical History:   Past Surgical History:   Procedure Laterality Date    COLONOSCOPY      ELBOW SURGERY Bilateral     FOOT TENDON SURGERY Bilateral     heel    HAND SURGERY      MN ARTHRP KNE CONDYLE&PLATU MEDIAL&LAT COMPARTMENTS Left 8/17/2023    Procedure: ARTHROPLASTY KNEE TOTAL W ROBOT;  Surgeon: Denton Nichole MD;  Location: [facility];  Service: Orthopedics    MN ARTHRS KNE SURG W/MENISCECTOMY MED/LAT W/SHVG Right 05/23/2022     Procedure: KNEE ARTHROSCOPY WITH medial MENISCECTOMY chondroplasty synovectomy injection;  Surgeon: Hank Shirley DO;  Location: [facility];  Service: Orthopedics    KS ARTHRS KNE SURG W/MENISCECTOMY MED/LAT W/SHVG Left 2023    Procedure: Left knee arthroscopy: Medial meniscectomy; Lateral meniscectomy; Chondroplasty; Synovectomy; Post arthroscopic injection of intra-articular joint space and peripheral portal;  Surgeon: Hank Shirley DO;  Location: [facility];  Service: Orthopedics    KS NEUROPLASTY &/TRANSPOS MEDIAN NRV CARPAL TUNNE Left 2021    Procedure: RELEASE CARPAL TUNNEL;  Surgeon: Hank Shirley DO;  Location:  MAIN OR;  Service: Orthopedics       Family History:  Family history reviewed and non-contributory  Family History   Problem Relation Age of Onset    Heart disease Mother         hx MI    Diabetes Mother     Stroke Mother     Arthritis Mother     COPD Father         84 yrs    Coronary artery disease Father     Cancer Brother        Social History:  Social History     Socioeconomic History    Marital status: /Civil Union     Spouse name: None    Number of children: None    Years of education: None    Highest education level: None   Occupational History    None   Tobacco Use    Smoking status: Former     Current packs/day: 0.00     Average packs/day: 1 pack/day for 35.0 years (35.0 ttl pk-yrs)     Types: Cigarettes     Start date:      Quit date:      Years since quittin.1    Smokeless tobacco: Never   Vaping Use    Vaping status: Never Used   Substance and Sexual Activity    Alcohol use: Yes     Comment: 2 x per week    Drug use: No    Sexual activity: Yes     Partners: Female     Birth control/protection: Male Sterilization   Other Topics Concern    None   Social History Narrative    None     Social Drivers of Health     Financial Resource Strain: Low Risk  (10/20/2023)    Overall Financial Resource Strain (CARDIA)     Difficulty of Paying Living Expenses: Not  "very hard   Food Insecurity: No Food Insecurity (10/31/2024)    Hunger Vital Sign     Worried About Running Out of Food in the Last Year: Never true     Ran Out of Food in the Last Year: Never true   Transportation Needs: No Transportation Needs (10/31/2024)    PRAPARE - Transportation     Lack of Transportation (Medical): No     Lack of Transportation (Non-Medical): No   Physical Activity: Not on file   Stress: Not on file   Social Connections: Not on file   Intimate Partner Violence: Not on file   Housing Stability: Low Risk  (10/31/2024)    Housing Stability Vital Sign     Unable to Pay for Housing in the Last Year: No     Number of Times Moved in the Last Year: 0     Homeless in the Last Year: No       Allergies:   No Known Allergies        Labs:  0   Lab Value Date/Time    HCT 50.8 (H) 01/29/2025 0851    HCT 48.6 10/14/2024 0724    HCT 51.7 (H) 04/09/2024 0851    HCT 48.7 04/05/2018 0921    HGB 16.6 01/29/2025 0851    HGB 15.7 10/14/2024 0724    HGB 16.7 04/09/2024 0851    HGB 16.8 04/05/2018 0921    INR 0.94 01/29/2025 0851    WBC 6.01 01/29/2025 0851    WBC 6.06 10/14/2024 0724    WBC 5.99 04/09/2024 0851    WBC 5.4 04/05/2018 0921    ESR 2 04/05/2018 0921       Meds:    Current Facility-Administered Medications:     ceFAZolin (ANCEF) IVPB (premix in dextrose) 2,000 mg 50 mL, 2,000 mg, Intravenous, Once, Denton Nichole MD    lactated ringers infusion, 125 mL/hr, Intravenous, Continuous, Denton Nichole MD, Last Rate: 125 mL/hr at 02/27/25 0641, 125 mL/hr at 02/27/25 0641    tranexamic acid (CYKLOKAPRON) 1000-0.7 MG/100ML-% injection 1,000 mg, 1,000 mg, Intravenous, Once, Denton Nichole MD    Blood Culture:   No results found for: \"BLOODCX\"    Wound Culture:   No results found for: \"WOUNDCULT\"    Ins and Outs:  No intake/output data recorded.          Physical Exam  /83   Pulse 84   Temp (!) 97.2 °F (36.2 °C) (Temporal)   Resp 17   Ht 5' 4\" (1.626 m)   Wt 86.6 kg (191 lb)   " "SpO2 96%   BMI 32.79 kg/m²   /83   Pulse 84   Temp (!) 97.2 °F (36.2 °C) (Temporal)   Resp 17   Ht 5' 4\" (1.626 m)   Wt 86.6 kg (191 lb)   SpO2 96%   BMI 32.79 kg/m²   Gen: No acute distress, resting comfortably in bed  HEENT: Eyes clear, moist mucus membranes, hearing intact  Respiratory: No audible wheezing or stridor  Cardiovascular: Well Perfused peripherally, 2+ distal pulse  Abdomen: nondistended, no peritoneal signs  Ortho Exam: limited ROM due to pain and mechanical blocking  Neuro Exam: intact    Lab Results: Reviewed  Imaging: Reviewed   "

## 2025-02-27 NOTE — OP NOTE
OPERATIVE REPORT  PATIENT NAME: Jason Miguel  : 1950  MRN: [id number]  Pt Location:  [facility] OR ROOM 06    Surgery Date: 2025    Surgeons and Role:     * Denton Nichole MD - Primary     * Yael Guevara PA-C - Assisting     Preop Diagnosis:  Chronic pain of right knee [M25.561, G89.29]  Primary osteoarthritis of right knee [M17.11]    Post-Op Diagnosis Codes:     * Chronic pain of right knee [M25.561, G89.29]     * Primary osteoarthritis of right knee [M17.11]    Procedure(s):  Right - ARTHROPLASTY KNEE TOTAL W ROBOT    Specimens:  * No specimens in log *    Estimated Blood Loss:   Minimal    Drains:  Closed/Suction Drain Right Knee Accordion 10 Fr. (Active)   Number of days: 0       Urethral Catheter Double-lumen;Non-latex 16 Fr. (Active)   Number of days: 0       Anesthesia Type:   Choice     Operative Indications:  Chronic pain of right knee [M25.561, G89.29]  Primary osteoarthritis of right knee [M17.11]    Operative Findings:  Depuy attune   Femur-5   Poly-8   Tibia-5   Patella-38    Complications:   None    Knee Technique: Suture (direct) Repair  Knee Approach: Medial Parapatellar        Procedure and Technique:  Following the induction of adequate level of spinal anesthesia, a Ayala catheter was sterilely introduced into this patient's bladder.  Antibiotics were administered.  The right thigh was fitted with a thigh-high tourniquet.  The right lower extremity then underwent sterile prep drape.  The right lower extremity was exsanguinated to gravity, and the tourniquet inflated to 300 mmHg.  A midline knee incision was greater than in flexion.  Full-thickness flaps were raised in order to access the extensor mechanism.  A medial arthrotomy was created to open up the knee joint.  2 half pins were placed on the proximal tibia, 2 half pins were placed on the distal femur.  In doing so, modules were created.  The arrays were then attached to the modules.  Checkpoints made the proximal tibia  distal femur.  The knee was then registered.  The surgery was planned out on the computer, the plan was finalized.  The robot was docked.  The first maneuver of the distal femoral cut probably anterior posterior cuts.  The chamfer cuts completed the process.  The proximal tibia cut was made next.  Care was taken preserve the integrity medial condyle, lateral collateral, posterior structures during these maneuvers.  The box cut was made for the posterior stabilized unit, and remnant medial and lateral meniscectomies then performed.  Trials were inserted, the knee was taken through range of motion, found to be capable full extension, good flexion, stable throughout.  The patella was then resurfaced while utilizing manufactures equipment, is found to be a size 38 mm button.  The trial components removed and the knee was prepared for insertion of cemented components.  The cemented tibia, cemented femur, trial poly-, cemented patella then placed.  Excess cement was removed, and the knee was brought into extension.  The cement was allowed to cure.  The trial poly was taken out, the knee was packed off.  The tourniquet was deflated, and hemostasis was secured.  The insert poly at the ends and snapped into position.  The knee was taken through final range of motion, found to be capable full extension, good flexion, stable throughout, and excellent patella tracking.  Satisfied with the extent of surgery, the wound was then flushed with saline and closed.  A Betadine soak initiated.  A drain is placed deep and brought via separate lateral stab incision.  The arthrotomy was closed with number Vicryl suture.  The subcu tissues were closed with 2-0 Vicryl suture.  The skin was closed with staples.  Sterile dressings were applied.  He was then transferred to recovery in stable condition with plans to include physical therapy for weightbearing to tolerance.  He will require DVT prophylaxis with Lovenox.  Please note, there is no  qualified orthopedic resident was available assist, the assistance of Yael Guevara PA-C was instrumental in the safe execution this patient surgery.  Started patient position, patient prepped draped, intraoperative assistance, wound closure, dressing application, patient transfers, all of these were performed under my direct supervision   I was present for the entire procedure.    Patient Disposition:  PACU             SIGNATURE: Denton Nichole MD  DATE: February 27, 2025  TIME: 8:53 AM

## 2025-02-27 NOTE — PROGRESS NOTES
PT Re-Evaluation     Today's date: 3/3/2025  Patient name: Jason Miguel  : 1950  MRN: [id number]  Referring provider: Denton Nichole,*  Dx:   Encounter Diagnosis     ICD-10-CM    1. Chronic pain of right knee  M25.561     G89.29       2. Primary osteoarthritis of right knee  M17.11                        Assessment  Impairments: abnormal gait, abnormal or restricted ROM, activity intolerance, impaired balance, impaired physical strength, lacks appropriate home exercise program, pain with function and poor posture   Functional limitations: difficulty with prolonged standing, prolonged walking, walking on unlevel surfaces, difficulty squatting/kneeling, difficulty stair-climbing, and difficulty transferring from low surfaces.  Symptom irritability: low    Assessment details: Jason Miguel is a 74 y.o. male with a history of arthritis, DM, BMI>30, chronic pain, and high cholesterol that presents for a post-operative(R TKA) PT Re-evaluation.  The patient demonstrates signs and symptoms consistent with chronic pain R knee, R knee OA, s/p R TKA.  During the examination the patient demonstrated decreased R LE strength, decreased R knee ROM, gait dysfunction, and R knee pain.  The patient's impairments are causing the following functional limitations: difficulty with prolonged standing, prolonged walking, walking on unlevel surfaces, difficulty squatting/kneeling, difficulty stair-climbing, and difficulty transferring from low surfaces.  The patient will benefit from continued skilled PT services to address impairments, work towards goals, and restore PLOF.      Understanding of Dx/Px/POC: good     Prognosis: good  Prognosis details: Positive prognostic indicators include: positive attitude toward recovery, good understanding of diagnosis/treatment plan, and absence of observed red flags.      Negative prognostic indicators include: Significant medical Hx, chronicity of condition    Goals  GOALS  TO BE ACHIEVED BY THE END OF THE PRE-OP VISIT  1.  Patient have all questions answered / receive pre operative education regarding precautions/wound care/sling/ADL's.  MET   2.  Patient to be independent with his phase 1 post operative exercises to perform pre surgery.  MET       GOALS TO BE ACHIEVED POST OPERATIVELY    STG: Achieve in 4-6 weeks  1.  Patient's R knee pain at worst less than 2/10 to allow for proper gait. PROGRESSING  2.  Patient's R knee ROM improve to 0-90 degrees to improve squatting. PROGRESSING  3.  R LE MMT improve to > 4+/5 all motions tested to improve ADL/recreational activities. PROGRESSING  4.  Timed up and go score improve to less than 14 seconds to indicate improved balance/decreased falls risk. PROGRESSING    LTG:  Achieve in 6-12 weeks  1.  Patient's knee FOTO score improve to > 65% to indicate a return to normal functioning. PROGRESSING  2.  Patient achieve personal goal of function. PROGRESSING  3. Patient to achieve independence with home exercise plan. PROGRESSING  4. 30 second sit to stand score improve to > 14 stands to indicate improved transfers. PROGRESSING        Plan  Patient would benefit from: skilled physical therapy  Planned modality interventions: cryotherapy, thermotherapy: hydrocollator packs, neuromuscular electric stimulation and electrical stimulation/Russian stimulation    Planned therapy interventions: joint mobilization, manual therapy, neuromuscular re-education, patient education, postural training, self care, strengthening, stretching, therapeutic activities, therapeutic exercise, home exercise program, abdominal trunk stabilization, balance, IASTM, gait training and balance/weight bearing training    Frequency: 1-3x/wk.  Duration in weeks: 12  Plan of Care beginning date: 2/6/2025  Plan of Care expiration date: 5/6/2025  Treatment plan discussed with: PTA and patient  Plan details: RE-ASSESS 1X/MONTH      Subjective Evaluation    History of Present  Illness  Date of surgery: 2025  Mechanism of injury: surgery  Mechanism of injury: SUBJECTIVE: 3/3/25: The patient is now s/p R TKA done by .  The patient presents for a post-op PT re-evaluation today.  The patient reports soreness and pain at the R anterior/lateral thigh.  The patient notes persistent difficulty with prolonged standing/walking, difficulty squatting/kneeling, difficulty stair-climbing, and the patient is unable to work as a .  The patient denies any N/T at the R LE.  The patient denies any post-operative complications.  The patient will benefit from continued PT focused on achieving patient specific goals.            INJURY HISTORY: Jason Miguel is a 74 y.o. male that presents to outpatient physical therapy with complaints of R knee pain and difficulty functioning.  The patient reports chronic R knee pain impacting his function despite attempting conservative care.  The patient notes difficulty with pivoting while walking, carrying laundry, squatting/kneeling, and ADL's.  The patient denies any N/T at the R LE.  The patient presents pre-operatively to get objective measures, learn phase 1 exercises, and receive education on the surgery/self care.  The patient's main goal for physical therapy is to perform ADL's and walk pain free.             Recurrent probem    Patient Goals  Patient goals for therapy: decreased pain, increased motion, increased strength, independence with ADLs/IADLs, return to sport/leisure activities, improved balance, return to work and decreased edema  Patient goal: perform ADL's and walk pain free - progressing  Pain  Current pain ratin  At best pain ratin  At worst pain ratin  Location: R anterior knee;R anterior/lateral thigh  Quality: dull ache and sharp  Relieving factors: rest  Aggravating factors: standing, walking, lifting and stair climbing  Progression: worsening    Social Support  Steps to enter house: yes  Stairs in house:  yes   Lives in: multiple-level home  Lives with: spouse    Employment status: working (drive school bus)  Treatments  Current treatment: physical therapy      Objective     Observations     Additional Observation Details  Patient's postoperative dressings inspected  - no drainage or there S/S of infection noted. The patient was instructed to keep the dressings clean/dry/intact until first post op visit with the surgery team.  Reviewed S/S of infection.          Palpation     Right   Tenderness of the rectus femoris, vastus lateralis and vastus medialis.     Additional Palpation Details  NO TTP    Tenderness     Additional Tenderness Details  NO TTP    Neurological Testing     Sensation     Knee   Left Knee   Intact: Light touch    Right Knee   Intact: light touch     Active Range of Motion   Left Knee   Flexion: 121 degrees   Extension: 2 degrees     Right Knee   Flexion: with pain  Extension: -13 degrees with pain    Passive Range of Motion   Left Knee   Flexion: WFL    Right Knee   Extension: -9 degrees with pain    Mobility   Patellar Mobility:   Left Knee   WFL: medial, lateral, superior and inferior.     Right Knee   WFL: medial, lateral, superior and inferior    Strength/Myotome Testing     Left Hip   Planes of Motion   Flexion: 4  Extension: 4-  Abduction: 4  Adduction: 4  External rotation: 4  Internal rotation: 4    Right Hip   Planes of Motion   Flexion: 3-  Extension: 3  Abduction: 3  Adduction: 3  External rotation: 3  Internal rotation: 3    Left Knee   Flexion: 4+  Extension: 4+  Quadriceps contraction: good    Right Knee   Flexion: 3-  Extension: 3-  Quadriceps contraction: poor    Tests     Additional Tests Details  FOTO: 25% ( predicted 61%)      Gait impairments: Patient ambulates with RW WBAT R LE.  The patient demonstrates slow gait speed, (+) flexed R knee, unequal step lengths, and lacking R knee terminal knee extension at heel strike.    TUG: 3/3: 35 SEC RW  PRE: 9 sec  30SSTS: 3/3: NT  PRE:  "14 stands 0H                 Re-Evaluation:4/2  Access Code [email or web address]   PRECAUTIONS:Hx L LE radiculopathy - flexion responder  Knee Specialty Daily Treatment Diary     Manual Therapy 2/6 3/3      Knee flexion/ extension  *      Hamstring stretch  Calf stretch  *    *      Prone Quad stretch        Patellar mobs        Knee stretch on edge of mat  2x1 min ea          Ther Ex 2/6 3/3      Nu Step for LE strength S=9  A=10  *L2 x 7 mins      Biodex Bike for knee ROM/ endurance S=        Heel slides flex/abd X15 ea  X15 ea                              Ankle pumps knee EXT x25 X30       SLR all planes        Ball squeeze  *      LAQ x15 AAROM x15      Hamstring stretch  *5x:15      Calf stretch  *5x:15      Standing hamstring curls  *x15      HR/TR  *x15      Mini Squat        Step knee flexion  *              Total gym squats (deep)        Neuro Re-ed        TKE        Gluteal set x15 x15      QS x15 x15                      Fitter board        Eccentric Leg press        Clam Shells  *      Heel-toe amb        Tandem stand        CSMi balance, \"+\" squat  *      Therapeutic ACT        sidestepping  *              Mirror walking                Amb up/down steps        Chair squats        Reverse Lunges mirror        Step ups  *          Modalities        CP  KNEE                               The patient was given a new home exercise plan with instruction, pictures, and verbal feedback.  The patient accepts and understands the new home activities.    "

## 2025-02-28 VITALS
OXYGEN SATURATION: 94 % | HEART RATE: 85 BPM | WEIGHT: 191 LBS | HEIGHT: 64 IN | TEMPERATURE: 98.3 F | RESPIRATION RATE: 18 BRPM | BODY MASS INDEX: 32.61 KG/M2 | SYSTOLIC BLOOD PRESSURE: 117 MMHG | DIASTOLIC BLOOD PRESSURE: 62 MMHG

## 2025-02-28 LAB
ALBUMIN SERPL BCG-MCNC: 3.7 G/DL (ref 3.5–5)
ALP SERPL-CCNC: 40 U/L (ref 34–104)
ALT SERPL W P-5'-P-CCNC: 11 U/L (ref 7–52)
ANION GAP SERPL CALCULATED.3IONS-SCNC: 9 MMOL/L (ref 4–13)
AST SERPL W P-5'-P-CCNC: 10 U/L (ref 13–39)
BILIRUB SERPL-MCNC: 0.34 MG/DL (ref 0.2–1)
BUN SERPL-MCNC: 22 MG/DL (ref 5–25)
CALCIUM SERPL-MCNC: 8.7 MG/DL (ref 8.4–10.2)
CHLORIDE SERPL-SCNC: 105 MMOL/L (ref 96–108)
CO2 SERPL-SCNC: 24 MMOL/L (ref 21–32)
CREAT SERPL-MCNC: 0.97 MG/DL (ref 0.6–1.3)
ERYTHROCYTE [DISTWIDTH] IN BLOOD BY AUTOMATED COUNT: 14 % (ref 11.6–15.1)
GFR SERPL CREATININE-BSD FRML MDRD: 77 ML/MIN/1.73SQ M
GLUCOSE P FAST SERPL-MCNC: 131 MG/DL (ref 65–99)
GLUCOSE SERPL-MCNC: 131 MG/DL (ref 65–140)
HCT VFR BLD AUTO: 38.5 % (ref 36.5–49.3)
HGB BLD-MCNC: 12.5 G/DL (ref 12–17)
MCH RBC QN AUTO: 29.2 PG (ref 26.8–34.3)
MCHC RBC AUTO-ENTMCNC: 32.5 G/DL (ref 31.4–37.4)
MCV RBC AUTO: 90 FL (ref 82–98)
PLATELET # BLD AUTO: 168 THOUSANDS/UL (ref 149–390)
PMV BLD AUTO: 10.5 FL (ref 8.9–12.7)
POTASSIUM SERPL-SCNC: 4.5 MMOL/L (ref 3.5–5.3)
PROT SERPL-MCNC: 5.7 G/DL (ref 6.4–8.4)
RBC # BLD AUTO: 4.28 MILLION/UL (ref 3.88–5.62)
SODIUM SERPL-SCNC: 138 MMOL/L (ref 135–147)
WBC # BLD AUTO: 12.4 THOUSAND/UL (ref 4.31–10.16)

## 2025-02-28 PROCEDURE — 85027 COMPLETE CBC AUTOMATED: CPT

## 2025-02-28 PROCEDURE — 97116 GAIT TRAINING THERAPY: CPT

## 2025-02-28 PROCEDURE — 97535 SELF CARE MNGMENT TRAINING: CPT

## 2025-02-28 PROCEDURE — 99232 SBSQ HOSP IP/OBS MODERATE 35: CPT | Performed by: INTERNAL MEDICINE

## 2025-02-28 PROCEDURE — 97530 THERAPEUTIC ACTIVITIES: CPT

## 2025-02-28 PROCEDURE — 97110 THERAPEUTIC EXERCISES: CPT

## 2025-02-28 PROCEDURE — 80053 COMPREHEN METABOLIC PANEL: CPT

## 2025-02-28 PROCEDURE — 99024 POSTOP FOLLOW-UP VISIT: CPT | Performed by: PHYSICIAN ASSISTANT

## 2025-02-28 RX ADMIN — OXYCODONE HYDROCHLORIDE 5 MG: 5 TABLET ORAL at 12:21

## 2025-02-28 RX ADMIN — ACETAMINOPHEN 975 MG: 325 TABLET ORAL at 02:16

## 2025-02-28 RX ADMIN — GABAPENTIN 100 MG: 100 CAPSULE ORAL at 11:39

## 2025-02-28 RX ADMIN — OXYCODONE HYDROCHLORIDE 5 MG: 5 TABLET ORAL at 07:22

## 2025-02-28 RX ADMIN — ENOXAPARIN SODIUM 40 MG: 40 INJECTION SUBCUTANEOUS at 08:41

## 2025-02-28 RX ADMIN — METHOCARBAMOL 500 MG: 500 TABLET ORAL at 05:25

## 2025-02-28 RX ADMIN — METHOCARBAMOL 500 MG: 500 TABLET ORAL at 11:39

## 2025-02-28 RX ADMIN — ACETAMINOPHEN 975 MG: 325 TABLET ORAL at 10:45

## 2025-02-28 RX ADMIN — METFORMIN HYDROCHLORIDE 500 MG: 500 TABLET ORAL at 07:22

## 2025-02-28 RX ADMIN — DOCUSATE SODIUM 100 MG: 100 CAPSULE, LIQUID FILLED ORAL at 08:41

## 2025-02-28 NOTE — OCCUPATIONAL THERAPY NOTE
Occupational Therapy Progress Note     Patient Name: Jason Miguel  Today's Date: 2/28/2025  Problem List  Principal Problem:    Primary osteoarthritis of right knee  Active Problems:    Hypercholesteremia    Type 2 diabetes mellitus without complication, without long-term current use of insulin (HCC)    Difficulty urinating       02/28/25 0840   OT Last Visit   OT Visit Date 02/28/25   Note Type   Note Type Treatment   Pain Assessment   Pain Assessment Tool 0-10   Pain Score 2   Pain Location/Orientation Orientation: Right;Location: Knee  (2/10 in beginning of session 4/10 at end of session)   Hospital Pain Intervention(s) Repositioned;Ambulation/increased activity;Emotional support;Rest   Restrictions/Precautions   Weight Bearing Precautions Per Order Yes   RLE Weight Bearing Per Order WBAT   Other Precautions WBS;Fall Risk;Pain   Lifestyle   Autonomy Independent with alll ADLs/IADLs, no AD use at baseline   Reciprocal Relationships Spouse   Service to Others Retired   Intrinsic Gratification voluteer    ADL   Where Assessed Chair   UB Dressing Assistance 5  Supervision/Setup   UB Dressing Deficit Setup;Supervision/safety;Verbal cueing   LB Dressing Assistance 5  Supervision/Setup   LB Dressing Deficit Setup;Requires assistive device for steadying;Verbal cueing;Supervision/safety;Increased time to complete   Toileting Assistance  5  Supervision/Setup   Toileting Deficit Supervison/safety;Other (Comment)  (standing to void with use of RW for stability)   Functional Standing Tolerance   Time ~3-4 min   Activity standing to void and for dressing tasks   Comments use of RW   Bed Mobility   Supine to Sit Unable to assess   Sit to Supine Unable to assess   Additional Comments Pt greeted OOB in recliner   Transfers   Sit to Stand 5  Supervision   Additional items Armrests;Increased time required;Verbal cues  (RW)   Stand to Sit 5  Supervision   Additional items Armrests;Increased time required;Verbal  cues  (RW)   Toilet transfer 5  Supervision   Additional items Increased time required;Verbal cues;Standard toilet;Commode;Other  (BSC over standard toilets to simulate home enviornment)   Additional Comments verbal cues for safety and use of RW   Functional Mobility   Functional Mobility 5  Supervision   Additional Comments Pt completed functional mobility functional household distance with use of RW and S   Additional items Rolling walker   Toilet Transfers   Toilet Transfer From Other (Comment)  (Chair)   Toilet Transfer Type To and from   Toilet Transfer to Standard toilet  (BSC)   Toilet Transfer Technique Ambulating   Toilet Transfers Supervision   Toilet Transfers Comments use of RW for stability   Cognition   Overall Cognitive Status WFL   Arousal/Participation Alert;Cooperative   Attention Within functional limits   Orientation Level Oriented X4   Memory Within functional limits   Following Commands Follows all commands and directions without difficulty   Comments Cooperative and motivated   Activity Tolerance   Activity Tolerance Patient tolerated treatment well   Medical Staff Made Aware THADDEUS Hernández   Assessment   Assessment Pt seen for OT treatment session focusing on ADLs/IADLs, functional mobility, functional standing tolerance, functional transfers, patient education, continued evaluation. Pt greeted OOB in recliner at start of session. Pt alert and cooperative throughout session. Pt with good sitting balance and fair - dynamic standing balance. Pt tolerated treatment well. Pt completed don of underwear and pants seated, then standing to pull over waist with use of RW for stability. Verbal cues for proper technique. Pt completed don of shirt with S standing with RW. Pt completed functional mobility from chair to bathroom with use of RW and S. Pt completed toilet transfer onto BSC over standard toilet with use of armrests to simulate home environment with S. Pt completed sit to stand with S with use of  armrests and S. Pt completed functional mobility functional household distance with use of RW and S to and from the room in the hallway. Pt completed functional mobility back to bathroom to void. Pt completed voiding in standing with use of RW over toilet with S. Pt completed functional mobility back to chair with use of RW and S. Stand to sit into chair with armrests and S. Pt educated on all ADLs/IADLs, transfers, and LE management for independence and safety at home. Pt reports 4/10 pain and no dizziness. Pt's vitals include: stable.     Pt ended session seated OOB in recliner. Call bell and phone within reach. All needs met and pt reports no further questions at this time. Continue to recommend No post-acute rehabilitation needs when medically cleared. OT will continue to follow pt on caseload.   Plan   Treatment Interventions ADL retraining;Functional transfer training;Endurance training;Equipment evaluation/education;Patient/family training;Compensatory technique education;Continued evaluation;Energy conservation;Activityengagement   Goal Expiration Date 03/06/25   OT Treatment Day 1   OT Frequency 3-5x/wk   Discharge Recommendation   Rehab Resource Intensity Level, OT No post-acute rehabilitation needs   Additional Comments  The patient's raw score on the AM-PAC Daily Activity Inpatient Short Form is 22. A raw score of greater than or equal to 19 suggests the patient may benefit from discharge to home. Please refer to the recommendation of the Occupational Therapist for safe discharge planning.   AM-PAC Daily Activity Inpatient   Lower Body Dressing 3   Bathing 3   Toileting 4   Upper Body Dressing 4   Grooming 4   Eating 4   Daily Activity Raw Score 22   Daily Activity Standardized Score (Calc for Raw Score >=11) 47.1   AM-PAC Applied Cognition Inpatient   Following a Speech/Presentation 4   Understanding Ordinary Conversation 4   Taking Medications 4   Remembering Where Things Are Placed or Put Away 4    Remembering List of 4-5 Errands 4   Taking Care of Complicated Tasks 4   Applied Cognition Raw Score 24   Applied Cognition Standardized Score 62.21   End of Consult   Education Provided Yes;Family or social support of family present for education by provider   Patient Position at End of Consult Bedside chair;All needs within reach   Nurse Communication Nurse aware of consult   Sheryl Kline, OT

## 2025-02-28 NOTE — PROGRESS NOTES
"Progress Note - Orthopedics   Name: Jason Miguel 74 y.o. male I MRN: [id number]  Unit/Bed#: WE 2 N -01 I Date of Admission: 2/27/2025   Date of Service: 2/28/2025 I Hospital Day: 0     Assessment & Plan  Primary osteoarthritis of right knee  POD 1 s/p R TKA with Dr. Nichole 2/27/2025.  Doing well.  -WBAT RLE  -Up and out of bed with assistance as needed  -PT/OT  -Pain control  -HV drain x 1 pulled this AM  -H 12.5 this AM, VSS, monitor  -Discharge planning.  Home today        Subjective   74 y.o.male seen and examined at the bedside.  Patient reports doing well overall, pain about 2/10 in the right knee currently.  Denies any associated numbness/tingling.  No acute issues reported overnight.      Objective :  Temp:  [97.2 °F (36.2 °C)-98.4 °F (36.9 °C)] 98.4 °F (36.9 °C)  HR:  [] 94  BP: (105-154)/(61-90) 110/61  Resp:  [14-25] 18  SpO2:  [86 %-97 %] 94 %  O2 Device: None (Room air)  Nasal Cannula O2 Flow Rate (L/min):  [2 L/min-4 L/min] 2 L/min    Physical Exam  Musculoskeletal: Right lower extremity  ACE wrap dressing in place, no saturation  HV drain x 1 in place holding suction, with some bloody drainage in the cannister.  Motor intact to +FHL/EHL, +ankle dorsi/plantar flexion  Sensation intact to saphenous, sural, tibial, superficial peroneal nerve, and deep peroneal  No calf swelling or tenderness to palpation      Lab Results: I have reviewed the following results:  Recent Labs     02/28/25  0519   WBC 12.40*   HGB 12.5   HCT 38.5        Blood Culture:  No results found for: \"BLOODCX\"  Wound Culture: No results found for: \"WOUNDCULT\"  "

## 2025-02-28 NOTE — PLAN OF CARE
Problem: PAIN - ADULT  Goal: Verbalizes/displays adequate comfort level or baseline comfort level  Description: Interventions:  - Encourage patient to monitor pain and request assistance  - Assess pain using appropriate pain scale  - Administer analgesics based on type and severity of pain and evaluate response  - Implement non-pharmacological measures as appropriate and evaluate response  - Consider cultural and social influences on pain and pain management  - Notify physician/advanced practitioner if interventions unsuccessful or patient reports new pain  Outcome: Adequate for Discharge     Problem: MUSCULOSKELETAL - ADULT  Goal: Maintain or return mobility to safest level of function  Description: INTERVENTIONS:  - Assess patient's ability to carry out ADLs; assess patient's baseline for ADL function and identify physical deficits which impact ability to perform ADLs (bathing, care of mouth/teeth, toileting, grooming, dressing, etc.)  - Assess/evaluate cause of self-care deficits   - Assess range of motion  - Assess patient's mobility  - Assess patient's need for assistive devices and provide as appropriate  - Encourage maximum independence but intervene and supervise when necessary  - Involve family in performance of ADLs  - Assess for home care needs following discharge   - Consider OT consult to assist with ADL evaluation and planning for discharge  - Provide patient education as appropriate  Outcome: Adequate for Discharge  Goal: Maintain proper alignment of affected body part  Description: INTERVENTIONS:  - Support, maintain and protect limb and body alignment  - Provide patient/ family with appropriate education  Outcome: Adequate for Discharge     Problem: SAFETY ADULT  Goal: Patient will remain free of falls  Description: INTERVENTIONS:  - Educate patient/family on patient safety including physical limitations  - Instruct patient to call for assistance with activity   - Consult OT/PT to assist with  strengthening/mobility   - Keep Call bell within reach  - Keep bed low and locked with side rails adjusted as appropriate  - Keep care items and personal belongings within reach  - Initiate and maintain comfort rounds  - Make Fall Risk Sign visible to staff  - Offer Toileting every 2 Hours, in advance of need  - Initiate/Maintain chair alarm  - Obtain necessary fall risk management equipment:   - Apply yellow socks and bracelet for high fall risk patients  - Consider moving patient to room near nurses station  Outcome: Adequate for Discharge  Goal: Maintain or return to baseline ADL function  Description: INTERVENTIONS:  -  Assess patient's ability to carry out ADLs; assess patient's baseline for ADL function and identify physical deficits which impact ability to perform ADLs (bathing, care of mouth/teeth, toileting, grooming, dressing, etc.)  - Assess/evaluate cause of self-care deficits   - Assess range of motion  - Assess patient's mobility; develop plan if impaired  - Assess patient's need for assistive devices and provide as appropriate  - Encourage maximum independence but intervene and supervise when necessary  - Involve family in performance of ADLs  - Assess for home care needs following discharge   - Consider OT consult to assist with ADL evaluation and planning for discharge  - Provide patient education as appropriate  Outcome: Adequate for Discharge     Problem: DISCHARGE PLANNING  Goal: Discharge to home or other facility with appropriate resources  Description: INTERVENTIONS:  - Identify barriers to discharge w/patient and caregiver  - Arrange for needed discharge resources and transportation as appropriate  - Identify discharge learning needs (meds, wound care, etc.)  - Arrange for interpretive services to assist at discharge as needed  - Refer to Case Management Department for coordinating discharge planning if the patient needs post-hospital services based on physician/advanced practitioner order or  complex needs related to functional status, cognitive ability, or social support system  Outcome: Adequate for Discharge     Problem: Knowledge Deficit  Goal: Patient/family/caregiver demonstrates understanding of disease process, treatment plan, medications, and discharge instructions  Description: Complete learning assessment and assess knowledge base.  Interventions:  - Provide teaching at level of understanding  - Provide teaching via preferred learning methods  Outcome: Adequate for Discharge

## 2025-02-28 NOTE — ASSESSMENT & PLAN NOTE
Postoperative day 0 status post right total knee arthroplasty, minimal to no tenderness, has been up to ambulate and transition, drain in place with only approximately 150 cc of serosanguineous output, AVSS, appropriate urinary output, neurovascular intact    POD #0 s/p right TKA-   - Antibiotics: Ancef 1 g IV every 8 for 2 more doses  - Anticoagulation: Lovenox 40 mg once daily subcutaneous, SCDs, ambulation  - Activity: Weightbearing as tolerated, PT/OT    - AM lab draw: Repeat CBC, BMP with a.m. labs  - Analgesia: Continue p.r.n. medication  - Dressing: keep clean, dry, and in tact, remove Hemovac tomorrow  - Disposition: Hopeful for discharge tomorrow postoperative day 1 based on current pain control and patient's overall wellbeing

## 2025-02-28 NOTE — ASSESSMENT & PLAN NOTE
POD 1 s/p R TKA with Dr. Nichole 2/27/2025.  Doing well.  -WBAT RLE  -Up and out of bed with assistance as needed  -PT/OT  -Pain control  -HV drain x 1 pulled this AM  -H 12.5 this AM, VSS, monitor  -Discharge planning.  Home today

## 2025-02-28 NOTE — PROGRESS NOTES
Progress Note - Hospitalist   Name: Jason Miguel 74 y.o. male I MRN: [id number]  Unit/Bed#: WE 2 N -01 I Date of Admission: 2/27/2025   Date of Service: 2/28/2025 I Hospital Day: 0     Assessment & Plan  Primary osteoarthritis of right knee  Postoperative day 1 status post right total knee arthroplasty, minimal to no tenderness, has been up to ambulate and transition, drain in place with only approximately 150 cc of serosanguineous output, AVSS, appropriate urinary output, neurovascular intact    POD #1 s/p right TKA-   - Antibiotics: Ancef 1 g IV every 8 for 2 more doses  - Anticoagulation: Lovenox 40 mg once daily subcutaneous, SCDs, ambulation  - Activity: Weightbearing as tolerated, PT/OT    - AM lab draw: Repeat CBC, BMP with a.m. labs  - Analgesia: Continue p.r.n. medication  - Dressing: keep clean, dry, and in tact, remove Hemovac tomorrow  - Disposition: Hopeful for discharge tomorrow postoperative day 1 based on current pain control and patient's overall wellbeing    Type 2 diabetes mellitus without complication, without long-term current use of insulin ([facility])    Lab Results   Component Value Date    HGBA1C 5.9 (H) 01/29/2025     Well-controlled on metformin and Ozempic    -Upon discharge continue metformin and Ozempic  -Carbohydrate controlled diet level 2  Hypercholesteremia  Stable, chronic    -Continue simvastatin  Difficulty urinating  Chronic, stable    -Continue Flomax    Ok for discharge from Hospitalist service perspective.    Subjective   Patient was seen and examined at bedside.  Patient denies any acute events overnight.  Patient tolerated well with pain well-controlled.  Patient denies any numbness, tingling, lack of movement, lack sensation in distal extremity.  Patient was able to ambulate.  Patient tolerated food.  Patient is eager for discharge.    Objective :  Temp:  [97.2 °F (36.2 °C)-98.4 °F (36.9 °C)] 98.4 °F (36.9 °C)  HR:  [] 94  BP: (105-154)/(61-90) 110/61  Resp:   [14-25] 18  SpO2:  [86 %-97 %] 94 %  O2 Device: None (Room air)  Nasal Cannula O2 Flow Rate (L/min):  [2 L/min-4 L/min] 2 L/min    I/O         02/26 0701  02/27 0700 02/27 0701  02/28 0700    P.O.  960    I.V. (mL/kg)  1000 (11.5)    IV Piggyback  150    Total Intake(mL/kg)  2110 (24.4)    Urine (mL/kg/hr)  875 (0.4)    Drains  175    Total Output  1050    Net  +1060          Unmeasured Urine Occurrence  1 x          Lines/Drains/Airways       Active Status       Name Placement date Placement time Site Days    Closed/Suction Drain Right Knee Accordion 10 Fr. 02/27/25  0842  Knee  less than 1                  Physical Exam  Constitutional:       General: He is awake. He is not in acute distress.     Appearance: He is overweight. He is not ill-appearing, toxic-appearing or diaphoretic.      Interventions: He is not intubated.  HENT:      Head: Normocephalic and atraumatic.      Right Ear: Hearing and external ear normal.      Left Ear: Hearing and external ear normal.      Nose: Nose normal.   Cardiovascular:      Rate and Rhythm: Normal rate and regular rhythm.   Pulmonary:      Effort: Pulmonary effort is normal. No tachypnea, bradypnea, accessory muscle usage, prolonged expiration, respiratory distress or retractions. He is not intubated.   Psychiatric:         Behavior: Behavior is cooperative.       Right  Lower Extremity: Thigh and calf compartments are soft and nontender to palpation. + L3-S1 SILT. + DF/PF.+ EHL. No pain with passive stretch/extension of toes. capillary refill less than 2 seconds, and foot is warm B/L. Incision is c/d/i      Lab Results: I have reviewed the following results:  Recent Labs     02/28/25  0519   WBC 12.40*   HGB 12.5   HCT 38.5          Imaging Results Review: No pertinent imaging studies reviewed.  Other Study Results Review: No additional pertinent studies reviewed.    VTE Pharmacologic Prophylaxis: Enoxaparin (Lovenox)  VTE Mechanical Prophylaxis: sequential compression  device

## 2025-02-28 NOTE — ANESTHESIA POSTPROCEDURE EVALUATION
Post-Op Assessment Note    CV Status:  Stable    Pain management: adequate       Mental Status:  Alert and awake   Hydration Status:  Euvolemic   PONV Controlled:  Controlled   Airway Patency:  Patent     Post Op Vitals Reviewed: Yes    No anethesia notable event occurred.    Staff: Anesthesiologist           Last Filed PACU Vitals:  Vitals Value Taken Time   Temp 98.2 °F (36.8 °C) 02/27/25 1015   Pulse 88 02/27/25 1033   /79 02/27/25 1030   Resp 20 02/27/25 1032   SpO2 92 % 02/27/25 1033   Vitals shown include unfiled device data.    Modified Pat:     Vitals Value Taken Time   Activity 1 02/27/25 0905   Respiration 1 02/27/25 0905   Circulation 0 02/27/25 0905   Consciousness 1 02/27/25 0905   Oxygen Saturation 1 02/27/25 0905     Modified Pat Score: 4

## 2025-02-28 NOTE — PLAN OF CARE
Problem: OCCUPATIONAL THERAPY ADULT  Goal: Performs self-care activities at highest level of function for planned discharge setting.  See evaluation for individualized goals.  Description: Treatment Interventions: ADL retraining, Functional transfer training, Endurance training, Patient/family training, Equipment evaluation/education, Compensatory technique education, Continued evaluation, Energy conservation, Activityengagement          See flowsheet documentation for full assessment, interventions and recommendations.   Outcome: Adequate for Discharge  Note: Limitation: Decreased ADL status, Decreased endurance, Decreased self-care trans, Decreased high-level ADLs  Prognosis: Good  Assessment: Pt seen for OT treatment session focusing on ADLs/IADLs, functional mobility, functional standing tolerance, functional transfers, patient education, continued evaluation. Pt greeted OOB in recliner at start of session. Pt alert and cooperative throughout session. Pt with good sitting balance and fair - dynamic standing balance. Pt tolerated treatment well. Pt completed don of underwear and pants seated, then standing to pull over waist with use of RW for stability. Verbal cues for proper technique. Pt completed don of shirt with S standing with RW. Pt completed functional mobility from chair to bathroom with use of RW and S. Pt completed toilet transfer onto [facility] [place] standard toilet with use of armrests to simulate home environment with S. Pt completed sit to stand with S with use of armrests and S. Pt completed functional mobility functional household distance with use of RW and S to and from the room in the hallway. Pt completed functional mobility back to bathroom to void. Pt completed voiding in standing with use of RW over toilet with S. Pt completed functional mobility back to chair with use of RW and S. Stand to sit into chair with armrests and S. Pt educated on all ADLs/IADLs, transfers, and LE management for  independence and safety at home. Pt reports 4/10 pain and no dizziness. Pt's vitals include: stable.     Pt ended session seated OOB in recliner. Call bell and phone within reach. All needs met and pt reports no further questions at this time. Continue to recommend No post-acute rehabilitation needs when medically cleared. OT will continue to follow pt on caseload.     Rehab Resource Intensity Level, OT: No post-acute rehabilitation needs

## 2025-02-28 NOTE — PHYSICAL THERAPY NOTE
PHYSICAL THERAPY NOTE          Patient Name: Jason Miguel  Today's Date: 2/28/2025  10:05-10:35       02/28/25 1005   PT Last Visit   PT Visit Date 02/28/25   Note Type   Note Type Treatment   Pain Assessment   Pain Score 2   Pain Location/Orientation Orientation: Right;Location: Knee   Hospital Pain Intervention(s) Repositioned;Ambulation/increased activity;Cold applied   Restrictions/Precautions   RLE Weight Bearing Per Order WBAT   Other Precautions Bed Alarm;Chair Alarm;Fall Risk;Pain;Multiple lines  ([place])   General   Chart Reviewed Yes   Response to Previous Treatment Patient with no complaints from previous session.   Family/Caregiver Present No   Cognition   Overall Cognitive Status WFL   Arousal/Participation Alert;Cooperative   Orientation Level Oriented X4   Following Commands Follows all commands and directions without difficulty   Comments Pleasant and motivated   Subjective   Subjective Reports this knee surgery is so much better than when he had his left knee done.   Bed Mobility   Additional Comments received OOB in recliner chair.   Transfers   Sit to Stand 5  Supervision   Additional items Increased time required;Armrests   Stand to Sit 5  Supervision   Additional items Increased time required;Armrests   Additional Comments Cues for hand and operative leg placement.   Ambulation/Elevation   Gait pattern Improper Weight shift;Decreased foot clearance;Antalgic;Decreased R stance;Step through pattern   Gait Assistance 5  Supervision   Additional items Verbal cues   Assistive Device Rolling walker   Distance 120 ft x 2 with stair navigation in between distances.   Stair Management Assistance 5  Supervision   Additional items Verbal cues   Stair Management Technique One rail L;With cane  (L HR + cane.)   Number of Stairs 5  (performed x 2)   Balance   Static Sitting Good   Dynamic Sitting Fair +   Static Standing  Fair   Dynamic Standing Fair -   Ambulatory Fair -   Endurance Deficit   Endurance Deficit No   Activity Tolerance   Activity Tolerance Patient tolerated treatment well   Medical Staff Made Aware NurseBetty   Nurse Made Aware yes   Exercises   TKR Sitting;10 reps;AAROM;AROM;Right  (Issued, reivewed and performed written HEP x 10 reps except LAQ x 5.  A/AAROm.  Ed on progression of reps and frequency.)   Neuro re-ed reviewed written TKR educational resources for home management.  Reviewed importance of knee extension and techniques to optimize knee ROM/Extension.   Assessment   Prognosis Good   Problem List Decreased strength;Decreased range of motion;Decreased endurance;Impaired balance;Decreased mobility;Pain;Orthopedic restrictions   Assessment Jason is seen for progression of PT POC to facilitate discharge to home.  Improved with all aspects of mobility.  S for all transfers.  Ambulates with antalgic but step through pattern with RW support and S.  Steady with RW.  Able to negotiate 5 steps with rail + cane and S level.  Tolerated mobility well.  Initiated TKR HEP. Educated on progression of reps and frequency and performed x 10 reps except LAQ x 5 A/AAROM.  Cleared for discharge from PT perspective.  All discharge questions addressed.  The patient's AM-PAC Basic Mobility Inpatient Short Form Raw Score is 23. A Raw score of greater than 16 suggests the patient may benefit from discharge to home. Please also refer to the recommendation of the Physical Therapist for safe discharge planning. Plan OPPT at discharge.   Barriers to Discharge Inaccessible home environment   Goals   Patient Goals go home   STG Expiration Date 03/06/25   PT Treatment Day 1   Plan   Treatment/Interventions Functional transfer training;LE strengthening/ROM;Elevations;Therapeutic exercise;Endurance training;Patient/family training;Equipment eval/education;Bed mobility;Gait training;Compensatory technique education;Continued  evaluation;Spoke to nursing   Progress Improving as expected   PT Frequency 2-3x/wk   Discharge Recommendation   Rehab Resource Intensity Level, PT III (Minimum Resource Intensity)   Equipment Recommended Walker;Cane  (pt has both)   AM-PAC Basic Mobility Inpatient   Turning in Flat Bed Without Bedrails 4   Lying on Back to Sitting on Edge of Flat Bed Without Bedrails 4   Moving Bed to Chair 4   Standing Up From Chair Using Arms 4   Walk in Room 4   Climb 3-5 Stairs With Railing 3   Basic Mobility Inpatient Raw Score 23   Basic Mobility Standardized Score 50.88   [facility] Highest Level Of Mobility   -HLM Goal 7: Walk 25 feet or more   -HLM Achieved 7: Walk 25 feet or more   Education   Education Provided Mobility training;Home exercise program;Assistive device   Patient Demonstrates acceptance/verbal understanding   End of Consult   Patient Position at End of Consult Supine;Bed/Chair alarm activated;All needs within reach   End of Consult Comments bedside chair iwth LE elevation, ice to operative leg and SCDs in place.   Lila Amezcua, PT

## 2025-02-28 NOTE — ASSESSMENT & PLAN NOTE
Postoperative day 1 status post right total knee arthroplasty, minimal to no tenderness, has been up to ambulate and transition, drain in place with only approximately 150 cc of serosanguineous output, AVSS, appropriate urinary output, neurovascular intact    POD #1 s/p right TKA-   - Antibiotics: Ancef 1 g IV every 8 for 2 more doses  - Anticoagulation: Lovenox 40 mg once daily subcutaneous, SCDs, ambulation  - Activity: Weightbearing as tolerated, PT/OT    - AM lab draw: Repeat CBC, BMP with a.m. labs  - Analgesia: Continue p.r.n. medication  - Dressing: keep clean, dry, and in tact, remove Hemovac tomorrow  - Disposition: Hopeful for discharge tomorrow postoperative day 1 based on current pain control and patient's overall wellbeing

## 2025-02-28 NOTE — ASSESSMENT & PLAN NOTE
Lab Results   Component Value Date    HGBA1C 5.9 (H) 01/29/2025     Well-controlled on metformin and Ozempic    -Upon discharge continue metformin and Ozempic  -Carbohydrate controlled diet level 2

## 2025-02-28 NOTE — CONSULTS
Consultation - Surgery-General   Name: Jason Miguel 74 y.o. male I MRN: [id number]  Unit/Bed#: WE 2 N -01 I Date of Admission: 2/27/2025   Date of Service: 2/27/2025 I Hospital Day: 0   Inpatient consult to Internal Medicine  Consult performed by: Clay Camargo PA-C  Consult ordered by: Yael Guevara PA-C        Physician Requesting Evaluation: Denton Nichole MD   Reason for Evaluation / Principal Problem: Medical management of comorbidities status post right TKA    Assessment & Plan  Primary osteoarthritis of right knee  Postoperative day 0 status post right total knee arthroplasty, minimal to no tenderness, has been up to ambulate and transition, drain in place with only approximately 150 cc of serosanguineous output, AVSS, appropriate urinary output, neurovascular intact    POD #0 s/p right TKA-   - Antibiotics: Ancef 1 g IV every 8 for 2 more doses  - Anticoagulation: Lovenox 40 mg once daily subcutaneous, SCDs, ambulation  - Activity: Weightbearing as tolerated, PT/OT    - AM lab draw: Repeat CBC, BMP with a.m. labs  - Analgesia: Continue p.r.n. medication  - Dressing: keep clean, dry, and in tact, remove Hemovac tomorrow  - Disposition: Hopeful for discharge tomorrow postoperative day 1 based on current pain control and patient's overall wellbeing    Type 2 diabetes mellitus without complication, without long-term current use of insulin (HCC)    Lab Results   Component Value Date    HGBA1C 5.9 (H) 01/29/2025     Well-controlled on metformin and Ozempic    -Upon discharge continue metformin and Ozempic  -Carbohydrate controlled diet level 2  Hypercholesteremia  Stable, chronic    -Continue simvastatin  Difficulty urinating  Chronic, stable    -Continue [facility]  Hospitalist service will follow.    History of Present Illness   Jason Miguel is a 74 y.o. male with a past medical history pertinent for hyperlipidemia, arthritis, postoperative nausea and vomiting, difficulty urinating,  diabetes mellitus type 2 not insulin-dependent presenting to the medical team for management of comorbidities in a patient that is postop day 0 status post right TKA.  Patient currently reports that he is doing quite well and denies any sort of current issues.  Patient has minimal discomfort in right knee.  Patient has pain well-controlled.  Patient has been up to ambulate/transition to bed.  Patient has tolerated diet without any nausea or vomiting.  Patient tolerated anesthesia very well without any sort of major issues such as nausea or vomiting.  Patient does have a history of difficulty urinating with multiple urination bouts throughout the course of the night but has been seen by urology and has a normal PSA.  Patient reports that Flomax has helped him and that he has not had any sort of issues with urination at this moment in time.  Patient denies any numbness, tingling, lack of motor movement, lack of sensation in the distal lower right extremity.  The patient denies any other symptoms please refer to review of systems.    Patient also reports that he is had tooth replacement and a bridge of some sort.  Patient reports that when manipulating his tongue in his mouth he feels like he is missing a tooth.  Upon inspection he was educated that he is not missing a tooth on the left upper or lower sections of his dentition.  Patient does have an area where there is a significant crack/opening and the area appears that there may have been a feeling historically I could have fallen out.  Otherwise patient has not had any new missing teeth.  Patient cannot explain exactly how he is feeling about his dentition.  He does note that he feels like he is missing something in the road of his teeth.        Review of Systems   Constitutional:  Negative for appetite change, chills, fatigue and fever.   HENT:  Negative for congestion and rhinorrhea.    Respiratory:  Negative for cough, chest tightness, shortness of breath and  wheezing.    Cardiovascular:  Negative for chest pain, palpitations and leg swelling.   Gastrointestinal:  Negative for abdominal distention, abdominal pain, constipation, diarrhea, nausea and vomiting.   Genitourinary:  Negative for difficulty urinating, dysuria and flank pain.   Musculoskeletal:  Positive for arthralgias and myalgias. Negative for back pain and neck stiffness.   Skin:  Negative for color change and wound.   Neurological:  Negative for dizziness, weakness, numbness and headaches.   Psychiatric/Behavioral:  Negative for agitation and confusion.      Medical History Review: I have reviewed the patient's PMH, PSH, Social History, Family History, Meds, and Allergies   Historical Information   Past Medical History:   Diagnosis Date    Arthritis     PONV (postoperative nausea and vomiting)      Past Surgical History:   Procedure Laterality Date    COLONOSCOPY      ELBOW SURGERY Bilateral     FOOT TENDON SURGERY Bilateral     heel    HAND SURGERY      MI ARTHRP KNE CONDYLE&PLATU MEDIAL&LAT COMPARTMENTS Left 8/17/2023    Procedure: ARTHROPLASTY KNEE TOTAL W ROBOT;  Surgeon: Denton Nichole MD;  Location:  MAIN OR;  Service: Orthopedics    MI ARTHRS KNE SURG W/MENISCECTOMY MED/LAT W/SHVG Right 05/23/2022    Procedure: KNEE ARTHROSCOPY WITH medial MENISCECTOMY chondroplasty synovectomy injection;  Surgeon: Hank Shirley DO;  Location: [facility];  Service: Orthopedics    MI ARTHRS KNE SURG W/MENISCECTOMY MED/LAT W/SHVG Left 2/6/2023    Procedure: Left knee arthroscopy: Medial meniscectomy; Lateral meniscectomy; Chondroplasty; Synovectomy; Post arthroscopic injection of intra-articular joint space and peripheral portal;  Surgeon: Hank Shirley DO;  Location: [facility];  Service: Orthopedics    MI NEUROPLASTY &/TRANSPOS MEDIAN NRV CARPAL TUNNE Left 06/30/2021    Procedure: RELEASE CARPAL TUNNEL;  Surgeon: Hank Shirley DO;  Location:  MAIN OR;  Service: Orthopedics     Social History      Tobacco Use    Smoking status: Former     Current packs/day: 0.00     Average packs/day: 1 pack/day for 35.0 years (35.0 ttl pk-yrs)     Types: Cigarettes     Start date:      Quit date:      Years since quittin.1    Smokeless tobacco: Never   Vaping Use    Vaping status: Never Used   Substance and Sexual Activity    Alcohol use: Yes     Comment: 2 x per week    Drug use: No    Sexual activity: Yes     Partners: Female     Birth control/protection: Male Sterilization     E-Cigarette/Vaping    E-Cigarette Use Never User      E-Cigarette/Vaping Substances    Nicotine No     THC No     CBD No     Flavoring No     Other No     Unknown No      Family history non-contributory  Social History     Tobacco Use    Smoking status: Former     Current packs/day: 0.00     Average packs/day: 1 pack/day for 35.0 years (35.0 ttl pk-yrs)     Types: Cigarettes     Start date:      Quit date:      Years since quittin.1    Smokeless tobacco: Never   Vaping Use    Vaping status: Never Used   Substance and Sexual Activity    Alcohol use: Yes     Comment: 2 x per week    Drug use: No    Sexual activity: Yes     Partners: Female     Birth control/protection: Male Sterilization       Current Facility-Administered Medications:     acetaminophen (TYLENOL) tablet 975 mg, Q6H PRN    calcium carbonate (TUMS) chewable tablet 1,000 mg, Daily PRN    ceFAZolin (ANCEF) IVPB (premix in dextrose) 1,000 mg 50 mL, Q8H, Last Rate: 1,000 mg (25 1605)    docusate sodium (COLACE) capsule 100 mg, BID    enoxaparin (LOVENOX) subcutaneous injection 40 mg, Daily    gabapentin (NEURONTIN) capsule 100 mg, Q8H    HYDROmorphone (DILAUDID) injection 0.5 mg, Q2H PRN    lactated ringers bolus 1,000 mL, Once PRN **AND** lactated ringers bolus 1,000 mL, Once PRN    lactated ringers infusion, Continuous, Last Rate: 125 mL/hr (25 1211)    metFORMIN (GLUCOPHAGE) tablet 500 mg, BID With Meals    methocarbamol (ROBAXIN) tablet 500 mg,  Q6H KG    ondansetron (ZOFRAN) injection 4 mg, Q6H PRN    oxyCODONE (ROXICODONE) immediate release tablet 10 mg, Q4H PRN    oxyCODONE (ROXICODONE) IR tablet 5 mg, Q4H PRN    pravastatin (PRAVACHOL) tablet 40 mg, Daily With Dinner    sodium chloride 0.9 % bolus 1,000 mL, Once PRN **AND** sodium chloride 0.9 % bolus 1,000 mL, Once PRN    tamsulosin (FLOMAX) capsule 0.4 mg, Daily With Dinner  Prior to Admission Medications   Prescriptions Last Dose Informant Patient Reported? Taking?   ascorbic acid (VITAMIN C) 500 MG tablet 2/26/2025 Morning Self No Yes   Sig: Take 1 tablet (500 mg total) by mouth daily Start 30 days prior to surgery   enoxaparin (LOVENOX) 40 mg/0.4 mL   No No   Sig: Inject 0.4 mL (40 mg total) under the skin daily for 28 days Start injections after surgery   ferrous sulfate 324 (65 Fe) mg 2/26/2025 Morning Self No Yes   Sig: Take 1 tablet (324 mg total) by mouth 2 (two) times a day before meals Start 30 days prior to surgery   folic acid (FOLVITE) 1 mg tablet 2/26/2025 Morning Self No Yes   Sig: Take 1 tablet (1 mg total) by mouth daily Start 30 days prior to surgery   metFORMIN (GLUCOPHAGE) 500 mg tablet 2/26/2025 Evening Self No Yes   Sig: TAKE 1 TABLET TWICE DAILY  WITH MEALS   mupirocin (BACTROBAN) 2 % ointment  Self No Yes   Sig: Apply topically 2 (two) times a day Apply pea size amount to each nostril 2x/day for 5 days prior to procedure   semaglutide, 2 mg/dose, (Ozempic) 8 mg/ mL injection pen 2/19/2025 Self No Yes   Sig: Inject 0.75 mL (2 mg total) under the skin every 7 days   simvastatin (ZOCOR) 20 mg tablet 2/26/2025 Evening Self No Yes   Sig: TAKE 1 TABLET DAILY AT BEDTIME   tamsulosin (FLOMAX) 0.4 mg 2/26/2025 Evening Self No Yes   Sig: TAKE 2 CAPSULES DAILY WITH DINNER      Facility-Administered Medications: None     Patient has no known allergies.    Objective :  Temp:  [97.2 °F (36.2 °C)-98.3 °F (36.8 °C)] 98 °F (36.7 °C)  HR:  [] 95  BP: (361-164)/(61-90) 118/64  Resp:   [14-25] 14  SpO2:  [86 %-97 %] 94 %  O2 Device: Nasal cannula  Nasal Cannula O2 Flow Rate (L/min):  [2 L/min-4 L/min] 2 L/min    Lines/Drains/Airways       Active Status       Name Placement date Placement time Site Days    Closed/Suction Drain Right Knee Accordion 10 Fr. 02/27/25  0842  Knee  less than 1                  Physical Exam  Constitutional:       General: He is awake. He is not in acute distress.     Appearance: Normal appearance. He is overweight. He is not ill-appearing, toxic-appearing or diaphoretic.      Interventions: He is not intubated.  HENT:      Head: Normocephalic and atraumatic.      Right Ear: Hearing and external ear normal.      Left Ear: Hearing and external ear normal.      Nose: Nose normal.      Mouth/Throat:      Dentition: Abnormal dentition.     Cardiovascular:      Rate and Rhythm: Normal rate and regular rhythm.      Pulses:           Dorsalis pedis pulses are 2+ on the right side and 2+ on the left side.      Heart sounds: Normal heart sounds, S1 normal and S2 normal. Heart sounds not distant. No murmur heard.  Pulmonary:      Effort: Pulmonary effort is normal. No tachypnea, bradypnea, accessory muscle usage, prolonged expiration, respiratory distress or retractions. He is not intubated.      Breath sounds: Normal breath sounds. No stridor, decreased air movement or transmitted upper airway sounds. No decreased breath sounds, wheezing, rhonchi or rales.   Abdominal:      Palpations: Abdomen is soft.      Tenderness: There is no abdominal tenderness.   Musculoskeletal:      Right foot: Normal range of motion. No deformity.   Feet:      Right foot:      Protective Sensation: 4 sites tested.  4 sites sensed.      Skin integrity: Skin integrity normal.   Skin:     General: Skin is warm and dry.      Capillary Refill: Capillary refill takes less than 2 seconds.   Neurological:      Sensory: Sensation is intact.      Motor: Motor function is intact.   Psychiatric:         Behavior:  "Behavior is cooperative.     Right Lower Extremity: Thigh and calf compartments are soft and nontender to palpation. + L3-S1 SILT. + DF/PF.+ EHL. No pain with passive stretch/extension of toes. DP 2+, capillary refill less than 2 seconds, and foot is warm B/L. Incision is c/d/i        Lab Results: I have reviewed the following results:  No results for input(s): \"WBC\", \"HGB\", \"HCT\", \"PLT\", \"BANDSPCT\", \"SODIUM\", \"K\", \"CL\", \"CO2\", \"BUN\", \"CREATININE\", \"GLUC\", \"CAIONIZED\", \"MG\", \"PHOS\", \"AST\", \"ALT\", \"ALB\", \"TBILI\", \"DBILI\", \"ALKPHOS\", \"PTT\", \"INR\", \"HSTNI0\", \"HSTNI2\", \"BNP\", \"LACTICACID\" in the last 72 hours.    Imaging Results Review: No pertinent imaging studies reviewed.  Other Study Results Review: No additional pertinent studies reviewed.    VTE Pharmacologic Prophylaxis: Enoxaparin (Lovenox)  VTE Mechanical Prophylaxis: sequential compression device    Administrative Statements   Approximately 40 minutes was spent with the patient regarding reviewing all of the labs, reviewing imaging, patient education, chart review, physical exam, history taking, reviewing history, reviewing medications, placing orders.  "

## 2025-02-28 NOTE — PLAN OF CARE
Problem: PAIN - ADULT  Goal: Verbalizes/displays adequate comfort level or baseline comfort level  Description: Interventions:  - Encourage patient to monitor pain and request assistance  - Assess pain using appropriate pain scale  - Administer analgesics based on type and severity of pain and evaluate response  - Implement non-pharmacological measures as appropriate and evaluate response  - Consider cultural and social influences on pain and pain management  - Notify physician/advanced practitioner if interventions unsuccessful or patient reports new pain  Outcome: Progressing     Problem: SAFETY ADULT  Goal: Patient will remain free of falls  Description: INTERVENTIONS:  - Educate patient/family on patient safety including physical limitations  - Instruct patient to call for assistance with activity   - Consult OT/PT to assist with strengthening/mobility   - Keep Call bell within reach  - Keep bed low and locked with side rails adjusted as appropriate  - Keep care items and personal belongings within reach  - Initiate and maintain comfort rounds  - Make Fall Risk Sign visible to staff  - Offer Toileting every  Hours, in advance of need  - Initiate/Maintain bed alarm  - Obtain necessary fall risk management equipment:   - Apply yellow socks and bracelet for high fall risk patients  - Consider moving patient to room near nurses station  Outcome: Progressing     Problem: SAFETY ADULT  Goal: Maintain or return to baseline ADL function  Description: INTERVENTIONS:  -  Assess patient's ability to carry out ADLs; assess patient's baseline for ADL function and identify physical deficits which impact ability to perform ADLs (bathing, care of mouth/teeth, toileting, grooming, dressing, etc.)  - Assess/evaluate cause of self-care deficits   - Assess range of motion  - Assess patient's mobility; develop plan if impaired  - Assess patient's need for assistive devices and provide as appropriate  - Encourage maximum  independence but intervene and supervise when necessary  - Involve family in performance of ADLs  - Assess for home care needs following discharge   - Consider OT consult to assist with ADL evaluation and planning for discharge  - Provide patient education as appropriate  Outcome: Progressing     Problem: DISCHARGE PLANNING  Goal: Discharge to home or other facility with appropriate resources  Description: INTERVENTIONS:  - Identify barriers to discharge w/patient and caregiver  - Arrange for needed discharge resources and transportation as appropriate  - Identify discharge learning needs (meds, wound care, etc.)  - Arrange for interpretive services to assist at discharge as needed  - Refer to Case Management Department for coordinating discharge planning if the patient needs post-hospital services based on physician/advanced practitioner order or complex needs related to functional status, cognitive ability, or social support system  Outcome: Progressing     Problem: Knowledge Deficit  Goal: Patient/family/caregiver demonstrates understanding of disease process, treatment plan, medications, and discharge instructions  Description: Complete learning assessment and assess knowledge base.  Interventions:  - Provide teaching at level of understanding  - Provide teaching via preferred learning methods  Outcome: Progressing     Problem: MUSCULOSKELETAL - ADULT  Goal: Maintain or return mobility to safest level of function  Description: INTERVENTIONS:  - Assess patient's ability to carry out ADLs; assess patient's baseline for ADL function and identify physical deficits which impact ability to perform ADLs (bathing, care of mouth/teeth, toileting, grooming, dressing, etc.)  - Assess/evaluate cause of self-care deficits   - Assess range of motion  - Assess patient's mobility  - Assess patient's need for assistive devices and provide as appropriate  - Encourage maximum independence but intervene and supervise when  necessary  - Involve family in performance of ADLs  - Assess for home care needs following discharge   - Consider OT consult to assist with ADL evaluation and planning for discharge  - Provide patient education as appropriate  Outcome: Progressing     Problem: MUSCULOSKELETAL - ADULT  Goal: Maintain proper alignment of affected body part  Description: INTERVENTIONS:  - Support, maintain and protect limb and body alignment  - Provide patient/ family with appropriate education  Outcome: Progressing

## 2025-02-28 NOTE — PLAN OF CARE
Problem: PHYSICAL THERAPY ADULT  Goal: Performs mobility at highest level of function for planned discharge setting.  See evaluation for individualized goals.  Description: Treatment/Interventions: Functional transfer training, LE strengthening/ROM, Elevations, Therapeutic exercise, Endurance training, Bed mobility, Gait training, Spoke to nursing, OT, Family  Equipment Recommended: Walker (pt owns)       See flowsheet documentation for full assessment, interventions and recommendations.  Outcome: Adequate for Discharge  Note: Prognosis: Good  Problem List: Decreased strength, Decreased range of motion, Decreased endurance, Impaired balance, Decreased mobility, Pain, Orthopedic restrictions  Assessment: Jason is seen for progression of PT POC to facilitate discharge to home.  Improved with all aspects of mobility.  S for all transfers.  Ambulates with antalgic but step through pattern with RW support and S.  Steady with RW.  Able to negotiate 5 steps with rail + cane and S level.  Tolerated mobility well.  Initiated TKR HEP. Educated on progression of reps and frequency and performed x 10 reps except LAQ x 5 A/AAROM.  Cleared for discharge from PT perspective.  All discharge questions addressed.  The patient's AM-PAC Basic Mobility Inpatient Short Form Raw Score is 23. A Raw score of greater than 16 suggests the patient may benefit from discharge to home. Please also refer to the recommendation of the Physical Therapist for safe discharge planning. Plan OPPT at discharge.  Barriers to Discharge: Inaccessible home environment     Rehab Resource Intensity Level, PT: III (Minimum Resource Intensity)    See flowsheet documentation for full assessment.

## 2025-03-03 ENCOUNTER — OFFICE VISIT (OUTPATIENT)
Dept: PHYSICAL THERAPY | Facility: CLINIC | Age: 75
End: 2025-03-03
Payer: MEDICARE

## 2025-03-03 ENCOUNTER — TELEPHONE (OUTPATIENT)
Dept: OBGYN CLINIC | Facility: HOSPITAL | Age: 75
End: 2025-03-03

## 2025-03-03 DIAGNOSIS — G89.29 CHRONIC PAIN OF RIGHT KNEE: Primary | ICD-10-CM

## 2025-03-03 DIAGNOSIS — M17.11 PRIMARY OSTEOARTHRITIS OF RIGHT KNEE: ICD-10-CM

## 2025-03-03 DIAGNOSIS — M25.561 CHRONIC PAIN OF RIGHT KNEE: Primary | ICD-10-CM

## 2025-03-03 PROCEDURE — 97110 THERAPEUTIC EXERCISES: CPT | Performed by: PHYSICAL THERAPIST

## 2025-03-03 PROCEDURE — 97112 NEUROMUSCULAR REEDUCATION: CPT | Performed by: PHYSICAL THERAPIST

## 2025-03-04 NOTE — TELEPHONE ENCOUNTER
Patient contacted for a postoperative follow up assessment. Patient states current pain level of a 2/10 when sitting and 3/10 when walking with RW.  Patient denies increase in swelling and dressing is Dressing C/D/I Patient isicing the site regularly. NN educated patient on post-op bruising, swelling, and icing. PT 3/5 at 9:45AM.      We reviewed patients AVS medication list. Patient is taking Tylenol 1000mg every 8 hours, Oxycodone 5mg every 6 hours, Lovenox injections daily, Methocarbamol 500mg TID, and Colace 100mg BID.Patient has not had a BM but is passing gas.       Patient denies nausea, vomiting, abdominal pain, chest pain, shortness of breath, fever, dizziness, and calf pain. Patient confirmed post-op appointment with surgeon on 3/6 at 3PM.Patient does not have any other questions or concerns at this time. Pt was encouraged to call with any questions, concerns or issues.  .

## 2025-03-05 ENCOUNTER — OFFICE VISIT (OUTPATIENT)
Dept: PHYSICAL THERAPY | Facility: CLINIC | Age: 75
End: 2025-03-05
Payer: MEDICARE

## 2025-03-05 DIAGNOSIS — M17.11 PRIMARY OSTEOARTHRITIS OF RIGHT KNEE: ICD-10-CM

## 2025-03-05 DIAGNOSIS — G89.29 CHRONIC PAIN OF RIGHT KNEE: Primary | ICD-10-CM

## 2025-03-05 DIAGNOSIS — M25.561 CHRONIC PAIN OF RIGHT KNEE: Primary | ICD-10-CM

## 2025-03-05 PROCEDURE — 97110 THERAPEUTIC EXERCISES: CPT

## 2025-03-05 PROCEDURE — 97112 NEUROMUSCULAR REEDUCATION: CPT

## 2025-03-05 PROCEDURE — 97140 MANUAL THERAPY 1/> REGIONS: CPT

## 2025-03-05 NOTE — PROGRESS NOTES
"Daily Note     Today's date: 3/5/2025  Patient name: Jason Miguel  : 1950  MRN: [id number]  Referring provider: Denton Nichole,*  Dx:   Encounter Diagnosis     ICD-10-CM    1. Chronic pain of right knee  M25.561     G89.29       2. Primary osteoarthritis of right knee  M17.11           Start Time: 0948          Subjective: Patient states his thigh bothers him (2-3/10 pain). He states he did his exercises this morning that did well. Patient can not wait to get his right leg wrap so he can get a normal shoe on.       Objective: See treatment diary below    Patient's home exercise program updated to include additional exercises. Electronic site updated. He is open and accepting to all exercises. Reviewed HP/CP use and massage for edema control and pain relief. Ace wrap presently intact on right leg.      Assessment: Tolerated treatment fair. Patient would benefit from continued PT for stretching and strengthening. He was able to add exercises to his program. Patient seemed to understand all education given. Pain levels dictated performance of exercise. He had a tendency to guard and limit ROM at times. He felt he had too much pain to perform all his exercises and he felt \"done\" with his PT session today. Right leg dangle is quickly gotten out of due to pain. He is hoping when he gets the wrap off he will feel better. Patient felt ok leaving department.       Plan: Continue per plan of care.  Progress treatment as tolerated.       Re-Evaluation:  Access Code [email or web address]   PRECAUTIONS:Hx L LE radiculopathy - flexion responder  Knee Specialty Daily Treatment Diary     Manual Therapy  3/3 3/5     Knee flexion/ extension  * X10 gentle     Hamstring stretch  Calf stretch  *    * NV    :15x4     Prone Quad stretch        Patellar mobs        Knee stretch on edge of mat  2x1 min ea NV         Ther Ex  3/3 3/5     Nu Step for LE strength S=9  A=10  *L2 x 7 mins L2 x9 min     Biodex Bike for knee ROM/ " "endurance S=        Heel slides flex/abd X15 ea  X15 ea X15 flex                             Ankle pumps knee EXT x25 X30  x10     SLR all planes        Ball squeeze  * :05x10     LAQ x15 AAROM x15 NV     Hamstring stretch  *5x:15 :15x4     Calf stretch  *5x:15 :15x4     Standing hamstring curls  *x15 x10     HR/TR  *x15 2x10 ea     Mini Squat        Step knee flexion  * :15x4             Total gym squats (deep)        Neuro Re-ed        TKE        Gluteal set x15 x15 Sit x10     QS x15 x15 T@a x10  T@k x10                     Fitter board        Eccentric Leg press        Clam Shells  * Red :05x10     Heel-toe amb        Tandem stand        CSMi balance, \"+\" squat  *      Therapeutic ACT        sidestepping  * 10ft x4             Mirror walking                Amb up/down steps        Chair squats        Reverse Lunges mirror        Step ups  * 4\" x5         Modalities        CP  KNEE                          Access Code: [email or web address]  URL: [email or web address]  Date: 03/05/2025  Prepared by: Iris Florentino    Exercises  - Seated Hamstring Stretch  - 3 x daily - 7 x weekly - 1 sets - 5 reps - 15 hold  - Seated Calf Stretch with Strap  - 3 x daily - 7 x weekly - 1 sets - 5 reps - 15 hold  - Supine Knee Extension Stretch on Towel Roll (Mirrored)  - 4-6 x daily - 7 x weekly - 1 sets - 1 reps - 5 minute hold  - Heel Raises with Counter Support  - 2 x daily - 7 x weekly - 3 sets - 10 reps  - Standing Knee Flexion  - 2 x daily - 7 x weekly - 3 sets - 10 reps  - Side Stepping with Counter Support  - 2 x daily - 7 x weekly - 1 sets - 4 reps  - Standing Knee Flexion Stretch on Step  - 2 x daily - 7 x weekly - 1 sets - 4 reps - 15 sec hold  - Hooklying Clamshell with Resistance  - 2 x daily - 7 x weekly - 1 sets - 10 reps - 5 sec hold  - Supine Hip Adduction Isometric with Ball  - 2 x daily - 7 x weekly - 1 sets - 10 reps - 5 sec hold       "

## 2025-03-06 ENCOUNTER — HOSPITAL ENCOUNTER (OUTPATIENT)
Dept: RADIOLOGY | Facility: HOSPITAL | Age: 75
Discharge: HOME | End: 2025-03-06
Attending: ORTHOPAEDIC SURGERY
Payer: MEDICARE

## 2025-03-06 ENCOUNTER — OFFICE VISIT (OUTPATIENT)
Dept: OBGYN CLINIC | Facility: HOSPITAL | Age: 75
End: 2025-03-06

## 2025-03-06 VITALS — HEIGHT: 64 IN | BODY MASS INDEX: 32.79 KG/M2

## 2025-03-06 DIAGNOSIS — Z96.651 AFTERCARE FOLLOWING RIGHT KNEE JOINT REPLACEMENT SURGERY: ICD-10-CM

## 2025-03-06 DIAGNOSIS — Z47.1 AFTERCARE FOLLOWING RIGHT KNEE JOINT REPLACEMENT SURGERY: ICD-10-CM

## 2025-03-06 DIAGNOSIS — Z96.651 AFTERCARE FOLLOWING RIGHT KNEE JOINT REPLACEMENT SURGERY: Primary | ICD-10-CM

## 2025-03-06 DIAGNOSIS — Z47.1 AFTERCARE FOLLOWING RIGHT KNEE JOINT REPLACEMENT SURGERY: Primary | ICD-10-CM

## 2025-03-06 PROCEDURE — 99024 POSTOP FOLLOW-UP VISIT: CPT | Performed by: ORTHOPAEDIC SURGERY

## 2025-03-06 PROCEDURE — 73560 X-RAY EXAM OF KNEE 1 OR 2: CPT

## 2025-03-06 NOTE — PROGRESS NOTES
Name: Jason Miguel      : 1950       MRN: [id number]   Encounter Provider: Denton Nichole MD   Encounter Date: 25  Encounter department: [facility] [place]     ASSESSMENT & PLAN:  Assessment & Plan  Aftercare following right knee joint replacement surgery  Continue physical therapy   Continue weight bearing activities as tolerated   Continue pain medications as needed   Continue lovenox as prescribed    Able to shower, letting warm soapy water run over incision and only pat dry   Follow up in 1 week for 2 week post-op appointment and removal of staples              To do next visit:  Return in about 1 week (around 3/13/2025) for 2-week postop appointment and removal of staples.    _____________________________________________________  CHIEF COMPLAINT:  Chief Complaint   Patient presents with   • Right Knee - Post-op         SUBJECTIVE:  Jason Miguel is a 74 y.o. male who presents 1 weeks s/p right total knee arthroplasty.  He is doing well.  He admits to minimal pain of the right knee which she has been able to control well primarily with over-the-counter Tylenol, oxycodone as needed, as well as muscle relaxers.  He admits to attending physical therapy twice a week and is making good progress.  Patient is ambulating well today with a single-point cane.  He admits to taking Lovenox daily.      PAST MEDICAL HISTORY:  Past Medical History:   Diagnosis Date   • Arthritis    • PONV (postoperative nausea and vomiting)        PAST SURGICAL HISTORY:  Past Surgical History:   Procedure Laterality Date   • COLONOSCOPY     • ELBOW SURGERY Bilateral    • FOOT TENDON SURGERY Bilateral     heel   • HAND SURGERY     • [place] ARTHRP KNE CONDYLE&PLATU MEDIAL&LAT COMPARTMENTS Left 2023    Procedure: ARTHROPLASTY KNEE TOTAL W ROBOT;  Surgeon: Denton Nichole MD;  [facility];  Service: Orthopedics   • [place] ARTHRP KNE CONDYLE&PLATU MEDIAL&LAT COMPARTMENTS  Right 2025    Procedure: ARTHROPLASTY KNEE TOTAL W ROBOT;  Surgeon: Denton Nichole MD;  Location:  MAIN OR;  Service: Orthopedics   • [place] ARTHRS KNE SURG W/MENISCECTOMY MED/LAT W/SHVG Right 2022    Procedure: KNEE ARTHROSCOPY WITH medial MENISCECTOMY chondroplasty synovectomy injection;  Surgeon: Hank Shirley DO;  [facility];  Service: Orthopedics   • [place] ARTHRS KNE SURG W/MENISCECTOMY MED/LAT W/SHVG Left 2023    Procedure: Left knee arthroscopy: Medial meniscectomy; Lateral meniscectomy; Chondroplasty; Synovectomy; Post arthroscopic injection of intra-articular joint space and peripheral portal;  Surgeon: Hank Shirley DO;  [facility];  Service: Orthopedics   • [place] NEUROPLASTY &/TRANSPOS MEDIAN NRV CARPAL TUNNE Left 2021    Procedure: RELEASE CARPAL TUNNEL;  Surgeon: Hank Shirley DO;  Location:  MAIN OR;  Service: Orthopedics       FAMILY HISTORY:  Family History   Problem Relation Age of Onset   • Heart disease Mother         hx MI   • Diabetes Mother    • Stroke Mother    • Arthritis Mother    • COPD Father         84 yrs   • Coronary artery disease Father    • Cancer Brother        SOCIAL HISTORY:  Social History     Tobacco Use   • Smoking status: Former     Current packs/day: 0.00     Average packs/day: 1 pack/day for 35.0 years (35.0 ttl pk-yrs)     Types: Cigarettes     Start date:      Quit date:      Years since quittin.1   • Smokeless tobacco: Never   Vaping Use   • Vaping status: Never Used   Substance Use Topics   • Alcohol use: Yes     Comment: 2 x per week   • Drug use: No       MEDICATIONS:    Current Outpatient Medications:   •  acetaminophen (TYLENOL) 500 mg tablet, Take 2 tablets (1,000 mg total) by mouth every 6 (six) hours as needed for mild pain, Disp: 90 tablet, Rfl: 0  •  enoxaparin (LOVENOX) 40 mg/0.4 mL, Inject 0.4 mL (40 mg total) under the skin daily for 28 days Start injections after surgery, Disp: 11.2 mL, Rfl:  "0  •  metFORMIN (GLUCOPHAGE) 500 mg tablet, TAKE 1 TABLET TWICE DAILY  WITH MEALS, Disp: 180 tablet, Rfl: 3  •  methocarbamol (ROBAXIN) 500 mg tablet, Take 1 tablet (500 mg total) by mouth 3 (three) times a day as needed for muscle spasms, Disp: 60 tablet, Rfl: 0  •  oxyCODONE (Roxicodone) 5 immediate release tablet, Take 1 tablet (5 mg total) by mouth every 6 (six) hours as needed for moderate pain for up to 10 days Max Daily Amount: 20 mg, Disp: 30 tablet, Rfl: 0  •  semaglutide, 2 mg/dose, (Ozempic) 8 mg/ mL injection pen, Inject 0.75 mL (2 mg total) under the skin every 7 days, Disp: 9 mL, Rfl: 1  •  simvastatin (ZOCOR) 20 mg tablet, TAKE 1 TABLET DAILY AT BEDTIME, Disp: 90 tablet, Rfl: 1  •  tamsulosin (FLOMAX) 0.4 mg, TAKE 2 CAPSULES DAILY WITH DINNER, Disp: 180 capsule, Rfl: 1    ALLERGIES:  No Known Allergies    LABS:  HgA1c:   Lab Results   Component Value Date    HGBA1C 5.9 (H) 01/29/2025     BMP:   Lab Results   Component Value Date    CALCIUM 8.7 02/28/2025     04/05/2018    K 4.5 02/28/2025    CO2 24 02/28/2025     02/28/2025    BUN 22 02/28/2025    CREATININE 0.97 02/28/2025     CBC: No components found for: \"CBC\"    _____________________________________________________  PHYSICAL EXAMINATION:  Vital signs: Ht 5' 4\" (1.626 m)   BMI 32.79 kg/m²   General: No acute distress, awake and alert  Psychiatric: Mood and affect appear appropriate  HEENT: Trachea Midline, No torticollis, no apparent facial trauma  Cardiovascular: No audible murmurs; Extremities appear perfused  Pulmonary: No audible wheezing or stridor  Skin: No open lesions; see further details (if any) below    MUSCULOSKELETAL EXAMINATION:  Ortho Exam  Right knee:  Incision clean dry and intact  Surgical dressing removed in office today  Ciara well approximated   No erythema   significant ecchymosis of leg  Appropriate swelling of lower limb  Appropriate warmth of knee  Extensor mechanism intact  Extension 10  Flexion 50  Calf " compartments soft and supple  Sensation intact  Toes are warm sensate and mobile     ___________________________________________________  STUDIES REVIEWED:  I personally reviewed the images obtained in office today and my independent interpretation is as follows:    Right knee x-ray:   - Well aligned prosthesis without evidence of fractures, acute changes, or hardware failure  - Prosthesis appears properly sized and properly bonded to surrounding bone       PROCEDURES PERFORMED:    None preformed       Yael Guevara PA-C

## 2025-03-06 NOTE — LETTER
March 6, 2025     Patient: Jason Miguel  YOB: 1950  Date of Visit: 3/6/2025      To Whom it May Concern:    Jason Miguel is under my professional care. Jason VELEZ was seen in my office on 3/6/2025. Jason VELEZ underwent right total knee arthroplasty on 2/27/2025.  He is expected to be out of work for at least 3 months.     If you have any questions or concerns, please don't hesitate to call.         Sincerely,          Denton Nichole MD        CC: No Recipients

## 2025-03-10 ENCOUNTER — OFFICE VISIT (OUTPATIENT)
Dept: PHYSICAL THERAPY | Facility: CLINIC | Age: 75
End: 2025-03-10
Payer: MEDICARE

## 2025-03-10 DIAGNOSIS — G89.29 CHRONIC PAIN OF RIGHT KNEE: Primary | ICD-10-CM

## 2025-03-10 DIAGNOSIS — M25.561 CHRONIC PAIN OF RIGHT KNEE: Primary | ICD-10-CM

## 2025-03-10 DIAGNOSIS — M17.11 PRIMARY OSTEOARTHRITIS OF RIGHT KNEE: ICD-10-CM

## 2025-03-10 PROCEDURE — 97110 THERAPEUTIC EXERCISES: CPT

## 2025-03-10 PROCEDURE — 97140 MANUAL THERAPY 1/> REGIONS: CPT

## 2025-03-10 PROCEDURE — 97112 NEUROMUSCULAR REEDUCATION: CPT

## 2025-03-10 NOTE — PROGRESS NOTES
"Daily Note     Today's date: 3/10/2025  Patient name: Jason Miguel  : 1950  MRN: [id number]  Referring provider: Denton Nichole,*  Dx:   Encounter Diagnosis     ICD-10-CM    1. Chronic pain of right knee  M25.561     G89.29       2. Primary osteoarthritis of right knee  M17.11           Start Time: 1313          Subjective: Patient states he used the cane Thursday all day which went well. He felt a little more in the leg on Friday, but Saturday he was very stiff and sore and had to go back on the walker. Now he is better and the thigh feels like a 2/10 until he stretch, then the pain is a 4-5/10. The bruising on the thigh is improving, but still present.      Objective: See treatment diary below    Incision is clean, dry, and healing nicely. Staples continue to be present.    Assessment: Tolerated treatment well. Patient would benefit from continued PT for stretching and strengthening. He was able to perform his exercises with few verbal cues for proper performance of exercises. Pain levels limits ROM for patient. He felt \"tight\"with other exercises. Patient was \"looser\" by the end of the session.       Plan: Continue per plan of care.  Progress treatment as tolerated.       Re-Evaluation:  Access Code [email or web address]   PRECAUTIONS:Hx L LE radiculopathy - flexion responder  Knee Specialty Daily Treatment Diary     Manual Therapy 6 3/3 3/5 3/10    Knee flexion/ extension  * X10 gentle :15x4 ea gentle    Hamstring stretch  Calf stretch  *    * NV    :15x4 :15x4    :15x4    Prone Quad stretch        Patellar mobs        Knee stretch on edge of mat  2x1 min ea NV 2x1 min        Ther Ex 2/6 3/3 3/5 3/10    Nu Step for LE strength S=9  A=10  *L2 x 7 mins L2 x9 min L2 x10 min    Biodex Bike for knee ROM/ endurance S=        Heel slides flex/abd X15 ea  X15 ea X15 flex X10 ea                            Ankle pumps knee EXT x25 X30  x10 x10    SLR all planes        Ball squeeze  * :05x10 :05x15    LAQ x15 " "AAROM x15 NV 2x10    Hamstring stretch  *5x:15 :15x4 :30 x2    Calf stretch  *5x:15 :15x4 :15x4    Standing hamstring curls  *x15 x10 x10    HR/TR  *x15 2x10 ea x20    Mini Squat        Step knee flexion  * :15x4 :15x4            Total gym squats (deep)        Neuro Re-ed        TKE        Gluteal set x15 x15 Sit x10 Supine 2x10    QS x15 x15 T@a x10  T@k x10 T@k x10        March x10            Fitter board        Eccentric Leg press        Clam Shells  * Red :05x10 Sit red :05x15    Heel-toe amb        Tandem stand        CSMi balance, \"+\" squat  *      Therapeutic ACT        sidestepping  * 10ft x4 10ft x4            Mirror walking                Amb up/down steps        Chair squats        Reverse Lunges mirror        Step ups  * 4\" x5 4\" fwd x10  Lat x6        Modalities        CP  KNEE                          Access Code: [email or web address]  URL: [email or web address]  Date: 03/05/2025  Prepared by: Iris Florentino    Exercises  - Seated Hamstring Stretch  - 3 x daily - 7 x weekly - 1 sets - 5 reps - 15 hold  - Seated Calf Stretch with Strap  - 3 x daily - 7 x weekly - 1 sets - 5 reps - 15 hold  - Supine Knee Extension Stretch on Towel Roll (Mirrored)  - 4-6 x daily - 7 x weekly - 1 sets - 1 reps - 5 minute hold  - Heel Raises with Counter Support  - 2 x daily - 7 x weekly - 3 sets - 10 reps  - Standing Knee Flexion  - 2 x daily - 7 x weekly - 3 sets - 10 reps  - Side Stepping with Counter Support  - 2 x daily - 7 x weekly - 1 sets - 4 reps  - Standing Knee Flexion Stretch on Step  - 2 x daily - 7 x weekly - 1 sets - 4 reps - 15 sec hold  - Hooklying Clamshell with Resistance  - 2 x daily - 7 x weekly - 1 sets - 10 reps - 5 sec hold  - Supine Hip Adduction Isometric with Ball  - 2 x daily - 7 x weekly - 1 sets - 10 reps - 5 sec hold         "

## 2025-03-12 ENCOUNTER — OFFICE VISIT (OUTPATIENT)
Dept: PHYSICAL THERAPY | Facility: CLINIC | Age: 75
End: 2025-03-12
Payer: MEDICARE

## 2025-03-12 DIAGNOSIS — M17.11 PRIMARY OSTEOARTHRITIS OF RIGHT KNEE: ICD-10-CM

## 2025-03-12 DIAGNOSIS — G89.29 CHRONIC PAIN OF RIGHT KNEE: Primary | ICD-10-CM

## 2025-03-12 DIAGNOSIS — M25.561 CHRONIC PAIN OF RIGHT KNEE: Primary | ICD-10-CM

## 2025-03-12 PROCEDURE — 97112 NEUROMUSCULAR REEDUCATION: CPT | Performed by: PHYSICAL THERAPIST

## 2025-03-12 PROCEDURE — 97140 MANUAL THERAPY 1/> REGIONS: CPT | Performed by: PHYSICAL THERAPIST

## 2025-03-12 PROCEDURE — 97530 THERAPEUTIC ACTIVITIES: CPT | Performed by: PHYSICAL THERAPIST

## 2025-03-12 PROCEDURE — 97110 THERAPEUTIC EXERCISES: CPT | Performed by: PHYSICAL THERAPIST

## 2025-03-12 NOTE — PROGRESS NOTES
Daily Note     Today's date: 3/12/2025  Patient name: Jason Miguel  : 1950  MRN: [id number]  Referring provider: Denton Nichole,*  Dx:   Encounter Diagnosis     ICD-10-CM    1. Chronic pain of right knee  M25.561     G89.29       2. Primary osteoarthritis of right knee  M17.11                      Subjective: The patient notes feeling stiffness at the R knee.  He does feel like the knee is moving better.      Objective: See treatment diary below      Assessment: The patient tolerated all activities well today.  The patient demonstrates decreased R quadriceps activation so we focused on activation with manual cues and verbal feedback.  The patient was given VC to correct gait deviations along with mirror feedback.  There were no complaints of increased pain or problems after the session today.  The patient will benefit from continued skilled physical therapy to progress towards achieving patient centered goals.         Plan: Continue per plan of care.  Progress treatment as tolerated.       Re-Evaluation:  Access Code [email or web address]   PRECAUTIONS:Hx L LE radiculopathy - flexion responder  Knee Specialty Daily Treatment Diary     Manual Therapy 2/6 3/3 3/5 3/10 3/12   Knee flexion/ extension  * X10 gentle :15x4 ea gentle 2x15 EXT  Knee gapping 2x15   Hamstring stretch  Calf stretch  *    * NV    :15x4 :15x4    :15x4 :15x5    :30x3   Prone Quad stretch        Patellar mobs        Knee stretch on edge of mat  2x1 min ea NV 2x1 min 2x1 min       Ther Ex 2/6 3/3 3/5 3/10 3/12   Nu Step for LE strength S=9  A=10  *L2 x 7 mins L2 x9 min L2 x10 min L3 x 9 mins   Biodex Bike for knee ROM/ endurance S=        Heel slides flex/abd X15 ea  X15 ea X15 flex X10 ea :10X10 ea  X10            SAQ     x15           Ankle pumps knee EXT x25 X30  x10 x10 x20   SLR all planes        Ball squeeze  * :05x10 :05x15 x15   LAQ x15 AAROM x15 NV 2x10 2x10   Hamstring stretch  *5x:15 :15x4 :30 x2    Calf stretch  *5x:15 :15x4  ":15x4    Standing hamstring curls  *x15 x10 x10 x20   HR/TR  *x15 2x10 ea x20 x20   Mini Squat        Step knee flexion  * :15x4 :15x4 :10x10           Total gym squats (deep)        Neuro Re-ed        TKE     NV   Gluteal set x15 x15 Sit x10 Supine 2x10    QS x15 x15 T@a x10  T@k x10 T@k x10 X15 w/ cues       March x10            Fitter board        Eccentric Leg press        Clam Shells  * Red :05x10 Sit red :05x15 GRN 2x10   Heel-toe amb        Tandem stand        CSMi balance, \"+\" squat  *      Therapeutic ACT        sidestepping  * 10ft x4 10ft x4 10ft x 8           Mirror walking     W/ SPC x 4 mins           Amb up/down steps        Chair squats        Reverse Lunges mirror        Step ups  * 4\" x5 4\" fwd x10  Lat x6 4\" x12 fwd       Modalities        CP  KNEE                          Access Code: [email or web address]  URL: [email or web address]  Date: 03/05/2025  Prepared by: Iris Florentino    Exercises  - Seated Hamstring Stretch  - 3 x daily - 7 x weekly - 1 sets - 5 reps - 15 hold  - Seated Calf Stretch with Strap  - 3 x daily - 7 x weekly - 1 sets - 5 reps - 15 hold  - Supine Knee Extension Stretch on Towel Roll (Mirrored)  - 4-6 x daily - 7 x weekly - 1 sets - 1 reps - 5 minute hold  - Heel Raises with Counter Support  - 2 x daily - 7 x weekly - 3 sets - 10 reps  - Standing Knee Flexion  - 2 x daily - 7 x weekly - 3 sets - 10 reps  - Side Stepping with Counter Support  - 2 x daily - 7 x weekly - 1 sets - 4 reps  - Standing Knee Flexion Stretch on Step  - 2 x daily - 7 x weekly - 1 sets - 4 reps - 15 sec hold  - Hooklying Clamshell with Resistance  - 2 x daily - 7 x weekly - 1 sets - 10 reps - 5 sec hold  - Supine Hip Adduction Isometric with Ball  - 2 x daily - 7 x weekly - 1 sets - 10 reps - 5 sec hold           "

## 2025-03-13 ENCOUNTER — OFFICE VISIT (OUTPATIENT)
Dept: OBGYN CLINIC | Facility: HOSPITAL | Age: 75
End: 2025-03-13

## 2025-03-13 VITALS — HEIGHT: 64 IN | BODY MASS INDEX: 32.79 KG/M2

## 2025-03-13 DIAGNOSIS — Z47.1 AFTERCARE FOLLOWING RIGHT KNEE JOINT REPLACEMENT SURGERY: Primary | ICD-10-CM

## 2025-03-13 DIAGNOSIS — Z96.651 AFTERCARE FOLLOWING RIGHT KNEE JOINT REPLACEMENT SURGERY: Primary | ICD-10-CM

## 2025-03-13 PROCEDURE — 99024 POSTOP FOLLOW-UP VISIT: CPT | Performed by: ORTHOPAEDIC SURGERY

## 2025-03-13 NOTE — PROGRESS NOTES
Name: Jason Miguel      : 1950       MRN: [id number]   Encounter Provider: Denton Nichole MD   Encounter Date: 25  Encounter department: [facility] [place]     ASSESSMENT & PLAN:  Assessment & Plan  Aftercare following right knee joint replacement surgery  Continue physical therapy   Continue weight bearing activities as tolerated   Continue pain medications as needed   Continue lovenox as prescribed    Staples removed in office today, incision cleaned, steri-strips applied   Follow up in 4 weeks for 6 week post-op appointment              To do next visit:  Return in about 4 weeks (around 4/10/2025) for 6 week post-op appointment.    _____________________________________________________  CHIEF COMPLAINT:  Chief Complaint   Patient presents with   • Right Knee - Post-op         SUBJECTIVE:  Jason Miguel is a 74 y.o. male who presents 2 weeks status post right total knee arthroplasty.  He is doing well.  He admits to little pain in the right knee which has been able to control well with over-the-counter Tylenol and a muscle relaxer as needed.  He continues to work with physical therapy and admits to attending twice a week, can good progress.  Patient is ambulating well with single-point cane today.  He continues to take Lovenox daily.        PAST MEDICAL HISTORY:  Past Medical History:   Diagnosis Date   • Arthritis    • PONV (postoperative nausea and vomiting)        PAST SURGICAL HISTORY:  Past Surgical History:   Procedure Laterality Date   • COLONOSCOPY     • ELBOW SURGERY Bilateral    • FOOT TENDON SURGERY Bilateral     heel   • HAND SURGERY     • [place] ARTHRP KNE CONDYLE&PLATU MEDIAL&LAT COMPARTMENTS Left 2023    Procedure: ARTHROPLASTY KNEE TOTAL W ROBOT;  Surgeon: Denton Nichole MD;  [facility];  Service: Orthopedics   • [place] ARTHRP KNE CONDYLE&PLATU MEDIAL&LAT COMPARTMENTS Right 2025    Procedure: ARTHROPLASTY KNEE TOTAL  W ROBOT;  Surgeon: Denton Nichole MD;  Location:  MAIN OR;  Service: Orthopedics   • [place] ARTHRS KNE SURG W/MENISCECTOMY MED/LAT W/SHVG Right 2022    Procedure: KNEE ARTHROSCOPY WITH medial MENISCECTOMY chondroplasty synovectomy injection;  Surgeon: Hank Shirley DO;  [facility];  Service: Orthopedics   • [place] ARTHRS KNE SURG W/MENISCECTOMY MED/LAT W/SHVG Left 2023    Procedure: Left knee arthroscopy: Medial meniscectomy; Lateral meniscectomy; Chondroplasty; Synovectomy; Post arthroscopic injection of intra-articular joint space and peripheral portal;  Surgeon: Hank Shirley DO;  [facility];  Service: Orthopedics   • [place] NEUROPLASTY &/TRANSPOS MEDIAN NRV CARPAL TUNNE Left 2021    Procedure: RELEASE CARPAL TUNNEL;  Surgeon: Hank Shirley DO;  Location:  MAIN OR;  Service: Orthopedics       FAMILY HISTORY:  Family History   Problem Relation Age of Onset   • Heart disease Mother         hx MI   • Diabetes Mother    • Stroke Mother    • Arthritis Mother    • COPD Father         84 yrs   • Coronary artery disease Father    • Cancer Brother        SOCIAL HISTORY:  Social History     Tobacco Use   • Smoking status: Former     Current packs/day: 0.00     Average packs/day: 1 pack/day for 35.0 years (35.0 ttl pk-yrs)     Types: Cigarettes     Start date:      Quit date:      Years since quittin.2   • Smokeless tobacco: Never   Vaping Use   • Vaping status: Never Used   Substance Use Topics   • Alcohol use: Yes     Comment: 2 x per week   • Drug use: No       MEDICATIONS:    Current Outpatient Medications:   •  acetaminophen (TYLENOL) 500 mg tablet, Take 2 tablets (1,000 mg total) by mouth every 6 (six) hours as needed for mild pain, Disp: 90 tablet, Rfl: 0  •  enoxaparin (LOVENOX) 40 mg/0.4 mL, Inject 0.4 mL (40 mg total) under the skin daily for 28 days Start injections after surgery, Disp: 11.2 mL, Rfl: 0  •  metFORMIN (GLUCOPHAGE) 500 mg tablet, TAKE 1  "TABLET TWICE DAILY  WITH MEALS, Disp: 180 tablet, Rfl: 3  •  methocarbamol (ROBAXIN) 500 mg tablet, Take 1 tablet (500 mg total) by mouth 3 (three) times a day as needed for muscle spasms, Disp: 60 tablet, Rfl: 0  •  semaglutide, 2 mg/dose, (Ozempic) 8 mg/ mL injection pen, Inject 0.75 mL (2 mg total) under the skin every 7 days, Disp: 9 mL, Rfl: 1  •  simvastatin (ZOCOR) 20 mg tablet, TAKE 1 TABLET DAILY AT BEDTIME, Disp: 90 tablet, Rfl: 1  •  tamsulosin (FLOMAX) 0.4 mg, TAKE 2 CAPSULES DAILY WITH DINNER, Disp: 180 capsule, Rfl: 1    ALLERGIES:  No Known Allergies    LABS:  HgA1c:   Lab Results   Component Value Date    HGBA1C 5.9 (H) 01/29/2025     BMP:   Lab Results   Component Value Date    CALCIUM 8.7 02/28/2025     04/05/2018    K 4.5 02/28/2025    CO2 24 02/28/2025     02/28/2025    BUN 22 02/28/2025    CREATININE 0.97 02/28/2025     CBC: No components found for: \"CBC\"    _____________________________________________________  PHYSICAL EXAMINATION:  Vital signs: Ht 5' 4\" (1.626 m)   BMI 32.79 kg/m²   General: No acute distress, awake and alert  Psychiatric: Mood and affect appear appropriate  HEENT: Trachea Midline, No torticollis, no apparent facial trauma  Cardiovascular: No audible murmurs; Extremities appear perfused  Pulmonary: No audible wheezing or stridor  Skin: No open lesions; see further details (if any) below    MUSCULOSKELETAL EXAMINATION:  Ortho Exam  Right knee:  Incision clean dry and intact  Staples well approximated; removed in office today  Incision cleaned, steri-strips applied    No erythema    moderate ecchymosis of leg  Appropriate swelling of lower limb  Appropriate warmth of knee  Extensor mechanism intact  Extension 10  Flexion 85  Calf compartments soft and supple  Sensation intact  Toes are warm sensate and mobile       ___________________________________________________  STUDIES REVIEWED:  I personally reviewed the images obtained in office today and my independent " interpretation is as follows:    None performed    PROCEDURES PERFORMED:    None kade Guevara PA-C

## 2025-03-17 ENCOUNTER — OFFICE VISIT (OUTPATIENT)
Dept: PHYSICAL THERAPY | Facility: CLINIC | Age: 75
End: 2025-03-17
Payer: MEDICARE

## 2025-03-17 DIAGNOSIS — M17.11 PRIMARY OSTEOARTHRITIS OF RIGHT KNEE: ICD-10-CM

## 2025-03-17 DIAGNOSIS — G89.29 CHRONIC PAIN OF RIGHT KNEE: Primary | ICD-10-CM

## 2025-03-17 DIAGNOSIS — M25.561 CHRONIC PAIN OF RIGHT KNEE: Primary | ICD-10-CM

## 2025-03-17 PROCEDURE — 97140 MANUAL THERAPY 1/> REGIONS: CPT

## 2025-03-17 PROCEDURE — 97110 THERAPEUTIC EXERCISES: CPT

## 2025-03-17 NOTE — PROGRESS NOTES
Daily Note     Today's date: 3/17/2025  Patient name: Jason Miguel  : 1950  MRN: [id number]  Referring provider: Denton Nichole,*  Dx:   Encounter Diagnosis     ICD-10-CM    1. Chronic pain of right knee  M25.561     G89.29       2. Primary osteoarthritis of right knee  M17.11           Start Time: 0900  Stop Time: 0940  Total time in clinic (min): 40 minutes    Subjective: Patient reports no pain but stiffness as well as medial knee sensitivity. Notes that walking is still difficult, has been doing more steps at home and walking less without AD.       Objective: See treatment diary below      Assessment: Patient demonstrates limited R knee A/PROM, after manual stretching cued patient to perform seated LAQ and standing hamstring curls for eccentric control after PT PROM to end range. With each repetition patient demonstrates improved neuromuscular control and activation. Patient able to perform step ups from 6 inch step using 2HHA with an emphasis on eccentric control. Patient to continue to benefit from skilled interventions to improve function.      Plan: Continue per plan of care.  Progress treatment as tolerated.       Re-Evaluation:  Access Code [email or web address]   PRECAUTIONS:Hx L LE radiculopathy - flexion responder  Knee Specialty Daily Treatment Diary     Manual Therapy 3/17 3/3 3/5 3/10 3/12   Knee flexion/ extension X10 ea * X10 gentle :15x4 ea gentle 2x15 EXT  Knee gapping 2x15   Hamstring stretch  Calf stretch :15x5 ea *    * NV    :15x4 :15x4    :15x4 :15x5    :30x3   Prone Quad stretch        Patellar mobs        Knee stretch on edge of mat X3 min 2x1 min ea NV 2x1 min 2x1 min       Ther Ex 3/17 3/3 3/5 3/10 3/12   Nu Step for LE strength S=9  A=10 L3 x 8 mins *L2 x 7 mins L2 x9 min L2 x10 min L3 x 9 mins   Biodex Bike for knee ROM/ endurance S=        Heel slides flex/abd :10x10 ea X15 ea X15 flex X10 ea :10X10 ea  X10            SAQ     x15           Ankle pumps knee EXT x25 X30  x10  "x10 x20   SLR all planes        Ball squeeze  * :05x10 :05x15 x15   LAQ X10  X10 w ECC AAROM x15 NV 2x10 2x10   Hamstring stretch  *5x:15 :15x4 :30 x2    Calf stretch  *5x:15 :15x4 :15x4    Standing hamstring curls X10 w ECC *x15 x10 x10 x20   HR/TR x20 *x15 2x10 ea x20 x20   Mini Squat        Step knee flexion :10x10 * :15x4 :15x4 :10x10           Total gym squats (deep)        Neuro Re-ed        TKE     NV   Gluteal set  x15 Sit x10 Supine 2x10    QS x15 x15 T@a x10  T@k x10 T@k x10 X15 w/ cues       March x10            Fitter board        Eccentric Leg press        Clam Shells  * Red :05x10 Sit red :05x15 GRN 2x10   Heel-toe amb        Tandem stand        CSMi balance, \"+\" squat  *      Therapeutic ACT        sidestepping 20ft x4 * 10ft x4 10ft x4 10ft x 8           Mirror walking     W/ SPC x 4 mins           Amb up/down steps        Chair squats        Reverse Lunges mirror        Step ups 6\" 2x5 2HHA * 4\" x5 4\" fwd x10  Lat x6 4\" x12 fwd       Modalities        CP  KNEE                          Access Code: [email or web address]  URL: [email or web address]  Date: 03/05/2025  Prepared by: Iris Florentino    Exercises  - Seated Hamstring Stretch  - 3 x daily - 7 x weekly - 1 sets - 5 reps - 15 hold  - Seated Calf Stretch with Strap  - 3 x daily - 7 x weekly - 1 sets - 5 reps - 15 hold  - Supine Knee Extension Stretch on Towel Roll (Mirrored)  - 4-6 x daily - 7 x weekly - 1 sets - 1 reps - 5 minute hold  - Heel Raises with Counter Support  - 2 x daily - 7 x weekly - 3 sets - 10 reps  - Standing Knee Flexion  - 2 x daily - 7 x weekly - 3 sets - 10 reps  - Side Stepping with Counter Support  - 2 x daily - 7 x weekly - 1 sets - 4 reps  - Standing Knee Flexion Stretch on Step  - 2 x daily - 7 x weekly - 1 sets - 4 reps - 15 sec hold  - Hooklying Clamshell with Resistance  - 2 x daily - 7 x weekly - 1 sets - 10 reps - 5 sec hold  - Supine Hip Adduction Isometric with Ball  - 2 x daily - 7 x weekly - 1 sets - 10 " reps - 5 sec hold

## 2025-03-19 ENCOUNTER — OFFICE VISIT (OUTPATIENT)
Dept: PHYSICAL THERAPY | Facility: CLINIC | Age: 75
End: 2025-03-19
Payer: MEDICARE

## 2025-03-19 DIAGNOSIS — M25.561 CHRONIC PAIN OF RIGHT KNEE: Primary | ICD-10-CM

## 2025-03-19 DIAGNOSIS — G89.29 CHRONIC PAIN OF RIGHT KNEE: Primary | ICD-10-CM

## 2025-03-19 DIAGNOSIS — M17.11 PRIMARY OSTEOARTHRITIS OF RIGHT KNEE: ICD-10-CM

## 2025-03-19 PROCEDURE — 97140 MANUAL THERAPY 1/> REGIONS: CPT | Performed by: PHYSICAL THERAPIST

## 2025-03-19 PROCEDURE — 97110 THERAPEUTIC EXERCISES: CPT | Performed by: PHYSICAL THERAPIST

## 2025-03-19 PROCEDURE — 97112 NEUROMUSCULAR REEDUCATION: CPT | Performed by: PHYSICAL THERAPIST

## 2025-03-19 NOTE — PROGRESS NOTES
Daily Note     Today's date: 3/19/2025  Patient name: Jason Miguel  : 1950  MRN: [id number]  Referring provider: Denton Nichole,*  Dx:   Encounter Diagnosis     ICD-10-CM    1. Chronic pain of right knee  M25.561     G89.29       2. Primary osteoarthritis of right knee  M17.11                      Subjective: The patient complains of stiffness at the R knee.  The patient notes feeling increased medial joint line pain after performing hip ABD with band at home.  Presently the pain at the R knee is 2-3/10.      Objective: See treatment diary below      Assessment: The patient had difficulty melany his quadriceps again which required verbal/manual cues to improve.  The patient tolerated the treatment fair with some complaints of pain.  The patient was educated that he is now entering the stage of healing where he can stress tissues and promote remodeling to allow ROM improvement at the knee.  The patient was encouraged to produce some mild to moderate pain with his stretching but never severe pain.  The patient will benefit from continued PT to achieve his goals of therapy      Plan: Continue per plan of care.  Progress treatment as tolerated.       Re-Evaluation:  Access Code [email or web address]   PRECAUTIONS:Hx L LE radiculopathy - flexion responder  Knee Specialty Daily Treatment Diary     Manual Therapy 3/17 3/19 3/5 3/10 3/12   Knee flexion/ extension X10 ea EXT 2x10  Flex 3x:30 X10 gentle :15x4 ea gentle 2x15 EXT  Knee gapping 2x15   Hamstring stretch  Calf stretch :15x5 ea 4x:15    4x:15 NV    :15x4 :15x4    :15x4 :15x5    :30x3   Prone Quad stretch        Patellar mobs  x10      Knee stretch on edge of mat X3 min 2x2min NV 2x1 min 2x1 min       Ther Ex 3/17 3/19 3/5 3/10 3/12   Nu Step for LE strength S=9  A=10 L3 x 8 mins L3 x 10 mins L2 x9 min L2 x10 min L3 x 9 mins   Biodex Bike for knee ROM/ endurance S=        Heel slides flex/abd :10x10 ea  X15 flex X10 ea :10X10 ea  X10            SAQ   "   x15           Ankle pumps knee EXT x25  x10 x10 x20   SLR all planes        Ball squeeze   :05x10 :05x15 x15   LAQ X10  X10 w ECC x15 NV 2x10 2x10   Hamstring stretch   :15x4 :30 x2    Calf stretch   :15x4 :15x4    Standing hamstring curls X10 w ECC X15 w/ ECC x10 x10 x20   HR/TR x20  2x10 ea x20 x20   Mini Squat        Step knee flexion :10x10  :15x4 :15x4 :10x10           Total gym squats (deep)        Neuro Re-ed        TKE     NV   Gluteal set   Sit x10 Supine 2x10    QS x15 W/ towel roll and manual cues/verbal cues 1x15 :05  1x10 T@a x10  T@k x10 T@k x10 X15 w/ cues       March x10            Fitter board        Eccentric Leg press        Clam Shells   Red :05x10 Sit red :05x15 GRN 2x10   Heel-toe amb        Tandem stand        CSMi balance, \"+\" squat  WS x10 ea  SQ x10      Therapeutic ACT        sidestepping 20ft x4  10ft x4 10ft x4 10ft x 8           Mirror walking     W/ SPC x 4 mins           Amb up/down steps        Chair squats        Reverse Lunges mirror        Step ups 6\" 2x5 2HHA 6\"x15 FWD 4\" x5 4\" fwd x10  Lat x6 4\" x12 fwd       Modalities        CP  KNEE                          Access Code: [email or web address]  URL: [email or web address]  Date: 03/05/2025  Prepared by: Iris Florentino    Exercises  - Seated Hamstring Stretch  - 3 x daily - 7 x weekly - 1 sets - 5 reps - 15 hold  - Seated Calf Stretch with Strap  - 3 x daily - 7 x weekly - 1 sets - 5 reps - 15 hold  - Supine Knee Extension Stretch on Towel Roll (Mirrored)  - 4-6 x daily - 7 x weekly - 1 sets - 1 reps - 5 minute hold  - Heel Raises with Counter Support  - 2 x daily - 7 x weekly - 3 sets - 10 reps  - Standing Knee Flexion  - 2 x daily - 7 x weekly - 3 sets - 10 reps  - Side Stepping with Counter Support  - 2 x daily - 7 x weekly - 1 sets - 4 reps  - Standing Knee Flexion Stretch on Step  - 2 x daily - 7 x weekly - 1 sets - 4 reps - 15 sec hold  - Hooklying Clamshell with Resistance  - 2 x daily - 7 x weekly - 1 sets - 10 " reps - 5 sec hold  - Supine Hip Adduction Isometric with Ball  - 2 x daily - 7 x weekly - 1 sets - 10 reps - 5 sec hold

## 2025-03-24 ENCOUNTER — OFFICE VISIT (OUTPATIENT)
Dept: PHYSICAL THERAPY | Facility: CLINIC | Age: 75
End: 2025-03-24
Payer: MEDICARE

## 2025-03-24 DIAGNOSIS — G89.29 CHRONIC PAIN OF RIGHT KNEE: Primary | ICD-10-CM

## 2025-03-24 DIAGNOSIS — M25.561 CHRONIC PAIN OF RIGHT KNEE: Primary | ICD-10-CM

## 2025-03-24 DIAGNOSIS — M17.11 PRIMARY OSTEOARTHRITIS OF RIGHT KNEE: ICD-10-CM

## 2025-03-24 PROCEDURE — 97112 NEUROMUSCULAR REEDUCATION: CPT

## 2025-03-24 PROCEDURE — 97140 MANUAL THERAPY 1/> REGIONS: CPT

## 2025-03-24 PROCEDURE — 97110 THERAPEUTIC EXERCISES: CPT

## 2025-03-24 NOTE — PROGRESS NOTES
"Daily Note     Today's date: 3/24/2025  Patient name: Jason Miguel  : 1950  MRN: [id number]  Referring provider: Denton Nichole,*  Dx:   Encounter Diagnosis     ICD-10-CM    1. Chronic pain of right knee  M25.561     G89.29       2. Primary osteoarthritis of right knee  M17.11                      Subjective: Pt reports he is doing alright, he is pretty stiff today. Pt reports he tried to do steps today at home, was able to do them but was pretty fatigued by the end.       Objective: See treatment diary below      Assessment: Tolerated treatment well. Pt performed all exercises without issue, continue to progress to tolerance. Pt limited in both flex and ext ROM, within expectations. Pt would benefit from continued focus on stretching, stability, and ROM to improve overall function and decrease limitations. Patient demonstrated fatigue post treatment, exhibited good technique with therapeutic exercises, and would benefit from continued PT      Plan: Continue per plan of care.      Re-Evaluation:  Access Code [email or web address]   PRECAUTIONS:Hx L LE radiculopathy - flexion responder  Knee Specialty Daily Treatment Diary     Manual Therapy 3/17 3/19 3/24 3/10 3/12   Knee flexion/ extension X10 ea EXT 2x10  Flex 3x:30 Ext 2x10\"  Flex 3x30\" :15x4 ea gentle 2x15 EXT  Knee gapping 2x15   Hamstring stretch  Calf stretch :15x5 ea 4x:15    4x:15 4x:15    4x:15 :15x4    :15x4 :15x5    :30x3   Prone Quad stretch        Patellar mobs  x10 x10     Knee stretch on edge of mat X3 min 2x2min  2x1 min 2x1 min       Ther Ex 3/17 3/19 3/24 3/10 3/12   Nu Step for LE strength S=9  A=10 L3 x 8 mins L3 x 10 mins L4 10' L2 x10 min L3 x 9 mins   Biodex Bike for knee ROM/ endurance S=        Heel slides flex/abd :10x10 ea   X10 ea :10X10 ea  X10            SAQ     x15           Ankle pumps knee EXT x25   x10 x20   SLR all planes        Ball squeeze    :05x15 x15   LAQ X10  X10 w ECC x15 x15 2x10 2x10   Hamstring stretch    " ":30 x2    Calf stretch    :15x4    Standing hamstring curls X10 w ECC X15 w/ ECC X15 w/ ECC x10 x20   HR/TR x20   x20 x20   Mini Squat        Step knee flexion :10x10   :15x4 :10x10           Total gym squats (deep)        Neuro Re-ed        TKE     NV   Gluteal set    Supine 2x10    QS x15 W/ towel roll and manual cues/verbal cues 1x15 :05  1x10 W/ towel roll and manual cues/verbal cues 1x15 :05 T@k x10 X15 w/ cues       March x10            Fitter board        Eccentric Leg press        Clam Shells    Sit red :05x15 GRN 2x10   Heel-toe amb        Tandem stand        CSMi balance, \"+\" squat  WS x10 ea  SQ x10 WS x10 ea  SQ x10     Therapeutic ACT        sidestepping 20ft x4   10ft x4 10ft x 8           Mirror walking     W/ SPC x 4 mins           Amb up/down steps        Chair squats        Reverse Lunges mirror        Step ups 6\" 2x5 2HHA 6\"x15 FWD 6\" x 15 FWD 4\" fwd x10  Lat x6 4\" x12 fwd       Modalities        CP  KNEE                          Access Code: [email or web address]  URL: [email or web address]  Date: 03/05/2025  Prepared by: Iris Florentino    Exercises  - Seated Hamstring Stretch  - 3 x daily - 7 x weekly - 1 sets - 5 reps - 15 hold  - Seated Calf Stretch with Strap  - 3 x daily - 7 x weekly - 1 sets - 5 reps - 15 hold  - Supine Knee Extension Stretch on Towel Roll (Mirrored)  - 4-6 x daily - 7 x weekly - 1 sets - 1 reps - 5 minute hold  - Heel Raises with Counter Support  - 2 x daily - 7 x weekly - 3 sets - 10 reps  - Standing Knee Flexion  - 2 x daily - 7 x weekly - 3 sets - 10 reps  - Side Stepping with Counter Support  - 2 x daily - 7 x weekly - 1 sets - 4 reps  - Standing Knee Flexion Stretch on Step  - 2 x daily - 7 x weekly - 1 sets - 4 reps - 15 sec hold  - Hooklying Clamshell with Resistance  - 2 x daily - 7 x weekly - 1 sets - 10 reps - 5 sec hold  - Supine Hip Adduction Isometric with Ball  - 2 x daily - 7 x weekly - 1 sets - 10 reps - 5 sec hold                 "

## 2025-03-26 ENCOUNTER — OFFICE VISIT (OUTPATIENT)
Dept: PHYSICAL THERAPY | Facility: CLINIC | Age: 75
End: 2025-03-26
Payer: MEDICARE

## 2025-03-26 DIAGNOSIS — G89.29 CHRONIC PAIN OF RIGHT KNEE: Primary | ICD-10-CM

## 2025-03-26 DIAGNOSIS — M25.561 CHRONIC PAIN OF RIGHT KNEE: Primary | ICD-10-CM

## 2025-03-26 DIAGNOSIS — M17.11 PRIMARY OSTEOARTHRITIS OF RIGHT KNEE: ICD-10-CM

## 2025-03-26 PROCEDURE — 97112 NEUROMUSCULAR REEDUCATION: CPT | Performed by: PHYSICAL THERAPIST

## 2025-03-26 PROCEDURE — 97140 MANUAL THERAPY 1/> REGIONS: CPT | Performed by: PHYSICAL THERAPIST

## 2025-03-26 PROCEDURE — 97110 THERAPEUTIC EXERCISES: CPT | Performed by: PHYSICAL THERAPIST

## 2025-03-26 PROCEDURE — 97530 THERAPEUTIC ACTIVITIES: CPT | Performed by: PHYSICAL THERAPIST

## 2025-03-26 NOTE — PROGRESS NOTES
"Daily Note     Today's date: 3/26/2025  Patient name: Jason Miguel  : 1950  MRN: [id number]  Referring provider: Denton Nichole,*  Dx:   Encounter Diagnosis     ICD-10-CM    1. Chronic pain of right knee  M25.561     G89.29       2. Primary osteoarthritis of right knee  M17.11                      Subjective: The patient reports feeling increased soreness at the R knee when coming down the steps he reverted back to a reciprocal pattern without thinking.  The patient notes intermittent \"jabs\" of pain at the R medial knee/shin.      Objective: See treatment diary below      Assessment: The patient tolerated all activities well today.  The patient was given verbal cues to perform the exercises properly.  Improvement noted with quadriceps activation with quad strengthening.  There were no complaints of increased pain or problems after the session today.  The patient will benefit from continued skilled physical therapy to progress towards achieving patient centered goals.         Plan: Continue per plan of care.  Progress treatment as tolerated.       Re-Evaluation:  Access Code [email or web address]   PRECAUTIONS:Hx L LE radiculopathy - flexion responder  Knee Specialty Daily Treatment Diary     Manual Therapy 3/17 3/19 3/24 3/26 3/12   Knee flexion/ extension X10 ea EXT 2x10  Flex 3x:30 Ext 2x10\"  Flex 3x30\" Flex 3x:15  EXT 2x10 oscillations 2x15 EXT  Knee gapping 2x15   Hamstring stretch  Calf stretch :15x5 ea 4x:15    4x:15 4x:15    4x:15 4x:15    4x:15 :15x5    :30x3   Prone Quad stretch        Patellar mobs  x10 x10 x10    Knee stretch on edge of mat X3 min 2x2min  1x1 mins ea 2x1 min       Ther Ex 3/17 3/19 3/24 3/26 3   Nu Step for LE strength S=9  A=10 L3 x 8 mins L3 x 10 mins L4 10' L4 x 6 mins L3 x 9 mins   Biodex Bike for knee ROM/ endurance S=12    1/2 revolutions x 3 mins    Heel slides flex/abd :10x10 ea    :10X10 ea  X10    Knee EXT Board    *NV    SAQ     x15   Prone knee flex    X10 after " "stretch    Ankle pumps knee EXT x25    x20   SLR all planes        Ball squeeze     x15   LAQ X10  X10 w ECC x15 x15 X15 after stretch 2x10   Hamstring stretch        Calf stretch    Wedge 3x:30    Standing hamstring curls X10 w ECC X15 w/ ECC X15 w/ ECC  x20   HR/TR x20    x20   Mini Squat        Step knee flexion :10x10    :10x10           Total gym squats (deep)        Neuro Re-ed        TKE    GRN x15 :05 NV   Gluteal set        QS x15 W/ towel roll and manual cues/verbal cues 1x15 :05  1x10 W/ towel roll and manual cues/verbal cues 1x15 :05 X10 towel @ heel  Prone x10 after stretch X15 w/ cues                   Fitter board        Eccentric Leg press        Clam Shells     GRN 2x10   Heel-toe amb    10ft x 8    Foam tandem    1x:30ea B/L    CSMi balance, \"+\" squat  WS x10 ea  SQ x10 WS x10 ea  SQ x10 SQ x15    Therapeutic ACT        sidestepping 20ft x4   25ft x 2 10ft x 8           Mirror walking    No AD 20ft x 6 W/ SPC x 4 mins           Amb up/down steps        Chair squats        Reverse Lunges mirror        Step ups 6\" 2x5 2HHA 6\"x15 FWD 6\" x 15 FWD 6\" x15 fwd 4\" x12 fwd       Modalities        CP  KNEE                          Access Code: [email or web address]  URL: [email or web address]  Date: 03/05/2025  Prepared by: Iris Florentino    Exercises  - Seated Hamstring Stretch  - 3 x daily - 7 x weekly - 1 sets - 5 reps - 15 hold  - Seated Calf Stretch with Strap  - 3 x daily - 7 x weekly - 1 sets - 5 reps - 15 hold  - Supine Knee Extension Stretch on Towel Roll (Mirrored)  - 4-6 x daily - 7 x weekly - 1 sets - 1 reps - 5 minute hold  - Heel Raises with Counter Support  - 2 x daily - 7 x weekly - 3 sets - 10 reps  - Standing Knee Flexion  - 2 x daily - 7 x weekly - 3 sets - 10 reps  - Side Stepping with Counter Support  - 2 x daily - 7 x weekly - 1 sets - 4 reps  - Standing Knee Flexion Stretch on Step  - 2 x daily - 7 x weekly - 1 sets - 4 reps - 15 sec hold  - Hooklying Clamshell with Resistance  - 2 " x daily - 7 x weekly - 1 sets - 10 reps - 5 sec hold  - Supine Hip Adduction Isometric with Ball  - 2 x daily - 7 x weekly - 1 sets - 10 reps - 5 sec hold

## 2025-03-28 DIAGNOSIS — M17.11 PRIMARY OSTEOARTHRITIS OF RIGHT KNEE: ICD-10-CM

## 2025-03-30 NOTE — PROGRESS NOTES
PT Re-Evaluation     Today's date: 3/31/2025  Patient name: Jason Miguel  : 1950  MRN: [id number]  Referring provider: Denton Nichole,*  Dx:   Encounter Diagnosis     ICD-10-CM    1. Chronic pain of right knee  M25.561     G89.29       2. Primary osteoarthritis of right knee  M17.11                        Assessment  Impairments: abnormal gait, abnormal or restricted ROM, activity intolerance, impaired balance, impaired physical strength, lacks appropriate home exercise program, pain with function and poor posture   Functional limitations: difficulty with prolonged standing, prolonged walking, walking on unlevel surfaces, difficulty squatting/kneeling, difficulty stair-climbing, and difficulty transferring from low surfaces.  Symptom irritability: low    Assessment details: Jason Miguel is a 74 y.o. male with a history of arthritis, DM, BMI>30, chronic pain, and high cholesterol that presents for a physical therapy re-evaluation.  The patient demonstrates signs and symptoms consistent with chronic pain R knee, R knee OA, 4 weeks s/p R TKA.  During the examination the patient demonstrated improved but impaired R LE strength, improved but impaired R knee ROM, improved but impaired gait, and decreased R knee pain.  The patient has made functional gains since starting therapy.  The patient is now able to ambulate with SPC community distances.  The patient is able to ambulate short distances at home without any AD.  The patient continues to have difficulty with tightness at his R calf. The patient notes most difficulty ambulating up/down the steps normally.  The patient is fearful he doesn't have enough strength to support himself.  The patient will benefit from continued skilled PT to address impairments, work towards goals, and restore patient PLOF.          Understanding of Dx/Px/POC: good     Prognosis: good  Prognosis details: Positive prognostic indicators include: positive attitude  toward recovery, good understanding of diagnosis/treatment plan, and absence of observed red flags.      Negative prognostic indicators include: Significant medical Hx, chronicity of condition    Goals  GOALS TO BE ACHIEVED BY THE END OF THE PRE-OP VISIT  1.  Patient have all questions answered / receive pre operative education regarding precautions/wound care/sling/ADL's.  MET   2.  Patient to be independent with his phase 1 post operative exercises to perform pre surgery.  MET       GOALS TO BE ACHIEVED POST OPERATIVELY    STG: Achieve in 4-6 weeks  1.  Patient's R knee pain at worst less than 2/10 to allow for proper gait. PROGRESSING  2.  Patient's R knee ROM improve to 0-90 degrees to improve squatting. MET  3.  R LE MMT improve to > 4+/5 all motions tested to improve ADL/recreational activities. PROGRESSING  4.  Timed up and go score improve to less than 14 seconds to indicate improved balance/decreased falls risk. MET    LTG:  Achieve in 6-12 weeks  1.  Patient's knee FOTO score improve to > 65% to indicate a return to normal functioning. PROGRESSING  2.  Patient achieve personal goal of function. PROGRESSING  3. Patient to achieve independence with home exercise plan. PROGRESSING  4. 30 second sit to stand score improve to > 14 stands to indicate improved transfers. PROGRESSING        Plan  Patient would benefit from: skilled physical therapy  Planned modality interventions: cryotherapy, thermotherapy: hydrocollator packs, neuromuscular electric stimulation and electrical stimulation/Russian stimulation    Planned therapy interventions: joint mobilization, manual therapy, neuromuscular re-education, patient education, postural training, self care, strengthening, stretching, therapeutic activities, therapeutic exercise, home exercise program, abdominal trunk stabilization, balance, IASTM, gait training and balance/weight bearing training    Frequency: 1-3x/wk.  Duration in weeks: 12  Plan of Care beginning  date: 2/6/2025  Plan of Care expiration date: 5/6/2025  Treatment plan discussed with: PTA and patient  Plan details: RE-ASSESS 1X/MONTH        Subjective Evaluation    History of Present Illness  Date of surgery: 2/27/2025  Mechanism of injury: surgery  Mechanism of injury: SUBJECTIVE: 3/31/25: The patient reports improvement since last therapy assessment.  The patient is now able to ambulate with SPC community distances.  The patient is able to ambulate short distances at home without any AD.  The patient continues to have difficulty with tightness at his R calf. The patient notes most difficulty ambulating up/down the steps normally.  The patient is fearful he doesn't have enough strength to support himself.  The patient will benefit from continued PT focused on achieving patient specific goals.          SUBJECTIVE: 3/3/25: The patient is now s/p R TKA done by .  The patient presents for a post-op PT re-evaluation today.  The patient reports soreness and pain at the R anterior/lateral thigh.  The patient notes persistent difficulty with prolonged standing/walking, difficulty squatting/kneeling, difficulty stair-climbing, and the patient is unable to work as a .  The patient denies any N/T at the R LE.  The patient denies any post-operative complications.  The patient will benefit from continued PT focused on achieving patient specific goals.            INJURY HISTORY: Jason Miguel is a 74 y.o. male that presents to outpatient physical therapy with complaints of R knee pain and difficulty functioning.  The patient reports chronic R knee pain impacting his function despite attempting conservative care.  The patient notes difficulty with pivoting while walking, carrying laundry, squatting/kneeling, and ADL's.  The patient denies any N/T at the R LE.  The patient presents pre-operatively to get objective measures, learn phase 1 exercises, and receive education on the surgery/self care.  The  patient's main goal for physical therapy is to perform ADL's and walk pain free.             Recurrent probem    Patient Goals  Patient goals for therapy: decreased pain, increased motion, increased strength, independence with ADLs/IADLs, return to sport/leisure activities, improved balance, return to work and decreased edema  Patient goal: perform ADL's and walk pain free - progressing  Pain  Current pain ratin  At best pain ratin  At worst pain rating: 3  Location: R anterior knee;R anterior/lateral thigh  Quality: dull ache and sharp  Relieving factors: rest  Aggravating factors: standing, walking, lifting and stair climbing  Progression: improved    Social Support  Steps to enter house: yes  Stairs in house: yes   Lives in: multiple-level home  Lives with: spouse    Employment status: working (drive school bus)  Treatments  Current treatment: physical therapy      Objective     Observations     Additional Observation Details  Patient's postoperative dressings inspected  - no drainage or there S/S of infection noted. The patient was instructed to keep the dressings clean/dry/intact until first post op visit with the surgery team.  Reviewed S/S of infection.          Palpation     Right   Tenderness of the rectus femoris, vastus lateralis and vastus medialis.     Additional Palpation Details  NO TTP    Tenderness     Additional Tenderness Details  NO TTP    Neurological Testing     Sensation     Knee   Left Knee   Intact: Light touch    Right Knee   Intact: light touch     Active Range of Motion   Left Knee   Flexion: 121 degrees   Extension: 2 degrees     Right Knee   Flexion: 90 degrees with pain  Extension: -6 degrees with pain    Passive Range of Motion   Left Knee   Flexion: WFL    Right Knee   Flexion: 72 degrees   Extension: -3 degrees with pain    Mobility   Patellar Mobility:   Left Knee   WFL: medial, lateral, superior and inferior.     Right Knee   WFL: medial, lateral, superior and  "inferior    Strength/Myotome Testing     Left Hip   Planes of Motion   Flexion: 4  Extension: 4-  Abduction: 4  Adduction: 4  External rotation: 4  Internal rotation: 4    Right Hip   Planes of Motion   Flexion: 3+  Extension: 3+  Abduction: 3+  Adduction: 3+  External rotation: 3+  Internal rotation: 3+    Left Knee   Flexion: 4+  Extension: 4+  Quadriceps contraction: good    Right Knee   Flexion: 4-  Extension: 3+  Quadriceps contraction: poor    Additional Strength Details  IMPROVED BUT IMPAIRED    Tests     Additional Tests Details  FOTO: 44% ( predicted 61%) ( improved)      Gait impairments: Patient ambulates with no AD WBAT R LE.  The patient demonstrates improved gait speed, (+) flexed R knee, more equal step lengths, and improved but impaired R knee terminal knee extension at heel strike.    TUG: 3/31: 10 sec no AD  3/3: 35 SEC RW  PRE: 9 sec    30SSTS: 3/31: staggered feet 16 times R foot forward  3/3: NT  PRE: 14 stands 0H                 Re-Evaluation:4/28  Access Code [email or web address]   PRECAUTIONS:Hx L LE radiculopathy - flexion responder  Knee Specialty Daily Treatment Diary     Manual Therapy 3/17 3/19 3/24 3/26 3/31   Knee flexion/ extension X10 ea EXT 2x10  Flex 3x:30 Ext 2x10\"  Flex 3x30\" Flex 3x:15  EXT 2x10 oscillations Flex 2x:15  EXT 2x:15   Hamstring stretch  Calf stretch :15x5 ea 4x:15    4x:15 4x:15    4x:15 4x:15    4x:15 3x:15    3x:15   Prone Quad stretch     3x:15   Patellar mobs  x10 x10 x10 x10   Knee stretch on edge of mat X3 min 2x2min  1x1 mins ea 1x1 min ea       Ther Ex 3/17 3/19 3/24 3/26 3/31   Nu Step for LE strength S=9  A=10 L3 x 8 mins L3 x 10 mins L4 10' L4 x 6 mins L4 x 5 mins   Biodex Bike for knee ROM/ endurance S=12    1/2 revolutions x 3 mins 1/2 revolutions x 3 mins   Heel slides flex/abd :10x10 ea       Knee EXT Board    *NV X 1min   SAQ        Prone knee flex    X10 after stretch    Ankle pumps knee EXT x25       SLR all planes        Ball squeeze        LAQ X10  X10 w " "ECC x15 x15 X15 after stretch X10    Hamstring stretch        Calf stretch    Wedge 3x:30 Wedge 3x:30   Standing hamstring curls X10 w ECC X15 w/ ECC X15 w/ ECC     HR/TR x20       Mini Squat        Step knee flexion :10x10               Total gym squats (deep)        Neuro Re-ed        TKE    GRN x15 :05 GRN x15:05   Gluteal set        QS x15 W/ towel roll and manual cues/verbal cues 1x15 :05  1x10 W/ towel roll and manual cues/verbal cues 1x15 :05 X10 towel @ heel  Prone x10 after stretch    NMES w/ Russian stim     QS 10 sec on and rest 30 sec off  X 10 mins           Fitter board        Eccentric Leg press        Clam Shells        Heel-toe amb    10ft x 8    Foam tandem    1x:30ea B/L Blue 1x:30   CSMi balance, \"+\" squat  WS x10 ea  SQ x10 WS x10 ea  SQ x10 SQ x15 SQ 2x10   Therapeutic ACT        sidestepping 20ft x4   25ft x 2            Mirror walking    No AD 20ft x 6            Amb up/down steps     *4 trials SOS 2HR up/down 4 steps   Chair squats        Reverse Lunges mirror        Step ups 6\" 2x5 2HHA 6\"x15 FWD 6\" x 15 FWD 6\" x15 fwd 6\" x15 FWD       Modalities        CP  KNEE                                     "

## 2025-03-31 ENCOUNTER — EVALUATION (OUTPATIENT)
Dept: PHYSICAL THERAPY | Facility: CLINIC | Age: 75
End: 2025-03-31
Payer: MEDICARE

## 2025-03-31 DIAGNOSIS — M17.11 PRIMARY OSTEOARTHRITIS OF RIGHT KNEE: ICD-10-CM

## 2025-03-31 DIAGNOSIS — M25.561 CHRONIC PAIN OF RIGHT KNEE: Primary | ICD-10-CM

## 2025-03-31 DIAGNOSIS — G89.29 CHRONIC PAIN OF RIGHT KNEE: Primary | ICD-10-CM

## 2025-03-31 PROCEDURE — 97530 THERAPEUTIC ACTIVITIES: CPT | Performed by: PHYSICAL THERAPIST

## 2025-03-31 PROCEDURE — 97140 MANUAL THERAPY 1/> REGIONS: CPT | Performed by: PHYSICAL THERAPIST

## 2025-03-31 PROCEDURE — 97110 THERAPEUTIC EXERCISES: CPT | Performed by: PHYSICAL THERAPIST

## 2025-03-31 PROCEDURE — 97112 NEUROMUSCULAR REEDUCATION: CPT | Performed by: PHYSICAL THERAPIST

## 2025-03-31 RX ORDER — METHOCARBAMOL 500 MG/1
500 TABLET, FILM COATED ORAL 3 TIMES DAILY PRN
Qty: 60 TABLET | Refills: 0 | Status: SHIPPED | OUTPATIENT
Start: 2025-03-31

## 2025-04-02 ENCOUNTER — OFFICE VISIT (OUTPATIENT)
Dept: PHYSICAL THERAPY | Facility: CLINIC | Age: 75
End: 2025-04-02
Payer: MEDICARE

## 2025-04-02 DIAGNOSIS — M25.561 CHRONIC PAIN OF RIGHT KNEE: Primary | ICD-10-CM

## 2025-04-02 DIAGNOSIS — M17.11 PRIMARY OSTEOARTHRITIS OF RIGHT KNEE: ICD-10-CM

## 2025-04-02 DIAGNOSIS — G89.29 CHRONIC PAIN OF RIGHT KNEE: Primary | ICD-10-CM

## 2025-04-02 PROCEDURE — 97110 THERAPEUTIC EXERCISES: CPT | Performed by: PHYSICAL THERAPIST

## 2025-04-02 PROCEDURE — 97112 NEUROMUSCULAR REEDUCATION: CPT | Performed by: PHYSICAL THERAPIST

## 2025-04-02 PROCEDURE — 97140 MANUAL THERAPY 1/> REGIONS: CPT | Performed by: PHYSICAL THERAPIST

## 2025-04-02 NOTE — PROGRESS NOTES
"Daily Note     Today's date: 2025  Patient name: Jason Miguel  : 1950  MRN: [id number]  Referring provider: Denton Nichole,*  Dx:   Encounter Diagnosis     ICD-10-CM    1. Chronic pain of right knee  M25.561     G89.29       2. Primary osteoarthritis of right knee  M17.11                      Subjective: The patient rates his pain 2/10 at the R knee.  The patient does continue to feel \"jabs\" of pain at the R knee which do dissipate on their own.      Objective: See treatment diary below      Assessment: The patient tolerated all activities well today.  The patient needed verbal cues to perform the exercises properly.  There were no complaints of increased pain or problems after the session today.  The patient will benefit from continued skilled physical therapy to progress towards achieving patient centered goals.         Plan: Continue per plan of care.  Progress treatment as tolerated.       Re-Evaluation:  Access Code [email or web address]   PRECAUTIONS:Hx L LE radiculopathy - flexion responder  Knee Specialty Daily Treatment Diary     Manual Therapy 4/2 3/19 3/24 3/26 3/31   Knee flexion/ extension FLX/EXT 3x:20 ea EXT 2x10  Flex 3x:30 Ext 2x10\"  Flex 3x30\" Flex 3x:15  EXT 2x10 oscillations Flex 2x:15  EXT 2x:15   Hamstring stretch  Calf stretch 4x:15    4x:15 4x:15    4x:15 4x:15    4x:15 4x:15    4x:15 3x:15    3x:15   Prone Quad stretch 3x:30    3x:15   Patellar mobs x10 x10 x10 x10 x10   Knee stretch on edge of mat 1x2 mins ea 2x2min  1x1 mins ea 1x1 min ea       Ther Ex 4/2 3/19 3/24 3/26 3/31   Nu Step for LE strength S=9  A=10 L5 x 10 mins L3 x 10 mins L4 10' L4 x 6 mins L4 x 5 mins   Biodex Bike for knee ROM/ endurance S=12 1/2 revolutions x 4 mins   1/2 revolutions x 3 mins 1/2 revolutions x 3 mins   Heel slides flex/abd        Knee EXT Board X 2 mins   *NV X 1min   SAQ        Prone knee flex x10   X10 after stretch    Ankle pumps knee EXT        SLR all planes        Ball squeeze      " "  LAQ x15 x15 x15 X15 after stretch X10    Hamstring stretch        Calf stretch Wedge 3x:30   Wedge 3x:30 Wedge 3x:30   Standing hamstring curls  X15 w/ ECC X15 w/ ECC     HR/TR        Mini Squat        Step knee flexion                Total gym squats (deep)        Neuro Re-ed        TKE GRN x15:05   GRN x15 :05 GRN x15:05   Gluteal set        QS  W/ towel roll and manual cues/verbal cues 1x15 :05  1x10 W/ towel roll and manual cues/verbal cues 1x15 :05 X10 towel @ heel  Prone x10 after stretch    NMES w/ Russian stim QS 10 sec on and rest 30 sec off  X 10 mins    QS 10 sec on and rest 30 sec off  X 10 mins           Fitter board        Eccentric Leg press        Clam Shells        Heel-toe amb    10ft x 8    Foam tandem 1x:30 B/L   1x:30ea B/L Blue 1x:30   CSMi balance, \"+\" squat SQ 2x10 WS x10 ea  SQ x10 WS x10 ea  SQ x10 SQ x15 SQ 2x10   Therapeutic ACT        sidestepping    25ft x 2            Mirror walking    No AD 20ft x 6            Amb up/down steps     *4 trials SOS 2HR up/down 4 steps   Chair squats        Reverse Lunges mirror        Step ups 6\" x15 FWD 6\"x15 FWD 6\" x 15 FWD 6\" x15 fwd 6\" x15 FWD       Modalities        CP  KNEE                                   "

## 2025-04-07 ENCOUNTER — OFFICE VISIT (OUTPATIENT)
Dept: PHYSICAL THERAPY | Facility: CLINIC | Age: 75
End: 2025-04-07
Payer: MEDICARE

## 2025-04-07 DIAGNOSIS — M25.561 CHRONIC PAIN OF RIGHT KNEE: Primary | ICD-10-CM

## 2025-04-07 DIAGNOSIS — M17.11 PRIMARY OSTEOARTHRITIS OF RIGHT KNEE: ICD-10-CM

## 2025-04-07 DIAGNOSIS — G89.29 CHRONIC PAIN OF RIGHT KNEE: Primary | ICD-10-CM

## 2025-04-07 DIAGNOSIS — E11.9 TYPE 2 DIABETES MELLITUS WITHOUT COMPLICATION, WITHOUT LONG-TERM CURRENT USE OF INSULIN (HCC): ICD-10-CM

## 2025-04-07 PROCEDURE — 97112 NEUROMUSCULAR REEDUCATION: CPT

## 2025-04-07 PROCEDURE — 97140 MANUAL THERAPY 1/> REGIONS: CPT

## 2025-04-07 PROCEDURE — 97530 THERAPEUTIC ACTIVITIES: CPT

## 2025-04-07 PROCEDURE — 97110 THERAPEUTIC EXERCISES: CPT

## 2025-04-07 NOTE — TELEPHONE ENCOUNTER
Patient requesting refill(s) of: metformin    Last filled: 12/09/2023  Last appt: 10/31/2024  Next appt: 05/01/2025  [facility]

## 2025-04-07 NOTE — PROGRESS NOTES
"Daily Note     Today's date: 2025  Patient name: Jason Miguel  : 1950  MRN: [id number]  Referring provider: Denton Nichole,*  Dx:   Encounter Diagnosis     ICD-10-CM    1. Chronic pain of right knee  M25.561     G89.29       2. Primary osteoarthritis of right knee  M17.11           Start Time: 0900          Subjective: Patient states his right knee has been feeling \"pretty good\". It is stiff which he expects. When he does get pain is is always in the medial knee. He can go up stairs at home,  but he still has difficulty with going down stairs. Patient felt the Stim has been helping with return of quad contraction.       Objective: See treatment diary below    Incision clean and dry with few scabs present. Internal suture present in incision at above scabbed area. Reviewed care. Patient continues with 3+ pitting edema in right knee.     Assessment: Tolerated treatment well. Patient would benefit from continued PT for stretching and strengthening. He was able to perform his exercises with few verbal cues for proper performance of exercises. Encouraged elevation for edema control. Patient felt good leaving department.       Plan: Continue per plan of care.  Progress treatment as tolerated.       Re-Evaluation:  Access Code [email or web address]   PRECAUTIONS:Hx L LE radiculopathy - flexion responder  Knee Specialty Daily Treatment Diary     Manual Therapy 4/2 4/7 3/24 3/26 3/31   Knee flexion/ extension FLX/EXT 3x:20 ea FLX/EXT 3x:20 ea Ext 2x10\"  Flex 3x30\" Flex 3x:15  EXT 2x10 oscillations Flex 2x:15  EXT 2x:15   Hamstring stretch  Calf stretch 4x:15    4x:15 :15x4    :15x4 4x:15    4x:15 4x:15    4x:15 3x:15    3x:15   Prone Quad stretch 3x:30 :30x3   3x:15   Patellar mobs x10 X10 ea glide x10 x10 x10   Knee stretch on edge of mat 1x2 mins ea 1x2 min ea  1x1 mins ea 1x1 min ea       Ther Ex 4/2 4/7 3/24 3/26 3/31   Nu Step for LE strength S=9  A=10 L5 x 10 mins L5 x10 min L4 10' L4 x 6 mins L4 x 5 " "mins   Biodex Bike for knee ROM/ endurance S=12 1/2 revolutions x 4 mins 1/2revolution x5 min  1/2 revolutions x 3 mins 1/2 revolutions x 3 mins   Heel slides flex/abd        Knee EXT Board X 2 mins X3 min  *NV X 1min   SAQ        Prone knee flex x10 x10  X10 after stretch    Ankle pumps knee EXT        SLR all planes        Ball squeeze        LAQ x15 x15 x15 X15 after stretch X10    Hamstring stretch        Calf stretch Wedge 3x:30 Wedge :30x3  Wedge 3x:30 Wedge 3x:30   Standing hamstring curls   X15 w/ ECC     HR/TR        Mini Squat        Step knee flexion                Total gym squats (deep)        Neuro Re-ed        TKE GRN x15:05 Green :05x15  GRN x15 :05 GRN x15:05   Gluteal set        QS  Prone x10 W/ towel roll and manual cues/verbal cues 1x15 :05 X10 towel @ heel  Prone x10 after stretch    NMES w/ Russian stim QS 10 sec on and rest 30 sec off  X 10 mins QS 10 sec on and rest 30 sec off  X 10 mins   QS 10 sec on and rest 30 sec off  X 10 mins           Fitter board        Eccentric Leg press        Clam Shells        Heel-toe amb    10ft x 8    Foam tandem 1x:30 B/L 2x:30 B/L  1x:30ea B/L Blue 1x:30   CSMi balance, \"+\" squat SQ 2x10 SQ 2x10 WS x10 ea  SQ x10 SQ x15 SQ 2x10   Therapeutic ACT        sidestepping    25ft x 2            Mirror walking    No AD 20ft x 6            Amb up/down steps     *4 trials SOS 2HR up/down 4 steps   Chair squats        Reverse Lunges mirror        Step ups 6\" x15 FWD 6\" x15 FWD 6\" x 15 FWD 6\" x15 fwd 6\" x15 FWD       Modalities        CP  KNEE                                     "

## 2025-04-09 ENCOUNTER — OFFICE VISIT (OUTPATIENT)
Dept: PHYSICAL THERAPY | Facility: CLINIC | Age: 75
End: 2025-04-09
Payer: MEDICARE

## 2025-04-09 ENCOUNTER — OFFICE VISIT (OUTPATIENT)
Dept: OBGYN CLINIC | Facility: HOSPITAL | Age: 75
End: 2025-04-09

## 2025-04-09 VITALS — WEIGHT: 194 LBS | BODY MASS INDEX: 33.12 KG/M2 | HEIGHT: 64 IN

## 2025-04-09 DIAGNOSIS — M25.561 CHRONIC PAIN OF RIGHT KNEE: Primary | ICD-10-CM

## 2025-04-09 DIAGNOSIS — Z96.651 AFTERCARE FOLLOWING RIGHT KNEE JOINT REPLACEMENT SURGERY: Primary | ICD-10-CM

## 2025-04-09 DIAGNOSIS — G89.29 CHRONIC PAIN OF RIGHT KNEE: Primary | ICD-10-CM

## 2025-04-09 DIAGNOSIS — Z47.1 AFTERCARE FOLLOWING RIGHT KNEE JOINT REPLACEMENT SURGERY: Primary | ICD-10-CM

## 2025-04-09 DIAGNOSIS — M17.11 PRIMARY OSTEOARTHRITIS OF RIGHT KNEE: ICD-10-CM

## 2025-04-09 PROCEDURE — 97112 NEUROMUSCULAR REEDUCATION: CPT

## 2025-04-09 PROCEDURE — 97140 MANUAL THERAPY 1/> REGIONS: CPT

## 2025-04-09 PROCEDURE — 97530 THERAPEUTIC ACTIVITIES: CPT

## 2025-04-09 PROCEDURE — 97110 THERAPEUTIC EXERCISES: CPT

## 2025-04-09 PROCEDURE — 99024 POSTOP FOLLOW-UP VISIT: CPT | Performed by: ORTHOPAEDIC SURGERY

## 2025-04-09 NOTE — LETTER
April 9, 2025     Patient: Jason Miguel  YOB: 1950  Date of Visit: 4/9/2025      To Whom it May Concern:    Jason Miguel is under my professional care. Jason VELEZ was seen in my office on 4/9/2025. Jason VELEZ may return to work on 5/12/2025 .    If you have any questions or concerns, please don't hesitate to call.         Sincerely,          Denton Nichole MD        CC: No Recipients

## 2025-04-09 NOTE — PROGRESS NOTES
"Daily Note     Today's date: 2025  Patient name: Jason Miguel  : 1950  MRN: [id number]  Referring provider: Denton Nichole,*  Dx:   Encounter Diagnosis     ICD-10-CM    1. Chronic pain of right knee  M25.561     G89.29       2. Primary osteoarthritis of right knee  M17.11           Start Time: 0900          Subjective: Patient states he is feel pretty good. He is more stiff then anything.       Objective: See treatment diary below    Incision still clean and dry. Scabs still present. Internal suture still present in middle of incision.     Assessment: Tolerated treatment well. Patient would benefit from continued PT for stretching and strengthening. He was able to perform his exercises with few verbal cues for proper performance of exercises. Patient was able to perform full rotation on the bike. He felt \"good\" leaving department.       Plan: Continue per plan of care.  Progress treatment as tolerated.       Re-Evaluation:  Access Code [email or web address]   PRECAUTIONS:Hx L LE radiculopathy - flexion responder  Knee Specialty Daily Treatment Diary     Manual Therapy 4/2 4/7 4/9 3/26 3/31   Knee flexion/ extension FLX/EXT 3x:20 ea FLX/EXT 3x:20 ea Flex/ ext performed Flex 3x:15  EXT 2x10 oscillations Flex 2x:15  EXT 2x:15   Hamstring stretch  Calf stretch 4x:15    4x:15 :15x4    :15x4 :15x4    :15x4 4x:15    4x:15 3x:15    3x:15   Prone Quad stretch 3x:30 :30x3 :30x3  3x:15   Patellar mobs x10 X10 ea glide X10 ea glide x10 x10   Knee stretch on edge of mat 1x2 mins ea 1x2 min ea 1x2 min ea 1x1 mins ea 1x1 min ea       Ther Ex 4/2 4/7 4/9 3/26 3/31   Nu Step for LE strength S=9  A=10 L5 x 10 mins L5 x10 min L5 x8 min L4 x 6 mins L4 x 5 mins   Biodex Bike for knee ROM/ endurance S=12 1/2 revolutions x 4 mins 1/2revolution x5 min Full rev x6 min  1/2 revolutions x 3 mins 1/2 revolutions x 3 mins   Heel slides flex/abd        Knee EXT Board X 2 mins X3 min X4 min *NV X 1min   SAQ        Prone knee flex " "x10 x10 x10 X10 after stretch    Ankle pumps knee EXT        SLR all planes        Ball squeeze        LAQ x15 x15 x15 X15 after stretch X10    Hamstring stretch        Calf stretch Wedge 3x:30 Wedge :30x3 Wedge :30x3 Wedge 3x:30 Wedge 3x:30   Standing hamstring curls        HR/TR        Mini Squat        Step knee flexion                Total gym squats (deep)        Neuro Re-ed        TKE GRN x15:05 Green :05x15 Green :05x15 GRN x15 :05 GRN x15:05   Gluteal set        QS  Prone x10 Prone x10 X10 towel @ heel  Prone x10 after stretch    NMES w/ Russian stim QS 10 sec on and rest 30 sec off  X 10 mins QS 10 sec on and rest 30 sec off  X 10 mins QS 10 sec on and rest 30 sec off  X 10 mins  QS 10 sec on and rest 30 sec off  X 10 mins           Fitter board        Eccentric Leg press        Clam Shells        Heel-toe amb    10ft x 8    Foam tandem 1x:30 B/L 2x:30 B/L 2x :30 B/L 1x:30ea B/L Blue 1x:30   CSMi balance, \"+\" squat SQ 2x10 SQ 2x10 Sq 2x10 SQ x15 SQ 2x10   Therapeutic ACT        sidestepping    25ft x 2            Mirror walking    No AD 20ft x 6            Amb up/down steps     *4 trials SOS 2HR up/down 4 steps   Chair squats        Reverse Lunges mirror        Step ups 6\" x15 FWD 6\" x15 FWD 8\" x10 FWD 6\" x15 fwd 6\" x15 FWD       Modalities        CP  KNEE                                       "

## 2025-04-09 NOTE — PROGRESS NOTES
Name: Jason Miguel      : 1950       MRN: [id number]   Encounter Provider: Denton Nichole MD   Encounter Date: 25  Encounter department: [facility] [place]     ASSESSMENT & PLAN:  Assessment & Plan  Aftercare following right knee joint replacement surgery  Continue physical therapy   Continue weight bearing activities as tolerated   Continue OTC pain medications as needed   Return to work note created and placed in chart with return date of  (10 weeks post-op)  Follow up in 6 weeks for 3 month post-op appointment with repeat x-rays              To do next visit:  Return in about 6 weeks (around 2025) for 3 month post-op appointment with repeat x-rays.    _____________________________________________________  CHIEF COMPLAINT:  Chief Complaint   Patient presents with   • Right Knee - Post-op         SUBJECTIVE:  Jason Miguel is a 74 y.o. male who presents 6 weeks s/p right total knee arthroplasty.  He is doing well.  He denies pain of the right knee and has not required any medications for the pain.  He continues to attend physical therapy twice a week and admits to making good progress.  He no longer requires use of ambulatory assistive device.  Patient wishes to return to work in early May, for which a note was created and placed in his chart today.         PAST MEDICAL HISTORY:  Past Medical History:   Diagnosis Date   • Arthritis    • PONV (postoperative nausea and vomiting)        PAST SURGICAL HISTORY:  Past Surgical History:   Procedure Laterality Date   • COLONOSCOPY     • ELBOW SURGERY Bilateral    • FOOT TENDON SURGERY Bilateral     heel   • HAND SURGERY     • SD ARTHRP KNE CONDYLE&PLATU MEDIAL&LAT COMPARTMENTS Left 2023    Procedure: ARTHROPLASTY KNEE TOTAL W ROBOT;  Surgeon: Denton Nichole MD;  Location: [facility];  Service: Orthopedics   • SD ARTHRP KNE CONDYLE&PLATU MEDIAL&LAT COMPARTMENTS Right 2025     Procedure: ARTHROPLASTY KNEE TOTAL W ROBOT;  Surgeon: Denton Nichole MD;  Location:  MAIN OR;  Service: Orthopedics   • IA ARTHRS KNE SURG W/MENISCECTOMY MED/LAT W/SHVG Right 2022    Procedure: KNEE ARTHROSCOPY WITH medial MENISCECTOMY chondroplasty synovectomy injection;  Surgeon: Hank Shirley DO;  Location: [facility];  Service: Orthopedics   • IA ARTHRS KNE SURG W/MENISCECTOMY MED/LAT W/SHVG Left 2023    Procedure: Left knee arthroscopy: Medial meniscectomy; Lateral meniscectomy; Chondroplasty; Synovectomy; Post arthroscopic injection of intra-articular joint space and peripheral portal;  Surgeon: Hank Shirley DO;  Location: [facility];  Service: Orthopedics   • IA NEUROPLASTY &/TRANSPOS MEDIAN NRV CARPAL TUNNE Left 2021    Procedure: RELEASE CARPAL TUNNEL;  Surgeon: Hank Shirley DO;  Location:  MAIN OR;  Service: Orthopedics       FAMILY HISTORY:  Family History   Problem Relation Age of Onset   • Heart disease Mother         hx MI   • Diabetes Mother    • Stroke Mother    • Arthritis Mother    • COPD Father         84 yrs   • Coronary artery disease Father    • Cancer Brother        SOCIAL HISTORY:  Social History     Tobacco Use   • Smoking status: Former     Current packs/day: 0.00     Average packs/day: 1 pack/day for 35.0 years (35.0 ttl pk-yrs)     Types: Cigarettes     Start date:      Quit date:      Years since quittin.2   • Smokeless tobacco: Never   Vaping Use   • Vaping status: Never Used   Substance Use Topics   • Alcohol use: Yes     Comment: 2 x per week   • Drug use: No       MEDICATIONS:    Current Outpatient Medications:   •  metFORMIN (GLUCOPHAGE) 500 mg tablet, Take 1 tablet (500 mg total) by mouth 2 (two) times a day with meals, Disp: 180 tablet, Rfl: 3  •  methocarbamol (ROBAXIN) 500 mg tablet, Take 1 tablet (500 mg total) by mouth 3 (three) times a day as needed for muscle spasms, Disp: 60 tablet, Rfl: 0  •  semaglutide, 2 mg/dose,  "(Ozempic) 8 mg/ mL injection pen, Inject 0.75 mL (2 mg total) under the skin every 7 days, Disp: 9 mL, Rfl: 1  •  simvastatin (ZOCOR) 20 mg tablet, TAKE 1 TABLET DAILY AT BEDTIME, Disp: 90 tablet, Rfl: 1  •  tamsulosin (FLOMAX) 0.4 mg, TAKE 2 CAPSULES DAILY WITH DINNER, Disp: 180 capsule, Rfl: 1    ALLERGIES:  No Known Allergies    LABS:  HgA1c:   Lab Results   Component Value Date    HGBA1C 5.9 (H) 01/29/2025     BMP:   Lab Results   Component Value Date    CALCIUM 8.7 02/28/2025     04/05/2018    K 4.5 02/28/2025    CO2 24 02/28/2025     02/28/2025    BUN 22 02/28/2025    CREATININE 0.97 02/28/2025     CBC: No components found for: \"CBC\"    _____________________________________________________  PHYSICAL EXAMINATION:  Vital signs: Ht 5' 4\" (1.626 m)   Wt 88 kg (194 lb)   BMI 33.30 kg/m²   General: No acute distress, awake and alert  Psychiatric: Mood and affect appear appropriate  HEENT: Trachea Midline, No torticollis, no apparent facial trauma  Cardiovascular: No audible murmurs; Extremities appear perfused  Pulmonary: No audible wheezing or stridor  Skin: No open lesions; see further details (if any) below    MUSCULOSKELETAL EXAMINATION:  Ortho Exam  Right knee:  Well healed anterior incision  Surgical incision pink in color without surrounding erythema, edema, or signs of infection   No ecchymosis  Appropriate swelling of lower limb  Appropriate warmth of knee  Extensor mechanism intact  Extension 0  Flexion 120  Calf compartments soft and supple  Sensation intact  Toes are warm sensate and mobile     ___________________________________________________  STUDIES REVIEWED:  I personally reviewed the images obtained in office today and my independent interpretation is as follows:    None performed      PROCEDURES PERFORMED:    None preformed       Yael Guevara PA-C  "

## 2025-04-14 ENCOUNTER — OFFICE VISIT (OUTPATIENT)
Dept: PHYSICAL THERAPY | Facility: CLINIC | Age: 75
End: 2025-04-14
Payer: MEDICARE

## 2025-04-14 DIAGNOSIS — M25.561 CHRONIC PAIN OF RIGHT KNEE: Primary | ICD-10-CM

## 2025-04-14 DIAGNOSIS — G89.29 CHRONIC PAIN OF RIGHT KNEE: Primary | ICD-10-CM

## 2025-04-14 DIAGNOSIS — M17.11 PRIMARY OSTEOARTHRITIS OF RIGHT KNEE: ICD-10-CM

## 2025-04-14 PROCEDURE — 97110 THERAPEUTIC EXERCISES: CPT

## 2025-04-14 PROCEDURE — 97140 MANUAL THERAPY 1/> REGIONS: CPT

## 2025-04-14 NOTE — PROGRESS NOTES
"Daily Note     Today's date: 2025  Patient name: Jason Miguel  : 1950  MRN: [id number]  Referring provider: Denton Nichole,*  Dx:   Encounter Diagnosis     ICD-10-CM    1. Chronic pain of right knee  M25.561     G89.29       2. Primary osteoarthritis of right knee  M17.11           Start Time: 0900          Subjective: Patient states his right knee is feeling \"really good\". He went away the weekend and did a lot of walking that went very well. His right knee feels \"stiff\" and \"uncomfortable\" in the knee, but no pain.       Objective: See treatment diary below      Assessment: Tolerated treatment well. Patient would benefit from continued PT for stretching and strengthening. He was able to add exercises to his program. Patient continues with limited knee extension. He felt \"good\" leaving department.      Plan: Continue per plan of care.  Progress treatment as tolerated.       Re-Evaluation:  Access Code [email or web address]   PRECAUTIONS:Hx L LE radiculopathy - flexion responder  Knee Specialty Daily Treatment Diary     Manual Therapy 4/2 4/7 4/9 4/14 3/31   Knee flexion/ extension FLX/EXT 3x:20 ea FLX/EXT 3x:20 ea Flex/ ext performed Flex/ ext performed Flex 2x:15  EXT 2x:15   Hamstring stretch  Calf stretch 4x:15    4x:15 :15x4    :15x4 :15x4    :15x4 :15x4    :15x4 3x:15    3x:15   Prone Quad stretch 3x:30 :30x3 :30x3 :30x3 3x:15   Patellar mobs x10 X10 ea glide X10 ea glide X10 ea glide x10   Knee stretch on edge of mat 1x2 mins ea 1x2 min ea 1x2 min ea 1x2 min ea 1x1 min ea       Ther Ex 4/2 4/7 4/9 4/14 3/31   Nu Step for LE strength S=9  A=10 L5 x 10 mins L5 x10 min L5 x8 min L5 x6 min L4 x 5 mins   Biodex Bike for knee ROM/ endurance S=12 1/2 revolutions x 4 mins 1/2revolution x5 min Full rev x6 min  L1 x8 min 1/2 revolutions x 3 mins   Heel slides flex/abd        Knee EXT Board X 2 mins X3 min X4 min X4 min X 1min   SAQ        Prone knee flex x10 x10 x10 x10    Ankle pumps knee EXT      " "  SLR all planes        Ball squeeze        LAQ x15 x15 x15 x15 X10    Hamstring stretch        Calf stretch Wedge 3x:30 Wedge :30x3 Wedge :30x3 Wedge :30x3 Wedge 3x:30   Standing hamstring curls        HR/TR        Mini Squat        Step knee flexion                Total gym squats (deep)    Mid x15    Neuro Re-ed        TKE GRN x15:05 Green :05x15 Green :05x15 Green :05x20 GRN x15:05   Gluteal set        QS  Prone x10 Prone x10 Prone x10  Sup w/ t@a x10    NMES w/ Russian stim QS 10 sec on and rest 30 sec off  X 10 mins QS 10 sec on and rest 30 sec off  X 10 mins QS 10 sec on and rest 30 sec off  X 10 mins  QS 10 sec on and rest 30 sec off  X 10 mins           Fitter board        Eccentric Leg press        Clam Shells        Heel-toe amb        Foam tandem 1x:30 B/L 2x:30 B/L 2x :30 B/L :30x2 B/L Blue 1x:30   CSMi balance, \"+\" squat SQ 2x10 SQ 2x10 Sq 2x10 Sq 2x10 SQ 2x10   Therapeutic ACT        sidestepping                Mirror walking                Amb up/down steps     *4 trials SOS 2HR up/down 4 steps   Chair squats        Reverse Lunges mirror        Step ups 6\" x15 FWD 6\" x15 FWD 8\" x10 FWD 8\" x10 FWD 6\" x15 FWD       Modalities        CP  KNEE                                         "

## 2025-04-16 ENCOUNTER — OFFICE VISIT (OUTPATIENT)
Dept: PHYSICAL THERAPY | Facility: CLINIC | Age: 75
End: 2025-04-16
Payer: MEDICARE

## 2025-04-16 DIAGNOSIS — M25.561 CHRONIC PAIN OF RIGHT KNEE: Primary | ICD-10-CM

## 2025-04-16 DIAGNOSIS — G89.29 CHRONIC PAIN OF RIGHT KNEE: Primary | ICD-10-CM

## 2025-04-16 DIAGNOSIS — M17.11 PRIMARY OSTEOARTHRITIS OF RIGHT KNEE: ICD-10-CM

## 2025-04-16 PROCEDURE — 97112 NEUROMUSCULAR REEDUCATION: CPT | Performed by: PHYSICAL THERAPIST

## 2025-04-16 PROCEDURE — 97140 MANUAL THERAPY 1/> REGIONS: CPT | Performed by: PHYSICAL THERAPIST

## 2025-04-16 PROCEDURE — 97110 THERAPEUTIC EXERCISES: CPT | Performed by: PHYSICAL THERAPIST

## 2025-04-16 NOTE — PROGRESS NOTES
Daily Note     Today's date: 2025  Patient name: Jason Miguel  : 1950  MRN: [id number]  Referring provider: Denton Nichole,*  Dx:   Encounter Diagnosis     ICD-10-CM    1. Chronic pain of right knee  M25.561     G89.29       2. Primary osteoarthritis of right knee  M17.11                      Subjective: The patient notes feeling minimal pain at his R knee today.  The patient is pleased with how his recovery is progressing      Objective: See treatment diary below      Assessment: The patient tolerated all activities well today.  The patient was given minimal cues to perform the exercises properly.  The patient notes most pain with manual therapy stretching to end range.  There were no complaints of problems after the session today.  The patient will benefit from continued skilled physical therapy to progress towards achieving patient centered goals.         Plan: Continue per plan of care.  Progress treatment as tolerated.       Re-Evaluation:  Access Code [email or web address]   PRECAUTIONS:Hx L LE radiculopathy - flexion responder  Knee Specialty Daily Treatment Diary     Manual Therapy    Knee flexion/ extension FLX/EXT 3x:20 ea FLX/EXT 3x:20 ea Flex/ ext performed Flex/ ext performed Flex/ ext performed   Hamstring stretch  Calf stretch 4x:15    4x:15 :15x4    :15x4 :15x4    :15x4 :15x4    :15x4 :15x4    :15x4   Prone Quad stretch 3x:30 :30x3 :30x3 :30x3 :30x3   Patellar mobs x10 X10 ea glide X10 ea glide X10 ea glide x10   Knee stretch on edge of mat 1x2 mins ea 1x2 min ea 1x2 min ea 1x2 min ea 1x1 min EXT       Ther Ex 16   Nu Step for LE strength S=9  A=10 L5 x 10 mins L5 x10 min L5 x8 min L5 x6 min L5 x 5 mins   Biodex Bike for knee ROM/ endurance S=12 1/2 revolutions x 4 mins 1/2revolution x5 min Full rev x6 min  L1 x8 min L1 x 5 min   Heel slides flex/abd        Knee EXT Board X 2 mins X3 min X4 min X4 min X 4 mins   SAQ        Prone knee flex x10  "x10 x10 x10 x10   Ankle pumps knee EXT        SLR all planes        Ball squeeze        LAQ x15 x15 x15 x15 x15   Hamstring stretch        Calf stretch Wedge 3x:30 Wedge :30x3 Wedge :30x3 Wedge :30x3 Wedge 3x:30   Standing hamstring curls        HR/TR        Mini Squat        Step knee flexion                Total gym squats (deep)    Mid x15 2x10   Neuro Re-ed        TKE GRN x15:05 Green :05x15 Green :05x15 Green :05x20 BLUE :05 x 20   Gluteal set        QS  Prone x10 Prone x10 Prone x10  Sup w/ t@a x10 Prone x10   NMES w/ Russian stim QS 10 sec on and rest 30 sec off  X 10 mins QS 10 sec on and rest 30 sec off  X 10 mins QS 10 sec on and rest 30 sec off  X 10 mins             Fitter board        Eccentric Leg press        Clam Shells        Heel-toe amb        Foam tandem 1x:30 B/L 2x:30 B/L 2x :30 B/L :30x2 B/L :30 x1 B/L tandem  :30x1 B/L SLB   CSMi balance, \"+\" squat SQ 2x10 SQ 2x10 Sq 2x10 Sq 2x10 SQ 2x15   Therapeutic ACT        sidestepping                Mirror walking                Amb up/down steps        Chair squats        Reverse Lunges mirror        Step ups 6\" x15 FWD 6\" x15 FWD 8\" x10 FWD 8\" x10 FWD        Modalities        CP  KNEE                             "

## 2025-04-21 ENCOUNTER — OFFICE VISIT (OUTPATIENT)
Dept: PHYSICAL THERAPY | Facility: CLINIC | Age: 75
End: 2025-04-21
Payer: MEDICARE

## 2025-04-21 DIAGNOSIS — G89.29 CHRONIC PAIN OF RIGHT KNEE: Primary | ICD-10-CM

## 2025-04-21 DIAGNOSIS — M25.561 CHRONIC PAIN OF RIGHT KNEE: Primary | ICD-10-CM

## 2025-04-21 DIAGNOSIS — M17.11 PRIMARY OSTEOARTHRITIS OF RIGHT KNEE: ICD-10-CM

## 2025-04-21 PROCEDURE — 97140 MANUAL THERAPY 1/> REGIONS: CPT

## 2025-04-21 PROCEDURE — 97110 THERAPEUTIC EXERCISES: CPT

## 2025-04-21 PROCEDURE — 97112 NEUROMUSCULAR REEDUCATION: CPT

## 2025-04-21 NOTE — PROGRESS NOTES
Daily Note     Today's date: 2025  Patient name: Jason Miguel  : 1950  MRN: [id number]  Referring provider: Denton Nichole,*  Dx:   Encounter Diagnosis     ICD-10-CM    1. Chronic pain of right knee  M25.561     G89.29       2. Primary osteoarthritis of right knee  M17.11           Start Time: 0858          Subjective: Patient has some increased pain in the knee when he is on it, but other then that is is good. His only concern is the stiffness yet. He is happy with his knee progress.       Objective: See treatment diary below      Assessment: Tolerated treatment well. Patient would benefit from continued PT for stretching and strengthening. He was able to add exercises to his program with little difficulty. Patient felt good leaving department.       Plan: Continue per plan of care.  Progress treatment as tolerated.       Re-Evaluation:  Access Code [email or web address]   PRECAUTIONS:Hx L LE radiculopathy - flexion responder  Knee Specialty Daily Treatment Diary     Manual Therapy    Knee flexion/ extension Flex/ ext performed FLX/EXT 3x:20 ea Flex/ ext performed Flex/ ext performed Flex/ ext performed   Hamstring stretch  Calf stretch :15x4    :15x4 :15x4    :15x4 :15x4    :15x4 :15x4    :15x4 :15x4    :15x4   Prone Quad stretch :30x3 :30x3 :30x3 :30x3 :30x3   Patellar mobs X10 glides X10 ea glide X10 ea glide X10 ea glide x10   Knee stretch on edge of mat 1 min EXT 1x2 min ea 1x2 min ea 1x2 min ea 1x1 min EXT       Ther Ex 16   Nu Step for LE strength S=9  A=10 L5 x5 min L5 x10 min L5 x8 min L5 x6 min L5 x 5 mins   Biodex Bike for knee ROM/ endurance S=12 L1 x5 min 1/2revolution x5 min Full rev x6 min  L1 x8 min L1 x 5 min   Heel slides flex/abd        Knee EXT Board X4 min X3 min X4 min X4 min X 4 mins   SAQ        Prone knee flex x10 x10 x10 x10 x10   Ankle pumps knee EXT        SLR all planes        Ball squeeze        LAQ x15 x15 x15 x15 x15  "  Hamstring stretch        Calf stretch Wedge :30x3 Wedge :30x3 Wedge :30x3 Wedge :30x3 Wedge 3x:30   Standing hamstring curls        HR/TR        Mini Squat        Step knee flexion                Total gym squats (deep) NV   Mid x15 2x10   Neuro Re-ed        TKE Blue x20 Green :05x15 Green :05x15 Green :05x20 BLUE :05 x 20   Gluteal set        QS T@a x10  Prone x10 Prone x10 Prone x10 Prone x10  Sup w/ t@a x10 Prone x10   NMES w/ Russian stim  QS 10 sec on and rest 30 sec off  X 10 mins QS 10 sec on and rest 30 sec off  X 10 mins     SLB foam :30x2 ea B/L       Fitter board        Eccentric Leg press        Clam Shells        Heel-toe amb        Foam tandem EO/EC :30x1 ea     2x:30 B/L 2x :30 B/L :30x2 B/L :30 x1 B/L tandem  :30x1 B/L SLB   CSMi balance, \"+\" squat Sq 2x15 SQ 2x10 Sq 2x10 Sq 2x10 SQ 2x15   Therapeutic ACT        sidestepping                Mirror walking                Amb up/down steps        Chair squats        Reverse Lunges mirror        Step ups  6\" x15 FWD 8\" x10 FWD 8\" x10 FWD        Modalities        CP  KNEE                               "

## 2025-04-23 ENCOUNTER — OFFICE VISIT (OUTPATIENT)
Dept: PHYSICAL THERAPY | Facility: CLINIC | Age: 75
End: 2025-04-23
Payer: MEDICARE

## 2025-04-23 ENCOUNTER — APPOINTMENT (OUTPATIENT)
Dept: LAB | Facility: CLINIC | Age: 75
End: 2025-04-23
Attending: NURSE PRACTITIONER
Payer: MEDICARE

## 2025-04-23 DIAGNOSIS — M17.11 PRIMARY OSTEOARTHRITIS OF RIGHT KNEE: ICD-10-CM

## 2025-04-23 DIAGNOSIS — M25.561 CHRONIC PAIN OF RIGHT KNEE: Primary | ICD-10-CM

## 2025-04-23 DIAGNOSIS — Z12.5 PROSTATE CANCER SCREENING: ICD-10-CM

## 2025-04-23 DIAGNOSIS — E11.9 TYPE 2 DIABETES MELLITUS WITHOUT COMPLICATION, WITHOUT LONG-TERM CURRENT USE OF INSULIN (HCC): ICD-10-CM

## 2025-04-23 DIAGNOSIS — G89.29 CHRONIC PAIN OF RIGHT KNEE: Primary | ICD-10-CM

## 2025-04-23 DIAGNOSIS — E78.00 HYPERCHOLESTEREMIA: ICD-10-CM

## 2025-04-23 LAB
ALBUMIN SERPL BCG-MCNC: 4.6 G/DL (ref 3.5–5)
ALP SERPL-CCNC: 61 U/L (ref 34–104)
ALT SERPL W P-5'-P-CCNC: 10 U/L (ref 7–52)
ANION GAP SERPL CALCULATED.3IONS-SCNC: 9 MMOL/L (ref 4–13)
AST SERPL W P-5'-P-CCNC: 13 U/L (ref 13–39)
BASOPHILS # BLD AUTO: 0.05 THOUSANDS/ÂΜL (ref 0–0.1)
BASOPHILS NFR BLD AUTO: 1 % (ref 0–1)
BILIRUB SERPL-MCNC: 0.45 MG/DL (ref 0.2–1)
BUN SERPL-MCNC: 15 MG/DL (ref 5–25)
CALCIUM SERPL-MCNC: 9.6 MG/DL (ref 8.4–10.2)
CHLORIDE SERPL-SCNC: 102 MMOL/L (ref 96–108)
CHOLEST SERPL-MCNC: 105 MG/DL (ref ?–200)
CO2 SERPL-SCNC: 27 MMOL/L (ref 21–32)
CREAT SERPL-MCNC: 0.81 MG/DL (ref 0.6–1.3)
EOSINOPHIL # BLD AUTO: 0.12 THOUSAND/ÂΜL (ref 0–0.61)
EOSINOPHIL NFR BLD AUTO: 2 % (ref 0–6)
ERYTHROCYTE [DISTWIDTH] IN BLOOD BY AUTOMATED COUNT: 13.6 % (ref 11.6–15.1)
EST. AVERAGE GLUCOSE BLD GHB EST-MCNC: 103 MG/DL
GFR SERPL CREATININE-BSD FRML MDRD: 88 ML/MIN/1.73SQ M
GLUCOSE P FAST SERPL-MCNC: 86 MG/DL (ref 65–99)
HBA1C MFR BLD: 5.2 %
HCT VFR BLD AUTO: 49 % (ref 36.5–49.3)
HDLC SERPL-MCNC: 45 MG/DL
HGB BLD-MCNC: 15.2 G/DL (ref 12–17)
IMM GRANULOCYTES # BLD AUTO: 0.01 THOUSAND/UL (ref 0–0.2)
IMM GRANULOCYTES NFR BLD AUTO: 0 % (ref 0–2)
LDLC SERPL CALC-MCNC: 43 MG/DL (ref 0–100)
LYMPHOCYTES # BLD AUTO: 1.65 THOUSANDS/ÂΜL (ref 0.6–4.47)
LYMPHOCYTES NFR BLD AUTO: 28 % (ref 14–44)
MCH RBC QN AUTO: 28.7 PG (ref 26.8–34.3)
MCHC RBC AUTO-ENTMCNC: 31 G/DL (ref 31.4–37.4)
MCV RBC AUTO: 93 FL (ref 82–98)
MONOCYTES # BLD AUTO: 0.45 THOUSAND/ÂΜL (ref 0.17–1.22)
MONOCYTES NFR BLD AUTO: 8 % (ref 4–12)
NEUTROPHILS # BLD AUTO: 3.69 THOUSANDS/ÂΜL (ref 1.85–7.62)
NEUTS SEG NFR BLD AUTO: 61 % (ref 43–75)
NRBC BLD AUTO-RTO: 0 /100 WBCS
PLATELET # BLD AUTO: 213 THOUSANDS/UL (ref 149–390)
PMV BLD AUTO: 11.1 FL (ref 8.9–12.7)
POTASSIUM SERPL-SCNC: 4.2 MMOL/L (ref 3.5–5.3)
PROT SERPL-MCNC: 6.9 G/DL (ref 6.4–8.4)
RBC # BLD AUTO: 5.29 MILLION/UL (ref 3.88–5.62)
SODIUM SERPL-SCNC: 138 MMOL/L (ref 135–147)
TRIGL SERPL-MCNC: 84 MG/DL (ref ?–150)
WBC # BLD AUTO: 5.97 THOUSAND/UL (ref 4.31–10.16)

## 2025-04-23 PROCEDURE — 85025 COMPLETE CBC W/AUTO DIFF WBC: CPT

## 2025-04-23 PROCEDURE — 97110 THERAPEUTIC EXERCISES: CPT

## 2025-04-23 PROCEDURE — 82570 ASSAY OF URINE CREATININE: CPT

## 2025-04-23 PROCEDURE — 80053 COMPREHEN METABOLIC PANEL: CPT

## 2025-04-23 PROCEDURE — G0103 PSA SCREENING: HCPCS

## 2025-04-23 PROCEDURE — 82043 UR ALBUMIN QUANTITATIVE: CPT

## 2025-04-23 PROCEDURE — 83036 HEMOGLOBIN GLYCOSYLATED A1C: CPT

## 2025-04-23 PROCEDURE — 97140 MANUAL THERAPY 1/> REGIONS: CPT

## 2025-04-23 PROCEDURE — 80061 LIPID PANEL: CPT

## 2025-04-23 PROCEDURE — 97112 NEUROMUSCULAR REEDUCATION: CPT

## 2025-04-23 PROCEDURE — 36415 COLL VENOUS BLD VENIPUNCTURE: CPT

## 2025-04-23 NOTE — PROGRESS NOTES
Daily Note     Today's date: 2025  Patient name: Jason Miguel  : 1950  MRN: [id number]  Referring provider: Denton Nichole,*  Dx:   Encounter Diagnosis     ICD-10-CM    1. Chronic pain of right knee  M25.561     G89.29       2. Primary osteoarthritis of right knee  M17.11                      Subjective: Patient states he has no pain, just knee stiffness with a little uncomfortable behind the right knee. He wants to continue to work on knee extension      Objective: See treatment diary below      Assessment: Tolerated treatment well. Patient would benefit from continued PT for stretching and strengthening. He was able to perform his exercises with little difficulty. Few verbal cues needed for proper performance of exercises. Annita felt good leaving department.       Plan: Continue per plan of care.  Progress note during next visit.  Progress treatment as tolerated.       Re-Evaluation:  Access Code [email or web address]   PRECAUTIONS:Hx L LE radiculopathy - flexion responder  Knee Specialty Daily Treatment Diary     Manual Therapy    Knee flexion/ extension Flex/ ext performed Flex/ ext performed Flex/ ext performed Flex/ ext performed Flex/ ext performed   Hamstring stretch  Calf stretch :15x4    :15x4 :15x4    :15x4 :15x4    :15x4 :15x4    :15x4 :15x4    :15x4   Prone Quad stretch :30x3 :30x3 :30x3 :30x3 :30x3   Patellar mobs X10 glides X10 glides X10 ea glide X10 ea glide x10   Knee stretch on edge of mat 1 min EXT X1 min EXT 1x2 min ea 1x2 min ea 1x1 min EXT       Ther Ex 16   Nu Step for LE strength S=9  A=10 L5 x5 min L5x 5 min L5 x8 min L5 x6 min L5 x 5 mins   Biodex Bike for knee ROM/ endurance S=12 L1 x5 min L2 x 10 min Full rev x6 min  L1 x8 min L1 x 5 min   Heel slides flex/abd        Knee EXT Board X4 min X4 min X4 min X4 min X 4 mins   SAQ        Prone knee flex x10 x10 x10 x10 x10   Ankle pumps knee EXT        SLR all planes        Ball  "squeeze        LAQ x15 x15 x15 x15 x15   Hamstring stretch        Calf stretch Wedge :30x3 Wedge :30x4 Wedge :30x3 Wedge :30x3 Wedge 3x:30   Standing hamstring curls        HR/TR        Mini Squat        Step knee flexion                Total gym squats (deep) NV NV  Mid x15 2x10   Neuro Re-ed        TKE Blue x20 Blue x20 Green :05x15 Green :05x20 BLUE :05 x 20   Gluteal set        QS T@a x10  Prone x10 T@a x10  Prone x10 Prone x10 Prone x10  Sup w/ t@a x10 Prone x10   NMES w/ Russian stim   QS 10 sec on and rest 30 sec off  X 10 mins     SLB foam :30x2 ea B/L :30x2 ea B/L      Fitter board        Eccentric Leg press        Clam Shells        Heel-toe amb        Foam tandem EO/EC :30x1 ea B/L     EO/EC :30x1 ea B/L 2x :30 B/L :30x2 B/L :30 x1 B/L tandem  :30x1 B/L SLB   CSMi balance, \"+\" squat Sq 2x15 Sq 2x15 Sq 2x10 Sq 2x10 SQ 2x15   Therapeutic ACT        sidestepping                Mirror walking                Amb up/down steps        Chair squats        Reverse Lunges mirror        Step ups   8\" x10 FWD 8\" x10 FWD        Modalities        CP  KNEE                                 "

## 2025-04-24 LAB
CREAT UR-MCNC: 119.3 MG/DL
MICROALBUMIN UR-MCNC: 10 MG/L
MICROALBUMIN/CREAT 24H UR: 8 MG/G CREATININE (ref 0–30)
PSA SERPL-MCNC: 2.3 NG/ML (ref 0–4)

## 2025-04-24 NOTE — PROGRESS NOTES
PT Re-Evaluation     Today's date: 2025  Patient name: Jason Miguel  : 1950  MRN: [id number]  Referring provider: Denton Nichole,*  Dx:   Encounter Diagnosis     ICD-10-CM    1. Chronic pain of right knee  M25.561     G89.29       2. Primary osteoarthritis of right knee  M17.11                        Assessment  Impairments: abnormal gait, abnormal or restricted ROM, activity intolerance, impaired balance, impaired physical strength, lacks appropriate home exercise program, pain with function and poor posture   Functional limitations: difficulty with prolonged standing, prolonged walking, walking on unlevel surfaces, difficulty squatting/kneeling, difficulty stair-climbing, and difficulty transferring from low surfaces.  Symptom irritability: low    Assessment details: Jason Miguel is a 74 y.o. male with a history of arthritis, DM, BMI>30, chronic pain, and high cholesterol that presents for a physical therapy re-evaluation/ POC UPDATE.  The patient demonstrates signs and symptoms consistent with chronic pain R knee, R knee OA, 8 weeks s/p R TKA.  During the examination the patient demonstrated improved but impaired R LE strength, improved but impaired R knee ROM, improved but impaired gait, and decreased R knee pain.  The patient has made functional gains since last therapy assessment.  The patient is now able to ambulate up and down the steps.  The patient has not attempted to dig or kneel on the R knee.  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  The patient notes he ius unable to fully straighten out his R knee at rest. The patient will benefit from continued skilled PT to address impairments, work towards goals, and restore patient PLOF.          Understanding of Dx/Px/POC: good     Prognosis: good  Prognosis details: Positive prognostic indicators include: positive attitude toward recovery,  good understanding of diagnosis/treatment plan, and absence of observed red flags.      Negative prognostic indicators include: Significant medical Hx, chronicity of condition    Goals  GOALS TO BE ACHIEVED BY THE END OF THE PRE-OP VISIT  1.  Patient have all questions answered / receive pre operative education regarding precautions/wound care//ADL's.  MET   2.  Patient to be independent with his phase 1 post operative exercises to perform pre surgery.  MET       GOALS TO BE ACHIEVED POST OPERATIVELY    STG: Achieve in 4-6 weeks  1.  Patient's R knee pain at worst less than 2/10 to allow for proper gait. MET  2.  Patient's R knee ROM improve to 0-90 degrees to improve squatting. MET  3.  R LE MMT improve to > 4+/5 all motions tested to improve ADL/recreational activities. PROGRESSING  4.  Timed up and go score improve to less than 14 seconds to indicate improved balance/decreased falls risk. MET    LTG:  Achieve in 6-12 weeks  1.  Patient's knee FOTO score improve to > 65% to indicate a return to normal functioning. PROGRESSING  2.  Patient achieve personal goal of normal function. PROGRESSING  3. Patient to achieve independence with home exercise plan. PROGRESSING  4. 30 second sit to stand score improve to > 14 stands to indicate improved transfers. MET    GOAL(s) MADE DURING POC UPDATE DONE 4/28/25 TO BE ACHIEVED IN 4-12 WEEKS  1. Patient to return to being able to straighten out his R knee fully to rest/sleep without disturbance without difficulty.    Plan  Patient would benefit from: skilled physical therapy  Planned modality interventions: cryotherapy, thermotherapy: hydrocollator packs, neuromuscular electric stimulation and electrical stimulation/Russian stimulation    Planned therapy interventions: joint mobilization, manual therapy, neuromuscular re-education, patient education, postural training, self care, strengthening, stretching, therapeutic activities, therapeutic exercise, home exercise program,  abdominal trunk stabilization, balance, IASTM, gait training and balance/weight bearing training    Frequency: 1-3x/wk.  Duration in weeks: 12  Plan of Care beginning date: 4/28/2025  Plan of Care expiration date: 7/28/2025  Treatment plan discussed with: PTA and patient  Plan details: RE-ASSESS 1X/MONTH        Subjective Evaluation    History of Present Illness  Date of surgery: 2/27/2025  Mechanism of injury: surgery  Mechanism of injury: SUBJECTIVE: 4/28/25:  The patient presents for PT RE-evaluation / POC UPDATE today.   The patient reports improvement since last therapy assessment.  The patient is now able to ambulate up and down the steps.  The patient has not attempted to dig or kneel on the R knee.  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  The patient notes he ius unable to fully straighten out his R knee at rest.  The patient will benefit from continued PT focused on achieving patient specific goals.        SUBJECTIVE: 3/31/25: The patient reports improvement since last therapy assessment.  The patient is now able to ambulate with SPC community distances.  The patient is able to ambulate short distances at home without any AD.  The patient continues to have difficulty with tightness at his R calf. The patient notes most difficulty ambulating up/down the steps normally.  The patient is fearful he doesn't have enough strength to support himself.  The patient will benefit from continued PT focused on achieving patient specific goals.          SUBJECTIVE: 3/3/25: The patient is now s/p R TKA done by .  The patient presents for a post-op PT re-evaluation today.  The patient reports soreness and pain at the R anterior/lateral thigh.  The patient notes persistent difficulty with prolonged standing/walking, difficulty squatting/kneeling, difficulty stair-climbing, and the patient is unable to work as a .  The  patient denies any N/T at the R LE.  The patient denies any post-operative complications.  The patient will benefit from continued PT focused on achieving patient specific goals.            INJURY HISTORY: Jason Miguel is a 74 y.o. male that presents to outpatient physical therapy with complaints of R knee pain and difficulty functioning.  The patient reports chronic R knee pain impacting his function despite attempting conservative care.  The patient notes difficulty with pivoting while walking, carrying laundry, squatting/kneeling, and ADL's.  The patient denies any N/T at the R LE.  The patient presents pre-operatively to get objective measures, learn phase 1 exercises, and receive education on the surgery/self care.  The patient's main goal for physical therapy is to perform ADL's and walk pain free.             Recurrent probem    Patient Goals  Patient goals for therapy: decreased pain, increased motion, increased strength, independence with ADLs/IADLs, return to sport/leisure activities, improved balance, return to work and decreased edema  Patient goal: perform ADL's and walk pain free - progressing  Pain  Current pain ratin  At best pain ratin  At worst pain ratin  Location: R medial knee  Quality: dull ache and sharp  Relieving factors: rest  Progression: improved    Social Support  Steps to enter house: yes  Stairs in house: yes   Lives in: multiple-level home  Lives with: spouse    Employment status: working (drive school bus)  Treatments  Current treatment: physical therapy      Objective     Observations     Additional Observation Details  Patient's postoperative incisions are well healed - patient is aware of scar massage.          Palpation     Right   Tenderness of the rectus femoris, vastus lateralis and vastus medialis.     Additional Palpation Details  NO TTP    Tenderness     Additional Tenderness Details  NO TTP    Neurological Testing     Sensation     Knee   Left Knee    Intact: Light touch    Right Knee   Intact: light touch     Active Range of Motion   Left Knee   Flexion: 121 degrees   Extension: 2 degrees     Right Knee   Flexion: 105 degrees   Extension: -3 degrees     Passive Range of Motion   Left Knee   Flexion: WFL    Right Knee   Flexion: 107 degrees   Extension: -2 degrees with pain    Mobility   Patellar Mobility:   Left Knee   WFL: medial, lateral, superior and inferior.     Right Knee   WFL: medial, lateral, superior and inferior    Strength/Myotome Testing     Left Hip   Planes of Motion   Flexion: 4  Extension: 4  Abduction: 4  Adduction: 4  External rotation: 4  Internal rotation: 4    Right Hip   Planes of Motion   Flexion: 4-  Extension: 4-  Abduction: 4-  Adduction: 4-  External rotation: 4-  Internal rotation: 4-    Left Knee   Flexion: 5  Extension: 5  Quadriceps contraction: good    Right Knee   Flexion: 4  Extension: 4-  Quadriceps contraction: fair    Additional Strength Details  IMPROVED BUT IMPAIRED    Tests     Additional Tests Details  FOTO: 44% ( predicted 61%) ( improved)      Gait impairments: Patient ambulates with no AD WBAT R LE.  The patient demonstrates improved gait speed, (+) flexed R knee, more equal step lengths, and improved but impaired R knee terminal knee extension at heel strike.    TUG: 3/31: 10 sec no AD  3/3: 35 SEC RW  PRE: 9 sec    30SSTS: 3/31: staggered feet 16 times R foot forward  3/3: NT  PRE: 14 stands 0H                 Re-Evaluation:5/27    Access Code [email or web address]   PRECAUTIONS:Hx L LE radiculopathy - flexion responder  Knee Specialty Daily Treatment Diary     Manual Therapy 4/21 4/23 4/28 4/14 4/16   Knee flexion/ extension Flex/ ext performed Flex/ ext performed DONE AD Flex/ ext performed Flex/ ext performed   Hamstring stretch  Calf stretch :15x4    :15x4 :15x4    :15x4 :30x3    :30x3 :15x4    :15x4 :15x4    :15x4   Prone Quad stretch :30x3 :30x3 NV :30x3 :30x3   Patellar mobs X10 glides X10 glides x10 X10 ea glide x10  "  Knee stretch on edge of mat 1 min EXT X1 min EXT  1x2 min ea 1x1 min EXT       Ther Ex 4/21 4/23 4/28 4/14 4/16   Nu Step for LE strength S=9  A=10 L5 x5 min L5x 5 min L5 x 5 mins L5 x6 min L5 x 5 mins   Biodex Bike for knee ROM/ endurance S=12 L1 x5 min L2 x 10 min L1 x 5 mins L1 x8 min L1 x 5 min   Heel slides flex/abd        Knee EXT Board X4 min X4 min  X 4 mins X4 min X 4 mins   SAQ        Prone knee flex x10 x10  x10 x10   Ankle pumps knee EXT        SLR all planes        Ball squeeze        LAQ x15 x15 x10 x15 x15   Hamstring stretch        Calf stretch Wedge :30x3 Wedge :30x4 Wedge 3x:30 Wedge :30x3 Wedge 3x:30   Standing hamstring curls        Hoist knee flex  Hoist knee ext   *3pl 2x10  *2pl 2x10             Knee EXT home unit   Education done with proper donning/doffing and parameters for stretching  AD             Total gym squats (deep) NV NV 2x15 Mid x15 2x10   Neuro Re-ed        TKE Blue x20 Blue x20  Green :05x20 BLUE :05 x 20   Gluteal set        QS T@a x10  Prone x10 T@a x10  Prone x10  Prone x10  Sup w/ t@a x10 Prone x10   NMES w/ Russian stim        SLB foam :30x2 ea B/L :30x2 ea B/L      Fitter board        Eccentric Leg press   *NV     Clam Shells        Heel-toe amb        Foam tandem EO/EC :30x1 ea B/L     EO/EC :30x1 ea B/L  :30x2 B/L :30 x1 B/L tandem  :30x1 B/L SLB   CSMi balance, \"+\" squat Sq 2x15 Sq 2x15  Sq 2x10 SQ 2x15   Therapeutic ACT        sidestepping                Mirror walking                Amb up/down steps        Chair squats        Reverse Lunges mirror        Step ups    8\" x10 FWD        Modalities        CP  KNEE                                       "

## 2025-04-28 ENCOUNTER — EVALUATION (OUTPATIENT)
Dept: PHYSICAL THERAPY | Facility: CLINIC | Age: 75
End: 2025-04-28
Attending: ORTHOPAEDIC SURGERY
Payer: MEDICARE

## 2025-04-28 DIAGNOSIS — M25.561 CHRONIC PAIN OF RIGHT KNEE: Primary | ICD-10-CM

## 2025-04-28 DIAGNOSIS — M17.11 PRIMARY OSTEOARTHRITIS OF RIGHT KNEE: ICD-10-CM

## 2025-04-28 DIAGNOSIS — G89.29 CHRONIC PAIN OF RIGHT KNEE: Primary | ICD-10-CM

## 2025-04-28 PROCEDURE — 97140 MANUAL THERAPY 1/> REGIONS: CPT | Performed by: PHYSICAL THERAPIST

## 2025-04-28 PROCEDURE — 97110 THERAPEUTIC EXERCISES: CPT | Performed by: PHYSICAL THERAPIST

## 2025-04-30 ENCOUNTER — RA CDI HCC (OUTPATIENT)
Dept: OTHER | Facility: HOSPITAL | Age: 75
End: 2025-04-30

## 2025-04-30 ENCOUNTER — OFFICE VISIT (OUTPATIENT)
Dept: PHYSICAL THERAPY | Facility: CLINIC | Age: 75
End: 2025-04-30
Payer: MEDICARE

## 2025-04-30 DIAGNOSIS — M17.11 PRIMARY OSTEOARTHRITIS OF RIGHT KNEE: ICD-10-CM

## 2025-04-30 DIAGNOSIS — M25.561 CHRONIC PAIN OF RIGHT KNEE: Primary | ICD-10-CM

## 2025-04-30 DIAGNOSIS — G89.29 CHRONIC PAIN OF RIGHT KNEE: Primary | ICD-10-CM

## 2025-04-30 PROCEDURE — 97140 MANUAL THERAPY 1/> REGIONS: CPT | Performed by: PHYSICAL THERAPIST

## 2025-04-30 PROCEDURE — 97112 NEUROMUSCULAR REEDUCATION: CPT | Performed by: PHYSICAL THERAPIST

## 2025-04-30 PROCEDURE — 97110 THERAPEUTIC EXERCISES: CPT | Performed by: PHYSICAL THERAPIST

## 2025-04-30 NOTE — PROGRESS NOTES
Daily Note     Today's date: 2025  Patient name: Jason Miguel  : 1950  MRN: [id number]  Referring provider: Denton Nichole,*  Dx:   Encounter Diagnosis     ICD-10-CM    1. Chronic pain of right knee  M25.561     G89.29       2. Primary osteoarthritis of right knee  M17.11                      Subjective: The patient reports feeling a 1/10 pain at the R knee.  The patient reports feeling more pain at the end of the day which he is able to manage with elevation and ice.  The patient notes he was able to use the extension brace twice a day.      Objective: See treatment diary below      Assessment: The patient was very pleasant and agreeable during the session today.  The patient tolerated the exercises well today.  The patient noted reduced pain with terminal knee extension during the manual therapy.  The patient reports feeling good at the end of the treatment today.  The patient will benefit from continued skilled physical therapy to progress towards achieving patient centered goals.        Plan: Continue per plan of care.  Progress treatment as tolerated.       Re-Evaluation:    Access Code [email or web address]   PRECAUTIONS:Hx L LE radiculopathy - flexion responder  Knee Specialty Daily Treatment Diary     Manual Therapy    Knee flexion/ extension Flex/ ext performed Flex/ ext performed DONE AD DONE AD Flex/ ext performed   Hamstring stretch  Calf stretch :15x4    :15x4 :15x4    :15x4 :30x3    :30x3 :30x3    :30x3 :15x4    :15x4   Prone Quad stretch :30x3 :30x3 NV 3x:30 :30x3   Patellar mobs X10 glides X10 glides x10 x10 x10   Knee stretch on edge of mat 1 min EXT X1 min EXT  X 1 min ea 1x1 min EXT       Ther Ex  4   Nu Step for LE strength S=9  A=10 L5 x5 min L5x 5 min L5 x 5 mins L5 mins L5 x 5 mins   Biodex Bike for knee ROM/ endurance S=12 L1 x5 min L2 x 10 min L1 x 5 mins L1 x 5 mins L1 x 5 min   Heel slides flex/abd        Knee EXT Board X4 min  "X4 min  X 4 mins X 5 mins X 4 mins   SAQ        Prone knee flex x10 x10   x10   Ankle pumps knee EXT        SLR all planes        Ball squeeze        LAQ x15 x15 x10  x15   Hamstring stretch        Calf stretch Wedge :30x3 Wedge :30x4 Wedge 3x:30  Wedge 3x:30   Standing hamstring curls        Hoist knee flex  Hoist knee ext   *3pl 2x10  *2pl 2x10 DOING AT GYM            Knee EXT home unit   Education done with proper donning/doffing and parameters for stretching  AD             Total gym squats (deep) NV NV 2x15  2x10   Neuro Re-ed        TKE Blue x20 Blue x20   BLUE :05 x 20   Gluteal set        QS T@a x10  Prone x10 T@a x10  Prone x10  Roll at heel x10 Prone x10   NMES w/ Russian stim        SLB foam :30x2 ea B/L :30x2 ea B/L  1x:30 B/L    Fitter board        Eccentric Leg press   *NV DOING AT GYM    Clam Shells        Heel-toe amb    10ft x4    Foam tandem EO/EC :30x1 ea B/L     EO/EC :30x1 ea B/L  1x:30ea B/L EO/EC :30 x1 B/L tandem  :30x1 B/L SLB   CSMi balance, \"+\" squat Sq 2x15 Sq 2x15  SQ 2x15 SQ 2x15   Therapeutic ACT        sidestepping                Mirror walking                Amb up/down steps        Chair squats        Reverse Lunges mirror        Step ups            Modalities        CP  KNEE                                     "

## 2025-05-01 ENCOUNTER — TELEPHONE (OUTPATIENT)
Dept: FAMILY MEDICINE CLINIC | Facility: CLINIC | Age: 75
End: 2025-05-01

## 2025-05-01 ENCOUNTER — OFFICE VISIT (OUTPATIENT)
Dept: FAMILY MEDICINE CLINIC | Facility: CLINIC | Age: 75
End: 2025-05-01
Payer: MEDICARE

## 2025-05-01 VITALS
SYSTOLIC BLOOD PRESSURE: 118 MMHG | WEIGHT: 192 LBS | DIASTOLIC BLOOD PRESSURE: 62 MMHG | HEART RATE: 82 BPM | BODY MASS INDEX: 32.78 KG/M2 | RESPIRATION RATE: 16 BRPM | TEMPERATURE: 98.1 F | HEIGHT: 64 IN | OXYGEN SATURATION: 98 %

## 2025-05-01 DIAGNOSIS — E78.00 HYPERCHOLESTEREMIA: ICD-10-CM

## 2025-05-01 DIAGNOSIS — E11.9 TYPE 2 DIABETES MELLITUS WITHOUT COMPLICATION, WITHOUT LONG-TERM CURRENT USE OF INSULIN (HCC): Primary | ICD-10-CM

## 2025-05-01 PROCEDURE — 99214 OFFICE O/P EST MOD 30 MIN: CPT | Performed by: NURSE PRACTITIONER

## 2025-05-01 PROCEDURE — G2211 COMPLEX E/M VISIT ADD ON: HCPCS | Performed by: NURSE PRACTITIONER

## 2025-05-01 NOTE — PROGRESS NOTES
Name: Jason Miguel      : 1950      MRN: [id number]  Encounter Provider: DREA Morin  Encounter Date: 2025   Encounter department: [facility] PRIMARY CARE  :  Assessment & Plan  Type 2 diabetes mellitus without complication, without long-term current use of insulin ([facility])    Lab Results   Component Value Date    HGBA1C 5.2 2025       Orders:    metFORMIN (GLUCOPHAGE) 500 mg tablet; Take 1 tablet (500 mg total) by mouth daily with breakfast    Hemoglobin A1C; Future    Comprehensive metabolic panel; Future    CBC and differential; Future    Hypercholesteremia    Orders:    Lipid Panel with Direct LDL reflex; Future           History of Present Illness   Here for 6 month medcheck and lab review  HgA1c now 5.2, will decrease Metformin to once daily from twice daily, will continue Ozempic 2mg weekly  Had knee replacement with Dr. Nichole- doing well with PT  Down 10+ pounds since visit 6 months ago      Review of Systems   Constitutional:  Negative for activity change, diaphoresis, fatigue and fever.   HENT:  Negative for congestion, facial swelling, rhinorrhea, sinus pressure, sinus pain, sneezing, sore throat and voice change.    Eyes:  Negative for discharge and visual disturbance.   Respiratory:  Negative for cough, choking, chest tightness, shortness of breath, wheezing and stridor.    Cardiovascular:  Negative for chest pain, palpitations and leg swelling.   Gastrointestinal:  Negative for abdominal distention, abdominal pain, constipation, diarrhea, nausea and vomiting.   Endocrine: Negative for polydipsia, polyphagia and polyuria.   Genitourinary:  Negative for difficulty urinating, dysuria, frequency and urgency.   Skin:  Negative for color change, rash and wound.   Neurological:  Negative for dizziness, syncope, speech difficulty, weakness, light-headedness and headaches.   Hematological:  Negative for adenopathy. Does not bruise/bleed easily.  "  Psychiatric/Behavioral:  Negative for agitation, behavioral problems, confusion, hallucinations, sleep disturbance and suicidal ideas. The patient is not nervous/anxious.        Objective   /62   Pulse 82   Temp 98.1 °F (36.7 °C) (Temporal)   Resp 16   Ht 5' 4\" (1.626 m)   Wt 87.1 kg (192 lb)   SpO2 98%   BMI 32.96 kg/m²      Physical Exam  Vitals and nursing note reviewed.   Constitutional:       General: He is not in acute distress.     Appearance: Normal appearance.   Cardiovascular:      Rate and Rhythm: Normal rate and regular rhythm.      Heart sounds: Normal heart sounds. No murmur heard.  Pulmonary:      Effort: Pulmonary effort is normal. No respiratory distress.      Breath sounds: Normal breath sounds. No wheezing.   Musculoskeletal:      Right lower leg: No edema.      Left lower leg: No edema.   Neurological:      Mental Status: He is alert and oriented to person, place, and time.   Psychiatric:         Mood and Affect: Mood normal.         Behavior: Behavior normal.         Thought Content: Thought content normal.         Judgment: Judgment normal.         "

## 2025-05-01 NOTE — ASSESSMENT & PLAN NOTE
Lab Results   Component Value Date    HGBA1C 5.2 04/23/2025       Orders:    metFORMIN (GLUCOPHAGE) 500 mg tablet; Take 1 tablet (500 mg total) by mouth daily with breakfast    Hemoglobin A1C; Future    Comprehensive metabolic panel; Future    CBC and differential; Future    
  Orders:    Lipid Panel with Direct LDL reflex; Future    
side effects of starting the prescribed medications. Patient continues to be non-toxic on re-evaluation. Findings were discussed with the patient and reasons to immediately return to the ED were articulated to them. They will follow-up with their PMD       Discharge Instructions:   Patient referred to  [address] [place] [address] 951, Toya Reddy, APRN - CNP  Jonathan Srivastava[address] [place] [address] [phone]    Call in 2 days        MEDICATIONS:   DISCHARGE MEDICATIONS:  Discharge Medication List as of 10/27/2023 12:15 PM        START taking these medications    Details   albuterol sulfate HFA (PROAIR HFA) 108 (90 Base) MCG/ACT inhaler Inhale 2 puffs into the lungs every 6 hours as needed for Wheezing Pediatric spacer with Mask, Disp-18 g, R-0Print      prednisoLONE (ORAPRED) 15 MG/5ML solution Take 5 mLs by mouth daily for 5 days, Disp-25 mL, R-0Print             DISCONTINUED MEDICATIONS:  Discharge Medication List as of 10/27/2023 12:15 PM        STOP taking these medications       albuterol (ACCUNEB) 0.63 MG/3ML nebulizer solution Comments:   Reason for Stopping:               Record Review:  Records Reviewed : None       Disposition Considerations: This patient's ED course included: a personal history and physicial examination and re-evaluation prior to disposition  This patient has remained hemodynamically stable during their ED course. I emphasized the importance of follow-up with the physician I referred them to in the timeframe recommended. I discussed with the patient emergent symptoms and the need to immediately return to the ER. Written information was included in their discharge instructions. Additional verbal discharge instructions were also given and discussed with the patient to supplement those generated by the EMR. We also discussed medications that were prescribed  (if any) including common side effects and interactions.  The patient was advised to abstain from driving, operating heavy machinery or

## 2025-05-01 NOTE — TELEPHONE ENCOUNTER
[employer] Patient Assistance Program Application Fax Completed and added to chart

## 2025-05-05 ENCOUNTER — OFFICE VISIT (OUTPATIENT)
Dept: PHYSICAL THERAPY | Facility: CLINIC | Age: 75
End: 2025-05-05
Attending: ORTHOPAEDIC SURGERY
Payer: MEDICARE

## 2025-05-05 DIAGNOSIS — M25.561 CHRONIC PAIN OF RIGHT KNEE: Primary | ICD-10-CM

## 2025-05-05 DIAGNOSIS — G89.29 CHRONIC PAIN OF RIGHT KNEE: Primary | ICD-10-CM

## 2025-05-05 DIAGNOSIS — M17.11 PRIMARY OSTEOARTHRITIS OF RIGHT KNEE: ICD-10-CM

## 2025-05-05 PROCEDURE — 97112 NEUROMUSCULAR REEDUCATION: CPT

## 2025-05-05 PROCEDURE — 97140 MANUAL THERAPY 1/> REGIONS: CPT

## 2025-05-05 PROCEDURE — 97110 THERAPEUTIC EXERCISES: CPT

## 2025-05-05 NOTE — PROGRESS NOTES
Daily Note     Today's date: 2025  Patient name: Jason Miguel  : 1950  MRN: [id number]  Referring provider: Denton Nichole,*  Dx:   Encounter Diagnosis     ICD-10-CM    1. Chronic pain of right knee  M25.561     G89.29       2. Primary osteoarthritis of right knee  M17.11           Start Time: 0845          Subjective: Patient states he had pain in joint line of right knee. Presently he is just stiff in the knee. He has been able to perform his extension stretching machine at 20 minutes at a time at home. He starts work next Monday.      Objective: See treatment diary below      Assessment: Tolerated treatment well. Patient would benefit from continued PT for stretching and strengthening. He was able to perform his exercises with few verbal cues for proper performance of exercises. He felt good leaving department.       Plan: Continue per plan of care.  Progress treatment as tolerated.       Re-Evaluation:    Access Code [email or web address]   PRECAUTIONS:Hx L LE radiculopathy - flexion responder  Knee Specialty Daily Treatment Diary     Manual Therapy  5   Knee flexion/ extension Flex/ ext performed Flex/ ext performed DONE AD DONE AD Xflex/ ext performed   Hamstring stretch  Calf stretch :15x4    :15x4 :15x4    :15x4 :30x3    :30x3 :30x3    :30x3 :15x4    :15x4   Prone Quad stretch :30x3 :30x3 NV 3x:30 :30x4   Patellar mobs X10 glides X10 glides x10 x10 x10   Knee stretch on edge of mat 1 min EXT X1 min EXT  X 1 min ea X1 min       Ther Ex  5/5   Nu Step for LE strength S=9  A=10 L5 x5 min L5x 5 min L5 x 5 mins L5 mins L5 x6 min   Biodex Bike for knee ROM/ endurance S=12 L1 x5 min L2 x 10 min L1 x 5 mins L1 x 5 mins L1 x8 min   Heel slides flex/abd        Knee EXT Board X4 min X4 min  X 4 mins X 5 mins X5 min   SAQ        Prone knee flex x10 x10      Ankle pumps knee EXT        SLR all planes        Ball squeeze        LAQ x15 x15 x10     Hamstring stretch   "      Calf stretch Wedge :30x3 Wedge :30x4 Wedge 3x:30  Wedge 3x:30   Standing hamstring curls        Hoist knee flex  Hoist knee ext   *3pl 2x10  *2pl 2x10 DOING AT GYM            Knee EXT home unit   Education done with proper donning/doffing and parameters for stretching  AD             Total gym squats (deep) NV NV 2x15     Neuro Re-ed        TKE Blue x20 Blue x20      Gluteal set        QS T@a x10  Prone x10 T@a x10  Prone x10  Roll at heel x10 Roll at heel x10   NMES w/ Russian stim        SLB foam :30x2 ea B/L :30x2 ea B/L  1x:30 B/L 1x :30 B/L   Fitter board        Eccentric Leg press   *NV DOING AT GYM    Clam Shells        Heel-toe amb    10ft x4 Cabinet x4   Foam tandem EO/EC :30x1 ea B/L     EO/EC :30x1 ea B/L  1x:30ea B/L EO/EC 1x :30 ea B/L EO/EC   CSMi balance, \"+\" squat Sq 2x15 Sq 2x15  SQ 2x15 Sq 2x15   Therapeutic ACT        sidestepping                Mirror walking                Amb up/down steps        Chair squats        Reverse Lunges mirror        Step ups            Modalities        CP  KNEE                                       "

## 2025-05-06 ENCOUNTER — PROCEDURE VISIT (OUTPATIENT)
Age: 75
End: 2025-05-06
Payer: MEDICARE

## 2025-05-06 VITALS — HEIGHT: 64 IN | BODY MASS INDEX: 32.78 KG/M2 | WEIGHT: 192 LBS

## 2025-05-06 DIAGNOSIS — E11.9 TYPE 2 DIABETES MELLITUS WITHOUT COMPLICATION, WITHOUT LONG-TERM CURRENT USE OF INSULIN (HCC): ICD-10-CM

## 2025-05-06 DIAGNOSIS — B35.1 ONYCHOMYCOSIS: Primary | ICD-10-CM

## 2025-05-06 PROCEDURE — 11721 DEBRIDE NAIL 6 OR MORE: CPT

## 2025-05-06 NOTE — PROGRESS NOTES
Jason Miguel  1950  AT RISK FOOT CARE    1. Onychomycosis        2. Type 2 diabetes mellitus without complication, without long-term current use of insulin (HCC)            Patient presents for at-risk foot care.  Patient has no acute concerns today.  Patient has significant lower extremity risk due to neuropathy, parasthesia, edema, and trophic skin changes to the lower extremity.    On exam patient has thickened, hypertrophic, discolored, brittle toenails with subungual debris and tenderness x10   Callus: 0  Patient has lower extremity edema  Patients skin is atrophic, thickened nails, and decreased pedal hair  Patient has decreased pinprick and vibratory sensation to his feet and paraesthesia     Today's treatment includes:  Debridement of toenails. Using nail nipper, cory, and curette, nails were sharply debrided, reduced in thickness and length. Devitalized nail tissue and fungal debris excised and removed. Patient tolerated well.        Discussed proper shoe gear, daily inspections of feet, and general foot health with patient. Patient has Q9  findings and is recommended for at risk foot care every 9-10 weeks.    Patients most recent complete clinical foot exam was on: 2/25/2025

## 2025-05-07 ENCOUNTER — APPOINTMENT (OUTPATIENT)
Dept: PHYSICAL THERAPY | Facility: CLINIC | Age: 75
End: 2025-05-07
Attending: ORTHOPAEDIC SURGERY
Payer: MEDICARE

## 2025-05-12 ENCOUNTER — OFFICE VISIT (OUTPATIENT)
Dept: PHYSICAL THERAPY | Facility: CLINIC | Age: 75
End: 2025-05-12
Attending: ORTHOPAEDIC SURGERY
Payer: MEDICARE

## 2025-05-12 DIAGNOSIS — Z47.1 AFTERCARE FOLLOWING RIGHT KNEE JOINT REPLACEMENT SURGERY: Primary | ICD-10-CM

## 2025-05-12 DIAGNOSIS — M17.11 PRIMARY OSTEOARTHRITIS OF RIGHT KNEE: ICD-10-CM

## 2025-05-12 DIAGNOSIS — M25.561 CHRONIC PAIN OF RIGHT KNEE: Primary | ICD-10-CM

## 2025-05-12 DIAGNOSIS — G89.29 CHRONIC PAIN OF RIGHT KNEE: Primary | ICD-10-CM

## 2025-05-12 DIAGNOSIS — Z96.651 AFTERCARE FOLLOWING RIGHT KNEE JOINT REPLACEMENT SURGERY: Primary | ICD-10-CM

## 2025-05-12 PROCEDURE — 97110 THERAPEUTIC EXERCISES: CPT

## 2025-05-12 PROCEDURE — 97140 MANUAL THERAPY 1/> REGIONS: CPT

## 2025-05-12 PROCEDURE — 97112 NEUROMUSCULAR REEDUCATION: CPT

## 2025-05-12 NOTE — PROGRESS NOTES
"Daily Note     Today's date: 2025  Patient name: Jason Miguel  : 1950  MRN: [id number]  Referring provider: Denton Nichole,*  Dx:   Encounter Diagnosis     ICD-10-CM    1. Chronic pain of right knee  M25.561     G89.29       2. 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 Overall \"it is not bad\". He feels if he could get a better knee bend he would be all set. It takes a couple of stretches before the knee bends.       Objective: See treatment diary below    Reviewed several positions and activities he can perform for a abetter knee flexion stretch.     Assessment: Tolerated treatment well. Patient would benefit from continued PT for stretching and strengthening. He was able to add hoist flex stretch to program without difficulty. Patient felt looser by the end of the session.        Plan: Continue per plan of care.  Progress treatment as tolerated.       Re-Evaluation:    Access Code [email or web address]   PRECAUTIONS:Hx L LE radiculopathy - flexion responder  Knee Specialty Daily Treatment Diary     Manual Therapy  5   Knee flexion/ extension Flex/ ext Flex/ ext performed DONE AD DONE AD Xflex/ ext performed   Hamstring stretch  Calf stretch :15x4    :15x4 :15x4    :15x4 :30x3    :30x3 :30x3    :30x3 :15x4    :15x4   Prone Quad stretch :30x3 :30x3 NV 3x:30 :30x4   Patellar mobs X10 glides X10 glides x10 x10 x10   Knee stretch on edge of mat Flex x1 min X1 min EXT  X 1 min ea X1 min       Ther Ex  5/   Nu Step for LE strength S=9  A=10 L5 x5 min L5x 5 min L5 x 5 mins L5 mins L5 x6 min   Biodex Bike for knee ROM/ endurance S=12 L2 x5 min L2 x 10 min L1 x 5 mins L1 x 5 mins L1 x8 min   Heel slides flex/abd        Knee EXT Board X5 min X4 min  X 4 mins X 5 mins X5 min   SAQ        Prone knee flex  x10      Ankle " "pumps knee EXT        SLR all planes        Ball squeeze        LAQ  x15 x10     Hamstring stretch        Calf stretch Wedge :30x4 Wedge :30x4 Wedge 3x:30  Wedge 3x:30   Standing hamstring curls        Hoist knee flex  Hoist knee ext Flex stretch x2 min  *3pl 2x10  *2pl 2x10 DOING AT GYM            Knee EXT home unit   Education done with proper donning/doffing and parameters for stretching  AD             Total gym squats (deep)  NV 2x15     Neuro Re-ed        TKE  Blue x20      Gluteal set        QS T@A x10 T@a x10  Prone x10  Roll at heel x10 Roll at heel x10   NMES w/ Russian stim        SLB foam  :30x2 ea B/L  1x:30 B/L 1x :30 B/L   Fitter board        Eccentric Leg press   *NV DOING AT GYM    Clam Shells        Heel-toe amb 10ft x4 ea FWD/BWD   10ft x4 Cabinet x4   Foam tandem 1x :30 ea B/L EO/EC EO/EC :30x1 ea B/L  1x:30ea B/L EO/EC 1x :30 ea B/L EO/EC   CSMi balance, \"+\" squat Sq 2x15 Sq 2x15  SQ 2x15 Sq 2x15   Therapeutic ACT        sidestepping                Mirror walking                Amb up/down steps        Chair squats        Reverse Lunges mirror        Step ups            Modalities        CP  KNEE                                         "

## 2025-05-14 ENCOUNTER — OFFICE VISIT (OUTPATIENT)
Dept: PHYSICAL THERAPY | Facility: CLINIC | Age: 75
End: 2025-05-14
Attending: ORTHOPAEDIC SURGERY
Payer: MEDICARE

## 2025-05-14 DIAGNOSIS — G89.29 CHRONIC PAIN OF RIGHT KNEE: Primary | ICD-10-CM

## 2025-05-14 DIAGNOSIS — M17.11 PRIMARY OSTEOARTHRITIS OF RIGHT KNEE: ICD-10-CM

## 2025-05-14 DIAGNOSIS — M25.561 CHRONIC PAIN OF RIGHT KNEE: Primary | ICD-10-CM

## 2025-05-14 PROCEDURE — 97110 THERAPEUTIC EXERCISES: CPT | Performed by: PHYSICAL THERAPIST

## 2025-05-14 PROCEDURE — 97140 MANUAL THERAPY 1/> REGIONS: CPT | Performed by: PHYSICAL THERAPIST

## 2025-05-14 PROCEDURE — 97112 NEUROMUSCULAR REEDUCATION: CPT | Performed by: PHYSICAL THERAPIST

## 2025-05-14 NOTE — PROGRESS NOTES
Daily Note     Today's date: 2025  Patient name: Jason Miguel  : 1950  MRN: [id number]  Referring provider: Denton Nichole,*  Dx:   Encounter Diagnosis     ICD-10-CM    1. Chronic pain of right knee  M25.561     G89.29       2. Primary osteoarthritis of right knee  M17.11                      Subjective: The patient notes he is not having problems at the R knee currently.      Objective: See treatment diary below      Assessment: The patient tolerated all activities well today.  The patient exhibited improved knee extension ROM.  There were no complaints of increased pain or problems after the session today.  The patient will benefit from continued skilled physical therapy to progress towards achieving patient centered goals.         Plan: Continue per plan of care.  Progress treatment as tolerated.       Re-Evaluation:    Access Code [email or web address]   PRECAUTIONS:Hx L LE radiculopathy - flexion responder  Knee Specialty Daily Treatment Diary     Manual Therapy  5   Knee flexion/ extension Flex/ ext DONE AD DONE AD DONE AD Xflex/ ext performed   Hamstring stretch  Calf stretch :15x4    :15x4 :15x4    :15x4 :30x3    :30x3 :30x3    :30x3 :15x4    :15x4   Prone Quad stretch :30x3 :30x3 NV 3x:30 :30x4   Patellar mobs X10 glides X10 ea x10 x10 x10   Knee stretch on edge of mat Flex x1 min   X 1 min ea X1 min       Ther Ex  5/   Nu Step for LE strength S=9  A=10 L5 x5 min L5 x 5min L5 x 5 mins L5 mins L5 x6 min   Biodex Bike for knee ROM/ endurance S=12 L2 x5 min L2 x 5 mins L1 x 5 mins L1 x 5 mins L1 x8 min   Heel slides flex/abd        Knee EXT Board X5 min  X 5 mins  X 4 mins X 5 mins X5 min   SAQ        Prone knee flex  x10      Ankle pumps knee EXT        SLR all planes        Ball squeeze        LAQ   x10     Hamstring stretch        Calf stretch Wedge :30x4 Wedge :30x3 Wedge 3x:30  Wedge 3x:30   Standing hamstring curls        Hoist knee flex  Hoist  "knee ext Flex stretch x2 min Flex stretch 3 pl x 3 mins *3pl 2x10  *2pl 2x10 DOING AT GYM            Knee EXT home unit   Education done with proper donning/doffing and parameters for stretching  AD             Total gym squats (deep)   2x15     Neuro Re-ed        TKE        Gluteal set        QS T@A x10 x10  Roll at heel x10 Roll at heel x10   NMES w/ Russian stim        SLB foam    1x:30 B/L 1x :30 B/L   Fitter board        Eccentric Leg press  reviewed *NV DOING AT GYM    Clam Shells        Heel-toe amb 10ft x4 ea FWD/BWD 10ft x 6 fwd/back  10ft x4 Cabinet x4   Foam tandem 1x :30 ea B/L EO/EC 1x:30 B/L ea EO/EC  1x:30ea B/L EO/EC 1x :30 ea B/L EO/EC   CSMi balance, \"+\" squat Sq 2x15   SQ 2x15 Sq 2x15   Therapeutic ACT        sidestepping                Mirror walking                Amb up/down steps        Chair squats        Reverse Lunges mirror        Step ups            Modalities        CP  KNEE                                           "

## 2025-05-19 ENCOUNTER — OFFICE VISIT (OUTPATIENT)
Dept: PHYSICAL THERAPY | Facility: CLINIC | Age: 75
End: 2025-05-19
Attending: ORTHOPAEDIC SURGERY
Payer: MEDICARE

## 2025-05-19 DIAGNOSIS — M17.11 PRIMARY OSTEOARTHRITIS OF RIGHT KNEE: ICD-10-CM

## 2025-05-19 DIAGNOSIS — G89.29 CHRONIC PAIN OF RIGHT KNEE: Primary | ICD-10-CM

## 2025-05-19 DIAGNOSIS — M25.561 CHRONIC PAIN OF RIGHT KNEE: Primary | ICD-10-CM

## 2025-05-19 PROCEDURE — 97110 THERAPEUTIC EXERCISES: CPT

## 2025-05-19 NOTE — PROGRESS NOTES
PT Re-Evaluation  and PT Discharge    Today's date: 2025  Patient name: Jason Miguel  : 1950  MRN: [id number]  Referring provider: Denton Nichole,*  Dx:   Encounter Diagnosis     ICD-10-CM    1. Chronic pain of right knee  M25.561     G89.29       2. Primary osteoarthritis of right knee  M17.11                        Assessment  Symptom irritability: low    Assessment details: Jason Miguel is a 74 y.o. male with a history of arthritis, DM, BMI>30, chronic pain, and high cholesterol that presents for a physical therapy re-evaluation/ DISCHARGE.  The patient demonstrates signs and symptoms consistent with chronic pain R knee, R knee OA, 11 weeks s/p R TKA.  During the examination the patient demonstrated improved but impaired R LE strength, improved  R knee ROM, improved gait, and decreased R knee pain.  The patient has made functional gains since last therapy assessment.   The patient is now able to descend the steps normally.  The patient is able to kneel on a soft surface, and he feels he is able to full straighten the knee into extension.   The patient will be discharged from formal PT to an independent [facility]          Understanding of Dx/Px/POC: good     Prognosis: good  Prognosis details: Positive prognostic indicators include: positive attitude toward recovery, good understanding of diagnosis/treatment plan, and absence of observed red flags.      Negative prognostic indicators include: Significant medical Hx, chronicity of condition    Goals  GOALS TO BE ACHIEVED BY THE END OF THE PRE-OP VISIT  1.  Patient have all questions answered / receive pre operative education regarding precautions/wound care//ADL's.  MET   2.  Patient to be independent with his phase 1 post operative exercises to perform pre surgery.  MET       GOALS TO BE ACHIEVED POST OPERATIVELY    STG: Achieve in 4-6 weeks  1.  Patient's R knee pain at worst less than 2/10 to allow for proper gait. MET  2.  Patient's R  knee ROM improve to 0-90 degrees to improve squatting. MET  3.  R LE MMT improve to > 4+/5 all motions tested to improve ADL/recreational activities. Partially met  4.  Timed up and go score improve to less than 14 seconds to indicate improved balance/decreased falls risk. MET    LTG:  Achieve in 6-12 weeks  1.  Patient's knee FOTO score improve to > 65% to indicate a return to normal functioning. MET  2.  Patient achieve personal goal of normal function. MET  3. Patient to achieve independence with home exercise plan. MET  4. 30 second sit to stand score improve to > 14 stands to indicate improved transfers. MET    GOAL(s) MADE DURING POC UPDATE DONE 4/28/25 TO BE ACHIEVED IN 4-12 WEEKS  1. Patient to return to being able to straighten out his R knee fully to rest/sleep without disturbance without difficulty. MET    Plan    Treatment plan discussed with: PTA and patient  Plan details: D/C PT TO AN INDEPENDENT HEP        Subjective Evaluation    History of Present Illness  Date of surgery: 2/27/2025  Mechanism of injury: surgery  Mechanism of injury: SUBJECTIVE: 5/21/25: The patient reports significant improvement since last therapy assessment.  The patient is now able to descend the steps normally.  The patient is able to kneel on a soft surface, and he feels he is able to full straighten the knee into extension.   The patient will be discharged from formal PT to an independent HEP        SUBJECTIVE: 4/28/25:  The patient presents for PT RE-evaluation / POC UPDATE today.   The patient reports improvement since last therapy assessment.  The patient is now able to ambulate up and down the steps.  The patient has not attempted to dig or kneel on the R knee.  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  The patient notes he is unable to fully straighten out his R knee at rest.  The patient will benefit from continued PT focused on  achieving patient specific goals.        SUBJECTIVE: 3/31/25: The patient reports improvement since last therapy assessment.  The patient is now able to ambulate with [facility] community distances.  The patient is able to ambulate short distances at home without any AD.  The patient continues to have difficulty with tightness at his R calf. The patient notes most difficulty ambulating up/down the steps normally.  The patient is fearful he doesn't have enough strength to support himself.  The patient will benefit from continued PT focused on achieving patient specific goals.          SUBJECTIVE: 3/3/25: The patient is now s/p R TKA done by .  The patient presents for a post-op PT re-evaluation today.  The patient reports soreness and pain at the R anterior/lateral thigh.  The patient notes persistent difficulty with prolonged standing/walking, difficulty squatting/kneeling, difficulty stair-climbing, and the patient is unable to work as a .  The patient denies any N/T at the R LE.  The patient denies any post-operative complications.  The patient will benefit from continued PT focused on achieving patient specific goals.            INJURY HISTORY: Jason Miguel is a 74 y.o. male that presents to outpatient physical therapy with complaints of R knee pain and difficulty functioning.  The patient reports chronic R knee pain impacting his function despite attempting conservative care.  The patient notes difficulty with pivoting while walking, carrying laundry, squatting/kneeling, and ADL's.  The patient denies any N/T at the R LE.  The patient presents pre-operatively to get objective measures, learn phase 1 exercises, and receive education on the surgery/self care.  The patient's main goal for physical therapy is to perform ADL's and walk pain free.             Recurrent probem    Patient Goals  Patient goal: perform ADL's and walk pain free - progressing- MET  Pain  Current pain ratin  At best  pain ratin  At worst pain ratin  Location: R medial knee  Quality: dull ache and sharp  Relieving factors: rest  Progression: improved    Social Support  Steps to enter house: yes  Stairs in house: yes   Lives in: multiple-level home  Lives with: spouse    Employment status: working (drive school bus)  Treatments  Previous treatment: physical therapy    Objective     Observations     Additional Observation Details  Patient's postoperative incisions are well healed - patient is aware of scar massage.          Palpation     Additional Palpation Details  NO TTP    Tenderness     Additional Tenderness Details  NO TTP    Neurological Testing     Sensation     Knee   Left Knee   Intact: Light touch    Right Knee   Intact: light touch     Active Range of Motion   Left Knee   Flexion: 121 degrees   Extension: 2 degrees     Right Knee   Flexion: 110 degrees   Extension: 0 degrees     Passive Range of Motion   Left Knee   Flexion: WFL    Right Knee   Flexion: 114 degrees   Extension: 1 degrees     Mobility   Patellar Mobility:   Left Knee   WFL: medial, lateral, superior and inferior.     Right Knee   WFL: medial, lateral, superior and inferior    Strength/Myotome Testing     Left Hip   Planes of Motion   Flexion: 4  Extension: 4  Abduction: 4  Adduction: 4  External rotation: 4  Internal rotation: 4    Right Hip   Planes of Motion   Flexion: 4  Extension: 4  Abduction: 4  Adduction: 4  External rotation: 4  Internal rotation: 4    Left Knee   Flexion: 5  Extension: 5  Quadriceps contraction: good    Right Knee   Flexion: 4+  Extension: 4+  Quadriceps contraction: good    Additional Strength Details  IMPROVED BUT IMPAIRED    Tests     Additional Tests Details  FOTO: 44% ( predicted 61%) ( improved)      Gait impairments: Patient ambulates with no AD WBAT R LE.  The patient demonstrates improved gait speed, (+) flexed R knee, more equal step lengths, and improved but impaired R knee terminal knee extension at heel  "strike.    TUG: 3/31: 10 sec no AD  3/3: 35 SEC RW  PRE: 9 sec    30SSTS: 5/2116 stands  3/31: staggered feet 16 times R foot forward  3/3: NT  PRE: 14 stands 0H                 Re-Evaluation:5/27    Access Code [email or web address]   PRECAUTIONS:Hx L LE radiculopathy - flexion responder  Knee Specialty Daily Treatment Diary       Manual Therapy 5/12 5/14 5/19 5/21 5/5   Knee flexion/ extension Flex/ ext DONE AD  DONE Xflex/ ext performed   Hamstring stretch  Calf stretch :15x4    :15x4 :15x4    :15x4   :15x4    :15x4   Prone Quad stretch :30x3 :30x3  :30 x4 :30x4   Patellar mobs X10 glides X10 ea  done x10   Knee stretch on edge of mat Flex x1 min    X1 min       Ther Ex 5/12 5/14 5/19 5/21 5/5   Nu Step for LE strength S=9  A=10 L5 x5 min L5 x 5min L5x5 min L5 x 5 mins L5 x6 min   Biodex Bike for knee ROM/ endurance S=12 L2 x5 min L2 x 5 mins  L1 x 5 mins L1 x8 min   Heel slides flex/abd        Knee EXT Board X5 min  X 5 mins  X 5 mins X5 min   SAQ        Prone knee flex  x10  3x:30    Ankle pumps knee EXT        SLR all planes        Hip ER     Instructed for HEP    LAQ        Hamstring stretch    Instructed for HEP    Calf stretch Wedge :30x4 Wedge :30x3   Wedge 3x:30   Standing hamstring curls        Hoist knee flex  Hoist knee ext Flex stretch x2 min Flex stretch 3 pl x 3 mins      Leg press S=5    2x10 82.5#    Clam shell    Instructed for HEP    Knee flexion on step    Done for HEP    Total gym squats (deep)        Neuro Re-ed        TKE        Gluteal set        QS T@A x10 x10   Roll at heel x10   NMES w/ Russian stim        SLB foam     1x :30 B/L   Fitter board        Eccentric Leg press  reviewed              Heel-toe amb 10ft x4 ea FWD/BWD 10ft x 6 fwd/back   Cabinet x4   Foam tandem 1x :30 ea B/L EO/EC 1x:30 B/L ea EO/EC   1x :30 ea B/L EO/EC   CSMi balance, \"+\" squat Sq 2x15    Sq 2x15   Therapeutic ACT        sidestepping                Mirror walking                Amb up/down steps        Chair squats      "   Reverse Lungstormy mirror        Step ups            Modalities        CP  KNEE                               The patient was given a new home exercise plan with instruction, pictures, and verbal feedback.  The patient accepts and understands the new home activities.

## 2025-05-19 NOTE — PROGRESS NOTES
Daily Note     Today's date: 2025  Patient name: Jason Miguel  : 1950  MRN: [id number]  Referring provider: Denton Nichole,*  Dx:   Encounter Diagnosis     ICD-10-CM    1. Chronic pain of right knee  M25.561     G89.29       2. Primary osteoarthritis of right knee  M17.11           Start Time: 0843  Stop Time: 08  Total time in clinic (min): 8 minutes    Subjective: Patient states his entire right knee is achy. He thinks from not doing much yesterday.       Objective: See treatment diary below    Annita brought back extensionator.     Assessment: Tolerated treatment well. Patient would benefit from continued PT for stretching and strengthening. While performing his exercises, he got a phone call. He had to leave due to an issue with the [employer] house. He will return next scheduled visit and do his HEP till then.       Plan: Continue per plan of care.  Progress treatment as tolerated.       Re-Evaluation:    Access Code [email or web address]   PRECAUTIONS:Hx L LE radiculopathy - flexion responder  Knee Specialty Daily Treatment Diary     Manual Therapy  55   Knee flexion/ extension Flex/ ext DONE AD  DONE AD Xflex/ ext performed   Hamstring stretch  Calf stretch :15x4    :15x4 :15x4    :15x4  :30x3    :30x3 :15x4    :15x4   Prone Quad stretch :30x3 :30x3  3x:30 :30x4   Patellar mobs X10 glides X10 ea  x10 x10   Knee stretch on edge of mat Flex x1 min   X 1 min ea X1 min       Ther Ex  5/5   Nu Step for LE strength S=9  A=10 L5 x5 min L5 x 5min L5x5 min L5 mins L5 x6 min   Biodex Bike for knee ROM/ endurance S=12 L2 x5 min L2 x 5 mins  L1 x 5 mins L1 x8 min   Heel slides flex/abd        Knee EXT Board X5 min  X 5 mins  X 5 mins X5 min   SAQ        Prone knee flex  x10      Ankle pumps knee EXT        SLR all planes        Ball squeeze        LAQ        Hamstring stretch        Calf stretch Wedge :30x4 Wedge :30x3   Wedge 3x:30   Standing hamstring curls       "  Hoist knee flex  Hoist knee ext Flex stretch x2 min Flex stretch 3 pl x 3 mins  DOING AT GYM            Knee EXT home unit                Total gym squats (deep)        Neuro Re-ed        TKE        Gluteal set        QS T@A x10 x10  Roll at heel x10 Roll at heel x10   NMES w/ Russian stim        SLB foam    1x:30 B/L 1x :30 B/L   Fitter board        Eccentric Leg press  reviewed  DOING AT GYM    Clam Shells        Heel-toe amb 10ft x4 ea FWD/BWD 10ft x 6 fwd/back  10ft x4 Cabinet x4   Foam tandem 1x :30 ea B/L EO/EC 1x:30 B/L ea EO/EC  1x:30ea B/L EO/EC 1x :30 ea B/L EO/EC   CSMi balance, \"+\" squat Sq 2x15   SQ 2x15 Sq 2x15   Therapeutic ACT        sidestepping                Mirror walking                Amb up/down steps        Chair squats        Reverse Lunges mirror        Step ups            Modalities        CP  KNEE                                             "

## 2025-05-21 ENCOUNTER — EVALUATION (OUTPATIENT)
Dept: PHYSICAL THERAPY | Facility: CLINIC | Age: 75
End: 2025-05-21
Attending: ORTHOPAEDIC SURGERY
Payer: MEDICARE

## 2025-05-21 DIAGNOSIS — M25.561 CHRONIC PAIN OF RIGHT KNEE: Primary | ICD-10-CM

## 2025-05-21 DIAGNOSIS — M17.11 PRIMARY OSTEOARTHRITIS OF RIGHT KNEE: ICD-10-CM

## 2025-05-21 DIAGNOSIS — G89.29 CHRONIC PAIN OF RIGHT KNEE: Primary | ICD-10-CM

## 2025-05-21 PROCEDURE — 97110 THERAPEUTIC EXERCISES: CPT | Performed by: PHYSICAL THERAPIST

## 2025-05-21 PROCEDURE — 97140 MANUAL THERAPY 1/> REGIONS: CPT | Performed by: PHYSICAL THERAPIST

## 2025-05-22 RX ORDER — MV-MIN/FA/VIT K/LUTEIN/ZEAXANT 200MCG-5MG
CAPSULE ORAL
COMMUNITY

## 2025-05-27 ENCOUNTER — HOSPITAL ENCOUNTER (OUTPATIENT)
Dept: RADIOLOGY | Facility: HOSPITAL | Age: 75
Discharge: HOME | End: 2025-05-27
Attending: ORTHOPAEDIC SURGERY
Payer: MEDICARE

## 2025-05-27 VITALS — BODY MASS INDEX: 32.96 KG/M2 | HEIGHT: 64 IN

## 2025-05-27 DIAGNOSIS — Z47.1 AFTERCARE FOLLOWING RIGHT KNEE JOINT REPLACEMENT SURGERY: ICD-10-CM

## 2025-05-27 DIAGNOSIS — Z96.651 AFTERCARE FOLLOWING RIGHT KNEE JOINT REPLACEMENT SURGERY: Primary | ICD-10-CM

## 2025-05-27 DIAGNOSIS — Z96.651 AFTERCARE FOLLOWING RIGHT KNEE JOINT REPLACEMENT SURGERY: ICD-10-CM

## 2025-05-27 DIAGNOSIS — Z47.1 AFTERCARE FOLLOWING RIGHT KNEE JOINT REPLACEMENT SURGERY: Primary | ICD-10-CM

## 2025-05-27 PROCEDURE — 73560 X-RAY EXAM OF KNEE 1 OR 2: CPT

## 2025-05-27 PROCEDURE — 99024 POSTOP FOLLOW-UP VISIT: CPT | Performed by: ORTHOPAEDIC SURGERY

## 2025-05-27 NOTE — PROGRESS NOTES
"Encounter Provider: Denton Nichole MD   Encounter Date: 05/27/25  Encounter department: [facility] [place]         ASSESSMENT & PLAN:  Assessment & Plan  Aftercare following right knee joint replacement surgery  3 months s/p right TKA with robot, 2/27/2025   He is doing well.    He is encouraged to remain active including HEP.    The patient is counseled on prophylactic antibiotic use prior to dental visits.    He should follow up in 3 months              To do next visit:  Return in about 3 months (around 8/27/2025).    Scribe Attestation      I,:  Franklin Johnson MA am acting as a scribe while in the presence of the attending physician.:       I,:  Denton Nichole MD personally performed the services described in this documentation    as scribed in my presence.:             ____________________________________________________  CHIEF COMPLAINT:  Chief Complaint   Patient presents with    Right Knee - Post-op         SUBJECTIVE:  Jason Miguel is a 74 y.o. male who presents 3 months s/p right TKA with robot, 2/27/2025.  He is doing well.  Today he has minimal right knee soreness.  He has completed physical therapy with benefit.  He denies fever, chills or shortness of breath.             PAST MEDICAL HISTORY:  Past Medical History[1]    PAST SURGICAL HISTORY:  Past Surgical History[2]    FAMILY HISTORY:  Family History[3]    SOCIAL HISTORY:  Social History[4]    MEDICATIONS:  Current Medications[5]    ALLERGIES:  Allergies[6]    LABS:  HgA1c:   Lab Results   Component Value Date    HGBA1C 5.2 04/23/2025     BMP:   Lab Results   Component Value Date    CALCIUM 9.6 04/23/2025     04/05/2018    K 4.2 04/23/2025    CO2 27 04/23/2025     04/23/2025    BUN 15 04/23/2025    CREATININE 0.81 04/23/2025     CBC: No components found for: \"CBC\"    _____________________________________________________  PHYSICAL EXAMINATION:  Vital signs: Ht 5' 4\" (1.626 m)   BMI 32.96 " "kg/m²   General: No acute distress, awake and alert  Psychiatric: Mood and affect appear appropriate  HEENT: Trachea Midline, No torticollis, no apparent facial trauma  Cardiovascular: No audible murmurs; Extremities appear perfused  Pulmonary: No audible wheezing or stridor  Skin: No open lesions; see further details (if any) below    Ortho Exam:  Right knee:  Well healed anterior incision  No erythema and no ecchymosis  Stable to varus and valgus stress  Extensor mechanism intact  Extension 0  Flexion 115  Calf compartments soft and supple  Sensation intact  Toes are warm sensate and mobile          _____________________________________________________  STUDIES REVIEWED:    The attending physician has personally reviewed the pertinent films in [facility] and interpretation is as follows:  Right knee x-ray:  Well aligned prosthesis with no acute changes.      PROCEDURES PERFORMED:  Procedures      None Preformed       Portions of the record may have been created with voice recognition software.  Occasional wrong word or \"sound a like\" substitutions may have occurred due to the inherent limitations of voice recognition software.  Read the chart carefully and recognize, using context, where substitutions have occurred.             [1]   Past Medical History:  Diagnosis Date    Arthritis     PONV (postoperative nausea and vomiting)    [2]   Past Surgical History:  Procedure Laterality Date    COLONOSCOPY      ELBOW SURGERY Bilateral     FOOT TENDON SURGERY Bilateral     heel    HAND SURGERY      TN ARTHRP KNE CONDYLE&PLATU MEDIAL&LAT COMPARTMENTS Left 8/17/2023    Procedure: ARTHROPLASTY KNEE TOTAL W ROBOT;  Surgeon: Denton Nichole MD;  Location: [facility];  Service: Orthopedics    TN ARTHRP KNE CONDYLE&PLATU MEDIAL&LAT COMPARTMENTS Right 2/27/2025    Procedure: ARTHROPLASTY KNEE TOTAL W ROBOT;  Surgeon: Denton Nichole MD;  Location:  MAIN OR;  Service: Orthopedics    TN ARTHRS KNE SURG W/MENISCECTOMY " MED/LAT W/SHVG Right 2022    Procedure: KNEE ARTHROSCOPY WITH medial MENISCECTOMY chondroplasty synovectomy injection;  Surgeon: Hank Shirley DO;  Location: [facility];  Service: Orthopedics    SD ARTHRS KNE SURG W/MENISCECTOMY MED/LAT W/SHVG Left 2023    Procedure: Left knee arthroscopy: Medial meniscectomy; Lateral meniscectomy; Chondroplasty; Synovectomy; Post arthroscopic injection of intra-articular joint space and peripheral portal;  Surgeon: Hank Shirley DO;  Location: [facility];  Service: Orthopedics    SD NEUROPLASTY &/TRANSPOS MEDIAN NRV CARPAL TUNNE Left 2021    Procedure: RELEASE CARPAL TUNNEL;  Surgeon: Hank Shirley DO;  Location:  MAIN OR;  Service: Orthopedics   [3]   Family History  Problem Relation Name Age of Onset    Heart disease Mother Mother         hx MI    Diabetes Mother Mother     Stroke Mother Mother     Arthritis Mother Mother     COPD Father Jason Miguel         84 yrs    Coronary artery disease Father Jason Miguel     Cancer Brother     [4]   Social History  Tobacco Use    Smoking status: Former     Current packs/day: 0.00     Average packs/day: 1 pack/day for 35.0 years (35.0 ttl pk-yrs)     Types: Cigarettes     Start date:      Quit date:      Years since quittin.4    Smokeless tobacco: Never   Vaping Use    Vaping status: Never Used   Substance Use Topics    Alcohol use: Yes     Comment: 2 x per week    Drug use: No   [5]   Current Outpatient Medications:     metFORMIN (GLUCOPHAGE) 500 mg tablet, Take 1 tablet (500 mg total) by mouth daily with breakfast, Disp: , Rfl:     Multiple Vitamins-Minerals (PreserVision AREDS 2+Multi Vit) CAPS, , Disp: , Rfl:     semaglutide, 2 mg/dose, (Ozempic) 8 mg/ mL injection pen, Inject 0.75 mL (2 mg total) under the skin every 7 days, Disp: 9 mL, Rfl: 1    simvastatin (ZOCOR) 20 mg tablet, TAKE 1 TABLET DAILY AT BEDTIME, Disp: 90 tablet, Rfl: 1    tamsulosin (FLOMAX) 0.4 mg, TAKE 2 CAPSULES DAILY  WITH DINNER, Disp: 180 capsule, Rfl: 1  [6] No Known Allergies

## 2025-05-28 DIAGNOSIS — N40.0 ENLARGED PROSTATE: ICD-10-CM

## 2025-05-28 RX ORDER — TAMSULOSIN HYDROCHLORIDE 0.4 MG/1
CAPSULE ORAL
Qty: 180 CAPSULE | Refills: 1 | Status: SHIPPED | OUTPATIENT
Start: 2025-05-28

## 2025-06-23 ENCOUNTER — TELEPHONE (OUTPATIENT)
Dept: FAMILY MEDICINE CLINIC | Facility: CLINIC | Age: 75
End: 2025-06-23

## 2025-07-15 ENCOUNTER — PROCEDURE VISIT (OUTPATIENT)
Age: 75
End: 2025-07-15
Payer: MEDICARE

## 2025-07-15 VITALS — WEIGHT: 192 LBS | HEIGHT: 64 IN | BODY MASS INDEX: 32.78 KG/M2

## 2025-07-15 DIAGNOSIS — B35.1 ONYCHOMYCOSIS: Primary | ICD-10-CM

## 2025-07-15 DIAGNOSIS — E11.9 TYPE 2 DIABETES MELLITUS WITHOUT COMPLICATION, WITHOUT LONG-TERM CURRENT USE OF INSULIN (HCC): ICD-10-CM

## 2025-07-15 PROCEDURE — 11721 DEBRIDE NAIL 6 OR MORE: CPT

## (undated) DEVICE — SPECIMEN TISSUE TRAP 12MM RIGID

## (undated) DEVICE — OCCLUSIVE GAUZE STRIP,3% BISMUTH TRIBROMOPHENATE IN PETROLATUM BLEND: Brand: XEROFORM

## (undated) DEVICE — TUBING ARTHROSCOPIC WAVE  MAIN PUMP

## (undated) DEVICE — PREP SURGICAL PURPREP 26ML

## (undated) DEVICE — GLOVE SRG BIOGEL 8

## (undated) DEVICE — VELYS BLADE SAW OSC 85 X 19 X 2MM

## (undated) DEVICE — NEEDLE 21 G X 1 1/2 SAFETY

## (undated) DEVICE — BLADE SHAVER EXCALIBUR 4MM 13CM COOLCUT

## (undated) DEVICE — READY WET SKIN SCRUB TRAY-LF: Brand: MEDLINE INDUSTRIES, INC.

## (undated) DEVICE — CONMED SUCTION CONNECTING TUBING, 20' LONG (6.1 M), 9/32" I.D. (7.1 MM): Brand: CONMED

## (undated) DEVICE — STRAP LITHOTOMY CANDY CANE

## (undated) DEVICE — ABDOMINAL PAD: Brand: DERMACEA

## (undated) DEVICE — SYRINGE 10ML LL

## (undated) DEVICE — BLADE SAW RECIP 12.5 X 76MM 0.9/0.9MM THCK

## (undated) DEVICE — DRAPE EQUIPMENT RF WAND

## (undated) DEVICE — LIGHT GLOVE GREEN

## (undated) DEVICE — BAG DECANTER

## (undated) DEVICE — SUT ETHILON 4-0 PS-2 18 IN 1667H

## (undated) DEVICE — NEEDLE 18 G X 1 1/2 SAFETY

## (undated) DEVICE — ACE WRAP 6 IN STERILE

## (undated) DEVICE — VELYS ROBOT-ASSISTED SOLUTION ARRAY DRILL PIN 125 MM X 4 MM: Brand: VELYS

## (undated) DEVICE — TOWEL SURG XR DETECT GREEN STRL RFD

## (undated) DEVICE — 4-PORT MANIFOLD: Brand: NEPTUNE 2

## (undated) DEVICE — 3 BONE CEMENT MIXER: Brand: MIXEVAC

## (undated) DEVICE — GLOVE INDICATOR PI UNDERGLOVE SZ 7.5 BLUE

## (undated) DEVICE — VELYS ROBOT DRAPE

## (undated) DEVICE — SPONGE PVP SCRUB WING STERILE

## (undated) DEVICE — GLOVE INDICATOR UNDERGLOVE SZ 7.5 GREEN

## (undated) DEVICE — WET SKIN PREP TRAY: Brand: MEDLINE INDUSTRIES, INC.

## (undated) DEVICE — COOL TEMP PAD

## (undated) DEVICE — TRAY FOLEY 16FR URIMETER SILICONE SURESTEP

## (undated) DEVICE — CUFF TOURNIQUET 30 X 4 IN QUICK CONNECT DISP 1BLA

## (undated) DEVICE — STERILE BETHLEHEM PLASTIC HAND: Brand: CARDINAL HEALTH

## (undated) DEVICE — SUT VICRYL PLUS 1 CTB-1 36 IN VCPB947H

## (undated) DEVICE — SUT ETHILON 3-0 PS-1 18 IN 1663H

## (undated) DEVICE — PADDING CAST 6IN COTTON STRL

## (undated) DEVICE — PAD CAST 6 IN COTTON NON STERILE

## (undated) DEVICE — PADDING CAST 4 IN  COTTON STRL

## (undated) DEVICE — STOCKINETTE,IMPERVIOUS,12X48,STERILE: Brand: MEDLINE

## (undated) DEVICE — CAST PADDING 6 IN SYNTHETIC STRL

## (undated) DEVICE — JP 3-SPRING RES W/10FR PVC DRAIN/TR: Brand: CARDINAL HEALTH

## (undated) DEVICE — PLUMEPEN PRO 10FT

## (undated) DEVICE — STOCKINETTE REGULAR

## (undated) DEVICE — HOOD: Brand: T7PLUS

## (undated) DEVICE — 3M™ IOBAN™ 2 ANTIMICROBIAL INCISE DRAPE 6650EZ: Brand: IOBAN™ 2

## (undated) DEVICE — TIBURON SPLIT SHEET: Brand: CONVERTORS

## (undated) DEVICE — HOOD WITH PEEL AWAY FACE SHIELD: Brand: T7PLUS

## (undated) DEVICE — SPLINT COMFORT 3 X 12

## (undated) DEVICE — VELYS SATEL DRAPE

## (undated) DEVICE — NO-SCRATCH ™ SMALL WHITNEY CURETTE ™ IS A SINGLE-USE, PLASTIC CURETTE FOR QUICKLY APPLYING, MANIPULATING AND REMOVING BONE CEMENT DURING HIP AND KNEE REPLACEMENT SURGERY. THE PLASTIC IS SOFTER THAN STEEL INSTRUMENTS, REDUCING THE RISK OF DAMAGING THE PROSTHESIS WITH METAL INSTRUMENTS.  THE CURETTE’S 6MM TIP REMOVES EXCESS CEMENT FROM REPLACEMENT HIPS AND KNEES. EASY-TO-MANEUVER, THE SMALL BLUE CURETTE LETS YOU REMOVE CEMENT FROM ALL EDGES OF THE PROSTHESIS.NO-SCRATCH WHITNEY SMALL CURETTE FEATURES:SAFER THAN STEEL- MADE OF PLASTIC - STURDY YET SOFTER THAN SURGICAL STEEL.HANDIER- EACH TOOL HAS A MOLDED-IN THUMB INDENTATION INSTANTLY ORIENTING THE TOOL.- EASIER TO MANEUVER IN HARD TO SEE PLACES.- COLOR-CODED FOR EASY IDENTIFICATION.FASTER- COMES INDIVIDUALLY PACKAGED IN STERILE, PEEL OPEN POUCH, READY TO GO.- APPLIES, MANIPULATES, OR REMOVES CEMENT WITH FINGERTIP PRECISION.ECONOMICAL- THE COST OF A SINGLE REVISION DWARFS THE COST OF A SINGLE-USE CURETTE. - DISPOSABLE – THERE’S NO NEED TO WASTE TIME REMOVING HARDENED CEMENT OR RE-STERILIZING TOOLS.- LESS EXPENSIVE TO BUY AND INVENTORY - ORDER ONLY THE TOOL YOU USE.- PACKAGED 25 INDIVIDUALLY WRAPPED TOOLS TO A CARTON FOR CONVENIENT SHELF STORAGE.: Brand: WHITNEY NO-SCRATCH CURETTE (SMALL)

## (undated) DEVICE — GLOVE SRG BIOGEL 6.5

## (undated) DEVICE — GLOVE SRG BIOGEL 7.5

## (undated) DEVICE — SILVER-COATED ANTIMICROBIAL BARRIER DRESSING: Brand: ACTICOAT   4" X 8"

## (undated) DEVICE — SYRINGE 5ML LL

## (undated) DEVICE — GLOVE INDICATOR PI UNDERGLOVE SZ 8.5 BLUE

## (undated) DEVICE — GLOVE INDICATOR PI UNDERGLOVE SZ 7 BLUE

## (undated) DEVICE — VELYS ROBOTIC-ASSISTED SOLUTION ARRAY SET - KNEE: Brand: VELYS

## (undated) DEVICE — 3M™ STERI-DRAPE™ U-DRAPE 1015: Brand: STERI-DRAPE™

## (undated) DEVICE — GLOVE INDICATOR PI UNDERGLOVE SZ 8 BLUE

## (undated) DEVICE — ACE WRAP 6 IN UNSTERILE

## (undated) DEVICE — BETHLEHEM UNIV TOTAL KNEE, KIT: Brand: CARDINAL HEALTH

## (undated) DEVICE — DUAL CUT SAGITTAL BLADE

## (undated) DEVICE — CAPIT KNEE ATTUNE FB W/DOM AOX

## (undated) DEVICE — BETHLEHEM UNIVERSAL  ARTHRO PK: Brand: CARDINAL HEALTH

## (undated) DEVICE — GAUZE SPONGES,16 PLY: Brand: CURITY

## (undated) DEVICE — RECIPROCATING BLADE, DOUBLE SIDED, OFFSET  (70.0 X 0.64 X 12.6MM)

## (undated) DEVICE — BAG POLY CLEAR 12 X 8 X 30

## (undated) DEVICE — ZIMMER® STERILE DISPOSABLE TOURNIQUET CUFF, DUAL PORT, SINGLE BLADDER, 18 IN. (46 CM)

## (undated) DEVICE — ASTOUND FABRIC REINFORCED SURGICAL GOWN: Brand: CONVERTORS

## (undated) DEVICE — DRAPE SHEET X-LG

## (undated) DEVICE — STIRRUP STRAP ADULT DISP

## (undated) DEVICE — ACE WRAP 6 IN XL STERILE

## (undated) DEVICE — TRAY FOLEY 16FR URIMETER SURESTEP

## (undated) DEVICE — DRESSING XEROFORM 2 X 2

## (undated) DEVICE — SAW BLADE OSCILLATING BRAZOL 167

## (undated) DEVICE — DRAPE SHEET THREE QUARTER

## (undated) DEVICE — SYRINGE 20ML LL

## (undated) DEVICE — SUT VICRYL PLUS 2-0 CTB-1 27 IN VCPB259H

## (undated) DEVICE — AMBIENT COVAC 50 IFS: Brand: COBLATION

## (undated) DEVICE — INTENDED FOR TISSUE SEPARATION, AND OTHER PROCEDURES THAT REQUIRE A SHARP SURGICAL BLADE TO PUNCTURE OR CUT.: Brand: BARD-PARKER ® CARBON RIB-BACK BLADES

## (undated) DEVICE — COBAN 6 IN STERILE

## (undated) DEVICE — ASTOUND STANDARD SURGICAL GOWN, XL: Brand: CONVERTORS

## (undated) DEVICE — URETERAL CATHETER ADAPTOR TIP

## (undated) DEVICE — HANDPIECE SET WITH RETRACTABLE COAXIAL FAN SPRAY TIP AND SUCTION TUBE: Brand: INTERPULSE

## (undated) DEVICE — GLOVE INDICATOR UNDERGLOVE SZ 8 GREEN

## (undated) DEVICE — SMARTGOWN SURGICAL GOWN, XL, LONG: Brand: CONVERTORS

## (undated) DEVICE — PROXIMATE PLUS MD MULTI-DIRECTIONAL RELEASE SKIN STAPLERS CONTAINS 35 STAINLESS STEEL STAPLES APPROXIMATE CLOSED DIMENSIONS: 6.9MM X 3.9MM WIDE: Brand: PROXIMATE